# Patient Record
Sex: FEMALE | Race: NATIVE HAWAIIAN OR OTHER PACIFIC ISLANDER | NOT HISPANIC OR LATINO | Employment: OTHER | URBAN - METROPOLITAN AREA
[De-identification: names, ages, dates, MRNs, and addresses within clinical notes are randomized per-mention and may not be internally consistent; named-entity substitution may affect disease eponyms.]

---

## 2017-01-05 DIAGNOSIS — I27.20 PULMONARY HTN (H): Primary | ICD-10-CM

## 2017-01-05 DIAGNOSIS — I50.9 CONGESTIVE HEART FAILURE, UNSPECIFIED CONGESTIVE HEART FAILURE CHRONICITY, UNSPECIFIED CONGESTIVE HEART FAILURE TYPE: ICD-10-CM

## 2017-01-05 RX ORDER — METOPROLOL TARTRATE 100 MG
100 TABLET ORAL 2 TIMES DAILY
Qty: 180 TABLET | Refills: 3 | Status: SHIPPED | OUTPATIENT
Start: 2017-01-05 | End: 2018-01-05

## 2017-01-05 RX ORDER — LOSARTAN POTASSIUM 50 MG/1
50 TABLET ORAL 2 TIMES DAILY
Qty: 180 TABLET | Refills: 3 | Status: SHIPPED | OUTPATIENT
Start: 2017-01-05 | End: 2017-05-15

## 2017-01-12 ENCOUNTER — TELEPHONE (OUTPATIENT)
Dept: NURSING | Facility: CLINIC | Age: 82
End: 2017-01-12

## 2017-01-12 NOTE — TELEPHONE ENCOUNTER
Patient is due for INR.   Spoke with patient. She has not been able to get here.   She will not be able to come in until next week and states that she will call to make an appointment.     No further questions or concerns at this time.  Katerine Harden RN   [facility]- Float Nurse

## 2017-01-17 NOTE — TELEPHONE ENCOUNTER
glipiZIDE (GLUCOTROL XL) 5 MG 24 hr tablet      Last Written Prescription Date: 10/18/2016  Last Fill Quantity: 90, # refills: 0  Last Office Visit with G, P or [facility] prescribing provider:  12/19/2016        BP Readings from Last 3 Encounters:   12/19/16 120/62   10/27/16 108/74   08/09/16 114/73     MICROL      103   11/10/2015  No results found for this basename: microalbumin  CREATININE   Date Value Ref Range Status   12/19/2016 1.89* 0.52 - 1.04 mg/dL Final   ]  GFR ESTIMATE   Date Value Ref Range Status   12/19/2016 25* >60 mL/min/1.7m2 Final     Comment:     Non  GFR Calc   10/27/2016 33* >60 mL/min/1.7m2 Final     Comment:     Non  GFR Calc   07/12/2016 29* >60 mL/min/1.7m2 Final     Comment:     Non  GFR Calc     GFR ESTIMATE IF BLACK   Date Value Ref Range Status   12/19/2016 31* >60 mL/min/1.7m2 Final     Comment:      GFR Calc   10/27/2016 40* >60 mL/min/1.7m2 Final     Comment:      GFR Calc   07/12/2016 35* >60 mL/min/1.7m2 Final     Comment:      GFR Calc     CHOL      144   5/19/2016  HDL       64   5/19/2016  LDL       68   5/19/2016  TRIG       63   5/19/2016  CHOLHDLRATIO      2.5   7/11/2014  AST       15   5/23/2016  ALT       19   12/19/2016  A1C      8.9   12/19/2016  A1C      7.0   7/12/2016  A1C      7.6   4/5/2016  A1C      7.0   11/10/2015  A1C      6.9   4/15/2015  POTASSIUM   Date Value Ref Range Status   12/19/2016 4.1 3.4 - 5.3 mmol/L Final

## 2017-01-18 RX ORDER — GLIPIZIDE 5 MG/1
TABLET, FILM COATED, EXTENDED RELEASE ORAL
Qty: 90 TABLET | Refills: 0 | OUTPATIENT
Start: 2017-01-18

## 2017-01-25 ENCOUNTER — OFFICE VISIT (OUTPATIENT)
Dept: OPHTHALMOLOGY | Facility: CLINIC | Age: 82
End: 2017-01-25
Payer: COMMERCIAL

## 2017-01-25 DIAGNOSIS — N18.30 CONTROLLED TYPE 2 DIABETES MELLITUS WITH STAGE 3 CHRONIC KIDNEY DISEASE, WITHOUT LONG-TERM CURRENT USE OF INSULIN (H): Primary | ICD-10-CM

## 2017-01-25 DIAGNOSIS — H53.411: ICD-10-CM

## 2017-01-25 DIAGNOSIS — H52.4 PRESBYOPIA: ICD-10-CM

## 2017-01-25 DIAGNOSIS — Z96.1 PSEUDOPHAKIA: ICD-10-CM

## 2017-01-25 DIAGNOSIS — E11.22 CONTROLLED TYPE 2 DIABETES MELLITUS WITH STAGE 3 CHRONIC KIDNEY DISEASE, WITHOUT LONG-TERM CURRENT USE OF INSULIN (H): Primary | ICD-10-CM

## 2017-01-25 PROCEDURE — 92015 DETERMINE REFRACTIVE STATE: CPT | Performed by: OPHTHALMOLOGY

## 2017-01-25 PROCEDURE — 92014 COMPRE OPH EXAM EST PT 1/>: CPT | Performed by: OPHTHALMOLOGY

## 2017-01-25 ASSESSMENT — EXTERNAL EXAM - RIGHT EYE: OD_EXAM: 1+ BROW PTOSIS

## 2017-01-25 ASSESSMENT — REFRACTION_WEARINGRX
OD_ADD: +3.00
OS_AXIS: 153
OD_AXIS: 045
OS_CYLINDER: +0.50
OD_CYLINDER: +0.50
SPECS_TYPE: BIFOCAL
OD_SPHERE: -1.00
OS_SPHERE: -1.50
OS_ADD: +3.00

## 2017-01-25 ASSESSMENT — EXTERNAL EXAM - LEFT EYE: OS_EXAM: 1+ BROW PTOSIS

## 2017-01-25 ASSESSMENT — VISUAL ACUITY
METHOD: SNELLEN - LINEAR
OD_CC: J1
OD_CC: 20/20
OS_CC: 20/20-1
OS_CC: J1

## 2017-01-25 ASSESSMENT — REFRACTION_MANIFEST
OS_CYLINDER: +0.50
OS_AXIS: 120
OD_SPHERE: -1.00
OD_AXIS: 035
OD_CYLINDER: +0.50
OS_SPHERE: -1.50
OD_ADD: +3.00
OS_ADD: +3.00

## 2017-01-25 ASSESSMENT — TONOMETRY
OS_IOP_MMHG: 13
OD_IOP_MMHG: 12
IOP_METHOD: APPLANATION

## 2017-01-25 ASSESSMENT — CONF VISUAL FIELD
OD_NORMAL: 1
OS_NORMAL: 1

## 2017-01-25 ASSESSMENT — CUP TO DISC RATIO
OS_RATIO: 0.5
OD_RATIO: 0.5

## 2017-01-25 ASSESSMENT — SLIT LAMP EXAM - LIDS
COMMENTS: 1+, DERMATOCHALASIS
COMMENTS: 1+, DERMATOCHALASIS

## 2017-01-25 NOTE — MR AVS SNAPSHOT
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   Possible posterior vitreous detachment (sudden onset large floater and/or flashing lights) discussed.   Glasses Rx given -optional   Call in September 2017 for an appointment in January 2018 for Complete Exam    Dr. Tavares [phone]      Diabetes weakens the blood vessels all over the body, including the eyes. Damage to the blood vessels in the eyes can cause swelling or bleeding into part of the eye (called the retina). This is called diabetic retinopathy (DENISSE-tin--pu-thee). If not treated, this disease can cause vision loss or blindness.   Symptoms may include blurred or distorted vision, but many people have no symptoms. It's important to see your eye doctor regularly to check for problems.   Early treatment and good control can help protect your vision. Here are the things you can do to help prevent vision loss:      1. Keep your blood sugar levels under tight control.      2. Bring high blood pressure under control.      3. No smoking.      4. Have yearly dilated eye exams.         Follow-ups after your visit        Your next 10 appointments already scheduled     Feb 20, 2017  2:30 PM   New Patient Visit with Emilie Ren MD   [facility] ([facility])    [address] [place] [id number]   [phone]            May 11, 2017 11:30 AM   (Arrive by 11:15 AM)  "  RETURN PRIMARY PULMONARY with Akbar Thompson MD   [facility] ([facility] [place])    [address] [facility] [address] [phone]              Who to contact     If you have questions or need follow up information about today's clinic visit or your schedule please contact [facility] directly at [phone].  Normal or non-critical lab and imaging results will be communicated to you by MyChart, letter or phone within 4 business days after the clinic has received the results. If you do not hear from us within 7 days, please contact the clinic through MyChart or phone. If you have a critical or abnormal lab result, we will notify you by phone as soon as possible.  Submit refill requests through [employer] or call your pharmacy and they will forward the refill request to us. Please allow 3 business days for your refill to be completed.          Additional Information About Your Visit        [employer]ine[employer]Talkpush Information     [employer]OTMP lets you send messages to your doctor, view your test results, renew your prescriptions, schedule appointments and more. To sign up, go to [email or web address] . Click on \"Log in\" on the left side of the screen, which will take you to the Welcome page. Then click on \"Sign up Now\" on the right side of the page.     You will be asked to enter the access code listed below, as well as some personal information. Please follow the directions to create your username and password.     Your access code is: [email or web address]  Expires: 2017  3:53 PM     Your access code will  in 90 days. If you need help or a new code, please call your [facility] clinic or [phone].        Care EveryWhere ID     This is your Care EveryWhere ID. This could be used by other organizations to access your [facility] medical records  [id number]         Blood Pressure from Last 3 Encounters:   16 120/62   10/27/16 108/74   16 114/73    " Weight from Last 3 Encounters:   10/27/16 115.849 kg (255 lb 6.4 oz)   08/09/16 115.123 kg (253 lb 12.8 oz)   07/12/16 111.449 kg (245 lb 11.2 oz)              Today, you had the following     No orders found for display       Primary Care Provider Office Phone # Fax #    Steven Abrams [phone]       [address] [address]        Thank you!     Thank you for choosing [facility]  for your care. Our goal is always to provide you with excellent care. Hearing back from our patients is one way we can continue to improve our services. Please take a few minutes to complete the written survey that you may receive in the mail after your visit with us. Thank you!             Your Updated Medication List - Protect others around you: Learn how to safely use, store and throw away your medicines at www.disposemymeds.org.          This list is accurate as of: 1/25/17  3:53 PM.  Always use your most recent med list.                   Brand Name Dispense Instructions for use    ACCU-CHEK PARVEZ PLUS test strip   Generic drug:  blood glucose monitoring     100 each    TEST BLOOD GLUCOSE AS DIRECTED EVERY DAY       * albuterol 108 (90 BASE) MCG/ACT Inhaler    PROAIR HFA/PROVENTIL HFA/VENTOLIN HFA    3 Inhaler    Inhale 2 puffs into the lungs every 6 hours as needed for shortness of breath / dyspnea       * albuterol (2.5 MG/3ML) 0.083% neb solution     30 vial    Take 3 mLs (2.5 mg) by nebulization every 4 hours as needed for shortness of breath / dyspnea       * ASPIRIN NOT PRESCRIBED    INTENTIONAL    0 each    Antiplatelet medication not prescribed intentionally due to Current anticoagulant therapy (warfarin/enoxaparin)       budesonide-formoterol 80-4.5 MCG/ACT Inhaler    SYMBICORT    1 Inhaler    Inhale 2 puffs into the lungs 2 times daily       CALCIUM + D PO      Take 1 tablet by mouth 2 times daily.       diphenoxylate-atropine 2.5-0.025 MG per tablet     LOMOTIL    40 tablet    TAKE 1 TABLET BY MOUTH FOUR TIMES DAILY       doxycycline 100 MG tablet    VIBRA-TABS    20 tablet    Take 1 tablet (100 mg) by mouth 2 times daily       furosemide 40 MG tablet    LASIX    180 tablet    Take 1 tablet (40 mg) by mouth 2 times daily       * glipiZIDE 5 MG 24 hr tablet    GLUCOTROL XL    90 tablet    TAKE 1 TABLET BY MOUTH EVERY DAY       * glipiZIDE 10 MG 24 hr tablet    glipiZIDE XL    90 tablet    Take 1 tablet (10 mg) by mouth daily       guaiFENesin-codeine 100-10 MG/5ML Soln solution    ROBITUSSIN AC    120 mL    Take 10 mLs by mouth every 4 hours as needed for cough       HYDROcodone-acetaminophen 5-325 MG per tablet    NORCO    40 tablet    Take 1 tablet by mouth 4 times daily as needed       * losartan 25 MG tablet    COZAAR    180 tablet    TAKE 1 TABLET BY MOUTH TWICE DAILY       * losartan 50 MG tablet    COZAAR    180 tablet    Take 1 tablet (50 mg) by mouth 2 times daily .  Please note change in tablet strength.       metoprolol 100 MG tablet    LOPRESSOR    180 tablet    Take 1 tablet (100 mg) by mouth 2 times daily NOTE TABLET STRENGTH CHANGE-1 TABLET TWICE DAILY       mupirocin 2 % ointment    BACTROBAN    22 g    Use 3 times a day to affected area for one week       OMEPRAZOLE CPCR 20 MG OR      1 CAPSULE DAILY       * order for DME     1 Device    nebuliser       order for DME     1 each    Reasoning for requesting the hospital bed: Patient had multiple complications following hip replacement(blood clot/infection/removal of hip replacement) uses walker to ambulate now,she has a very hard time trying to lift her right foot. Hard to get herself into bed because of the height. She lives alone.  She uses devices at home to get herself into bed( step stool/extension to help lift her leg).Would also like the side rails       PARoxetine 20 MG tablet    PAXIL    90 tablet    TAKE 1 TABLET BY MOUTH EVERY DAY       potassium chloride 10 MEQ tablet    K-TAB,KLOR-CON     180 tablet    Take 2 tablets daily       predniSONE 20 MG tablet    DELTASONE    10 tablet    Take 1 tablet (20 mg) by mouth 2 times daily       Resveratrol 100 MG Caps      Take 1 capsule by mouth daily       * STATIN NOT PRESCRIBED (INTENTIONAL)     0 each    1 each continuous prn. Statin not prescribed intentionally due to Other: patient has normal LDL within recommended guidelines on no medications       TYLENOL 500 MG tablet   Generic drug:  acetaminophen      Take 2 tablets by mouth 3 times daily as needed.       vitamin B complex with vitamin C Tabs tablet      Take 1 tablet by mouth daily.       warfarin 5 MG tablet    COUMADIN    90 tablet    TAKE 1/2 TABLET ON MONDAY AND FRIDAY AND 1 TABLET BY MOUTH ON ALL OTHER DAYS       * Notice:  This list has 9 medication(s) that are the same as other medications prescribed for you. Read the directions carefully, and ask your doctor or other care provider to review them with you.

## 2017-01-25 NOTE — PATIENT INSTRUCTIONS
Possible clouding of posterior capsule discussed.   Possible posterior vitreous detachment (sudden onset large floater and/or flashing lights) discussed.   Glasses Rx given -optional   Call in September 2017 for an appointment in January 2018 for Complete Exam    Dr. Tavares [phone]      Diabetes weakens the blood vessels all over the body, including the eyes. Damage to the blood vessels in the eyes can cause swelling or bleeding into part of the eye (called the retina). This is called diabetic retinopathy ([place]-tin--[facility]-thee). If not treated, this disease can cause vision loss or blindness.   Symptoms may include blurred or distorted vision, but many people have no symptoms. It's important to see your eye doctor regularly to check for problems.   Early treatment and good control can help protect your vision. Here are the things you can do to help prevent vision loss:      1. Keep your blood sugar levels under tight control.      2. Bring high blood pressure under control.      3. No smoking.      4. Have yearly dilated eye exams.

## 2017-01-25 NOTE — PROGRESS NOTES
Current Eye Medications:       Subjective:  DIABETIC EYE EXAM   Pt report she sees good. Pt reports that about 6 wks ago she had a broken blood vessel right eye , patient has had several for these in the past.    A1C      8.9   12/19/2016  A1C      7.0   7/12/2016  A1C      7.6   4/5/2016  A1C      7.0   11/10/2015  A1C      6.9   4/15/2015     Objective:  See Ophthalmology Exam.       Assessment:  Stable eye exam.  No diabetic retinopathy.      ICD-10-CM    1. 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 Pseudophakia, ou Z96.1    3. 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 Presbyopia H52.4 REFRACTIVE STATUS        Plan: Possible clouding of posterior capsule discussed.   Possible posterior vitreous detachment (sudden onset large floater and/or flashing lights) discussed.   Glasses Rx given -optional   Call in September 2017 for an appointment in January 2018 for Complete Exam    Dr. Tavares [phone]

## 2017-02-02 NOTE — TELEPHONE ENCOUNTER
Spoke to patient and informed her she is overdue for INR.  Appointment scheduled for 2/6.   No further questions at this time.  Jack Corral RN  [facility]

## 2017-02-06 ENCOUNTER — ANTICOAGULATION THERAPY VISIT (OUTPATIENT)
Dept: NURSING | Facility: CLINIC | Age: 82
End: 2017-02-06
Payer: COMMERCIAL

## 2017-02-06 DIAGNOSIS — Z79.01 LONG-TERM (CURRENT) USE OF ANTICOAGULANTS: Primary | ICD-10-CM

## 2017-02-06 LAB — INR POINT OF CARE: 2.5 (ref 0.86–1.14)

## 2017-02-06 PROCEDURE — 85610 PROTHROMBIN TIME: CPT | Mod: QW

## 2017-02-06 PROCEDURE — 99207 ZZC NO CHARGE NURSE ONLY: CPT

## 2017-02-06 PROCEDURE — 36416 COLLJ CAPILLARY BLOOD SPEC: CPT

## 2017-02-06 NOTE — PROGRESS NOTES
ANTICOAGULATION FOLLOW-UP CLINIC VISIT    Patient Name:  Mara Colindres  Date:  2/6/2017  Contact Type:  Face to Face    SUBJECTIVE:     Patient Findings     Positives No Problem Findings           OBJECTIVE    INR PROTIME   Date Value Ref Range Status   02/06/2017 2.5* 0.86 - 1.14 Final       ASSESSMENT / PLAN  INR assessment THER    Recheck INR In: 2 WEEKS    INR Location Clinic      Anticoagulation Summary as of 2/6/2017     INR goal 2.0-3.0   Selected INR 2.5 (2/6/2017)   Maintenance plan 2.5 mg (5 mg x 0.5) on Mon, Fri; 5 mg (5 mg x 1) all other days   Full instructions 2/8: 2.5 mg; 2/15: 2.5 mg; 2/22: 2.5 mg; Otherwise 2.5 mg on Mon, Fri; 5 mg all other days   Weekly total 30 mg   Plan last modified Katerine Harden RN (9/8/2016)   Next INR check 2/23/2017   Priority INR   Target end date     Indications   Long-term (current) use of anticoagulants [Z79.01] [Z79.01]  DVT (deep venous thrombosis) (H) [I82.409]         Anticoagulation Episode Summary     INR check location     Preferred lab     Send INR reminders to  [facility]    Comments       Anticoagulation Care Providers     Provider Role Specialty Phone number    Steven Abrams MD [facility] [phone]            See the Encounter Report to view Anticoagulation Flowsheet and Dosing Calendar (Go to Encounters tab in chart review, and find the Anticoagulation Therapy Visit)    RN reviewed patient's weekly warfarin dose.  Pt INR remains within therapeutic range.  Pt will continue with current dosing and monitoring as planned, based on physician directed care plan.    [facility]     Jack Corral, RN

## 2017-02-06 NOTE — MR AVS SNAPSHOT
Mara CHARLES Jens   2/6/2017 3:00 PM   Anticoagulation Therapy Visit    Description:  82 year old female   Provider:  [facility]   Department:  [facility]           INR as of 2/6/2017     Selected INR 2.5 (2/6/2017)      Anticoagulation Summary as of 2/6/2017     INR goal 2.0-3.0   Selected INR 2.5 (2/6/2017)   Full instructions 2.5 mg on Mon, Fri; 5 mg all other days   Next INR check 2/23/2017    Indications   Long-term (current) use of anticoagulants [Z79.01] [Z79.01]  DVT (deep venous thrombosis) (H) [I82.409]         Your next Anticoagulation Clinic appointment(s)     Feb 23, 2017  3:15 PM   Anticoagulation Visit with [facility]   [facility] ([facility]    [address] [facility] [address] [address] [phone]              Contact Numbers     [facility]  Please call [phone] to cancel and/or reschedule your appointment   Please call [phone] with any problems or questions regarding your therapy.        February 2017 Details    Sun Mon Tue Wed Thu Fri Sat        1               2               3               4                 5               6      2.5 mg   See details      7      5 mg         8      5 mg         9      5 mg         10      2.5 mg         11      5 mg           12      5 mg         13      2.5 mg         14      5 mg         15      5 mg         16      5 mg         17      2.5 mg         18      5 mg           19      5 mg         20      2.5 mg         21      5 mg         22      5 mg         23            24               25                 26               27               28                    Date Details   02/06 This INR check       Date of next INR:  2/23/2017         How to take your warfarin dose     To take:  2.5 mg Take 0.5 of a 5 mg tablet.    To take:  5 mg Take 1 of the 5 mg tablets.

## 2017-02-20 ENCOUNTER — OFFICE VISIT (OUTPATIENT)
Dept: NEPHROLOGY | Facility: CLINIC | Age: 82
End: 2017-02-20

## 2017-02-20 VITALS
HEART RATE: 92 BPM | DIASTOLIC BLOOD PRESSURE: 84 MMHG | SYSTOLIC BLOOD PRESSURE: 127 MMHG | BODY MASS INDEX: 44.99 KG/M2 | WEIGHT: 254 LBS

## 2017-02-20 DIAGNOSIS — E08.42 DIABETIC POLYNEUROPATHY ASSOCIATED WITH DIABETES MELLITUS DUE TO UNDERLYING CONDITION (H): Primary | ICD-10-CM

## 2017-02-20 DIAGNOSIS — I27.20 PULMONARY HTN (H): ICD-10-CM

## 2017-02-20 DIAGNOSIS — I89.0 LYMPHEDEMA: ICD-10-CM

## 2017-02-20 DIAGNOSIS — N18.30 CONTROLLED TYPE 2 DIABETES MELLITUS WITH STAGE 3 CHRONIC KIDNEY DISEASE, WITHOUT LONG-TERM CURRENT USE OF INSULIN (H): Primary | ICD-10-CM

## 2017-02-20 DIAGNOSIS — I48.19 PERSISTENT ATRIAL FIBRILLATION (H): ICD-10-CM

## 2017-02-20 DIAGNOSIS — N18.30 CKD (CHRONIC KIDNEY DISEASE) STAGE 3, GFR 30-59 ML/MIN (H): ICD-10-CM

## 2017-02-20 DIAGNOSIS — I82.401 ACUTE DEEP VEIN THROMBOSIS (DVT) OF RIGHT LOWER EXTREMITY, UNSPECIFIED VEIN (H): ICD-10-CM

## 2017-02-20 DIAGNOSIS — E11.22 CONTROLLED TYPE 2 DIABETES MELLITUS WITH STAGE 3 CHRONIC KIDNEY DISEASE, WITHOUT LONG-TERM CURRENT USE OF INSULIN (H): Primary | ICD-10-CM

## 2017-02-20 DIAGNOSIS — E66.01 MORBID OBESITY DUE TO EXCESS CALORIES (H): ICD-10-CM

## 2017-02-20 LAB
ALBUMIN UR-MCNC: NEGATIVE MG/DL
APPEARANCE UR: CLEAR
BILIRUB UR QL STRIP: NEGATIVE
COLOR UR AUTO: YELLOW
ERYTHROCYTE [DISTWIDTH] IN BLOOD BY AUTOMATED COUNT: 14.4 % (ref 10–15)
GLUCOSE UR STRIP-MCNC: NEGATIVE MG/DL
HCT VFR BLD AUTO: 42.3 % (ref 35–47)
HGB BLD-MCNC: 13.8 G/DL (ref 11.7–15.7)
HGB UR QL STRIP: ABNORMAL
KETONES UR STRIP-MCNC: NEGATIVE MG/DL
LEUKOCYTE ESTERASE UR QL STRIP: NEGATIVE
MCH RBC QN AUTO: 31.1 PG (ref 26.5–33)
MCHC RBC AUTO-ENTMCNC: 32.6 G/DL (ref 31.5–36.5)
MCV RBC AUTO: 95 FL (ref 78–100)
NITRATE UR QL: NEGATIVE
NON-SQ EPI CELLS #/AREA URNS LPF: ABNORMAL /LPF
PH UR STRIP: 5 PH (ref 5–7)
PLATELET # BLD AUTO: 252 10E9/L (ref 150–450)
RBC # BLD AUTO: 4.44 10E12/L (ref 3.8–5.2)
RBC #/AREA URNS AUTO: ABNORMAL /HPF (ref 0–2)
SP GR UR STRIP: 1.01 (ref 1–1.03)
URN SPEC COLLECT METH UR: ABNORMAL
UROBILINOGEN UR STRIP-ACNC: 0.2 EU/DL (ref 0.2–1)
WBC # BLD AUTO: 7.1 10E9/L (ref 4–11)
WBC #/AREA URNS AUTO: ABNORMAL /HPF (ref 0–2)

## 2017-02-20 PROCEDURE — 36415 COLL VENOUS BLD VENIPUNCTURE: CPT | Performed by: INTERNAL MEDICINE

## 2017-02-20 PROCEDURE — 84550 ASSAY OF BLOOD/URIC ACID: CPT | Performed by: INTERNAL MEDICINE

## 2017-02-20 PROCEDURE — 80053 COMPREHEN METABOLIC PANEL: CPT | Performed by: INTERNAL MEDICINE

## 2017-02-20 PROCEDURE — 84156 ASSAY OF PROTEIN URINE: CPT | Performed by: INTERNAL MEDICINE

## 2017-02-20 PROCEDURE — 83735 ASSAY OF MAGNESIUM: CPT | Performed by: INTERNAL MEDICINE

## 2017-02-20 PROCEDURE — 82784 ASSAY IGA/IGD/IGG/IGM EACH: CPT | Performed by: INTERNAL MEDICINE

## 2017-02-20 PROCEDURE — 81001 URINALYSIS AUTO W/SCOPE: CPT | Performed by: INTERNAL MEDICINE

## 2017-02-20 PROCEDURE — 86334 IMMUNOFIX E-PHORESIS SERUM: CPT | Performed by: INTERNAL MEDICINE

## 2017-02-20 PROCEDURE — 85027 COMPLETE CBC AUTOMATED: CPT | Performed by: INTERNAL MEDICINE

## 2017-02-20 PROCEDURE — 86335 IMMUNFIX E-PHORSIS/URINE/CSF: CPT | Performed by: INTERNAL MEDICINE

## 2017-02-20 PROCEDURE — 84100 ASSAY OF PHOSPHORUS: CPT | Performed by: INTERNAL MEDICINE

## 2017-02-20 NOTE — PROGRESS NOTES
Assessment and Plan:   82 year old female with history of CKD with SCr 1.2-1.5mg/dL, pulmonary hypertension, atrial fibrillation, DVT, diastolic dysfunction, diabetes x 10 years, complicated right hip replacement in 2004 with chronic infection and DVT, who presents for CKD evaluation.   1. CKD stage 3b/4- her SCr has been 1.2-1.5 but recently up to 1.9mg/dL. She has complex cardiorenal physiology with Afib, diastolic dysfunction and pulmonary hypertension. She has +ABILIO and Raynauds, invoking possibility of chronic interstitial process or autoimmune disease.   She has diabetes with neuropathy that could also explain some CKD.    - check urine analysis, immunofixation, assess proteinuria  - monitor with labs q 3-4 months  - no evidence for sarcoidosis  2. Anemia- borderline anemia, mild   3. Hypertension- well controlled, low normal BP, on ACE, BB and diuretic  4. Electrolytes/ acid-base- within normal  5. Bone- normal PTH (49) and normal calcium and phosphorus      Assessment and plan was discussed with patient and she voiced her understanding and agreement.    Consult:  Mara Colindres was seen in consultation at the request of Dr. Abrams for CKD management.    Reason for Visit:  Mara Colindres is a 82 year old female with CKD from diabetes and cardiac dysfunction, who presents for evaluation    HPI:  She is a pleasant female with history of diabetes x 10 years with mild neuropathy, no retinopathy,moderate pulmonary hypertension, diastolic heart failure, hypertension, atrial fibrillation, diabetes and chronic kidney disease. She has recently been following for ongoing management of pulmonary arterial hypertension and congestive heart failure. with CKD with SCr 1.2-1.5 mg/dL back to 2006 (eGFR 35-45 ml/min), 2004 had a right hip joint replacement with chronic infection and was on antibiotics for 10 years, and took herself off a few years ago.   She has dyspnea on exertion even with showering and other simple tasks.  She lives with her son who moved here from [place] at time of her 's death. She has a grandson going to [employer] for oil business.            Kidney Disease and Medical Hx:       h/o HTN: Yes   Usual BP: 100       h/o DM: Yes        h/o Protein in Urine: Yes        h/o Blood in Urine: No       h/o Kidney Stones:No       h/o UTI  No       h/o Chronic NSAID Use: No         Previous Transplant Hx:      No         Uremic Symptoms:       No    ROS:   A comprehensive review of systems was obtained and negative, except as noted in the HPI or PMH.    Active Medical Problems:  Patient Active Problem List   Diagnosis     Morbid obesity (H)     Hypertension goal BP (blood pressure) < 140/90     DVT (deep venous thrombosis) (H)     MRSA (methicillin resistant Staphylococcus aureus)     Chronic diarrhea     HYPERLIPIDEMIA LDL GOAL <100     Chronic anticoagulation     CKD (chronic kidney disease) stage 3, GFR 30-59 ml/min     Septic hip (H)     Lymphedema     Tubular adenoma of colon     Abdominal wall hematoma     Advanced directives, counseling/discussion     Umbilical hernia     History of [place] fever     Moderate persistent asthma     Type 2 diabetes, controlled, with renal manifestation (H)     Pseudophakia, ou     Hypovitaminosis D     Bilateral leg pain     Positive YAMINI     Raynaud phenomenon     Pulmonary HTN (H)     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     Scotoma involving central area in visual field, right     Failed total hip arthroplasty, sequela     Major depressive disorder, recurrent episode, mild (H)     Chronic diastolic congestive heart failure (H)     Long-term (current) use of anticoagulants [Z79.01]     Persistent atrial fibrillation (H)     Diabetic polyneuropathy associated with diabetes mellitus due to underlying condition (H)     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, without long-term current use of insulin (H)     PMH:   Medical  record was reviewed and PMH was discussed with patient and noted below.  Past Medical History   Diagnosis Date     A-fib (H)      Bilateral leg edema      fairly severe     Cataract      CHF (congestive heart failure) (H)      Chronic diarrhea      neg colonoscopy abt one year ago, even to the point of having fecal accidents     Chronic venous insufficiency      CKD (chronic kidney disease) stage 3, GFR 30-59 ml/min      Diabetic retinopathy (H)      DVT (deep venous thrombosis) (H)      GERD (gastroesophageal reflux disease)      Hyperlipidemia LDL goal <100 10/31/2010     Hypertension goal BP (blood pressure) < 140/90      Long-term (current) use of anticoagulants      Moderate persistent asthma 8/15/2012     MRSA (methicillin resistant Staphylococcus aureus)      postop hip     Obesity      S/P colonoscopy 2008 ( ?)     Septic hip (H)      sees an ID specialist Dr ANNEL DE LA CRUZ She remains on long-term suppressive antibiotic therapy using Minocin 50 mg twice daily     Stasis dermatitis      Type 2 diabetes, HbA1C goal < 8% (H)      Urinary incontinence, mixed      Vitiligo      PSH:   Past Surgical History   Procedure Laterality Date     C removal gallbladder       Appendectomy       Rotator cuff repair rt/lt  1/93     right     Tonsillectomy       Joint replacemtn, knee rt/lt  2000     bilateral     Joint replacement, hip rt/lt  2004     right     Colonoscopy  2008     Colonoscopy  8/20/2012     Procedure: COLONOSCOPY;  COLONOSCOPY, TUBULAR ADENOMA OF COLON, CHRONIC DIARRHEA;  Surgeon: Yobany Hinojosa MD;  Location: [facility] OR     Phacoemulsification with standard intraocular lens implant  8/2013     left eye; right eye       Family Hx:   Family History   Problem Relation Age of Onset     DIABETES Mother      CANCER Mother      Hypertension Mother      Glaucoma Mother      Hypertension Father      CANCER Son      thyroid cancer     Neurologic Disorder Sister      Alzheimer Disease Sister      Personal Hx:    Social History     Social History     Marital status:      Spouse name: Katja)     Number of children: 3     Years of education: N/A     Occupational History      Retired     Social History Main Topics     Smoking status: Never Smoker     Smokeless tobacco: Never Used     Alcohol use No     Drug use: No     Sexual activity: Not Currently     Other Topics Concern     Not on file     Social History Narrative    [place] in [place]       Allergies:  Allergies   Allergen Reactions     Atenolol Other (See Comments)     Arms became limp, almost stroke like symptoms per patient.   Tolerates metoprolol fine     Metformin      Side effects / gastrointestinal symptoms        Medications:  Prior to Admission medications    Medication Sig Start Date End Date Taking? Authorizing Provider   metoprolol (LOPRESSOR) 100 MG tablet Take 1 tablet (100 mg) by mouth 2 times daily NOTE TABLET STRENGTH CHANGE-1 TABLET TWICE DAILY 1/5/17   Akbar Thompson MD   losartan (COZAAR) 50 MG tablet Take 1 tablet (50 mg) by mouth 2 times daily .  Please note change in tablet strength. 1/5/17   Akbar Thompson MD   glipiZIDE (GLIPIZIDE XL) 10 MG 24 hr tablet Take 1 tablet (10 mg) by mouth daily 12/19/16   Steven Abrams MD   warfarin (COUMADIN) 5 MG tablet TAKE 1/2 TABLET ON MONDAY AND FRIDAY AND 1 TABLET BY MOUTH ON ALL OTHER DAYS 12/12/16   Ilene Davis APRN CNP   guaiFENesin-codeine (ROBITUSSIN AC) 100-10 MG/5ML SOLN solution Take 10 mLs by mouth every 4 hours as needed for cough 12/8/16   Steven Abrams MD   PARoxetine (PAXIL) 20 MG tablet TAKE 1 TABLET BY MOUTH EVERY DAY 11/8/16   Steven Abrams MD   diphenoxylate-atropine (LOMOTIL) 2.5-0.025 MG per tablet TAKE 1 TABLET BY MOUTH FOUR TIMES DAILY 11/2/16   Steven Abrams MD   furosemide (LASIX) 40 MG tablet Take 1 tablet (40 mg) by mouth 2 times daily 8/26/16   Akbar Thompson MD   losartan (COZAAR) 25 MG tablet TAKE 1 TABLET BY MOUTH TWICE DAILY 7/13/16    Steven Abrams MD   potassium chloride (K-DUR) 10 MEQ tablet Take 2 tablets daily 6/28/16   Sabrina Hicks APRN CNP   albuterol (2.5 MG/3ML) 0.083% nebulizer solution Take 3 mLs (2.5 mg) by nebulization every 4 hours as needed for shortness of breath / dyspnea 3/4/16   Steven Abrams MD   order for DME Reasoning for requesting the hospital bed: Patient had multiple complications following hip replacement(blood clot/infection/removal of hip replacement) uses walker to ambulate now,she has a very hard time trying to lift her right foot. Hard to get herself into bed because of the height. She lives alone.  She uses devices at home to get herself into bed( step stool/extension to help lift her leg).Would also like the side rails 1/13/16   Steven Abrams MD   ACCU-CHEK PARVEZ PLUS test strip TEST BLOOD GLUCOSE AS DIRECTED EVERY DAY 9/2/15   Steven Abrams MD   mupirocin (BACTROBAN) 2 % ointment Use 3 times a day to affected area for one week 6/10/15   Susie Villatoro MD   albuterol (PROAIR HFA, PROVENTIL HFA, VENTOLIN HFA) 108 (90 BASE) MCG/ACT inhaler Inhale 2 puffs into the lungs every 6 hours as needed for shortness of breath / dyspnea 4/15/15   Ilene Davis APRN CNP   budesonide-formoterol (SYMBICORT) 80-4.5 MCG/ACT inhaler Inhale 2 puffs into the lungs 2 times daily 4/15/15   Ilene Davis APRN CNP   acetaminophen (TYLENOL) 500 MG tablet Take 2 tablets by mouth 3 times daily as needed.    Reported, Patient   vitamin  B complex with vitamin C (VITAMIN  B COMPLEX) TABS Take 1 tablet by mouth daily.    Reported, Patient   Resveratrol 100 MG CAPS Take 1 capsule by mouth daily     Reported, Patient   STATIN NOT PRESCRIBED, INTENTIONAL, 1 each continuous prn. Statin not prescribed intentionally due to Other: patient has normal LDL within recommended guidelines on no medications 3/12/13   Steven Abrams MD   ASPIRIN NOT PRESCRIBED, INTENTIONAL, Antiplatelet medication not  prescribed intentionally due to Current anticoagulant therapy (warfarin/enoxaparin) 10/8/12   Steven Abrams MD   Calcium Carbonate-Vitamin D (CALCIUM + D PO) Take 1 tablet by mouth 2 times daily.    Unknown, Entered By History   ORDER FOR DME nebuliser 5/23/12   Chip Vernon MD   OMEPRAZOLE CPCR 20 MG OR 1 CAPSULE DAILY    Reported, Patient     Vitals:  /84 (BP Location: Right arm, Patient Position: Chair, Cuff Size: Adult Large)  Pulse 92  Wt 115.2 kg (254 lb)  BMI 44.99 kg/m2    Exam:  GENERAL APPEARANCE: alert and no distress  HENT: mouth without ulcers or lesions  LYMPHATICS: no cervical or supraclavicular nodes  RESP: lungs clear to auscultation - no rales, rhonchi or wheezes  CV: regular rhythm, normal rate, no rub, no murmur  EDEMA: + LE edema bilaterally, right >> L  ABDOMEN: soft, nondistended, nontender, bowel sounds normal  MS: extremities normal - no gross deformities noted, no evidence of inflammation in joints, no muscle tenderness  SKIN: no rash      Results:  No results found for this or any previous visit (from the past 336 hour(s)).  Component      Latest Ref Rng & Units 6/3/2016 10/27/2016 12/19/2016   Sodium      133 - 144 mmol/L 135 138 136   Potassium      3.4 - 5.3 mmol/L 4.3 4.0 4.1   Chloride      94 - 109 mmol/L 102 103 100   Carbon Dioxide      20 - 32 mmol/L 26 23 23   Anion Gap      3 - 14 mmol/L 7 12 13   Glucose      70 - 99 mg/dL 168 (H) 196 (H) 361 (H)   Urea Nitrogen      7 - 30 mg/dL 32 (H) 27 35 (H)   Creatinine      0.52 - 1.04 mg/dL 1.55 (H) 1.51 (H) 1.89 (H)   GFR Estimate      >60 mL/min/1.7m2 32 (L) 33 (L) 25 (L)   GFR Estimate If Black      >60 mL/min/1.7m2 39 (L) 40 (L) 31 (L)   Calcium      8.5 - 10.1 mg/dL 9.3 8.8 9.6   WBC      4.0 - 11.0 10e9/L  6.7    RBC Count      3.8 - 5.2 10e12/L  4.15    Hemoglobin      11.7 - 15.7 g/dL  12.6    Hematocrit      35.0 - 47.0 %  39.5    MCV      78 - 100 fl  95    MCH      26.5 - 33.0 pg  30.4    MCHC      31.5 - 36.5 g/dL   31.9    RDW      10.0 - 15.0 %  14.4    Platelet Count      150 - 450 10e9/L  232    N-Terminal Pro Bnp      0 - 450 pg/mL 1895 (H) 2796 (H)    Magnesium      1.6 - 2.3 mg/dL 1.8     Hemoglobin A1C      4.3 - 6.0 %   8.9 (H)   Parathyroid Hormone Intact      12 - 72 pg/mL   49   Alkaline Phosphatase      40 - 150 U/L   91   Phosphorus      2.5 - 4.5 mg/dL   2.9   Vitamin D Deficiency screening      20 - 75 ug/L   35   ALT      0 - 50 U/L   19

## 2017-02-20 NOTE — LETTER
2/20/2017       RE: Mara Colindres  [address] [facility] [address]     Dear Colleague,    Thank you for referring your patient, Mara Colindres, [facility] Please see a copy of my visit note below.    Assessment and Plan:   82 year old female with history of CKD with SCr 1.2-1.5mg/dL, pulmonary hypertension, atrial fibrillation, DVT, diastolic dysfunction, diabetes x 10 years, complicated right hip replacement in 2004 with chronic infection and DVT, who presents for CKD evaluation.   1. CKD stage 3b/4- her SCr has been 1.2-1.5 but recently up to 1.9mg/dL. She has complex cardiorenal physiology with Afib, diastolic dysfunction and pulmonary hypertension. She has +ABILIO and Raynauds, invoking possibility of chronic interstitial process or autoimmune disease.   She has diabetes with neuropathy that could also explain some CKD.    - check urine analysis, immunofixation, assess proteinuria  - monitor with labs q 3-4 months  - no evidence for sarcoidosis  2. Anemia- borderline anemia, mild   3. Hypertension- well controlled, low normal BP, on ACE, BB and diuretic  4. Electrolytes/ acid-base- within normal  5. Bone- normal PTH (49) and normal calcium and phosphorus      Assessment and plan was discussed with patient and she voiced her understanding and agreement.    Consult:  Mara Colindres was seen in consultation at the request of Dr. Abrams for CKD management.    Reason for Visit:  Mara Colindres is a 82 year old female with CKD from diabetes and cardiac dysfunction, who presents for evaluation    HPI:  She is a pleasant female with history of diabetes x 10 years with mild neuropathy, no retinopathy,moderate pulmonary hypertension, diastolic heart failure, hypertension, atrial fibrillation, diabetes and chronic kidney disease. She has recently been following for ongoing management of pulmonary arterial hypertension and congestive heart  failure. with CKD with SCr 1.2-1.5 mg/dL back to 2006 (eGFR 35-45 ml/min), 2004 had a right hip joint replacement with chronic infection and was on antibiotics for 10 years, and took herself off a few years ago.   She has dyspnea on exertion even with showering and other simple tasks. She lives with her son who moved here from [place] at time of her 's death. She has a grandson going to [place] for oil business.            Kidney Disease and Medical Hx:       h/o HTN: Yes   Usual BP: 100       h/o DM: Yes        h/o Protein in Urine: Yes        h/o Blood in Urine: No       h/o Kidney Stones:No       h/o UTI  No       h/o Chronic NSAID Use: No         Previous Transplant Hx:      No         Uremic Symptoms:       No    ROS:   A comprehensive review of systems was obtained and negative, except as noted in the HPI or PMH.    Active Medical Problems:  Patient Active Problem List   Diagnosis     Morbid obesity (H)     Hypertension goal BP (blood pressure) < 140/90     DVT (deep venous thrombosis) (H)     MRSA (methicillin resistant Staphylococcus aureus)     Chronic diarrhea     HYPERLIPIDEMIA LDL GOAL <100     Chronic anticoagulation     CKD (chronic kidney disease) stage 3, GFR 30-59 ml/min     Septic hip (H)     Lymphedema     Tubular adenoma of colon     Abdominal wall hematoma     Advanced directives, counseling/discussion     Umbilical hernia     History of [place] fever     Moderate persistent asthma     Type 2 diabetes, controlled, with renal manifestation (H)     Pseudophakia, ou     Hypovitaminosis D     Bilateral leg pain     Positive YAMINI     Raynaud phenomenon     Pulmonary HTN (H)     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     Scotoma involving central area in visual field, right     Failed total hip arthroplasty, sequela     Major depressive disorder, recurrent episode, mild (H)     Chronic diastolic congestive heart failure (H)     Long-term (current) use of anticoagulants  [Z79.01]     Persistent atrial fibrillation (H)     Diabetic polyneuropathy associated with diabetes mellitus due to underlying condition (H)     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, without long-term current use of insulin (H)     PMH:   Medical record was reviewed and PMH was discussed with patient and noted below.  Past Medical History   Diagnosis Date     A-fib (H)      Bilateral leg edema      fairly severe     Cataract      CHF (congestive heart failure) (H)      Chronic diarrhea      neg colonoscopy abt one year ago, even to the point of having fecal accidents     Chronic venous insufficiency      CKD (chronic kidney disease) stage 3, GFR 30-59 ml/min      Diabetic retinopathy (H)      DVT (deep venous thrombosis) (H)      GERD (gastroesophageal reflux disease)      Hyperlipidemia LDL goal <100 10/31/2010     Hypertension goal BP (blood pressure) < 140/90      Long-term (current) use of anticoagulants      Moderate persistent asthma 8/15/2012     MRSA (methicillin resistant Staphylococcus aureus)      postop hip     Obesity      S/P colonoscopy 2008 ( ?)     Septic hip (H)      sees an ID specialist Dr ANNEL DE LA CRUZ She remains on long-term suppressive antibiotic therapy using Minocin 50 mg twice daily     Stasis dermatitis      Type 2 diabetes, HbA1C goal < 8% (H)      Urinary incontinence, mixed      Vitiligo      PSH:   Past Surgical History   Procedure Laterality Date     C removal gallbladder       Appendectomy       Rotator cuff repair rt/lt  1/93     right     Tonsillectomy       Joint replacemtn, knee rt/lt  2000     bilateral     Joint replacement, hip rt/lt  2004     right     Colonoscopy  2008     Colonoscopy  8/20/2012     Procedure: COLONOSCOPY;  COLONOSCOPY, TUBULAR ADENOMA OF COLON, CHRONIC DIARRHEA;  Surgeon: Yobany Hinojosa MD;  Location: [facility] OR     Phacoemulsification with standard intraocular lens implant  8/2013     left eye; right eye        Family Hx:   Family History   Problem Relation Age of Onset     DIABETES Mother      CANCER Mother      Hypertension Mother      Glaucoma Mother      Hypertension Father      CANCER Son      thyroid cancer     Neurologic Disorder Sister      Alzheimer Disease Sister      Personal Hx:   Social History     Social History     Marital status:      Spouse name: Katja)     Number of children: 3     Years of education: N/A     Occupational History      Retired     Social History Main Topics     Smoking status: Never Smoker     Smokeless tobacco: Never Used     Alcohol use No     Drug use: No     Sexual activity: Not Currently     Other Topics Concern     Not on file     Social History Narrative    [place]       Allergies:  Allergies   Allergen Reactions     Atenolol Other (See Comments)     Arms became limp, almost stroke like symptoms per patient.   Tolerates metoprolol fine     Metformin      Side effects / gastrointestinal symptoms        Medications:  Prior to Admission medications    Medication Sig Start Date End Date Taking? Authorizing Provider   metoprolol (LOPRESSOR) 100 MG tablet Take 1 tablet (100 mg) by mouth 2 times daily NOTE TABLET STRENGTH CHANGE-1 TABLET TWICE DAILY 1/5/17   Akbar Thompson MD   losartan (COZAAR) 50 MG tablet Take 1 tablet (50 mg) by mouth 2 times daily .  Please note change in tablet strength. 1/5/17   Akbar Thompson MD   glipiZIDE (GLIPIZIDE XL) 10 MG 24 hr tablet Take 1 tablet (10 mg) by mouth daily 12/19/16   Steven Abrams MD   warfarin (COUMADIN) 5 MG tablet TAKE 1/2 TABLET ON MONDAY AND FRIDAY AND 1 TABLET BY MOUTH ON ALL OTHER DAYS 12/12/16   Ilene Davis APRN CNP   guaiFENesin-codeine (ROBITUSSIN AC) 100-10 MG/5ML SOLN solution Take 10 mLs by mouth every 4 hours as needed for cough 12/8/16   Steven Abrams MD   PARoxetine (PAXIL) 20 MG tablet TAKE 1 TABLET BY MOUTH EVERY DAY 11/8/16   Steven Abrams MD   diphenoxylate-atropine  (LOMOTIL) 2.5-0.025 MG per tablet TAKE 1 TABLET BY MOUTH FOUR TIMES DAILY 11/2/16   Steven Abrams MD   furosemide (LASIX) 40 MG tablet Take 1 tablet (40 mg) by mouth 2 times daily 8/26/16   Akbar Thompson MD   losartan (COZAAR) 25 MG tablet TAKE 1 TABLET BY MOUTH TWICE DAILY 7/13/16   Steven Abrams MD   potassium chloride (K-DUR) 10 MEQ tablet Take 2 tablets daily 6/28/16   Sabrina Hicks APRN CNP   albuterol (2.5 MG/3ML) 0.083% nebulizer solution Take 3 mLs (2.5 mg) by nebulization every 4 hours as needed for shortness of breath / dyspnea 3/4/16   Steven Abrams MD   order for DME Reasoning for requesting the hospital bed: Patient had multiple complications following hip replacement(blood clot/infection/removal of hip replacement) uses walker to ambulate now,she has a very hard time trying to lift her right foot. Hard to get herself into bed because of the height. She lives alone.  She uses devices at home to get herself into bed( step stool/extension to help lift her leg).Would also like the side rails 1/13/16   Steven Abrams MD   ACCU-CHEK PARVEZ PLUS test strip TEST BLOOD GLUCOSE AS DIRECTED EVERY DAY 9/2/15   Steven Abrams MD   mupirocin (BACTROBAN) 2 % ointment Use 3 times a day to affected area for one week 6/10/15   Susie Villatoro MD   albuterol (PROAIR HFA, PROVENTIL HFA, VENTOLIN HFA) 108 (90 BASE) MCG/ACT inhaler Inhale 2 puffs into the lungs every 6 hours as needed for shortness of breath / dyspnea 4/15/15   Ilene Davis APRN CNP   budesonide-formoterol (SYMBICORT) 80-4.5 MCG/ACT inhaler Inhale 2 puffs into the lungs 2 times daily 4/15/15   Ilene Davis APRN CNP   acetaminophen (TYLENOL) 500 MG tablet Take 2 tablets by mouth 3 times daily as needed.    Reported, Patient   vitamin  B complex with vitamin C (VITAMIN  B COMPLEX) TABS Take 1 tablet by mouth daily.    Reported, Patient   Resveratrol 100 MG CAPS Take 1 capsule by mouth daily      Reported, Patient   STATIN NOT PRESCRIBED, INTENTIONAL, 1 each continuous prn. Statin not prescribed intentionally due to Other: patient has normal LDL within recommended guidelines on no medications 3/12/13   Steven Abrams MD   ASPIRIN NOT PRESCRIBED, INTENTIONAL, Antiplatelet medication not prescribed intentionally due to Current anticoagulant therapy (warfarin/enoxaparin) 10/8/12   Steven Abrams MD   Calcium Carbonate-Vitamin D (CALCIUM + D PO) Take 1 tablet by mouth 2 times daily.    Unknown, Entered By History   ORDER FOR DME nebuliser 5/23/12   Chip Vernon MD   OMEPRAZOLE CPCR 20 MG OR 1 CAPSULE DAILY    Reported, Patient     Vitals:  /84 (BP Location: Right arm, Patient Position: Chair, Cuff Size: Adult Large)  Pulse 92  Wt 115.2 kg (254 lb)  BMI 44.99 kg/m2    Exam:  GENERAL APPEARANCE: alert and no distress  HENT: mouth without ulcers or lesions  LYMPHATICS: no cervical or supraclavicular nodes  RESP: lungs clear to auscultation - no rales, rhonchi or wheezes  CV: regular rhythm, normal rate, no rub, no murmur  EDEMA: + LE edema bilaterally, right >> L  ABDOMEN: soft, nondistended, nontender, bowel sounds normal  MS: extremities normal - no gross deformities noted, no evidence of inflammation in joints, no muscle tenderness  SKIN: no rash      Results:  No results found for this or any previous visit (from the past 336 hour(s)).  Component      Latest Ref Rng & Units 6/3/2016 10/27/2016 12/19/2016   Sodium      133 - 144 mmol/L 135 138 136   Potassium      3.4 - 5.3 mmol/L 4.3 4.0 4.1   Chloride      94 - 109 mmol/L 102 103 100   Carbon Dioxide      20 - 32 mmol/L 26 23 23   Anion Gap      3 - 14 mmol/L 7 12 13   Glucose      70 - 99 mg/dL 168 (H) 196 (H) 361 (H)   Urea Nitrogen      7 - 30 mg/dL 32 (H) 27 35 (H)   Creatinine      0.52 - 1.04 mg/dL 1.55 (H) 1.51 (H) 1.89 (H)   GFR Estimate      >60 mL/min/1.7m2 32 (L) 33 (L) 25 (L)   GFR Estimate If Black      >60 mL/min/1.7m2 39 (L) 40 (L) 31  (L)   Calcium      8.5 - 10.1 mg/dL 9.3 8.8 9.6   WBC      4.0 - 11.0 10e9/L  6.7    RBC Count      3.8 - 5.2 10e12/L  4.15    Hemoglobin      11.7 - 15.7 g/dL  12.6    Hematocrit      35.0 - 47.0 %  39.5    MCV      78 - 100 fl  95    MCH      26.5 - 33.0 pg  30.4    MCHC      31.5 - 36.5 g/dL  31.9    RDW      10.0 - 15.0 %  14.4    Platelet Count      150 - 450 10e9/L  232    N-Terminal Pro Bnp      0 - 450 pg/mL 1895 (H) 2796 (H)    Magnesium      1.6 - 2.3 mg/dL 1.8     Hemoglobin A1C      4.3 - 6.0 %   8.9 (H)   Parathyroid Hormone Intact      12 - 72 pg/mL   49   Alkaline Phosphatase      40 - 150 U/L   91   Phosphorus      2.5 - 4.5 mg/dL   2.9   Vitamin D Deficiency screening      20 - 75 ug/L   35   ALT      0 - 50 U/L   19       Again, thank you for allowing me to participate in the care of your patient.      Sincerely,    Emilie Ren MD

## 2017-02-20 NOTE — MR AVS SNAPSHOT
After Visit Summary   2/20/2017    Mara Colindres    MRN: [id number]           Patient Information     Date Of Birth          11/30/1934        Visit Information        Provider Department      2/20/2017 2:30 PM Emilie Ren MD [facility]        Today's Diagnoses     Diabetic polyneuropathy associated with diabetes mellitus due to underlying condition (H)    -  1    Pulmonary HTN (H)        Morbid obesity due to excess calories (H)        Acute deep vein thrombosis (DVT) of right lower extremity, unspecified vein (H)        Persistent atrial fibrillation (H)        CKD (chronic kidney disease) stage 3, GFR 30-59 ml/min        Lymphedema           Follow-ups after your visit        Follow-up notes from your care team     Return in about 3 months (around 5/20/2017).      Your next 10 appointments already scheduled     Feb 23, 2017  3:15 PM CST   Anticoagulation Visit with FZ ANTI COAG   [facility]    [address] [facility] [address]   [phone]            May 11, 2017 11:30 AM CDT   (Arrive by 11:15 AM)   RETURN PRIMARY PULMONARY with Akbar Thompson MD   [facility] ([facility])    [address] [place] [id number] [phone]              Who to contact     Please call your clinic at [phone] to:    Ask questions about your health    Make or cancel appointments    Discuss your medicines    Learn about your test results    Speak to your doctor   If you have compliments or concerns about an experience at your clinic, or if you wish to file a complaint, please contact [facility] Patient Relations at [phone] or email us at [email or web address]         Additional Information About Your Visit        MyChart Information     [email or web address] is an electronic gateway that provides easy, online access to your medical records. With  dax Asparna, you can request a clinic appointment, read your test results, renew a prescription or communicate with your care team.     To sign up for dax Asparna visit the website at [email or web address]   You will be asked to enter the access code listed below, as well as some personal information. Please follow the directions to create your username and password.     Your access code is: [email or web address]  Expires: 2017  3:53 PM     Your access code will  in 90 days. If you need help or a new code, please contact your [facility] or call [phone] for assistance.        Care EveryWhere ID     This is your Care EveryWhere ID. This could be used by other organizations to access your [facility] medical records  [id number]        Your Vitals Were     Pulse BMI (Body Mass Index)                92 44.99 kg/m2           Blood Pressure from Last 3 Encounters:   17 127/84   16 120/62   10/27/16 108/74    Weight from Last 3 Encounters:   17 115.2 kg (254 lb)   10/27/16 115.8 kg (255 lb 6.4 oz)   16 115.1 kg (253 lb 12.8 oz)              Today, you had the following     No orders found for display         Today's Medication Changes          These changes are accurate as of: 17  5:41 PM.  If you have any questions, ask your nurse or doctor.               These medicines have changed or have updated prescriptions.        Dose/Directions    glipiZIDE 10 MG 24 hr tablet   Commonly known as:  glipiZIDE XL   This may have changed:  Another medication with the same name was removed. Continue taking this medication, and follow the directions you see here.   Used for:  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, without long-term current use of insulin (H)   Changed by:  Steven Abrams MD        Dose:  10 mg   Take 1 tablet (10 mg) by mouth daily   Quantity:  90 tablet   Refills:  1                Primary Care Provider Office Phone # Fax  #    Steven Abrams [phone]       [facility] [address] [place] [phone]        Thank you!     Thank you for choosing [facility]  for your care. Our goal is always to provide you with excellent care. Hearing back from our patients is one way we can continue to improve our services. Please take a few minutes to complete the written survey that you may receive in the mail after your visit with us. Thank you!             Your Updated Medication List - Protect others around you: Learn how to safely use, store and throw away your medicines at www.disposemymeds.org.          This list is accurate as of: 2/20/17  5:41 PM.  Always use your most recent med list.                   Brand Name Dispense Instructions for use    ACCU-CHEK PARVEZ PLUS test strip   Generic drug:  blood glucose monitoring     100 each    TEST BLOOD GLUCOSE AS DIRECTED EVERY DAY       * albuterol 108 (90 BASE) MCG/ACT Inhaler    PROAIR HFA/PROVENTIL HFA/VENTOLIN HFA    3 Inhaler    Inhale 2 puffs into the lungs every 6 hours as needed for shortness of breath / dyspnea       * albuterol (2.5 MG/3ML) 0.083% neb solution     30 vial    Take 3 mLs (2.5 mg) by nebulization every 4 hours as needed for shortness of breath / dyspnea       * ASPIRIN NOT PRESCRIBED    INTENTIONAL    0 each    Antiplatelet medication not prescribed intentionally due to Current anticoagulant therapy (warfarin/enoxaparin)       budesonide-formoterol 80-4.5 MCG/ACT Inhaler    SYMBICORT    1 Inhaler    Inhale 2 puffs into the lungs 2 times daily       CALCIUM + D PO      Take 1 tablet by mouth 2 times daily.       diphenoxylate-atropine 2.5-0.025 MG per tablet    LOMOTIL    40 tablet    TAKE 1 TABLET BY MOUTH FOUR TIMES DAILY       furosemide 40 MG tablet    LASIX    180 tablet    Take 1 tablet (40 mg) by mouth 2 times daily       glipiZIDE 10 MG 24 hr tablet    glipiZIDE XL    90 tablet    Take 1 tablet (10 mg) by mouth daily        guaiFENesin-codeine 100-10 MG/5ML Soln solution    ROBITUSSIN AC    120 mL    Take 10 mLs by mouth every 4 hours as needed for cough       * losartan 25 MG tablet    COZAAR    180 tablet    TAKE 1 TABLET BY MOUTH TWICE DAILY       * losartan 50 MG tablet    COZAAR    180 tablet    Take 1 tablet (50 mg) by mouth 2 times daily .  Please note change in tablet strength.       metoprolol 100 MG tablet    LOPRESSOR    180 tablet    Take 1 tablet (100 mg) by mouth 2 times daily NOTE TABLET STRENGTH CHANGE-1 TABLET TWICE DAILY       mupirocin 2 % ointment    BACTROBAN    22 g    Use 3 times a day to affected area for one week       OMEPRAZOLE CPCR 20 MG OR      1 CAPSULE DAILY       * order for DME     1 Device    nebuliser       order for DME     1 each    Reasoning for requesting the hospital bed: Patient had multiple complications following hip replacement(blood clot/infection/removal of hip replacement) uses walker to ambulate now,she has a very hard time trying to lift her right foot. Hard to get herself into bed because of the height. She lives alone.  She uses devices at home to get herself into bed( step stool/extension to help lift her leg).Would also like the side rails       PARoxetine 20 MG tablet    PAXIL    90 tablet    TAKE 1 TABLET BY MOUTH EVERY DAY       potassium chloride 10 MEQ tablet    K-TAB,KLOR-CON    180 tablet    Take 2 tablets daily       Resveratrol 100 MG Caps      Take 1 capsule by mouth daily       * STATIN NOT PRESCRIBED (INTENTIONAL)     0 each    1 each continuous prn. Statin not prescribed intentionally due to Other: patient has normal LDL within recommended guidelines on no medications       TYLENOL 500 MG tablet   Generic drug:  acetaminophen      Take 2 tablets by mouth 3 times daily as needed.       vitamin B complex with vitamin C Tabs tablet      Take 1 tablet by mouth daily.       warfarin 5 MG tablet    COUMADIN    90 tablet    TAKE 1/2 TABLET ON MONDAY AND FRIDAY AND 1 TABLET  BY MOUTH ON ALL OTHER DAYS       * Notice:  This list has 7 medication(s) that are the same as other medications prescribed for you. Read the directions carefully, and ask your doctor or other care provider to review them with you.

## 2017-02-21 LAB
ALBUMIN SERPL-MCNC: 4 G/DL (ref 3.4–5)
ALP SERPL-CCNC: 78 U/L (ref 40–150)
ALT SERPL W P-5'-P-CCNC: 20 U/L (ref 0–50)
ANION GAP SERPL CALCULATED.3IONS-SCNC: 10 MMOL/L (ref 3–14)
AST SERPL W P-5'-P-CCNC: 18 U/L (ref 0–45)
BILIRUB SERPL-MCNC: 1.4 MG/DL (ref 0.2–1.3)
BUN SERPL-MCNC: 21 MG/DL (ref 7–30)
CALCIUM SERPL-MCNC: 9.4 MG/DL (ref 8.5–10.1)
CHLORIDE SERPL-SCNC: 102 MMOL/L (ref 94–109)
CO2 SERPL-SCNC: 25 MMOL/L (ref 20–32)
CREAT SERPL-MCNC: 1.29 MG/DL (ref 0.52–1.04)
CREAT UR-MCNC: 36 MG/DL
GFR SERPL CREATININE-BSD FRML MDRD: 40 ML/MIN/1.7M2
GLUCOSE SERPL-MCNC: 193 MG/DL (ref 70–99)
MAGNESIUM SERPL-MCNC: 1.9 MG/DL (ref 1.6–2.3)
PHOSPHATE SERPL-MCNC: 2.6 MG/DL (ref 2.5–4.5)
POTASSIUM SERPL-SCNC: 4.1 MMOL/L (ref 3.4–5.3)
PROT SERPL-MCNC: 7.4 G/DL (ref 6.8–8.8)
PROT UR-MCNC: 0.07 G/L
PROT/CREAT 24H UR: 0.19 G/G CR (ref 0–0.2)
SODIUM SERPL-SCNC: 137 MMOL/L (ref 133–144)
URATE SERPL-MCNC: 5.3 MG/DL (ref 2.6–6)

## 2017-02-22 LAB
IGA SERPL-MCNC: 346 MG/DL (ref 70–380)
IGG SERPL-MCNC: 1010 MG/DL (ref 695–1620)
IGM SERPL-MCNC: 41 MG/DL (ref 60–265)
IMMUNOFIXATION ELP: ABNORMAL
PROT ELPH PNL UR ELPH: NORMAL

## 2017-02-23 ENCOUNTER — ANTICOAGULATION THERAPY VISIT (OUTPATIENT)
Dept: NURSING | Facility: CLINIC | Age: 82
End: 2017-02-23
Payer: COMMERCIAL

## 2017-02-23 DIAGNOSIS — Z79.01 LONG-TERM (CURRENT) USE OF ANTICOAGULANTS: ICD-10-CM

## 2017-02-23 DIAGNOSIS — I82.401 ACUTE DEEP VEIN THROMBOSIS (DVT) OF RIGHT LOWER EXTREMITY, UNSPECIFIED VEIN (H): ICD-10-CM

## 2017-02-23 LAB — INR POINT OF CARE: 2.2 (ref 0.86–1.14)

## 2017-02-23 PROCEDURE — 85610 PROTHROMBIN TIME: CPT | Mod: QW

## 2017-02-23 PROCEDURE — 99207 ZZC NO CHARGE NURSE ONLY: CPT

## 2017-02-23 PROCEDURE — 36416 COLLJ CAPILLARY BLOOD SPEC: CPT

## 2017-02-23 NOTE — MR AVS SNAPSHOT
Mara CHARLES Jens   2/23/2017 3:15 PM   Anticoagulation Therapy Visit    Description:  82 year old female   Provider:  STALIN ANTI COAG   Department:  [facility]           INR as of 2/23/2017     Today's INR 2.2      Anticoagulation Summary as of 2/23/2017     INR goal 2.0-3.0   Today's INR 2.2   Full instructions 2.5 mg on Mon, Fri; 5 mg all other days   Next INR check 3/23/2017    Indications   Long-term (current) use of anticoagulants [Z79.01] [Z79.01]  DVT (deep venous thrombosis) (H) [I82.409]         Your next Anticoagulation Clinic appointment(s)     Mar 23, 2017  3:00 PM CDT   Anticoagulation Visit with STALIN ANTI LAYO   [facility] ([facility]    [address] [place] [address] [phone]              Contact Numbers     [facility]  Please call [phone] to cancel and/or reschedule your appointment   Please call [phone] with any problems or questions regarding your therapy.        February 2017 Details    Sun Mon Tue Wed Thu Fri Sat        1               2               3               4                 5               6               7               8               9               10               11                 12               13               14               15               16               17               18                 19               20               21               22               23      5 mg   See details      24      2.5 mg         25      5 mg           26      5 mg         27      2.5 mg         28      5 mg              Date Details   02/23 This INR check               How to take your warfarin dose     To take:  2.5 mg Take 0.5 of a 5 mg tablet.    To take:  5 mg Take 1 of the 5 mg tablets.           March 2017 Details    Sun Mon Tue Wed Thu Fri Sat        1      5 mg         2      5 mg         3      2.5 mg         4      5 mg           5      5 mg         6      2.5 mg         7      5 mg         8      5 mg         9       5 mg         10      2.5 mg         11      5 mg           12      5 mg         13      2.5 mg         14      5 mg         15      5 mg         16      5 mg         17      2.5 mg         18      5 mg           19      5 mg         20      2.5 mg         21      5 mg         22      5 mg         23            24               25                 26               27               28               29               30               31                 Date Details   No additional details    Date of next INR:  3/23/2017         How to take your warfarin dose     To take:  2.5 mg Take 0.5 of a 5 mg tablet.    To take:  5 mg Take 1 of the 5 mg tablets.

## 2017-02-23 NOTE — PROGRESS NOTES
ANTICOAGULATION FOLLOW-UP CLINIC VISIT    Patient Name:  Mara Colindres  Date:  2/23/2017  Contact Type:  Face to Face    SUBJECTIVE:     Patient Findings     Positives No Problem Findings           OBJECTIVE    INR Protime   Date Value Ref Range Status   02/23/2017 2.2 (A) 0.86 - 1.14 Final       ASSESSMENT / PLAN  INR assessment THER    Recheck INR In: 4 WEEKS    INR Location Clinic      Anticoagulation Summary as of 2/23/2017     INR goal 2.0-3.0   Today's INR 2.2   Maintenance plan 2.5 mg (5 mg x 0.5) on Mon, Fri; 5 mg (5 mg x 1) all other days   Full instructions 2.5 mg on Mon, Fri; 5 mg all other days   Weekly total 30 mg   Plan last modified Katerine Harden RN (9/8/2016)   Next INR check 3/23/2017   Priority INR   Target end date     Indications   Long-term (current) use of anticoagulants [Z79.01] [Z79.01]  DVT (deep venous thrombosis) (H) [I82.409]         Anticoagulation Episode Summary     INR check location     Preferred lab     Send INR reminders to [facility]    Comments       Anticoagulation Care Providers     Provider Role Specialty Phone number    Steven Abrams MD [facility] [phone]            See the Encounter Report to view Anticoagulation Flowsheet and Dosing Calendar (Go to Encounters tab in chart review, and find the Anticoagulation Therapy Visit)        Shellie Conte RN

## 2017-03-23 ENCOUNTER — ANTICOAGULATION THERAPY VISIT (OUTPATIENT)
Dept: NURSING | Facility: CLINIC | Age: 82
End: 2017-03-23
Payer: COMMERCIAL

## 2017-03-23 DIAGNOSIS — Z79.01 LONG-TERM (CURRENT) USE OF ANTICOAGULANTS: ICD-10-CM

## 2017-03-23 LAB — INR POINT OF CARE: 3.1 (ref 0.86–1.14)

## 2017-03-23 PROCEDURE — 99207 ZZC NO CHARGE NURSE ONLY: CPT

## 2017-03-23 PROCEDURE — 85610 PROTHROMBIN TIME: CPT | Mod: QW

## 2017-03-23 PROCEDURE — 36416 COLLJ CAPILLARY BLOOD SPEC: CPT

## 2017-03-23 NOTE — MR AVS SNAPSHOT
Mara CHARLES Jens   3/23/2017 3:00 PM   Anticoagulation Therapy Visit    Description:  82 year old female   Provider:  STALIN ANTI COAG   Department:  [facility]           INR as of 3/23/2017     Today's INR 3.1!      Anticoagulation Summary as of 3/23/2017     INR goal 2.0-3.0   Today's INR 3.1!   Full instructions 2.5 mg on Mon, Wed, Fri; 5 mg all other days   Next INR check 4/20/2017    Indications   Long-term (current) use of anticoagulants [Z79.01] [Z79.01]  DVT (deep venous thrombosis) (H) [I82.409]         Your next Anticoagulation Clinic appointment(s)     Apr 20, 2017  2:30 PM CDT   Anticoagulation Visit with STALIN ANTI LAYO   [facility] ([address] [address] [phone]              Contact Numbers     [facility]  Please call [phone] to cancel and/or reschedule your appointment   Please call [phone] with any problems or questions regarding your therapy.        March 2017 Details    Sun Mon Tue Wed Thu Fri Sat        1               2               3               4                 5               6               7               8               9               10               11                 12               13               14               15               16               17               18                 19               20               21               22               23      5 mg   See details      24      2.5 mg         25      5 mg           26      5 mg         27      2.5 mg         28      5 mg         29      2.5 mg         30      5 mg         31      2.5 mg           Date Details   03/23 This INR check               How to take your warfarin dose     To take:  2.5 mg Take 0.5 of a 5 mg tablet.    To take:  5 mg Take 1 of the 5 mg tablets.           April 2017 Details    Sun Mon Tue Wed Thu Fri Sat           1      5 mg           2      5 mg         3      2.5 mg         4      5 mg         5      2.5 mg          6      5 mg         7      2.5 mg         8      5 mg           9      5 mg         10      2.5 mg         11      5 mg         12      2.5 mg         13      5 mg         14      2.5 mg         15      5 mg           16      5 mg         17      2.5 mg         18      5 mg         19      2.5 mg         20            21               22                 23               24               25               26               27               28               29                 30                      Date Details   No additional details    Date of next INR:  4/20/2017         How to take your warfarin dose     To take:  2.5 mg Take 0.5 of a 5 mg tablet.    To take:  5 mg Take 1 of the 5 mg tablets.

## 2017-03-23 NOTE — PROGRESS NOTES
ANTICOAGULATION FOLLOW-UP CLINIC VISIT    Patient Name:  Mara Colindres  Date:  3/23/2017  Contact Type:  Face to Face    SUBJECTIVE:     Patient Findings     Positives No Problem Findings    Comments Patient had been taking 2.5 mg M/W/F instead of just M/F and 5 mg ROW           OBJECTIVE    INR Protime   Date Value Ref Range Status   03/23/2017 3.1 (A) 0.86 - 1.14 Final       ASSESSMENT / PLAN  INR assessment THER    Recheck INR In: 4 WEEKS    INR Location Clinic      Anticoagulation Summary as of 3/23/2017     INR goal 2.0-3.0   Today's INR 3.1!   Maintenance plan 2.5 mg (5 mg x 0.5) on Mon, Wed, Fri; 5 mg (5 mg x 1) all other days   Full instructions 2.5 mg on Mon, Wed, Fri; 5 mg all other days   Weekly total 27.5 mg   Plan last modified Celeste Ruiz RN (3/23/2017)   Next INR check 4/20/2017   Priority INR   Target end date     Indications   Long-term (current) use of anticoagulants [Z79.01] [Z79.01]  DVT (deep venous thrombosis) (H) [I82.409]         Anticoagulation Episode Summary     INR check location     Preferred lab     Send INR reminders to [facility]    Comments       Anticoagulation Care Providers     Provider Role Specialty Phone number    Steven Abrams MD [facility] Internal Medicine [phone]            See the Encounter Report to view Anticoagulation Flowsheet and Dosing Calendar (Go to Encounters tab in chart review, and find the Anticoagulation Therapy Visit)    Dosage adjustment made based on physician directed care plan.    Celeste Ruiz, AMAYA

## 2017-04-02 DIAGNOSIS — I48.19 PERSISTENT ATRIAL FIBRILLATION (H): ICD-10-CM

## 2017-04-02 DIAGNOSIS — Z79.01 LONG-TERM (CURRENT) USE OF ANTICOAGULANTS: ICD-10-CM

## 2017-04-04 RX ORDER — WARFARIN SODIUM 5 MG/1
TABLET ORAL
Qty: 90 TABLET | Refills: 1 | Status: SHIPPED | OUTPATIENT
Start: 2017-04-04 | End: 2017-11-27

## 2017-04-04 NOTE — TELEPHONE ENCOUNTER
Prescription approved per [facility] Refill Protocol.  Celeste Ruiz, RN - BC

## 2017-04-04 NOTE — TELEPHONE ENCOUNTER
warfarin (COUMADIN) 5 MG tablet    Last Written Prescription Date: 12/12/16  Last Fill Qty: 90, # refills: 0  Last Office Visit with G, P or [facility] prescribing provider: 12/19/16       Date and Result of Last PT/INR:   Lab Results   Component Value Date    INR 3.1 03/23/2017    INR 2.2 02/23/2017    INR 1.87 05/26/2016    INR 2.02 05/25/2016

## 2017-04-20 NOTE — PROGRESS NOTES
"  SUBJECTIVE:                                                    Mara Colindres is a 82 year old female who presents to clinic today for the following health issues:      SOB/Chronic Coughing -- Patient states she has had \"hard\" coughing after she has a cold. She had episodes after vik and easter. Her colds appear 2 to 3 times a year, and coughing symptoms persist for one to sometimes 2 months after her cold ends. She relates she cannot take a deep breath. She states this has been happening for several years. She uses her nebulizer 3 to 4 times a day, which helps. She states she is producing phlegm and has rhinorrhea. She notes she was diagnosed with asthma. She states she could hardly get dressed because she feels fatigued. She feels like she is \"going to go to [employer] locker\" because of coughing. She states it is harder to breath in, but it does not feel like she is breathing through a straw. She notes she has not used a bronchodilator for a few months because she feels like they do not help. She denies sore throat, ear pain, or chest pain.    Osteoarthritis -- She states she has pain in her shoulders, hands, and hips. She is taking Hydrocodone, which she states she uses 4 times a week.    Hypertension -- Patient reports she threw away her blood pressure monitor.  BP Readings from Last 3 Encounters:   04/27/17 120/88   02/20/17 127/84   12/19/16 120/62     Diabetes Follow-up      Patient is checking blood sugars: occasional     Diabetic concerns: None     Symptoms of hypoglycemia (low blood sugar): none     Paresthesias (numbness or burning in feet) or sores: Yes      Date of last diabetic eye exam: within the last year     -- She has been compliant with Glipizide 10 MG qd. Her progress was discussed and the current treatment plan will be checked again in 3 months.  Lab Results   Component Value Date    A1C 8.3 04/27/2017    A1C 8.9 12/19/2016    A1C 7.0 07/12/2016    A1C 7.6 04/05/2016    A1C 7.0 " 11/10/2015          Amount of exercise or physical activity: None    Problems taking medications regularly: No    Medication side effects: none    Diet: low fat/cholesterol    Additional Notes -- She believes she has had a DEXA scan within the last 10 years. Her x-ray from 1 year ago showed no notable abnormal results.    Problem list and histories reviewed & adjusted, as indicated.  Additional history: as documented    Patient Active Problem List   Diagnosis     Morbid obesity (H)     Hypertension goal BP (blood pressure) < 140/90     DVT (deep venous thrombosis) (H)     MRSA (methicillin resistant Staphylococcus aureus)     Chronic diarrhea     HYPERLIPIDEMIA LDL GOAL <100     Chronic anticoagulation     CKD (chronic kidney disease) stage 3, GFR 30-59 ml/min     Septic hip (H)     Lymphedema     Tubular adenoma of colon     Abdominal wall hematoma     Advanced directives, counseling/discussion     Umbilical hernia     History of [facility] fever     Moderate persistent asthma     Type 2 diabetes, controlled, with renal manifestation (H)     Pseudophakia, ou     Hypovitaminosis D     Bilateral leg pain     Positive YAMINI     Raynaud phenomenon     Pulmonary HTN (H)     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     Scotoma involving central area in visual field, right     Failed total hip arthroplasty, sequela     Major depressive disorder, recurrent episode, mild (H)     Chronic diastolic congestive heart failure (H)     Long-term (current) use of anticoagulants [Z79.01]     Persistent atrial fibrillation (H)     Diabetic polyneuropathy associated with diabetes mellitus due to underlying condition (H)     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, without long-term current use of insulin (H)     Fatigue, unspecified type     Past Surgical History:   Procedure Laterality Date     APPENDECTOMY       C REMOVAL GALLBLADDER       COLONOSCOPY  2008     COLONOSCOPY  8/20/2012     Procedure: COLONOSCOPY;  COLONOSCOPY, TUBULAR ADENOMA OF COLON, CHRONIC DIARRHEA;  Surgeon: Yobany Hinojosa MD;  Location: [facility] OR     JOINT REPLACEMENT, HIP RT/LT  2004    right     JOINT REPLACEMTN, KNEE RT/LT  2000    bilateral     PHACOEMULSIFICATION WITH STANDARD INTRAOCULAR LENS IMPLANT  8/2013    left eye; right eye     ROTATOR CUFF REPAIR RT/LT  1/93    right     TONSILLECTOMY         Social History   Substance Use Topics     Smoking status: Never Smoker     Smokeless tobacco: Never Used     Alcohol use No     Family History   Problem Relation Age of Onset     DIABETES Mother      CANCER Mother      Hypertension Mother      Glaucoma Mother      Hypertension Father      CANCER Son      thyroid cancer     Neurologic Disorder Sister      Alzheimer Disease Sister          Current Outpatient Prescriptions   Medication Sig Dispense Refill     HYDROcodone-acetaminophen (NORCO) 5-325 MG per tablet TK 1 T PO  QID AS NEEDED 90 tablet 0     guaiFENesin-codeine (ROBITUSSIN AC) 100-10 MG/5ML SOLN solution Take 10 mLs by mouth every 4 hours as needed for cough 120 mL 3     predniSONE (DELTASONE) 20 MG tablet Take 1 tablet (20 mg) by mouth daily Take 3 tabs (60 mg) orally daily for 3 days, then Take 2 tabs (40 mg) orally daily for 3 days, then Take 1 tab (20 mg) orally daily for 3 days, then Take 1/2 tab (10 mg) orally for 3 days 20 tablet 0     warfarin (COUMADIN) 5 MG tablet Take 1/2 tablet (2.5 mg) Monday, Wednesday, and Friday, and take 1 tablet (5 mg) by mouth all other days 90 tablet 1     metoprolol (LOPRESSOR) 100 MG tablet Take 1 tablet (100 mg) by mouth 2 times daily NOTE TABLET STRENGTH CHANGE-1 TABLET TWICE DAILY 180 tablet 3     losartan (COZAAR) 50 MG tablet Take 1 tablet (50 mg) by mouth 2 times daily .  Please note change in tablet strength. 180 tablet 3     glipiZIDE (GLIPIZIDE XL) 10 MG 24 hr tablet Take 1 tablet (10 mg) by mouth daily 90 tablet 1     PARoxetine (PAXIL) 20 MG tablet TAKE 1 TABLET BY  MOUTH EVERY DAY 90 tablet 1     diphenoxylate-atropine (LOMOTIL) 2.5-0.025 MG per tablet TAKE 1 TABLET BY MOUTH FOUR TIMES DAILY 40 tablet 5     furosemide (LASIX) 40 MG tablet Take 1 tablet (40 mg) by mouth 2 times daily 180 tablet 3     losartan (COZAAR) 25 MG tablet TAKE 1 TABLET BY MOUTH TWICE DAILY 180 tablet 1     potassium chloride (K-DUR) 10 MEQ tablet Take 2 tablets daily 180 tablet 3     albuterol (2.5 MG/3ML) 0.083% nebulizer solution Take 3 mLs (2.5 mg) by nebulization every 4 hours as needed for shortness of breath / dyspnea 30 vial 0     order for DME Reasoning for requesting the hospital bed: Patient had multiple complications following hip replacement(blood clot/infection/removal of hip replacement) uses walker to ambulate now,she has a very hard time trying to lift her right foot. Hard to get herself into bed because of the height. She lives alone.  She uses devices at home to get herself into bed( step stool/extension to help lift her leg).Would also like the side rails 1 each 0     ACCU-CHEK PARVEZ PLUS test strip TEST BLOOD GLUCOSE AS DIRECTED EVERY  each 2     mupirocin (BACTROBAN) 2 % ointment Use 3 times a day to affected area for one week 22 g 1     albuterol (PROAIR HFA, PROVENTIL HFA, VENTOLIN HFA) 108 (90 BASE) MCG/ACT inhaler Inhale 2 puffs into the lungs every 6 hours as needed for shortness of breath / dyspnea 3 Inhaler 1     budesonide-formoterol (SYMBICORT) 80-4.5 MCG/ACT inhaler Inhale 2 puffs into the lungs 2 times daily 1 Inhaler 3     acetaminophen (TYLENOL) 500 MG tablet Take 2 tablets by mouth 3 times daily as needed.       vitamin  B complex with vitamin C (VITAMIN  B COMPLEX) TABS Take 1 tablet by mouth daily.       Resveratrol 100 MG CAPS Take 1 capsule by mouth daily        Calcium Carbonate-Vitamin D (CALCIUM + D PO) Take 1 tablet by mouth 2 times daily.       ORDER FOR DME nebuliser 1 Device 1     OMEPRAZOLE CPCR 20 MG OR 1 CAPSULE DAILY       STATIN NOT PRESCRIBED,  "INTENTIONAL, 1 each continuous prn. Statin not prescribed intentionally due to Other: patient has normal LDL within recommended guidelines on no medications (Patient not taking: Reported on 4/27/2017) 0 each 0     ASPIRIN NOT PRESCRIBED, INTENTIONAL, Antiplatelet medication not prescribed intentionally due to Current anticoagulant therapy (warfarin/enoxaparin) (Patient not taking: Reported on 4/27/2017) 0 each 3     Labs reviewed in EPIC    Reviewed and updated as needed this visit by clinical staff  Tobacco  Allergies  Meds  Med Hx  Surg Hx  Fam Hx  Soc Hx      Reviewed and updated as needed this visit by Provider         ROS:  Constitutional, HEENT, cardiovascular, pulmonary, GI, , musculoskeletal, neuro, skin, endocrine and psych systems are negative, except as otherwise noted.    This document serves as a record of the services and decisions personally performed and made by Steven Abrams MD. It was created on their behalf by Herbert Nguyen, a trained medical scribe. The creation of this document is based the provider's statements to the medical scribe.  Herbert Nguyen April 27, 2017 4:49 PM    OBJECTIVE:                                                    /88 (BP Location: Right arm, Cuff Size: Adult Large)  Pulse 104  Temp 98.6  F (37  C) (Oral)  Ht 1.6 m (5' 3\")  Wt 113.9 kg (251 lb)  SpO2 95%  Breastfeeding? No  BMI 44.46 kg/m2  Body mass index is 44.46 kg/(m^2).  GENERAL: healthy, alert and no distress, morbid obesity  EYES: Eyes grossly normal to inspection, PERRL and conjunctivae and sclerae normal  RESP: lungs reveal pronounced polyphonic wheezing primarily these are inspiratory, talking slowly and seems visibly dyspneic  Respiratory rate 18 breaths per minute  CV: regular rate and rhythm, normal S1 S2, no S3 or S4, no murmur, click or rub, no peripheral edema and peripheral pulses strong  MS: no gross musculoskeletal defects noted, significant 2+ bilateral edema, very obese and " using a motorized scooter type of vehicle   SKIN: no suspicious lesions or rashes to visible skin  NEURO: mentation intact and speech normal  PSYCH: mentation appears normal, affect normal/bright    Diagnostic Test Results:  Results for orders placed or performed in visit on 04/27/17   Spirometry, Breathing Capacity: Normal Order, Clinic Performed   Result Value Ref Range    FEV-1      FVC      FEV1/FVC      FEF 25/75     Hemoglobin A1c   Result Value Ref Range    Hemoglobin A1C 8.3 (H) 4.3 - 6.0 %        ASSESSMENT/PLAN:                                                      (J45.40) Moderate persistent asthma without complication  (primary encounter diagnosis)  Comment: her symptoms are most consistent with asthma which she has as a diagnosis on her problem list. Her current symptoms are most consistent with an asthma flare-up . I treated her with a course of prednisone in tapering doses and renewed her associated nebulizer supplies including albuterol as what she has is way out of date.   Plan: Spirometry, Breathing Capacity: Normal Order,         Clinic Performed, XR Chest 2 Views, predniSONE         (DELTASONE) 20 MG tablet        And further follow up depending on how things go     (Z13.89) Screening for diabetic peripheral neuropathy  Comment:   Plan: FOOT EXAM  NO CHARGE [95013.114]            (E11.22,  N18.3) 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, without long-term current use of insulin (H)  Comment: her hemoglobin a1c  [ diabetes test ] is improved. Recheck in 3 months   Plan: Albumin Random Urine Quantitative, Hemoglobin         A1c            (R05) Cough  Comment: refills provided   Plan: guaiFENesin-codeine (ROBITUSSIN AC) 100-10         MG/5ML SOLN solution, XR Chest 2 Views,         predniSONE (DELTASONE) 20 MG tablet            (M15.0) Primary osteoarthritis involving multiple joints  Comment: not a Chronic Pain Contract today . Using this medication quite  intermittent only  Plan: HYDROcodone-acetaminophen (NORCO) 5-325 MG per         tablet            (Z78.0) Asymptomatic menopausal state   Comment: recommended   Plan: DX Hip/Pelvis/Spine            (R53.83) Fatigue, unspecified type  Comment: multifactorial   Plan: Vitamin B12, Hemoglobin            (J45.41) Moderate persistent asthma with exacerbation  Comment: as above   Plan: albuterol (2.5 MG/3ML) 0.083% neb solution,         budesonide-formoterol (SYMBICORT) 80-4.5         MCG/ACT Inhaler              Patient Instructions   - Recheck A1c in 3 months.    - I will follow up with you about test results.    - Use Prednisone as directed.     - Follow up with me after Prednisone is finished.    The information in this document, created by the medical scribe for me, accurately reflects the services I personally performed and the decisions made by me. I have reviewed and approved this document for accuracy.   MD Steven Patterson MD  [facility]

## 2017-04-27 ENCOUNTER — OFFICE VISIT (OUTPATIENT)
Dept: FAMILY MEDICINE | Facility: CLINIC | Age: 82
End: 2017-04-27
Payer: COMMERCIAL

## 2017-04-27 ENCOUNTER — ANTICOAGULATION THERAPY VISIT (OUTPATIENT)
Dept: NURSING | Facility: CLINIC | Age: 82
End: 2017-04-27
Payer: COMMERCIAL

## 2017-04-27 ENCOUNTER — RADIANT APPOINTMENT (OUTPATIENT)
Dept: GENERAL RADIOLOGY | Facility: CLINIC | Age: 82
End: 2017-04-27
Attending: INTERNAL MEDICINE
Payer: COMMERCIAL

## 2017-04-27 VITALS
SYSTOLIC BLOOD PRESSURE: 120 MMHG | OXYGEN SATURATION: 95 % | TEMPERATURE: 98.6 F | BODY MASS INDEX: 44.47 KG/M2 | HEIGHT: 63 IN | DIASTOLIC BLOOD PRESSURE: 88 MMHG | WEIGHT: 251 LBS | HEART RATE: 104 BPM

## 2017-04-27 DIAGNOSIS — R53.83 FATIGUE, UNSPECIFIED TYPE: ICD-10-CM

## 2017-04-27 DIAGNOSIS — J45.41 MODERATE PERSISTENT ASTHMA WITH EXACERBATION: Primary | ICD-10-CM

## 2017-04-27 DIAGNOSIS — R05.9 COUGH: ICD-10-CM

## 2017-04-27 DIAGNOSIS — M15.0 PRIMARY OSTEOARTHRITIS INVOLVING MULTIPLE JOINTS: ICD-10-CM

## 2017-04-27 DIAGNOSIS — Z78.0 ASYMPTOMATIC MENOPAUSAL STATE: ICD-10-CM

## 2017-04-27 DIAGNOSIS — J45.40 MODERATE PERSISTENT ASTHMA WITHOUT COMPLICATION: ICD-10-CM

## 2017-04-27 DIAGNOSIS — Z13.89 SCREENING FOR DIABETIC PERIPHERAL NEUROPATHY: ICD-10-CM

## 2017-04-27 DIAGNOSIS — N18.30 CONTROLLED TYPE 2 DIABETES MELLITUS WITH STAGE 3 CHRONIC KIDNEY DISEASE, WITHOUT LONG-TERM CURRENT USE OF INSULIN (H): ICD-10-CM

## 2017-04-27 DIAGNOSIS — Z79.01 LONG-TERM (CURRENT) USE OF ANTICOAGULANTS: ICD-10-CM

## 2017-04-27 DIAGNOSIS — E11.22 CONTROLLED TYPE 2 DIABETES MELLITUS WITH STAGE 3 CHRONIC KIDNEY DISEASE, WITHOUT LONG-TERM CURRENT USE OF INSULIN (H): ICD-10-CM

## 2017-04-27 LAB
FEF 25/75: NORMAL
FEV-1: NORMAL
FEV1/FVC: NORMAL
FVC: NORMAL
HBA1C MFR BLD: 8.3 % (ref 4.3–6)
HGB BLD-MCNC: 13.7 G/DL (ref 11.7–15.7)
INR POINT OF CARE: 3.3 (ref 0.86–1.14)

## 2017-04-27 PROCEDURE — 99214 OFFICE O/P EST MOD 30 MIN: CPT | Mod: 25 | Performed by: INTERNAL MEDICINE

## 2017-04-27 PROCEDURE — 82607 VITAMIN B-12: CPT | Performed by: INTERNAL MEDICINE

## 2017-04-27 PROCEDURE — 36416 COLLJ CAPILLARY BLOOD SPEC: CPT

## 2017-04-27 PROCEDURE — 83036 HEMOGLOBIN GLYCOSYLATED A1C: CPT | Performed by: INTERNAL MEDICINE

## 2017-04-27 PROCEDURE — 71020 XR CHEST 2 VW: CPT

## 2017-04-27 PROCEDURE — 94010 BREATHING CAPACITY TEST: CPT | Performed by: INTERNAL MEDICINE

## 2017-04-27 PROCEDURE — 85018 HEMOGLOBIN: CPT | Performed by: INTERNAL MEDICINE

## 2017-04-27 PROCEDURE — 99207 ZZC NO CHARGE NURSE ONLY: CPT

## 2017-04-27 PROCEDURE — 85610 PROTHROMBIN TIME: CPT | Mod: QW

## 2017-04-27 PROCEDURE — 99207 C FOOT EXAM  NO CHARGE: CPT | Performed by: INTERNAL MEDICINE

## 2017-04-27 RX ORDER — HYDROCODONE BITARTRATE AND ACETAMINOPHEN 5; 325 MG/1; MG/1
TABLET ORAL
Qty: 90 TABLET | Refills: 0 | Status: SHIPPED | OUTPATIENT
Start: 2017-04-27 | End: 2018-04-11

## 2017-04-27 RX ORDER — BUDESONIDE AND FORMOTEROL FUMARATE DIHYDRATE 80; 4.5 UG/1; UG/1
2 AEROSOL RESPIRATORY (INHALATION) 2 TIMES DAILY
Qty: 1 INHALER | Refills: 3 | Status: SHIPPED | OUTPATIENT
Start: 2017-04-27 | End: 2017-09-12

## 2017-04-27 RX ORDER — PREDNISONE 20 MG/1
20 TABLET ORAL DAILY
Qty: 20 TABLET | Refills: 0 | Status: SHIPPED | OUTPATIENT
Start: 2017-04-27 | End: 2017-07-27

## 2017-04-27 RX ORDER — CODEINE PHOSPHATE AND GUAIFENESIN 10; 100 MG/5ML; MG/5ML
2 SOLUTION ORAL EVERY 4 HOURS PRN
Qty: 120 ML | Refills: 3 | Status: SHIPPED | OUTPATIENT
Start: 2017-04-27 | End: 2019-06-20

## 2017-04-27 RX ORDER — ALBUTEROL SULFATE 0.83 MG/ML
SOLUTION RESPIRATORY (INHALATION)
Qty: 30 VIAL | Refills: 0 | Status: SHIPPED | OUTPATIENT
Start: 2017-04-27 | End: 2019-12-09

## 2017-04-27 RX ORDER — HYDROCODONE BITARTRATE AND ACETAMINOPHEN 5; 325 MG/1; MG/1
TABLET ORAL
Refills: 0 | COMMUNITY
Start: 2016-06-30 | End: 2017-04-27

## 2017-04-27 ASSESSMENT — PAIN SCALES - GENERAL: PAINLEVEL: NO PAIN (0)

## 2017-04-27 NOTE — PATIENT INSTRUCTIONS
- Recheck A1c in 3 months.    - I will follow up with you about test results.    - Use Prednisone as directed.     - Follow up with me after Prednisone is finished.        [facility]    If you have any questions regarding to your visit please contact your care team:     Team Pink:   Clinic Hours Telephone Number   Internal Medicine:  Dr. Alaina Davis, NP       7am-7pm  Monday - Thursday   7am-5pm  Fridays  [phone]  (Appointment scheduling available 24/7)    Questions about your visit?  Team Line  [phone]   [facility] [place] [facility] [place]-9pm Monday-Friday Saturday-Sunday- 9am-5pm   [place] - 5pm-9pm Monday-Friday Saturday-Sunday- 9am-5pm  [phone] - [place]   [phone] - [place]       What options do I have for visits at the clinic other than the traditional office visit?  To expand how we care for you, many of our providers are utilizing electronic visits (e-visits) and telephone visits, when medically appropriate, for interactions with their patients rather than a visit in the clinic.   We also offer nurse visits for many medical concerns. Just like any other service, we will bill your insurance company for this type of visit based on time spent on the phone with your provider. Not all insurance companies cover these visits. Please check with your medical insurance if this type of visit is covered. You will be responsible for any charges that are not paid by your insurance.      E-visits via Vayyar:  generally incur a $35.00 fee.  Telephone visits:  Time spent on the phone: *charged based on time that is spent on the phone in increments of 10 minutes. Estimated cost:   5-10 mins $30.00   11-20 mins. $59.00   21-30 mins. $85.00   Use Vayyar (secure email communication and access to your chart) to send your primary care provider a message or make an appointment. Ask someone on your Team how to sign up for  Reggie.    For a Price Quote for your services, please call our Consumer Price Line at [phone].    As always, Thank you for trusting us with your health care needs!    Claudia Ornelas CMA

## 2017-04-27 NOTE — MR AVS SNAPSHOT
After Visit Summary   4/27/2017    Mara Colindres    MRN: [id number]           Patient Information     Date Of Birth          11/30/1934        Visit Information        Provider Department      4/27/2017 3:50 PM Steven Abrams MD [facility]        Today's Diagnoses     Moderate persistent asthma without complication    -  1    Screening for diabetic peripheral neuropathy        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, without long-term current use of insulin (H)        Cough        Primary osteoarthritis involving multiple joints        Asymptomatic menopausal state         Fatigue, unspecified type          Care Instructions    - Recheck A1c in 3 months.    - I will follow up with you about test results.    - Use Prednisone as directed.     - Follow up with me after Prednisone is finished.        [facility]    If you have any questions regarding to your visit please contact your care team:     Team Pink:   Clinic Hours Telephone Number   Internal Medicine:  Dr. Alaina Davis NP       7am-7pm  Monday - Thursday   7am-5pm  Fridays  [phone]  (Appointment scheduling available 24/7)    Questions about your visit?  Team Line  [phone]   Urgent Care - [place] - 11am-9pm Monday-Friday Saturday-Sunday- 9am-5pm   [place] - 5pm-9pm Monday-Friday Saturday-Sunday- 9am-5pm  [phone] - [place]   [phone] - [place]       What options do I have for visits at the clinic other than the traditional office visit?  To expand how we care for you, many of our providers are utilizing electronic visits (e-visits) and telephone visits, when medically appropriate, for interactions with their patients rather than a visit in the clinic.   We also offer nurse visits for many medical concerns. Just like any other service, we will bill your insurance company for this type of visit  based on time spent on the phone with your provider. Not all insurance companies cover these visits. Please check with your medical insurance if this type of visit is covered. You will be responsible for any charges that are not paid by your insurance.      E-visits via [employer]:  generally incur a $35.00 fee.  Telephone visits:  Time spent on the phone: *charged based on time that is spent on the phone in increments of 10 minutes. Estimated cost:   5-10 mins $30.00   11-20 mins. $59.00   21-30 mins. $85.00   Use [employer] (secure email communication and access to your chart) to send your primary care provider a message or make an appointment. Ask someone on your Team how to sign up for Algramo.    For a Price Quote for your services, please call our [employer] Price Line at [phone].    As always, Thank you for trusting us with your health care needs!    Claudia Ornelas CMA          Follow-ups after your visit        Your next 10 appointments already scheduled     May 11, 2017  3:15 PM CDT   Anticoagulation Visit with SRIDHAR ANTI COAG   [facility]    [address] [place] [address] [phone]            May 15, 2017  3:00 PM CDT   Return Visit with Emilie Ren MD   [facility] ([facility])    [address] [address] [phone]            May 24, 2017  2:30 PM CDT   (Arrive by 2:15 PM)   RETURN PRIMARY PULMONARY with Akbar Thompson MD   [facility] ([facility])    [address]  [address] [facility] [address] [phone]              Future tests that were ordered for you today     Open Future Orders        Priority Expected Expires Ordered    DX Hip/Pelvis/Spine Routine 4/27/2017 4/27/2018 4/27/2017    XR Chest 2 Views Routine 4/27/2017 4/27/2018 4/27/2017            Who to contact     If you have questions or need follow up information about today's  "clinic visit or your schedule please contact [facility] directly at [phone].  Normal or non-critical lab and imaging results will be communicated to you by MyChart, letter or phone within 4 business days after the clinic has received the results. If you do not hear from us within 7 days, please contact the clinic through [employer]bee[employer] or phone. If you have a critical or abnormal lab result, we will notify you by phone as soon as possible.  Submit refill requests through [employer] or call your pharmacy and they will forward the refill request to us. Please allow 3 business days for your refill to be completed.          Additional Information About Your Visit        MyChart Information     [employer] lets you send messages to your doctor, view your test results, renew your prescriptions, schedule appointments and more. To sign up, go to [email or web address] . Click on \"Log in\" on the left side of the screen, which will take you to the Welcome page. Then click on \"Sign up Now\" on the right side of the page.     You will be asked to enter the access code listed below, as well as some personal information. Please follow the directions to create your username and password.     Your access code is: [email or web address]  Expires: 2017  5:00 PM     Your access code will  in 90 days. If you need help or a new code, please call your [place] clinic or [phone].        Care EveryWhere ID     This is your Care EveryWhere ID. This could be used by other organizations to access your [place] medical records  [id number]        Your Vitals Were     Pulse Temperature Height Pulse Oximetry Breastfeeding? BMI (Body Mass Index)    104 98.6  F (37  C) (Oral) 5' 3\" (1.6 m) 95% No 44.46 kg/m2       Blood Pressure from Last 3 Encounters:   17 120/88   17 127/84   16 120/62    Weight from Last 3 Encounters:   17 251 lb (113.9 kg)   17 254 lb (115.2 kg)   10/27/16 255 lb 6.4 oz (115.8 kg)    "           We Performed the Following     Albumin Random Urine Quantitative     FOOT EXAM  NO CHARGE [06299.114]     Hemoglobin A1c     Hemoglobin     Spirometry, Breathing Capacity: Normal Order, Clinic Performed     Vitamin B12          Today's Medication Changes          These changes are accurate as of: 4/27/17  5:00 PM.  If you have any questions, ask your nurse or doctor.               Start taking these medicines.        Dose/Directions    predniSONE 20 MG tablet   Commonly known as:  DELTASONE   Used for:  Cough, Moderate persistent asthma without complication   Started by:  Steven Abrams MD        Dose:  20 mg   Take 1 tablet (20 mg) by mouth daily Take 3 tabs (60 mg) orally daily for 3 days, then Take 2 tabs (40 mg) orally daily for 3 days, then Take 1 tab (20 mg) orally daily for 3 days, then Take 1/2 tab (10 mg) orally for 3 days   Quantity:  20 tablet   Refills:  0         These medicines have changed or have updated prescriptions.        Dose/Directions    HYDROcodone-acetaminophen 5-325 MG per tablet   Commonly known as:  NORCO   This may have changed:  See the new instructions.   Used for:  Primary osteoarthritis involving multiple joints   Changed by:  Steven Abrams MD        TK 1 T PO  QID AS NEEDED   Quantity:  90 tablet   Refills:  0            Where to get your medicines      Some of these will need a paper prescription and others can be bought over the counter.  Ask your nurse if you have questions.     Bring a paper prescription for each of these medications     guaiFENesin-codeine 100-10 MG/5ML Soln solution    HYDROcodone-acetaminophen 5-325 MG per tablet    predniSONE 20 MG tablet                Primary Care Provider Office Phone # Fax #    Steven Abrams [phone]       [facility] [address] [address]        Thank you!     Thank you for choosing [facility]  for your care. Our goal is always to provide you with excellent  care. Hearing back from our patients is one way we can continue to improve our services. Please take a few minutes to complete the written survey that you may receive in the mail after your visit with us. Thank you!             Your Updated Medication List - Protect others around you: Learn how to safely use, store and throw away your medicines at www.disposemymeds.org.          This list is accurate as of: 4/27/17  5:00 PM.  Always use your most recent med list.                   Brand Name Dispense Instructions for use    ACCU-CHEK PARVEZ PLUS test strip   Generic drug:  blood glucose monitoring     100 each    TEST BLOOD GLUCOSE AS DIRECTED EVERY DAY       * albuterol 108 (90 BASE) MCG/ACT Inhaler    PROAIR HFA/PROVENTIL HFA/VENTOLIN HFA    3 Inhaler    Inhale 2 puffs into the lungs every 6 hours as needed for shortness of breath / dyspnea       * albuterol (2.5 MG/3ML) 0.083% neb solution     30 vial    Take 3 mLs (2.5 mg) by nebulization every 4 hours as needed for shortness of breath / dyspnea       * ASPIRIN NOT PRESCRIBED    INTENTIONAL    0 each    Antiplatelet medication not prescribed intentionally due to Current anticoagulant therapy (warfarin/enoxaparin)       budesonide-formoterol 80-4.5 MCG/ACT Inhaler    SYMBICORT    1 Inhaler    Inhale 2 puffs into the lungs 2 times daily       CALCIUM + D PO      Take 1 tablet by mouth 2 times daily.       diphenoxylate-atropine 2.5-0.025 MG per tablet    LOMOTIL    40 tablet    TAKE 1 TABLET BY MOUTH FOUR TIMES DAILY       furosemide 40 MG tablet    LASIX    180 tablet    Take 1 tablet (40 mg) by mouth 2 times daily       glipiZIDE 10 MG 24 hr tablet    glipiZIDE XL    90 tablet    Take 1 tablet (10 mg) by mouth daily       guaiFENesin-codeine 100-10 MG/5ML Soln solution    ROBITUSSIN AC    120 mL    Take 10 mLs by mouth every 4 hours as needed for cough       HYDROcodone-acetaminophen 5-325 MG per tablet    NORCO    90 tablet    TK 1 T PO  QID AS NEEDED       *  losartan 25 MG tablet    COZAAR    180 tablet    TAKE 1 TABLET BY MOUTH TWICE DAILY       * losartan 50 MG tablet    COZAAR    180 tablet    Take 1 tablet (50 mg) by mouth 2 times daily .  Please note change in tablet strength.       metoprolol 100 MG tablet    LOPRESSOR    180 tablet    Take 1 tablet (100 mg) by mouth 2 times daily NOTE TABLET STRENGTH CHANGE-1 TABLET TWICE DAILY       mupirocin 2 % ointment    BACTROBAN    22 g    Use 3 times a day to affected area for one week       OMEPRAZOLE CPCR 20 MG OR      1 CAPSULE DAILY       * order for DME     1 Device    nebuliser       order for DME     1 each    Reasoning for requesting the hospital bed: Patient had multiple complications following hip replacement(blood clot/infection/removal of hip replacement) uses walker to ambulate now,she has a very hard time trying to lift her right foot. Hard to get herself into bed because of the height. She lives alone.  She uses devices at home to get herself into bed( step stool/extension to help lift her leg).Would also like the side rails       PARoxetine 20 MG tablet    PAXIL    90 tablet    TAKE 1 TABLET BY MOUTH EVERY DAY       potassium chloride 10 MEQ tablet    K-TAB,KLOR-CON    180 tablet    Take 2 tablets daily       predniSONE 20 MG tablet    DELTASONE    20 tablet    Take 1 tablet (20 mg) by mouth daily Take 3 tabs (60 mg) orally daily for 3 days, then Take 2 tabs (40 mg) orally daily for 3 days, then Take 1 tab (20 mg) orally daily for 3 days, then Take 1/2 tab (10 mg) orally for 3 days       Resveratrol 100 MG Caps      Take 1 capsule by mouth daily       * STATIN NOT PRESCRIBED (INTENTIONAL)     0 each    1 each continuous prn. Statin not prescribed intentionally due to Other: patient has normal LDL within recommended guidelines on no medications       TYLENOL 500 MG tablet   Generic drug:  acetaminophen      Take 2 tablets by mouth 3 times daily as needed.       vitamin B complex with vitamin C Tabs tablet       Take 1 tablet by mouth daily.       warfarin 5 MG tablet    COUMADIN    90 tablet    Take 1/2 tablet (2.5 mg) Monday, Wednesday, and Friday, and take 1 tablet (5 mg) by mouth all other days       * Notice:  This list has 7 medication(s) that are the same as other medications prescribed for you. Read the directions carefully, and ask your doctor or other care provider to review them with you.

## 2017-04-27 NOTE — MR AVS SNAPSHOT
Mara CHARLES Jens   4/27/2017 3:15 PM   Anticoagulation Therapy Visit    Description:  82 year old female   Provider:  STALIN ANTI COAG   Department:  [facility]           INR as of 4/27/2017     Today's INR 3.3!      Anticoagulation Summary as of 4/27/2017     INR goal 2.0-3.0   Today's INR 3.3!   Full instructions 2.5 mg on Mon, Wed, Fri; 5 mg all other days   Next INR check 5/11/2017    Indications   Long-term (current) use of anticoagulants [Z79.01] [Z79.01]  DVT (deep venous thrombosis) (H) [I82.409]         Your next Anticoagulation Clinic appointment(s)     May 11, 2017  3:15 PM CDT   Anticoagulation Visit with STALIN ANTI LAYO   [facility] ([facility]    [address] [place] [address] [phone]              Contact Numbers     [facility]  Please call [phone] to cancel and/or reschedule your appointment   Please call [phone] with any problems or questions regarding your therapy.        April 2017 Details    Sun Mon Tue Wed Thu Fri Sat           1                 2               3               4               5               6               7               8                 9               10               11               12               13               14               15                 16               17               18               19               20               21               22                 23               24               25               26               27      5 mg   See details      28      2.5 mg         29      5 mg           30      5 mg                Date Details   04/27 This INR check               How to take your warfarin dose     To take:  2.5 mg Take 0.5 of a 5 mg tablet.    To take:  5 mg Take 1 of the 5 mg tablets.           May 2017 Details    Sun Mon Tue Wed u Fri Sat      1      2.5 mg         2      5 mg         3      2.5 mg         4      5 mg         5      2.5 mg         6      5 mg           7      5 mg          8      2.5 mg         9      5 mg         10      2.5 mg         11            12               13                 14               15               16               17               18               19               20                 21               22               23               24               25               26               27                 28               29               30               31                   Date Details   No additional details    Date of next INR:  5/11/2017         How to take your warfarin dose     To take:  2.5 mg Take 0.5 of a 5 mg tablet.    To take:  5 mg Take 1 of the 5 mg tablets.

## 2017-04-27 NOTE — PROGRESS NOTES
ANTICOAGULATION FOLLOW-UP CLINIC VISIT    Patient Name:  Mara Colindres  Date:  4/27/2017  Contact Type:  Face to Face    SUBJECTIVE:     Patient Findings     Positives No Problem Findings           OBJECTIVE    INR Protime   Date Value Ref Range Status   04/27/2017 3.3 (A) 0.86 - 1.14 Final       ASSESSMENT / PLAN  INR assessment SUPRA    Recheck INR In: 2 WEEKS    INR Location Clinic      Anticoagulation Summary as of 4/27/2017     INR goal 2.0-3.0   Today's INR 3.3!   Maintenance plan 2.5 mg (5 mg x 0.5) on Mon, Wed, Fri; 5 mg (5 mg x 1) all other days   Full instructions 2.5 mg on Mon, Wed, Fri; 5 mg all other days   Weekly total 27.5 mg   No change documented Celeste Ruiz RN   Plan last modified Celeste Ruiz RN (3/23/2017)   Next INR check 5/11/2017   Priority INR   Target end date     Indications   Long-term (current) use of anticoagulants [Z79.01] [Z79.01]  DVT (deep venous thrombosis) (H) [I82.409]         Anticoagulation Episode Summary     INR check location     Preferred lab     Send INR reminders to [facility]    Comments       Anticoagulation Care Providers     Provider Role Specialty Phone number    Steven Abrams MD [facility] Internal Medicine [phone]            See the Encounter Report to view Anticoagulation Flowsheet and Dosing Calendar (Go to Encounters tab in chart review, and find the Anticoagulation Therapy Visit)    Dosage adjustment made based on physician directed care plan.    Celeste Ruiz RN

## 2017-04-27 NOTE — NURSING NOTE
"Chief Complaint   Patient presents with     Diabetes     follow-up     Medication Request     pain meds       Initial /88 (BP Location: Right arm, Cuff Size: Adult Large)  Pulse 104  Temp 98.6  F (37  C) (Oral)  Ht 5' 3\" (1.6 m)  Wt 251 lb (113.9 kg)  SpO2 95%  Breastfeeding? No  BMI 44.46 kg/m2 Estimated body mass index is 44.46 kg/(m^2) as calculated from the following:    Height as of this encounter: 5' 3\" (1.6 m).    Weight as of this encounter: 251 lb (113.9 kg).  Medication Reconciliation: complete       Claudia Ornelas CMA      "

## 2017-04-28 LAB — VIT B12 SERPL-MCNC: 3481 PG/ML (ref 193–986)

## 2017-05-04 DIAGNOSIS — F33.0 MAJOR DEPRESSIVE DISORDER, RECURRENT EPISODE, MILD (H): ICD-10-CM

## 2017-05-04 NOTE — TELEPHONE ENCOUNTER
PARoxetine (PAXIL) 20 MG tablet     Last Written Prescription Date: 11/8/16  Last Fill Quantity: 90, # refills: 1  Last Office Visit with G primary care provider:  4/27/17        Last PHQ-9 score on record=   PHQ-9 SCORE 11/8/2016   Total Score -   Total Score 2

## 2017-05-05 NOTE — TELEPHONE ENCOUNTER
MA - please call and complete a PHQ-9 and reroute back to RN refill pool.    Celeste Ruiz RN - BC

## 2017-05-08 ENCOUNTER — TELEPHONE (OUTPATIENT)
Dept: FAMILY MEDICINE | Facility: CLINIC | Age: 82
End: 2017-05-08

## 2017-05-08 DIAGNOSIS — R05.9 COUGH: Primary | ICD-10-CM

## 2017-05-08 RX ORDER — PAROXETINE 20 MG/1
TABLET, FILM COATED ORAL
Qty: 90 TABLET | Refills: 0 | Status: SHIPPED | OUTPATIENT
Start: 2017-05-08 | End: 2017-07-27

## 2017-05-08 NOTE — TELEPHONE ENCOUNTER
I think we should set patient up for a follow up with one of our allergy specialists in order to get a better handle on her diagnosis and more effective treatment plan , Please help see to it that appointment is generated     OZ WALSH MD

## 2017-05-08 NOTE — TELEPHONE ENCOUNTER
Patient states she was supposed to follow-up and let you know once she has completed the Prednisone. Patient states she is feeling better but is still coughing and producing phlegm.      Claudia Ornelas, CMA

## 2017-05-08 NOTE — TELEPHONE ENCOUNTER
Spoke to patient in regards to message below. Completed PHQ9 over the phone with patient. Score=7. Please advise.    Claudia Ornelas CMA

## 2017-05-09 ASSESSMENT — PATIENT HEALTH QUESTIONNAIRE - PHQ9: SUM OF ALL RESPONSES TO PHQ QUESTIONS 1-9: 6

## 2017-05-09 NOTE — TELEPHONE ENCOUNTER
Spoke with patient.  She had a recent illness and is not sure if her symptoms are related to the recent illness or the depression.  Went over PHQ-9 again.  Score of 6.  Patient denies any thoughts of hurting self in any way.    She would like to stay on same dose.    Refill sent.  She declined an appointment but agreed to schedule if needed.    Soraida Marmolejo RN

## 2017-05-15 ENCOUNTER — OFFICE VISIT (OUTPATIENT)
Dept: NEPHROLOGY | Facility: CLINIC | Age: 82
End: 2017-05-15

## 2017-05-15 VITALS
SYSTOLIC BLOOD PRESSURE: 99 MMHG | HEART RATE: 104 BPM | WEIGHT: 255 LBS | DIASTOLIC BLOOD PRESSURE: 72 MMHG | BODY MASS INDEX: 45.17 KG/M2

## 2017-05-15 DIAGNOSIS — I50.32 CHRONIC DIASTOLIC CONGESTIVE HEART FAILURE (H): ICD-10-CM

## 2017-05-15 DIAGNOSIS — R06.02 SHORTNESS OF BREATH: ICD-10-CM

## 2017-05-15 DIAGNOSIS — N18.30 CONTROLLED TYPE 2 DIABETES MELLITUS WITH STAGE 3 CHRONIC KIDNEY DISEASE, WITHOUT LONG-TERM CURRENT USE OF INSULIN (H): ICD-10-CM

## 2017-05-15 DIAGNOSIS — I10 HYPERTENSION GOAL BP (BLOOD PRESSURE) < 140/90: ICD-10-CM

## 2017-05-15 DIAGNOSIS — I27.20 PULMONARY HTN (H): ICD-10-CM

## 2017-05-15 DIAGNOSIS — Z79.01 CHRONIC ANTICOAGULATION: ICD-10-CM

## 2017-05-15 DIAGNOSIS — N18.30 CKD (CHRONIC KIDNEY DISEASE) STAGE 3, GFR 30-59 ML/MIN (H): Primary | ICD-10-CM

## 2017-05-15 DIAGNOSIS — J45.41 MODERATE PERSISTENT ASTHMA WITH ACUTE EXACERBATION: Primary | ICD-10-CM

## 2017-05-15 DIAGNOSIS — N18.30 CKD (CHRONIC KIDNEY DISEASE) STAGE 3, GFR 30-59 ML/MIN (H): ICD-10-CM

## 2017-05-15 DIAGNOSIS — I48.19 PERSISTENT ATRIAL FIBRILLATION (H): ICD-10-CM

## 2017-05-15 DIAGNOSIS — R58 ECCHYMOSIS: ICD-10-CM

## 2017-05-15 DIAGNOSIS — E11.22 CONTROLLED TYPE 2 DIABETES MELLITUS WITH STAGE 3 CHRONIC KIDNEY DISEASE, WITHOUT LONG-TERM CURRENT USE OF INSULIN (H): ICD-10-CM

## 2017-05-15 DIAGNOSIS — I73.00 RAYNAUD'S PHENOMENON WITHOUT GANGRENE: ICD-10-CM

## 2017-05-15 LAB
ERYTHROCYTE [DISTWIDTH] IN BLOOD BY AUTOMATED COUNT: 14.7 % (ref 10–15)
HCT VFR BLD AUTO: 32.6 % (ref 35–47)
HGB BLD-MCNC: 10.7 G/DL (ref 11.7–15.7)
INR PPP: 3.3 (ref 0.86–1.14)
MCH RBC QN AUTO: 32 PG (ref 26.5–33)
MCHC RBC AUTO-ENTMCNC: 32.8 G/DL (ref 31.5–36.5)
MCV RBC AUTO: 98 FL (ref 78–100)
PLATELET # BLD AUTO: 284 10E9/L (ref 150–450)
RBC # BLD AUTO: 3.34 10E12/L (ref 3.8–5.2)
WBC # BLD AUTO: 8.3 10E9/L (ref 4–11)

## 2017-05-15 PROCEDURE — 86256 FLUORESCENT ANTIBODY TITER: CPT | Mod: 90 | Performed by: INTERNAL MEDICINE

## 2017-05-15 PROCEDURE — 86160 COMPLEMENT ANTIGEN: CPT | Performed by: INTERNAL MEDICINE

## 2017-05-15 PROCEDURE — 86038 ANTINUCLEAR ANTIBODIES: CPT | Performed by: INTERNAL MEDICINE

## 2017-05-15 PROCEDURE — 85610 PROTHROMBIN TIME: CPT | Performed by: INTERNAL MEDICINE

## 2017-05-15 PROCEDURE — 80053 COMPREHEN METABOLIC PANEL: CPT | Performed by: INTERNAL MEDICINE

## 2017-05-15 PROCEDURE — 83735 ASSAY OF MAGNESIUM: CPT | Performed by: INTERNAL MEDICINE

## 2017-05-15 PROCEDURE — 99000 SPECIMEN HANDLING OFFICE-LAB: CPT | Performed by: INTERNAL MEDICINE

## 2017-05-15 PROCEDURE — 84100 ASSAY OF PHOSPHORUS: CPT | Performed by: INTERNAL MEDICINE

## 2017-05-15 PROCEDURE — 36415 COLL VENOUS BLD VENIPUNCTURE: CPT | Performed by: INTERNAL MEDICINE

## 2017-05-15 PROCEDURE — 83880 ASSAY OF NATRIURETIC PEPTIDE: CPT | Performed by: INTERNAL MEDICINE

## 2017-05-15 PROCEDURE — 86235 NUCLEAR ANTIGEN ANTIBODY: CPT | Performed by: INTERNAL MEDICINE

## 2017-05-15 PROCEDURE — 85027 COMPLETE CBC AUTOMATED: CPT | Performed by: INTERNAL MEDICINE

## 2017-05-15 RX ORDER — LOSARTAN POTASSIUM 25 MG/1
25 TABLET ORAL 2 TIMES DAILY
Qty: 60 TABLET | Refills: 3 | Status: SHIPPED | OUTPATIENT
Start: 2017-05-15 | End: 2017-05-16

## 2017-05-15 NOTE — LETTER
5/15/2017      RE: Mara Colindres  [address] [facility] [address]       Assessment and Plan:   82 year old female with history of CKD with SCr 1.2-1.5mg/dL, pulmonary hypertension, atrial fibrillation, DVT, diastolic dysfunction, diabetes x 10 years, complicated right hip replacement in 2004 with chronic infection and DVT, who presents for CKD evaluation.   1. CKD stage 3b/4- her SCr has been 1.2-1.5 but was up to 1.9mg/dL winter 2016 but back down in February 2017. She has complex cardiorenal physiology with Afib, diastolic dysfunction and pulmonary hypertension. She has +ABILIO and Raynauds, invoking possibility of chronic interstitial process or autoimmune disease.   - will check serology today  She has diabetes with neuropathy that could also explain some CKD.    - she is non proteinuric  -  F/u renal function today  2. Anemia- borderline anemia, mild   3. Hypertension- well controlled, low normal BP, on ACE, BB and diuretic-    - BP today 99 systolic and she is symptomatic (dizziness)  - lower losartan to 25mg bid, improve BP and perfusion, ? Better response to furosemide, she will take it regularly the next few days (bid for next couple days at least) and see if 2-3 lbs lower will help her breathing.  -check pro BNP  4. Electrolytes/ acid-base- within normal  5. Bone- normal PTH (49) and normal calcium and phosphorus  6. Ecchymosis- ? Spontaneous bleed on coumadin (no trauma per patient), check INR and hgb today    Assessment and plan was discussed with patient and she voiced her understanding and agreement.    Reason for Visit:  Mara Colindres is a 82 year old female with CKD from diabetes and cardiac dysfunction, who presents for followup    HPI:  She is a pleasant female with history of diabetes x 10 years with mild neuropathy, no retinopathy,moderate pulmonary hypertension, diastolic heart failure, hypertension, atrial fibrillation, diabetes and chronic kidney disease. She has recently been  following for ongoing management of pulmonary arterial hypertension and congestive heart failure. with CKD with SCr 1.2-1.5 mg/dL back to 2006 (eGFR 35-45 ml/min). In 2004 she had a right hip joint replacement with chronic infection and was on antibiotics for 10 years, and took herself off a few years ago.   She also has history of + ABILIO (2.2) in 2014 and Raynaud's.  She has dyspnea on exertion even with showering and other simple tasks. She lives with her son who moved here from [place] at time of her 's death. She has a grandson going to [place] for oil business.   She is on motor scooter, unable to walk due to hip.  Today she returns for followup. Her last creatinine was 1.29mg/dL which was back to her baseline. She complains of significant shortness of breath, was treated for bronchitis with steroids in April. She is on the high side of her weight range. She sometimes skips her furosemide if she is going anywhere.  She has felt dizzy for the last few days. Her blood pressure is 99 systolic and HR near 100 (usually lower). Her weight is up on higher side of her normal range.   She also has a bruise on her right hip though does not recall falling or injuring herself. She is on coumadin for DVT history   She is seeing DR Thompson next week for pulmonary hypertension followup    ROS:   A comprehensive review of systems was obtained and negative, except as noted in the HPI or PMH.    Active Medical Problems:  Patient Active Problem List   Diagnosis     Morbid obesity (H)     Hypertension goal BP (blood pressure) < 140/90     DVT (deep venous thrombosis) (H)     MRSA (methicillin resistant Staphylococcus aureus)     Chronic diarrhea     HYPERLIPIDEMIA LDL GOAL <100     Chronic anticoagulation     CKD (chronic kidney disease) stage 3, GFR 30-59 ml/min     Septic hip (H)     Lymphedema     Tubular adenoma of colon     Abdominal wall hematoma     Advanced directives, counseling/discussion     Umbilical  hernia     History of [place] fever     Moderate persistent asthma     Type 2 diabetes, controlled, with renal manifestation (H)     Pseudophakia, ou     Hypovitaminosis D     Bilateral leg pain     Positive YAMINI     Raynaud phenomenon     Pulmonary HTN (H)     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     Scotoma involving central area in visual field, right     Failed total hip arthroplasty, sequela     Major depressive disorder, recurrent episode, mild (H)     Chronic diastolic congestive heart failure (H)     Long-term (current) use of anticoagulants [Z79.01]     Persistent atrial fibrillation (H)     Diabetic polyneuropathy associated with diabetes mellitus due to underlying condition (H)     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, without long-term current use of insulin (H)     Fatigue, unspecified type     PMH:   Medical record was reviewed and PMH was discussed with patient and noted below.  Past Medical History:   Diagnosis Date     A-fib (H)      Bilateral leg edema     fairly severe     Cataract      CHF (congestive heart failure) (H)      Chronic diarrhea     neg colonoscopy abt one year ago, even to the point of having fecal accidents     Chronic venous insufficiency      CKD (chronic kidney disease) stage 3, GFR 30-59 ml/min      Diabetic retinopathy (H)      DVT (deep venous thrombosis) (H)      GERD (gastroesophageal reflux disease)      Hyperlipidemia LDL goal <100 10/31/2010     Hypertension goal BP (blood pressure) < 140/90      Long-term (current) use of anticoagulants      Moderate persistent asthma 8/15/2012     MRSA (methicillin resistant Staphylococcus aureus)     postop hip     Obesity      S/P colonoscopy 2008 ( ?)     Septic hip (H)     sees an ID specialist Dr ANNEL DE LA CRUZ She remains on long-term suppressive antibiotic therapy using Minocin 50 mg twice daily     Stasis dermatitis      Type 2 diabetes, HbA1C goal < 8% (H)      Urinary  incontinence, mixed      Vitiligo      PSH:   Past Surgical History:   Procedure Laterality Date     APPENDECTOMY       C REMOVAL GALLBLADDER       COLONOSCOPY  2008     COLONOSCOPY  8/20/2012    Procedure: COLONOSCOPY;  COLONOSCOPY, TUBULAR ADENOMA OF COLON, CHRONIC DIARRHEA;  Surgeon: Yobany Hinojosa MD;  Location: [facility] OR     JOINT REPLACEMENT, HIP RT/LT  2004    right     JOINT REPLACEMTN, KNEE RT/LT  2000    bilateral     PHACOEMULSIFICATION WITH STANDARD INTRAOCULAR LENS IMPLANT  8/2013    left eye; right eye     ROTATOR CUFF REPAIR RT/LT  1/93    right     TONSILLECTOMY         Family Hx:   Family History   Problem Relation Age of Onset     DIABETES Mother      CANCER Mother      Hypertension Mother      Glaucoma Mother      Hypertension Father      CANCER Son      thyroid cancer     Neurologic Disorder Sister      Alzheimer Disease Sister      Personal Hx:   Social History     Social History     Marital status:      Spouse name: (Bill)     Number of children: 3     Years of education: N/A     Occupational History      Retired     Social History Main Topics     Smoking status: Never Smoker     Smokeless tobacco: Never Used     Alcohol use No     Drug use: No     Sexual activity: Not Currently     Other Topics Concern     Not on file     Social History Narrative    [place] in [place]       Allergies:  Allergies   Allergen Reactions     Atenolol Other (See Comments)     Arms became limp, almost stroke like symptoms per patient.   Tolerates metoprolol fine     Metformin      Side effects / gastrointestinal symptoms        Medications:  Prior to Admission medications    Medication Sig Start Date End Date Taking? Authorizing Provider   metoprolol (LOPRESSOR) 100 MG tablet Take 1 tablet (100 mg) by mouth 2 times daily NOTE TABLET STRENGTH CHANGE-1 TABLET TWICE DAILY 1/5/17   Akbar Thompson MD   losartan (COZAAR) 50 MG tablet Take 1 tablet (50 mg) by mouth 2 times daily .  Please note change in  tablet strength. 1/5/17   Akbar Thompson MD   glipiZIDE (GLIPIZIDE XL) 10 MG 24 hr tablet Take 1 tablet (10 mg) by mouth daily 12/19/16   Stevne Abrams MD   warfarin (COUMADIN) 5 MG tablet TAKE 1/2 TABLET ON MONDAY AND FRIDAY AND 1 TABLET BY MOUTH ON ALL OTHER DAYS 12/12/16   Ilene Davis APRN CNP   guaiFENesin-codeine (ROBITUSSIN AC) 100-10 MG/5ML SOLN solution Take 10 mLs by mouth every 4 hours as needed for cough 12/8/16   Steven Abrams MD   PARoxetine (PAXIL) 20 MG tablet TAKE 1 TABLET BY MOUTH EVERY DAY 11/8/16   Steven Abrams MD   diphenoxylate-atropine (LOMOTIL) 2.5-0.025 MG per tablet TAKE 1 TABLET BY MOUTH FOUR TIMES DAILY 11/2/16   Steven Abrams MD   furosemide (LASIX) 40 MG tablet Take 1 tablet (40 mg) by mouth 2 times daily 8/26/16   Akbar Thompson MD   losartan (COZAAR) 25 MG tablet TAKE 1 TABLET BY MOUTH TWICE DAILY 7/13/16   Steven Abrams MD   potassium chloride (K-DUR) 10 MEQ tablet Take 2 tablets daily 6/28/16   Sabrina Hicks APRN CNP   albuterol (2.5 MG/3ML) 0.083% nebulizer solution Take 3 mLs (2.5 mg) by nebulization every 4 hours as needed for shortness of breath / dyspnea 3/4/16   Steven Abrams MD   order for DME Reasoning for requesting the hospital bed: Patient had multiple complications following hip replacement(blood clot/infection/removal of hip replacement) uses walker to ambulate now,she has a very hard time trying to lift her right foot. Hard to get herself into bed because of the height. She lives alone.  She uses devices at home to get herself into bed( step stool/extension to help lift her leg).Would also like the side rails 1/13/16   Steven Abrams MD   ACCU-CHEK PARVEZ PLUS test strip TEST BLOOD GLUCOSE AS DIRECTED EVERY DAY 9/2/15   Steven Abrams MD   mupirocin (BACTROBAN) 2 % ointment Use 3 times a day to affected area for one week 6/10/15   Susie Villatoro MD   albuterol (PROAIR HFA, PROVENTIL HFA, VENTOLIN HFA) 108 (61  BASE) MCG/ACT inhaler Inhale 2 puffs into the lungs every 6 hours as needed for shortness of breath / dyspnea 4/15/15   Ilene Davis APRN CNP   budesonide-formoterol (SYMBICORT) 80-4.5 MCG/ACT inhaler Inhale 2 puffs into the lungs 2 times daily 4/15/15   Ilene Davis APRN CNP   acetaminophen (TYLENOL) 500 MG tablet Take 2 tablets by mouth 3 times daily as needed.    Reported, Patient   vitamin  B complex with vitamin C (VITAMIN  B COMPLEX) TABS Take 1 tablet by mouth daily.    Reported, Patient   Resveratrol 100 MG CAPS Take 1 capsule by mouth daily     Reported, Patient   STATIN NOT PRESCRIBED, INTENTIONAL, 1 each continuous prn. Statin not prescribed intentionally due to Other: patient has normal LDL within recommended guidelines on no medications 3/12/13   Steven Abrams MD   ASPIRIN NOT PRESCRIBED, INTENTIONAL, Antiplatelet medication not prescribed intentionally due to Current anticoagulant therapy (warfarin/enoxaparin) 10/8/12   Steven Abrams MD   Calcium Carbonate-Vitamin D (CALCIUM + D PO) Take 1 tablet by mouth 2 times daily.    Unknown, Entered By History   ORDER FOR DME nebuliser 5/23/12   Chip Vernon MD   OMEPRAZOLE CPCR 20 MG OR 1 CAPSULE DAILY    Reported, Patient     Vitals:  BP 99/72 (BP Location: Left arm, Patient Position: Chair, Cuff Size: Adult Large)  Pulse 104  Wt 115.7 kg (255 lb)  BMI 45.17 kg/m2    Exam:  GENERAL APPEARANCE: alert and no distress  HENT: mouth without ulcers or lesions, +jvd  LYMPHATICS: no cervical or supraclavicular nodes  RESP: lungs clear to auscultation - no rales, rhonchi or wheezes  CV: regular rhythm, normal rate, no rub, no murmur  EDEMA: + LE edema bilaterally, right > L  ABDOMEN: soft, nondistended, nontender, bowel sounds normal  MS: extremities normal - no gross deformities noted, no evidence of inflammation in joints, no muscle tenderness  SKIN: no rash      Results:  No results found for this or any previous visit (from the past  336 hour(s)).  Component      Latest Ref Rng & Units 6/3/2016 10/27/2016 12/19/2016   Sodium      133 - 144 mmol/L 135 138 136   Potassium      3.4 - 5.3 mmol/L 4.3 4.0 4.1   Chloride      94 - 109 mmol/L 102 103 100   Carbon Dioxide      20 - 32 mmol/L 26 23 23   Anion Gap      3 - 14 mmol/L 7 12 13   Glucose      70 - 99 mg/dL 168 (H) 196 (H) 361 (H)   Urea Nitrogen      7 - 30 mg/dL 32 (H) 27 35 (H)   Creatinine      0.52 - 1.04 mg/dL 1.55 (H) 1.51 (H) 1.89 (H)   GFR Estimate      >60 mL/min/1.7m2 32 (L) 33 (L) 25 (L)   GFR Estimate If Black      >60 mL/min/1.7m2 39 (L) 40 (L) 31 (L)   Calcium      8.5 - 10.1 mg/dL 9.3 8.8 9.6   WBC      4.0 - 11.0 10e9/L  6.7    RBC Count      3.8 - 5.2 10e12/L  4.15    Hemoglobin      11.7 - 15.7 g/dL  12.6    Hematocrit      35.0 - 47.0 %  39.5    MCV      78 - 100 fl  95    MCH      26.5 - 33.0 pg  30.4    MCHC      31.5 - 36.5 g/dL  31.9    RDW      10.0 - 15.0 %  14.4    Platelet Count      150 - 450 10e9/L  232    N-Terminal Pro Bnp      0 - 450 pg/mL 1895 (H) 2796 (H)    Magnesium      1.6 - 2.3 mg/dL 1.8     Hemoglobin A1C      4.3 - 6.0 %   8.9 (H)   Parathyroid Hormone Intact      12 - 72 pg/mL   49   Alkaline Phosphatase      40 - 150 U/L   91   Phosphorus      2.5 - 4.5 mg/dL   2.9   Vitamin D Deficiency screening      20 - 75 ug/L   35   ALT      0 - 50 U/L   19       Emilie Florencio Ren MD

## 2017-05-15 NOTE — MR AVS SNAPSHOT
After Visit Summary   5/15/2017    Mara Colindres    MRN: [id number]           Patient Information     Date Of Birth          11/30/1934        Visit Information        Provider Department      5/15/2017 3:00 PM Emilie Ren MD [facility]        Today's Diagnoses     Moderate persistent asthma with acute exacerbation    -  1    Pulmonary HTN (H)        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, without long-term current use of insulin (H)        Chronic diastolic congestive heart failure (H)        Hypertension goal BP (blood pressure) < 140/90        Raynaud's phenomenon without gangrene        CKD (chronic kidney disease) stage 3, GFR 30-59 ml/min        Chronic anticoagulation           Follow-ups after your visit        Follow-up notes from your care team     Return in about 3 months (around 8/15/2017).      Your next 10 appointments already scheduled     May 24, 2017  2:30 PM CDT   (Arrive by 2:15 PM)   RETURN PRIMARY PULMONARY with Akbar Thompson MD   [facility] [place]    [address] [place] [id number] [phone]              Who to contact     Please call your clinic at [phone] to:    Ask questions about your health    Make or cancel appointments    Discuss your medicines    Learn about your test results    Speak to your doctor   If you have compliments or concerns about an experience at your clinic, or if you wish to file a complaint, please contact [facility] Patient Relations at [phone] or email us at [email or web address]         Additional Information About Your Visit        MyChart Information     [email or web address] is an electronic gateway that provides easy, online access to your medical records. With [email or web address], you can request a clinic appointment, read your test results, renew a prescription or communicate with your care  team.     To sign up for Journeys visit the website at [email or web address]   You will be asked to enter the access code listed below, as well as some personal information. Please follow the directions to create your username and password.     Your access code is: [email or web address]  Expires: 2017  5:00 PM     Your access code will  in 90 days. If you need help or a new code, please contact your [facility] or call [phone] for assistance.        Care EveryWhere ID     This is your Care EveryWhere ID. This could be used by other organizations to access your [place] medical records  [id number]        Your Vitals Were     Pulse BMI (Body Mass Index)                104 45.17 kg/m2           Blood Pressure from Last 3 Encounters:   05/15/17 99/72   17 120/88   17 127/84    Weight from Last 3 Encounters:   05/15/17 115.7 kg (255 lb)   17 113.9 kg (251 lb)   17 115.2 kg (254 lb)              Today, you had the following     No orders found for display         Today's Medication Changes          These changes are accurate as of: 5/15/17  4:04 PM.  If you have any questions, ask your nurse or doctor.               These medicines have changed or have updated prescriptions.        Dose/Directions    losartan 25 MG tablet   Commonly known as:  COZAAR   This may have changed:  See the new instructions.   Used for:  Pulmonary HTN (H)   Changed by:  Emilie Ren MD        Dose:  25 mg   Take 1 tablet (25 mg) by mouth 2 times daily   Quantity:  60 tablet   Refills:  3            Where to get your medicines      These medications were sent to [id number] - [place], [place] - [address] AT [facility] &   [address], [place] [address]     Phone:  [phone]     losartan 25 MG tablet                Primary Care Provider Office Phone # Fax #    Steven Abrams [phone]        [facility] [address]  [place] [address]        Thank you!     Thank you for choosing [facility]  for your care. Our goal is always to provide you with excellent care. Hearing back from our patients is one way we can continue to improve our services. Please take a few minutes to complete the written survey that you may receive in the mail after your visit with us. Thank you!             Your Updated Medication List - Protect others around you: Learn how to safely use, store and throw away your medicines at www.disposemymeds.org.          This list is accurate as of: 5/15/17  4:04 PM.  Always use your most recent med list.                   Brand Name Dispense Instructions for use    ACCU-CHEK PARVEZ PLUS test strip   Generic drug:  blood glucose monitoring     100 each    TEST BLOOD GLUCOSE AS DIRECTED EVERY DAY       * albuterol 108 (90 BASE) MCG/ACT Inhaler    PROAIR HFA/PROVENTIL HFA/VENTOLIN HFA    3 Inhaler    Inhale 2 puffs into the lungs every 6 hours as needed for shortness of breath / dyspnea       * albuterol (2.5 MG/3ML) 0.083% neb solution     30 vial    Take 3 mLs (2.5 mg) by nebulization every 4 hours as needed for shortness of breath / dyspnea       * ASPIRIN NOT PRESCRIBED    INTENTIONAL    0 each    Antiplatelet medication not prescribed intentionally due to Current anticoagulant therapy (warfarin/enoxaparin)       budesonide-formoterol 80-4.5 MCG/ACT Inhaler    SYMBICORT    1 Inhaler    Inhale 2 puffs into the lungs 2 times daily       CALCIUM + D PO      Take 1 tablet by mouth 2 times daily.       diphenoxylate-atropine 2.5-0.025 MG per tablet    LOMOTIL    40 tablet    TAKE 1 TABLET BY MOUTH FOUR TIMES DAILY       furosemide 40 MG tablet    LASIX    180 tablet    Take 1 tablet (40 mg) by mouth 2 times daily       glipiZIDE 10 MG 24 hr tablet    glipiZIDE XL    90 tablet    Take 1 tablet (10 mg) by mouth daily       guaiFENesin-codeine 100-10 MG/5ML Soln solution     ROBITUSSIN AC    120 mL    Take 10 mLs by mouth every 4 hours as needed for cough       HYDROcodone-acetaminophen 5-325 MG per tablet    NORCO    90 tablet    TK 1 T PO  QID AS NEEDED       losartan 25 MG tablet    COZAAR    60 tablet    Take 1 tablet (25 mg) by mouth 2 times daily       metoprolol 100 MG tablet    LOPRESSOR    180 tablet    Take 1 tablet (100 mg) by mouth 2 times daily NOTE TABLET STRENGTH CHANGE-1 TABLET TWICE DAILY       mupirocin 2 % ointment    BACTROBAN    22 g    Use 3 times a day to affected area for one week       OMEPRAZOLE CPCR 20 MG OR      1 CAPSULE DAILY       * order for DME     1 Device    nebuliser       order for DME     1 each    Reasoning for requesting the hospital bed: Patient had multiple complications following hip replacement(blood clot/infection/removal of hip replacement) uses walker to ambulate now,she has a very hard time trying to lift her right foot. Hard to get herself into bed because of the height. She lives alone.  She uses devices at home to get herself into bed( step stool/extension to help lift her leg).Would also like the side rails       PARoxetine 20 MG tablet    PAXIL    90 tablet    TAKE 1 TABLET BY MOUTH EVERY DAY       potassium chloride 10 MEQ tablet    K-TAB,KLOR-CON    180 tablet    Take 2 tablets daily       predniSONE 20 MG tablet    DELTASONE    20 tablet    Take 1 tablet (20 mg) by mouth daily Take 3 tabs (60 mg) orally daily for 3 days, then Take 2 tabs (40 mg) orally daily for 3 days, then Take 1 tab (20 mg) orally daily for 3 days, then Take 1/2 tab (10 mg) orally for 3 days       Resveratrol 100 MG Caps      Take 1 capsule by mouth daily       * STATIN NOT PRESCRIBED (INTENTIONAL)     0 each    1 each continuous prn. Statin not prescribed intentionally due to Other: patient has normal LDL within recommended guidelines on no medications       TYLENOL 500 MG tablet   Generic drug:  acetaminophen      Take 2 tablets by mouth 3 times daily as  needed.       vitamin B complex with vitamin C Tabs tablet      Take 1 tablet by mouth daily.       warfarin 5 MG tablet    COUMADIN    90 tablet    Take 1/2 tablet (2.5 mg) Monday, Wednesday, and Friday, and take 1 tablet (5 mg) by mouth all other days       * Notice:  This list has 5 medication(s) that are the same as other medications prescribed for you. Read the directions carefully, and ask your doctor or other care provider to review them with you.

## 2017-05-15 NOTE — PROGRESS NOTES
Assessment and Plan:   82 year old female with history of CKD with SCr 1.2-1.5mg/dL, pulmonary hypertension, atrial fibrillation, DVT, diastolic dysfunction, diabetes x 10 years, complicated right hip replacement in 2004 with chronic infection and DVT, who presents for CKD evaluation.   1. CKD stage 3b/4- her SCr has been 1.2-1.5 but was up to 1.9mg/dL winter 2016 but back down in February 2017. She has complex cardiorenal physiology with Afib, diastolic dysfunction and pulmonary hypertension. She has +ABILIO and Raynauds, invoking possibility of chronic interstitial process or autoimmune disease.   - will check serology today  She has diabetes with neuropathy that could also explain some CKD.    - she is non proteinuric  -  F/u renal function today  2. Anemia- borderline anemia, mild   3. Hypertension- well controlled, low normal BP, on ACE, BB and diuretic-    - BP today 99 systolic and she is symptomatic (dizziness)  - lower losartan to 25mg bid, improve BP and perfusion, ? Better response to furosemide, she will take it regularly the next few days (bid for next couple days at least) and see if 2-3 lbs lower will help her breathing.  -check pro BNP  4. Electrolytes/ acid-base- within normal  5. Bone- normal PTH (49) and normal calcium and phosphorus  6. Ecchymosis- ? Spontaneous bleed on coumadin (no trauma per patient), check INR and hgb today    Assessment and plan was discussed with patient and she voiced her understanding and agreement.    Reason for Visit:  Mara Colindres is a 82 year old female with CKD from diabetes and cardiac dysfunction, who presents for followup    HPI:  She is a pleasant female with history of diabetes x 10 years with mild neuropathy, no retinopathy,moderate pulmonary hypertension, diastolic heart failure, hypertension, atrial fibrillation, diabetes and chronic kidney disease. She has recently been following for ongoing management of pulmonary arterial hypertension and congestive heart  failure. with CKD with SCr 1.2-1.5 mg/dL back to 2006 (eGFR 35-45 ml/min). In 2004 she had a right hip joint replacement with chronic infection and was on antibiotics for 10 years, and took herself off a few years ago.   She also has history of + ABILIO (2.2) in 2014 and Raynaud's.  She has dyspnea on exertion even with showering and other simple tasks. She lives with her son who moved here from [place] at time of her 's death. She has a grandson going to [employer] for oil business.   She is on motor scooter, unable to walk due to hip.  Today she returns for followup. Her last creatinine was 1.29mg/dL which was back to her baseline. She complains of significant shortness of breath, was treated for bronchitis with steroids in April. She is on the high side of her weight range. She sometimes skips her furosemide if she is going anywhere.  She has felt dizzy for the last few days. Her blood pressure is 99 systolic and HR near 100 (usually lower). Her weight is up on higher side of her normal range.   She also has a bruise on her right hip though does not recall falling or injuring herself. She is on coumadin for DVT history   She is seeing DR Thompson next week for pulmonary hypertension followup    ROS:   A comprehensive review of systems was obtained and negative, except as noted in the HPI or PMH.    Active Medical Problems:  Patient Active Problem List   Diagnosis     Morbid obesity (H)     Hypertension goal BP (blood pressure) < 140/90     DVT (deep venous thrombosis) (H)     MRSA (methicillin resistant Staphylococcus aureus)     Chronic diarrhea     HYPERLIPIDEMIA LDL GOAL <100     Chronic anticoagulation     CKD (chronic kidney disease) stage 3, GFR 30-59 ml/min     Septic hip (H)     Lymphedema     Tubular adenoma of colon     Abdominal wall hematoma     Advanced directives, counseling/discussion     Umbilical hernia     History of [place] fever     Moderate persistent asthma     Type 2  diabetes, controlled, with renal manifestation (H)     Pseudophakia, ou     Hypovitaminosis D     Bilateral leg pain     Positive YAMINI     Raynaud phenomenon     Pulmonary HTN (H)     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     Scotoma involving central area in visual field, right     Failed total hip arthroplasty, sequela     Major depressive disorder, recurrent episode, mild (H)     Chronic diastolic congestive heart failure (H)     Long-term (current) use of anticoagulants [Z79.01]     Persistent atrial fibrillation (H)     Diabetic polyneuropathy associated with diabetes mellitus due to underlying condition (H)     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, without long-term current use of insulin (H)     Fatigue, unspecified type     PMH:   Medical record was reviewed and PMH was discussed with patient and noted below.  Past Medical History:   Diagnosis Date     A-fib (H)      Bilateral leg edema     fairly severe     Cataract      CHF (congestive heart failure) (H)      Chronic diarrhea     neg colonoscopy abt one year ago, even to the point of having fecal accidents     Chronic venous insufficiency      CKD (chronic kidney disease) stage 3, GFR 30-59 ml/min      Diabetic retinopathy (H)      DVT (deep venous thrombosis) (H)      GERD (gastroesophageal reflux disease)      Hyperlipidemia LDL goal <100 10/31/2010     Hypertension goal BP (blood pressure) < 140/90      Long-term (current) use of anticoagulants      Moderate persistent asthma 8/15/2012     MRSA (methicillin resistant Staphylococcus aureus)     postop hip     Obesity      S/P colonoscopy 2008 ( ?)     Septic hip (H)     sees an ID specialist Dr ANNEL DE LA CRUZ She remains on long-term suppressive antibiotic therapy using Minocin 50 mg twice daily     Stasis dermatitis      Type 2 diabetes, HbA1C goal < 8% (H)      Urinary incontinence, mixed      Vitiligo      PSH:   Past Surgical History:   Procedure Laterality  Date     APPENDECTOMY       C REMOVAL GALLBLADDER       COLONOSCOPY  2008     COLONOSCOPY  8/20/2012    Procedure: COLONOSCOPY;  COLONOSCOPY, TUBULAR ADENOMA OF COLON, CHRONIC DIARRHEA;  Surgeon: Yobany Hinojosa MD;  Location: [facility]     JOINT REPLACEMENT, HIP RT/LT  2004    right     JOINT REPLACEMTN, KNEE RT/LT  2000    bilateral     PHACOEMULSIFICATION WITH STANDARD INTRAOCULAR LENS IMPLANT  8/2013    left eye; right eye     ROTATOR CUFF REPAIR RT/LT  1/93    right     TONSILLECTOMY         Family Hx:   Family History   Problem Relation Age of Onset     DIABETES Mother      CANCER Mother      Hypertension Mother      Glaucoma Mother      Hypertension Father      CANCER Son      thyroid cancer     Neurologic Disorder Sister      Alzheimer Disease Sister      Personal Hx:   Social History     Social History     Marital status:      Spouse name: (Bill)     Number of children: 3     Years of education: N/A     Occupational History      Retired     Social History Main Topics     Smoking status: Never Smoker     Smokeless tobacco: Never Used     Alcohol use No     Drug use: No     Sexual activity: Not Currently     Other Topics Concern     Not on file     Social History Narrative    [place] in [place]       Allergies:  Allergies   Allergen Reactions     Atenolol Other (See Comments)     Arms became limp, almost stroke like symptoms per patient.   Tolerates metoprolol fine     Metformin      Side effects / gastrointestinal symptoms        Medications:  Prior to Admission medications    Medication Sig Start Date End Date Taking? Authorizing Provider   metoprolol (LOPRESSOR) 100 MG tablet Take 1 tablet (100 mg) by mouth 2 times daily NOTE TABLET STRENGTH CHANGE-1 TABLET TWICE DAILY 1/5/17   Akbar Thompson MD   losartan (COZAAR) 50 MG tablet Take 1 tablet (50 mg) by mouth 2 times daily .  Please note change in tablet strength. 1/5/17   Akbar Thompson MD   glipiZIDE (GLIPIZIDE XL) 10 MG 24 hr  tablet Take 1 tablet (10 mg) by mouth daily 12/19/16   Steven Abrams MD   warfarin (COUMADIN) 5 MG tablet TAKE 1/2 TABLET ON MONDAY AND FRIDAY AND 1 TABLET BY MOUTH ON ALL OTHER DAYS 12/12/16   Ilene Davis APRN CNP   guaiFENesin-codeine (ROBITUSSIN AC) 100-10 MG/5ML SOLN solution Take 10 mLs by mouth every 4 hours as needed for cough 12/8/16   Steven Abrams MD   PARoxetine (PAXIL) 20 MG tablet TAKE 1 TABLET BY MOUTH EVERY DAY 11/8/16   Steven Abrams MD   diphenoxylate-atropine (LOMOTIL) 2.5-0.025 MG per tablet TAKE 1 TABLET BY MOUTH FOUR TIMES DAILY 11/2/16   Steven Abrams MD   furosemide (LASIX) 40 MG tablet Take 1 tablet (40 mg) by mouth 2 times daily 8/26/16   Akbar Thompson MD   losartan (COZAAR) 25 MG tablet TAKE 1 TABLET BY MOUTH TWICE DAILY 7/13/16   Steven Abrams MD   potassium chloride (K-DUR) 10 MEQ tablet Take 2 tablets daily 6/28/16   Sabrina Hicks APRN CNP   albuterol (2.5 MG/3ML) 0.083% nebulizer solution Take 3 mLs (2.5 mg) by nebulization every 4 hours as needed for shortness of breath / dyspnea 3/4/16   Steven Abrams MD   order for DME Reasoning for requesting the hospital bed: Patient had multiple complications following hip replacement(blood clot/infection/removal of hip replacement) uses walker to ambulate now,she has a very hard time trying to lift her right foot. Hard to get herself into bed because of the height. She lives alone.  She uses devices at home to get herself into bed( step stool/extension to help lift her leg).Would also like the side rails 1/13/16   Steven Abrams MD   ACCU-CHEK PARVEZ PLUS test strip TEST BLOOD GLUCOSE AS DIRECTED EVERY DAY 9/2/15   Steven Abrams MD   mupirocin (BACTROBAN) 2 % ointment Use 3 times a day to affected area for one week 6/10/15   Susie Villatoro MD   albuterol (PROAIR HFA, PROVENTIL HFA, VENTOLIN HFA) 108 (90 BASE) MCG/ACT inhaler Inhale 2 puffs into the lungs every 6 hours as needed for shortness of  breath / dyspnea 4/15/15   Ilene Davis APRN CNP   budesonide-formoterol (SYMBICORT) 80-4.5 MCG/ACT inhaler Inhale 2 puffs into the lungs 2 times daily 4/15/15   Ilene Davis APRN CNP   acetaminophen (TYLENOL) 500 MG tablet Take 2 tablets by mouth 3 times daily as needed.    Reported, Patient   vitamin  B complex with vitamin C (VITAMIN  B COMPLEX) TABS Take 1 tablet by mouth daily.    Reported, Patient   Resveratrol 100 MG CAPS Take 1 capsule by mouth daily     Reported, Patient   STATIN NOT PRESCRIBED, INTENTIONAL, 1 each continuous prn. Statin not prescribed intentionally due to Other: patient has normal LDL within recommended guidelines on no medications 3/12/13   Steven Abrams MD   ASPIRIN NOT PRESCRIBED, INTENTIONAL, Antiplatelet medication not prescribed intentionally due to Current anticoagulant therapy (warfarin/enoxaparin) 10/8/12   Steven Abrams MD   Calcium Carbonate-Vitamin D (CALCIUM + D PO) Take 1 tablet by mouth 2 times daily.    Unknown, Entered By History   ORDER FOR DME nebuliser 5/23/12   Chip Vernon MD   OMEPRAZOLE CPCR 20 MG OR 1 CAPSULE DAILY    Reported, Patient     Vitals:  BP 99/72 (BP Location: Left arm, Patient Position: Chair, Cuff Size: Adult Large)  Pulse 104  Wt 115.7 kg (255 lb)  BMI 45.17 kg/m2    Exam:  GENERAL APPEARANCE: alert and no distress  HENT: mouth without ulcers or lesions, +jvd  LYMPHATICS: no cervical or supraclavicular nodes  RESP: lungs clear to auscultation - no rales, rhonchi or wheezes  CV: regular rhythm, normal rate, no rub, no murmur  EDEMA: + LE edema bilaterally, right > L  ABDOMEN: soft, nondistended, nontender, bowel sounds normal  MS: extremities normal - no gross deformities noted, no evidence of inflammation in joints, no muscle tenderness  SKIN: no rash      Results:  No results found for this or any previous visit (from the past 336 hour(s)).  Component      Latest Ref Rng & Units 6/3/2016 10/27/2016 12/19/2016    Sodium      133 - 144 mmol/L 135 138 136   Potassium      3.4 - 5.3 mmol/L 4.3 4.0 4.1   Chloride      94 - 109 mmol/L 102 103 100   Carbon Dioxide      20 - 32 mmol/L 26 23 23   Anion Gap      3 - 14 mmol/L 7 12 13   Glucose      70 - 99 mg/dL 168 (H) 196 (H) 361 (H)   Urea Nitrogen      7 - 30 mg/dL 32 (H) 27 35 (H)   Creatinine      0.52 - 1.04 mg/dL 1.55 (H) 1.51 (H) 1.89 (H)   GFR Estimate      >60 mL/min/1.7m2 32 (L) 33 (L) 25 (L)   GFR Estimate If Black      >60 mL/min/1.7m2 39 (L) 40 (L) 31 (L)   Calcium      8.5 - 10.1 mg/dL 9.3 8.8 9.6   WBC      4.0 - 11.0 10e9/L  6.7    RBC Count      3.8 - 5.2 10e12/L  4.15    Hemoglobin      11.7 - 15.7 g/dL  12.6    Hematocrit      35.0 - 47.0 %  39.5    MCV      78 - 100 fl  95    MCH      26.5 - 33.0 pg  30.4    MCHC      31.5 - 36.5 g/dL  31.9    RDW      10.0 - 15.0 %  14.4    Platelet Count      150 - 450 10e9/L  232    N-Terminal Pro Bnp      0 - 450 pg/mL 1895 (H) 2796 (H)    Magnesium      1.6 - 2.3 mg/dL 1.8     Hemoglobin A1C      4.3 - 6.0 %   8.9 (H)   Parathyroid Hormone Intact      12 - 72 pg/mL   49   Alkaline Phosphatase      40 - 150 U/L   91   Phosphorus      2.5 - 4.5 mg/dL   2.9   Vitamin D Deficiency screening      20 - 75 ug/L   35   ALT      0 - 50 U/L   19

## 2017-05-15 NOTE — NURSING NOTE
"Chief Complaint   Patient presents with     Follow Up For     Kidney Problem     CKD        Initial BP 99/72 (BP Location: Left arm, Patient Position: Chair, Cuff Size: Adult Large)  Pulse 104  Wt 115.7 kg (255 lb)  BMI 45.17 kg/m2 Estimated body mass index is 45.17 kg/(m^2) as calculated from the following:    Height as of 4/27/17: 1.6 m (5' 3\").    Weight as of this encounter: 115.7 kg (255 lb).  Medication Reconciliation: complete  "

## 2017-05-16 ENCOUNTER — TELEPHONE (OUTPATIENT)
Dept: NEPHROLOGY | Facility: CLINIC | Age: 82
End: 2017-05-16

## 2017-05-16 ENCOUNTER — ANTICOAGULATION THERAPY VISIT (OUTPATIENT)
Dept: NURSING | Facility: CLINIC | Age: 82
End: 2017-05-16
Payer: COMMERCIAL

## 2017-05-16 DIAGNOSIS — Z79.01 LONG-TERM (CURRENT) USE OF ANTICOAGULANTS: ICD-10-CM

## 2017-05-16 DIAGNOSIS — I82.409 DVT (DEEP VENOUS THROMBOSIS) (H): ICD-10-CM

## 2017-05-16 DIAGNOSIS — I27.20 PULMONARY HTN (H): ICD-10-CM

## 2017-05-16 LAB
ALBUMIN SERPL-MCNC: 3.1 G/DL (ref 3.4–5)
ALP SERPL-CCNC: 69 U/L (ref 40–150)
ALT SERPL W P-5'-P-CCNC: 16 U/L (ref 0–50)
ANION GAP SERPL CALCULATED.3IONS-SCNC: 10 MMOL/L (ref 3–14)
AST SERPL W P-5'-P-CCNC: 17 U/L (ref 0–45)
BILIRUB SERPL-MCNC: 2.1 MG/DL (ref 0.2–1.3)
BUN SERPL-MCNC: 25 MG/DL (ref 7–30)
CALCIUM SERPL-MCNC: 8.5 MG/DL (ref 8.5–10.1)
CHLORIDE SERPL-SCNC: 104 MMOL/L (ref 94–109)
CO2 SERPL-SCNC: 24 MMOL/L (ref 20–32)
CREAT SERPL-MCNC: 1.35 MG/DL (ref 0.52–1.04)
GFR SERPL CREATININE-BSD FRML MDRD: 38 ML/MIN/1.7M2
GLUCOSE SERPL-MCNC: 255 MG/DL (ref 70–99)
MAGNESIUM SERPL-MCNC: 1.6 MG/DL (ref 1.6–2.3)
NT-PROBNP SERPL-MCNC: 2903 PG/ML (ref 0–450)
PHOSPHATE SERPL-MCNC: 2.9 MG/DL (ref 2.5–4.5)
POTASSIUM SERPL-SCNC: 4.6 MMOL/L (ref 3.4–5.3)
PROT SERPL-MCNC: 6.3 G/DL (ref 6.8–8.8)
SODIUM SERPL-SCNC: 138 MMOL/L (ref 133–144)

## 2017-05-16 PROCEDURE — 99207 ZZC NO CHARGE NURSE ONLY: CPT | Performed by: INTERNAL MEDICINE

## 2017-05-16 RX ORDER — LOSARTAN POTASSIUM 25 MG/1
25 TABLET ORAL 2 TIMES DAILY
Qty: 180 TABLET | Refills: 3 | Status: SHIPPED | OUTPATIENT
Start: 2017-05-16 | End: 2018-01-09

## 2017-05-16 NOTE — PROGRESS NOTES
ANTICOAGULATION FOLLOW-UP CLINIC VISIT    Patient Name:  Mara Colindres  Date:  5/16/2017  Contact Type:  Telephone    SUBJECTIVE:     Patient Findings     Positives No Problem Findings    Comments S/O:  Pt was called with the following orders: INR today of 3.3.  Pt is on Coumadin for DVT.  Current Coumadin dose is 2.5 mg on Mon/Wed/Fri and 5 mg ROW.   Concerns today are: None Noted.    A/P: Pt's INR is Supratherapeutic  for his/her goal range of 2 - 3.  Reasons why INR is out of range may include:unknown  Recommended to have pt remain on the same Coumadin dose and to have his/her INR rechecked in 2 weeks on 5/30.      Cleeste Ruiz RN - BC                 OBJECTIVE    INR   Date Value Ref Range Status   05/15/2017 3.30 (H) 0.86 - 1.14 Final     Comment:     This test is intended for monitoring Coumadin therapy.  Results are not   accurate   in patients with prolonged INR due to factor deficiency.         ASSESSMENT / PLAN  INR assessment SUPRA    Recheck INR In: 2 WEEKS    INR Location Clinic      Anticoagulation Summary as of 5/16/2017     INR goal 2.0-3.0   Today's INR 3.30! (5/15/2017)   Maintenance plan 2.5 mg (5 mg x 0.5) on Mon, Wed, Fri; 5 mg (5 mg x 1) all other days   Full instructions 2.5 mg on Mon, Wed, Fri; 5 mg all other days   Weekly total 27.5 mg   No change documented Celeste Ruiz RN   Plan last modified Celeste Ruiz RN (3/23/2017)   Next INR check 5/30/2017   Priority INR   Target end date     Indications   Long-term (current) use of anticoagulants [Z79.01] [Z79.01]  DVT (deep venous thrombosis) (H) [I82.409]         Anticoagulation Episode Summary     INR check location     Preferred lab     Send INR reminders to [facility]    Comments       Anticoagulation Care Providers     Provider Role Specialty Phone number    Steven Abrams MD [facility] Internal Medicine [phone]            See the Encounter Report to view Anticoagulation Flowsheet and Dosing Calendar (Go to  Encounters tab in chart review, and find the Anticoagulation Therapy Visit)    Dosage adjustment made based on physician directed care plan.    Celeste Ruiz RN

## 2017-05-16 NOTE — TELEPHONE ENCOUNTER
Request for 90 day supply.  Kim Dyson LPN  Nephrology  [facility]  [phone]

## 2017-05-16 NOTE — MR AVS SNAPSHOT
Mara CHARLES Jens   5/16/2017   Anticoagulation Therapy Visit    Description:  82 year old female   Provider:  Steven Abrams MD   Department:  [facility]           INR as of 5/16/2017     Today's INR 3.30! (5/15/2017)      Anticoagulation Summary as of 5/16/2017     INR goal 2.0-3.0   Today's INR 3.30! (5/15/2017)   Full instructions 2.5 mg on Mon, Wed, Fri; 5 mg all other days   Next INR check 5/30/2017    Indications   Long-term (current) use of anticoagulants [Z79.01] [Z79.01]  DVT (deep venous thrombosis) (H) [I82.409]         Your next Anticoagulation Clinic appointment(s)     May 30, 2017  2:30 PM CDT   Anticoagulation Visit with SRIDHAR ANTI COAG   [facility] ([address] [address] [phone]              Contact Numbers     [facility]  Please call [phone] to cancel and/or reschedule your appointment   Please call [phone] with any problems or questions regarding your therapy.        May 2017 Details    Sun Mon Tue Wed Thu Fri Sat      1               2               3               4               5               6                 7               8               9               10               11               12               13                 14               15               16      5 mg   See details      17      2.5 mg         18      5 mg         19      2.5 mg         20      5 mg           21      5 mg         22      2.5 mg         23      5 mg         24      2.5 mg         25      5 mg         26      2.5 mg         27      5 mg           28      5 mg         29      2.5 mg         30            31                   Date Details   05/16 This INR check       Date of next INR:  5/30/2017         How to take your warfarin dose     To take:  2.5 mg Take 0.5 of a 5 mg tablet.    To take:  5 mg Take 1 of the 5 mg tablets.

## 2017-05-17 LAB
ANA SER QL IA: NORMAL
C3 SERPL-MCNC: 120 MG/DL (ref 76–169)
C4 SERPL-MCNC: 22 MG/DL (ref 15–50)
ENA SCL70 IGG SER IA-ACNC: NORMAL AI (ref 0–0.9)

## 2017-05-18 LAB — ENDOMYSIUM IGA TITR SER IF: NORMAL {TITER}

## 2017-05-23 ENCOUNTER — PRE VISIT (OUTPATIENT)
Dept: CARDIOLOGY | Facility: CLINIC | Age: 82
End: 2017-05-23

## 2017-05-24 ENCOUNTER — OFFICE VISIT (OUTPATIENT)
Dept: CARDIOLOGY | Facility: CLINIC | Age: 82
End: 2017-05-24
Attending: INTERNAL MEDICINE
Payer: COMMERCIAL

## 2017-05-24 VITALS
BODY MASS INDEX: 44.38 KG/M2 | OXYGEN SATURATION: 99 % | WEIGHT: 250.5 LBS | HEIGHT: 63 IN | DIASTOLIC BLOOD PRESSURE: 82 MMHG | HEART RATE: 88 BPM | SYSTOLIC BLOOD PRESSURE: 127 MMHG

## 2017-05-24 DIAGNOSIS — I27.20 PULMONARY HYPERTENSION (H): ICD-10-CM

## 2017-05-24 DIAGNOSIS — R06.09 DYSPNEA ON EXERTION: Primary | ICD-10-CM

## 2017-05-24 PROCEDURE — 99214 OFFICE O/P EST MOD 30 MIN: CPT | Mod: ZP | Performed by: INTERNAL MEDICINE

## 2017-05-24 PROCEDURE — 99212 OFFICE O/P EST SF 10 MIN: CPT | Mod: ZF

## 2017-05-24 ASSESSMENT — PAIN SCALES - GENERAL: PAINLEVEL: NO PAIN (0)

## 2017-05-24 NOTE — NURSING NOTE
Diet: Patient instructed regarding a heart healthy diet, including discussion of reduced fat and sodium intake. Patient demonstrated understanding of this information and agreed to call with further questions or concerns.    Labs: Patient was given results of the laboratory testing obtained today. Patient demonstrated understanding of this information and agreed to call with further questions or concerns.     Med Reconcile: Reviewed and verified all current medications with the patient. The updated medication list was printed and given to the patient.    Return Appointment: Patient given instructions regarding scheduling next clinic visit. Patient demonstrated understanding of this information and agreed to call with further questions or concerns.    Patient stated she understood all health information given and agreed to call with further questions or concerns.     Medication Changes:  No medication changes at this time. Please continue current medication regiment.     Patient Instructions:  Check-In  Time Check-In Location Estimated Length Procedure   Name        [facility]   waiting room 20 minutes CT Scan**   Procedure Preparations & Instructions     This is a non-invasive procedure and does NOT require any preparation         Results:  Component      Latest Ref Rng & Units 5/15/2017   Sodium      133 - 144 mmol/L 138   Potassium      3.4 - 5.3 mmol/L 4.6   Chloride      94 - 109 mmol/L 104   Carbon Dioxide      20 - 32 mmol/L 24   Anion Gap      3 - 14 mmol/L 10   Glucose      70 - 99 mg/dL 255 (H)   Urea Nitrogen      7 - 30 mg/dL 25   Creatinine      0.52 - 1.04 mg/dL 1.35 (H)   GFR Estimate      >60 mL/min/1.7m2 38 (L)   GFR Estimate If Black      >60 mL/min/1.7m2 45 (L)   Calcium      8.5 - 10.1 mg/dL 8.5   Bilirubin Total      0.2 - 1.3 mg/dL 2.1 (H)   Albumin      3.4 - 5.0 g/dL 3.1 (L)   Protein Total      6.8 - 8.8 g/dL 6.3 (L)   Alkaline Phosphatase      40 - 150 U/L 69   ALT      0 - 50 U/L 16   AST      0  "- 45 U/L 17   WBC      4.0 - 11.0 10e9/L 8.3   RBC Count      3.8 - 5.2 10e12/L 3.34 (L)   Hemoglobin      11.7 - 15.7 g/dL 10.7 (L)   Hematocrit      35.0 - 47.0 % 32.6 (L)   MCV      78 - 100 fl 98   MCH      26.5 - 33.0 pg 32.0   MCHC      31.5 - 36.5 g/dL 32.8   RDW      10.0 - 15.0 % 14.7   Platelet Count      150 - 450 10e9/L 284   Magnesium      1.6 - 2.3 mg/dL 1.6   Phosphorus      2.5 - 4.5 mg/dL 2.9   INR      0.86 - 1.14 3.30 (H)   N-Terminal Pro Bnp      0 - 450 pg/mL 2903 (H)   Complement C3      76 - 169 mg/dL 120   Complement C4      15 - 50 mg/dL 22   Endomysial Antibody IgA by IFA       <1:10 . . .   ABILIO Screen by EIA      <1.0 <1.0 . . .   Scleroderma Antibody Scl-70 NOBLE IgG      0.0 - 0.9 AI <0.2 . . .     Vital Signs 5/24/2017   Systolic 127   Diastolic 82   Pulse 88   Temperature    Respirations    Weight (LB) 250 lb 8 oz   Height 5' 3\"   BMI (Calculated) 44.47   Pain 0   O2 99     Follow up Appointment Information:  Check-In  Time Check-In Location Appointment   Type Provider   Name     After review of testing   [address] [place] [address]  3rd Floor Routine Clinic   Follow Up Visit Akbar Thompson MD   Appointment Preparations & Instructions   We ask that you plan accordingly due to traffic and parking that may delay you. We look forward to seeing you!         We are located on the third floor of the [facility] on the [facility].  Our address is     [address] [place] [address] [place] [address] [place] [address]    Thank you for allowing us to be a part of your care here at the [facility]    If you have questions or concerns please contact us at:    Kathryn Sotelo, RN, BSN    Juan A L[facility]pez (Schedule,P.A.)  Nurse Coordinator     Clinic   Pulmonary Hypertension   Pulmonary Hypertension  [facility]  (P)[phone]    (P) " [phone]        (F)[phone]    ** Please note that you will NOT receive a reminder call regarding your scheduled testing, reminder calls are for provider appointments only.**    **If you are scheduled for testing within the [facility] system you may receive a call regarding pre-registration for billing purposes only.**     Remember to weigh yourself daily after voiding and before you consume any food or beverages and log the numbers.      If you have gained/lost 2 pounds overnight or 5 pounds in a week contact us immediately for medication adjustments or further instructions.

## 2017-05-24 NOTE — NURSING NOTE
Chief Complaint   Patient presents with     Follow Up For     Return PH for 7 month follow up

## 2017-05-24 NOTE — PROGRESS NOTES
Mara is a pleasant 81-year-old female with moderate pulmonary hypertension World Health Organization group 2, diastolic heart failure, hypertension, atrial fibrillation, diabetes and chronic kidney disease. She was hospitalized recently for increasing shortness of breath, and right heart cath showed pulmonary artery wedge pressure of 26. SVR also 1580.         Results for MARA MAHAN (MRN [id number]) as of 5/24/2017 14:46   Ref. Range 4/27/2017 17:23 5/15/2017 16:15   Sodium Latest Ref Range: 133 - 144 mmol/L  138   Potassium Latest Ref Range: 3.4 - 5.3 mmol/L  4.6   Chloride Latest Ref Range: 94 - 109 mmol/L  104   Carbon Dioxide Latest Ref Range: 20 - 32 mmol/L  24   Urea Nitrogen Latest Ref Range: 7 - 30 mg/dL  25   Creatinine Latest Ref Range: 0.52 - 1.04 mg/dL  1.35 (H)   GFR Estimate Latest Ref Range: >60 mL/min/1.7m2  38 (L)   GFR Estimate If Black Latest Ref Range: >60 mL/min/1.7m2  45 (L)   Calcium Latest Ref Range: 8.5 - 10.1 mg/dL  8.5   Anion Gap Latest Ref Range: 3 - 14 mmol/L  10   Magnesium Latest Ref Range: 1.6 - 2.3 mg/dL  1.6   Phosphorus Latest Ref Range: 2.5 - 4.5 mg/dL  2.9   Albumin Latest Ref Range: 3.4 - 5.0 g/dL  3.1 (L)   Protein Total Latest Ref Range: 6.8 - 8.8 g/dL  6.3 (L)   Bilirubin Total Latest Ref Range: 0.2 - 1.3 mg/dL  2.1 (H)   Alkaline Phosphatase Latest Ref Range: 40 - 150 U/L  69   ALT Latest Ref Range: 0 - 50 U/L  16   AST Latest Ref Range: 0 - 45 U/L  17   ABILIO Screen by EIA Latest Ref Range: <1.0   <1.0...   Endomysial Antibody IgA by IFA Unknown  <1:10...   N-Terminal Pro Bnp Latest Ref Range: 0 - 450 pg/mL  2903 (H)   Glucose Latest Ref Range: 70 - 99 mg/dL  255 (H)   WBC Latest Ref Range: 4.0 - 11.0 10e9/L  8.3   Hemoglobin Latest Ref Range: 11.7 - 15.7 g/dL  10.7 (L)   Hematocrit Latest Ref Range: 35.0 - 47.0 %  32.6 (L)   Platelet Count Latest Ref Range: 150 - 450 10e9/L  284   RBC Count Latest Ref Range: 3.8 - 5.2 10e12/L  3.34 (L)   MCV Latest Ref  Range: 78 - 100 fl  98   MCH Latest Ref Range: 26.5 - 33.0 pg  32.0   MCHC Latest Ref Range: 31.5 - 36.5 g/dL  32.8   RDW Latest Ref Range: 10.0 - 15.0 %  14.7   INR Latest Ref Range: 0.86 - 1.14   3.30 (H)   Complement C3 Latest Ref Range: 76 - 169 mg/dL  120   Complement C4 Latest Ref Range: 15 - 50 mg/dL  22   Scleroderma Antibody Scl-70 NOBLE IgG Latest Ref Range: 0.0 - 0.9 AI  <0.2...   XR CHEST 2 VW Unknown Rpt    I personally saw and examined this patient, reviewed imaging and laboratory studies, confirmed physical examination and discussed results and plan with patient and or family.

## 2017-05-24 NOTE — LETTER
5/24/2017    RE: Mara Colindres  [address] [facility] [address]     Dear Colleague,    Thank you for the opportunity to participate in the care of your patient, Mara Colindres, at the [facility] at [facility]. Please see a copy of my visit note below.    Mara is a pleasant 81-year-old female with moderate pulmonary hypertension World Health Organization group 2, diastolic heart failure, hypertension, atrial fibrillation, diabetes and chronic kidney disease. She was hospitalized recently for increasing shortness of breath, and right heart cath showed pulmonary artery wedge pressure of 26. SVR also 1580.         Results for MARA COLINDRES (MRN [id number]) as of 5/24/2017 14:46   Ref. Range 4/27/2017 17:23 5/15/2017 16:15   Sodium Latest Ref Range: 133 - 144 mmol/L  138   Potassium Latest Ref Range: 3.4 - 5.3 mmol/L  4.6   Chloride Latest Ref Range: 94 - 109 mmol/L  104   Carbon Dioxide Latest Ref Range: 20 - 32 mmol/L  24   Urea Nitrogen Latest Ref Range: 7 - 30 mg/dL  25   Creatinine Latest Ref Range: 0.52 - 1.04 mg/dL  1.35 (H)   GFR Estimate Latest Ref Range: >60 mL/min/1.7m2  38 (L)   GFR Estimate If Black Latest Ref Range: >60 mL/min/1.7m2  45 (L)   Calcium Latest Ref Range: 8.5 - 10.1 mg/dL  8.5   Anion Gap Latest Ref Range: 3 - 14 mmol/L  10   Magnesium Latest Ref Range: 1.6 - 2.3 mg/dL  1.6   Phosphorus Latest Ref Range: 2.5 - 4.5 mg/dL  2.9   Albumin Latest Ref Range: 3.4 - 5.0 g/dL  3.1 (L)   Protein Total Latest Ref Range: 6.8 - 8.8 g/dL  6.3 (L)   Bilirubin Total Latest Ref Range: 0.2 - 1.3 mg/dL  2.1 (H)   Alkaline Phosphatase Latest Ref Range: 40 - 150 U/L  69   ALT Latest Ref Range: 0 - 50 U/L  16   AST Latest Ref Range: 0 - 45 U/L  17   ABILIO Screen by EIA Latest Ref Range: <1.0   <1.0...   Endomysial Antibody IgA by IFA Unknown  <1:10...   N-Terminal Pro Bnp Latest Ref Range: 0 - 450 pg/mL  2903 (H)   Glucose Latest Ref Range: 70 - 99  mg/dL  255 (H)   WBC Latest Ref Range: 4.0 - 11.0 10e9/L  8.3   Hemoglobin Latest Ref Range: 11.7 - 15.7 g/dL  10.7 (L)   Hematocrit Latest Ref Range: 35.0 - 47.0 %  32.6 (L)   Platelet Count Latest Ref Range: 150 - 450 10e9/L  284   RBC Count Latest Ref Range: 3.8 - 5.2 10e12/L  3.34 (L)   MCV Latest Ref Range: 78 - 100 fl  98   MCH Latest Ref Range: 26.5 - 33.0 pg  32.0   MCHC Latest Ref Range: 31.5 - 36.5 g/dL  32.8   RDW Latest Ref Range: 10.0 - 15.0 %  14.7   INR Latest Ref Range: 0.86 - 1.14   3.30 (H)   Complement C3 Latest Ref Range: 76 - 169 mg/dL  120   Complement C4 Latest Ref Range: 15 - 50 mg/dL  22   Scleroderma Antibody Scl-70 NOBLE IgG Latest Ref Range: 0.0 - 0.9 AI  <0.2...   XR CHEST 2 VW Unknown Rpt    I personally saw and examined this patient, reviewed imaging and laboratory studies, confirmed physical examination and discussed results and plan with patient and or family.    Please do not hesitate to contact me if you have any questions/concerns.     Sincerely,     Akbar Thompson MD

## 2017-05-24 NOTE — PATIENT INSTRUCTIONS
"Medication Changes:  No medication changes at this time. Please continue current medication regiment.     Patient Instructions:  Check-In  Time Check-In Location Estimated Length Procedure   Name        [facility]   waiting room 20 minutes CT Scan**   Procedure Preparations & Instructions     This is a non-invasive procedure and does NOT require any preparation         Results:  Component      Latest Ref Rng & Units 5/15/2017   Sodium      133 - 144 mmol/L 138   Potassium      3.4 - 5.3 mmol/L 4.6   Chloride      94 - 109 mmol/L 104   Carbon Dioxide      20 - 32 mmol/L 24   Anion Gap      3 - 14 mmol/L 10   Glucose      70 - 99 mg/dL 255 (H)   Urea Nitrogen      7 - 30 mg/dL 25   Creatinine      0.52 - 1.04 mg/dL 1.35 (H)   GFR Estimate      >60 mL/min/1.7m2 38 (L)   GFR Estimate If Black      >60 mL/min/1.7m2 45 (L)   Calcium      8.5 - 10.1 mg/dL 8.5   Bilirubin Total      0.2 - 1.3 mg/dL 2.1 (H)   Albumin      3.4 - 5.0 g/dL 3.1 (L)   Protein Total      6.8 - 8.8 g/dL 6.3 (L)   Alkaline Phosphatase      40 - 150 U/L 69   ALT      0 - 50 U/L 16   AST      0 - 45 U/L 17   WBC      4.0 - 11.0 10e9/L 8.3   RBC Count      3.8 - 5.2 10e12/L 3.34 (L)   Hemoglobin      11.7 - 15.7 g/dL 10.7 (L)   Hematocrit      35.0 - 47.0 % 32.6 (L)   MCV      78 - 100 fl 98   MCH      26.5 - 33.0 pg 32.0   MCHC      31.5 - 36.5 g/dL 32.8   RDW      10.0 - 15.0 % 14.7   Platelet Count      150 - 450 10e9/L 284   Magnesium      1.6 - 2.3 mg/dL 1.6   Phosphorus      2.5 - 4.5 mg/dL 2.9   INR      0.86 - 1.14 3.30 (H)   N-Terminal Pro Bnp      0 - 450 pg/mL 2903 (H)   Complement C3      76 - 169 mg/dL 120   Complement C4      15 - 50 mg/dL 22   Endomysial Antibody IgA by IFA       <1:10 . . .   ABILIO Screen by EIA      <1.0 <1.0 . . .   Scleroderma Antibody Scl-70 NOBLE IgG      0.0 - 0.9 AI <0.2 . . .     Vital Signs 5/24/2017   Systolic 127   Diastolic 82   Pulse 88   Temperature    Respirations    Weight (LB) 250 lb 8 oz   Height 5' 3\"   BMI " (Calculated) 44.47   Pain 0   O2 99     Follow up Appointment Information:  Check-In  Time Check-In Location Appointment   Type Provider   Name     After review of testing   [address] [place] [address]  3rd Floor Routine Clinic   Follow Up Visit Akbar Thompson MD   Appointment Preparations & Instructions   We ask that you plan accordingly due to traffic and parking that may delay you. We look forward to seeing you!         We are located on the third floor of the [facility] on the [facility].  Our address is     [address] [place] [address] on 3rd Floor   [place] [id number]    Thank you for allowing us to be a part of your care here at the [facility]    If you have questions or concerns please contact us at:    Kathryn Sotelo RN, BSN    [facility] ([facility]  Nurse Coordinator     Clinic   Pulmonary Hypertension   Pulmonary Hypertension  [facility]  (P)[phone]    (P) [phone]        (F)[phone]    ** Please note that you will NOT receive a reminder call regarding your scheduled testing, reminder calls are for provider appointments only.**    **If you are scheduled for testing within the [employer] system you may receive a call regarding pre-registration for billing purposes only.**     Remember to weigh yourself daily after voiding and before you consume any food or beverages and log the numbers.      If you have gained/lost 2 pounds overnight or 5 pounds in a week contact us immediately for medication adjustments or further instructions.

## 2017-05-24 NOTE — MR AVS SNAPSHOT
After Visit Summary   5/24/2017    Mara Colindres    MRN: [id number]           Patient Information     Date Of Birth          11/30/1934        Visit Information        Provider Department      5/24/2017 2:30 PM Akbar Thompson MD [facility]        Today's Diagnoses     Dyspnea on exertion    -  1    Pulmonary hypertension (H)          Care Instructions    Medication Changes:  No medication changes at this time. Please continue current medication regiment.     Patient Instructions:  Check-In  Time Check-In Location Estimated Length Procedure   Name        [facility]   waiting room 20 minutes CT Scan**   Procedure Preparations & Instructions     This is a non-invasive procedure and does NOT require any preparation         Results:  Component      Latest Ref Rng & Units 5/15/2017   Sodium      133 - 144 mmol/L 138   Potassium      3.4 - 5.3 mmol/L 4.6   Chloride      94 - 109 mmol/L 104   Carbon Dioxide      20 - 32 mmol/L 24   Anion Gap      3 - 14 mmol/L 10   Glucose      70 - 99 mg/dL 255 (H)   Urea Nitrogen      7 - 30 mg/dL 25   Creatinine      0.52 - 1.04 mg/dL 1.35 (H)   GFR Estimate      >60 mL/min/1.7m2 38 (L)   GFR Estimate If Black      >60 mL/min/1.7m2 45 (L)   Calcium      8.5 - 10.1 mg/dL 8.5   Bilirubin Total      0.2 - 1.3 mg/dL 2.1 (H)   Albumin      3.4 - 5.0 g/dL 3.1 (L)   Protein Total      6.8 - 8.8 g/dL 6.3 (L)   Alkaline Phosphatase      40 - 150 U/L 69   ALT      0 - 50 U/L 16   AST      0 - 45 U/L 17   WBC      4.0 - 11.0 10e9/L 8.3   RBC Count      3.8 - 5.2 10e12/L 3.34 (L)   Hemoglobin      11.7 - 15.7 g/dL 10.7 (L)   Hematocrit      35.0 - 47.0 % 32.6 (L)   MCV      78 - 100 fl 98   MCH      26.5 - 33.0 pg 32.0   MCHC      31.5 - 36.5 g/dL 32.8   RDW      10.0 - 15.0 % 14.7   Platelet Count      150 - 450 10e9/L 284   Magnesium      1.6 - 2.3 mg/dL 1.6   Phosphorus      2.5 - 4.5 mg/dL 2.9   INR      0.86 - 1.14 3.30 (H)   N-Terminal Pro Bnp      0 - 450 pg/mL  "2903 (H)   Complement C3      76 - 169 mg/dL 120   Complement C4      15 - 50 mg/dL 22   Endomysial Antibody IgA by IFA       <1:10 . . .   ABILIO Screen by EIA      <1.0 <1.0 . . .   Scleroderma Antibody Scl-70 NOBLE IgG      0.0 - 0.9 AI <0.2 . . .     Vital Signs 5/24/2017   Systolic 127   Diastolic 82   Pulse 88   Temperature    Respirations    Weight (LB) 250 lb 8 oz   Height 5' 3\"   BMI (Calculated) 44.47   Pain 0   O2 99     Follow up Appointment Information:  Check-In  Time Check-In Location Appointment   Type Provider   Name     After review of testing   [address] [place] [address]  3rd Floor Routine Clinic   Follow Up Visit Akbar Thompson MD   Appointment Preparations & Instructions   We ask that you plan accordingly due to traffic and parking that may delay you. We look forward to seeing you!         We are located on the third floor of the [facility] ([facility] on the [facility].  Our address is     [address] [place] [address] [place] [address]rd [place] [address]    Thank you for allowing us to be a part of your care here at the [facility]    If you have questions or concerns please contact us at:    Kathryn Sotelo RN, BSN    Juan A López (Schedule[facility]P.A.)  Nurse Coordinator     Clinic   Pulmonary Hypertension   Pulmonary Hypertension  [facility]  (P)[phone]    (P) [phone]        (F)[phone]    ** Please note that you will NOT receive a reminder call regarding your scheduled testing, reminder calls are for provider appointments only.**    **If you are scheduled for testing within the [employer] system you may receive a call regarding pre-registration for billing purposes only.**     Remember to weigh yourself daily after voiding and before you consume any food or beverages and log the numbers.      If you have gained/lost 2 pounds overnight or 5 pounds in a " week contact us immediately for medication adjustments or further instructions.            Follow-ups after your visit        Follow-up notes from your care team     Return for with Dr. Thompson, Return Pulmonary Hypertension, with, Labs, Will schedule follow up after testing is completed.      Your next 10 appointments already scheduled     May 24, 2017  4:20 PM CDT   (Arrive by 4:05 PM)   CT CHEST W/O CONTRAST with UCCT1   [facility] [place] [facility] ([facility])    [address] [facility]  1st Floor  [facility] [address] [phone]           Please bring any scans or X-rays taken at other hospitals, if similar tests were done. Also bring a list of your medicines, including vitamins, minerals and over-the-counter drugs. It is safest to leave personal items at home.  Be sure to tell your doctor:   If you have any allergies.   If there s any chance you are pregnant.   If you are breastfeeding.   If you have any special needs.  You do not need to do anything special to prepare.  Please wear loose clothing, such as a sweat suit or jogging clothes. Avoid snaps, zippers and other metal. We may ask you to undress and put on a hospital gown.            May 30, 2017  2:30 PM CDT   [facility] with [facility] COAG   [facility]    [address] [place] [address] [phone]              Future tests that were ordered for you today     Open Future Orders        Priority Expected Expires Ordered    CT Chest w/o contrast Routine  5/24/2018 5/24/2017            Who to contact     If you have questions or need follow up information about today's clinic visit or your schedule please contact [facility] directly at [phone].  Normal or non-critical lab and imaging results will be communicated to you by MyChart, letter or phone within 4 business days after the clinic has received the results. If you do not hear from us within 7  "days, please contact the clinic through [employer] or phone. If you have a critical or abnormal lab result, we will notify you by phone as soon as possible.  Submit refill requests through [employer] or call your pharmacy and they will forward the refill request to us. Please allow 3 business days for your refill to be completed.          Additional Information About Your Visit        [employer] Information     [employer] lets you send messages to your doctor, view your test results, renew your prescriptions, schedule appointments and more. To sign up, go to [email or web address] . Click on \"Log in\" on the left side of the screen, which will take you to the Welcome page. Then click on \"Sign up Now\" on the right side of the page.     You will be asked to enter the access code listed below, as well as some personal information. Please follow the directions to create your username and password.     Your access code is: [email or web address]  Expires: 2017  5:00 PM     Your access code will  in 90 days. If you need help or a new code, please call your [facility] clinic or [phone].        Care EveryWhere ID     This is your Care EveryWhere ID. This could be used by other organizations to access your [facility] medical records  [id number]        Your Vitals Were     Pulse Height Pulse Oximetry BMI (Body Mass Index)          88 1.6 m (5' 3\") 99% 44.37 kg/m2         Blood Pressure from Last 3 Encounters:   17 127/82   05/15/17 99/72   17 120/88    Weight from Last 3 Encounters:   17 113.6 kg (250 lb 8 oz)   05/15/17 115.7 kg (255 lb)   17 113.9 kg (251 lb)               Primary Care Provider Office Phone # Fax #    Steven Abrams [phone]       [facility] [address] [facility] [address]        Thank you!     Thank you for choosing [facility]  for your care. Our goal is always to provide you with excellent care. Hearing back from our patients is one way we " can continue to improve our services. Please take a few minutes to complete the written survey that you may receive in the mail after your visit with us. Thank you!             Your Updated Medication List - Protect others around you: Learn how to safely use, store and throw away your medicines at www.disposemymeds.org.          This list is accurate as of: 5/24/17  3:13 PM.  Always use your most recent med list.                   Brand Name Dispense Instructions for use    ACCU-CHEK PARVEZ PLUS test strip   Generic drug:  blood glucose monitoring     100 each    TEST BLOOD GLUCOSE AS DIRECTED EVERY DAY       * albuterol 108 (90 BASE) MCG/ACT Inhaler    PROAIR HFA/PROVENTIL HFA/VENTOLIN HFA    3 Inhaler    Inhale 2 puffs into the lungs every 6 hours as needed for shortness of breath / dyspnea       * albuterol (2.5 MG/3ML) 0.083% neb solution     30 vial    Take 3 mLs (2.5 mg) by nebulization every 4 hours as needed for shortness of breath / dyspnea       * ASPIRIN NOT PRESCRIBED    INTENTIONAL    0 each    Antiplatelet medication not prescribed intentionally due to Current anticoagulant therapy (warfarin/enoxaparin)       budesonide-formoterol 80-4.5 MCG/ACT Inhaler    SYMBICORT    1 Inhaler    Inhale 2 puffs into the lungs 2 times daily       CALCIUM + D PO      Take 1 tablet by mouth 2 times daily.       diphenoxylate-atropine 2.5-0.025 MG per tablet    LOMOTIL    40 tablet    TAKE 1 TABLET BY MOUTH FOUR TIMES DAILY       furosemide 40 MG tablet    LASIX    180 tablet    Take 1 tablet (40 mg) by mouth 2 times daily       glipiZIDE 10 MG 24 hr tablet    glipiZIDE XL    90 tablet    Take 1 tablet (10 mg) by mouth daily       guaiFENesin-codeine 100-10 MG/5ML Soln solution    ROBITUSSIN AC    120 mL    Take 10 mLs by mouth every 4 hours as needed for cough       HYDROcodone-acetaminophen 5-325 MG per tablet    NORCO    90 tablet    TK 1 T PO  QID AS NEEDED       losartan 25 MG tablet    COZAAR    180 tablet    Take 1  tablet (25 mg) by mouth 2 times daily       metoprolol 100 MG tablet    LOPRESSOR    180 tablet    Take 1 tablet (100 mg) by mouth 2 times daily NOTE TABLET STRENGTH CHANGE-1 TABLET TWICE DAILY       mupirocin 2 % ointment    BACTROBAN    22 g    Use 3 times a day to affected area for one week       OMEPRAZOLE CPCR 20 MG OR      1 CAPSULE DAILY       * order for DME     1 Device    nebuliser       order for DME     1 each    Reasoning for requesting the hospital bed: Patient had multiple complications following hip replacement(blood clot/infection/removal of hip replacement) uses walker to ambulate now,she has a very hard time trying to lift her right foot. Hard to get herself into bed because of the height. She lives alone.  She uses devices at home to get herself into bed( step stool/extension to help lift her leg).Would also like the side rails       PARoxetine 20 MG tablet    PAXIL    90 tablet    TAKE 1 TABLET BY MOUTH EVERY DAY       potassium chloride 10 MEQ tablet    K-TAB,KLOR-CON    180 tablet    Take 2 tablets daily       predniSONE 20 MG tablet    DELTASONE    20 tablet    Take 1 tablet (20 mg) by mouth daily Take 3 tabs (60 mg) orally daily for 3 days, then Take 2 tabs (40 mg) orally daily for 3 days, then Take 1 tab (20 mg) orally daily for 3 days, then Take 1/2 tab (10 mg) orally for 3 days       Resveratrol 100 MG Caps      Take 1 capsule by mouth daily       * STATIN NOT PRESCRIBED (INTENTIONAL)     0 each    1 each continuous prn. Statin not prescribed intentionally due to Other: patient has normal LDL within recommended guidelines on no medications       TYLENOL 500 MG tablet   Generic drug:  acetaminophen      Take 2 tablets by mouth 3 times daily as needed.       vitamin B complex with vitamin C Tabs tablet      Take 1 tablet by mouth daily.       warfarin 5 MG tablet    COUMADIN    90 tablet    Take 1/2 tablet (2.5 mg) Monday, Wednesday, and Friday, and take 1 tablet (5 mg) by mouth all other  days       * Notice:  This list has 5 medication(s) that are the same as other medications prescribed for you. Read the directions carefully, and ask your doctor or other care provider to review them with you.

## 2017-05-30 ENCOUNTER — ANTICOAGULATION THERAPY VISIT (OUTPATIENT)
Dept: NURSING | Facility: CLINIC | Age: 82
End: 2017-05-30
Payer: COMMERCIAL

## 2017-05-30 DIAGNOSIS — I82.409 DVT (DEEP VENOUS THROMBOSIS) (H): ICD-10-CM

## 2017-05-30 DIAGNOSIS — Z79.01 LONG-TERM (CURRENT) USE OF ANTICOAGULANTS: ICD-10-CM

## 2017-05-30 LAB — INR POINT OF CARE: 2.8 (ref 0.86–1.14)

## 2017-05-30 PROCEDURE — 85610 PROTHROMBIN TIME: CPT | Mod: QW

## 2017-05-30 PROCEDURE — 99207 ZZC NO CHARGE NURSE ONLY: CPT

## 2017-05-30 PROCEDURE — 36416 COLLJ CAPILLARY BLOOD SPEC: CPT

## 2017-05-30 NOTE — MR AVS SNAPSHOT
Mara CHARLES Jens   5/30/2017 2:30 PM   Anticoagulation Therapy Visit    Description:  82 year old female   Provider:  STALIN ANTI COAG   Department:  [facility]           INR as of 5/30/2017     Today's INR 2.8      Anticoagulation Summary as of 5/30/2017     INR goal 2.0-3.0   Today's INR 2.8   Full instructions 2.5 mg on Mon, Wed, Fri; 5 mg all other days   Next INR check 6/27/2017    Indications   Long-term (current) use of anticoagulants [Z79.01] [Z79.01]  DVT (deep venous thrombosis) (H) [I82.409]         Your next Anticoagulation Clinic appointment(s)     Jun 27, 2017  2:30 PM CDT   Anticoagulation Visit with STALIN ANTI COAЕЛЕНА   [facility] ([facility]    [address] [address] [phone]              Contact Numbers     [facility]  Please call [phone] to cancel and/or reschedule your appointment   Please call [phone] with any problems or questions regarding your therapy.        May 2017 Details    Sun Mon Tue Wed Thu Fri Sat      1               2               3               4               5               6                 7               8               9               10               11               12               13                 14               15               16               17               18               19               20                 21               22               23               24               25               26               27                 28               29               30      5 mg   See details      31      2.5 mg             Date Details   05/30 This INR check               How to take your warfarin dose     To take:  2.5 mg Take 0.5 of a 5 mg tablet.    To take:  5 mg Take 1 of the 5 mg tablets.           June 2017 Details    Sun Mon Tue Wed Thu Fri Sat         1      5 mg         2      2.5 mg         3      5 mg           4      5 mg         5      2.5 mg         6      5 mg         7      2.5 mg          8      5 mg         9      2.5 mg         10      5 mg           11      5 mg         12      2.5 mg         13      5 mg         14      2.5 mg         15      5 mg         16      2.5 mg         17      5 mg           18      5 mg         19      2.5 mg         20      5 mg         21      2.5 mg         22      5 mg         23      2.5 mg         24      5 mg           25      5 mg         26      2.5 mg         27            28               29               30                 Date Details   No additional details    Date of next INR:  6/27/2017         How to take your warfarin dose     To take:  2.5 mg Take 0.5 of a 5 mg tablet.    To take:  5 mg Take 1 of the 5 mg tablets.

## 2017-05-30 NOTE — PROGRESS NOTES
ANTICOAGULATION FOLLOW-UP CLINIC VISIT    Patient Name:  Mara Colindres  Date:  5/30/2017  Contact Type:  Face to Face    SUBJECTIVE:     Patient Findings     Positives No Problem Findings           OBJECTIVE    INR Protime   Date Value Ref Range Status   05/30/2017 2.8 (A) 0.86 - 1.14 Final       ASSESSMENT / PLAN  No question data found.  Anticoagulation Summary as of 5/30/2017     INR goal 2.0-3.0   Today's INR 2.8   Maintenance plan 2.5 mg (5 mg x 0.5) on Mon, Wed, Fri; 5 mg (5 mg x 1) all other days   Full instructions 2.5 mg on Mon, Wed, Fri; 5 mg all other days   Weekly total 27.5 mg   No change documented Celeste Ruiz RN   Plan last modified Celeste Ruiz RN (3/23/2017)   Next INR check 6/27/2017   Priority INR   Target end date     Indications   Long-term (current) use of anticoagulants [Z79.01] [Z79.01]  DVT (deep venous thrombosis) (H) [I82.409]         Anticoagulation Episode Summary     INR check location     Preferred lab     Send INR reminders to [facility]    Comments       Anticoagulation Care Providers     Provider Role Specialty Phone number    Steven Abrams MD Responsible Internal Medicine [phone]            See the Encounter Report to view Anticoagulation Flowsheet and Dosing Calendar (Go to Encounters tab in chart review, and find the Anticoagulation Therapy Visit)    Dosage adjustment made based on physician directed care plan.    Celeste Ruiz RN

## 2017-06-07 DIAGNOSIS — N18.30 CKD (CHRONIC KIDNEY DISEASE) STAGE 3, GFR 30-59 ML/MIN (H): Primary | ICD-10-CM

## 2017-06-07 NOTE — NURSING NOTE
Communicated to patient per Dr. Ren to repeat labs in 1-2 weeks (CMP and CBC). Orders have been placed. Left voice message for patient.  Kim Dyson LPN  Nephrology  [facility]  [phone]

## 2017-06-15 DIAGNOSIS — N18.30 CKD (CHRONIC KIDNEY DISEASE) STAGE 3, GFR 30-59 ML/MIN (H): ICD-10-CM

## 2017-06-15 LAB
ALBUMIN SERPL-MCNC: 3.7 G/DL (ref 3.4–5)
ALP SERPL-CCNC: 68 U/L (ref 40–150)
ALT SERPL W P-5'-P-CCNC: 20 U/L (ref 0–50)
ANION GAP SERPL CALCULATED.3IONS-SCNC: 13 MMOL/L (ref 3–14)
AST SERPL W P-5'-P-CCNC: 15 U/L (ref 0–45)
BILIRUB SERPL-MCNC: 0.8 MG/DL (ref 0.2–1.3)
BUN SERPL-MCNC: 29 MG/DL (ref 7–30)
CALCIUM SERPL-MCNC: 9.1 MG/DL (ref 8.5–10.1)
CHLORIDE SERPL-SCNC: 102 MMOL/L (ref 94–109)
CO2 SERPL-SCNC: 21 MMOL/L (ref 20–32)
CREAT SERPL-MCNC: 1.42 MG/DL (ref 0.52–1.04)
ERYTHROCYTE [DISTWIDTH] IN BLOOD BY AUTOMATED COUNT: 14.8 % (ref 10–15)
GFR SERPL CREATININE-BSD FRML MDRD: 35 ML/MIN/1.7M2
GLUCOSE SERPL-MCNC: 200 MG/DL (ref 70–99)
HCT VFR BLD AUTO: 36.9 % (ref 35–47)
HGB BLD-MCNC: 12.3 G/DL (ref 11.7–15.7)
MCH RBC QN AUTO: 33.1 PG (ref 26.5–33)
MCHC RBC AUTO-ENTMCNC: 33.3 G/DL (ref 31.5–36.5)
MCV RBC AUTO: 99 FL (ref 78–100)
PLATELET # BLD AUTO: 252 10E9/L (ref 150–450)
POTASSIUM SERPL-SCNC: 3.9 MMOL/L (ref 3.4–5.3)
PROT SERPL-MCNC: 7.1 G/DL (ref 6.8–8.8)
RBC # BLD AUTO: 3.72 10E12/L (ref 3.8–5.2)
SODIUM SERPL-SCNC: 136 MMOL/L (ref 133–144)
WBC # BLD AUTO: 6.9 10E9/L (ref 4–11)

## 2017-06-15 PROCEDURE — 36415 COLL VENOUS BLD VENIPUNCTURE: CPT | Performed by: INTERNAL MEDICINE

## 2017-06-15 PROCEDURE — 85027 COMPLETE CBC AUTOMATED: CPT | Performed by: INTERNAL MEDICINE

## 2017-06-15 PROCEDURE — 80053 COMPREHEN METABOLIC PANEL: CPT | Performed by: INTERNAL MEDICINE

## 2017-06-16 DIAGNOSIS — N18.30 CONTROLLED TYPE 2 DIABETES MELLITUS WITH STAGE 3 CHRONIC KIDNEY DISEASE, WITHOUT LONG-TERM CURRENT USE OF INSULIN (H): ICD-10-CM

## 2017-06-16 DIAGNOSIS — E11.22 CONTROLLED TYPE 2 DIABETES MELLITUS WITH STAGE 3 CHRONIC KIDNEY DISEASE, WITHOUT LONG-TERM CURRENT USE OF INSULIN (H): ICD-10-CM

## 2017-06-16 DIAGNOSIS — T50.2X5A DIURETIC-INDUCED HYPOKALEMIA: ICD-10-CM

## 2017-06-16 DIAGNOSIS — E87.6 DIURETIC-INDUCED HYPOKALEMIA: ICD-10-CM

## 2017-06-16 RX ORDER — GLIPIZIDE 10 MG/1
10 TABLET, FILM COATED, EXTENDED RELEASE ORAL DAILY
Qty: 90 TABLET | Refills: 0 | Status: SHIPPED | OUTPATIENT
Start: 2017-06-16 | End: 2017-07-27

## 2017-06-16 RX ORDER — POTASSIUM CHLORIDE 750 MG/1
TABLET, EXTENDED RELEASE ORAL
Qty: 180 TABLET | Refills: 3 | Status: SHIPPED | OUTPATIENT
Start: 2017-06-16 | End: 2018-01-22

## 2017-06-16 NOTE — TELEPHONE ENCOUNTER
glipiZIDE (GLIPIZIDE XL) 10 MG 24 hr tablet         Last Written Prescription Date: 12/19/16  Last Fill Quantity: 90, # refills: 1  Last Office Visit with [facility], [facility] or [facility] prescribing provider:  4/27/17        BP Readings from Last 3 Encounters:   05/24/17 127/82   05/15/17 99/72   04/27/17 120/88     Lab Results   Component Value Date    MICROL 103 11/10/2015     Lab Results   Component Value Date    UMALCR 39.16 11/10/2015     Creatinine   Date Value Ref Range Status   06/15/2017 1.42 (H) 0.52 - 1.04 mg/dL Final   ]  GFR Estimate   Date Value Ref Range Status   06/15/2017 35 (L) >60 mL/min/1.7m2 Final     Comment:     Non  GFR Calc   05/15/2017 38 (L) >60 mL/min/1.7m2 Final     Comment:     Non  GFR Calc   02/20/2017 40 (L) >60 mL/min/1.7m2 Final     Comment:     Non  GFR Calc     GFR Estimate If Black   Date Value Ref Range Status   06/15/2017 43 (L) >60 mL/min/1.7m2 Final     Comment:      GFR Calc   05/15/2017 45 (L) >60 mL/min/1.7m2 Final     Comment:      GFR Calc   02/20/2017 48 (L) >60 mL/min/1.7m2 Final     Comment:      GFR Calc     Lab Results   Component Value Date    CHOL 144 05/19/2016     Lab Results   Component Value Date    HDL 64 05/19/2016     Lab Results   Component Value Date    LDL 68 05/19/2016     Lab Results   Component Value Date    TRIG 63 05/19/2016     Lab Results   Component Value Date    CHOLHDLRATIO 2.5 07/11/2014     Lab Results   Component Value Date    AST 15 06/15/2017     Lab Results   Component Value Date    ALT 20 06/15/2017     Lab Results   Component Value Date    A1C 8.3 04/27/2017    A1C 8.9 12/19/2016    A1C 7.0 07/12/2016    A1C 7.6 04/05/2016    A1C 7.0 11/10/2015     Potassium   Date Value Ref Range Status   06/15/2017 3.9 3.4 - 5.3 mmol/L Final

## 2017-06-16 NOTE — TELEPHONE ENCOUNTER
Routing refill request to provider for review/approval because:  Labs out of range:  Cr  Labs not current:  LDL    Celeste Ruiz RN - BC

## 2017-06-20 NOTE — TELEPHONE ENCOUNTER
Spoke to patient in regards to approved med and needing to be seen for further refills. Patient states understanding. Scheduled patient to see PCP 7/27/17.    Claudia Ornelas CMA

## 2017-06-21 ENCOUNTER — RADIANT APPOINTMENT (OUTPATIENT)
Dept: BONE DENSITY | Facility: CLINIC | Age: 82
End: 2017-06-21
Attending: INTERNAL MEDICINE
Payer: COMMERCIAL

## 2017-06-21 DIAGNOSIS — Z78.0 ASYMPTOMATIC POSTMENOPAUSAL STATUS: ICD-10-CM

## 2017-06-21 DIAGNOSIS — Z78.0 ASYMPTOMATIC MENOPAUSAL STATE: ICD-10-CM

## 2017-06-21 PROCEDURE — 77081 DXA BONE DENSITY APPENDICULR: CPT | Performed by: INTERNAL MEDICINE

## 2017-06-21 PROCEDURE — 77080 DXA BONE DENSITY AXIAL: CPT | Mod: 59 | Performed by: INTERNAL MEDICINE

## 2017-06-27 ENCOUNTER — ANTICOAGULATION THERAPY VISIT (OUTPATIENT)
Dept: NURSING | Facility: CLINIC | Age: 82
End: 2017-06-27
Payer: COMMERCIAL

## 2017-06-27 ENCOUNTER — TELEPHONE (OUTPATIENT)
Dept: PHARMACY | Facility: CLINIC | Age: 82
End: 2017-06-27

## 2017-06-27 DIAGNOSIS — N18.30 CKD (CHRONIC KIDNEY DISEASE) STAGE 3, GFR 30-59 ML/MIN (H): ICD-10-CM

## 2017-06-27 DIAGNOSIS — I82.409 DVT (DEEP VENOUS THROMBOSIS) (H): ICD-10-CM

## 2017-06-27 DIAGNOSIS — Z79.01 LONG-TERM (CURRENT) USE OF ANTICOAGULANTS: ICD-10-CM

## 2017-06-27 DIAGNOSIS — M81.0 SENILE OSTEOPOROSIS: Primary | ICD-10-CM

## 2017-06-27 LAB — INR POINT OF CARE: 2.9 (ref 0.86–1.14)

## 2017-06-27 PROCEDURE — 99207 ZZC NO CHARGE NURSE ONLY: CPT

## 2017-06-27 PROCEDURE — 85610 PROTHROMBIN TIME: CPT | Mod: QW

## 2017-06-27 PROCEDURE — 36416 COLLJ CAPILLARY BLOOD SPEC: CPT

## 2017-06-27 NOTE — MR AVS SNAPSHOT
Mara CHARLES Jens   6/27/2017 2:30 PM   Anticoagulation Therapy Visit    Description:  82 year old female   Provider:  STALIN ANTI COAG   Department:  [facility]           INR as of 6/27/2017     Today's INR 2.9      Anticoagulation Summary as of 6/27/2017     INR goal 2.0-3.0   Today's INR 2.9   Full instructions 2.5 mg on Mon, Wed, Fri; 5 mg all other days   Next INR check 7/27/2017    Indications   Long-term (current) use of anticoagulants [Z79.01] [Z79.01]  DVT (deep venous thrombosis) (H) [I82.409]         Your next Anticoagulation Clinic appointment(s)     Jul 27, 2017  3:15 PM CDT   Anticoagulation Visit with STALIN ANTI LAYO   [facility] ([facility]    [address] [address] [phone]              Contact Numbers     [facility]  Please call [phone] to cancel and/or reschedule your appointment   Please call [phone] with any problems or questions regarding your therapy.        June 2017 Details    Sun Mon Tue Wed Thu Fri Sat         1               2               3                 4               5               6               7               8               9               10                 11               12               13               14               15               16               17                 18               19               20               21               22               23               24                 25               26               27      5 mg   See details      28      2.5 mg         29      5 mg         30      2.5 mg           Date Details   06/27 This INR check               How to take your warfarin dose     To take:  2.5 mg Take 0.5 of a 5 mg tablet.    To take:  5 mg Take 1 of the 5 mg tablets.           July 2017 Details    Sun Mon Tue Wed Thu Fri Sat           1      5 mg           2      5 mg         3      2.5 mg         4      5 mg         5      2.5 mg         6      5 mg         7      2.5 mg          8      5 mg           9      5 mg         10      2.5 mg         11      5 mg         12      2.5 mg         13      5 mg         14      2.5 mg         15      5 mg           16      5 mg         17      2.5 mg         18      5 mg         19      2.5 mg         20      5 mg         21      2.5 mg         22      5 mg           23      5 mg         24      2.5 mg         25      5 mg         26      2.5 mg         27            28               29                 30               31                     Date Details   No additional details    Date of next INR:  7/27/2017         How to take your warfarin dose     To take:  2.5 mg Take 0.5 of a 5 mg tablet.    To take:  5 mg Take 1 of the 5 mg tablets.

## 2017-06-27 NOTE — TELEPHONE ENCOUNTER
Submitted insurance verification request via [employer] portal. Will await response - please leave encounter open.       Hugh Ray, Carla  Medication Therapy Management Provider  Pager (voicemail): [phone]

## 2017-06-27 NOTE — PROGRESS NOTES
ANTICOAGULATION FOLLOW-UP CLINIC VISIT    Patient Name:  Mara Colindres  Date:  6/27/2017  Contact Type:  Face to Face    SUBJECTIVE:     Patient Findings     Positives No Problem Findings           OBJECTIVE    INR Protime   Date Value Ref Range Status   06/27/2017 2.9 (A) 0.86 - 1.14 Final       ASSESSMENT / PLAN  No question data found.  Anticoagulation Summary as of 6/27/2017     INR goal 2.0-3.0   Today's INR 2.9   Maintenance plan 2.5 mg (5 mg x 0.5) on Mon, Wed, Fri; 5 mg (5 mg x 1) all other days   Full instructions 2.5 mg on Mon, Wed, Fri; 5 mg all other days   Weekly total 27.5 mg   No change documented Celeste Ruiz RN   Plan last modified Celeste Ruiz RN (3/23/2017)   Next INR check 7/27/2017   Priority INR   Target end date     Indications   Long-term (current) use of anticoagulants [Z79.01] [Z79.01]  DVT (deep venous thrombosis) (H) [I82.409]         Anticoagulation Episode Summary     INR check location     Preferred lab     Send INR reminders to [facility] CL[facility]IC    Comments       Anticoagulation Care Providers     Provider Role Specialty Phone number    Steven Abrams MD Responsible Internal Medicine [phone]            See the Encounter Report to view Anticoagulation Flowsheet and Dosing Calendar (Go to Encounters tab in chart review, and find the Anticoagulation Therapy Visit)    Dosage adjustment made based on physician directed care plan.    Celeste Ruiz RN

## 2017-07-11 PROBLEM — M81.0 SENILE OSTEOPOROSIS: Status: ACTIVE | Noted: 2017-07-11

## 2017-07-11 NOTE — TELEPHONE ENCOUNTER
RN huddled with Edvin (MT) per Hugh patient will need to rechecking calcium, magnesium, and phosphorus within 14 days before Prolia therapy started.  Will route to MD to place order to be completed for patient before starting Prolia therapy.    Danielle KOVACS RN, BSN

## 2017-07-11 NOTE — TELEPHONE ENCOUNTER
Received insurance verification back from [employer] Portal - Prolia is subject to a $183 deductible ($0 of which has been met) and 20% co-insurance for administration, office visit, and cost of Prolia (roughly $200). [employer] will coordinate benefits and will consider the patient's deductible, co-insurance amounts up to primary plans allowed amount. Patient effectively has a 0% co-insurance with coordination. Claims will crossover.       Routed to Pink  to notify patient of coverage and schedule RN injection.       Carlos SanchezD  Medication Therapy Management Provider  Pager (voicemail): [phone]

## 2017-07-12 NOTE — TELEPHONE ENCOUNTER
"Please place orders for Prolia \"injection\" to be given in clinic by RN.    Pharmacy does not order this medication.  Please do not send to pharmacy. Halima Bob,     "

## 2017-07-13 NOTE — TELEPHONE ENCOUNTER
Patient has OV with Dr. Abrams on 7/27/17    Please see if patient wants to get labs now and then if cleared, get Prolia injection on the same day she comes in to see Dr. Abrams on 7/27/17  (Schedule a separate appointment on RN schedule for the injection)    Or would she rather get labs done at 7/27/17 OV then schedule an RN appointment on a later day    Soraida Marmolejo RN

## 2017-07-14 ENCOUNTER — TELEPHONE (OUTPATIENT)
Dept: FAMILY MEDICINE | Facility: CLINIC | Age: 82
End: 2017-07-14

## 2017-07-14 NOTE — TELEPHONE ENCOUNTER
Reason for Call:  Other returning call    Detailed comments: Patient Left a message on the TC line. Returning a call from Steven Abrams MD    Phone Number Patient can be reached at: Home number on file [phone] (home)    Best Time: any time    Can we leave a detailed message on this number? Not Applicable    Call taken on 7/14/2017 at 12:12 PM by Rubi Harrington

## 2017-07-14 NOTE — TELEPHONE ENCOUNTER
To Chac[facility] Team Triage RN ; I believe the call to patient came from Alisha Marmolejo RN, please clarify; If I called patient it's news to me, I would be glad to call her back but not sure of reason.    Steven Abrams MD

## 2017-07-14 NOTE — TELEPHONE ENCOUNTER
"This call was made by [place]   See 6/27/17 phone encounter (copy below).   Please reach out to patient and document in the 6/27/17 encounter. Close out this encounter so there are no duplicates      \"Patient has OV with Dr. Abrams on 7/27/17     Please see if patient wants to get labs now and then if cleared, get Prolia injection on the same day she comes in to see Dr. Abrams on 7/27/17  (Schedule a separate appointment on RN schedule for the injection)     Or would she rather get labs done at 7/27/17 OV then schedule an RN appointment on a later day\"       Soraida Marmolejo, AMAYA    "

## 2017-07-17 NOTE — TELEPHONE ENCOUNTER
Spoke to patient.  Patient will have labs drawn on 7-24 at 11 a.m.  Patient scheduled for Prolia injection on 7-27-17, at 2:30 p.m.  Halima Bob,

## 2017-07-24 DIAGNOSIS — N18.30 CKD (CHRONIC KIDNEY DISEASE) STAGE 3, GFR 30-59 ML/MIN (H): ICD-10-CM

## 2017-07-24 DIAGNOSIS — M81.0 SENILE OSTEOPOROSIS: ICD-10-CM

## 2017-07-24 LAB
ANION GAP SERPL CALCULATED.3IONS-SCNC: 10 MMOL/L (ref 3–14)
BUN SERPL-MCNC: 22 MG/DL (ref 7–30)
CALCIUM SERPL-MCNC: 9.5 MG/DL (ref 8.5–10.1)
CHLORIDE SERPL-SCNC: 104 MMOL/L (ref 94–109)
CO2 SERPL-SCNC: 24 MMOL/L (ref 20–32)
CREAT SERPL-MCNC: 1.48 MG/DL (ref 0.52–1.04)
GFR SERPL CREATININE-BSD FRML MDRD: 34 ML/MIN/1.7M2
GLUCOSE SERPL-MCNC: 211 MG/DL (ref 70–99)
MAGNESIUM SERPL-MCNC: 1.7 MG/DL (ref 1.6–2.3)
PHOSPHATE SERPL-MCNC: 3.2 MG/DL (ref 2.5–4.5)
POTASSIUM SERPL-SCNC: 3.9 MMOL/L (ref 3.4–5.3)
SODIUM SERPL-SCNC: 138 MMOL/L (ref 133–144)

## 2017-07-24 PROCEDURE — 84100 ASSAY OF PHOSPHORUS: CPT | Performed by: INTERNAL MEDICINE

## 2017-07-24 PROCEDURE — 83735 ASSAY OF MAGNESIUM: CPT | Performed by: INTERNAL MEDICINE

## 2017-07-24 PROCEDURE — 80048 BASIC METABOLIC PNL TOTAL CA: CPT | Performed by: INTERNAL MEDICINE

## 2017-07-24 PROCEDURE — 36415 COLL VENOUS BLD VENIPUNCTURE: CPT | Performed by: INTERNAL MEDICINE

## 2017-07-25 NOTE — PROGRESS NOTES
SUBJECTIVE:                                                    Mara Colindres is a 82 year old female who presents to clinic today for the following health issues:       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, without long-term current use of insulin (H)  Major depressive disorder, recurrent episode, mild (H)  Hyperlipidemia LDL goal <100  CKD (chronic kidney disease) stage 3, GFR 30-59 ml/min  Diabetic polyneuropathy associated with diabetes mellitus due to underlying condition (H)  Senile osteoporosis     This was a long and somewhat complicated appointment as I believe I was possibly pushing patient towards treatment I am not so sure she wants, see as detailed below     Diabetes Follow-up    Patient is checking blood sugars: once daily.  Results are as follows:       am - 150's-170's    Diabetic concerns: None     Symptoms of hypoglycemia (low blood sugar): none     Paresthesias (numbness or burning in feet) or sores: Yes numb toes    Date of last diabetic eye exam: within the last year  -- She states when her diabetes was more in control she was watching her diet better. She cannot walk very far now.  Lab Results   Component Value Date    A1C 8.2 07/27/2017    A1C 8.3 04/27/2017    A1C 8.9 12/19/2016    A1C 7.0 07/12/2016    A1C 7.6 04/05/2016     Osteoporosis -- Patient states she received a call from the office telling her to start having Prolia injections. She is curious about this medication and somewhat concerned . She has been compliant with Calcium and Vitamin D. She tried Fosamax for about 6 months but cannot remember why she stopped this.    Hypertension --  BP Readings from Last 3 Encounters:   07/27/17 110/70   05/24/17 127/82   05/15/17 99/72       Amount of exercise or physical activity: None    Problems taking medications regularly: No    Medication side effects: none    Diet: low salt and low fat/cholesterol    Problem list and histories reviewed & adjusted, as  indicated.  Additional history: as documented    Patient Active Problem List   Diagnosis     Morbid obesity (H)     Hypertension goal BP (blood pressure) < 140/90     DVT (deep venous thrombosis) (H)     MRSA (methicillin resistant Staphylococcus aureus)     Chronic diarrhea     HYPERLIPIDEMIA LDL GOAL <100     Chronic anticoagulation     CKD (chronic kidney disease) stage 3, GFR 30-59 ml/min     Septic hip (H)     Lymphedema     Tubular adenoma of colon     Abdominal wall hematoma     Advanced directives, counseling/discussion     Umbilical hernia     History of [place] fever     Moderate persistent asthma     Type 2 diabetes, controlled, with renal manifestation (H)     Pseudophakia, ou     Hypovitaminosis D     Bilateral leg pain     Positive YAMINI     Raynaud phenomenon     Pulmonary HTN (H)     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     Scotoma involving central area in visual field, right     Failed total hip arthroplasty, sequela     Major depressive disorder, recurrent episode, mild (H)     Chronic diastolic congestive heart failure (H)     Long-term (current) use of anticoagulants [Z79.01]     Persistent atrial fibrillation (H)     Diabetic polyneuropathy associated with diabetes mellitus due to underlying condition (H)     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, without long-term current use of insulin (H)     Fatigue, unspecified type     Senile osteoporosis     Past Surgical History:   Procedure Laterality Date     APPENDECTOMY       COLONOSCOPY  2008     COLONOSCOPY  8/20/2012    Procedure: COLONOSCOPY;  COLONOSCOPY, TUBULAR ADENOMA OF COLON, CHRONIC DIARRHEA;  Surgeon: Yobany Hinojosa MD;  Location: [facility] OR     HC REMOVAL GALLBLADDER       JOINT REPLACEMENT, HIP RT/LT  2004    right     JOINT REPLACEMTN, KNEE RT/LT  2000    bilateral     PHACOEMULSIFICATION WITH STANDARD INTRAOCULAR LENS IMPLANT  8/2013    left eye; right eye     ROTATOR CUFF REPAIR  RT/LT  1/93    right     TONSILLECTOMY         Social History   Substance Use Topics     Smoking status: Never Smoker     Smokeless tobacco: Never Used     Alcohol use No     Family History   Problem Relation Age of Onset     DIABETES Mother      CANCER Mother      Hypertension Mother      Glaucoma Mother      Hypertension Father      CANCER Son      thyroid cancer     Neurologic Disorder Sister      Alzheimer Disease Sister          Current Outpatient Prescriptions   Medication Sig Dispense Refill     glipiZIDE (GLUCOTROL XL) 10 MG 24 hr tablet Take 1 tablet (10 mg) by mouth daily NEED APPOINTMENT FOR FURTHER REFILLS. 90 tablet 0     potassium chloride (K-TAB,KLOR-CON) 10 MEQ tablet Take 2 tablets daily 180 tablet 3     losartan (COZAAR) 25 MG tablet Take 1 tablet (25 mg) by mouth 2 times daily 180 tablet 3     PARoxetine (PAXIL) 20 MG tablet TAKE 1 TABLET BY MOUTH EVERY DAY 90 tablet 0     HYDROcodone-acetaminophen (NORCO) 5-325 MG per tablet TK 1 T PO  QID AS NEEDED 90 tablet 0     guaiFENesin-codeine (ROBITUSSIN AC) 100-10 MG/5ML SOLN solution Take 10 mLs by mouth every 4 hours as needed for cough 120 mL 3     albuterol (2.5 MG/3ML) 0.083% neb solution Take 3 mLs (2.5 mg) by nebulization every 4 hours as needed for shortness of breath / dyspnea 30 vial 0     budesonide-formoterol (SYMBICORT) 80-4.5 MCG/ACT Inhaler Inhale 2 puffs into the lungs 2 times daily 1 Inhaler 3     warfarin (COUMADIN) 5 MG tablet Take 1/2 tablet (2.5 mg) Monday, Wednesday, and Friday, and take 1 tablet (5 mg) by mouth all other days 90 tablet 1     metoprolol (LOPRESSOR) 100 MG tablet Take 1 tablet (100 mg) by mouth 2 times daily NOTE TABLET STRENGTH CHANGE-1 TABLET TWICE DAILY 180 tablet 3     diphenoxylate-atropine (LOMOTIL) 2.5-0.025 MG per tablet TAKE 1 TABLET BY MOUTH FOUR TIMES DAILY 40 tablet 5     furosemide (LASIX) 40 MG tablet Take 1 tablet (40 mg) by mouth 2 times daily 180 tablet 3     ACCU-CHEK PARVEZ PLUS test strip TEST  BLOOD GLUCOSE AS DIRECTED EVERY  each 2     mupirocin (BACTROBAN) 2 % ointment Use 3 times a day to affected area for one week 22 g 1     albuterol (PROAIR HFA, PROVENTIL HFA, VENTOLIN HFA) 108 (90 BASE) MCG/ACT inhaler Inhale 2 puffs into the lungs every 6 hours as needed for shortness of breath / dyspnea 3 Inhaler 1     acetaminophen (TYLENOL) 500 MG tablet Take 2 tablets by mouth 3 times daily as needed.       Resveratrol 100 MG CAPS Take 1 capsule by mouth daily        Calcium Carbonate-Vitamin D (CALCIUM + D PO) Take 1 tablet by mouth 2 times daily.       OMEPRAZOLE CPCR 20 MG OR 1 CAPSULE DAILY       denosumab (PROLIA) 60 MG/ML SOLN injection Inject 1 mL (60 mg) Subcutaneous once for 1 dose 1 mL 0     order for DME Reasoning for requesting the hospital bed: Patient had multiple complications following hip replacement(blood clot/infection/removal of hip replacement) uses walker to ambulate now,she has a very hard time trying to lift her right foot. Hard to get herself into bed because of the height. She lives alone.  She uses devices at home to get herself into bed( step stool/extension to help lift her leg).Would also like the side rails 1 each 0     STATIN NOT PRESCRIBED, INTENTIONAL, 1 each continuous prn. Statin not prescribed intentionally due to Other: patient has normal LDL within recommended guidelines on no medications 0 each 0     ASPIRIN NOT PRESCRIBED, INTENTIONAL, Antiplatelet medication not prescribed intentionally due to Current anticoagulant therapy (warfarin/enoxaparin) 0 each 3     ORDER FOR DME nebuliser 1 Device 1     Labs reviewed in EPIC      Reviewed and updated as needed this visit by clinical staffTobacco  Allergies  Meds  Med Hx  Surg Hx  Fam Hx  Soc Hx      Reviewed and updated as needed this visit by Provider         ROS:  Constitutional, HEENT, cardiovascular, pulmonary, GI, , musculoskeletal, neuro, skin, endocrine and psych systems are negative, except as otherwise  "noted.    This document serves as a record of the services and decisions personally performed and made by Steven Abrams MD. It was created on their behalf by Herbert Nguyen, a trained medical scribe. The creation of this document is based the provider's statements to the medical scribe.  Herbert Nguyen July 27, 2017 3:32 PM    OBJECTIVE:   /70  Pulse 102  Temp 97.3  F (36.3  C) (Oral)  Ht 1.6 m (5' 3\")  Wt 115.2 kg (254 lb)  SpO2 98%  Breastfeeding? No  BMI 44.99 kg/m2  Body mass index is 44.99 kg/(m^2).  GENERAL: morbidly obese, alert and no distress, in a motorized scooter type of vehicle with poor generalized mobility   EYES: Eyes grossly normal to inspection, PERRL and conjunctivae and sclerae normal  RESP: lungs clear to auscultation - no rales, rhonchi or wheezes, distant breath sounds   CV: regular rate and rhythm, normal S1 S2, no S3 or S4, no murmur, click or rub, no peripheral edema and peripheral pulses strong, distant heart tones   SKIN: no suspicious lesions or rashes to visible skin  NEURO: mentation intact and speech normal  PSYCH: mentation appears normal, affect normal/bright    Diagnostic Test Results:  Results for orders placed or performed in visit on 07/27/17   Hemoglobin A1c   Result Value Ref Range    Hemoglobin A1C 8.2 (H) 4.3 - 6.0 %     ASSESSMENT/PLAN:     (E11.22,  N18.3) 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, without long-term current use of insulin (H)  (primary encounter diagnosis)  Comment: I felt that by far the best chance to get this patients diabetes mellitus under better control and help her shed some of her tremendous excess weight was to try a (glucagon-like peptide-1) GLP-1 agonist such as Trulicity (dulaglutide) . She was not convinced of this and wants to consider her full range of options. For these reasons we agreed with a certified diabetes educator office visit    Plan: Hemoglobin A1c, glipiZIDE (GLUCOTROL XL) 10 MG        "  24 hr tablet, DIABETES EDUCATOR REFERRAL,         Albumin Random Urine Quantitative, CANCELED:         Albumin Random Urine Quantitative            (F33.0) Major depressive disorder, recurrent episode, mild (H)  Comment: maintainence therapy at this point   Plan: PARoxetine (PAXIL) 20 MG tablet            (E78.5) Hyperlipidemia LDL goal <100  Comment: ongoing treatment and ongoing monitoring    Plan: Lipid panel reflex to direct LDL            (N18.3) CKD (chronic kidney disease) stage 3, GFR 30-59 ml/min  Comment: ongoing monitoring  , blood pressure a shade low and losartan ( Cozaar ) reduced   Plan: Albumin Random Urine Quantitative, CANCELED:         Albumin Random Urine Quantitative            (E08.42) Diabetic polyneuropathy associated with diabetes mellitus due to underlying condition (H)  Comment: has bilateral lower extremity tingling and numbess type sensation   Plan: ongoing monitoring      (M81.0) Senile osteoporosis  Comment: we now led to the discussion of Denosumab injection (Prolia)   Plan: again she isn't sure she wants to proceed with this. I explained to patient the benefits and also to stay with calcium and vitamin D     Patient Instructions   - If you would like you can meet with an [facility] pharmacist to discuss Prolia.    - You can follow up with a diabetes educator.    - Decrease Losartan to 25 MG once a day.    The information in this document, created by the medical scribe for me, accurately reflects the services I personally performed and the decisions made by me. I have reviewed and approved this document for accuracy.   MD Steven Patterson MD  [facility]

## 2017-07-27 ENCOUNTER — ANTICOAGULATION THERAPY VISIT (OUTPATIENT)
Dept: NURSING | Facility: CLINIC | Age: 82
End: 2017-07-27
Payer: COMMERCIAL

## 2017-07-27 ENCOUNTER — OFFICE VISIT (OUTPATIENT)
Dept: FAMILY MEDICINE | Facility: CLINIC | Age: 82
End: 2017-07-27
Payer: COMMERCIAL

## 2017-07-27 VITALS
DIASTOLIC BLOOD PRESSURE: 70 MMHG | BODY MASS INDEX: 45 KG/M2 | HEIGHT: 63 IN | OXYGEN SATURATION: 98 % | HEART RATE: 102 BPM | TEMPERATURE: 97.3 F | SYSTOLIC BLOOD PRESSURE: 110 MMHG | WEIGHT: 254 LBS

## 2017-07-27 DIAGNOSIS — N18.30 CONTROLLED TYPE 2 DIABETES MELLITUS WITH STAGE 3 CHRONIC KIDNEY DISEASE, WITHOUT LONG-TERM CURRENT USE OF INSULIN (H): Primary | ICD-10-CM

## 2017-07-27 DIAGNOSIS — E11.22 CONTROLLED TYPE 2 DIABETES MELLITUS WITH STAGE 3 CHRONIC KIDNEY DISEASE, WITHOUT LONG-TERM CURRENT USE OF INSULIN (H): Primary | ICD-10-CM

## 2017-07-27 DIAGNOSIS — N18.30 CKD (CHRONIC KIDNEY DISEASE) STAGE 3, GFR 30-59 ML/MIN (H): ICD-10-CM

## 2017-07-27 DIAGNOSIS — I82.409 DVT (DEEP VENOUS THROMBOSIS) (H): ICD-10-CM

## 2017-07-27 DIAGNOSIS — E08.42 DIABETIC POLYNEUROPATHY ASSOCIATED WITH DIABETES MELLITUS DUE TO UNDERLYING CONDITION (H): ICD-10-CM

## 2017-07-27 DIAGNOSIS — Z79.01 LONG-TERM (CURRENT) USE OF ANTICOAGULANTS: ICD-10-CM

## 2017-07-27 DIAGNOSIS — E78.5 HYPERLIPIDEMIA LDL GOAL <100: ICD-10-CM

## 2017-07-27 DIAGNOSIS — M81.0 SENILE OSTEOPOROSIS: ICD-10-CM

## 2017-07-27 DIAGNOSIS — F33.0 MAJOR DEPRESSIVE DISORDER, RECURRENT EPISODE, MILD (H): ICD-10-CM

## 2017-07-27 LAB
CHOLEST SERPL-MCNC: 137 MG/DL
HBA1C MFR BLD: 8.2 % (ref 4.3–6)
HDLC SERPL-MCNC: 56 MG/DL
INR POINT OF CARE: 3.1 (ref 0.86–1.14)
LDLC SERPL CALC-MCNC: 66 MG/DL
NONHDLC SERPL-MCNC: 81 MG/DL
TRIGL SERPL-MCNC: 77 MG/DL

## 2017-07-27 PROCEDURE — 80061 LIPID PANEL: CPT | Performed by: INTERNAL MEDICINE

## 2017-07-27 PROCEDURE — 83036 HEMOGLOBIN GLYCOSYLATED A1C: CPT | Performed by: INTERNAL MEDICINE

## 2017-07-27 PROCEDURE — 85610 PROTHROMBIN TIME: CPT | Mod: QW

## 2017-07-27 PROCEDURE — 99207 ZZC NO CHARGE NURSE ONLY: CPT

## 2017-07-27 PROCEDURE — 99214 OFFICE O/P EST MOD 30 MIN: CPT | Performed by: INTERNAL MEDICINE

## 2017-07-27 PROCEDURE — 36416 COLLJ CAPILLARY BLOOD SPEC: CPT

## 2017-07-27 RX ORDER — GLIPIZIDE 10 MG/1
10 TABLET, FILM COATED, EXTENDED RELEASE ORAL DAILY
Qty: 90 TABLET | Refills: 0 | Status: SHIPPED | OUTPATIENT
Start: 2017-07-27 | End: 2017-10-06 | Stop reason: SINTOL

## 2017-07-27 RX ORDER — PAROXETINE 20 MG/1
20 TABLET, FILM COATED ORAL DAILY
Qty: 90 TABLET | Refills: 1 | Status: SHIPPED | OUTPATIENT
Start: 2017-07-27 | End: 2018-01-21

## 2017-07-27 ASSESSMENT — ANXIETY QUESTIONNAIRES
1. FEELING NERVOUS, ANXIOUS, OR ON EDGE: NOT AT ALL
IF YOU CHECKED OFF ANY PROBLEMS ON THIS QUESTIONNAIRE, HOW DIFFICULT HAVE THESE PROBLEMS MADE IT FOR YOU TO DO YOUR WORK, TAKE CARE OF THINGS AT HOME, OR GET ALONG WITH OTHER PEOPLE: NOT DIFFICULT AT ALL
5. BEING SO RESTLESS THAT IT IS HARD TO SIT STILL: NOT AT ALL
6. BECOMING EASILY ANNOYED OR IRRITABLE: NOT AT ALL
2. NOT BEING ABLE TO STOP OR CONTROL WORRYING: NOT AT ALL
GAD7 TOTAL SCORE: 1
7. FEELING AFRAID AS IF SOMETHING AWFUL MIGHT HAPPEN: NOT AT ALL
3. WORRYING TOO MUCH ABOUT DIFFERENT THINGS: SEVERAL DAYS

## 2017-07-27 ASSESSMENT — PATIENT HEALTH QUESTIONNAIRE - PHQ9: 5. POOR APPETITE OR OVEREATING: NOT AT ALL

## 2017-07-27 ASSESSMENT — PAIN SCALES - GENERAL: PAINLEVEL: NO PAIN (0)

## 2017-07-27 NOTE — MR AVS SNAPSHOT
After Visit Summary   7/27/2017    Mara Colindres    MRN: [id number]           Patient Information     Date Of Birth          11/30/1934        Visit Information        Provider Department      7/27/2017 2:50 PM Steven Abrams MD [facility]        Today's Diagnoses     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, without long-term current use of insulin (H)    -  1    Major depressive disorder, recurrent episode, mild (H)        Hyperlipidemia LDL goal <100        CKD (chronic kidney disease) stage 3, GFR 30-59 ml/min        Diabetic polyneuropathy associated with diabetes mellitus due to underlying condition (H)        Senile osteoporosis          Care Instructions    - If you would like you can meet with an [facility] pharmacist to discuss Prolia.    - You can follow up with a diabetes educator.    - Decrease Losartan to 25 MG once a day.    - Follow up with me in 3-6 months.     [facility]    If you have any questions regarding to your visit please contact your care team:     Team Pink:   Clinic Hours Telephone Number   Internal Medicine:  Dr. Alaina Davis NP       7am-7pm  Monday - Thursday   7am-5pm  Fridays  [phone]  (Appointment scheduling available 24/7)    Questions about your visit?  Team Line  [phone]   Urgent Care - [place] - 11am-9pm Monday-Friday Saturday-Sunday- 9am-5pm   [place] - 5pm-9pm Monday-Friday Saturday-Sunday- 9am-5pm  [phone] - [place]   [phone] - [place]       What options do I have for visits at the clinic other than the traditional office visit?  To expand how we care for you, many of our providers are utilizing electronic visits (e-visits) and telephone visits, when medically appropriate, for interactions with their patients rather than a visit in the clinic.   We also offer nurse visits for many medical concerns. Just like any  other service, we will bill your insurance company for this type of visit based on time spent on the phone with your provider. Not all insurance companies cover these visits. Please check with your medical insurance if this type of visit is covered. You will be responsible for any charges that are not paid by your insurance.      E-visits via [employer]:  generally incur a $35.00 fee.  Telephone visits:  Time spent on the phone: *charged based on time that is spent on the phone in increments of 10 minutes. Estimated cost:   5-10 mins $30.00   11-20 mins. $59.00   21-30 mins. $85.00   Use [employer] (secure email communication and access to your chart) to send your primary care provider a message or make an appointment. Ask someone on your Team how to sign up for [employer].    For a Price Quote for your services, please call our [employer] Price Line at [phone].    As always, Thank you for trusting us with your health care needs!    Discharged by JOVON Meza            Follow-ups after your visit        Additional Services     DIABETES EDUCATOR REFERRAL       DIABETES SELF MANAGEMENT TRAINING (DSMT)      Your provider has referred you to Diabetes Education: [facility]: Diabetes Education - [facility] [phone]   [email or web address]    Type of training and number of hours: Previous Diagnosis: Follow-up DSMT - 2 hours.    Medicare covers: 10 hours of initial DSMT in 12 month period from the time of first visit, plus 2 hours of follow-up DSMT annually, and additional hours as requested for insulin training.    Diabetes Type: Type 2 - On Oral Medication             Diabetes Co-Morbidities: neuropathy               A1C Goal:  <8.0       A1C is: Lab Results       Component                Value               Date                       A1C                      8.2                 07/27/2017              If an urgent visit is needed or A1C is above 12, Care Team to call the  Diabetes Education Team at [phone] or send an In Basket message to the Diabetes Education Pool (P DIAB ED-PATIENT CARE).    Diabetes Education Topics: Comprehensive Knowledge Assessment and Instruction    Special Educational Needs Requiring Individual DSMT: Other: - we are planning on starting (glucagon-like peptide-1) GLP-1 agonist  [ Trulicity (dulaglutide) ]       MEDICAL NUTRITION THERAPY (MNT) for Diabetes    Medical Nutrition Therapy with a Registered Dietitian can be provided in coordination with Diabetes Self-Management Training to assist in achieving optimal diabetes management.    MNT Type and Hours: Previous diagnosis: Annual follow-up MNT - 2 hours                       Medicare will cover: 3 hours initial MNT in 12 month period after first visit, plus 2 hours of follow-up MNT annually    Please be aware that coverage of these services is subject to the terms and limitations of your health insurance plan.  Call member services at your health plan to determine Diabetes Self-Management Training benefits and ask which blood glucose monitor brands are covered by your plan.      Please bring the following with you to your appointment:    (1)  List of current medications   (2)  List of Blood Glucose Monitor brands that are covered by your insurance plan  (3)  Blood Glucose Monitor and log book  (4)   Food records for the 3 days prior to your visit    The Certified Diabetes Educator may make diabetes medication adjustments per the CDE Protocol and Collaborative Practice Agreement.                  Who to contact     If you have questions or need follow up information about today's clinic visit or your schedule please contact [facility] directly at [phone].  Normal or non-critical lab and imaging results will be communicated to you by MyChart, letter or phone within 4 business days after the clinic has received the results. If you do not hear from us within 7 days, please contact the  "clinic through [employer] or phone. If you have a critical or abnormal lab result, we will notify you by phone as soon as possible.  Submit refill requests through [employer] or call your pharmacy and they will forward the refill request to us. Please allow 3 business days for your refill to be completed.          Additional Information About Your Visit        [employer] Information     [employer] lets you send messages to your doctor, view your test results, renew your prescriptions, schedule appointments and more. To sign up, go to [email or web address] . Click on \"Log in\" on the left side of the screen, which will take you to the Welcome page. Then click on \"Sign up Now\" on the right side of the page.     You will be asked to enter the access code listed below, as well as some personal information. Please follow the directions to create your username and password.     Your access code is: [email or web address]  Expires: 10/25/2017  3:49 PM     Your access code will  in 90 days. If you need help or a new code, please call your [place] clinic or [phone].        Care EveryWhere ID     This is your Care EveryWhere ID. This could be used by other organizations to access your [place] medical records  [id number]        Your Vitals Were     Pulse Temperature Height Pulse Oximetry Breastfeeding? BMI (Body Mass Index)    102 97.3  F (36.3  C) (Oral) 5' 3\" (1.6 m) 98% No 44.99 kg/m2       Blood Pressure from Last 3 Encounters:   17 110/70   17 127/82   05/15/17 99/72    Weight from Last 3 Encounters:   17 254 lb (115.2 kg)   17 250 lb 8 oz (113.6 kg)   05/15/17 255 lb (115.7 kg)              We Performed the Following     Albumin Random Urine Quantitative     DIABETES EDUCATOR REFERRAL     Hemoglobin A1c     Lipid panel reflex to direct LDL          Today's Medication Changes          These changes are accurate as of: 17  3:49 PM.  If you have any questions, ask your nurse or doctor.             "   These medicines have changed or have updated prescriptions.        Dose/Directions    PARoxetine 20 MG tablet   Commonly known as:  PAXIL   This may have changed:  See the new instructions.   Used for:  Major depressive disorder, recurrent episode, mild (H)   Changed by:  Steven Abrams MD        Dose:  20 mg   Take 1 tablet (20 mg) by mouth daily   Quantity:  90 tablet   Refills:  1         Stop taking these medicines if you haven't already. Please contact your care team if you have questions.     vitamin B complex with vitamin C Tabs tablet   Stopped by:  Steven Abrams MD                Where to get your medicines      These medications were sent to [id number] - [place], [place] - [address] AT [facility] [address]  [address], [place] [address]     Phone:  [phone]     glipiZIDE 10 MG 24 hr tablet    PARoxetine 20 MG tablet                Primary Care Provider Office Phone # Fax #    Steven Abrams [phone]       [place] [id number] [facility] [address]        Equal Access to Services     [facility] Hospital[facility]LLA AH: Hadii ana laura ku hadasho Soomaali, waaxda luqadaha, qaybta kaalmada adeeliezeryada, scott fenton . So [facility] [phone].    ATENCIÓN: Si habla español, tiene a mckeon disposición servicios gratuitos de asistencia lingüística. [facility] [phone].    We comply with applicable federal civil rights laws and [place] laws. We do not discriminate on the basis of race, color, national origin, age, disability sex, sexual orientation or gender identity.            Thank you!     Thank you for choosing [facility]  for your care. Our goal is always to provide you with excellent care. Hearing back from our patients is one way we can continue to improve our services. Please take a few minutes to complete the written survey that you may receive in the mail after your visit with us. Thank  you!             Your Updated Medication List - Protect others around you: Learn how to safely use, store and throw away your medicines at www.disposemymeds.org.          This list is accurate as of: 7/27/17  3:49 PM.  Always use your most recent med list.                   Brand Name Dispense Instructions for use Diagnosis    ACCU-CHEK PARVEZ PLUS test strip   Generic drug:  blood glucose monitoring     100 each    TEST BLOOD GLUCOSE AS DIRECTED EVERY DAY    Type 2 diabetes, HbA1C goal < 8% (H)       * albuterol 108 (90 BASE) MCG/ACT Inhaler    PROAIR HFA/PROVENTIL HFA/VENTOLIN HFA    3 Inhaler    Inhale 2 puffs into the lungs every 6 hours as needed for shortness of breath / dyspnea    Moderate persistent asthma with exacerbation       * albuterol (2.5 MG/3ML) 0.083% neb solution     30 vial    Take 3 mLs (2.5 mg) by nebulization every 4 hours as needed for shortness of breath / dyspnea    Moderate persistent asthma with exacerbation       * ASPIRIN NOT PRESCRIBED    INTENTIONAL    0 each    Antiplatelet medication not prescribed intentionally due to Current anticoagulant therapy (warfarin/enoxaparin)    Type 2 diabetes, HbA1C goal < 8% (H)       budesonide-formoterol 80-4.5 MCG/ACT Inhaler    SYMBICORT    1 Inhaler    Inhale 2 puffs into the lungs 2 times daily    Moderate persistent asthma with exacerbation       CALCIUM + D PO      Take 1 tablet by mouth 2 times daily.        denosumab 60 MG/ML Soln injection    PROLIA    1 mL    Inject 1 mL (60 mg) Subcutaneous once for 1 dose    CKD (chronic kidney disease) stage 3, GFR 30-59 ml/min, Senile osteoporosis       diphenoxylate-atropine 2.5-0.025 MG per tablet    LOMOTIL    40 tablet    TAKE 1 TABLET BY MOUTH FOUR TIMES DAILY    Chronic diarrhea       furosemide 40 MG tablet    LASIX    180 tablet    Take 1 tablet (40 mg) by mouth 2 times daily    Chronic kidney disease, stage III (moderate)       glipiZIDE 10 MG 24 hr tablet    GLUCOTROL XL    90 tablet    Take 1  tablet (10 mg) by mouth daily NEED APPOINTMENT FOR FURTHER REFILLS.    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, without long-term current use of insulin (H)       guaiFENesin-codeine 100-10 MG/5ML Soln solution    ROBITUSSIN AC    120 mL    Take 10 mLs by mouth every 4 hours as needed for cough    Cough, Moderate persistent asthma with exacerbation       HYDROcodone-acetaminophen 5-325 MG per tablet    NORCO    90 tablet    TK 1 T PO  QID AS NEEDED    Primary osteoarthritis involving multiple joints       losartan 25 MG tablet    COZAAR    180 tablet    Take 1 tablet (25 mg) by mouth 2 times daily    Pulmonary HTN (H)       metoprolol 100 MG tablet    LOPRESSOR    180 tablet    Take 1 tablet (100 mg) by mouth 2 times daily NOTE TABLET STRENGTH CHANGE-1 TABLET TWICE DAILY    Congestive heart failure, unspecified congestive heart failure chronicity, unspecified congestive heart failure type (H), Pulmonary HTN (H)       mupirocin 2 % ointment    BACTROBAN    22 g    Use 3 times a day to affected area for one week    Post-operative state       OMEPRAZOLE CPCR 20 MG OR      1 CAPSULE DAILY        * order for DME     1 Device    nebuliser    Acute bronchitis treated with antibiotics in the past 60 days       order for DME     1 each    Reasoning for requesting the hospital bed: Patient had multiple complications following hip replacement(blood clot/infection/removal of hip replacement) uses walker to ambulate now,she has a very hard time trying to lift her right foot. Hard to get herself into bed because of the height. She lives alone.  She uses devices at home to get herself into bed( step stool/extension to help lift her leg).Would also like the side rails    Failed total hip arthroplasty, sequela, Septic hip (H), Lymphedema, Bilateral leg pain       PARoxetine 20 MG tablet    PAXIL    90 tablet    Take 1 tablet (20 mg) by mouth daily    Major depressive disorder, recurrent episode, mild  (H)       potassium chloride 10 MEQ tablet    K-TAB,KLOR-CON    180 tablet    Take 2 tablets daily    Diuretic-induced hypokalemia       Resveratrol 100 MG Caps      Take 1 capsule by mouth daily        * STATIN NOT PRESCRIBED (INTENTIONAL)     0 each    1 each continuous prn. Statin not prescribed intentionally due to Other: patient has normal LDL within recommended guidelines on no medications    Hyperlipidemia LDL goal <100       TYLENOL 500 MG tablet   Generic drug:  acetaminophen      Take 2 tablets by mouth 3 times daily as needed.        warfarin 5 MG tablet    COUMADIN    90 tablet    Take 1/2 tablet (2.5 mg) Monday, Wednesday, and Friday, and take 1 tablet (5 mg) by mouth all other days    Long-term (current) use of anticoagulants, Persistent atrial fibrillation (H)       * Notice:  This list has 5 medication(s) that are the same as other medications prescribed for you. Read the directions carefully, and ask your doctor or other care provider to review them with you.

## 2017-07-27 NOTE — PATIENT INSTRUCTIONS
- If you would like you can meet with an [facility] pharmacist to discuss Prolia.    - You can follow up with a diabetes educator.    - Decrease Losartan to 25 MG once a day.    - Follow up with me in 3-6 months.     [facility]    If you have any questions regarding to your visit please contact your care team:     Team Pink:   Clinic Hours Telephone Number   Internal Medicine:  Dr. Alaina Davis, NP       7am-7pm  Monday - Thursday   7am-5pm  Fridays  [phone]  (Appointment scheduling available 24/7)    Questions about your visit?  Team Line  [phone]   Urgent Care - [place] - 11am-9pm Monday-Friday Saturday-Sunday- 9am-5pm   [place] - 5pm-9pm Monday-Friday Saturday-Sunday- 9am-5pm  [phone] - [place]   [phone] - [place]       What options do I have for visits at the clinic other than the traditional office visit?  To expand how we care for you, many of our providers are utilizing electronic visits (e-visits) and telephone visits, when medically appropriate, for interactions with their patients rather than a visit in the clinic.   We also offer nurse visits for many medical concerns. Just like any other service, we will bill your insurance company for this type of visit based on time spent on the phone with your provider. Not all insurance companies cover these visits. Please check with your medical insurance if this type of visit is covered. You will be responsible for any charges that are not paid by your insurance.      E-visits via [employer]:  generally incur a $35.00 fee.  Telephone visits:  Time spent on the phone: *charged based on time that is spent on the phone in increments of 10 minutes. Estimated cost:   5-10 mins $30.00   11-20 mins. $59.00   21-30 mins. $85.00   Use [employer] (secure email communication and access to your chart) to send your primary care provider a message or make an appointment. Ask someone  on your Team how to sign up for [employer].    For a Price Quote for your services, please call our [employer] Price Line at [phone].    As always, Thank you for trusting us with your health care needs!    Discharged by JOVON Meza

## 2017-07-27 NOTE — LETTER
[facility]  [address] [place] [address]    July 28, 2017    Mara Colindres  [address] [address]          Dear Mara,    We already discussed with you the hemoglobin a1c  [ diabetes test ] at your appointment . The fasting lipid panel is all within acceptable limits.     Please let me know if you have any questions for me about all of these lab tests. Take care Mara,     Results for orders placed or performed in visit on 07/27/17   Lipid panel reflex to direct LDL   Result Value Ref Range    Cholesterol 137 <200 mg/dL    Triglycerides 77 <150 mg/dL    HDL Cholesterol 56 >49 mg/dL    LDL Cholesterol Calculated 66 <100 mg/dL    Non HDL Cholesterol 81 <130 mg/dL   Hemoglobin A1c   Result Value Ref Range    Hemoglobin A1C 8.2 (H) 4.3 - 6.0 %       If you have any questions or concerns, please me or my clinic team at [phone].      Sincerely,        Steven Abrams MD/bt

## 2017-07-27 NOTE — PROGRESS NOTES
ANTICOAGULATION FOLLOW-UP CLINIC VISIT    Patient Name:  Mara Colindres  Date:  7/27/2017  Contact Type:  Face to Face    SUBJECTIVE:        OBJECTIVE    INR Protime   Date Value Ref Range Status   07/27/2017 3.1 (A) 0.86 - 1.14 Final       ASSESSMENT / PLAN  No question data found.  Anticoagulation Summary as of 7/27/2017     INR goal 2.0-3.0   Today's INR 3.1!   Maintenance plan 2.5 mg (5 mg x 0.5) on Mon, Wed, Fri; 5 mg (5 mg x 1) all other days   Full instructions 2.5 mg on Mon, Wed, Fri; 5 mg all other days   Weekly total 27.5 mg   No change documented Celeste Ruiz RN   Plan last modified Celeste Ruiz RN (3/23/2017)   Next INR check 8/31/2017   Priority INR   Target end date     Indications   Long-term (current) use of anticoagulants [Z79.01] [Z79.01]  DVT (deep venous thrombosis) (H) [I82.409]         Anticoagulation Episode Summary     INR check location     Preferred lab     Send INR reminders to [facility]    Comments       Anticoagulation Care Providers     Provider Role Specialty Phone number    Steven Abrams MD Responsible Internal Medicine [phone]            See the Encounter Report to view Anticoagulation Flowsheet and Dosing Calendar (Go to Encounters tab in chart review, and find the Anticoagulation Therapy Visit)    Dosage adjustment made based on physician directed care plan.    Celeste Ruiz RN

## 2017-07-27 NOTE — NURSING NOTE
"Chief Complaint   Patient presents with     Diabetes     follow-up     Health Maintenance     PHQ(, GAD7, ACT, Fall assessment       Initial /70  Pulse 102  Temp 97.3  F (36.3  C) (Oral)  Ht 5' 3\" (1.6 m)  Wt 254 lb (115.2 kg)  SpO2 98%  Breastfeeding? No  BMI 44.99 kg/m2 Estimated body mass index is 44.99 kg/(m^2) as calculated from the following:    Height as of this encounter: 5' 3\" (1.6 m).    Weight as of this encounter: 254 lb (115.2 kg).  Medication Reconciliation: complete   Elena Boss CMA (AAMA)      "

## 2017-07-27 NOTE — MR AVS SNAPSHOT
Mara CHARLES Jens   7/27/2017 3:15 PM   Anticoagulation Therapy Visit    Description:  82 year old female   Provider:  STALIN ANTI COAG   Department:  [facility]           INR as of 7/27/2017     Today's INR 3.1!      Anticoagulation Summary as of 7/27/2017     INR goal 2.0-3.0   Today's INR 3.1!   Full instructions 2.5 mg on Mon, Wed, Fri; 5 mg all other days   Next INR check 8/31/2017    Indications   Long-term (current) use of anticoagulants [Z79.01] [Z79.01]  DVT (deep venous thrombosis) (H) [I82.409]         Your next Anticoagulation Clinic appointment(s)     Aug 31, 2017  2:45 PM CDT   Anticoagulation Visit with STALIN ANTI LAYO   [facility]    [address] [place] [id number] [phone]              Contact Numbers     [facility]  Please call [phone] to cancel and/or reschedule your appointment   Please call [phone] with any problems or questions regarding your therapy.        July 2017 Details    Sun Mon Tue Wed Thu Fri Sat           1                 2               3               4               5               6               7               8                 9               10               11               12               13               14               15                 16               17               18               19               20               21               22                 23               24               25               26               27      5 mg   See details      28      2.5 mg         29      5 mg           30      5 mg         31      2.5 mg               Date Details   07/27 This INR check               How to take your warfarin dose     To take:  2.5 mg Take 0.5 of a 5 mg tablet.    To take:  5 mg Take 1 of the 5 mg tablets.           August 2017 Details    Sun Mon Tue Wed Thu Fri Sat       1      5 mg         2      2.5 mg         3      5 mg         4      2.5 mg         5      5 mg           6      5 mg          7      2.5 mg         8      5 mg         9      2.5 mg         10      5 mg         11      2.5 mg         12      5 mg           13      5 mg         14      2.5 mg         15      5 mg         16      2.5 mg         17      5 mg         18      2.5 mg         19      5 mg           20      5 mg         21      2.5 mg         22      5 mg         23      2.5 mg         24      5 mg         25      2.5 mg         26      5 mg           27      5 mg         28      2.5 mg         29      5 mg         30      2.5 mg         31               Date Details   No additional details    Date of next INR:  8/31/2017         How to take your warfarin dose     To take:  2.5 mg Take 0.5 of a 5 mg tablet.    To take:  5 mg Take 1 of the 5 mg tablets.

## 2017-07-28 ASSESSMENT — PATIENT HEALTH QUESTIONNAIRE - PHQ9: SUM OF ALL RESPONSES TO PHQ QUESTIONS 1-9: 5

## 2017-07-28 ASSESSMENT — ANXIETY QUESTIONNAIRES: GAD7 TOTAL SCORE: 1

## 2017-07-28 ASSESSMENT — ASTHMA QUESTIONNAIRES: ACT_TOTALSCORE: 20

## 2017-08-23 DIAGNOSIS — N18.30 CHRONIC KIDNEY DISEASE, STAGE III (MODERATE) (H): ICD-10-CM

## 2017-08-23 RX ORDER — FUROSEMIDE 40 MG
40 TABLET ORAL 2 TIMES DAILY
Qty: 180 TABLET | Refills: 3 | Status: SHIPPED | OUTPATIENT
Start: 2017-08-23 | End: 2018-01-22

## 2017-08-30 ENCOUNTER — ALLIED HEALTH/NURSE VISIT (OUTPATIENT)
Dept: EDUCATION SERVICES | Facility: CLINIC | Age: 82
End: 2017-08-30
Payer: COMMERCIAL

## 2017-08-30 ENCOUNTER — TELEPHONE (OUTPATIENT)
Dept: INTERNAL MEDICINE | Facility: CLINIC | Age: 82
End: 2017-08-30

## 2017-08-30 ENCOUNTER — ANTICOAGULATION THERAPY VISIT (OUTPATIENT)
Dept: NURSING | Facility: CLINIC | Age: 82
End: 2017-08-30
Payer: COMMERCIAL

## 2017-08-30 DIAGNOSIS — I82.409 DVT (DEEP VENOUS THROMBOSIS) (H): ICD-10-CM

## 2017-08-30 DIAGNOSIS — E11.22 CONTROLLED TYPE 2 DIABETES MELLITUS WITH STAGE 3 CHRONIC KIDNEY DISEASE, WITHOUT LONG-TERM CURRENT USE OF INSULIN (H): Primary | ICD-10-CM

## 2017-08-30 DIAGNOSIS — I48.19 PERSISTENT ATRIAL FIBRILLATION (H): Primary | ICD-10-CM

## 2017-08-30 DIAGNOSIS — N18.30 CONTROLLED TYPE 2 DIABETES MELLITUS WITH STAGE 3 CHRONIC KIDNEY DISEASE, WITHOUT LONG-TERM CURRENT USE OF INSULIN (H): Primary | ICD-10-CM

## 2017-08-30 DIAGNOSIS — Z79.01 LONG-TERM (CURRENT) USE OF ANTICOAGULANTS: ICD-10-CM

## 2017-08-30 LAB — INR POINT OF CARE: 3.3 (ref 0.86–1.14)

## 2017-08-30 PROCEDURE — 36416 COLLJ CAPILLARY BLOOD SPEC: CPT

## 2017-08-30 PROCEDURE — 85610 PROTHROMBIN TIME: CPT | Mod: QW

## 2017-08-30 PROCEDURE — G0108 DIAB MANAGE TRN  PER INDIV: HCPCS

## 2017-08-30 PROCEDURE — 99207 ZZC NO CHARGE NURSE ONLY: CPT

## 2017-08-30 NOTE — MR AVS SNAPSHOT
Mara CHARLES Jens   8/30/2017 1:30 PM   Anticoagulation Therapy Visit    Description:  82 year old female   Provider:  STALIN ANTI COAG   Department:  [facility]           INR as of 8/30/2017     Today's INR 3.3!      Anticoagulation Summary as of 8/30/2017     INR goal 2.0-3.0   Today's INR 3.3!   Full instructions 2.5 mg on Mon, Wed, Fri; 5 mg all other days   Next INR check 9/28/2017    Indications   Long-term (current) use of anticoagulants [Z79.01] [Z79.01]  DVT (deep venous thrombosis) (H) [I82.409]         Your next Anticoagulation Clinic appointment(s)     Sep 28, 2017  2:30 PM CDT   Anticoagulation Visit with STALIN ANTI LAYO   [facility] ([address] [address] [phone]              Contact Numbers     [facility]  Please call [phone] to cancel and/or reschedule your appointment   Please call [phone] with any problems or questions regarding your therapy.        August 2017 Details    Sun Mon Tue Wed Thu Fri Sat       1               2               3               4               5                 6               7               8               9               10               11               12                 13               14               15               16               17               18               19                 20               21               22               23               24               25               26                 27               28               29               30      2.5 mg   See details      31      5 mg            Date Details   08/30 This INR check               How to take your warfarin dose     To take:  2.5 mg Take 0.5 of a 5 mg tablet.    To take:  5 mg Take 1 of the 5 mg tablets.           September 2017 Details    Sun Mon Tue Wed Thu Fri Sat          1      2.5 mg         2      5 mg           3      5 mg         4      2.5 mg         5      5 mg         6      2.5 mg         7       5 mg         8      2.5 mg         9      5 mg           10      5 mg         11      2.5 mg         12      5 mg         13      2.5 mg         14      5 mg         15      2.5 mg         16      5 mg           17      5 mg         18      2.5 mg         19      5 mg         20      2.5 mg         21      5 mg         22      2.5 mg         23      5 mg           24      5 mg         25      2.5 mg         26      5 mg         27      2.5 mg         28            29               30                Date Details   No additional details    Date of next INR:  9/28/2017         How to take your warfarin dose     To take:  2.5 mg Take 0.5 of a 5 mg tablet.    To take:  5 mg Take 1 of the 5 mg tablets.

## 2017-08-30 NOTE — PATIENT INSTRUCTIONS
Find out the type or thyroid cancer that your sone had.  Recommend 2-3 carb choices for meals and 0-1 or 0-2 choices for snack.  Be as active as you are able.

## 2017-08-30 NOTE — PROGRESS NOTES
ANTICOAGULATION FOLLOW-UP CLINIC VISIT    Patient Name:  Mara Colindres  Date:  8/30/2017  Contact Type:  Face to Face    SUBJECTIVE:     Patient Findings     Positives No Problem Findings, Unexplained INR or factor level change           OBJECTIVE    INR Protime   Date Value Ref Range Status   08/30/2017 3.3 (A) 0.86 - 1.14 Final       ASSESSMENT / PLAN  No question data found.  Anticoagulation Summary as of 8/30/2017     INR goal 2.0-3.0   Today's INR 3.3!   Maintenance plan 2.5 mg (5 mg x 0.5) on Mon, Wed, Fri; 5 mg (5 mg x 1) all other days   Full instructions 2.5 mg on Mon, Wed, Fri; 5 mg all other days   Weekly total 27.5 mg   No change documented Celeste Ruiz RN   Plan last modified Celeste Ruiz RN (3/23/2017)   Next INR check 9/28/2017   Priority INR   Target end date     Indications   Long-term (current) use of anticoagulants [Z79.01] [Z79.01]  DVT (deep venous thrombosis) (H) [I82.409]         Anticoagulation Episode Summary     INR check location     Preferred lab     Send INR reminders to [facility]    Comments       Anticoagulation Care Providers     Provider Role Specialty Phone number    Steven Abrams MD [facility] [phone]            See the Encounter Report to view Anticoagulation Flowsheet and Dosing Calendar (Go to Encounters tab in chart review, and find the Anticoagulation Therapy Visit)    Dosage adjustment made based on physician directed care plan.    Celeste Ruiz RN

## 2017-08-30 NOTE — MR AVS SNAPSHOT
"              After Visit Summary   8/30/2017    Mara Colindres    MRN: [id number]           Patient Information     Date Of Birth          11/30/1934        Visit Information        Provider Department      8/30/2017 1:45 PM  DIABETIC ED RESOURCE [facility]        Care Instructions    Find out the type or thyroid cancer that your sone had.  Recommend 2-3 carb choices for meals and 0-1 or 0-2 choices for snack.  Be as active as you are able.          Follow-ups after your visit        Your next 10 appointments already scheduled     Sep 21, 2017  2:00 PM CDT   Diabetic Education with  DIABETIC ED RESOURCE   [facility]    [address] [place] [id number] [phone]            Sep 28, 2017  2:30 PM CDT   Anticoagulation Visit with SRIDHAR ANTI COAG   [facility]    [address] [place] [id number] [phone]              Who to contact     If you have questions or need follow up information about today's clinic visit or your schedule please contact [facility] directly at [phone].  Normal or non-critical lab and imaging results will be communicated to you by MyChart, letter or phone within 4 business days after the clinic has received the results. If you do not hear from us within 7 days, please contact the clinic through [employer] or phone. If you have a critical or abnormal lab result, we will notify you by phone as soon as possible.  Submit refill requests through e[employer] or call your pharmacy and they will forward the refill request to us. Please allow 3 business days for your refill to be completed.          Additional Information About Your Visit        MyChart Information     [employer] lets you send messages to your doctor, view your test results, renew your prescriptions, schedule appointments and more. To sign up, go to [email or web address] . Click on \"Log in\" on " "the left side of the screen, which will take you to the Welcome page. Then click on \"Sign up Now\" on the right side of the page.     You will be asked to enter the access code listed below, as well as some personal information. Please follow the directions to create your username and password.     Your access code is: [email or web address]  Expires: 10/25/2017  3:49 PM     Your access code will  in 90 days. If you need help or a new code, please call your [facility] clinic or [phone].        Care EveryWhere ID     This is your Care EveryWhere ID. This could be used by other organizations to access your [facility] medical records  [id number]         Blood Pressure from Last 3 Encounters:   17 110/70   17 127/82   05/15/17 99/72    Weight from Last 3 Encounters:   17 254 lb (115.2 kg)   17 250 lb 8 oz (113.6 kg)   05/15/17 255 lb (115.7 kg)              Today, you had the following     No orders found for display       Primary Care Provider Office Phone # Fax #    Steven Abrams [phone] [address] [facility] [address]        Equal Access to Services     MARCIA PAREDES : Hadii ana laura ku hadasho Soomaali, waaxda luqadaha, qaybta kaalmada adeegyada, scott fenton . So [facility] [phone].    ATENCIÓN: Si habla español, tiene a mckeon disposición servicios gratuitos de asistencia lingüística. Llame al [phone].    We comply with applicable federal civil rights laws and [place] laws. We do not discriminate on the basis of race, color, national origin, age, disability sex, sexual orientation or gender identity.            Thank you!     Thank you for choosing [facility]  for your care. Our goal is always to provide you with excellent care. Hearing back from our patients is one way we can continue to improve our services. Please take a few minutes to complete the written survey that you may receive in the mail after your visit with " us. Thank you!             Your Updated Medication List - Protect others around you: Learn how to safely use, store and throw away your medicines at www.disposemymeds.org.          This list is accurate as of: 8/30/17  2:56 PM.  Always use your most recent med list.                   Brand Name Dispense Instructions for use Diagnosis    ACCU-CHEK PARVEZ PLUS test strip   Generic drug:  blood glucose monitoring     100 each    TEST BLOOD GLUCOSE AS DIRECTED EVERY DAY    Type 2 diabetes, HbA1C goal < 8% (H)       * albuterol 108 (90 BASE) MCG/ACT Inhaler    PROAIR HFA/PROVENTIL HFA/VENTOLIN HFA    3 Inhaler    Inhale 2 puffs into the lungs every 6 hours as needed for shortness of breath / dyspnea    Moderate persistent asthma with exacerbation       * albuterol (2.5 MG/3ML) 0.083% neb solution     30 vial    Take 3 mLs (2.5 mg) by nebulization every 4 hours as needed for shortness of breath / dyspnea    Moderate persistent asthma with exacerbation       * ASPIRIN NOT PRESCRIBED    INTENTIONAL    0 each    Antiplatelet medication not prescribed intentionally due to Current anticoagulant therapy (warfarin/enoxaparin)    Type 2 diabetes, HbA1C goal < 8% (H)       budesonide-formoterol 80-4.5 MCG/ACT Inhaler    SYMBICORT    1 Inhaler    Inhale 2 puffs into the lungs 2 times daily    Moderate persistent asthma with exacerbation       CALCIUM + D PO      Take 1 tablet by mouth 2 times daily.        denosumab 60 MG/ML Soln injection    PROLIA    1 mL    Inject 1 mL (60 mg) Subcutaneous once for 1 dose    CKD (chronic kidney disease) stage 3, GFR 30-59 ml/min, Senile osteoporosis       diphenoxylate-atropine 2.5-0.025 MG per tablet    LOMOTIL    40 tablet    TAKE 1 TABLET BY MOUTH FOUR TIMES DAILY    Chronic diarrhea       furosemide 40 MG tablet    LASIX    180 tablet    Take 1 tablet (40 mg) by mouth 2 times daily    Chronic kidney disease, stage III (moderate)       glipiZIDE 10 MG 24 hr tablet    GLUCOTROL XL    90 tablet     Take 1 tablet (10 mg) by mouth daily NEED APPOINTMENT FOR FURTHER REFILLS.    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, without long-term current use of insulin (H)       guaiFENesin-codeine 100-10 MG/5ML Soln solution    ROBITUSSIN AC    120 mL    Take 10 mLs by mouth every 4 hours as needed for cough    Cough, Moderate persistent asthma with exacerbation       HYDROcodone-acetaminophen 5-325 MG per tablet    NORCO    90 tablet    TK 1 T PO  QID AS NEEDED    Primary osteoarthritis involving multiple joints       losartan 25 MG tablet    COZAAR    180 tablet    Take 1 tablet (25 mg) by mouth 2 times daily    Pulmonary HTN (H)       metoprolol 100 MG tablet    LOPRESSOR    180 tablet    Take 1 tablet (100 mg) by mouth 2 times daily NOTE TABLET STRENGTH CHANGE-1 TABLET TWICE DAILY    Congestive heart failure, unspecified congestive heart failure chronicity, unspecified congestive heart failure type (H), Pulmonary HTN (H)       mupirocin 2 % ointment    BACTROBAN    22 g    Use 3 times a day to affected area for one week    Post-operative state       OMEPRAZOLE CPCR 20 MG OR      1 CAPSULE DAILY        * order for DME     1 Device    nebuliser    Acute bronchitis treated with antibiotics in the past 60 days       order for DME     1 each    Reasoning for requesting the hospital bed: Patient had multiple complications following hip replacement(blood clot/infection/removal of hip replacement) uses walker to ambulate now,she has a very hard time trying to lift her right foot. Hard to get herself into bed because of the height. She lives alone.  She uses devices at home to get herself into bed( step stool/extension to help lift her leg).Would also like the side rails    Failed total hip arthroplasty, sequela, Septic hip (H), Lymphedema, Bilateral leg pain       PARoxetine 20 MG tablet    PAXIL    90 tablet    Take 1 tablet (20 mg) by mouth daily    Major depressive disorder, recurrent  episode, mild (H)       potassium chloride 10 MEQ tablet    K-TAB,KLOR-CON    180 tablet    Take 2 tablets daily    Diuretic-induced hypokalemia       Resveratrol 100 MG Caps      Take 1 capsule by mouth daily        * STATIN NOT PRESCRIBED (INTENTIONAL)     0 each    1 each continuous prn. Statin not prescribed intentionally due to Other: patient has normal LDL within recommended guidelines on no medications    Hyperlipidemia LDL goal <100       TYLENOL 500 MG tablet   Generic drug:  acetaminophen      Take 2 tablets by mouth 3 times daily as needed.        warfarin 5 MG tablet    COUMADIN    90 tablet    Take 1/2 tablet (2.5 mg) Monday, Wednesday, and Friday, and take 1 tablet (5 mg) by mouth all other days    Long-term (current) use of anticoagulants, Persistent atrial fibrillation (H)       * Notice:  This list has 5 medication(s) that are the same as other medications prescribed for you. Read the directions carefully, and ask your doctor or other care provider to review them with you.

## 2017-08-30 NOTE — PROGRESS NOTES
Diabetes Self Management Training: Individual Review Visit    Mara Colindres presents today for education and evaluation of glucose control related to Type 2 diabetes.    She is accompanied by self    Patient's diabetes management related comments/concerns: What are the side effects of that medicine?    Patient's emotional response to diabetes: expresses readiness to learn and uncertain    Patient would like this visit to be focused around the following diabetes-related behaviors and goals: Healthy Eating and Taking Medication    ASSESSMENT:  Patient Problem List and Family Medical History reviewed for relevant medical history, current medical status, and diabetes risk factors.  Son lives with patient and assists her.  She states she has no motivation to much of anything.  Can benefit from mindful choices for food and adding some activity to her day. Patient uses a motorized cart to get around.  She is considering starting Trulicity but her son has had thyroid cancer and she does not know what type.  She will find out this information before her next appointment so we can make a decision at that time to start or not.  She has decreased renal function.    Current Diabetes Management per Patient:  Taking diabetes medications?   yes:     Diabetes Medication(s)     Sulfonylureas Sig    glipiZIDE (GLUCOTROL XL) 10 MG 24 hr tablet Take 1 tablet (10 mg) by mouth daily NEED APPOINTMENT FOR FURTHER REFILLS.          *Abbreviated insulin dose documentation key: Insulin Trade Name (llmozxznq-rnima-tgdxpz-bedtime) - i.e. Humalog 5-5-5-0 (Humalog 5 units at breakfast, 5 units at lunch, and 5 units at dinner).    Past Diabetes Education: Yes    Patient glucose self monitoring as follows: 0-2 times per day times daily.   BG meter: Accu-Chek Leanna meter  BG results: fasting glucose- 166, 172, 151, 118, 124, 146, 177 and bedtime- 245, 190, 184, 220, 130, 218    BG values are: Not in goal  Patient's most recent   Lab Results  "  Component Value Date    A1C 8.2 07/27/2017    is not meeting goal of <8.0    Nutrition:  Patient skips lunch regularly and has an inconsistent intake of carbohydrates limited vegetables  Gets up at 7 and reads the paper and then goes back to bed and gets up at 10 am  Breakfast - Brunch-  11- 12-1 pm- 2 toast   Garima or 12 grain and 2 oz cheese  Or cantaloupe and 3 club crackers and cheese 2  Or 2 toaster waffles with syrup lite  Lunch - no eating between brunch and dinner.  Dinner - 6 pm- chicken geraldine pasta and diet Pepsi   Or bowl of chili, 2 biscuits and wine cooler, 12 toffee nuts, 3 pieces of salt water taffy  Snacks - 10 pm- package of cheese crackers and popcorn about 11 pm  Or Chocolate Schwanns bar   Or 2 toast with butter and melvina tea.         2 pm- cantaloupe 1/2 cup package of snack crackers 6  (Double with cheese or peanut butter in the middle.)    Beverages:  wine cooler, coffee with artifical creamer,  A little water,  Tea, sparkling water, cranberry juice or orange juice    Cultural/Samaritan diet restrictions: no    Biggest Challenge to Healthy Eating: no regular schedule, not balanced eating, snacking.  Feels that she is still a bit depressed since her husbands death.  Does not feel like doing anything.    Physical Activity:    No exercise    Diabetes Complications:  Not discussed today.    Vitals:  There were no vitals taken for this visit.  Estimated body mass index is 44.99 kg/(m^2) as calculated from the following:    Height as of 7/27/17: 5' 3\" (1.6 m).    Weight as of 7/27/17: 254 lb (115.2 kg).   Last 3 BP:   BP Readings from Last 3 Encounters:   07/27/17 110/70   05/24/17 127/82   05/15/17 99/72       History   Smoking Status     Never Smoker   Smokeless Tobacco     Never Used       Labs:  Lab Results   Component Value Date    A1C 8.2 07/27/2017     Lab Results   Component Value Date     07/24/2017     Lab Results   Component Value Date    LDL 66 07/27/2017     HDL Cholesterol "   Date Value Ref Range Status   07/27/2017 56 >49 mg/dL Final   ]  GFR Estimate   Date Value Ref Range Status   07/24/2017 34 (L) >60 mL/min/1.7m2 Final     Comment:     Non  GFR Calc     GFR Estimate If Black   Date Value Ref Range Status   07/24/2017 41 (L) >60 mL/min/1.7m2 Final     Comment:      GFR Calc     Lab Results   Component Value Date    CR 1.48 07/24/2017     No results found for: MICROALBUMIN    Socio/Economic Considerations:    Support system: [facility]    Health Beliefs and Attitudes:   Patient Activation Measure Survey Score:  No flowsheet data found.    Stage of Change: CONTEMPLATION (Considering change and yet undecided)      Diabetes knowledge and skills assessment:     Patient is knowledgeable in diabetes management concepts related to: Monitoring    Patient needs further education on the following diabetes management concepts: Healthy Eating, Being Active, Taking Medication and Problem Solving    Barriers to Learning Assessment: No Barriers identified except age    Based on learning assessment above, most appropriate setting for further diabetes education would be: Individual setting.    INTERVENTION:   Education provided today on:  AADE Self-Care Behaviors:  Healthy Eating: carbohydrate counting, consistency in amount, composition, and timing of food intake, portion control, plate planning method and label reading  Taking Medication: action of prescribed medication, side effects of prescribed medications and when to take medications  Problem Solving: high blood glucose - causes, signs/symptoms, treatment and prevention  Healthy Coping: benefits of making appropriate lifestyle changes  Risks: A1C levels and goals    Opportunities for ongoing education and support in diabetes-self management were discussed.    Pt verbalized understanding of concepts discussed and recommendations provided today.       Education Materials Provided:  [facility] Understanding Diabetes  Booklet, BG Log Sheet and My Plate Planner    PLAN:  See Patient Instructions for co-developed, patient-stated behavior change goals.  AVS printed and provided to patient today.    FOLLOW-UP:  Follow-up appointment scheduled on 8/30/17.  Chart routed to referring provider.    Ongoing plan for education and support: Follow-up visit with diabetes educator in 3 weeks    Bess Dang RN  BSN CDE    Time Spent: 70 minutes  Encounter Type: Individual    Any diabetes medication dose changes were made via the CDE Protocol and Collaborative Practice Agreement with the patient's referring provider. A copy of this encounter was shared with the provider.

## 2017-09-12 DIAGNOSIS — E11.22 CONTROLLED TYPE 2 DIABETES MELLITUS WITH STAGE 3 CHRONIC KIDNEY DISEASE, WITHOUT LONG-TERM CURRENT USE OF INSULIN (H): ICD-10-CM

## 2017-09-12 DIAGNOSIS — N18.30 CONTROLLED TYPE 2 DIABETES MELLITUS WITH STAGE 3 CHRONIC KIDNEY DISEASE, WITHOUT LONG-TERM CURRENT USE OF INSULIN (H): ICD-10-CM

## 2017-09-12 DIAGNOSIS — J45.41 MODERATE PERSISTENT ASTHMA WITH EXACERBATION: ICD-10-CM

## 2017-09-12 RX ORDER — GLIPIZIDE 10 MG/1
TABLET, FILM COATED, EXTENDED RELEASE ORAL
Qty: 90 TABLET | Refills: 0 | Status: SHIPPED | OUTPATIENT
Start: 2017-09-12 | End: 2017-10-06 | Stop reason: SINTOL

## 2017-09-12 RX ORDER — DILTIAZEM HYDROCHLORIDE 60 MG/1
TABLET, FILM COATED ORAL
Qty: 10.2 G | Refills: 0 | Status: SHIPPED | OUTPATIENT
Start: 2017-09-12 | End: 2017-10-10

## 2017-09-12 NOTE — TELEPHONE ENCOUNTER
glipiZIDE (GLUCOTROL XL) 10 MG 24 hr tablet    Last Written Prescription Date: 07/27/2017  Last Fill Quantity: 90, # refills: 0  Last Office Visit with G, P or [facility] prescribing provider:  07/27/2017        BP Readings from Last 3 Encounters:   07/27/17 110/70   05/24/17 127/82   05/15/17 99/72     Lab Results   Component Value Date    MICROL 103 11/10/2015     Lab Results   Component Value Date    UMALCR 39.16 11/10/2015     Creatinine   Date Value Ref Range Status   07/24/2017 1.48 (H) 0.52 - 1.04 mg/dL Final   ]  GFR Estimate   Date Value Ref Range Status   07/24/2017 34 (L) >60 mL/min/1.7m2 Final     Comment:     Non  GFR Calc   06/15/2017 35 (L) >60 mL/min/1.7m2 Final     Comment:     Non  GFR Calc   05/15/2017 38 (L) >60 mL/min/1.7m2 Final     Comment:     Non  GFR Calc     GFR Estimate If Black   Date Value Ref Range Status   07/24/2017 41 (L) >60 mL/min/1.7m2 Final     Comment:      GFR Calc   06/15/2017 43 (L) >60 mL/min/1.7m2 Final     Comment:      GFR Calc   05/15/2017 45 (L) >60 mL/min/1.7m2 Final     Comment:      GFR Calc     Lab Results   Component Value Date    CHOL 137 07/27/2017     Lab Results   Component Value Date    HDL 56 07/27/2017     Lab Results   Component Value Date    LDL 66 07/27/2017     Lab Results   Component Value Date    TRIG 77 07/27/2017     Lab Results   Component Value Date    CHOLHDLRATIO 2.5 07/11/2014     Lab Results   Component Value Date    AST 15 06/15/2017     Lab Results   Component Value Date    ALT 20 06/15/2017     Lab Results   Component Value Date    A1C 8.2 07/27/2017    A1C 8.3 04/27/2017    A1C 8.9 12/19/2016    A1C 7.0 07/12/2016    A1C 7.6 04/05/2016     Potassium   Date Value Ref Range Status   07/24/2017 3.9 3.4 - 5.3 mmol/L Final     Laxmi Hawley MA

## 2017-09-12 NOTE — TELEPHONE ENCOUNTER
budesonide-formoterol (SYMBICORT) 80-4.5 MCG/ACT Inhaler   Last Written Prescription Date: 04/27/2017  Last Fill Quantity: 1, # refills: 3    Last Office Visit with [facility], P or [facility] prescribing provider:  07/27/2017   Future Office Visit:       Date of Last Asthma Action Plan Letter:   Asthma Action Plan Q1 Year    Topic Date Due     Asthma Action Plan - yearly  04/28/2017      Asthma Control Test:   ACT Total Scores 7/27/2017   ACT TOTAL SCORE -   ASTHMA ER VISITS -   ASTHMA HOSPITALIZATIONS -   ACT TOTAL SCORE (Goal Greater than or Equal to 20) 20   In the past 12 months, how many times did you visit the emergency room for your asthma without being admitted to the hospital? 0   In the past 12 months, how many times were you hospitalized overnight because of your asthma? 0       Date of Last Spirometry Test:   No results found for this or any previous visit.    Laxmi Hawley MA

## 2017-09-21 ENCOUNTER — ALLIED HEALTH/NURSE VISIT (OUTPATIENT)
Dept: EDUCATION SERVICES | Facility: CLINIC | Age: 82
End: 2017-09-21
Payer: COMMERCIAL

## 2017-09-21 DIAGNOSIS — I48.19 PERSISTENT ATRIAL FIBRILLATION (H): Primary | ICD-10-CM

## 2017-09-21 DIAGNOSIS — E11.22 CONTROLLED TYPE 2 DIABETES MELLITUS WITH STAGE 3 CHRONIC KIDNEY DISEASE, WITHOUT LONG-TERM CURRENT USE OF INSULIN (H): ICD-10-CM

## 2017-09-21 DIAGNOSIS — N18.30 CONTROLLED TYPE 2 DIABETES MELLITUS WITH STAGE 3 CHRONIC KIDNEY DISEASE, WITHOUT LONG-TERM CURRENT USE OF INSULIN (H): ICD-10-CM

## 2017-09-21 PROCEDURE — G0108 DIAB MANAGE TRN  PER INDIV: HCPCS

## 2017-09-21 NOTE — PATIENT INSTRUCTIONS
Check with your insurance company to see if Trulicity is covered under your Medicare part D or your other supplement.  Call Dr Abrams if you would like to start.  Call in blood glucose values for review 2 weeks after you start Trulicity.

## 2017-09-21 NOTE — PROGRESS NOTES
Diabetes Self Management Training: Individual Review Visit    Mara Colindres presents today for education and evaluation of glucose control related to Type 2 diabetes.    She is accompanied by self    Patient's diabetes management related comments/concerns: My blood glucose levels are somewhat better and I am being more mindful of what I am eating.  I am not at 100% but I have been doing better.    Patient's emotional response to diabetes: expresses readiness to learn    Patient would like this visit to be focused around the following diabetes-related behaviors and goals: Assistance with making lifestyle changes, Healthy Eating and Taking Medication    ASSESSMENT:  Patient Problem List and Family Medical History reviewed for relevant medical history, current medical status, and diabetes risk factors.    Current Diabetes Management per Patient:  Taking diabetes medications?   yes:     Diabetes Medication(s)     Sulfonylureas Sig    glipiZIDE (GLUCOTROL XL) 10 MG 24 hr tablet TAKE 1 TABLET(10 MG) BY MOUTH DAILY    glipiZIDE (GLUCOTROL XL) 10 MG 24 hr tablet Take 1 tablet (10 mg) by mouth daily NEED APPOINTMENT FOR FURTHER REFILLS.          *Abbreviated insulin dose documentation key: Insulin Trade Name (ndpnjdmdd-niwtf-vsntju-bedtime) - i.e. Humalog 5-5-5-0 (Humalog 5 units at breakfast, 5 units at lunch, and 5 units at dinner).    Past Diabetes Education: Yes    Patient glucose self monitoring as follows: per patient recall 1-2 times per day  BG results: fasting glucose- 120-160, pre-lunch glucose- , pre-supper glucose-  and bedtime- 172, 176 mg/dl.    BG values are: unable to assess  Patient's most recent   Lab Results   Component Value Date    A1C 8.2 07/27/2017    is not meeting goal of <8.0    Nutrition:  Patient eats 3 meals per day  Eating more vegetables and maybe adding chicken.    Breakfast -  small- toast 1-2 and coffee, may have a piece of fruit if only eating 1 toast  Lunch -   Tuna salad sandwich 1/2  "and fruit and a tomato  Dinner -   Pork tenderloin sandwich on  1 bread and a cookie  Snacks - cucumbers    Physical Activity:    Limitations: walking  Limited.  Uses a scooter    Diabetes Complications:  Acute Complications: At risk for hypoglycemia? yes    Vitals:  There were no vitals taken for this visit.  Estimated body mass index is 44.99 kg/(m^2) as calculated from the following:    Height as of 7/27/17: 5' 3\" (1.6 m).    Weight as of 7/27/17: 254 lb (115.2 kg).   Last 3 BP:   BP Readings from Last 3 Encounters:   07/27/17 110/70   05/24/17 127/82   05/15/17 99/72       History   Smoking Status     Never Smoker   Smokeless Tobacco     Never Used       Labs:  Lab Results   Component Value Date    A1C 8.2 07/27/2017     Lab Results   Component Value Date     07/24/2017     Lab Results   Component Value Date    LDL 66 07/27/2017     HDL Cholesterol   Date Value Ref Range Status   07/27/2017 56 >49 mg/dL Final   ]  GFR Estimate   Date Value Ref Range Status   07/24/2017 34 (L) >60 mL/min/1.7m2 Final     Comment:     Non  GFR Calc     GFR Estimate If Black   Date Value Ref Range Status   07/24/2017 41 (L) >60 mL/min/1.7m2 Final     Comment:      GFR Calc     Lab Results   Component Value Date    CR 1.48 07/24/2017     No results found for: MICROALBUMIN    Socio/Economic Considerations:    Support system: not assessed    Health Beliefs and Attitudes:   Patient Activation Measure Survey Score:  No flowsheet data found.    Stage of Change: PREPARATION (Decided to change - considering how)      Diabetes knowledge and skills assessment:     Patient is knowledgeable in diabetes management concepts related to: Monitoring    Patient needs further education on the following diabetes management concepts: Healthy Eating, Taking Medication and Problem Solving    Barriers to Learning Assessment: age above 80 years    Based on learning assessment above, most appropriate setting for further " diabetes education would be: Individual setting.    INTERVENTION:   Education provided today on:  AADE Self-Care Behaviors:  Healthy Eating: carbohydrate counting, consistency in amount, composition, and timing of food intake, weight reduction, portion control and plate planning method  Monitoring: log and interpret results, individual blood glucose targets and frequency of monitoring  Taking Medication: Trulicity-action of prescribed medication, drawing up, administering diabetes medications, side effects of prescribed medications and when to take medications.  Patient did practice injection using an insulin syringe into her abd without difficulty.  Problem Solving: high blood glucose - causes, signs/symptoms, treatment and prevention and low blood glucose - causes, signs/symptoms, treatment and prevention  Low blood glucose - possible reduction of glipizide XL dose at start of Trulicity to prevent low blood glucose.    Opportunities for ongoing education and support in diabetes-self management were discussed.    Pt verbalized understanding of concepts discussed and recommendations provided today.       Education Materials Provided:  BG Log Sheet    PLAN:  See Patient Instructions for co-developed, patient-stated behavior change goals.  AVS printed and provided to patient today.  Patient is interested to start Trulicity.    FOLLOW-UP:  Patient to call MD after she talks with her insurance co if she would like to start Trulicity.  Patient to call in blood glucose values for review 2 weeks after start of Trulicity or sooner if concerns.  Chart routed to referring provider.    Ongoing plan for education and support: Follow-up visit with diabetes educator  and Follow-up with primary care provider    Bess Dang RN  BSN CDE    Time Spent: 60 minutes  Encounter Type: Individual    Any diabetes medication dose changes were made via the CDE Protocol and Collaborative Practice Agreement with the patient's referring  provider. A copy of this encounter was shared with the provider.

## 2017-09-21 NOTE — MR AVS SNAPSHOT
"              After Visit Summary   9/21/2017    Mara Colindres    MRN: [id number]           Patient Information     Date Of Birth          11/30/1934        Visit Information        Provider Department      9/21/2017 2:00 PM [facility] DIABETIC ED RESOURCE [facility]        Care Instructions    Check with your insurance company to see if Trulicity is covered under your Medicare part D or your other supplement.  Call Dr Abrams if you would like to start.  Call in blood glucose values for review 2 weeks after you start Trulicity.          Follow-ups after your visit        Your next 10 appointments already scheduled     Sep 28, 2017  2:30 PM CDT   Anticoagulation Visit with SRIDHAR ANTI COAG   [facility] ([facility]    [address] [address] [phone]              Who to contact     If you have questions or need follow up information about today's clinic visit or your schedule please contact [facility] directly at [phone].  Normal or non-critical lab and imaging results will be communicated to you by Owlrhart, letter or phone within 4 business days after the clinic has received the results. If you do not hear from us within 7 days, please contact the clinic through TerraSkyt or phone. If you have a critical or abnormal lab result, we will notify you by phone as soon as possible.  Submit refill requests through Clusterize or call your pharmacy and they will forward the refill request to us. Please allow 3 business days for your refill to be completed.          Additional Information About Your Visit        Owlrhart Information     Clusterize lets you send messages to your doctor, view your test results, renew your prescriptions, schedule appointments and more. To sign up, go to [email or web address] . Click on \"Log in\" on the left side of the screen, which will take you to the Welcome page. Then click on \"Sign up Now\" on the right side of the page. "     You will be asked to enter the access code listed below, as well as some personal information. Please follow the directions to create your username and password.     Your access code is: [email or web address]  Expires: 10/25/2017  3:49 PM     Your access code will  in 90 days. If you need help or a new code, please call your [facility] clinic or [phone].        Care EveryWhere ID     This is your Care EveryWhere ID. This could be used by other organizations to access your [facility] medical records  [id number]         Blood Pressure from Last 3 Encounters:   17 110/70   17 127/82   05/15/17 99/72    Weight from Last 3 Encounters:   17 254 lb (115.2 kg)   17 250 lb 8 oz (113.6 kg)   05/15/17 255 lb (115.7 kg)              Today, you had the following     No orders found for display       Primary Care Provider Office Phone # Fax #    Steven Abrams [phone]       [address] [facility] [address]        Equal Access to Services     [facility]: Hadii aad ku hadasho Soomaali, waaxda luqadaha, qaybta kaalmada adeegyada, scott augustinen bailee fenton . So [facility] [phone].    ATENCIÓN: Si habla español, tiene a mckeon disposición servicios gratuitos de asistencia lingüística. Llame al [phone].    We comply with applicable federal civil rights laws and [place] laws. We do not discriminate on the basis of race, color, national origin, age, disability sex, sexual orientation or gender identity.            Thank you!     Thank you for choosing [facility]  for your care. Our goal is always to provide you with excellent care. Hearing back from our patients is one way we can continue to improve our services. Please take a few minutes to complete the written survey that you may receive in the mail after your visit with us. Thank you!             Your Updated Medication List - Protect others around you: Learn how to safely use, store and throw  away your medicines at www.disposemymeds.org.          This list is accurate as of: 9/21/17  2:55 PM.  Always use your most recent med list.                   Brand Name Dispense Instructions for use Diagnosis    ACCU-CHEK PARVEZ PLUS test strip   Generic drug:  blood glucose monitoring     100 each    TEST BLOOD GLUCOSE AS DIRECTED EVERY DAY    Type 2 diabetes, HbA1C goal < 8% (H)       * albuterol 108 (90 BASE) MCG/ACT Inhaler    PROAIR HFA/PROVENTIL HFA/VENTOLIN HFA    3 Inhaler    Inhale 2 puffs into the lungs every 6 hours as needed for shortness of breath / dyspnea    Moderate persistent asthma with exacerbation       * albuterol (2.5 MG/3ML) 0.083% neb solution     30 vial    Take 3 mLs (2.5 mg) by nebulization every 4 hours as needed for shortness of breath / dyspnea    Moderate persistent asthma with exacerbation       * ASPIRIN NOT PRESCRIBED    INTENTIONAL    0 each    Antiplatelet medication not prescribed intentionally due to Current anticoagulant therapy (warfarin/enoxaparin)    Type 2 diabetes, HbA1C goal < 8% (H)       CALCIUM + D PO      Take 1 tablet by mouth 2 times daily.        denosumab 60 MG/ML Soln injection    PROLIA    1 mL    Inject 1 mL (60 mg) Subcutaneous once for 1 dose    CKD (chronic kidney disease) stage 3, GFR 30-59 ml/min, Senile osteoporosis       diphenoxylate-atropine 2.5-0.025 MG per tablet    LOMOTIL    40 tablet    TAKE 1 TABLET BY MOUTH FOUR TIMES DAILY    Chronic diarrhea       furosemide 40 MG tablet    LASIX    180 tablet    Take 1 tablet (40 mg) by mouth 2 times daily    Chronic kidney disease, stage III (moderate)       * glipiZIDE 10 MG 24 hr tablet    GLUCOTROL XL    90 tablet    Take 1 tablet (10 mg) by mouth daily NEED APPOINTMENT FOR FURTHER REFILLS.    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, without long-term current use of insulin (H)       * glipiZIDE 10 MG 24 hr tablet    GLUCOTROL XL    90 tablet    TAKE 1 TABLET(10 MG) BY  MOUTH DAILY    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, without long-term current use of insulin (H)       guaiFENesin-codeine 100-10 MG/5ML Soln solution    ROBITUSSIN AC    120 mL    Take 10 mLs by mouth every 4 hours as needed for cough    Cough, Moderate persistent asthma with exacerbation       HYDROcodone-acetaminophen 5-325 MG per tablet    NORCO    90 tablet    TK 1 T PO  QID AS NEEDED    Primary osteoarthritis involving multiple joints       losartan 25 MG tablet    COZAAR    180 tablet    Take 1 tablet (25 mg) by mouth 2 times daily    Pulmonary HTN (H)       metoprolol 100 MG tablet    LOPRESSOR    180 tablet    Take 1 tablet (100 mg) by mouth 2 times daily NOTE TABLET STRENGTH CHANGE-1 TABLET TWICE DAILY    Congestive heart failure, unspecified congestive heart failure chronicity, unspecified congestive heart failure type (H), Pulmonary HTN (H)       mupirocin 2 % ointment    BACTROBAN    22 g    Use 3 times a day to affected area for one week    Post-operative state       OMEPRAZOLE CPCR 20 MG OR      1 CAPSULE DAILY        * order for DME     1 Device    nebuliser    Acute bronchitis treated with antibiotics in the past 60 days       order for DME     1 each    Reasoning for requesting the hospital bed: Patient had multiple complications following hip replacement(blood clot/infection/removal of hip replacement) uses walker to ambulate now,she has a very hard time trying to lift her right foot. Hard to get herself into bed because of the height. She lives alone.  She uses devices at home to get herself into bed( step stool/extension to help lift her leg).Would also like the side rails    Failed total hip arthroplasty, sequela, Septic hip (H), Lymphedema, Bilateral leg pain       PARoxetine 20 MG tablet    PAXIL    90 tablet    Take 1 tablet (20 mg) by mouth daily    Major depressive disorder, recurrent episode, mild (H)       potassium chloride 10 MEQ tablet     K-TAB,KLOR-CON    180 tablet    Take 2 tablets daily    Diuretic-induced hypokalemia       Resveratrol 100 MG Caps      Take 1 capsule by mouth daily        * STATIN NOT PRESCRIBED (INTENTIONAL)     0 each    1 each continuous prn. Statin not prescribed intentionally due to Other: patient has normal LDL within recommended guidelines on no medications    Hyperlipidemia LDL goal <100       SYMBICORT 80-4.5 MCG/ACT Inhaler   Generic drug:  budesonide-formoterol     10.2 g    INHALE 2 PUFFS INTO THE LUNGS TWICE DAILY    Moderate persistent asthma with exacerbation       TYLENOL 500 MG tablet   Generic drug:  acetaminophen      Take 2 tablets by mouth 3 times daily as needed.        warfarin 5 MG tablet    COUMADIN    90 tablet    Take 1/2 tablet (2.5 mg) Monday, Wednesday, and Friday, and take 1 tablet (5 mg) by mouth all other days    Long-term (current) use of anticoagulants, Persistent atrial fibrillation (H)       * Notice:  This list has 7 medication(s) that are the same as other medications prescribed for you. Read the directions carefully, and ask your doctor or other care provider to review them with you.

## 2017-09-25 ENCOUNTER — TELEPHONE (OUTPATIENT)
Dept: INTERNAL MEDICINE | Facility: CLINIC | Age: 82
End: 2017-09-25

## 2017-09-25 DIAGNOSIS — N18.30 CONTROLLED TYPE 2 DIABETES MELLITUS WITH STAGE 3 CHRONIC KIDNEY DISEASE, WITHOUT LONG-TERM CURRENT USE OF INSULIN (H): Primary | ICD-10-CM

## 2017-09-25 DIAGNOSIS — E11.22 CONTROLLED TYPE 2 DIABETES MELLITUS WITH STAGE 3 CHRONIC KIDNEY DISEASE, WITHOUT LONG-TERM CURRENT USE OF INSULIN (H): Primary | ICD-10-CM

## 2017-09-25 NOTE — TELEPHONE ENCOUNTER
Reason for Call:  Other prescription    Detailed comments:  Patient calling. Per festus in diabetic ed. She would like a rx of trulicity called into pharmacy. Please call and let know.     Phone Number Patient can be reached at: Home number on file [phone] (home)    Best Time:  Any     Can we leave a detailed message on this number? YES    Call taken on 9/25/2017 at 1:59 PM by Katlin Mcarthur

## 2017-09-25 NOTE — TELEPHONE ENCOUNTER
Routing to provider to please advise  Patient would like a prescription for Trulicity    Per 9/21/17 diabetic ed appointment notes:    Instructions   Check with your insurance company to see if Trulicity is covered under your Medicare part D or your other supplement.  Call Dr Abrams if you would like to start.  Call in blood glucose values for review 2 weeks after you start Trulicity.

## 2017-09-26 DIAGNOSIS — N18.30 CONTROLLED TYPE 2 DIABETES MELLITUS WITH STAGE 3 CHRONIC KIDNEY DISEASE, WITHOUT LONG-TERM CURRENT USE OF INSULIN (H): ICD-10-CM

## 2017-09-26 DIAGNOSIS — E11.22 CONTROLLED TYPE 2 DIABETES MELLITUS WITH STAGE 3 CHRONIC KIDNEY DISEASE, WITHOUT LONG-TERM CURRENT USE OF INSULIN (H): ICD-10-CM

## 2017-09-26 NOTE — TELEPHONE ENCOUNTER
dulaglutide (TRULICITY) 1.5 MG/0.5ML pen         Last Written Prescription Date: 9/25/17  Last Fill Quantity: .5mL, # refills: 11  Last Office Visit with G, P or [facility] prescribing provider:  9/25/17        BP Readings from Last 3 Encounters:   07/27/17 110/70   05/24/17 127/82   05/15/17 99/72     Lab Results   Component Value Date    MICROL 103 11/10/2015     Lab Results   Component Value Date    UMALCR 39.16 11/10/2015     Creatinine   Date Value Ref Range Status   07/24/2017 1.48 (H) 0.52 - 1.04 mg/dL Final   ]  GFR Estimate   Date Value Ref Range Status   07/24/2017 34 (L) >60 mL/min/1.7m2 Final     Comment:     Non  GFR Calc   06/15/2017 35 (L) >60 mL/min/1.7m2 Final     Comment:     Non  GFR Calc   05/15/2017 38 (L) >60 mL/min/1.7m2 Final     Comment:     Non  GFR Calc     GFR Estimate If Black   Date Value Ref Range Status   07/24/2017 41 (L) >60 mL/min/1.7m2 Final     Comment:      GFR Calc   06/15/2017 43 (L) >60 mL/min/1.7m2 Final     Comment:      GFR Calc   05/15/2017 45 (L) >60 mL/min/1.7m2 Final     Comment:      GFR Calc     Lab Results   Component Value Date    CHOL 137 07/27/2017     Lab Results   Component Value Date    HDL 56 07/27/2017     Lab Results   Component Value Date    LDL 66 07/27/2017     Lab Results   Component Value Date    TRIG 77 07/27/2017     Lab Results   Component Value Date    CHOLHDLRATIO 2.5 07/11/2014     Lab Results   Component Value Date    AST 15 06/15/2017     Lab Results   Component Value Date    ALT 20 06/15/2017     Lab Results   Component Value Date    A1C 8.2 07/27/2017    A1C 8.3 04/27/2017    A1C 8.9 12/19/2016    A1C 7.0 07/12/2016    A1C 7.6 04/05/2016     Potassium   Date Value Ref Range Status   07/24/2017 3.9 3.4 - 5.3 mmol/L Final

## 2017-09-28 ENCOUNTER — ANTICOAGULATION THERAPY VISIT (OUTPATIENT)
Dept: NURSING | Facility: CLINIC | Age: 82
End: 2017-09-28
Payer: COMMERCIAL

## 2017-09-28 DIAGNOSIS — Z79.01 LONG-TERM (CURRENT) USE OF ANTICOAGULANTS: ICD-10-CM

## 2017-09-28 DIAGNOSIS — I82.409 DVT (DEEP VENOUS THROMBOSIS) (H): ICD-10-CM

## 2017-09-28 LAB — INR POINT OF CARE: 2.8 (ref 0.86–1.14)

## 2017-09-28 PROCEDURE — 36416 COLLJ CAPILLARY BLOOD SPEC: CPT

## 2017-09-28 PROCEDURE — 85610 PROTHROMBIN TIME: CPT | Mod: QW

## 2017-09-28 PROCEDURE — 99207 ZZC NO CHARGE NURSE ONLY: CPT

## 2017-09-28 NOTE — PROGRESS NOTES
ANTICOAGULATION FOLLOW-UP CLINIC VISIT    Patient Name:  Mara Colindres  Date:  9/28/2017  Contact Type:  Face to Face    SUBJECTIVE:     Patient Findings     Positives No Problem Findings           OBJECTIVE    INR Protime   Date Value Ref Range Status   09/28/2017 2.8 (A) 0.86 - 1.14 Final       ASSESSMENT / PLAN  INR assessment THER    Recheck INR In: 6 WEEKS    INR Location Clinic      Anticoagulation Summary as of 9/28/2017     INR goal 2.0-3.0   Today's INR 2.8   Maintenance plan 2.5 mg (5 mg x 0.5) on Mon, Wed, Fri; 5 mg (5 mg x 1) all other days   Full instructions 2.5 mg on Mon, Wed, Fri; 5 mg all other days   Weekly total 27.5 mg   No change documented Mg Oconnor RN   Plan last modified Celeste Ruiz RN (3/23/2017)   Next INR check 11/8/2017   Priority INR   Target end date     Indications   Long-term (current) use of anticoagulants [Z79.01] [Z79.01]  DVT (deep venous thrombosis) (H) [I82.409]         Anticoagulation Episode Summary     INR check location     Preferred lab     Send INR reminders to [facility]    Comments       Anticoagulation Care Providers     Provider Role Specialty Phone number    Steven Abrams MD [facility] Internal Medicine [phone]            See the Encounter Report to view Anticoagulation Flowsheet and Dosing Calendar (Go to Encounters tab in chart review, and find the Anticoagulation Therapy Visit)    Dosage adjustment made based on physician directed care plan.    Mg Oconnor, RN

## 2017-09-28 NOTE — TELEPHONE ENCOUNTER
Duplicate request from the same pharmacy.     dulaglutide (TRULICITY) 0.75 MG/0.5ML pen 0.5 mL 0 9/25/2017  --      Sig: Inject 0.75 mg Subcutaneous every 7 days After one week plan to increase dose to 1.5 milligram dose     Class: E-Prescribe     Route: Subcutaneous     Order: [id number]     E-Prescribing Status: Receipt confirmed by pharmacy (9/25/2017  6:31 PM CDT)       Printout Tracking      External Result Report       Medication Administration Instructions      After one week plan to increase dose to 1.5 milligram dose       Pharmacy      [id number] - [place], [place] - [address] AT [facility] [address]         Danielle KOVACS, RN, BSN

## 2017-09-28 NOTE — MR AVS SNAPSHOT
Mara CHARLES Jens   9/28/2017 2:30 PM   Anticoagulation Therapy Visit    Description:  82 year old female   Provider:  STALIN ANTI COAG   Department:  [facility]           INR as of 9/28/2017     Today's INR 2.8      Anticoagulation Summary as of 9/28/2017     INR goal 2.0-3.0   Today's INR 2.8   Full instructions 2.5 mg on Mon, Wed, Fri; 5 mg all other days   Next INR check 11/8/2017    Indications   Long-term (current) use of anticoagulants [Z79.01] [Z79.01]  DVT (deep venous thrombosis) (H) [I82.409]         Your next Anticoagulation Clinic appointment(s)     Nov 08, 2017  2:30 PM CST   Anticoagulation Visit with STALIN ANTI LAYO   [facility] ([facility]    [address] [address] [phone]              Contact Numbers     [facility]  Please call [phone] to cancel and/or reschedule your appointment   Please call [phone] with any problems or questions regarding your therapy.        September 2017 Details    Sun Mon Tue Wed Thu Fri Sat          1               2                 3               4               5               6               7               8               9                 10               11               12               13               14               15               16                 17               18               19               20               21               22               23                 24               25               26               27               28      5 mg   See details      29      2.5 mg         30      5 mg          Date Details   09/28 This INR check               How to take your warfarin dose     To take:  2.5 mg Take 0.5 of a 5 mg tablet.    To take:  5 mg Take 1 of the 5 mg tablets.           October 2017 Details    Sun Mon Tue Wed Thu Fri Sat     1      5 mg         2      2.5 mg         3      5 mg         4      2.5 mg         5      5 mg         6      2.5 mg         7      5 mg           8       5 mg         9      2.5 mg         10      5 mg         11      2.5 mg         12      5 mg         13      2.5 mg         14      5 mg           15      5 mg         16      2.5 mg         17      5 mg         18      2.5 mg         19      5 mg         20      2.5 mg         21      5 mg           22      5 mg         23      2.5 mg         24      5 mg         25      2.5 mg         26      5 mg         27      2.5 mg         28      5 mg           29      5 mg         30      2.5 mg         31      5 mg              Date Details   No additional details            How to take your warfarin dose     To take:  2.5 mg Take 0.5 of a 5 mg tablet.    To take:  5 mg Take 1 of the 5 mg tablets.           November 2017 Details    Sun Mon Tue Wed Thu Fri Sat        1      2.5 mg         2      5 mg         3      2.5 mg         4      5 mg           5      5 mg         6      2.5 mg         7      5 mg         8            9               10               11                 12               13               14               15               16               17               18                 19               20               21               22               23               24               25                 26               27               28               29               30                  Date Details   No additional details    Date of next INR:  11/8/2017         How to take your warfarin dose     To take:  2.5 mg Take 0.5 of a 5 mg tablet.    To take:  5 mg Take 1 of the 5 mg tablets.

## 2017-10-05 ENCOUNTER — TELEPHONE (OUTPATIENT)
Dept: INTERNAL MEDICINE | Facility: CLINIC | Age: 82
End: 2017-10-05

## 2017-10-05 DIAGNOSIS — E11.9 TYPE 2 DIABETES, HBA1C GOAL < 8% (H): ICD-10-CM

## 2017-10-05 NOTE — TELEPHONE ENCOUNTER
Reason for Call:  Other prescription    Detailed comments: Patient states she is having side effects from the prescription Trulicity,  Patient states since she has to test more often more test strips are needed at this time, also     Please contact the patient to discuss further,     Pharmacy [facility],   Phone Number Patient can be reached at: Home number on file [phone] (home)    Best Time: today,     Can we leave a detailed message on this number? YES    Call taken on 10/5/2017 at 3:17 PM by Laisha Willson

## 2017-10-05 NOTE — TELEPHONE ENCOUNTER
Patient calling into clinic with concerns regarding low blood sugar after starting Trulicity. States started Trulicity injection on Monday 10/2/17. Patient states she took Glipizide 10mg tablet in morning and trulicity injection in evening. Monday night was feeling nauseated and lasted for 3 days. Patient states did have one episode of vomiting. Yesterday felt dizzy,tired weak. Took blood sugar yesterday and blood sugar at that time was 75mg/dl. Patient states normally runs around 157mg/dl and has been staying around that. However only has a few test strips left so has not been regularly checking blood sugars to save test strips. Patient states she would like to test 4 times daily and would like prescription sent to pharmacy for this. Patient is wondering if Glipizide and Trulicity together may have caused symptoms. Feeling better today. Not feeling nauseated, weak or tired. No recurrence of vomiting. Patient wondering if maybe she should lower dose of glipizide to half tablet?  Patient would also like clarity on Trulicity. Wondering if can finish box 0.75mg before she starts the 1.5mg of trulicity. Does not want to waste syringes before increasing the dose.     , Please advise.    Nichole Ugarte RN

## 2017-10-06 DIAGNOSIS — E11.9 TYPE 2 DIABETES, HBA1C GOAL < 8% (H): ICD-10-CM

## 2017-10-06 RX ORDER — BLOOD SUGAR DIAGNOSTIC
STRIP MISCELLANEOUS
Qty: 350 STRIP | Refills: 5 | Status: SHIPPED | OUTPATIENT
Start: 2017-10-06

## 2017-10-06 NOTE — TELEPHONE ENCOUNTER
Prescription for test strips resent as patient is requesting a 90 day supply and the previous prescription was written incorrecting as 100 strips KASANDRA Marmolejo RN

## 2017-10-06 NOTE — TELEPHONE ENCOUNTER
1. New prescription for glucostrips sent. Four times a day is appropriate   2. Discontinue Glucotrol (Glipizide) as soon as possible   3. Ok to go slow with Trulicity (dulaglutide)     Steven Abrams MD

## 2017-10-06 NOTE — TELEPHONE ENCOUNTER
Patient notified of Provider's message as written.  Patient verbalized understanding.    Called pharmacy to d/c glipizide  Nakita took the order    Soraida Marmolejo RN

## 2017-10-10 DIAGNOSIS — J45.41 MODERATE PERSISTENT ASTHMA WITH EXACERBATION: ICD-10-CM

## 2017-10-10 RX ORDER — DILTIAZEM HYDROCHLORIDE 60 MG/1
TABLET, FILM COATED ORAL
Qty: 10.2 G | Refills: 0 | Status: SHIPPED | OUTPATIENT
Start: 2017-10-10 | End: 2017-11-07

## 2017-10-10 NOTE — TELEPHONE ENCOUNTER
SYMBICORT 80-4.5 MCG/ACT Inhaler     Last Written Prescription Date: 09/12/2017  Last Fill Quantity: 10.2g, # refills: 0    Last Office Visit with [facility], [facility] or [facility] prescribing provider:  07/27/2017   Future Office Visit:       Date of Last Asthma Action Plan Letter:   Asthma Action Plan Q1 Year    Topic Date Due     Asthma Action Plan - yearly  04/28/2017      Asthma Control Test:   ACT Total Scores 7/27/2017   ACT TOTAL SCORE -   ASTHMA ER VISITS -   ASTHMA HOSPITALIZATIONS -   ACT TOTAL SCORE (Goal Greater than or Equal to 20) 20   In the past 12 months, how many times did you visit the emergency room for your asthma without being admitted to the hospital? 0   In the past 12 months, how many times were you hospitalized overnight because of your asthma? 0       Date of Last Spirometry Test:   No results found for this or any previous visit.      Laxmi Hawley MA

## 2017-10-13 ENCOUNTER — TELEPHONE (OUTPATIENT)
Dept: EDUCATION SERVICES | Facility: CLINIC | Age: 82
End: 2017-10-13

## 2017-10-13 NOTE — TELEPHONE ENCOUNTER
Call out to patient to follow up with her since her Trulicity start.  Patient reports that she is now doing fine with this medication.  She denies nausea or vomiting this week.  Reports blood glucose values as:    Date Breakfast  Lunch  Dinner  Bedtime    Before After Before After Before After    10/7 150      137   10/8 122      160   10/9       258   10/10 117      240   10/11 149    188  156   10/12 127      164   10/13 154           She states when her blood glucose levels were above 200, she probably ate a late meal but cannot remember exactly.  Recommended that if she plans to eat a later dinner, would be better to test before her dinner meal on that day.  BG goal range  before meals and less than 180 mg/dl when tested 2 hours after the start of a meal.  Patient plans to take the .75mg dose weekly for  4 doses and then if all going well, to increase to 1.5 mg weekly.    Patient reports that when she first started Trulicity on Oct 2 ,she did feel nauseated and vomited one time on 10/3.  She states that on 10/6 she did belch stomach contents once.  Patient states during this first on Trulicity week she was able to eat and drink.  Requested that patient call in her blood glucose values again in 2 weeks for review before she would begin the increased dose.  Patient verbalized understanding.    This note forwarded to Dr Abrams.    Bess Dang RN  BSN CDE

## 2017-10-16 DIAGNOSIS — N18.30 CONTROLLED TYPE 2 DIABETES MELLITUS WITH STAGE 3 CHRONIC KIDNEY DISEASE, WITHOUT LONG-TERM CURRENT USE OF INSULIN (H): ICD-10-CM

## 2017-10-16 DIAGNOSIS — E11.22 CONTROLLED TYPE 2 DIABETES MELLITUS WITH STAGE 3 CHRONIC KIDNEY DISEASE, WITHOUT LONG-TERM CURRENT USE OF INSULIN (H): ICD-10-CM

## 2017-10-16 NOTE — TELEPHONE ENCOUNTER
dulaglutide (TRULICITY) 0.75 MG/0.5ML pen       Last Written Prescription Date: 09/25/2017  Last Fill Quantity: 0.5mL, # refills: 0  Last Office Visit with [facility], [facility] or [facility] prescribing provider:  07/27/2017        BP Readings from Last 3 Encounters:   07/27/17 110/70   05/24/17 127/82   05/15/17 99/72     Lab Results   Component Value Date    MICROL 103 11/10/2015     Lab Results   Component Value Date    UMALCR 39.16 11/10/2015     Creatinine   Date Value Ref Range Status   07/24/2017 1.48 (H) 0.52 - 1.04 mg/dL Final   ]  GFR Estimate   Date Value Ref Range Status   07/24/2017 34 (L) >60 mL/min/1.7m2 Final     Comment:     Non  GFR Calc   06/15/2017 35 (L) >60 mL/min/1.7m2 Final     Comment:     Non  GFR Calc   05/15/2017 38 (L) >60 mL/min/1.7m2 Final     Comment:     Non  GFR Calc     GFR Estimate If Black   Date Value Ref Range Status   07/24/2017 41 (L) >60 mL/min/1.7m2 Final     Comment:      GFR Calc   06/15/2017 43 (L) >60 mL/min/1.7m2 Final     Comment:      GFR Calc   05/15/2017 45 (L) >60 mL/min/1.7m2 Final     Comment:      GFR Calc     Lab Results   Component Value Date    CHOL 137 07/27/2017     Lab Results   Component Value Date    HDL 56 07/27/2017     Lab Results   Component Value Date    LDL 66 07/27/2017     Lab Results   Component Value Date    TRIG 77 07/27/2017     Lab Results   Component Value Date    CHOLHDLRATIO 2.5 07/11/2014     Lab Results   Component Value Date    AST 15 06/15/2017     Lab Results   Component Value Date    ALT 20 06/15/2017     Lab Results   Component Value Date    A1C 8.2 07/27/2017    A1C 8.3 04/27/2017    A1C 8.9 12/19/2016    A1C 7.0 07/12/2016    A1C 7.6 04/05/2016     Potassium   Date Value Ref Range Status   07/24/2017 3.9 3.4 - 5.3 mmol/L Final     Laxmi Hawley MA

## 2017-10-17 RX ORDER — DULAGLUTIDE 0.75 MG/.5ML
INJECTION, SOLUTION SUBCUTANEOUS
Qty: 2 ML | Refills: 0 | Status: SHIPPED | OUTPATIENT
Start: 2017-10-17 | End: 2018-01-08

## 2017-10-17 NOTE — TELEPHONE ENCOUNTER
Prescription approved per [facility] Refill Protocol.  Patient due for office visit for further refills.  Celeste Ruiz RN - BC

## 2017-10-27 ENCOUNTER — TELEPHONE (OUTPATIENT)
Dept: EDUCATION SERVICES | Facility: CLINIC | Age: 82
End: 2017-10-27

## 2017-10-27 NOTE — TELEPHONE ENCOUNTER
Call out to patient to follow up with her re her blood glucose control and how she is tolerating Trulicity.    Messaging re patient 10/13/17      Are you OK with her calling in her blood glucose values and any concerns or would you like her to come in for a GFR  In the next 1 week?     Thanks,     Bess Dang RN  BSN CDE     Response Oct 16, 2017    Per Dr Abrams:  Yes. She can call in her blood glucose readings. Her gfr [ glomerular filtration rate ] is a concern of course. She has complex renal physiology and has had some follow up appointments with Dr. Emilie Ren nephrologist with [facility] . I will copy her on this message in case she is unaware of our effort to control patient weight with a (glucagon-like peptide-1) GLP-1 agonist and our worries about how this might or might not have any significant effects on her renal function.     Steven Abrams MD      Call out to patient to follow up with her re her blood glucose control and how she is tolerating Trulicity.  She states that she thinks she is doing pretty good.  Eating and drinking fine.  Has not weighed herself. Plans to weigh herself today.  Patient notes that she feels full faster but still is cleaning her plate. She does not pay much attention to this.  Recommend that patient stop eating if she starts to feel full to prevent occasional mild nausea she notes.    Reports blood glucose:  10/23  2 hr pp brunch 143      HS- 192 mg/dl  10/24- 2 hr pp brunch  152     2 hr pp supper 164  10/25  2 hr pp brunch 136      2 hr pp supper 206  10/26  2 hr pp brunch  154     2 hr pp supper 186  Patient requested review of goal ranges for her BG level.  Recommend  pre meal and less than 180 mg/dl 2 hr pp,   Patient currently taking Trulicity 0.75 mg weekly and plans to start Trulicity 1.5 mg next Monday.  Patient will call in her blood glucose values for review in 2 weeks or sooner if concerns.    Bess Dang RN  BSN CDE

## 2017-10-27 NOTE — TELEPHONE ENCOUNTER
----- Message from Steven Abrams MD sent at 10/27/2017 12:29 PM CDT -----  Regarding: RE: Trulicity  ya bartolo solis !    Steven Abrams MD    ----- Message -----     From: Bess Dang     Sent: 10/27/2017  11:15 AM       To: Steven Abrams MD  Subject: Trulicity                                        Hi Dr Abrams,    Please see tel enc for this patient from today. She is planning to increase her Trulicity dose up to 1.5 mg starting on Monday as she already has a box of this dose. Are you OK with her increasing her dose as above?   Thank you,  Bess Dang RN  BSN CDE

## 2017-11-02 NOTE — PROGRESS NOTES
SUBJECTIVE:   Mara Colindres is a 82 year old female who presents to clinic today for the following health issues:       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, without long-term current use of insulin (H)  Need for prophylactic vaccination and inoculation against influenza  Moderate persistent asthma with exacerbation  Chronic diarrhea  Hypertension goal BP (blood pressure) < 140/90  MRSA (methicillin resistant Staphylococcus aureus)  CKD (chronic kidney disease) stage 3, GFR 30-59 ml/min  Septic hip (H)  Long-term (current) use of anticoagulants  Persistent atrial fibrillation (H)  Lymphedema  Senile osteoporosis     Elderly woman I have followed for quite some time. Generally she is really doing pretty well at this point !     Diabetes -- Patient states she met with Kelsi Dang and started on Trulicity. When she first started on Trulicity, she had some brief vomiting, but it has resolved. Is now up to the dose of 1.5 MG Trulicity one time per week . This morning her blood glucose was 125. There's no question her diabetes mellitus and weight are heading in the right direction.  Lab Results   Component Value Date    A1C 7.8 11/07/2017    A1C 8.2 07/27/2017    A1C 8.3 04/27/2017    A1C 8.9 12/19/2016    A1C 7.0 07/12/2016     Pulmonary Hypertension -- She notes Dr. Akbar Thompson prescribed Cozaar [Losartan] for pulmonary hypertension. For a week she had presyncope so she decreased her dosages of Losartan and Metoprolol.    Hip Problem -- She had physical therapy more than 10 years ago for her hip which did not help much. Reports she still cannot move her hip well. Has not had any fevers or chills since she stopped antibiotics. Due to her relatively severe deconditioning [ after ten years of basically being non-ambulatory status , she has become quite easily fatigued and has dyspnea on exertion. She questions me as to why this is, see assessment and plan section . For about 10 years  she took antibiotics as a prophylaxis because of her septic hip but she finally stopped these antibiotics about 2 years ago and has had no untoward symptoms whatsoever     Asthma -- She reports she uses her inhalers occasionally, not every day. Feels her current plan of treatment is working for her.    Mental Health -- She notes she went to a therapist in the past which did not help her. Says things are going okay for her. She declines any further medication adjustments or counseling psychologist referrals    SOB -- Patient states she has dyspnea with exertion. She is curious if this is related to lungs or heart.     Diarrhea -- Patient says she has been taking probiotics and Lomotil which has been helping her diarrhea.     Lymphedema -- States she went to a lymphedema clinic and has one lymphedema garment.    Osteoporosis -- She is curious about Prolia. Says she takes a vitamin D supplement every day. We did review her most recent previous DEXA scan     Hypertension --  BP Readings from Last 3 Encounters:   11/07/17 108/70   07/27/17 110/70   05/24/17 127/82     Hyperlipidemia --  Recent Labs   Lab Test  07/27/17   1448  05/19/16   1425   07/11/14   1204  03/12/13   1134   CHOL  137  144   < >  130  140   HDL  56  64   < >  51  50   LDL  66  68   --   65  73   TRIG  77  63   --   71  84   CHOLHDLRATIO   --    --    --   2.5  2.8    < > = values in this interval not displayed.     Problem list and histories reviewed & adjusted, as indicated.  Additional history: as documented    Patient Active Problem List   Diagnosis     Morbid obesity (H)     Hypertension goal BP (blood pressure) < 140/90     DVT (deep venous thrombosis) (H)     MRSA (methicillin resistant Staphylococcus aureus)     Chronic diarrhea     HYPERLIPIDEMIA LDL GOAL <100     Chronic anticoagulation     CKD (chronic kidney disease) stage 3, GFR 30-59 ml/min     Septic hip (H)     Lymphedema     Tubular adenoma of colon     Abdominal wall hematoma      Advanced directives, counseling/discussion     Umbilical hernia     History of [facility] fever     Moderate persistent asthma     Pseudophakia, ou     Hypovitaminosis D     Bilateral leg pain     Positive YAMINI     Raynaud phenomenon     Pulmonary HTN     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     Scotoma involving central area in visual field, right     Failed total hip arthroplasty, sequela     Major depressive disorder, recurrent episode, mild (H)     Chronic diastolic congestive heart failure (H)     Long-term (current) use of anticoagulants [Z79.01]     Persistent atrial fibrillation (H)     Diabetic polyneuropathy associated with diabetes mellitus due to underlying condition (H)     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, without long-term current use of insulin (H)     Fatigue, unspecified type     Senile osteoporosis     Past Surgical History:   Procedure Laterality Date     APPENDECTOMY       COLONOSCOPY  2008     COLONOSCOPY  8/20/2012    Procedure: COLONOSCOPY;  COLONOSCOPY, TUBULAR ADENOMA OF COLON, CHRONIC DIARRHEA;  Surgeon: Yobany Hinojosa MD;  Location: [facility] OR     HC REMOVAL GALLBLADDER       JOINT REPLACEMENT, HIP RT/LT  2004    right     JOINT REPLACEMTN, KNEE RT/LT  2000    bilateral     PHACOEMULSIFICATION WITH STANDARD INTRAOCULAR LENS IMPLANT  8/2013    left eye; right eye     ROTATOR CUFF REPAIR RT/LT  1/93    right     TONSILLECTOMY         Social History   Substance Use Topics     Smoking status: Never Smoker     Smokeless tobacco: Never Used     Alcohol use No     Family History   Problem Relation Age of Onset     DIABETES Mother      CANCER Mother      Hypertension Mother      Glaucoma Mother      Hypertension Father      CANCER Son      thyroid cancer     Neurologic Disorder Sister      Alzheimer Disease Sister          Current Outpatient Prescriptions   Medication Sig Dispense Refill     budesonide-formoterol (SYMBICORT) 80-4.5 MCG/ACT  Inhaler INHALE 2 PUFFS INTO THE LUNGS TWICE DAILY 10.2 g 0     diphenoxylate-atropine (LOMOTIL) 2.5-0.025 MG per tablet TAKE 1 TABLET BY MOUTH FOUR TIMES DAILY 40 tablet 5     TRULICITY 0.75 MG/0.5ML pen INJECT 0.75 MG SUBCUTANEOUSLY EVERY 7 DAYS. AFTER 1 WEEK INCREASE TO 1.5 MG DOSE 2 mL 0     ACCU-CHEK PARVEZ PLUS test strip USE TO TEST FOUR TIMES DAILY OR AS DIRECTED 350 strip 5     dulaglutide (TRULICITY) 1.5 MG/0.5ML pen Inject 1.5 mg Subcutaneous every 7 days 0.5 mL 11     furosemide (LASIX) 40 MG tablet Take 1 tablet (40 mg) by mouth 2 times daily 180 tablet 3     PARoxetine (PAXIL) 20 MG tablet Take 1 tablet (20 mg) by mouth daily 90 tablet 1     potassium chloride (K-TAB,KLOR-CON) 10 MEQ tablet Take 2 tablets daily 180 tablet 3     losartan (COZAAR) 25 MG tablet Take 1 tablet (25 mg) by mouth 2 times daily 180 tablet 3     HYDROcodone-acetaminophen (NORCO) 5-325 MG per tablet TK 1 T PO  QID AS NEEDED 90 tablet 0     albuterol (2.5 MG/3ML) 0.083% neb solution Take 3 mLs (2.5 mg) by nebulization every 4 hours as needed for shortness of breath / dyspnea 30 vial 0     warfarin (COUMADIN) 5 MG tablet Take 1/2 tablet (2.5 mg) Monday, Wednesday, and Friday, and take 1 tablet (5 mg) by mouth all other days 90 tablet 1     metoprolol (LOPRESSOR) 100 MG tablet Take 1 tablet (100 mg) by mouth 2 times daily NOTE TABLET STRENGTH CHANGE-1 TABLET TWICE DAILY 180 tablet 3     order for DME Reasoning for requesting the hospital bed: Patient had multiple complications following hip replacement(blood clot/infection/removal of hip replacement) uses walker to ambulate now,she has a very hard time trying to lift her right foot. Hard to get herself into bed because of the height. She lives alone.  She uses devices at home to get herself into bed( step stool/extension to help lift her leg).Would also like the side rails 1 each 0     mupirocin (BACTROBAN) 2 % ointment Use 3 times a day to affected area for one week 22 g 1      albuterol (PROAIR HFA, PROVENTIL HFA, VENTOLIN HFA) 108 (90 BASE) MCG/ACT inhaler Inhale 2 puffs into the lungs every 6 hours as needed for shortness of breath / dyspnea 3 Inhaler 1     acetaminophen (TYLENOL) 500 MG tablet Take 2 tablets by mouth 3 times daily as needed.       Resveratrol 100 MG CAPS Take 1 capsule by mouth daily        STATIN NOT PRESCRIBED, INTENTIONAL, 1 each continuous prn. Statin not prescribed intentionally due to Other: patient has normal LDL within recommended guidelines on no medications 0 each 0     ASPIRIN NOT PRESCRIBED, INTENTIONAL, Antiplatelet medication not prescribed intentionally due to Current anticoagulant therapy (warfarin/enoxaparin) 0 each 3     Calcium Carbonate-Vitamin D (CALCIUM + D PO) Take 1 tablet by mouth 2 times daily.       ORDER FOR DME nebuliser 1 Device 1     OMEPRAZOLE CPCR 20 MG OR 1 CAPSULE DAILY       [DISCONTINUED] SYMBICORT 80-4.5 MCG/ACT Inhaler INHALE 2 PUFFS INTO THE LUNGS TWICE DAILY 10.2 g 0     denosumab (PROLIA) 60 MG/ML SOLN injection Inject 1 mL (60 mg) Subcutaneous once for 1 dose 1 mL 0     guaiFENesin-codeine (ROBITUSSIN AC) 100-10 MG/5ML SOLN solution Take 10 mLs by mouth every 4 hours as needed for cough (Patient not taking: Reported on 11/7/2017) 120 mL 3     [DISCONTINUED] diphenoxylate-atropine (LOMOTIL) 2.5-0.025 MG per tablet TAKE 1 TABLET BY MOUTH FOUR TIMES DAILY 40 tablet 5     Labs reviewed in EPIC      Reviewed and updated as needed this visit by clinical staffTobacco  Allergies  Meds  Med Hx  Surg Hx  Fam Hx  Soc Hx      Reviewed and updated as needed this visit by Provider         ROS:  Constitutional, HEENT, cardiovascular, pulmonary, GI, , musculoskeletal, neuro, skin, endocrine and psych systems are negative, except as otherwise noted.    This document serves as a record of the services and decisions personally performed and made by Steven Abrams MD. It was created on their behalf by Herbert Nguyen, a trained medical  scribe. The creation of this document is based the provider's statements to the medical scribe.  Herbert MCCULLOUGH Anandas November 7, 2017 2:39 PM    OBJECTIVE:   /70  Pulse 96  Temp 98.5  F (36.9  C) (Oral)  Wt 113.4 kg (250 lb)  SpO2 97%  BMI 44.29 kg/m2  Body mass index is 44.29 kg/(m^2).  GENERAL: morbidly obese, alert and no distress, answers all questions appropriately, appropriate grooming and affect, appears stated age , using a motorized scooter type of vehicle   EYES: Eyes grossly normal to inspection, EOMI, conjunctivae and sclerae normal  RESP: lungs clear to auscultation - no rales, rhonchi or wheezes  CV: regular rate and rhythm, normal S1 S2, no S3 or S4, no murmur, click or rub, peripheral pulses strong  MS: no gross musculoskeletal defects noted, significant bilateral lower extremity edema   SKIN: no suspicious lesions or rashes to visible skin   NEURO: mentation intact and speech normal  PSYCH: mentation appears normal, affect normal/bright    Diagnostic Test Results:  Results for orders placed or performed in visit on 11/07/17   Hemoglobin A1c   Result Value Ref Range    Hemoglobin A1C 7.8 (H) 4.3 - 6.0 %     ASSESSMENT/PLAN:     (E11.22,  N18.3) 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, without long-term current use of insulin (H)  (primary encounter diagnosis)  Comment: fundamentally we are quite pleased with her direction here. I am anxious to see how well things look in 3 more months . Continue current plan of care     Plan: Albumin Random Urine Quantitative with Creat         Ratio, Basic metabolic panel        Recheck laboratory studies in 3 months either with or without an appointment with me    (Z23) Need for prophylactic vaccination and inoculation against influenza  Comment: administered   Plan: FLU VACCINE, INCREASED ANTIGEN, PRESV FREE, AGE        65+ [83346], Vaccine Administration, Initial         [42113]            (J45.41) Moderate persistent asthma  with exacerbation  Comment: continue current plan of care  , asthma action plan and asthma control test completed  Plan: budesonide-formoterol (SYMBICORT) 80-4.5         MCG/ACT Inhaler, Asthma Action Plan (AAP)            (K52.9) Chronic diarrhea  Comment: she is past evaluations and simply lives with this problem   Plan: diphenoxylate-atropine (LOMOTIL) 2.5-0.025 MG         per tablet            (I10) Hypertension goal BP (blood pressure) < 140/90  Comment: controlled within acceptable limits   Plan: continue current plan of care      (A49.02) MRSA (methicillin resistant Staphylococcus aureus)  Comment: we delved into another discussion area  Plan: in about 2010 she had a positive nasal swab for MRSA (methicillin resistant Staphylococcus aureus)  But when we repeated this in 2013, we found a negative swab    (N18.3) CKD (chronic kidney disease) stage 3, GFR 30-59 ml/min  Comment: she does have advanced chronic kidney disease stage 3B and if this progresses we'd need a nephrologist consultation   Plan: Albumin Random Urine Quantitative with Creat         Ratio, Basic metabolic panel            (M00.9) Septic hip (H)  Comment: done with antibiotics   Plan: assume a posture of observation at this point     (Z79.01) Long-term (current) use of anticoagulants [Z79.01]  Comment: update inr clinic referral    Plan: INR CLINIC REFERRAL            (I48.1) Persistent atrial fibrillation (H)  Comment: as above   Plan: INR CLINIC REFERRAL            (I89.0) Lymphedema  Comment: continue current plan of care     Plan: no new orders     (M81.0) Senile osteoporosis  Comment: we then reviewed Denosumab injection (Prolia) and she is a good candidate for this treatment   Plan: Vitamin D Deficiency        And recheck calcium       Patient Instructions   - I will follow up with you about lab results.     - Follow up for an A1C lab recheck in 3 months. You can see me at that time if you would like. Otherwise follow up with me in 6  months.    The information in this document, created by the medical scribe for me, accurately reflects the services I personally performed and the decisions made by me. I have reviewed and approved this document for accuracy.   MD Steven Patterson MD  [facility]

## 2017-11-07 ENCOUNTER — OFFICE VISIT (OUTPATIENT)
Dept: INTERNAL MEDICINE | Facility: CLINIC | Age: 82
End: 2017-11-07
Payer: COMMERCIAL

## 2017-11-07 ENCOUNTER — ANTICOAGULATION THERAPY VISIT (OUTPATIENT)
Dept: NURSING | Facility: CLINIC | Age: 82
End: 2017-11-07
Payer: COMMERCIAL

## 2017-11-07 VITALS
OXYGEN SATURATION: 97 % | HEART RATE: 96 BPM | TEMPERATURE: 98.5 F | DIASTOLIC BLOOD PRESSURE: 70 MMHG | BODY MASS INDEX: 44.29 KG/M2 | SYSTOLIC BLOOD PRESSURE: 108 MMHG | WEIGHT: 250 LBS

## 2017-11-07 DIAGNOSIS — Z79.01 LONG-TERM (CURRENT) USE OF ANTICOAGULANTS: ICD-10-CM

## 2017-11-07 DIAGNOSIS — I10 HYPERTENSION GOAL BP (BLOOD PRESSURE) < 140/90: ICD-10-CM

## 2017-11-07 DIAGNOSIS — Z23 NEED FOR PROPHYLACTIC VACCINATION AND INOCULATION AGAINST INFLUENZA: ICD-10-CM

## 2017-11-07 DIAGNOSIS — N18.30 CONTROLLED TYPE 2 DIABETES MELLITUS WITH STAGE 3 CHRONIC KIDNEY DISEASE, WITHOUT LONG-TERM CURRENT USE OF INSULIN (H): Primary | ICD-10-CM

## 2017-11-07 DIAGNOSIS — I82.409 DVT (DEEP VENOUS THROMBOSIS) (H): ICD-10-CM

## 2017-11-07 DIAGNOSIS — I89.0 LYMPHEDEMA: ICD-10-CM

## 2017-11-07 DIAGNOSIS — M00.9 SEPTIC HIP (H): ICD-10-CM

## 2017-11-07 DIAGNOSIS — I48.19 PERSISTENT ATRIAL FIBRILLATION (H): ICD-10-CM

## 2017-11-07 DIAGNOSIS — J45.41 MODERATE PERSISTENT ASTHMA WITH EXACERBATION: ICD-10-CM

## 2017-11-07 DIAGNOSIS — N18.30 CKD (CHRONIC KIDNEY DISEASE) STAGE 3, GFR 30-59 ML/MIN (H): ICD-10-CM

## 2017-11-07 DIAGNOSIS — K52.9 CHRONIC DIARRHEA: ICD-10-CM

## 2017-11-07 DIAGNOSIS — M81.0 SENILE OSTEOPOROSIS: ICD-10-CM

## 2017-11-07 DIAGNOSIS — A49.02 MRSA (METHICILLIN RESISTANT STAPHYLOCOCCUS AUREUS): ICD-10-CM

## 2017-11-07 DIAGNOSIS — E11.22 CONTROLLED TYPE 2 DIABETES MELLITUS WITH STAGE 3 CHRONIC KIDNEY DISEASE, WITHOUT LONG-TERM CURRENT USE OF INSULIN (H): Primary | ICD-10-CM

## 2017-11-07 LAB
ANION GAP SERPL CALCULATED.3IONS-SCNC: 11 MMOL/L (ref 3–14)
BUN SERPL-MCNC: 29 MG/DL (ref 7–30)
CALCIUM SERPL-MCNC: 9 MG/DL (ref 8.5–10.1)
CHLORIDE SERPL-SCNC: 101 MMOL/L (ref 94–109)
CO2 SERPL-SCNC: 25 MMOL/L (ref 20–32)
CREAT SERPL-MCNC: 1.56 MG/DL (ref 0.52–1.04)
CREAT UR-MCNC: 209 MG/DL
GFR SERPL CREATININE-BSD FRML MDRD: 32 ML/MIN/1.7M2
GLUCOSE SERPL-MCNC: 228 MG/DL (ref 70–99)
HBA1C MFR BLD: 7.8 % (ref 4.3–6)
INR POINT OF CARE: 4.1 (ref 0.86–1.14)
MICROALBUMIN UR-MCNC: 45 MG/L
MICROALBUMIN/CREAT UR: 21.34 MG/G CR (ref 0–25)
POTASSIUM SERPL-SCNC: 3.7 MMOL/L (ref 3.4–5.3)
SODIUM SERPL-SCNC: 137 MMOL/L (ref 133–144)

## 2017-11-07 PROCEDURE — 99214 OFFICE O/P EST MOD 30 MIN: CPT | Mod: 25 | Performed by: INTERNAL MEDICINE

## 2017-11-07 PROCEDURE — 82306 VITAMIN D 25 HYDROXY: CPT | Performed by: INTERNAL MEDICINE

## 2017-11-07 PROCEDURE — 36416 COLLJ CAPILLARY BLOOD SPEC: CPT

## 2017-11-07 PROCEDURE — 85610 PROTHROMBIN TIME: CPT | Mod: QW

## 2017-11-07 PROCEDURE — 90662 IIV NO PRSV INCREASED AG IM: CPT | Performed by: INTERNAL MEDICINE

## 2017-11-07 PROCEDURE — 82043 UR ALBUMIN QUANTITATIVE: CPT | Performed by: INTERNAL MEDICINE

## 2017-11-07 PROCEDURE — 80048 BASIC METABOLIC PNL TOTAL CA: CPT | Performed by: INTERNAL MEDICINE

## 2017-11-07 PROCEDURE — 99207 ZZC NO CHARGE NURSE ONLY: CPT

## 2017-11-07 PROCEDURE — G0008 ADMIN INFLUENZA VIRUS VAC: HCPCS | Performed by: INTERNAL MEDICINE

## 2017-11-07 PROCEDURE — 83036 HEMOGLOBIN GLYCOSYLATED A1C: CPT | Performed by: INTERNAL MEDICINE

## 2017-11-07 RX ORDER — DIPHENOXYLATE HCL/ATROPINE 2.5-.025MG
TABLET ORAL
Qty: 40 TABLET | Refills: 5 | Status: SHIPPED | OUTPATIENT
Start: 2017-11-07 | End: 2019-02-28

## 2017-11-07 RX ORDER — BUDESONIDE AND FORMOTEROL FUMARATE DIHYDRATE 80; 4.5 UG/1; UG/1
AEROSOL RESPIRATORY (INHALATION)
Qty: 10.2 G | Refills: 0 | Status: SHIPPED | OUTPATIENT
Start: 2017-11-07 | End: 2018-11-27

## 2017-11-07 ASSESSMENT — PAIN SCALES - GENERAL: PAINLEVEL: NO PAIN (0)

## 2017-11-07 NOTE — PROGRESS NOTES
ANTICOAGULATION FOLLOW-UP CLINIC VISIT    Patient Name:  Mara Colindres  Date:  11/7/2017  Contact Type:  Face to Face    SUBJECTIVE:     Patient Findings     Positives Change in medications (stopped taking glipizide. Has started Trullicy), Change in diet/appetite (Has been eating less greens. ), No Problem Findings           OBJECTIVE    INR Protime   Date Value Ref Range Status   11/07/2017 4.1 (A) 0.86 - 1.14 Final       ASSESSMENT / PLAN  INR assessment SUPRA    Recheck INR In: 1 WEEK    INR Location Clinic      Anticoagulation Summary as of 11/7/2017     INR goal 2.0-3.0   Today's INR 4.1!   Maintenance plan 2.5 mg (5 mg x 0.5) on Mon, Wed, Fri; 5 mg (5 mg x 1) all other days   Full instructions 11/7: Hold; 11/14: 2.5 mg; Otherwise 2.5 mg on Mon, Wed, Fri; 5 mg all other days   Weekly total 27.5 mg   Plan last modified Celeste Ruiz RN (3/23/2017)   Next INR check 11/15/2017   Priority INR   Target end date     Indications   Long-term (current) use of anticoagulants [Z79.01] [Z79.01]  DVT (deep venous thrombosis) (H) [I82.409]         Anticoagulation Episode Summary     INR check location     Preferred lab     Send INR reminders to [facility]    Comments       Anticoagulation Care Providers     Provider Role Specialty Phone number    Steven Abrams MD [facility] Internal Medicine [phone]            See the Encounter Report to view Anticoagulation Flowsheet and Dosing Calendar (Go to Encounters tab in chart review, and find the Anticoagulation Therapy Visit)    Dosage adjustment made based on physician directed care plan.    Celeste Ruiz, AMAYA

## 2017-11-07 NOTE — LETTER
[facility]  [address] [place] [address]  MAXIME Ibrahim [id number]    November 9, 2017    Mara Colindres  [address] [facility] [address]          Dear Trell Terry is a copy of your results. We already discussed the hemoglobin A1c (diabetes test) at your appointment. Otherwise, all of these tests are within acceptable limits. I am excited to see how things are going to improve over the next few months.     Results for orders placed or performed in visit on 11/07/17   Hemoglobin A1c   Result Value Ref Range    Hemoglobin A1C 7.8 (H) 4.3 - 6.0 %   Albumin Random Urine Quantitative with Creat Ratio   Result Value Ref Range    Creatinine Urine 209 mg/dL    Albumin Urine mg/L 45 mg/L    Albumin Urine mg/g Cr 21.34 0 - 25 mg/g Cr   Basic metabolic panel   Result Value Ref Range    Sodium 137 133 - 144 mmol/L    Potassium 3.7 3.4 - 5.3 mmol/L    Chloride 101 94 - 109 mmol/L    Carbon Dioxide 25 20 - 32 mmol/L    Anion Gap 11 3 - 14 mmol/L    Glucose 228 (H) 70 - 99 mg/dL    Urea Nitrogen 29 7 - 30 mg/dL    Creatinine 1.56 (H) 0.52 - 1.04 mg/dL    GFR Estimate 32 (L) >60 mL/min/1.7m2    GFR Estimate If Black 38 (L) >60 mL/min/1.7m2    Calcium 9.0 8.5 - 10.1 mg/dL   Vitamin D Deficiency   Result Value Ref Range    Vitamin D Deficiency screening 34 20 - 75 ug/L             If you have any questions or concerns, please call myself or my nurse at [phone].      Sincerely,        Steven Abrams MD/jay

## 2017-11-07 NOTE — PROGRESS NOTES

## 2017-11-07 NOTE — MR AVS SNAPSHOT
Mara CHARLES Jens   11/7/2017 1:45 PM   Anticoagulation Therapy Visit    Description:  82 year old female   Provider:  STALIN ANTI COAG   Department:  [facility]           INR as of 11/7/2017     Today's INR 4.1!      Anticoagulation Summary as of 11/7/2017     INR goal 2.0-3.0   Today's INR 4.1!   Full instructions 11/7: Hold; 11/14: 2.5 mg; Otherwise 2.5 mg on Mon, Wed, Fri; 5 mg all other days   Next INR check 11/15/2017    Indications   Long-term (current) use of anticoagulants [Z79.01] [Z79.01]  DVT (deep venous thrombosis) (H) [I82.409]         Your next Anticoagulation Clinic appointment(s)     Nov 15, 2017  2:30 PM CST   Anticoagulation Visit with [facility]   [facility] [id number] [phone]              Contact Numbers     [facility]  Please call [phone] to cancel and/or reschedule your appointment   Please call [phone] with any problems or questions regarding your therapy.        November 2017 Details    Sun Mon Tue Wed Thu Fri Sat        1               2               3               4                 5               6               7      Hold   See details      8      2.5 mg         9      5 mg         10      2.5 mg         11      5 mg           12      5 mg         13      2.5 mg         14      2.5 mg         15            16               17               18                 19               20               21               22               23               24               25                 26               27               28               29               30                  Date Details   11/07 This INR check       Date of next INR:  11/15/2017         How to take your warfarin dose     To take:  2.5 mg Take 0.5 of a 5 mg tablet.    To take:  5 mg Take 1 of the 5 mg tablets.    Hold Do not take your warfarin dose. See the Details table to the right for additional instructions.

## 2017-11-07 NOTE — NURSING NOTE
"Chief Complaint   Patient presents with     Diabetes     Flu Shot       Initial /70  Pulse 96  Temp 98.5  F (36.9  C) (Oral)  Wt 250 lb (113.4 kg)  SpO2 97%  BMI 44.29 kg/m2 Estimated body mass index is 44.29 kg/(m^2) as calculated from the following:    Height as of 7/27/17: 5' 3\" (1.6 m).    Weight as of this encounter: 250 lb (113.4 kg).  Medication Reconciliation: complete   Annabelle Hartman MA    "

## 2017-11-07 NOTE — PATIENT INSTRUCTIONS
- I will follow up with you about lab results.     - Follow up for an A1C lab recheck in 3 months. You can see me at that time if you would like. Otherwise follow up with me in 6 months.      [facility]    If you have any questions regarding to your visit please contact your care team:     Team Pink:   Clinic Hours Telephone Number   Internal Medicine:  Dr. Alaina Davis, NP       7am-7pm  Monday - Thursday   7am-5pm  Fridays  [phone]  (Appointment scheduling available 24/7)    Questions about your visit?  Team Line  [phone]   Urgent Care - [place] - 11am-9pm Monday-Friday Saturday-Sunday- 9am-5pm   [place] - 5pm-9pm Monday-Friday Saturday-Sunday- 9am-5pm  [phone] - [place]   [phone] - [place]       What options do I have for visits at the clinic other than the traditional office visit?  To expand how we care for you, many of our providers are utilizing electronic visits (e-visits) and telephone visits, when medically appropriate, for interactions with their patients rather than a visit in the clinic.   We also offer nurse visits for many medical concerns. Just like any other service, we will bill your insurance company for this type of visit based on time spent on the phone with your provider. Not all insurance companies cover these visits. Please check with your medical insurance if this type of visit is covered. You will be responsible for any charges that are not paid by your insurance.      E-visits via [employer]:  generally incur a $35.00 fee.  Telephone visits:  Time spent on the phone: *charged based on time that is spent on the phone in increments of 10 minutes. Estimated cost:   5-10 mins $30.00   11-20 mins. $59.00   21-30 mins. $85.00   Use [employer] (secure email communication and access to your chart) to send your primary care provider a message or make an appointment. Ask someone on your Team how  to sign up for [employer].    For a Price Quote for your services, please call our Consumer Price Line at [phone].    As always, Thank you for trusting us with your health care needs!    Claudia Ornelas CMA

## 2017-11-07 NOTE — LETTER
My Asthma Action Plan  Name: Mara Colindres   YOB: 1934  Date: 11/7/2017   My doctor: Steven Abrams MD   My clinic: [facility]        My Control Medicine: Budesonide + formoterol (Symbicort) -  80/4.5 mcg    My Rescue Medicine: Albuterol (Proair/Ventolin/Proventil) inhaler  2 pullf qid prn   My Asthma Severity: intermittent  Avoid your asthma triggers: smoke               GREEN ZONE   Good Control    I feel good    No cough or wheeze    Can work, sleep and play without asthma symptoms       Take your asthma control medicine every day.     1. If exercise triggers your asthma, take your rescue medication    15 minutes before exercise or sports, and    During exercise if you have asthma symptoms  2. Spacer to use with inhaler: If you have a spacer, make sure to use it with your inhaler             YELLOW ZONE Getting Worse  I have ANY of these:    I do not feel good    Cough or wheeze    Chest feels tight    Wake up at night   1. Keep taking your Green Zone medications  2. Start taking your rescue medicine:    every 20 minutes for up to 1 hour. Then every 4 hours for 24-48 hours.  3. If you stay in the Yellow Zone for more than 12-24 hours, contact your doctor.  4. If you do not return to the Green Zone in 12-24 hours or you get worse, start taking your oral steroid medicine if prescribed by your provider.           RED ZONE Medical Alert - Get Help  I have ANY of these:    I feel awful    Medicine is not helping    Breathing getting harder    Trouble walking or talking    Nose opens wide to breathe       1. Take your rescue medicine NOW  2. If your provider has prescribed an oral steroid medicine, start taking it NOW  3. Call your doctor NOW  4. If you are still in the Red Zone after 20 minutes and you have not reached your doctor:    Take your rescue medicine again and    Call 911 or go to the emergency room right away    See your regular doctor within 2 weeks of an Emergency Room or  Urgent Care visit for follow-up treatment.        Electronically signed by: Steven Abrams, November 7, 2017    Annual Reminders:  Meet with Asthma Educator,  Flu Shot in the Fall, consider Pneumonia Vaccination for patients with asthma (aged 19 and older).    Pharmacy: [id number] - [place], [place] - [address] AT [facility] [address]                    Asthma Triggers  How To Control Things That Make Your Asthma Worse    Triggers are things that make your asthma worse.  Look at the list below to help you find your triggers and what you can do about them.  You can help prevent asthma flare-ups by staying away from your triggers.      Trigger                                                          What you can do   Cigarette Smoke  Tobacco smoke can make asthma worse. Do not allow smoking in your home, car or around you.  Be sure no one smokes at a child s day care or school.  If you smoke, ask your health care provider for ways to help you quit.  Ask family members to quit too.  Ask your health care provider for a referral to Quit Plan to help you quit smoking, or call 2-344-432-PLAN.     Colds, Flu, Bronchitis  These are common triggers of asthma. Wash your hands often.  Don t touch your eyes, nose or mouth.  Get a flu shot every year.     Dust Mites  These are tiny bugs that live in cloth or carpet. They are too small to see. Wash sheets and blankets in hot water every week.   Encase pillows and mattress in dust mite proof covers.  Avoid having carpet if you can. If you have carpet, vacuum weekly.   Use a dust mask and HEPA vacuum.   Pollen and Outdoor Mold  Some people are allergic to trees, grass, or weed pollen, or molds. Try to keep your windows closed.  Limit time out doors when pollen count is high.   Ask you health care provider about taking medicine during allergy season.     Animal Dander  Some people are allergic to skin flakes, urine or saliva from pets with fur or  feathers. Keep pets with fur or feathers out of your home.    If you can t keep the pet outdoors, then keep the pet out of your bedroom.  Keep the bedroom door closed.  Keep pets off cloth furniture and away from stuffed toys.     Mice, Rats, and Cockroaches  Some people are allergic to the waste from these pests.   Cover food and garbage.  Clean up spills and food crumbs.  Store grease in the refrigerator.   Keep food out of the bedroom.   Indoor Mold  This can be a trigger if your home has high moisture. Fix leaking faucets, pipes, or other sources of water.   Clean moldy surfaces.  Dehumidify basement if it is damp and smelly.   Smoke, Strong Odors, and Sprays  These can reduce air quality. Stay away from strong odors and sprays, such as perfume, powder, hair spray, paints, smoke incense, paint, cleaning products, candles and new carpet.   Exercise or Sports  Some people with asthma have this trigger. Be active!  Ask your doctor about taking medicine before sports or exercise to prevent symptoms.    Warm up for 5-10 minutes before and after sports or exercise.     Other Triggers of Asthma  Cold air:  Cover your nose and mouth with a scarf.  Sometimes laughing or crying can be a trigger.  Some medicines and food can trigger asthma.

## 2017-11-07 NOTE — MR AVS SNAPSHOT
After Visit Summary   11/7/2017    Mara Colindres    MRN: [id number]           Patient Information     Date Of Birth          11/30/1934        Visit Information        Provider Department      11/7/2017 2:10 PM Steven Abrams MD [facility]        Today's Diagnoses     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, without long-term current use of insulin (H)    -  1    Need for prophylactic vaccination and inoculation against influenza        Moderate persistent asthma with exacerbation        Chronic diarrhea        Hypertension goal BP (blood pressure) < 140/90        MRSA (methicillin resistant Staphylococcus aureus)        CKD (chronic kidney disease) stage 3, GFR 30-59 ml/min        Septic hip (H)        Long-term (current) use of anticoagulants [Z79.01]        Persistent atrial fibrillation (H)        Lymphedema        Senile osteoporosis          Care Instructions    - I will follow up with you about lab results.     - Follow up for an A1C lab recheck in 3 months. You can see me at that time if you would like. Otherwise follow up with me in 6 months.      [facility]    If you have any questions regarding to your visit please contact your care team:     Team Pink:   Clinic Hours Telephone Number   Internal Medicine:  Dr. Alaina Davis NP       7am-7pm  Monday - Thursday   7am-5pm  Fridays  [phone]  (Appointment scheduling available 24/7)    Questions about your visit?  Team Line  [phone]   Urgent Care - [place] - 11am-9pm Monday-Friday Saturday-Sunday- 9am-5pm   [place] - 5pm-9pm Monday-Friday Saturday-Sunday- 9am-5pm  [phone] - [place]   [phone] - [place]       What options do I have for visits at the clinic other than the traditional office visit?  To expand how we care for you, many of our providers are utilizing electronic visits (e-visits)  and telephone visits, when medically appropriate, for interactions with their patients rather than a visit in the clinic.   We also offer nurse visits for many medical concerns. Just like any other service, we will bill your insurance company for this type of visit based on time spent on the phone with your provider. Not all insurance companies cover these visits. Please check with your medical insurance if this type of visit is covered. You will be responsible for any charges that are not paid by your insurance.      E-visits via [employer]:  generally incur a $35.00 fee.  Telephone visits:  Time spent on the phone: *charged based on time that is spent on the phone in increments of 10 minutes. Estimated cost:   5-10 mins $30.00   11-20 mins. $59.00   21-30 mins. $85.00   Use [employer] (secure email communication and access to your chart) to send your primary care provider a message or make an appointment. Ask someone on your Team how to sign up for [employer]    For a Price Quote for your services, please call our [email or web address] Line at [phone].    As always, Thank you for trusting us with your health care needs!    Claudia Ornelas, JOVON            Follow-ups after your visit        Additional Services     INR CLINIC REFERRAL       Your provider has referred you to INR Services.    Please be aware that coverage of these services is subject to the terms and limitations of your health insurance plan.  Call member services at your health plan with any benefit or coverage questions.    Indication for Anticoagulation: Atrial Fibrillation  If nonstandard INR is desired, indicate goal range and explanation:   Expected Duration of Therapy: Lifetime                  Your next 10 appointments already scheduled     Nov 15, 2017  2:30 PM CST   Anticoagulation Visit with FZ ANTI COAG   [facility] [place] ([facility] [place])    [address]  [place] [address]   [phone]            Jan 08, 2018  2:30 PM CST  "  Return Visit with Emilie Ren MD   [facility] ([facility])    [address] [place] [address]  [place] [address] [phone]              Who to contact     If you have questions or need follow up information about today's clinic visit or your schedule please contact [facility] directly at [phone].  Normal or non-critical lab and imaging results will be communicated to you by MyChart, letter or phone within 4 business days after the clinic has received the results. If you do not hear from us within 7 days, please contact the clinic through [employer] or phone. If you have a critical or abnormal lab result, we will notify you by phone as soon as possible.  Submit refill requests through [employer] or call your pharmacy and they will forward the refill request to us. Please allow 3 business days for your refill to be completed.          Additional Information About Your Visit        [employer] Information     [employer] lets you send messages to your doctor, view your test results, renew your prescriptions, schedule appointments and more. To sign up, go to [email or web address] . Click on \"Log in\" on the left side of the screen, which will take you to the Welcome page. Then click on \"Sign up Now\" on the right side of the page.     You will be asked to enter the access code listed below, as well as some personal information. Please follow the directions to create your username and password.     Your access code is: [email or web address]  Expires: 2018  3:07 PM     Your access code will  in 90 days. If you need help or a new code, please call your [place] clinic or [phone].        Care EveryWhere ID     This is your Care EveryWhere ID. This could be used by other organizations to access your [place] medical records  [id number]        Your Vitals Were     Pulse Temperature Pulse Oximetry BMI (Body Mass Index)          96 98.5  F (36.9  C) (Oral) 97% 44.29 kg/m2         " Blood Pressure from Last 3 Encounters:   11/07/17 108/70   07/27/17 110/70   05/24/17 127/82    Weight from Last 3 Encounters:   11/07/17 250 lb (113.4 kg)   07/27/17 254 lb (115.2 kg)   05/24/17 250 lb 8 oz (113.6 kg)              We Performed the Following     Albumin Random Urine Quantitative with Creat Ratio     Asthma Action Plan (AAP)     Basic metabolic panel     FLU VACCINE, INCREASED ANTIGEN, PRESV FREE, AGE 65+ [56716]     Hemoglobin A1c     INR CLINIC REFERRAL     Vaccine Administration, Initial [46139]     Vitamin D Deficiency          Today's Medication Changes          These changes are accurate as of: 11/7/17  3:07 PM.  If you have any questions, ask your nurse or doctor.               These medicines have changed or have updated prescriptions.        Dose/Directions    budesonide-formoterol 80-4.5 MCG/ACT Inhaler   Commonly known as:  SYMBICORT   This may have changed:  See the new instructions.   Used for:  Moderate persistent asthma with exacerbation   Changed by:  Steven Abrams MD        INHALE 2 PUFFS INTO THE LUNGS TWICE DAILY   Quantity:  10.2 g   Refills:  0       diphenoxylate-atropine 2.5-0.025 MG per tablet   Commonly known as:  LOMOTIL   This may have changed:  See the new instructions.   Used for:  Chronic diarrhea   Changed by:  Steven Abrams MD        TAKE 1 TABLET BY MOUTH FOUR TIMES DAILY   Quantity:  40 tablet   Refills:  5            Where to get your medicines      These medications were sent to [id number] - [place], [place] - [address] AT [facility] [address], [place] [address]     Phone:  [phone]     budesonide-formoterol 80-4.5 MCG/ACT Inhaler         Some of these will need a paper prescription and others can be bought over the counter.  Ask your nurse if you have questions.     Bring a paper prescription for each of these medications     diphenoxylate-atropine 2.5-0.025 MG per tablet                 Primary Care Provider Office Phone # Fax #    Steven Abrams [phone] [address] [address]        Equal Access to Services     JOSHKRISHNA LENA : Hadii aad ku hadceeo Soomaali, waaxda luqadaha, qaybta kaalmada adeegyada, scott rootwillian michael. So [facility] [phone].    ATENCIÓN: Si habla español, tiene a mckeon disposición servicios gratuitos de asistencia lingüística. Llame al [phone].    We comply with applicable federal civil rights laws and [place] laws. We do not discriminate on the basis of race, color, national origin, age, disability, sex, sexual orientation, or gender identity.            Thank you!     Thank you for choosing [facility]  for your care. Our goal is always to provide you with excellent care. Hearing back from our patients is one way we can continue to improve our services. Please take a few minutes to complete the written survey that you may receive in the mail after your visit with us. Thank you!             Your Updated Medication List - Protect others around you: Learn how to safely use, store and throw away your medicines at www.disposemymeds.org.          This list is accurate as of: 11/7/17  3:07 PM.  Always use your most recent med list.                   Brand Name Dispense Instructions for use Diagnosis    ACCU-CHEK PARVEZ PLUS test strip   Generic drug:  blood glucose monitoring     350 strip    USE TO TEST FOUR TIMES DAILY OR AS DIRECTED    Type 2 diabetes, HbA1C goal < 8% (H)       * albuterol 108 (90 BASE) MCG/ACT Inhaler    PROAIR HFA/PROVENTIL HFA/VENTOLIN HFA    3 Inhaler    Inhale 2 puffs into the lungs every 6 hours as needed for shortness of breath / dyspnea    Moderate persistent asthma with exacerbation       * albuterol (2.5 MG/3ML) 0.083% neb solution     30 vial    Take 3 mLs (2.5 mg) by nebulization every 4 hours as needed for shortness of breath / dyspnea    Moderate persistent asthma with  exacerbation       * ASPIRIN NOT PRESCRIBED    INTENTIONAL    0 each    Antiplatelet medication not prescribed intentionally due to Current anticoagulant therapy (warfarin/enoxaparin)    Type 2 diabetes, HbA1C goal < 8% (H)       budesonide-formoterol 80-4.5 MCG/ACT Inhaler    SYMBICORT    10.2 g    INHALE 2 PUFFS INTO THE LUNGS TWICE DAILY    Moderate persistent asthma with exacerbation       CALCIUM + D PO      Take 1 tablet by mouth 2 times daily.        denosumab 60 MG/ML Soln injection    PROLIA    1 mL    Inject 1 mL (60 mg) Subcutaneous once for 1 dose    CKD (chronic kidney disease) stage 3, GFR 30-59 ml/min, Senile osteoporosis       diphenoxylate-atropine 2.5-0.025 MG per tablet    LOMOTIL    40 tablet    TAKE 1 TABLET BY MOUTH FOUR TIMES DAILY    Chronic diarrhea       * dulaglutide 1.5 MG/0.5ML pen    TRULICITY    0.5 mL    Inject 1.5 mg Subcutaneous every 7 days    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, without long-term current use of insulin (H)       * TRULICITY 0.75 MG/0.5ML pen   Generic drug:  dulaglutide     2 mL    INJECT 0.75 MG SUBCUTANEOUSLY EVERY 7 DAYS. AFTER 1 WEEK INCREASE TO 1.5 MG DOSE    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, without long-term current use of insulin (H)       furosemide 40 MG tablet    LASIX    180 tablet    Take 1 tablet (40 mg) by mouth 2 times daily    Chronic kidney disease, stage III (moderate)       guaiFENesin-codeine 100-10 MG/5ML Soln solution    ROBITUSSIN AC    120 mL    Take 10 mLs by mouth every 4 hours as needed for cough    Cough, Moderate persistent asthma with exacerbation       HYDROcodone-acetaminophen 5-325 MG per tablet    NORCO    90 tablet    TK 1 T PO  QID AS NEEDED    Primary osteoarthritis involving multiple joints       losartan 25 MG tablet    COZAAR    180 tablet    Take 1 tablet (25 mg) by mouth 2 times daily    Pulmonary HTN       metoprolol 100 MG tablet    LOPRESSOR     180 tablet    Take 1 tablet (100 mg) by mouth 2 times daily NOTE TABLET STRENGTH CHANGE-1 TABLET TWICE DAILY    Congestive heart failure, unspecified congestive heart failure chronicity, unspecified congestive heart failure type (H), Pulmonary HTN       mupirocin 2 % ointment    BACTROBAN    22 g    Use 3 times a day to affected area for one week    Post-operative state       OMEPRAZOLE CPCR 20 MG OR      1 CAPSULE DAILY        * order for DME     1 Device    nebuliser    Acute bronchitis treated with antibiotics in the past 60 days       order for DME     1 each    Reasoning for requesting the hospital bed: Patient had multiple complications following hip replacement(blood clot/infection/removal of hip replacement) uses walker to ambulate now,she has a very hard time trying to lift her right foot. Hard to get herself into bed because of the height. She lives alone.  She uses devices at home to get herself into bed( step stool/extension to help lift her leg).Would also like the side rails    Failed total hip arthroplasty, sequela, Septic hip (H), Lymphedema, Bilateral leg pain       PARoxetine 20 MG tablet    PAXIL    90 tablet    Take 1 tablet (20 mg) by mouth daily    Major depressive disorder, recurrent episode, mild (H)       potassium chloride 10 MEQ tablet    K-TAB,KLOR-CON    180 tablet    Take 2 tablets daily    Diuretic-induced hypokalemia       Resveratrol 100 MG Caps      Take 1 capsule by mouth daily        * STATIN NOT PRESCRIBED (INTENTIONAL)     0 each    1 each continuous prn. Statin not prescribed intentionally due to Other: patient has normal LDL within recommended guidelines on no medications    Hyperlipidemia LDL goal <100       TYLENOL 500 MG tablet   Generic drug:  acetaminophen      Take 2 tablets by mouth 3 times daily as needed.        warfarin 5 MG tablet    COUMADIN    90 tablet    Take 1/2 tablet (2.5 mg) Monday, Wednesday, and Friday, and take 1 tablet (5 mg) by mouth all other days     Long-term (current) use of anticoagulants, Persistent atrial fibrillation (H)       * Notice:  This list has 7 medication(s) that are the same as other medications prescribed for you. Read the directions carefully, and ask your doctor or other care provider to review them with you.

## 2017-11-08 DIAGNOSIS — J45.41 MODERATE PERSISTENT ASTHMA WITH EXACERBATION: ICD-10-CM

## 2017-11-08 LAB — DEPRECATED CALCIDIOL+CALCIFEROL SERPL-MC: 34 UG/L (ref 20–75)

## 2017-11-08 ASSESSMENT — ASTHMA QUESTIONNAIRES: ACT_TOTALSCORE: 20

## 2017-11-08 NOTE — TELEPHONE ENCOUNTER
Spoke to pharmacy and confirmed duplicate request.    budesonide-formoterol (SYMBICORT) 80-4.5 MCG/ACT Inhaler 10.2 g 0 11/7/2017  No      Sig: INHALE 2 PUFFS INTO THE LUNGS TWICE DAILY     Class: E-Prescribe     Order: [id number]     E-Prescribing Status: Receipt confirmed by pharmacy (11/7/2017  2:56 PM CST)       Usha LINDSEY CMA ([facility])

## 2017-11-10 RX ORDER — DILTIAZEM HYDROCHLORIDE 60 MG/1
TABLET, FILM COATED ORAL
Qty: 10.2 G | Refills: 5 | Status: SHIPPED | OUTPATIENT
Start: 2017-11-10 | End: 2018-01-08

## 2017-11-10 NOTE — TELEPHONE ENCOUNTER
Signed Prescriptions:                        Disp   Refills    SYMBICORT 80-4.5 MCG/ACT Inhaler           10.2 g 5        Sig: INHALE 2 PUFFS INTO THE LUNGS TWICE A DAY  Authorizing Provider: OZ WALSH  Ordering User: ANAID KENNEDY RN refilled medication per [facility] [place].     Anaid Kennedy RN

## 2017-11-15 ENCOUNTER — ANTICOAGULATION THERAPY VISIT (OUTPATIENT)
Dept: NURSING | Facility: CLINIC | Age: 82
End: 2017-11-15
Payer: COMMERCIAL

## 2017-11-15 DIAGNOSIS — Z79.01 LONG-TERM (CURRENT) USE OF ANTICOAGULANTS: ICD-10-CM

## 2017-11-15 DIAGNOSIS — I82.409 DVT (DEEP VENOUS THROMBOSIS) (H): ICD-10-CM

## 2017-11-15 LAB — INR POINT OF CARE: 2.6 (ref 0.86–1.14)

## 2017-11-15 PROCEDURE — 36416 COLLJ CAPILLARY BLOOD SPEC: CPT

## 2017-11-15 PROCEDURE — 85610 PROTHROMBIN TIME: CPT | Mod: QW

## 2017-11-15 PROCEDURE — 99207 ZZC NO CHARGE NURSE ONLY: CPT

## 2017-11-15 NOTE — PROGRESS NOTES
ANTICOAGULATION FOLLOW-UP CLINIC VISIT    Patient Name:  Mara Colindres  Date:  11/15/2017  Contact Type:  Face to Face    SUBJECTIVE:     Patient Findings     Positives No Problem Findings           OBJECTIVE    INR Protime   Date Value Ref Range Status   11/15/2017 2.6 (A) 0.86 - 1.14 Final       ASSESSMENT / PLAN  No question data found.  Anticoagulation Summary as of 11/15/2017     INR goal 2.0-3.0   Today's INR 2.6   Maintenance plan 2.5 mg (5 mg x 0.5) on Mon, Wed, Fri; 5 mg (5 mg x 1) all other days   Full instructions 2.5 mg on Mon, Wed, Fri; 5 mg all other days   Weekly total 27.5 mg   No change documented Celeste Ruiz RN   Plan last modified Celeste Ruiz RN (3/23/2017)   Next INR check 12/6/2017   Priority INR   Target end date     Indications   Long-term (current) use of anticoagulants [Z79.01] [Z79.01]  DVT (deep venous thrombosis) (H) [I82.409]         Anticoagulation Episode Summary     INR check location     Preferred lab     Send INR reminders to [facility]    Comments       Anticoagulation Care Providers     Provider Role Specialty Phone number    Steven Abrams MD Responsible Internal Medicine [phone]            See the Encounter Report to view Anticoagulation Flowsheet and Dosing Calendar (Go to Encounters tab in chart review, and find the Anticoagulation Therapy Visit)    Dosage adjustment made based on physician directed care plan.    Celeste Ruiz RN

## 2017-11-15 NOTE — MR AVS SNAPSHOT
Mara CHARLES Jens   11/15/2017 2:30 PM   Anticoagulation Therapy Visit    Description:  82 year old female   Provider:  STALIN ANTI COAG   Department:  [facility]           INR as of 11/15/2017     Today's INR 2.6      Anticoagulation Summary as of 11/15/2017     INR goal 2.0-3.0   Today's INR 2.6   Full instructions 2.5 mg on Mon, Wed, Fri; 5 mg all other days   Next INR check 12/6/2017    Indications   Long-term (current) use of anticoagulants [Z79.01] [Z79.01]  DVT (deep venous thrombosis) (H) [I82.409]         Your next Anticoagulation Clinic appointment(s)     Dec 06, 2017  2:30 PM CST   Anticoagulation Visit with STALIN ANTI LAYO   [facility] ([facility]    [address] [address] [phone]              Contact Numbers     [facility]  Please call [phone] to cancel and/or reschedule your appointment   Please call [phone] with any problems or questions regarding your therapy.        November 2017 Details    Sun Mon Tue Wed Thu Fri Sat        1               2               3               4                 5               6               7               8               9               10               11                 12               13               14               15      2.5 mg   See details      16      5 mg         17      2.5 mg         18      5 mg           19      5 mg         20      2.5 mg         21      5 mg         22      2.5 mg         23      5 mg         24      2.5 mg         25      5 mg           26      5 mg         27      2.5 mg         28      5 mg         29      2.5 mg         30      5 mg            Date Details   11/15 This INR check               How to take your warfarin dose     To take:  2.5 mg Take 0.5 of a 5 mg tablet.    To take:  5 mg Take 1 of the 5 mg tablets.           December 2017 Details    Sun Mon Tue Wed Thu Fri Sat          1      2.5 mg         2      5 mg           3      5 mg         4      2.5 mg          5      5 mg         6            7               8               9                 10               11               12               13               14               15               16                 17               18               19               20               21               22               23                 24               25               26               27               28               29               30                 31                      Date Details   No additional details    Date of next INR:  12/6/2017         How to take your warfarin dose     To take:  2.5 mg Take 0.5 of a 5 mg tablet.    To take:  5 mg Take 1 of the 5 mg tablets.

## 2017-11-27 DIAGNOSIS — I48.19 PERSISTENT ATRIAL FIBRILLATION (H): ICD-10-CM

## 2017-11-27 DIAGNOSIS — Z79.01 LONG-TERM (CURRENT) USE OF ANTICOAGULANTS: ICD-10-CM

## 2017-11-27 RX ORDER — WARFARIN SODIUM 5 MG/1
TABLET ORAL
Qty: 90 TABLET | Refills: 0 | Status: SHIPPED | OUTPATIENT
Start: 2017-11-27 | End: 2018-03-21

## 2017-11-27 NOTE — TELEPHONE ENCOUNTER
Prescription approved per [facility] Refill Protocol.    Usha Zhong RN  [facility]

## 2017-12-08 ENCOUNTER — TELEPHONE (OUTPATIENT)
Dept: EDUCATION SERVICES | Facility: CLINIC | Age: 82
End: 2017-12-08

## 2017-12-08 NOTE — TELEPHONE ENCOUNTER
Call out to patient to follow up with her re her blood glucose control since starting on the Trulicity.  She states that she has been feeling well and blood glucose values are doing good.  She has lost 4 lbs and her A1C is down 0.4 % .  She will call Dr Abrams if she starts having any concerns with N&V or other.    Bess Dang RN  BSN CDE

## 2018-01-05 DIAGNOSIS — I27.20 PULMONARY HTN (H): ICD-10-CM

## 2018-01-05 DIAGNOSIS — I50.9 CONGESTIVE HEART FAILURE, UNSPECIFIED CONGESTIVE HEART FAILURE CHRONICITY, UNSPECIFIED CONGESTIVE HEART FAILURE TYPE: ICD-10-CM

## 2018-01-05 RX ORDER — METOPROLOL TARTRATE 100 MG
100 TABLET ORAL 2 TIMES DAILY
Qty: 180 TABLET | Refills: 1 | Status: SHIPPED | OUTPATIENT
Start: 2018-01-05 | End: 2018-01-08

## 2018-01-08 ENCOUNTER — OFFICE VISIT (OUTPATIENT)
Dept: NEPHROLOGY | Facility: CLINIC | Age: 83
End: 2018-01-08
Payer: COMMERCIAL

## 2018-01-08 VITALS
HEIGHT: 63 IN | WEIGHT: 252 LBS | HEART RATE: 82 BPM | DIASTOLIC BLOOD PRESSURE: 70 MMHG | SYSTOLIC BLOOD PRESSURE: 100 MMHG | BODY MASS INDEX: 44.65 KG/M2 | OXYGEN SATURATION: 94 %

## 2018-01-08 DIAGNOSIS — R25.2 CRAMP OF LIMB: ICD-10-CM

## 2018-01-08 DIAGNOSIS — N18.30 CKD (CHRONIC KIDNEY DISEASE) STAGE 3, GFR 30-59 ML/MIN (H): Primary | ICD-10-CM

## 2018-01-08 DIAGNOSIS — I89.0 LYMPHEDEMA: ICD-10-CM

## 2018-01-08 DIAGNOSIS — I27.20 PULMONARY HTN (H): ICD-10-CM

## 2018-01-08 DIAGNOSIS — I50.9 CONGESTIVE HEART FAILURE, UNSPECIFIED CONGESTIVE HEART FAILURE CHRONICITY, UNSPECIFIED CONGESTIVE HEART FAILURE TYPE: ICD-10-CM

## 2018-01-08 DIAGNOSIS — E08.42 DIABETIC POLYNEUROPATHY ASSOCIATED WITH DIABETES MELLITUS DUE TO UNDERLYING CONDITION (H): ICD-10-CM

## 2018-01-08 DIAGNOSIS — I73.00 RAYNAUD'S PHENOMENON WITHOUT GANGRENE: ICD-10-CM

## 2018-01-08 LAB
ALBUMIN SERPL-MCNC: 3.8 G/DL (ref 3.4–5)
ANION GAP SERPL CALCULATED.3IONS-SCNC: 6 MMOL/L (ref 3–14)
BUN SERPL-MCNC: 31 MG/DL (ref 7–30)
CALCIUM SERPL-MCNC: 9.7 MG/DL (ref 8.5–10.1)
CHLORIDE SERPL-SCNC: 99 MMOL/L (ref 94–109)
CO2 SERPL-SCNC: 28 MMOL/L (ref 20–32)
CREAT SERPL-MCNC: 2.06 MG/DL (ref 0.52–1.04)
GFR SERPL CREATININE-BSD FRML MDRD: 23 ML/MIN/1.7M2
GLUCOSE SERPL-MCNC: 228 MG/DL (ref 70–99)
MAGNESIUM SERPL-MCNC: 2 MG/DL (ref 1.6–2.3)
PHOSPHATE SERPL-MCNC: 3.6 MG/DL (ref 2.5–4.5)
POTASSIUM SERPL-SCNC: 4.2 MMOL/L (ref 3.4–5.3)
SODIUM SERPL-SCNC: 133 MMOL/L (ref 133–144)

## 2018-01-08 PROCEDURE — 80069 RENAL FUNCTION PANEL: CPT | Performed by: INTERNAL MEDICINE

## 2018-01-08 PROCEDURE — 83735 ASSAY OF MAGNESIUM: CPT | Performed by: INTERNAL MEDICINE

## 2018-01-08 PROCEDURE — 36415 COLL VENOUS BLD VENIPUNCTURE: CPT | Performed by: INTERNAL MEDICINE

## 2018-01-08 RX ORDER — METOPROLOL TARTRATE 100 MG
50 TABLET ORAL 2 TIMES DAILY
Qty: 180 TABLET | Refills: 1 | Status: SHIPPED | OUTPATIENT
Start: 2018-01-08 | End: 2018-07-02

## 2018-01-08 NOTE — PROGRESS NOTES
Assessment and Plan:   83 year old female with history of CKD with SCr 1.2-1.5mg/dL, pulmonary hypertension, atrial fibrillation, DVT, diastolic dysfunction, diabetes x 10 years, complicated right hip replacement in 2004 with chronic infection and DVT, who presents for CKD followup  1. CKD stage 3b/4- her SCr has been 1.2-1.5 but was up to 1.9mg/dL winter 2016 but back down in February 2017. She has complex cardiorenal physiology with Afib, diastolic dysfunction and pulmonary hypertension. She has +ABILIO and Raynauds, invoking possibility of chronic interstitial process or autoimmune disease.   She has diabetes with neuropathy that could also explain some CKD.    - she is non proteinuric  -  F/u renal function today given muscle cramps- ?volume depletion or electrolytes (K or Mag)  -will have her monitor BP and other symptoms and call if worsens    2. Anemia- borderline anemia, mild   3. Hypertension- well controlled, low normal BP, on ACE, BB and diuretic-    - BP today 100 systolic and she is mildly symptomatic  - lowered losartan to 25mg bid last visit and she lowered it to daily. She also lowered metoprolol to 50mg bid. Her weight is down 3 lbs or so, likely this is low end of her ideal wieght (with winter boots on, she is 252 so her ideal is 252-534 lbs- if loses more weight, consider holding PM dose for 1-2 days  - continue losartan 25mg daily  4. Electrolytes/ acid-base- within normal  5. Bone- normal PTH (49) and normal calcium and phosphorus  6. Muscle cramps- charley horses in last couple months, will check K and Mag, if normal, ? Due to volume depletion, or CKD.    RTC 6 months  Assessment and plan was discussed with patient and she voiced her understanding and agreement.    Reason for Visit:  Mara Colindres is a 83 year old female with CKD from diabetes and cardiac dysfunction, who presents for followup    HPI:  She is a pleasant female with history of diabetes x 10 years with mild neuropathy, no  retinopathy,moderate pulmonary hypertension, diastolic heart failure, hypertension, atrial fibrillation, diabetes and chronic kidney disease. She has recently been following for ongoing management of pulmonary arterial hypertension and congestive heart failure. with CKD with SCr 1.2-1.5 mg/dL back to 2006 (eGFR 35-45 ml/min). In 2004 she had a right hip joint replacement with chronic infection and was on antibiotics for 10 years, and took herself off a few years ago.   She also has history of + ABILIO (2.2) in 2014 and Raynaud's.  She has dyspnea on exertion even with showering and other simple tasks. She lives with her son who moved here from [place] at time of her 's death. She has a grandson going to [employer] for oil business.     She was on metoprolol 100mg bid but felt quite tired thus she cut this down to 50mg bid, and losartan is 25mg  She has not had orthopnea recently, but if she skips the furosemide in afternoon, she notices she is more short of breath- she skips it when she goes out but takes it when she gets home.  She has not been checking bPs as her cuff is a wrist cuff  Her weight is 252 lbs, but has been having charley horses almost nightly for last couple months.    ROS:   A comprehensive review of systems was obtained and negative, except as noted in the HPI or PMH.    Active Medical Problems:  Patient Active Problem List   Diagnosis     Morbid obesity (H)     Hypertension goal BP (blood pressure) < 140/90     DVT (deep venous thrombosis) (H)     MRSA (methicillin resistant Staphylococcus aureus)     Chronic diarrhea     HYPERLIPIDEMIA LDL GOAL <100     Chronic anticoagulation     CKD (chronic kidney disease) stage 3, GFR 30-59 ml/min     Septic hip (H)     Lymphedema     Tubular adenoma of colon     Abdominal wall hematoma     Advanced directives, counseling/discussion     Umbilical hernia     History of [place] fever     Moderate persistent asthma     Pseudophakia, ou      Hypovitaminosis D     Bilateral leg pain     Positive YAMINI     Raynaud phenomenon     Pulmonary HTN     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     Scotoma involving central area in visual field, right     Failed total hip arthroplasty, sequela     Major depressive disorder, recurrent episode, mild (H)     Chronic diastolic congestive heart failure (H)     Long-term (current) use of anticoagulants [Z79.01]     Persistent atrial fibrillation (H)     Diabetic polyneuropathy associated with diabetes mellitus due to underlying condition (H)     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, without long-term current use of insulin (H)     Fatigue, unspecified type     Senile osteoporosis     PMH:   Medical record was reviewed and PMH was discussed with patient and noted below.  Past Medical History:   Diagnosis Date     A-fib (H)      Bilateral leg edema     fairly severe     Cataract      CHF (congestive heart failure) (H)      Chronic diarrhea     neg colonoscopy abt one year ago, even to the point of having fecal accidents     Chronic venous insufficiency      CKD (chronic kidney disease) stage 3, GFR 30-59 ml/min      Diabetic retinopathy (H)      DVT (deep venous thrombosis) (H)      GERD (gastroesophageal reflux disease)      Hyperlipidemia LDL goal <100 10/31/2010     Hypertension goal BP (blood pressure) < 140/90      Long-term (current) use of anticoagulants      Moderate persistent asthma 8/15/2012     MRSA (methicillin resistant Staphylococcus aureus)     postop hip     Obesity      S/P colonoscopy 2008 ( ?)     Septic hip (H)     sees an ID specialist Dr ANNEL DE LA CRUZ She remains on long-term suppressive antibiotic therapy using Minocin 50 mg twice daily     Stasis dermatitis      Type 2 diabetes, HbA1C goal < 8% (H)      Urinary incontinence, mixed      Vitiligo      PSH:   Past Surgical History:   Procedure Laterality Date     APPENDECTOMY       COLONOSCOPY  2008      COLONOSCOPY  8/20/2012    Procedure: COLONOSCOPY;  COLONOSCOPY, TUBULAR ADENOMA OF COLON, CHRONIC DIARRHEA;  Surgeon: Yobany Hinojosa MD;  Location: [facility] OR     HC REMOVAL GALLBLADDER       JOINT REPLACEMENT, HIP RT/LT  2004    right     JOINT REPLACEMTN, KNEE RT/LT  2000    bilateral     PHACOEMULSIFICATION WITH STANDARD INTRAOCULAR LENS IMPLANT  8/2013    left eye; right eye     ROTATOR CUFF REPAIR RT/LT  1/93    right     TONSILLECTOMY         Family Hx:   Family History   Problem Relation Age of Onset     DIABETES Mother      CANCER Mother      Hypertension Mother      Glaucoma Mother      Hypertension Father      CANCER Son      thyroid cancer     Neurologic Disorder Sister      Alzheimer Disease Sister      Personal Hx:   Social History     Social History     Marital status:      Spouse name: (Bill)     Number of children: 3     Years of education: N/A     Occupational History      Retired     Social History Main Topics     Smoking status: Never Smoker     Smokeless tobacco: Never Used     Alcohol use No     Drug use: No     Sexual activity: No     Other Topics Concern     Parent/Sibling W/ Cabg, Mi Or Angioplasty Before 65f 55m? No     Social History Narrative    [employer] in [place]       Allergies:  Allergies   Allergen Reactions     Atenolol Other (See Comments)     Arms became limp, almost stroke like symptoms per patient.   Tolerates metoprolol fine     Metformin      Side effects / gastrointestinal symptoms        Medications:  Prior to Admission medications    Medication Sig Start Date End Date Taking? Authorizing Provider   metoprolol (LOPRESSOR) 100 MG tablet Take 1 tablet (100 mg) by mouth 2 times daily NOTE TABLET STRENGTH CHANGE-1 TABLET TWICE DAILY 1/5/17   Akbar Thompson MD   losartan (COZAAR) 50 MG tablet Take 1 tablet (50 mg) by mouth 2 times daily .  Please note change in tablet strength. 1/5/17   Akbar Thompson MD   glipiZIDE (GLIPIZIDE XL) 10 MG 24 hr tablet Take 1  tablet (10 mg) by mouth daily 12/19/16   Steven Abrams MD   warfarin (COUMADIN) 5 MG tablet TAKE 1/2 TABLET ON MONDAY AND FRIDAY AND 1 TABLET BY MOUTH ON ALL OTHER DAYS 12/12/16   Ilene Davis APRN CNP   guaiFENesin-codeine (ROBITUSSIN AC) 100-10 MG/5ML SOLN solution Take 10 mLs by mouth every 4 hours as needed for cough 12/8/16   Steven Abrams MD   PARoxetine (PAXIL) 20 MG tablet TAKE 1 TABLET BY MOUTH EVERY DAY 11/8/16   Steven Abrams MD   diphenoxylate-atropine (LOMOTIL) 2.5-0.025 MG per tablet TAKE 1 TABLET BY MOUTH FOUR TIMES DAILY 11/2/16   Steven Abrams MD   furosemide (LASIX) 40 MG tablet Take 1 tablet (40 mg) by mouth 2 times daily 8/26/16   Akbar Thompson MD   losartan (COZAAR) 25 MG tablet TAKE 1 TABLET BY MOUTH TWICE DAILY 7/13/16   Steven Abrams MD   potassium chloride (K-DUR) 10 MEQ tablet Take 2 tablets daily 6/28/16   Sabrina Hicks APRN CNP   albuterol (2.5 MG/3ML) 0.083% nebulizer solution Take 3 mLs (2.5 mg) by nebulization every 4 hours as needed for shortness of breath / dyspnea 3/4/16   Steven Abrams MD   order for DME Reasoning for requesting the hospital bed: Patient had multiple complications following hip replacement(blood clot/infection/removal of hip replacement) uses walker to ambulate now,she has a very hard time trying to lift her right foot. Hard to get herself into bed because of the height. She lives alone.  She uses devices at home to get herself into bed( step stool/extension to help lift her leg).Would also like the side rails 1/13/16   Steven Abrams MD   ACCU-CHEK PARVEZ PLUS test strip TEST BLOOD GLUCOSE AS DIRECTED EVERY DAY 9/2/15   Steven Abrams MD   mupirocin (BACTROBAN) 2 % ointment Use 3 times a day to affected area for one week 6/10/15   Susie Villatoro MD   albuterol (PROAIR HFA, PROVENTIL HFA, VENTOLIN HFA) 108 (90 BASE) MCG/ACT inhaler Inhale 2 puffs into the lungs every 6 hours as needed for shortness of breath /  "dyspnea 4/15/15   Ilene Davis APRN CNP   budesonide-formoterol (SYMBICORT) 80-4.5 MCG/ACT inhaler Inhale 2 puffs into the lungs 2 times daily 4/15/15   Ilene Davis APRN CNP   acetaminophen (TYLENOL) 500 MG tablet Take 2 tablets by mouth 3 times daily as needed.    Reported, Patient   vitamin  B complex with vitamin C (VITAMIN  B COMPLEX) TABS Take 1 tablet by mouth daily.    Reported, Patient   Resveratrol 100 MG CAPS Take 1 capsule by mouth daily     Reported, Patient   STATIN NOT PRESCRIBED, INTENTIONAL, 1 each continuous prn. Statin not prescribed intentionally due to Other: patient has normal LDL within recommended guidelines on no medications 3/12/13   Steven Abrams MD   ASPIRIN NOT PRESCRIBED, INTENTIONAL, Antiplatelet medication not prescribed intentionally due to Current anticoagulant therapy (warfarin/enoxaparin) 10/8/12   Steven Abrams MD   Calcium Carbonate-Vitamin D (CALCIUM + D PO) Take 1 tablet by mouth 2 times daily.    Unknown, Entered By History   ORDER FOR DME nebuliser 5/23/12   Chip Vernon MD   OMEPRAZOLE CPCR 20 MG OR 1 CAPSULE DAILY    Reported, Patient     Vitals:  /70  Pulse 82  Ht 1.6 m (5' 3\")  Wt 114.3 kg (252 lb)  SpO2 94%  BMI 44.64 kg/m2,     Exam:  GENERAL APPEARANCE: alert and no distress  HENT: mouth without ulcers or lesions, +jvd  LYMPHATICS: no cervical or supraclavicular nodes  RESP: lungs clear to auscultation - no rales, rhonchi or wheezes  CV: regular rhythm, normal rate, no rub, no murmur  EDEMA: + LE edema bilaterally, right > L  ABDOMEN: soft, nondistended, nontender, bowel sounds normal  MS: extremities normal - no gross deformities noted, no evidence of inflammation in joints, no muscle tenderness  SKIN: no rash      Results:  No results found for this or any previous visit (from the past 336 hour(s)).  Component      Latest Ref Rng & Units 6/3/2016 10/27/2016 12/19/2016   Sodium      133 - 144 mmol/L 135 138 136   Potassium     "  3.4 - 5.3 mmol/L 4.3 4.0 4.1   Chloride      94 - 109 mmol/L 102 103 100   Carbon Dioxide      20 - 32 mmol/L 26 23 23   Anion Gap      3 - 14 mmol/L 7 12 13   Glucose      70 - 99 mg/dL 168 (H) 196 (H) 361 (H)   Urea Nitrogen      7 - 30 mg/dL 32 (H) 27 35 (H)   Creatinine      0.52 - 1.04 mg/dL 1.55 (H) 1.51 (H) 1.89 (H)   GFR Estimate      >60 mL/min/1.7m2 32 (L) 33 (L) 25 (L)   GFR Estimate If Black      >60 mL/min/1.7m2 39 (L) 40 (L) 31 (L)   Calcium      8.5 - 10.1 mg/dL 9.3 8.8 9.6   WBC      4.0 - 11.0 10e9/L  6.7    RBC Count      3.8 - 5.2 10e12/L  4.15    Hemoglobin      11.7 - 15.7 g/dL  12.6    Hematocrit      35.0 - 47.0 %  39.5    MCV      78 - 100 fl  95    MCH      26.5 - 33.0 pg  30.4    MCHC      31.5 - 36.5 g/dL  31.9    RDW      10.0 - 15.0 %  14.4    Platelet Count      150 - 450 10e9/L  232    N-Terminal Pro Bnp      0 - 450 pg/mL 1895 (H) 2796 (H)    Magnesium      1.6 - 2.3 mg/dL 1.8     Hemoglobin A1C      4.3 - 6.0 %   8.9 (H)   Parathyroid Hormone Intact      12 - 72 pg/mL   49   Alkaline Phosphatase      40 - 150 U/L   91   Phosphorus      2.5 - 4.5 mg/dL   2.9   Vitamin D Deficiency screening      20 - 75 ug/L   35   ALT      0 - 50 U/L   19

## 2018-01-08 NOTE — LETTER
1/8/2018      RE: Mara Colindres  [address] [facility] [address]       Assessment and Plan:   83 year old female with history of CKD with SCr 1.2-1.5mg/dL, pulmonary hypertension, atrial fibrillation, DVT, diastolic dysfunction, diabetes x 10 years, complicated right hip replacement in 2004 with chronic infection and DVT, who presents for CKD followup  1. CKD stage 3b/4- her SCr has been 1.2-1.5 but was up to 1.9mg/dL winter 2016 but back down in February 2017. She has complex cardiorenal physiology with Afib, diastolic dysfunction and pulmonary hypertension. She has +ABILIO and Raynauds, invoking possibility of chronic interstitial process or autoimmune disease.   She has diabetes with neuropathy that could also explain some CKD.    - she is non proteinuric  -  F/u renal function today given muscle cramps- ?volume depletion or electrolytes (K or Mag)  -will have her monitor BP and other symptoms and call if worsens    2. Anemia- borderline anemia, mild   3. Hypertension- well controlled, low normal BP, on ACE, BB and diuretic-    - BP today 100 systolic and she is mildly symptomatic  - lowered losartan to 25mg bid last visit and she lowered it to daily. She also lowered metoprolol to 50mg bid. Her weight is down 3 lbs or so, likely this is low end of her ideal wieght (with winter boots on, she is 252 so her ideal is 252-534 lbs- if loses more weight, consider holding PM dose for 1-2 days  - continue losartan 25mg daily  4. Electrolytes/ acid-base- within normal  5. Bone- normal PTH (49) and normal calcium and phosphorus  6. Muscle cramps- charley horses in last couple months, will check K and Mag, if normal, ? Due to volume depletion, or CKD.    RTC 6 months  Assessment and plan was discussed with patient and she voiced her understanding and agreement.    Reason for Visit:  Mara Colindres is a 83 year old female with CKD from diabetes and cardiac dysfunction, who presents for followup    HPI:  She is  a pleasant female with history of diabetes x 10 years with mild neuropathy, no retinopathy,moderate pulmonary hypertension, diastolic heart failure, hypertension, atrial fibrillation, diabetes and chronic kidney disease. She has recently been following for ongoing management of pulmonary arterial hypertension and congestive heart failure. with CKD with SCr 1.2-1.5 mg/dL back to 2006 (eGFR 35-45 ml/min). In 2004 she had a right hip joint replacement with chronic infection and was on antibiotics for 10 years, and took herself off a few years ago.   She also has history of + ABILIO (2.2) in 2014 and Raynaud's.  She has dyspnea on exertion even with showering and other simple tasks. She lives with her son who moved here from [place] at time of her 's death. She has a grandson going to [employer] for oil business.     She was on metoprolol 100mg bid but felt quite tired thus she cut this down to 50mg bid, and losartan is 25mg  She has not had orthopnea recently, but if she skips the furosemide in afternoon, she notices she is more short of breath- she skips it when she goes out but takes it when she gets home.  She has not been checking bPs as her cuff is a wrist cuff  Her weight is 252 lbs, but has been having charley horses almost nightly for last couple months.    ROS:   A comprehensive review of systems was obtained and negative, except as noted in the HPI or PMH.    Active Medical Problems:  Patient Active Problem List   Diagnosis     Morbid obesity (H)     Hypertension goal BP (blood pressure) < 140/90     DVT (deep venous thrombosis) (H)     MRSA (methicillin resistant Staphylococcus aureus)     Chronic diarrhea     HYPERLIPIDEMIA LDL GOAL <100     Chronic anticoagulation     CKD (chronic kidney disease) stage 3, GFR 30-59 ml/min     Septic hip (H)     Lymphedema     Tubular adenoma of colon     Abdominal wall hematoma     Advanced directives, counseling/discussion     Umbilical hernia     History of [place] fever     Moderate persistent asthma     Pseudophakia, ou     Hypovitaminosis D     Bilateral leg pain     Positive YAMINI     Raynaud phenomenon     Pulmonary HTN     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     Scotoma involving central area in visual field, right     Failed total hip arthroplasty, sequela     Major depressive disorder, recurrent episode, mild (H)     Chronic diastolic congestive heart failure (H)     Long-term (current) use of anticoagulants [Z79.01]     Persistent atrial fibrillation (H)     Diabetic polyneuropathy associated with diabetes mellitus due to underlying condition (H)     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, without long-term current use of insulin (H)     Fatigue, unspecified type     Senile osteoporosis     PMH:   Medical record was reviewed and PMH was discussed with patient and noted below.  Past Medical History:   Diagnosis Date     A-fib (H)      Bilateral leg edema     fairly severe     Cataract      CHF (congestive heart failure) (H)      Chronic diarrhea     neg colonoscopy abt one year ago, even to the point of having fecal accidents     Chronic venous insufficiency      CKD (chronic kidney disease) stage 3, GFR 30-59 ml/min      Diabetic retinopathy (H)      DVT (deep venous thrombosis) (H)      GERD (gastroesophageal reflux disease)      Hyperlipidemia LDL goal <100 10/31/2010     Hypertension goal BP (blood pressure) < 140/90      Long-term (current) use of anticoagulants      Moderate persistent asthma 8/15/2012     MRSA (methicillin resistant Staphylococcus aureus)     postop hip     Obesity      S/P colonoscopy 2008 ( ?)     Septic hip (H)     sees an ID specialist Dr ANNEL DE LA CRUZ She remains on long-term suppressive antibiotic therapy using Minocin 50 mg twice daily     Stasis dermatitis      Type 2 diabetes, HbA1C goal < 8% (H)      Urinary incontinence, mixed      Vitiligo      PSH:   Past Surgical History:    Procedure Laterality Date     APPENDECTOMY       COLONOSCOPY  2008     COLONOSCOPY  8/20/2012    Procedure: COLONOSCOPY;  COLONOSCOPY, TUBULAR ADENOMA OF COLON, CHRONIC DIARRHEA;  Surgeon: Yobany Hinojosa MD;  Location: [facility] OR     HC REMOVAL GALLBLADDER       JOINT REPLACEMENT, HIP RT/LT  2004    right     JOINT REPLACEMTN, KNEE RT/LT  2000    bilateral     PHACOEMULSIFICATION WITH STANDARD INTRAOCULAR LENS IMPLANT  8/2013    left eye; right eye     ROTATOR CUFF REPAIR RT/LT  1/93    right     TONSILLECTOMY         Family Hx:   Family History   Problem Relation Age of Onset     DIABETES Mother      CANCER Mother      Hypertension Mother      Glaucoma Mother      Hypertension Father      CANCER Son      thyroid cancer     Neurologic Disorder Sister      Alzheimer Disease Sister      Personal Hx:   Social History     Social History     Marital status:      Spouse name: (Bill)     Number of children: 3     Years of education: N/A     Occupational History      Retired     Social History Main Topics     Smoking status: Never Smoker     Smokeless tobacco: Never Used     Alcohol use No     Drug use: No     Sexual activity: No     Other Topics Concern     Parent/Sibling W/ Cabg, Mi Or Angioplasty Before 65f 55m? No     Social History Narrative    [employer] in [place]       Allergies:  Allergies   Allergen Reactions     Atenolol Other (See Comments)     Arms became limp, almost stroke like symptoms per patient.   Tolerates metoprolol fine     Metformin      Side effects / gastrointestinal symptoms        Medications:  Prior to Admission medications    Medication Sig Start Date End Date Taking? Authorizing Provider   metoprolol (LOPRESSOR) 100 MG tablet Take 1 tablet (100 mg) by mouth 2 times daily NOTE TABLET STRENGTH CHANGE-1 TABLET TWICE DAILY 1/5/17   Akbar Thompson MD   losartan (COZAAR) 50 MG tablet Take 1 tablet (50 mg) by mouth 2 times daily .  Please note change in tablet strength. 1/5/17    Akbar Thompson MD   glipiZIDE (GLIPIZIDE XL) 10 MG 24 hr tablet Take 1 tablet (10 mg) by mouth daily 12/19/16   Steven Abrams MD   warfarin (COUMADIN) 5 MG tablet TAKE 1/2 TABLET ON MONDAY AND FRIDAY AND 1 TABLET BY MOUTH ON ALL OTHER DAYS 12/12/16   Ilene Davis APRN CNP   guaiFENesin-codeine (ROBITUSSIN AC) 100-10 MG/5ML SOLN solution Take 10 mLs by mouth every 4 hours as needed for cough 12/8/16   Steven Abrams MD   PARoxetine (PAXIL) 20 MG tablet TAKE 1 TABLET BY MOUTH EVERY DAY 11/8/16   Steven Abrams MD   diphenoxylate-atropine (LOMOTIL) 2.5-0.025 MG per tablet TAKE 1 TABLET BY MOUTH FOUR TIMES DAILY 11/2/16   Steven Abrams MD   furosemide (LASIX) 40 MG tablet Take 1 tablet (40 mg) by mouth 2 times daily 8/26/16   Akbar Thompson MD   losartan (COZAAR) 25 MG tablet TAKE 1 TABLET BY MOUTH TWICE DAILY 7/13/16   Steven Abrams MD   potassium chloride (K-DUR) 10 MEQ tablet Take 2 tablets daily 6/28/16   Sabrina Hicks APRN CNP   albuterol (2.5 MG/3ML) 0.083% nebulizer solution Take 3 mLs (2.5 mg) by nebulization every 4 hours as needed for shortness of breath / dyspnea 3/4/16   Steven Abrams MD   order for DME Reasoning for requesting the hospital bed: Patient had multiple complications following hip replacement(blood clot/infection/removal of hip replacement) uses walker to ambulate now,she has a very hard time trying to lift her right foot. Hard to get herself into bed because of the height. She lives alone.  She uses devices at home to get herself into bed( step stool/extension to help lift her leg).Would also like the side rails 1/13/16   Steven Abrams MD   ACCU-CHEK PARVEZ PLUS test strip TEST BLOOD GLUCOSE AS DIRECTED EVERY DAY 9/2/15   Steven Abrams MD   mupirocin (BACTROBAN) 2 % ointment Use 3 times a day to affected area for one week 6/10/15   Susie Villatoro MD   albuterol (PROAIR HFA, PROVENTIL HFA, VENTOLIN HFA) 108 (90 BASE) MCG/ACT inhaler  "Inhale 2 puffs into the lungs every 6 hours as needed for shortness of breath / dyspnea 4/15/15   Ilene Davis APRN CNP   budesonide-formoterol (SYMBICORT) 80-4.5 MCG/ACT inhaler Inhale 2 puffs into the lungs 2 times daily 4/15/15   Ilene Davis APRN CNP   acetaminophen (TYLENOL) 500 MG tablet Take 2 tablets by mouth 3 times daily as needed.    Reported, Patient   vitamin  B complex with vitamin C (VITAMIN  B COMPLEX) TABS Take 1 tablet by mouth daily.    Reported, Patient   Resveratrol 100 MG CAPS Take 1 capsule by mouth daily     Reported, Patient   STATIN NOT PRESCRIBED, INTENTIONAL, 1 each continuous prn. Statin not prescribed intentionally due to Other: patient has normal LDL within recommended guidelines on no medications 3/12/13   Steven Abrams MD   ASPIRIN NOT PRESCRIBED, INTENTIONAL, Antiplatelet medication not prescribed intentionally due to Current anticoagulant therapy (warfarin/enoxaparin) 10/8/12   Steven Abrams MD   Calcium Carbonate-Vitamin D (CALCIUM + D PO) Take 1 tablet by mouth 2 times daily.    Unknown, Entered By History   ORDER FOR DME nebuliser 5/23/12   Chip Vernon MD   OMEPRAZOLE CPCR 20 MG OR 1 CAPSULE DAILY    Reported, Patient     Vitals:  /70  Pulse 82  Ht 1.6 m (5' 3\")  Wt 114.3 kg (252 lb)  SpO2 94%  BMI 44.64 kg/m2,     Exam:  GENERAL APPEARANCE: alert and no distress  HENT: mouth without ulcers or lesions, +jvd  LYMPHATICS: no cervical or supraclavicular nodes  RESP: lungs clear to auscultation - no rales, rhonchi or wheezes  CV: regular rhythm, normal rate, no rub, no murmur  EDEMA: + LE edema bilaterally, right > L  ABDOMEN: soft, nondistended, nontender, bowel sounds normal  MS: extremities normal - no gross deformities noted, no evidence of inflammation in joints, no muscle tenderness  SKIN: no rash      Results:  No results found for this or any previous visit (from the past 336 hour(s)).  Component      Latest Ref Rng & Units 6/3/2016 " 10/27/2016 12/19/2016   Sodium      133 - 144 mmol/L 135 138 136   Potassium      3.4 - 5.3 mmol/L 4.3 4.0 4.1   Chloride      94 - 109 mmol/L 102 103 100   Carbon Dioxide      20 - 32 mmol/L 26 23 23   Anion Gap      3 - 14 mmol/L 7 12 13   Glucose      70 - 99 mg/dL 168 (H) 196 (H) 361 (H)   Urea Nitrogen      7 - 30 mg/dL 32 (H) 27 35 (H)   Creatinine      0.52 - 1.04 mg/dL 1.55 (H) 1.51 (H) 1.89 (H)   GFR Estimate      >60 mL/min/1.7m2 32 (L) 33 (L) 25 (L)   GFR Estimate If Black      >60 mL/min/1.7m2 39 (L) 40 (L) 31 (L)   Calcium      8.5 - 10.1 mg/dL 9.3 8.8 9.6   WBC      4.0 - 11.0 10e9/L  6.7    RBC Count      3.8 - 5.2 10e12/L  4.15    Hemoglobin      11.7 - 15.7 g/dL  12.6    Hematocrit      35.0 - 47.0 %  39.5    MCV      78 - 100 fl  95    MCH      26.5 - 33.0 pg  30.4    MCHC      31.5 - 36.5 g/dL  31.9    RDW      10.0 - 15.0 %  14.4    Platelet Count      150 - 450 10e9/L  232    N-Terminal Pro Bnp      0 - 450 pg/mL 1895 (H) 2796 (H)    Magnesium      1.6 - 2.3 mg/dL 1.8     Hemoglobin A1C      4.3 - 6.0 %   8.9 (H)   Parathyroid Hormone Intact      12 - 72 pg/mL   49   Alkaline Phosphatase      40 - 150 U/L   91   Phosphorus      2.5 - 4.5 mg/dL   2.9   Vitamin D Deficiency screening      20 - 75 ug/L   35   ALT      0 - 50 U/L   19       Emilie Florencio Ren MD

## 2018-01-08 NOTE — NURSING NOTE
"Chief Complaint   Patient presents with     Follow Up For     Kidney Problem     ckd      Diabetes       Initial /70  Pulse 82  Ht 5' 3\" (1.6 m)  Wt 252 lb (114.3 kg)  SpO2 94%  BMI 44.64 kg/m2 Estimated body mass index is 44.64 kg/(m^2) as calculated from the following:    Height as of this encounter: 5' 3\" (1.6 m).    Weight as of this encounter: 252 lb (114.3 kg).  Medication Reconciliation: complete  "

## 2018-01-08 NOTE — MR AVS SNAPSHOT
After Visit Summary   2018    Mara Colindres    MRN: [id number]           Patient Information     Date Of Birth          1934        Visit Information        Provider Department      2018 2:30 PM Emilie Ren MD [facility]        Today's Diagnoses     CKD (chronic kidney disease) stage 3, GFR 30-59 ml/min    -  1    Congestive heart failure, unspecified congestive heart failure chronicity, unspecified congestive heart failure type (H)        Pulmonary HTN        Diabetic polyneuropathy associated with diabetes mellitus due to underlying condition (H)        Lymphedema        Raynaud's phenomenon without gangrene           Follow-ups after your visit        Follow-up notes from your care team     Return in about 6 months (around 2018).      Who to contact     Please call your clinic at [phone] to:    Ask questions about your health    Make or cancel appointments    Discuss your medicines    Learn about your test results    Speak to your doctor   If you have compliments or concerns about an experience at your clinic, or if you wish to file a complaint, please contact [facility] Patient Relations at [phone] or email us at [email or web address]         Additional Information About Your Visit        MyChart Information     [employer] is an electronic gateway that provides easy, online access to your medical records. With [employer], you can request a clinic appointment, read your test results, renew a prescription or communicate with your care team.     To sign up for [employer] visit the website at [email or web address]   You will be asked to enter the access code listed below, as well as some personal information. Please follow the directions to create your username and password.     Your access code is: [email or web address]  Expires: 2018  3:07 PM     Your access code will  in 90 days. If you need help or a new code,  "please contact your [facility] or call [phone] for assistance.        Care EveryWhere ID     This is your Care EveryWhere ID. This could be used by other organizations to access your [facility] medical records  [id number]        Your Vitals Were     Pulse Height Pulse Oximetry BMI (Body Mass Index)          82 1.6 m (5' 3\") 94% 44.64 kg/m2         Blood Pressure from Last 3 Encounters:   01/08/18 100/70   11/07/17 108/70   07/27/17 110/70    Weight from Last 3 Encounters:   01/08/18 114.3 kg (252 lb)   11/07/17 113.4 kg (250 lb)   07/27/17 115.2 kg (254 lb)              Today, you had the following     No orders found for display         Today's Medication Changes          These changes are accurate as of: 1/8/18  3:25 PM.  If you have any questions, ask your nurse or doctor.               These medicines have changed or have updated prescriptions.        Dose/Directions    metoprolol 100 MG tablet   Commonly known as:  LOPRESSOR   This may have changed:    - how much to take  - additional instructions   Used for:  Congestive heart failure, unspecified congestive heart failure chronicity, unspecified congestive heart failure type (H), Pulmonary HTN   Changed by:  Emilie Ren MD        Dose:  50 mg   Take 0.5 tablets (50 mg) by mouth 2 times daily Patient lowered to 50mg twice a day as she was tired and low blood pressure   Quantity:  180 tablet   Refills:  1            Where to get your medicines      These medications were sent to [id number] - [place], [place] - [address] AT [facility] [address]  [address], [place] [address]     Phone:  [phone]     metoprolol 100 MG tablet                Primary Care Provider Office Phone # Fax #    Steven Abrams [phone] [address] [facility] [place] [address]        Equal Access to Services     MARCIA PAREDES AH: Cerdic Cline, " wamarileeda natividadsocrates, qaybta kajohn jones, scott daviesaan ah. So [facility] [phone].    ATENCIÓN: Si jacob sepulveda, tiene a mckeon disposición servicios gratuitos de asistencia lingüística. Carin al [phone].    We comply with applicable federal civil rights laws and [place] laws. We do not discriminate on the basis of race, color, national origin, age, disability, sex, sexual orientation, or gender identity.            Thank you!     Thank you for choosing [facility]  for your care. Our goal is always to provide you with excellent care. Hearing back from our patients is one way we can continue to improve our services. Please take a few minutes to complete the written survey that you may receive in the mail after your visit with us. Thank you!             Your Updated Medication List - Protect others around you: Learn how to safely use, store and throw away your medicines at www.disposemymeds.org.          This list is accurate as of: 1/8/18  3:25 PM.  Always use your most recent med list.                   Brand Name Dispense Instructions for use Diagnosis    ACCU-CHEK PARVEZ PLUS test strip   Generic drug:  blood glucose monitoring     350 strip    USE TO TEST FOUR TIMES DAILY OR AS DIRECTED    Type 2 diabetes, HbA1C goal < 8% (H)       * albuterol 108 (90 BASE) MCG/ACT Inhaler    PROAIR HFA/PROVENTIL HFA/VENTOLIN HFA    3 Inhaler    Inhale 2 puffs into the lungs every 6 hours as needed for shortness of breath / dyspnea    Moderate persistent asthma with exacerbation       * albuterol (2.5 MG/3ML) 0.083% neb solution     30 vial    Take 3 mLs (2.5 mg) by nebulization every 4 hours as needed for shortness of breath / dyspnea    Moderate persistent asthma with exacerbation       * ASPIRIN NOT PRESCRIBED    INTENTIONAL    0 each    Antiplatelet medication not prescribed intentionally due to Current anticoagulant therapy (warfarin/enoxaparin)    Type 2 diabetes, HbA1C goal < 8% (H)        budesonide-formoterol 80-4.5 MCG/ACT Inhaler    SYMBICORT    10.2 g    INHALE 2 PUFFS INTO THE LUNGS TWICE DAILY    Moderate persistent asthma with exacerbation       CALCIUM + D PO      Take 1 tablet by mouth 2 times daily.        diphenoxylate-atropine 2.5-0.025 MG per tablet    LOMOTIL    40 tablet    TAKE 1 TABLET BY MOUTH FOUR TIMES DAILY    Chronic diarrhea       dulaglutide 1.5 MG/0.5ML pen    TRULICITY    0.5 mL    Inject 1.5 mg Subcutaneous every 7 days    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, without long-term current use of insulin (H)       furosemide 40 MG tablet    LASIX    180 tablet    Take 1 tablet (40 mg) by mouth 2 times daily    Chronic kidney disease, stage III (moderate)       guaiFENesin-codeine 100-10 MG/5ML Soln solution    ROBITUSSIN AC    120 mL    Take 10 mLs by mouth every 4 hours as needed for cough    Cough, Moderate persistent asthma with exacerbation       HYDROcodone-acetaminophen 5-325 MG per tablet    NORCO    90 tablet    TK 1 T PO  QID AS NEEDED    Primary osteoarthritis involving multiple joints       losartan 25 MG tablet    COZAAR    180 tablet    Take 1 tablet (25 mg) by mouth 2 times daily    Pulmonary HTN       metoprolol 100 MG tablet    LOPRESSOR    180 tablet    Take 0.5 tablets (50 mg) by mouth 2 times daily Patient lowered to 50mg twice a day as she was tired and low blood pressure    Congestive heart failure, unspecified congestive heart failure chronicity, unspecified congestive heart failure type (H), Pulmonary HTN       OMEPRAZOLE CPCR 20 MG OR      1 CAPSULE DAILY        * order for DME     1 Device    nebuliser    Acute bronchitis treated with antibiotics in the past 60 days       order for DME     1 each    Reasoning for requesting the hospital bed: Patient had multiple complications following hip replacement(blood clot/infection/removal of hip replacement) uses walker to ambulate now,she has a very hard time trying to lift  her right foot. Hard to get herself into bed because of the height. She lives alone.  She uses devices at home to get herself into bed( step stool/extension to help lift her leg).Would also like the side rails    Failed total hip arthroplasty, sequela, Septic hip (H), Lymphedema, Bilateral leg pain       PARoxetine 20 MG tablet    PAXIL    90 tablet    Take 1 tablet (20 mg) by mouth daily    Major depressive disorder, recurrent episode, mild (H)       potassium chloride 10 MEQ tablet    K-TAB,KLOR-CON    180 tablet    Take 2 tablets daily    Diuretic-induced hypokalemia       Resveratrol 100 MG Caps      Take 1 capsule by mouth daily        * STATIN NOT PRESCRIBED (INTENTIONAL)     0 each    1 each continuous prn. Statin not prescribed intentionally due to Other: patient has normal LDL within recommended guidelines on no medications    Hyperlipidemia LDL goal <100       TYLENOL 500 MG tablet   Generic drug:  acetaminophen      Take 2 tablets by mouth 3 times daily as needed.        warfarin 5 MG tablet    COUMADIN    90 tablet    TAKE 1/2 TABLET BY MOUTH ON MONDAY, WEDNESDAY AND FRIDAY AND TAKE 1 TABLET BY MOUTH ALL OTHER DAYS    Long-term (current) use of anticoagulants, Persistent atrial fibrillation (H)       * Notice:  This list has 5 medication(s) that are the same as other medications prescribed for you. Read the directions carefully, and ask your doctor or other care provider to review them with you.

## 2018-01-09 DIAGNOSIS — I27.20 PULMONARY HTN (H): ICD-10-CM

## 2018-01-10 RX ORDER — LOSARTAN POTASSIUM 25 MG/1
25 TABLET ORAL 2 TIMES DAILY
Qty: 180 TABLET | Refills: 1 | Status: SHIPPED | OUTPATIENT
Start: 2018-01-10 | End: 2018-05-21

## 2018-01-17 ENCOUNTER — TELEPHONE (OUTPATIENT)
Dept: NURSING | Facility: CLINIC | Age: 83
End: 2018-01-17

## 2018-01-17 NOTE — TELEPHONE ENCOUNTER
Message left for patient to call the INR Clinic # at [phone]. Patient overdue for INR appointment.     Celeste Ruiz RN - BC

## 2018-01-21 DIAGNOSIS — F33.0 MAJOR DEPRESSIVE DISORDER, RECURRENT EPISODE, MILD (H): ICD-10-CM

## 2018-01-22 ENCOUNTER — TELEPHONE (OUTPATIENT)
Dept: NEPHROLOGY | Facility: CLINIC | Age: 83
End: 2018-01-22

## 2018-01-22 DIAGNOSIS — N18.30 CHRONIC KIDNEY DISEASE, STAGE III (MODERATE) (H): ICD-10-CM

## 2018-01-22 DIAGNOSIS — T50.2X5A DIURETIC-INDUCED HYPOKALEMIA: ICD-10-CM

## 2018-01-22 DIAGNOSIS — E87.6 DIURETIC-INDUCED HYPOKALEMIA: ICD-10-CM

## 2018-01-22 RX ORDER — FUROSEMIDE 40 MG
40 TABLET ORAL DAILY
Qty: 180 TABLET | Refills: 3 | COMMUNITY
Start: 2018-01-22 | End: 2020-01-30

## 2018-01-22 RX ORDER — POTASSIUM CHLORIDE 750 MG/1
5 TABLET, EXTENDED RELEASE ORAL DAILY
Qty: 180 TABLET | Refills: 3 | COMMUNITY
Start: 2018-01-22 | End: 2019-01-28

## 2018-01-22 NOTE — TELEPHONE ENCOUNTER
Called patient to review results and recommendations from Dr. Ren:    Kidney function is a little worse, creatinine at 2, estimated GFR 28.  I suspect due to your lower blood pressure, so I would like you to take furosemide just once a day , monitor weight, and if you gain more than 1-2 pounds, let us know. Repeat labs in one month. Also can cut down the potassium pill to one a day    Patient reviewed recommendations to decrease furosemide to 40 mg daily (instead of BID). Her chart says she is taking potassium 10 MEQ BID, but patient says she has only been taking it once a day, so she will decrease it to 5 MEQ daily.     Labs in one month.     Patient advised to call with weight gain of 2 or more pounds or swelling.     Will follow up in one month when repeat labs are done.    Mg Krueger, RN, BAN  Nephrology RN Care Coordinator

## 2018-01-22 NOTE — TELEPHONE ENCOUNTER
MA - please call patient and complete a PHQ-9 then reroute to RN refill pool.    Celeste Ruiz RN - BC

## 2018-01-23 RX ORDER — PAROXETINE 20 MG/1
TABLET, FILM COATED ORAL
Qty: 90 TABLET | Refills: 0 | Status: SHIPPED | OUTPATIENT
Start: 2018-01-23 | End: 2018-04-27

## 2018-01-23 ASSESSMENT — PATIENT HEALTH QUESTIONNAIRE - PHQ9: SUM OF ALL RESPONSES TO PHQ QUESTIONS 1-9: 2

## 2018-01-29 ENCOUNTER — ANTICOAGULATION THERAPY VISIT (OUTPATIENT)
Dept: NURSING | Facility: CLINIC | Age: 83
End: 2018-01-29
Payer: COMMERCIAL

## 2018-01-29 DIAGNOSIS — Z79.01 LONG-TERM (CURRENT) USE OF ANTICOAGULANTS: ICD-10-CM

## 2018-01-29 DIAGNOSIS — I82.409 DVT (DEEP VENOUS THROMBOSIS) (H): ICD-10-CM

## 2018-01-29 LAB — INR POINT OF CARE: 3.4 (ref 0.86–1.14)

## 2018-01-29 PROCEDURE — 99207 ZZC NO CHARGE NURSE ONLY: CPT

## 2018-01-29 PROCEDURE — 36416 COLLJ CAPILLARY BLOOD SPEC: CPT

## 2018-01-29 PROCEDURE — 85610 PROTHROMBIN TIME: CPT | Mod: QW

## 2018-01-29 NOTE — PROGRESS NOTES
ANTICOAGULATION FOLLOW-UP CLINIC VISIT    Patient Name:  Mara Colindres  Date:  1/29/2018  Contact Type:  Face to Face    SUBJECTIVE:     Patient Findings     Positives No Problem Findings, Unexplained INR or factor level change           OBJECTIVE    INR Protime   Date Value Ref Range Status   01/29/2018 3.4 (A) 0.86 - 1.14 Final       ASSESSMENT / PLAN  INR assessment SUPRA    Recheck INR In: 4 WEEKS    INR Location Clinic      Anticoagulation Summary as of 1/29/2018     INR goal 2.0-3.0   Today's INR 3.4!   Maintenance plan 2.5 mg (5 mg x 0.5) on Mon, Wed, Fri; 5 mg (5 mg x 1) all other days   Full instructions 2.5 mg on Mon, Wed, Fri; 5 mg all other days   Weekly total 27.5 mg   No change documented Celeste Ruiz RN   Plan last modified Celeste Ruiz RN (3/23/2017)   Next INR check 2/26/2018   Priority INR   Target end date     Indications   Long-term (current) use of anticoagulants [Z79.01] [Z79.01]  DVT (deep venous thrombosis) (H) [I82.409]         Anticoagulation Episode Summary     INR check location     Preferred lab     Send INR reminders to [facility]    Comments       Anticoagulation Care Providers     Provider Role Specialty Phone number    Steven Abrams MD [facility] Internal Medicine [phone]            See the Encounter Report to view Anticoagulation Flowsheet and Dosing Calendar (Go to Encounters tab in chart review, and find the Anticoagulation Therapy Visit)    Dosage adjustment made based on physician directed care plan.    Celeste Ruiz RN

## 2018-01-29 NOTE — MR AVS SNAPSHOT
Mara CHARLES Jens   1/29/2018 2:00 PM   Anticoagulation Therapy Visit    Description:  83 year old female   Provider:  STALIN ANTI COAG   Department:  [facility]           INR as of 1/29/2018     Today's INR 3.4!      Anticoagulation Summary as of 1/29/2018     INR goal 2.0-3.0   Today's INR 3.4!   Full instructions 2.5 mg on Mon, Wed, Fri; 5 mg all other days   Next INR check 2/26/2018    Indications   Long-term (current) use of anticoagulants [Z79.01] [Z79.01]  DVT (deep venous thrombosis) (H) [I82.409]         Your next Anticoagulation Clinic appointment(s)     Feb 26, 2018  2:00 PM CST   Anticoagulation Visit with STALIN ANTI LAYO   [facility] ([address] [address] [phone]              Contact Numbers     [facility]  Please call [phone] to cancel and/or reschedule your appointment   Please call [phone] with any problems or questions regarding your therapy.        January 2018 Details    Sun Mon Tue Wed Thu Fri Sat      1               2               3               4               5               6                 7               8               9               10               11               12               13                 14               15               16               17               18               19               20                 21               22               23               24               25               26               27                 28               29      2.5 mg   See details      30      5 mg         31      2.5 mg             Date Details   01/29 This INR check               How to take your warfarin dose     To take:  2.5 mg Take 0.5 of a 5 mg tablet.    To take:  5 mg Take 1 of the 5 mg tablets.           February 2018 Details    Sun Mon Tue Wed Thu Fri Sat         1      5 mg         2      2.5 mg         3      5 mg           4      5 mg         5      2.5 mg         6      5 mg         7       2.5 mg         8      5 mg         9      2.5 mg         10      5 mg           11      5 mg         12      2.5 mg         13      5 mg         14      2.5 mg         15      5 mg         16      2.5 mg         17      5 mg           18      5 mg         19      2.5 mg         20      5 mg         21      2.5 mg         22      5 mg         23      2.5 mg         24      5 mg           25      5 mg         26            27               28                   Date Details   No additional details    Date of next INR:  2/26/2018         How to take your warfarin dose     To take:  2.5 mg Take 0.5 of a 5 mg tablet.    To take:  5 mg Take 1 of the 5 mg tablets.

## 2018-02-20 ENCOUNTER — CARE COORDINATION (OUTPATIENT)
Dept: NEPHROLOGY | Facility: CLINIC | Age: 83
End: 2018-02-20

## 2018-02-20 DIAGNOSIS — N18.30 CHRONIC KIDNEY DISEASE, STAGE 3 (MODERATE): Primary | ICD-10-CM

## 2018-02-20 NOTE — PROGRESS NOTES
Reason for Call    Called patient to follow up after we last spoke about a month ago. At that time, she had just been seen by Dr. Ren and her Cr was elevated from previous baseline and BP was low in clinic, so we had patient decrease furosemide to 40 mg once daily instead of BID.    Patient says this is going well for her. Has not noticed increased swelling or weight gain. Does not check BPs at home currently, but just got a cuff, so will start.    Order placed for repeat BMP. She will get this done next week at her local clinic when she gets her INR checked.    Mg Krueger, RN, BAN  Nephrology RN Care Coordinator

## 2018-02-27 ENCOUNTER — ANTICOAGULATION THERAPY VISIT (OUTPATIENT)
Dept: NURSING | Facility: CLINIC | Age: 83
End: 2018-02-27
Payer: COMMERCIAL

## 2018-02-27 DIAGNOSIS — N18.30 CHRONIC KIDNEY DISEASE, STAGE 3 (MODERATE): ICD-10-CM

## 2018-02-27 DIAGNOSIS — I48.19 PERSISTENT ATRIAL FIBRILLATION (H): ICD-10-CM

## 2018-02-27 DIAGNOSIS — I82.409 DVT (DEEP VENOUS THROMBOSIS) (H): ICD-10-CM

## 2018-02-27 DIAGNOSIS — Z79.01 LONG-TERM (CURRENT) USE OF ANTICOAGULANTS: ICD-10-CM

## 2018-02-27 LAB
ANION GAP SERPL CALCULATED.3IONS-SCNC: 13 MMOL/L (ref 3–14)
BUN SERPL-MCNC: 23 MG/DL (ref 7–30)
CALCIUM SERPL-MCNC: 9 MG/DL (ref 8.5–10.1)
CHLORIDE SERPL-SCNC: 100 MMOL/L (ref 94–109)
CO2 SERPL-SCNC: 23 MMOL/L (ref 20–32)
CREAT SERPL-MCNC: 1.56 MG/DL (ref 0.52–1.04)
GFR SERPL CREATININE-BSD FRML MDRD: 32 ML/MIN/1.7M2
GLUCOSE SERPL-MCNC: 222 MG/DL (ref 70–99)
INR POINT OF CARE: 2.7 (ref 0.86–1.14)
POTASSIUM SERPL-SCNC: 3.9 MMOL/L (ref 3.4–5.3)
SODIUM SERPL-SCNC: 136 MMOL/L (ref 133–144)

## 2018-02-27 PROCEDURE — 99207 ZZC NO CHARGE NURSE ONLY: CPT

## 2018-02-27 PROCEDURE — 85610 PROTHROMBIN TIME: CPT | Mod: QW

## 2018-02-27 PROCEDURE — 80048 BASIC METABOLIC PNL TOTAL CA: CPT | Performed by: INTERNAL MEDICINE

## 2018-02-27 PROCEDURE — 36416 COLLJ CAPILLARY BLOOD SPEC: CPT

## 2018-02-27 NOTE — PROGRESS NOTES
ANTICOAGULATION FOLLOW-UP CLINIC VISIT    Patient Name:  Mara Colindres  Date:  2/27/2018  Contact Type:  Face to Face    SUBJECTIVE:     Patient Findings     Positives No Problem Findings           OBJECTIVE    INR Protime   Date Value Ref Range Status   02/27/2018 2.7 (A) 0.86 - 1.14 Final       ASSESSMENT / PLAN  INR assessment THER    Recheck INR In: 5 WEEKS    INR Location Clinic      Anticoagulation Summary as of 2/27/2018     INR goal 2.0-3.0   Today's INR 2.7   Maintenance plan 2.5 mg (5 mg x 0.5) on Mon, Wed, Fri; 5 mg (5 mg x 1) all other days   Full instructions 2.5 mg on Mon, Wed, Fri; 5 mg all other days   Weekly total 27.5 mg   No change documented Usha Zhong RN   Plan last modified Celeste Ruiz RN (3/23/2017)   Next INR check 4/3/2018   Priority INR   Target end date Indefinite    Indications   Long-term (current) use of anticoagulants [Z79.01] [Z79.01]  DVT (deep venous thrombosis) (H) [I82.409]  Persistent atrial fibrillation (H) [I48.1]         Anticoagulation Episode Summary     INR check location     Preferred lab     Send INR reminders to [facility]    Comments       Anticoagulation Care Providers     Provider Role Specialty Phone number    Steven Abrams MD [facility] Internal Medicine [phone]            See the Encounter Report to view Anticoagulation Flowsheet and Dosing Calendar (Go to Encounters tab in chart review, and find the Anticoagulation Therapy Visit)    Dosage adjustment made based on physician directed care plan.    Usha Zhong RN

## 2018-02-27 NOTE — MR AVS SNAPSHOT
Mara CHARLES Jens   2/27/2018 1:30 PM   Anticoagulation Therapy Visit    Description:  83 year old female   Provider:  STALIN ANTI COAG   Department:  [facility] Nurse           INR as of 2/27/2018     Today's INR 2.7      Anticoagulation Summary as of 2/27/2018     INR goal 2.0-3.0   Today's INR 2.7   Full instructions 2.5 mg on Mon, Wed, Fri; 5 mg all other days   Next INR check 4/3/2018    Indications   Long-term (current) use of anticoagulants [Z79.01] [Z79.01]  DVT (deep venous thrombosis) (H) [I82.409]  Persistent atrial fibrillation (H) [I48.1]         Your next Anticoagulation Clinic appointment(s)     Apr 03, 2018  2:00 PM CDT   Anticoagulation Visit with STALIN ANTI LAYO   [facility] ([address] [id number] [phone]              Contact Numbers     [facility]  Please call [phone] to cancel and/or reschedule your appointment   Please call [phone] with any problems or questions regarding your therapy.        February 2018 Details    Sun Mon Tue Wed Thu Fri Sat         1               2               3                 4               5               6               7               8               9               10                 11               12               13               14               15               16               17                 18               19               20               21               22               23               24                 25               26               27      5 mg   See details      28      2.5 mg             Date Details   02/27 This INR check               How to take your warfarin dose     To take:  2.5 mg Take 0.5 of a 5 mg tablet.    To take:  5 mg Take 1 of the 5 mg tablets.           March 2018 Details    Sun Mon Tue Wed Thu Fri Sat         1      5 mg         2      2.5 mg         3      5 mg           4      5 mg         5      2.5 mg         6      5 mg         7      2.5 mg          8      5 mg         9      2.5 mg         10      5 mg           11      5 mg         12      2.5 mg         13      5 mg         14      2.5 mg         15      5 mg         16      2.5 mg         17      5 mg           18      5 mg         19      2.5 mg         20      5 mg         21      2.5 mg         22      5 mg         23      2.5 mg         24      5 mg           25      5 mg         26      2.5 mg         27      5 mg         28      2.5 mg         29      5 mg         30      2.5 mg         31      5 mg          Date Details   No additional details            How to take your warfarin dose     To take:  2.5 mg Take 0.5 of a 5 mg tablet.    To take:  5 mg Take 1 of the 5 mg tablets.           April 2018 Details    Sun Mon Tue Wed Thu Fri Sat     1      5 mg         2      2.5 mg         3            4               5               6               7                 8               9               10               11               12               13               14                 15               16               17               18               19               20               21                 22               23               24               25               26               27               28                 29               30                     Date Details   No additional details    Date of next INR:  4/3/2018         How to take your warfarin dose     To take:  2.5 mg Take 0.5 of a 5 mg tablet.    To take:  5 mg Take 1 of the 5 mg tablets.

## 2018-03-08 ENCOUNTER — OFFICE VISIT (OUTPATIENT)
Dept: OPHTHALMOLOGY | Facility: CLINIC | Age: 83
End: 2018-03-08
Payer: COMMERCIAL

## 2018-03-08 DIAGNOSIS — H52.4 PRESBYOPIA: ICD-10-CM

## 2018-03-08 DIAGNOSIS — Z96.1 PSEUDOPHAKIA: ICD-10-CM

## 2018-03-08 DIAGNOSIS — H53.411: ICD-10-CM

## 2018-03-08 DIAGNOSIS — H43.812 POSTERIOR VITREOUS DETACHMENT OF LEFT EYE: ICD-10-CM

## 2018-03-08 DIAGNOSIS — N18.30 CONTROLLED TYPE 2 DIABETES MELLITUS WITH STAGE 3 CHRONIC KIDNEY DISEASE, WITHOUT LONG-TERM CURRENT USE OF INSULIN (H): Primary | ICD-10-CM

## 2018-03-08 DIAGNOSIS — E11.3299 BACKGROUND DIABETIC RETINOPATHY (H): ICD-10-CM

## 2018-03-08 DIAGNOSIS — E11.22 CONTROLLED TYPE 2 DIABETES MELLITUS WITH STAGE 3 CHRONIC KIDNEY DISEASE, WITHOUT LONG-TERM CURRENT USE OF INSULIN (H): Primary | ICD-10-CM

## 2018-03-08 PROCEDURE — 92014 COMPRE OPH EXAM EST PT 1/>: CPT | Performed by: OPHTHALMOLOGY

## 2018-03-08 PROCEDURE — 92015 DETERMINE REFRACTIVE STATE: CPT | Performed by: OPHTHALMOLOGY

## 2018-03-08 ASSESSMENT — TONOMETRY
OS_IOP_MMHG: 15
IOP_METHOD: APPLANATION
OD_IOP_MMHG: 15

## 2018-03-08 ASSESSMENT — REFRACTION_WEARINGRX
OS_CYLINDER: SPHERE
OD_AXIS: 045
OS_SPHERE: -1.50
OD_SPHERE: -1.00
OS_AXIS: 153
OD_ADD: +3.00
OD_CYLINDER: +0.50
OS_CYLINDER: +0.50
OS_SPHERE: +2.50
OD_CYLINDER: SPHERE
OS_ADD: +3.00
OD_SPHERE: +2.50
SPECS_TYPE: BIFOCAL

## 2018-03-08 ASSESSMENT — EXTERNAL EXAM - RIGHT EYE: OD_EXAM: 1+ BROW PTOSIS

## 2018-03-08 ASSESSMENT — VISUAL ACUITY
OS_CC: 20/30
CORRECTION_TYPE: GLASSES
OD_CC: 20/20
OD_CC+: -2
OS_CC+: -2
METHOD: SNELLEN - LINEAR

## 2018-03-08 ASSESSMENT — REFRACTION_MANIFEST
OD_CYLINDER: +0.75
OS_SPHERE: -1.75
OS_ADD: +3.00
OD_AXIS: 031
OS_AXIS: 144
OD_ADD: +3.00
OD_SPHERE: -0.75
OS_CYLINDER: +0.75

## 2018-03-08 ASSESSMENT — CUP TO DISC RATIO
OS_RATIO: 0.5
OD_RATIO: 0.5

## 2018-03-08 ASSESSMENT — SLIT LAMP EXAM - LIDS
COMMENTS: 1+ DERMATOCHALASIS
COMMENTS: 1+ DERMATOCHALASIS

## 2018-03-08 ASSESSMENT — CONF VISUAL FIELD: OS_NORMAL: 1

## 2018-03-08 ASSESSMENT — EXTERNAL EXAM - LEFT EYE: OS_EXAM: 1+ BROW PTOSIS

## 2018-03-08 NOTE — PROGRESS NOTES
Current Eye Medications: Systane as needed both eyes      Subjective: here for complete today. Since she had her cataracts out, she had hair like floaters, but now have changed to black spots that move around. Sometimes when she is reading, the right eye seems to have a blank spot that comes and goes. DM, takes injection once a week.  Last a1c was 7.8 on 11-7-17     Objective:  See Ophthalmology Exam.       Assessment:  Posterior capsule opacity right eye approaching visual significance.  Mild background diabetic retinopathy both eyes.      ICD-10-CM    1. 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 Background diabetic retinopathy, mild, ou E11.3299    3. Pseudophakia, ou Z96.1    4. 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 Posterior vitreous detachment of left eye H43.812    6. Presbyopia H52.4 REFRACTIVE STATUS        Plan:  Possible clouding of posterior capsule discussed.   If vision right eye becomes consistently blurred, call to schedule Yag laser capsulotomy.  Possible posterior vitreous detachment (sudden onset large floater and/or flashing lights) discussed right eye.  Continue same glasses.  Call in November 2018 for an appointment in March 2019 for Complete Exam.    Dr. Tavares [phone]

## 2018-03-08 NOTE — LETTER
3/8/2018         RE: Mara Colindres  [address] [facility] [address]        Dear Colleague,    Thank you for referring your patient, Mara Colindres, to the [facility]. Please see a copy of my visit note below.     Current Eye Medications: Systane as needed both eyes      Subjective: here for complete today. Since she had her cataracts out, she had hair like floaters, but now have changed to black spots that move around. Sometimes when she is reading, the right eye seems to have a blank spot that comes and goes. DM, takes injection once a week.  Last a1c was 7.8 on 11-7-17     Objective:  See Ophthalmology Exam.       Assessment:  Posterior capsule opacity right eye approaching visual significance.  Mild background diabetic retinopathy both eyes.      ICD-10-CM    1. 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 Background diabetic retinopathy, mild, ou E11.3299    3. Pseudophakia, ou Z96.1    4. 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 Posterior vitreous detachment of left eye H43.812    6. Presbyopia H52.4 REFRACTIVE STATUS        Plan:  Possible clouding of posterior capsule discussed.   If vision right eye becomes consistently blurred, call to schedule Yag laser capsulotomy.  Possible posterior vitreous detachment (sudden onset large floater and/or flashing lights) discussed right eye.  Continue same glasses.  Call in November 2018 for an appointment in March 2019 for Complete Exam.    Dr. Tavares [phone]      Again, thank you for allowing me to participate in the care of your patient.        Sincerely,        Matty Tavares MD

## 2018-03-08 NOTE — MR AVS SNAPSHOT
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   If vision right eye becomes consistently blurred, call to schedule Yag laser capsulotomy.  Possible posterior vitreous detachment (sudden onset large floater and/or flashing lights) discussed right eye.  Continue same glasses.  Call in November 2018 for an appointment in March 2019 for Complete Exam.    Dr. Tavares [phone]    Diabetes weakens the blood vessels all over the body, including the eyes. Damage to the blood vessels in the eyes can cause swelling or bleeding into part of the eye (called the retina). This is called diabetic retinopathy (DENISSE-tin--[facility]-thee). If not treated, this disease can cause vision loss or blindness.   Symptoms may include blurred or distorted vision, but many people have no symptoms. It's important to see your eye doctor regularly to check for problems.   Early treatment and good control can help protect your vision. Here are the things you can do to help prevent vision loss:      1. Keep your blood sugar levels under tight control.      2. Bring high blood pressure under control.      3. No smoking.      4. Have yearly dilated eye exams.              Follow-ups after your visit        Your next 10 appointments already scheduled     Apr 03, 2018  2:00 PM CDT   Anticoagulation Visit  "with FZ ANTI COAG   [facility] ([facility])    [address]  [place] [address] [phone]            2018  1:30 PM CDT   Return Visit with Emilie Ren MD   [facility] ([facility])    [address]  [place] [address] [phone]              Who to contact     If you have questions or need follow up information about today's clinic visit or your schedule please contact [facility] directly at [phone].  Normal or non-critical lab and imaging results will be communicated to you by MyChart, letter or phone within 4 business days after the clinic has received the results. If you do not hear from us within 7 days, please contact the clinic through [employer] or phone. If you have a critical or abnormal lab result, we will notify you by phone as soon as possible.  Submit refill requests through [employer] or call your pharmacy and they will forward the refill request to us. Please allow 3 business days for your refill to be completed.          Additional Information About Your Visit        MyChart Information     [employer] lets you send messages to your doctor, view your test results, renew your prescriptions, schedule appointments and more. To sign up, go to [email or web address] . Click on \"Log in\" on the left side of the screen, which will take you to the Welcome page. Then click on \"Sign up Now\" on the right side of the page.     You will be asked to enter the access code listed below, as well as some personal information. Please follow the directions to create your username and password.     Your access code is: [email or web address]  Expires: 2018  1:54 PM     Your access code will  in 90 days. If you need help or a new code, please call your [facility] or [phone].        Care EveryWhere ID     This is your Care EveryWhere ID. This could be used by other organizations to access your [place] " medical records  [id number]         Blood Pressure from Last 3 Encounters:   01/08/18 100/70   11/07/17 108/70   07/27/17 110/70    Weight from Last 3 Encounters:   01/08/18 114.3 kg (252 lb)   11/07/17 113.4 kg (250 lb)   07/27/17 115.2 kg (254 lb)              We Performed the Following     EYE EXAM (SIMPLE-NONBILLABLE)     REFRACTIVE STATUS        Primary Care Provider Office Phone # Fax #    Steven Abrams [phone] [address] [facility] [address]        Equal Access to Services     [facility]: Hadii aad tigre hadasho Sosteven, waaxda luqadaha, qaybta kaalmada adeegyada, scott fenton . So [facility] [phone].    ATENCIÓN: Si habla español, tiene a mckeon disposición servicios gratuitos de asistencia lingüística. [facility] [phone].    We comply with applicable federal civil rights laws and [place] laws. We do not discriminate on the basis of race, color, national origin, age, disability, sex, sexual orientation, or gender identity.            Thank you!     Thank you for choosing [facility]  for your care. Our goal is always to provide you with excellent care. Hearing back from our patients is one way we can continue to improve our services. Please take a few minutes to complete the written survey that you may receive in the mail after your visit with us. Thank you!             Your Updated Medication List - Protect others around you: Learn how to safely use, store and throw away your medicines at www.disposemymeds.org.          This list is accurate as of 3/8/18  1:56 PM.  Always use your most recent med list.                   Brand Name Dispense Instructions for use Diagnosis    ACCU-CHEK PARVEZ PLUS test strip   Generic drug:  blood glucose monitoring     350 strip    USE TO TEST FOUR TIMES DAILY OR AS DIRECTED    Type 2 diabetes, HbA1C goal < 8% (H)       * albuterol 108 (90 BASE) MCG/ACT Inhaler    PROAIR HFA/PROVENTIL HFA/VENTOLIN  HFA    3 Inhaler    Inhale 2 puffs into the lungs every 6 hours as needed for shortness of breath / dyspnea    Moderate persistent asthma with exacerbation       * albuterol (2.5 MG/3ML) 0.083% neb solution     30 vial    Take 3 mLs (2.5 mg) by nebulization every 4 hours as needed for shortness of breath / dyspnea    Moderate persistent asthma with exacerbation       * ASPIRIN NOT PRESCRIBED    INTENTIONAL    0 each    Antiplatelet medication not prescribed intentionally due to Current anticoagulant therapy (warfarin/enoxaparin)    Type 2 diabetes, HbA1C goal < 8% (H)       budesonide-formoterol 80-4.5 MCG/ACT Inhaler    SYMBICORT    10.2 g    INHALE 2 PUFFS INTO THE LUNGS TWICE DAILY    Moderate persistent asthma with exacerbation       CALCIUM + D PO      Take 1 tablet by mouth 2 times daily.        diphenoxylate-atropine 2.5-0.025 MG per tablet    LOMOTIL    40 tablet    TAKE 1 TABLET BY MOUTH FOUR TIMES DAILY    Chronic diarrhea       dulaglutide 1.5 MG/0.5ML pen    TRULICITY    0.5 mL    Inject 1.5 mg Subcutaneous every 7 days    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, without long-term current use of insulin (H)       furosemide 40 MG tablet    LASIX    180 tablet    Take 1 tablet (40 mg) by mouth daily    Chronic kidney disease, stage III (moderate)       guaiFENesin-codeine 100-10 MG/5ML Soln solution    ROBITUSSIN AC    120 mL    Take 10 mLs by mouth every 4 hours as needed for cough    Cough, Moderate persistent asthma with exacerbation       HYDROcodone-acetaminophen 5-325 MG per tablet    NORCO    90 tablet    TK 1 T PO  QID AS NEEDED    Primary osteoarthritis involving multiple joints       losartan 25 MG tablet    COZAAR    180 tablet    Take 1 tablet (25 mg) by mouth 2 times daily    Pulmonary HTN       metoprolol tartrate 100 MG tablet    LOPRESSOR    180 tablet    Take 0.5 tablets (50 mg) by mouth 2 times daily Patient lowered to 50mg twice a day as she was tired and  low blood pressure    Congestive heart failure, unspecified congestive heart failure chronicity, unspecified congestive heart failure type (H), Pulmonary HTN       OMEPRAZOLE CPCR 20 MG OR      1 CAPSULE DAILY        * order for DME     1 Device    nebuliser    Acute bronchitis treated with antibiotics in the past 60 days       order for DME     1 each    Reasoning for requesting the hospital bed: Patient had multiple complications following hip replacement(blood clot/infection/removal of hip replacement) uses walker to ambulate now,she has a very hard time trying to lift her right foot. Hard to get herself into bed because of the height. She lives alone.  She uses devices at home to get herself into bed( step stool/extension to help lift her leg).Would also like the side rails    Failed total hip arthroplasty, sequela, Septic hip (H), Lymphedema, Bilateral leg pain       PARoxetine 20 MG tablet    PAXIL    90 tablet    TAKE 1 TABLET(20 MG) BY MOUTH DAILY    Major depressive disorder, recurrent episode, mild (H)       potassium chloride 10 MEQ tablet    K-TAB,KLOR-CON    180 tablet    Take 0.5 tablets (5 mEq) by mouth daily    Diuretic-induced hypokalemia       Resveratrol 100 MG Caps      Take 1 capsule by mouth daily        * STATIN NOT PRESCRIBED (INTENTIONAL)     0 each    1 each continuous prn. Statin not prescribed intentionally due to Other: patient has normal LDL within recommended guidelines on no medications    Hyperlipidemia LDL goal <100       TYLENOL 500 MG tablet   Generic drug:  acetaminophen      Take 2 tablets by mouth 3 times daily as needed.        warfarin 5 MG tablet    COUMADIN    90 tablet    TAKE 1/2 TABLET BY MOUTH ON MONDAY, WEDNESDAY AND FRIDAY AND TAKE 1 TABLET BY MOUTH ALL OTHER DAYS    Long-term (current) use of anticoagulants, Persistent atrial fibrillation (H)       * Notice:  This list has 5 medication(s) that are the same as other medications prescribed for you. Read the directions  carefully, and ask your doctor or other care provider to review them with you.

## 2018-03-08 NOTE — PATIENT INSTRUCTIONS
Possible clouding of posterior capsule discussed.   If vision right eye becomes consistently blurred, call to schedule Yag laser capsulotomy.  Possible posterior vitreous detachment (sudden onset large floater and/or flashing lights) discussed right eye.  Continue same glasses.  Call in November 2018 for an appointment in March 2019 for Complete Exam.    Dr. Tavares [phone]    Diabetes weakens the blood vessels all over the body, including the eyes. Damage to the blood vessels in the eyes can cause swelling or bleeding into part of the eye (called the retina). This is called diabetic retinopathy (DENISSE-tin--[facility]-thee). If not treated, this disease can cause vision loss or blindness.   Symptoms may include blurred or distorted vision, but many people have no symptoms. It's important to see your eye doctor regularly to check for problems.   Early treatment and good control can help protect your vision. Here are the things you can do to help prevent vision loss:      1. Keep your blood sugar levels under tight control.      2. Bring high blood pressure under control.      3. No smoking.      4. Have yearly dilated eye exams.

## 2018-03-10 PROBLEM — E11.3299 BDR (BACKGROUND DIABETIC RETINOPATHY) (H): Status: RESOLVED | Noted: 2018-03-08 | Resolved: 2018-03-10

## 2018-03-10 PROBLEM — E11.3299 BACKGROUND DIABETIC RETINOPATHY (H): Status: ACTIVE | Noted: 2018-03-10

## 2018-03-21 DIAGNOSIS — Z79.01 LONG-TERM (CURRENT) USE OF ANTICOAGULANTS: ICD-10-CM

## 2018-03-21 DIAGNOSIS — I48.19 PERSISTENT ATRIAL FIBRILLATION (H): ICD-10-CM

## 2018-03-21 RX ORDER — WARFARIN SODIUM 5 MG/1
TABLET ORAL
Qty: 90 TABLET | Refills: 0 | Status: SHIPPED | OUTPATIENT
Start: 2018-03-21 | End: 2018-07-18

## 2018-04-07 ENCOUNTER — TELEPHONE (OUTPATIENT)
Dept: CARDIOLOGY | Facility: CLINIC | Age: 83
End: 2018-04-07

## 2018-04-07 NOTE — TELEPHONE ENCOUNTER
Pt requesting Rx not listed Losartan 50mg tab twice daily. Current Rx is written for 25mg twice daily

## 2018-04-11 DIAGNOSIS — M15.0 PRIMARY OSTEOARTHRITIS INVOLVING MULTIPLE JOINTS: ICD-10-CM

## 2018-04-11 NOTE — TELEPHONE ENCOUNTER
Routing refill request to provider for review/approval because:  Drug not on the [facility] refill protocol       Requested Prescriptions   Pending Prescriptions Disp Refills     HYDROcodone-acetaminophen (NORCO) 5-325 MG per tablet 90 tablet 0     Sig: TK 1 T PO  QID AS NEEDED    There is no refill protocol information for this order        Swetha Alcocer RN

## 2018-04-11 NOTE — TELEPHONE ENCOUNTER
Reason for Call:  Other call back    Detailed comments: Patient would like a refill of her HYDROcodone-acetaminophen (NORCO) 5-325 MG per tablet. Please call when ready.    Phone Number Patient can be reached at: Home number on file [phone] (home)    Best Time: Any    Can we leave a detailed message on this number? YES    Call taken on 4/11/2018 at 4:53 PM by Geno Quick

## 2018-04-12 RX ORDER — HYDROCODONE BITARTRATE AND ACETAMINOPHEN 5; 325 MG/1; MG/1
TABLET ORAL
Qty: 90 TABLET | Refills: 0 | Status: SHIPPED | OUTPATIENT
Start: 2018-04-12 | End: 2019-02-25

## 2018-04-12 NOTE — TELEPHONE ENCOUNTER
LM for patient that the Norco prescription is ready for pick-up at the first floor . Halima Bob,

## 2018-04-19 ENCOUNTER — ANTICOAGULATION THERAPY VISIT (OUTPATIENT)
Dept: NURSING | Facility: CLINIC | Age: 83
End: 2018-04-19
Payer: COMMERCIAL

## 2018-04-19 DIAGNOSIS — I82.409 DVT (DEEP VENOUS THROMBOSIS) (H): ICD-10-CM

## 2018-04-19 DIAGNOSIS — Z79.01 LONG-TERM (CURRENT) USE OF ANTICOAGULANTS: ICD-10-CM

## 2018-04-19 DIAGNOSIS — I48.19 PERSISTENT ATRIAL FIBRILLATION (H): ICD-10-CM

## 2018-04-19 LAB — INR POINT OF CARE: 2.6 (ref 0.86–1.14)

## 2018-04-19 PROCEDURE — 99207 ZZC NO CHARGE NURSE ONLY: CPT

## 2018-04-19 PROCEDURE — 85610 PROTHROMBIN TIME: CPT | Mod: QW

## 2018-04-19 PROCEDURE — 36416 COLLJ CAPILLARY BLOOD SPEC: CPT

## 2018-04-19 NOTE — MR AVS SNAPSHOT
Mara CHARLES Jens   4/19/2018 3:00 PM   Anticoagulation Therapy Visit    Description:  83 year old female   Provider:  STALIN ANTI COAG   Department:  [facility]           INR as of 4/19/2018     Today's INR 2.6      Anticoagulation Summary as of 4/19/2018     INR goal 2.0-3.0   Today's INR 2.6   Full instructions 2.5 mg on Mon, Wed, Fri; 5 mg all other days   Next INR check 5/31/2018    Indications   Long-term (current) use of anticoagulants [Z79.01] [Z79.01]  DVT (deep venous thrombosis) (H) [I82.409]  Persistent atrial fibrillation (H) [I48.1]         Your next Anticoagulation Clinic appointment(s)     May 31, 2018  2:30 PM CDT   Anticoagulation Visit with STALIN ANTI LAYO   [facility] ([address] [address] [phone]              Contact Numbers     [facility]  Please call [phone] to cancel and/or reschedule your appointment   Please call [phone] with any problems or questions regarding your therapy.        April 2018 Details    Sun Mon Tue Wed Thu Fri Sat     1               2               3               4               5               6               7                 8               9               10               11               12               13               14                 15               16               17               18               19      5 mg   See details      20      2.5 mg         21      5 mg           22      5 mg         23      2.5 mg         24      5 mg         25      2.5 mg         26      5 mg         27      2.5 mg         28      5 mg           29      5 mg         30      2.5 mg               Date Details   04/19 This INR check               How to take your warfarin dose     To take:  2.5 mg Take 0.5 of a 5 mg tablet.    To take:  5 mg Take 1 of the 5 mg tablets.           May 2018 Details    Sun Mon Tue Wed Thu Fri Sat       1      5 mg         2      2.5 mg         3      5 mg         4       2.5 mg         5      5 mg           6      5 mg         7      2.5 mg         8      5 mg         9      2.5 mg         10      5 mg         11      2.5 mg         12      5 mg           13      5 mg         14      2.5 mg         15      5 mg         16      2.5 mg         17      5 mg         18      2.5 mg         19      5 mg           20      5 mg         21      2.5 mg         22      5 mg         23      2.5 mg         24      5 mg         25      2.5 mg         26      5 mg           27      5 mg         28      2.5 mg         29      5 mg         30      2.5 mg         31               Date Details   No additional details    Date of next INR:  5/31/2018         How to take your warfarin dose     To take:  2.5 mg Take 0.5 of a 5 mg tablet.    To take:  5 mg Take 1 of the 5 mg tablets.

## 2018-04-19 NOTE — PROGRESS NOTES
ANTICOAGULATION FOLLOW-UP CLINIC VISIT    Patient Name:  Mara Colindres  Date:  4/19/2018  Contact Type:  Face to Face    SUBJECTIVE:     Patient Findings     Positives No Problem Findings           OBJECTIVE    INR Protime   Date Value Ref Range Status   04/19/2018 2.6 (A) 0.86 - 1.14 Final       ASSESSMENT / PLAN  INR assessment THER    Recheck INR In: 6 WEEKS    INR Location Clinic      Anticoagulation Summary as of 4/19/2018     INR goal 2.0-3.0   Today's INR 2.6   Maintenance plan 2.5 mg (5 mg x 0.5) on Mon, Wed, Fri; 5 mg (5 mg x 1) all other days   Full instructions 2.5 mg on Mon, Wed, Fri; 5 mg all other days   Weekly total 27.5 mg   No change documented Celeste Ruiz RN   Plan last modified Celeste Ruiz RN (3/23/2017)   Next INR check 5/31/2018   Priority INR   Target end date Indefinite    Indications   Long-term (current) use of anticoagulants [Z79.01] [Z79.01]  DVT (deep venous thrombosis) (H) [I82.409]  Persistent atrial fibrillation (H) [I48.1]         Anticoagulation Episode Summary     INR check location     Preferred lab     Send INR reminders to [facility]    Comments       Anticoagulation Care Providers     Provider Role Specialty Phone number    Steven Abrams MD [facility] Internal Medicine [phone]            See the Encounter Report to view Anticoagulation Flowsheet and Dosing Calendar (Go to Encounters tab in chart review, and find the Anticoagulation Therapy Visit)    Dosage adjustment made based on physician directed care plan.    Celeste Ruiz RN

## 2018-05-21 ENCOUNTER — TELEPHONE (OUTPATIENT)
Dept: NEPHROLOGY | Facility: CLINIC | Age: 83
End: 2018-05-21

## 2018-05-21 DIAGNOSIS — I27.20 PULMONARY HTN (H): ICD-10-CM

## 2018-05-21 RX ORDER — LOSARTAN POTASSIUM 25 MG/1
25 TABLET ORAL 2 TIMES DAILY
Qty: 180 TABLET | Refills: 1 | Status: SHIPPED | OUTPATIENT
Start: 2018-05-21 | End: 2020-01-15

## 2018-05-31 ENCOUNTER — ANTICOAGULATION THERAPY VISIT (OUTPATIENT)
Dept: NURSING | Facility: CLINIC | Age: 83
End: 2018-05-31
Payer: COMMERCIAL

## 2018-05-31 DIAGNOSIS — I82.409 DVT (DEEP VENOUS THROMBOSIS) (H): ICD-10-CM

## 2018-05-31 DIAGNOSIS — I48.19 PERSISTENT ATRIAL FIBRILLATION (H): ICD-10-CM

## 2018-05-31 DIAGNOSIS — Z79.01 LONG-TERM (CURRENT) USE OF ANTICOAGULANTS: ICD-10-CM

## 2018-05-31 LAB — INR POINT OF CARE: 3.4 (ref 0.86–1.14)

## 2018-05-31 PROCEDURE — 99207 ZZC NO CHARGE NURSE ONLY: CPT

## 2018-05-31 PROCEDURE — 85610 PROTHROMBIN TIME: CPT | Mod: QW

## 2018-05-31 PROCEDURE — 36416 COLLJ CAPILLARY BLOOD SPEC: CPT

## 2018-05-31 NOTE — MR AVS SNAPSHOT
Mara CHARLES Jens   5/31/2018 2:30 PM   Anticoagulation Therapy Visit    Description:  83 year old female   Provider:  STALIN ANTI COAG   Department:  [facility]           INR as of 5/31/2018     Today's INR 3.4!      Anticoagulation Summary as of 5/31/2018     INR goal 2.0-3.0   Today's INR 3.4!   Full warfarin instructions 5/31: 2.5 mg; Otherwise 2.5 mg on Mon, Wed, Fri; 5 mg all other days   Next INR check 6/21/2018    Indications   Long-term (current) use of anticoagulants [Z79.01] [Z79.01]  DVT (deep venous thrombosis) (H) [I82.409]  Persistent atrial fibrillation (H) [I48.1]         Your next Anticoagulation Clinic appointment(s)     Jun 21, 2018  2:30 PM CDT   Anticoagulation Visit with STALIN ANTI LAYO   [facility] ([address] [id number] [phone]              Contact Numbers     [facility]  Please call [phone] to cancel and/or reschedule your appointment   Please call [phone] with any problems or questions regarding your therapy.        May 2018 Details    Sun Mon Tue Wed Thu Fri Sat       1               2               3               4               5                 6               7               8               9               10               11               12                 13               14               15               16               17               18               19                 20               21               22               23               24               25               26                 27               28               29               30               31      2.5 mg   See details         Date Details   05/31 This INR check               How to take your warfarin dose     To take:  2.5 mg Take 0.5 of a 5 mg tablet.           June 2018 Details    Sun Mon Tue Wed Thu Fri Sat          1      2.5 mg         2      5 mg           3      5 mg         4      2.5 mg         5      5 mg         6       2.5 mg         7      5 mg         8      2.5 mg         9      5 mg           10      5 mg         11      2.5 mg         12      5 mg         13      2.5 mg         14      5 mg         15      2.5 mg         16      5 mg           17      5 mg         18      2.5 mg         19      5 mg         20      2.5 mg         21            22               23                 24               25               26               27               28               29               30                Date Details   No additional details    Date of next INR:  6/21/2018         How to take your warfarin dose     To take:  2.5 mg Take 0.5 of a 5 mg tablet.    To take:  5 mg Take 1 of the 5 mg tablets.

## 2018-05-31 NOTE — PROGRESS NOTES
ANTICOAGULATION FOLLOW-UP CLINIC VISIT    Patient Name:  Mara Colindres  Date:  5/31/2018  Contact Type:  Face to Face    SUBJECTIVE:     Patient Findings     Positives No Problem Findings           OBJECTIVE    INR Protime   Date Value Ref Range Status   05/31/2018 3.4 (A) 0.86 - 1.14 Final       ASSESSMENT / PLAN  INR assessment SUPRA    Recheck INR In: 3 WEEKS    INR Location Clinic      Anticoagulation Summary as of 5/31/2018     INR goal 2.0-3.0   Today's INR 3.4!   Warfarin maintenance plan 2.5 mg (5 mg x 0.5) on Mon, Wed, Fri; 5 mg (5 mg x 1) all other days   Full warfarin instructions 5/31: 2.5 mg; Otherwise 2.5 mg on Mon, Wed, Fri; 5 mg all other days   Weekly warfarin total 27.5 mg   Plan last modified Celeste Ruiz RN (3/23/2017)   Next INR check 6/21/2018   Priority INR   Target end date Indefinite    Indications   Long-term (current) use of anticoagulants [Z79.01] [Z79.01]  DVT (deep venous thrombosis) (H) [I82.409]  Persistent atrial fibrillation (H) [I48.1]         Anticoagulation Episode Summary     INR check location     Preferred lab     Send INR reminders to [facility]    Comments       Anticoagulation Care Providers     Provider Role Specialty Phone number    Steven Abrams MD [facility] Internal Medicine [phone]            See the Encounter Report to view Anticoagulation Flowsheet and Dosing Calendar (Go to Encounters tab in chart review, and find the Anticoagulation Therapy Visit)    Dosage adjustment made based on physician directed care plan.    Celeste Ruiz, AMAYA

## 2018-06-28 ENCOUNTER — ANTICOAGULATION THERAPY VISIT (OUTPATIENT)
Dept: NURSING | Facility: CLINIC | Age: 83
End: 2018-06-28
Payer: COMMERCIAL

## 2018-06-28 DIAGNOSIS — I82.409 DVT (DEEP VENOUS THROMBOSIS) (H): ICD-10-CM

## 2018-06-28 DIAGNOSIS — Z79.01 LONG-TERM (CURRENT) USE OF ANTICOAGULANTS: ICD-10-CM

## 2018-06-28 DIAGNOSIS — I48.19 PERSISTENT ATRIAL FIBRILLATION (H): ICD-10-CM

## 2018-06-28 LAB — INR POINT OF CARE: 3.7 (ref 0.86–1.14)

## 2018-06-28 PROCEDURE — 36416 COLLJ CAPILLARY BLOOD SPEC: CPT

## 2018-06-28 PROCEDURE — 85610 PROTHROMBIN TIME: CPT | Mod: QW

## 2018-06-28 PROCEDURE — 99207 ZZC NO CHARGE NURSE ONLY: CPT

## 2018-06-28 NOTE — MR AVS SNAPSHOT
Mara CHARLES Jens   6/28/2018 2:30 PM   Anticoagulation Therapy Visit    Description:  83 year old female   Provider:  STALIN ANTI COAG   Department:  [facility]           INR as of 6/28/2018     Today's INR 3.7!      Anticoagulation Summary as of 6/28/2018     INR goal 2.0-3.0   Today's INR 3.7!   Full warfarin instructions 6/28: Hold; Otherwise 5 mg on Tue, Thu, Sat; 2.5 mg all other days   Next INR check 7/19/2018    Indications   Long-term (current) use of anticoagulants [Z79.01] [Z79.01]  DVT (deep venous thrombosis) (H) [I82.409]  Persistent atrial fibrillation (H) [I48.1]         Your next Anticoagulation Clinic appointment(s)     Jul 19, 2018  2:30 PM CDT   Anticoagulation Visit with STALIN ANTI LAYO   [facility] ([facility]    [address] [id number] [phone]              Contact Numbers     [facility]  Please call [phone] to cancel and/or reschedule your appointment   Please call [phone] with any problems or questions regarding your therapy.        June 2018 Details    Sun Mon Tue Wed Thu Fri Sat          1               2                 3               4               5               6               7               8               9                 10               11               12               13               14               15               16                 17               18               19               20               21               22               23                 24               25               26               27               28      Hold   See details      29      2.5 mg         30      5 mg          Date Details   06/28 This INR check               How to take your warfarin dose     To take:  2.5 mg Take 0.5 of a 5 mg tablet.    To take:  5 mg Take 1 of the 5 mg tablets.    Hold Do not take your warfarin dose. See the Details table to the right for additional instructions.                July 2018 Details    Sun  Mon Tue Wed Thu Fri Sat     1      2.5 mg         2      2.5 mg         3      5 mg         4      2.5 mg         5      5 mg         6      2.5 mg         7      5 mg           8      2.5 mg         9      2.5 mg         10      5 mg         11      2.5 mg         12      5 mg         13      2.5 mg         14      5 mg           15      2.5 mg         16      2.5 mg         17      5 mg         18      2.5 mg         19            20               21                 22               23               24               25               26               27               28                 29               30               31                    Date Details   No additional details    Date of next INR:  7/19/2018         How to take your warfarin dose     To take:  2.5 mg Take 0.5 of a 5 mg tablet.    To take:  5 mg Take 1 of the 5 mg tablets.

## 2018-06-28 NOTE — PROGRESS NOTES
ANTICOAGULATION FOLLOW-UP CLINIC VISIT    Patient Name:  Mara Colindres  Date:  6/28/2018  Contact Type:  Face to Face    SUBJECTIVE:     Patient Findings     Positives No Problem Findings, Unexplained INR or factor level change    Comments Due to multiple supra therapeutic Coumadin was decreased by 9%.           OBJECTIVE    INR Protime   Date Value Ref Range Status   06/28/2018 3.7 (A) 0.86 - 1.14 Final       ASSESSMENT / PLAN  INR assessment SUPRA    Recheck INR In: 3 WEEKS    INR Location Clinic      Anticoagulation Summary as of 6/28/2018     INR goal 2.0-3.0   Today's INR 3.7!   Warfarin maintenance plan 5 mg (5 mg x 1) on Tue, Thu, Sat; 2.5 mg (5 mg x 0.5) all other days   Full warfarin instructions 6/28: Hold; Otherwise 5 mg on Tue, Thu, Sat; 2.5 mg all other days   Weekly warfarin total 25 mg   Plan last modified Celeste Ruiz RN (6/28/2018)   Next INR check 7/19/2018   Priority INR   Target end date Indefinite    Indications   Long-term (current) use of anticoagulants [Z79.01] [Z79.01]  DVT (deep venous thrombosis) (H) [I82.409]  Persistent atrial fibrillation (H) [I48.1]         Anticoagulation Episode Summary     INR check location     Preferred lab     Send INR reminders to [facility]    Comments       Anticoagulation Care Providers     Provider Role Specialty Phone number    Steven Abrams MD [facility] Internal Medicine [phone]            See the Encounter Report to view Anticoagulation Flowsheet and Dosing Calendar (Go to Encounters tab in chart review, and find the Anticoagulation Therapy Visit)    Dosage adjustment made based on physician directed care plan.    Celeste Ruiz RN

## 2018-07-02 DIAGNOSIS — I27.20 PULMONARY HTN (H): ICD-10-CM

## 2018-07-02 DIAGNOSIS — I50.9 CONGESTIVE HEART FAILURE, UNSPECIFIED CONGESTIVE HEART FAILURE CHRONICITY, UNSPECIFIED CONGESTIVE HEART FAILURE TYPE: ICD-10-CM

## 2018-07-03 RX ORDER — METOPROLOL TARTRATE 100 MG
50 TABLET ORAL 2 TIMES DAILY
Qty: 90 TABLET | Refills: 0 | Status: SHIPPED | OUTPATIENT
Start: 2018-07-03 | End: 2020-02-01

## 2018-07-09 ENCOUNTER — OFFICE VISIT (OUTPATIENT)
Dept: NEPHROLOGY | Facility: CLINIC | Age: 83
End: 2018-07-09
Payer: COMMERCIAL

## 2018-07-09 VITALS — SYSTOLIC BLOOD PRESSURE: 125 MMHG | DIASTOLIC BLOOD PRESSURE: 50 MMHG | HEART RATE: 80 BPM

## 2018-07-09 DIAGNOSIS — N18.30 CKD (CHRONIC KIDNEY DISEASE) STAGE 3, GFR 30-59 ML/MIN (H): ICD-10-CM

## 2018-07-09 DIAGNOSIS — I27.20 PULMONARY HTN (H): ICD-10-CM

## 2018-07-09 DIAGNOSIS — E87.6 HYPOKALEMIA: ICD-10-CM

## 2018-07-09 DIAGNOSIS — I10 HYPERTENSION GOAL BP (BLOOD PRESSURE) < 140/90: Primary | ICD-10-CM

## 2018-07-09 DIAGNOSIS — N18.30 CKD (CHRONIC KIDNEY DISEASE) STAGE 3, GFR 30-59 ML/MIN (H): Primary | ICD-10-CM

## 2018-07-09 DIAGNOSIS — E08.42 DIABETIC POLYNEUROPATHY ASSOCIATED WITH DIABETES MELLITUS DUE TO UNDERLYING CONDITION (H): ICD-10-CM

## 2018-07-09 DIAGNOSIS — K52.9 CHRONIC DIARRHEA: ICD-10-CM

## 2018-07-09 DIAGNOSIS — E11.22 CONTROLLED TYPE 2 DIABETES MELLITUS WITH STAGE 3 CHRONIC KIDNEY DISEASE, WITHOUT LONG-TERM CURRENT USE OF INSULIN (H): ICD-10-CM

## 2018-07-09 DIAGNOSIS — I10 HYPERTENSION GOAL BP (BLOOD PRESSURE) < 140/90: ICD-10-CM

## 2018-07-09 DIAGNOSIS — N18.30 CONTROLLED TYPE 2 DIABETES MELLITUS WITH STAGE 3 CHRONIC KIDNEY DISEASE, WITHOUT LONG-TERM CURRENT USE OF INSULIN (H): ICD-10-CM

## 2018-07-09 LAB
ALBUMIN SERPL-MCNC: 3.7 G/DL (ref 3.4–5)
ANION GAP SERPL CALCULATED.3IONS-SCNC: 12 MMOL/L (ref 3–14)
BUN SERPL-MCNC: 24 MG/DL (ref 7–30)
CALCIUM SERPL-MCNC: 9.2 MG/DL (ref 8.5–10.1)
CHLORIDE SERPL-SCNC: 103 MMOL/L (ref 94–109)
CO2 SERPL-SCNC: 24 MMOL/L (ref 20–32)
CREAT SERPL-MCNC: 1.67 MG/DL (ref 0.52–1.04)
GFR SERPL CREATININE-BSD FRML MDRD: 29 ML/MIN/1.7M2
GLUCOSE SERPL-MCNC: 159 MG/DL (ref 70–99)
HBA1C MFR BLD: 7.3 % (ref 0–5.6)
HGB BLD-MCNC: 13.4 G/DL (ref 11.7–15.7)
PHOSPHATE SERPL-MCNC: 3.3 MG/DL (ref 2.5–4.5)
POTASSIUM SERPL-SCNC: 3.8 MMOL/L (ref 3.4–5.3)
PTH-INTACT SERPL-MCNC: 93 PG/ML (ref 18–80)
SODIUM SERPL-SCNC: 139 MMOL/L (ref 133–144)

## 2018-07-09 PROCEDURE — 83970 ASSAY OF PARATHORMONE: CPT | Performed by: INTERNAL MEDICINE

## 2018-07-09 PROCEDURE — 83036 HEMOGLOBIN GLYCOSYLATED A1C: CPT | Performed by: INTERNAL MEDICINE

## 2018-07-09 PROCEDURE — 85018 HEMOGLOBIN: CPT | Performed by: INTERNAL MEDICINE

## 2018-07-09 PROCEDURE — 36415 COLL VENOUS BLD VENIPUNCTURE: CPT | Performed by: INTERNAL MEDICINE

## 2018-07-09 PROCEDURE — 80069 RENAL FUNCTION PANEL: CPT | Performed by: INTERNAL MEDICINE

## 2018-07-09 NOTE — LETTER
7/9/2018       RE: Mara Colindres  [address] [address]     Dear Colleague,    Thank you for referring your patient, Mara Colindres, to the [facility] at [facility]. Please see a copy of my visit note below.    Assessment and Plan:   83 year old female with history of CKD with SCr 1.2-1.5mg/dL, pulmonary hypertension, atrial fibrillation, DVT, diastolic dysfunction, diabetes x 10 years, complicated right hip replacement in 2004 with chronic infection and DVT, who presents for CKD followup  1. CKD stage 3b/4- her SCr has been 1.2-1.5 but was up to 1.9mg/dL winter 2016 but back down in February 2017. She has complex cardiorenal physiology with Afib, diastolic dysfunction and pulmonary hypertension. She has +ABILIO and Raynauds, invoking possibility of chronic interstitial process or autoimmune disease.   She has diabetes with neuropathy that could also explain some CKD.    - she is non proteinuric  -will have her monitor BP and other symptoms and call if worsens  - labs today  2. Anemia- borderline anemia, mild   3. Hypertension- well controlled, low normal BP, on ACE, BB and diuretic-    - BP today 100 systolic and she is mildly symptomatic  - lowered losartan to 25mg bid last visit and she lowered it to daily. She also lowered metoprolol to 50mg bid. Her weight is down 3 lbs or so, likely this is low end of her ideal wieght (with winter boots on, she is 252 so her ideal is 252-534 lbs- if loses more weight, consider holding PM dose for 1-2 days  - continue losartan 25mg daily  4. Electrolytes/ acid-base- within normal, on potassium supplement 5mEq  5. Bone- normal PTH (49) and normal calcium and phosphorus to date, check today  6. Muscle cramps- still there, maybe not as bad, K and Mag in range, ? Dehydration- she will try to drink a little more water    RTC 8 months if labs stable  Assessment and plan was discussed with patient and she voiced her  understanding and agreement.    Reason for Visit:  Mara Colindres is a 83 year old female with CKD from diabetes and cardiac dysfunction, who presents for followup    HPI:  She is a pleasant female with history of diabetes x 10-15 years with mild neuropathy, no retinopathy,moderate pulmonary hypertension, diastolic heart failure, hypertension, atrial fibrillation, diabetes and chronic kidney disease. She has recently been following for ongoing management of pulmonary arterial hypertension and congestive heart failure. with CKD with SCr 1.2-1.5 mg/dL back to 2006 (eGFR 35-45 ml/min). In 2004 she had a right hip joint replacement with chronic infection and was on antibiotics for 10 years, and took herself off a few years ago.   She also has history of + ABILIO (2.2) in 2014 and Raynaud's.  She has dyspnea on exertion even with showering and other simple tasks. She lives with her son who moved here from [place] at time of her 's death. She has a grandson going to [employer] for oil business.     She was on metoprolol 100mg bid but felt quite tired thus she cut this down to 50mg bid, and losartan is 25mg  She has not been checking bPs as her cuff is a wrist cuff  Her weight is near 245lbs, likely lost some weight with trulicity.    She was started on trulicity and blood sugars have been great. -135  She has dyspnea with exertion. She denies orthopnea  She takes 40mg furosemide, but takes 80mg sometimes if more swelling or more dyspnea.    She had diarrhea a few weeks ago but it resolved.  She had a hemorrhoid act up but otherwise denies blood in stool or other symptoms.  She has some achiness but does not   Taking melatonin (OTC) as needed to help her sleep.    ROS:   A comprehensive review of systems was obtained and negative, except as noted in the HPI or PMH.    Active Medical Problems:  Patient Active Problem List   Diagnosis     Morbid obesity (H)     Hypertension goal BP (blood pressure) < 140/90      DVT (deep venous thrombosis) (H)     MRSA (methicillin resistant Staphylococcus aureus)     Chronic diarrhea     HYPERLIPIDEMIA LDL GOAL <100     Chronic anticoagulation     CKD (chronic kidney disease) stage 3, GFR 30-59 ml/min     Septic hip (H)     Lymphedema     Tubular adenoma of colon     Abdominal wall hematoma     Advanced directives, counseling/discussion     Umbilical hernia     History of [facility] fever     Moderate persistent asthma     Pseudophakia, ou     Hypovitaminosis D     Bilateral leg pain     Positive YAMINI     Raynaud phenomenon     Pulmonary HTN     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     Scotoma involving central area in visual field, right     Failed total hip arthroplasty, sequela     Major depressive disorder, recurrent episode, mild (H)     Chronic diastolic congestive heart failure (H)     Long-term (current) use of anticoagulants [Z79.01]     Persistent atrial fibrillation (H)     Diabetic polyneuropathy associated with diabetes mellitus due to underlying condition (H)     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, without long-term current use of insulin (H)     Fatigue, unspecified type     Senile osteoporosis     Posterior vitreous detachment of left eye     Background diabetic retinopathy, mild, ou     PMH:   Medical record was reviewed and PMH was discussed with patient and noted below.  Past Medical History:   Diagnosis Date     A-fib (H)      Bilateral leg edema     fairly severe     Cataract      CHF (congestive heart failure) (H)      Chronic diarrhea     neg colonoscopy abt one year ago, even to the point of having fecal accidents     Chronic venous insufficiency      CKD (chronic kidney disease) stage 3, GFR 30-59 ml/min      Diabetic retinopathy (H)      DVT (deep venous thrombosis) (H)      GERD (gastroesophageal reflux disease)      Hyperlipidemia LDL goal <100 10/31/2010     Hypertension goal BP (blood pressure) < 140/90       Long-term (current) use of anticoagulants      Moderate persistent asthma 8/15/2012     MRSA (methicillin resistant Staphylococcus aureus)     postop hip     Obesity      S/P colonoscopy 2008 ( ?)     Septic hip (H)     sees an ID specialist Dr ANNEL DE LA CRUZ She remains on long-term suppressive antibiotic therapy using Minocin 50 mg twice daily     Stasis dermatitis      Type 2 diabetes, HbA1C goal < 8% (H)      Urinary incontinence, mixed      Vitiligo      PSH:   Past Surgical History:   Procedure Laterality Date     APPENDECTOMY       CATARACT IOL, RT/LT       COLONOSCOPY  2008     COLONOSCOPY  8/20/2012    Procedure: COLONOSCOPY;  COLONOSCOPY, TUBULAR ADENOMA OF COLON, CHRONIC DIARRHEA;  Surgeon: Yobany Hinojosa MD;  Location: [facility] OR     HC REMOVAL GALLBLADDER       JOINT REPLACEMENT, HIP RT/LT  2004    right     JOINT REPLACEMTN, KNEE RT/LT  2000    bilateral     PHACOEMULSIFICATION WITH STANDARD INTRAOCULAR LENS IMPLANT  8/2013    left eye; right eye     ROTATOR CUFF REPAIR RT/LT  1/93    right     TONSILLECTOMY         Family Hx:   Family History   Problem Relation Age of Onset     Diabetes Mother      Cancer Mother      Hypertension Mother      Cataracts Mother      Hypertension Father      Glaucoma Father      Cataracts Father      Cancer Son      thyroid cancer     Neurologic Disorder Sister      Alzheimer Disease Sister      Personal Hx:   Social History     Social History     Marital status:      Spouse name: (Bill)     Number of children: 3     Years of education: N/A     Occupational History      Retired     Social History Main Topics     Smoking status: Never Smoker     Smokeless tobacco: Never Used     Alcohol use No     Drug use: No     Sexual activity: No     Other Topics Concern     Parent/Sibling W/ Cabg, Mi Or Angioplasty Before 65f 55m? No     Social History Narrative    [place] in [place]       Allergies:  Allergies   Allergen Reactions     Atenolol Other (See Comments)     Arms  became limp, almost stroke like symptoms per patient.   Tolerates metoprolol fine     Metformin      Side effects / gastrointestinal symptoms        Medications:  Prior to Admission medications    Medication Sig Start Date End Date Taking? Authorizing Provider   metoprolol (LOPRESSOR) 100 MG tablet Take 1 tablet (100 mg) by mouth 2 times daily NOTE TABLET STRENGTH CHANGE-1 TABLET TWICE DAILY 1/5/17   Akbar Thompson MD   losartan (COZAAR) 50 MG tablet Take 1 tablet (50 mg) by mouth 2 times daily .  Please note change in tablet strength. 1/5/17   Akbar Thompson MD   glipiZIDE (GLIPIZIDE XL) 10 MG 24 hr tablet Take 1 tablet (10 mg) by mouth daily 12/19/16   Steven Abrams MD   warfarin (COUMADIN) 5 MG tablet TAKE 1/2 TABLET ON MONDAY AND FRIDAY AND 1 TABLET BY MOUTH ON ALL OTHER DAYS 12/12/16   Ilene Davis APRN CNP   guaiFENesin-codeine (ROBITUSSIN AC) 100-10 MG/5ML SOLN solution Take 10 mLs by mouth every 4 hours as needed for cough 12/8/16   Steven Abrams MD   PARoxetine (PAXIL) 20 MG tablet TAKE 1 TABLET BY MOUTH EVERY DAY 11/8/16   Steven Abrams MD   diphenoxylate-atropine (LOMOTIL) 2.5-0.025 MG per tablet TAKE 1 TABLET BY MOUTH FOUR TIMES DAILY 11/2/16   Steven Abrams MD   furosemide (LASIX) 40 MG tablet Take 1 tablet (40 mg) by mouth 2 times daily 8/26/16   Akbar Thompson MD   losartan (COZAAR) 25 MG tablet TAKE 1 TABLET BY MOUTH TWICE DAILY 7/13/16   Steven Abrams MD   potassium chloride (K-DUR) 10 MEQ tablet Take 2 tablets daily 6/28/16   Sabrina Hicks APRN CNP   albuterol (2.5 MG/3ML) 0.083% nebulizer solution Take 3 mLs (2.5 mg) by nebulization every 4 hours as needed for shortness of breath / dyspnea 3/4/16   Steven Abrams MD   order for DME Reasoning for requesting the hospital bed: Patient had multiple complications following hip replacement(blood clot/infection/removal of hip replacement) uses walker to ambulate now,she has a very hard time trying  to lift her right foot. Hard to get herself into bed because of the height. She lives alone.  She uses devices at home to get herself into bed( step stool/extension to help lift her leg).Would also like the side rails 1/13/16   Steven Abrams MD   ACCU-CHEK PARVEZ PLUS test strip TEST BLOOD GLUCOSE AS DIRECTED EVERY DAY 9/2/15   Steven Abrams MD   mupirocin (BACTROBAN) 2 % ointment Use 3 times a day to affected area for one week 6/10/15   Susie Villatoro MD   albuterol (PROAIR HFA, PROVENTIL HFA, VENTOLIN HFA) 108 (90 BASE) MCG/ACT inhaler Inhale 2 puffs into the lungs every 6 hours as needed for shortness of breath / dyspnea 4/15/15   Ilene Davis APRN CNP   budesonide-formoterol (SYMBICORT) 80-4.5 MCG/ACT inhaler Inhale 2 puffs into the lungs 2 times daily 4/15/15   Ilene Davis APRN CNP   acetaminophen (TYLENOL) 500 MG tablet Take 2 tablets by mouth 3 times daily as needed.    Reported, Patient   vitamin  B complex with vitamin C (VITAMIN  B COMPLEX) TABS Take 1 tablet by mouth daily.    Reported, Patient   Resveratrol 100 MG CAPS Take 1 capsule by mouth daily     Reported, Patient   STATIN NOT PRESCRIBED, INTENTIONAL, 1 each continuous prn. Statin not prescribed intentionally due to Other: patient has normal LDL within recommended guidelines on no medications 3/12/13   Steven Abrams MD   ASPIRIN NOT PRESCRIBED, INTENTIONAL, Antiplatelet medication not prescribed intentionally due to Current anticoagulant therapy (warfarin/enoxaparin) 10/8/12   Steven Abrams MD   Calcium Carbonate-Vitamin D (CALCIUM + D PO) Take 1 tablet by mouth 2 times daily.    Unknown, Entered By History   ORDER FOR DME nebuliser 5/23/12   Chip Vernon MD   OMEPRAZOLE CPCR 20 MG OR 1 CAPSULE DAILY    Reported, Patient     Vitals:  /50 (BP Location: Left arm, Patient Position: Sitting)  Pulse 80,     Exam:  GENERAL APPEARANCE: alert and no distress  HENT: mouth without ulcers or lesions,  +jvd  LYMPHATICS: no cervical or supraclavicular nodes  RESP: lungs clear to auscultation - no rales, rhonchi or wheezes  CV: regular rhythm, normal rate, no rub, no murmur  EDEMA: + LE edema bilaterally, right > L  ABDOMEN: soft, nondistended, nontender, bowel sounds normal  MS: extremities normal - no gross deformities noted, no evidence of inflammation in joints, no muscle tenderness  SKIN: no rash      Results:  Recent Results (from the past 336 hour(s))   INR point of care    Collection Time: 06/28/18 12:00 AM   Result Value Ref Range    INR Protime 3.7 (A) 0.86 - 1.14     Component      Latest Ref Rng & Units 6/3/2016 10/27/2016 12/19/2016   Sodium      133 - 144 mmol/L 135 138 136   Potassium      3.4 - 5.3 mmol/L 4.3 4.0 4.1   Chloride      94 - 109 mmol/L 102 103 100   Carbon Dioxide      20 - 32 mmol/L 26 23 23   Anion Gap      3 - 14 mmol/L 7 12 13   Glucose      70 - 99 mg/dL 168 (H) 196 (H) 361 (H)   Urea Nitrogen      7 - 30 mg/dL 32 (H) 27 35 (H)   Creatinine      0.52 - 1.04 mg/dL 1.55 (H) 1.51 (H) 1.89 (H)   GFR Estimate      >60 mL/min/1.7m2 32 (L) 33 (L) 25 (L)   GFR Estimate If Black      >60 mL/min/1.7m2 39 (L) 40 (L) 31 (L)   Calcium      8.5 - 10.1 mg/dL 9.3 8.8 9.6   WBC      4.0 - 11.0 10e9/L  6.7    RBC Count      3.8 - 5.2 10e12/L  4.15    Hemoglobin      11.7 - 15.7 g/dL  12.6    Hematocrit      35.0 - 47.0 %  39.5    MCV      78 - 100 fl  95    MCH      26.5 - 33.0 pg  30.4    MCHC      31.5 - 36.5 g/dL  31.9    RDW      10.0 - 15.0 %  14.4    Platelet Count      150 - 450 10e9/L  232    N-Terminal Pro Bnp      0 - 450 pg/mL 1895 (H) 2796 (H)    Magnesium      1.6 - 2.3 mg/dL 1.8     Hemoglobin A1C      4.3 - 6.0 %   8.9 (H)   Parathyroid Hormone Intact      12 - 72 pg/mL   49   Alkaline Phosphatase      40 - 150 U/L   91   Phosphorus      2.5 - 4.5 mg/dL   2.9   Vitamin D Deficiency screening      20 - 75 ug/L   35   ALT      0 - 50 U/L   19     Emilie Ren MD    of Medicine  Department of Nephrology  [facility]

## 2018-07-09 NOTE — PROGRESS NOTES
Assessment and Plan:   83 year old female with history of CKD with SCr 1.2-1.5mg/dL, pulmonary hypertension, atrial fibrillation, DVT, diastolic dysfunction, diabetes x 10 years, complicated right hip replacement in 2004 with chronic infection and DVT, who presents for CKD followup  1. CKD stage 3b/4- her SCr has been 1.2-1.5 but was up to 1.9mg/dL winter 2016 but back down in February 2017. She has complex cardiorenal physiology with Afib, diastolic dysfunction and pulmonary hypertension. She has +ABILIO and Raynauds, invoking possibility of chronic interstitial process or autoimmune disease.   She has diabetes with neuropathy that could also explain some CKD.    - she is non proteinuric  -will have her monitor BP and other symptoms and call if worsens  - labs today  2. Anemia- borderline anemia, mild   3. Hypertension- well controlled, low normal BP, on ACE, BB and diuretic-    - BP today 100 systolic and she is mildly symptomatic  - lowered losartan to 25mg bid last visit and she lowered it to daily. She also lowered metoprolol to 50mg bid. Her weight is down 3 lbs or so, likely this is low end of her ideal wieght (with winter boots on, she is 252 so her ideal is 252-534 lbs- if loses more weight, consider holding PM dose for 1-2 days  - continue losartan 25mg daily  4. Electrolytes/ acid-base- within normal, on potassium supplement 5mEq  5. Bone- normal PTH (49) and normal calcium and phosphorus to date, check today  6. Muscle cramps- still there, maybe not as bad, K and Mag in range, ? Dehydration- she will try to drink a little more water    RTC 8 months if labs stable  Assessment and plan was discussed with patient and she voiced her understanding and agreement.    Reason for Visit:  Mara Colindres is a 83 year old female with CKD from diabetes and cardiac dysfunction, who presents for followup    HPI:  She is a pleasant female with history of diabetes x 10-15 years with mild neuropathy, no  retinopathy,moderate pulmonary hypertension, diastolic heart failure, hypertension, atrial fibrillation, diabetes and chronic kidney disease. She has recently been following for ongoing management of pulmonary arterial hypertension and congestive heart failure. with CKD with SCr 1.2-1.5 mg/dL back to 2006 (eGFR 35-45 ml/min). In 2004 she had a right hip joint replacement with chronic infection and was on antibiotics for 10 years, and took herself off a few years ago.   She also has history of + ABILIO (2.2) in 2014 and Raynaud's.  She has dyspnea on exertion even with showering and other simple tasks. She lives with her son who moved here from [place] at time of her 's death. She has a grandson going to [employer] for oil business.     She was on metoprolol 100mg bid but felt quite tired thus she cut this down to 50mg bid, and losartan is 25mg  She has not been checking bPs as her cuff is a wrist cuff  Her weight is near 245lbs, likely lost some weight with trulicity.    She was started on trulicity and blood sugars have been great. -135  She has dyspnea with exertion. She denies orthopnea  She takes 40mg furosemide, but takes 80mg sometimes if more swelling or more dyspnea.    She had diarrhea a few weeks ago but it resolved.  She had a hemorrhoid act up but otherwise denies blood in stool or other symptoms.  She has some achiness but does not   Taking melatonin (OTC) as needed to help her sleep.    ROS:   A comprehensive review of systems was obtained and negative, except as noted in the HPI or PMH.    Active Medical Problems:  Patient Active Problem List   Diagnosis     Morbid obesity (H)     Hypertension goal BP (blood pressure) < 140/90     DVT (deep venous thrombosis) (H)     MRSA (methicillin resistant Staphylococcus aureus)     Chronic diarrhea     HYPERLIPIDEMIA LDL GOAL <100     Chronic anticoagulation     CKD (chronic kidney disease) stage 3, GFR 30-59 ml/min     Septic hip (H)      Lymphedema     Tubular adenoma of colon     Abdominal wall hematoma     Advanced directives, counseling/discussion     Umbilical hernia     History of [facility] fever     Moderate persistent asthma     Pseudophakia, ou     Hypovitaminosis D     Bilateral leg pain     Positive YAMINI     Raynaud phenomenon     Pulmonary HTN     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     Scotoma involving central area in visual field, right     Failed total hip arthroplasty, sequela     Major depressive disorder, recurrent episode, mild (H)     Chronic diastolic congestive heart failure (H)     Long-term (current) use of anticoagulants [Z79.01]     Persistent atrial fibrillation (H)     Diabetic polyneuropathy associated with diabetes mellitus due to underlying condition (H)     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, without long-term current use of insulin (H)     Fatigue, unspecified type     Senile osteoporosis     Posterior vitreous detachment of left eye     Background diabetic retinopathy, mild, ou     PMH:   Medical record was reviewed and PMH was discussed with patient and noted below.  Past Medical History:   Diagnosis Date     A-fib (H)      Bilateral leg edema     fairly severe     Cataract      CHF (congestive heart failure) (H)      Chronic diarrhea     neg colonoscopy abt one year ago, even to the point of having fecal accidents     Chronic venous insufficiency      CKD (chronic kidney disease) stage 3, GFR 30-59 ml/min      Diabetic retinopathy (H)      DVT (deep venous thrombosis) (H)      GERD (gastroesophageal reflux disease)      Hyperlipidemia LDL goal <100 10/31/2010     Hypertension goal BP (blood pressure) < 140/90      Long-term (current) use of anticoagulants      Moderate persistent asthma 8/15/2012     MRSA (methicillin resistant Staphylococcus aureus)     postop hip     Obesity      S/P colonoscopy 2008 ( ?)     Septic hip (H)     sees an ID specialist Dr UP  JONO She remains on long-term suppressive antibiotic therapy using Minocin 50 mg twice daily     Stasis dermatitis      Type 2 diabetes, HbA1C goal < 8% (H)      Urinary incontinence, mixed      Vitiligo      PSH:   Past Surgical History:   Procedure Laterality Date     APPENDECTOMY       CATARACT IOL, RT/LT       COLONOSCOPY  2008     COLONOSCOPY  8/20/2012    Procedure: COLONOSCOPY;  COLONOSCOPY, TUBULAR ADENOMA OF COLON, CHRONIC DIARRHEA;  Surgeon: Yobany Hinojosa MD;  Location: [facility] OR     HC REMOVAL GALLBLADDER       JOINT REPLACEMENT, HIP RT/LT  2004    right     JOINT REPLACEMTN, KNEE RT/LT  2000    bilateral     PHACOEMULSIFICATION WITH STANDARD INTRAOCULAR LENS IMPLANT  8/2013    left eye; right eye     ROTATOR CUFF REPAIR RT/LT  1/93    right     TONSILLECTOMY         Family Hx:   Family History   Problem Relation Age of Onset     Diabetes Mother      Cancer Mother      Hypertension Mother      Cataracts Mother      Hypertension Father      Glaucoma Father      Cataracts Father      Cancer Son      thyroid cancer     Neurologic Disorder Sister      Alzheimer Disease Sister      Personal Hx:   Social History     Social History     Marital status:      Spouse name: (Gagandeep)     Number of children: 3     Years of education: N/A     Occupational History      Retired     Social History Main Topics     Smoking status: Never Smoker     Smokeless tobacco: Never Used     Alcohol use No     Drug use: No     Sexual activity: No     Other Topics Concern     Parent/Sibling W/ Cabg, Mi Or Angioplasty Before 65f 55m? No     Social History Narrative    [employer] [place]       Allergies:  Allergies   Allergen Reactions     Atenolol Other (See Comments)     Arms became limp, almost stroke like symptoms per patient.   Tolerates metoprolol fine     Metformin      Side effects / gastrointestinal symptoms        Medications:  Prior to Admission medications    Medication Sig Start Date End Date Taking? Authorizing  Provider   metoprolol (LOPRESSOR) 100 MG tablet Take 1 tablet (100 mg) by mouth 2 times daily NOTE TABLET STRENGTH CHANGE-1 TABLET TWICE DAILY 1/5/17   Akbar Thompson MD   losartan (COZAAR) 50 MG tablet Take 1 tablet (50 mg) by mouth 2 times daily .  Please note change in tablet strength. 1/5/17   Akbar Thompson MD   glipiZIDE (GLIPIZIDE XL) 10 MG 24 hr tablet Take 1 tablet (10 mg) by mouth daily 12/19/16   Steven Abrams MD   warfarin (COUMADIN) 5 MG tablet TAKE 1/2 TABLET ON MONDAY AND FRIDAY AND 1 TABLET BY MOUTH ON ALL OTHER DAYS 12/12/16   Ilene Davis APRN CNP   guaiFENesin-codeine (ROBITUSSIN AC) 100-10 MG/5ML SOLN solution Take 10 mLs by mouth every 4 hours as needed for cough 12/8/16   Steven Abrams MD   PARoxetine (PAXIL) 20 MG tablet TAKE 1 TABLET BY MOUTH EVERY DAY 11/8/16   Steven Abrams MD   diphenoxylate-atropine (LOMOTIL) 2.5-0.025 MG per tablet TAKE 1 TABLET BY MOUTH FOUR TIMES DAILY 11/2/16   Steven Abrams MD   furosemide (LASIX) 40 MG tablet Take 1 tablet (40 mg) by mouth 2 times daily 8/26/16   Akbar Thompson MD   losartan (COZAAR) 25 MG tablet TAKE 1 TABLET BY MOUTH TWICE DAILY 7/13/16   Steven Abrams MD   potassium chloride (K-DUR) 10 MEQ tablet Take 2 tablets daily 6/28/16   Sabrina Hicks APRN CNP   albuterol (2.5 MG/3ML) 0.083% nebulizer solution Take 3 mLs (2.5 mg) by nebulization every 4 hours as needed for shortness of breath / dyspnea 3/4/16   Steven Abrams MD   order for DME Reasoning for requesting the hospital bed: Patient had multiple complications following hip replacement(blood clot/infection/removal of hip replacement) uses walker to ambulate now,she has a very hard time trying to lift her right foot. Hard to get herself into bed because of the height. She lives alone.  She uses devices at home to get herself into bed( step stool/extension to help lift her leg).Would also like the side rails 1/13/16   Steven Abrams MD    ACCU-CHEK PARVEZ PLUS test strip TEST BLOOD GLUCOSE AS DIRECTED EVERY DAY 9/2/15   Steven Abrams MD   mupirocin (BACTROBAN) 2 % ointment Use 3 times a day to affected area for one week 6/10/15   Susie Villatoro MD   albuterol (PROAIR HFA, PROVENTIL HFA, VENTOLIN HFA) 108 (90 BASE) MCG/ACT inhaler Inhale 2 puffs into the lungs every 6 hours as needed for shortness of breath / dyspnea 4/15/15   Ilene Davis APRN CNP   budesonide-formoterol (SYMBICORT) 80-4.5 MCG/ACT inhaler Inhale 2 puffs into the lungs 2 times daily 4/15/15   Ilene Davis APRN CNP   acetaminophen (TYLENOL) 500 MG tablet Take 2 tablets by mouth 3 times daily as needed.    Reported, Patient   vitamin  B complex with vitamin C (VITAMIN  B COMPLEX) TABS Take 1 tablet by mouth daily.    Reported, Patient   Resveratrol 100 MG CAPS Take 1 capsule by mouth daily     Reported, Patient   STATIN NOT PRESCRIBED, INTENTIONAL, 1 each continuous prn. Statin not prescribed intentionally due to Other: patient has normal LDL within recommended guidelines on no medications 3/12/13   Steven Abrams MD   ASPIRIN NOT PRESCRIBED, INTENTIONAL, Antiplatelet medication not prescribed intentionally due to Current anticoagulant therapy (warfarin/enoxaparin) 10/8/12   Steven Abrams MD   Calcium Carbonate-Vitamin D (CALCIUM + D PO) Take 1 tablet by mouth 2 times daily.    Unknown, Entered By History   ORDER FOR DME nebuliser 5/23/12   Chip Vernon MD   OMEPRAZOLE CPCR 20 MG OR 1 CAPSULE DAILY    Reported, Patient     Vitals:  /50 (BP Location: Left arm, Patient Position: Sitting)  Pulse 80,     Exam:  GENERAL APPEARANCE: alert and no distress  HENT: mouth without ulcers or lesions, +jvd  LYMPHATICS: no cervical or supraclavicular nodes  RESP: lungs clear to auscultation - no rales, rhonchi or wheezes  CV: regular rhythm, normal rate, no rub, no murmur  EDEMA: + LE edema bilaterally, right > L  ABDOMEN: soft, nondistended, nontender,  bowel sounds normal  MS: extremities normal - no gross deformities noted, no evidence of inflammation in joints, no muscle tenderness  SKIN: no rash      Results:  Recent Results (from the past 336 hour(s))   INR point of care    Collection Time: 06/28/18 12:00 AM   Result Value Ref Range    INR Protime 3.7 (A) 0.86 - 1.14     Component      Latest Ref Rng & Units 6/3/2016 10/27/2016 12/19/2016   Sodium      133 - 144 mmol/L 135 138 136   Potassium      3.4 - 5.3 mmol/L 4.3 4.0 4.1   Chloride      94 - 109 mmol/L 102 103 100   Carbon Dioxide      20 - 32 mmol/L 26 23 23   Anion Gap      3 - 14 mmol/L 7 12 13   Glucose      70 - 99 mg/dL 168 (H) 196 (H) 361 (H)   Urea Nitrogen      7 - 30 mg/dL 32 (H) 27 35 (H)   Creatinine      0.52 - 1.04 mg/dL 1.55 (H) 1.51 (H) 1.89 (H)   GFR Estimate      >60 mL/min/1.7m2 32 (L) 33 (L) 25 (L)   GFR Estimate If Black      >60 mL/min/1.7m2 39 (L) 40 (L) 31 (L)   Calcium      8.5 - 10.1 mg/dL 9.3 8.8 9.6   WBC      4.0 - 11.0 10e9/L  6.7    RBC Count      3.8 - 5.2 10e12/L  4.15    Hemoglobin      11.7 - 15.7 g/dL  12.6    Hematocrit      35.0 - 47.0 %  39.5    MCV      78 - 100 fl  95    MCH      26.5 - 33.0 pg  30.4    MCHC      31.5 - 36.5 g/dL  31.9    RDW      10.0 - 15.0 %  14.4    Platelet Count      150 - 450 10e9/L  232    N-Terminal Pro Bnp      0 - 450 pg/mL 1895 (H) 2796 (H)    Magnesium      1.6 - 2.3 mg/dL 1.8     Hemoglobin A1C      4.3 - 6.0 %   8.9 (H)   Parathyroid Hormone Intact      12 - 72 pg/mL   49   Alkaline Phosphatase      40 - 150 U/L   91   Phosphorus      2.5 - 4.5 mg/dL   2.9   Vitamin D Deficiency screening      20 - 75 ug/L   35   ALT      0 - 50 U/L   19     Emilie Ren MD   of Medicine  Department of Nephrology  [facility]

## 2018-07-19 ENCOUNTER — ANTICOAGULATION THERAPY VISIT (OUTPATIENT)
Dept: NURSING | Facility: CLINIC | Age: 83
End: 2018-07-19
Payer: COMMERCIAL

## 2018-07-19 DIAGNOSIS — Z79.01 LONG-TERM (CURRENT) USE OF ANTICOAGULANTS: ICD-10-CM

## 2018-07-19 DIAGNOSIS — I82.409 DVT (DEEP VENOUS THROMBOSIS) (H): ICD-10-CM

## 2018-07-19 DIAGNOSIS — I48.19 PERSISTENT ATRIAL FIBRILLATION (H): ICD-10-CM

## 2018-07-19 LAB — INR POINT OF CARE: 2.8 (ref 0.86–1.14)

## 2018-07-19 PROCEDURE — 36416 COLLJ CAPILLARY BLOOD SPEC: CPT

## 2018-07-19 PROCEDURE — 85610 PROTHROMBIN TIME: CPT | Mod: QW

## 2018-07-19 PROCEDURE — 99207 ZZC NO CHARGE NURSE ONLY: CPT

## 2018-07-19 NOTE — PROGRESS NOTES
ANTICOAGULATION FOLLOW-UP CLINIC VISIT    Patient Name:  Mara Colindres  Date:  7/19/2018  Contact Type:  Face to Face    SUBJECTIVE:     Patient Findings     Positives No Problem Findings           OBJECTIVE    INR Protime   Date Value Ref Range Status   07/19/2018 2.8 (A) 0.86 - 1.14 Final       ASSESSMENT / PLAN  INR assessment THER    Recheck INR In: 4 WEEKS    INR Location Clinic      Anticoagulation Summary as of 7/19/2018     INR goal 2.0-3.0   Today's INR 2.8   Warfarin maintenance plan 5 mg (5 mg x 1) on Tue, Thu, Sat; 2.5 mg (5 mg x 0.5) all other days   Full warfarin instructions 5 mg on Tue, Thu, Sat; 2.5 mg all other days   Weekly warfarin total 25 mg   No change documented Celeste Ruiz RN   Plan last modified Celeste Ruiz RN (6/28/2018)   Next INR check 8/16/2018   Priority INR   Target end date Indefinite    Indications   Long-term (current) use of anticoagulants [Z79.01] [Z79.01]  DVT (deep venous thrombosis) (H) [I82.409]  Persistent atrial fibrillation (H) [I48.1]         Anticoagulation Episode Summary     INR check location     Preferred lab     Send INR reminders to [facility]    Comments       Anticoagulation Care Providers     Provider Role Specialty Phone number    Steven Abrams MD [facility] Internal Medicine [phone]            See the Encounter Report to view Anticoagulation Flowsheet and Dosing Calendar (Go to Encounters tab in chart review, and find the Anticoagulation Therapy Visit)    Dosage adjustment made based on physician directed care plan.    Celeste Ruiz RN

## 2018-07-19 NOTE — MR AVS SNAPSHOT
Mara CHARLES Jens   7/19/2018 2:30 PM   Anticoagulation Therapy Visit    Description:  83 year old female   Provider:  STALIN ANTI COAG   Department:  [facility]           INR as of 7/19/2018     Today's INR 2.8      Anticoagulation Summary as of 7/19/2018     INR goal 2.0-3.0   Today's INR 2.8   Full warfarin instructions 5 mg on Tue, Thu, Sat; 2.5 mg all other days   Next INR check 8/16/2018    Indications   Long-term (current) use of anticoagulants [Z79.01] [Z79.01]  DVT (deep venous thrombosis) (H) [I82.409]  Persistent atrial fibrillation (H) [I48.1]         Your next Anticoagulation Clinic appointment(s)     Aug 16, 2018  2:30 PM CDT   Anticoagulation Visit with STALIN ANTI LAYO   [facility] ([address] [address] [phone]              Contact Numbers     [facility] [address]  Please call [phone] to cancel and/or reschedule your appointment   Please call [phone] with any problems or questions regarding your therapy.        July 2018 Details    Sun Mon Tue Wed Thu Fri Sat     1               2               3               4               5               6               7                 8               9               10               11               12               13               14                 15               16               17               18               19      5 mg   See details      20      2.5 mg         21      5 mg           22      2.5 mg         23      2.5 mg         24      5 mg         25      2.5 mg         26      5 mg         27      2.5 mg         28      5 mg           29      2.5 mg         30      2.5 mg         31      5 mg              Date Details   07/19 This INR check               How to take your warfarin dose     To take:  2.5 mg Take 0.5 of a 5 mg tablet.    To take:  5 mg Take 1 of the 5 mg tablets.           August 2018 Details    Sun Mon Tue Wed Thu Fri Sat        1      2.5 mg         2      5  mg         3      2.5 mg         4      5 mg           5      2.5 mg         6      2.5 mg         7      5 mg         8      2.5 mg         9      5 mg         10      2.5 mg         11      5 mg           12      2.5 mg         13      2.5 mg         14      5 mg         15      2.5 mg         16            17               18                 19               20               21               22               23               24               25                 26               27               28               29               30               31                 Date Details   No additional details    Date of next INR:  8/16/2018         How to take your warfarin dose     To take:  2.5 mg Take 0.5 of a 5 mg tablet.    To take:  5 mg Take 1 of the 5 mg tablets.

## 2018-07-31 DIAGNOSIS — F33.0 MAJOR DEPRESSIVE DISORDER, RECURRENT EPISODE, MILD (H): ICD-10-CM

## 2018-07-31 RX ORDER — PAROXETINE 20 MG/1
TABLET, FILM COATED ORAL
Qty: 90 TABLET | Refills: 0 | Status: SHIPPED | OUTPATIENT
Start: 2018-07-31 | End: 2019-07-27

## 2018-07-31 NOTE — TELEPHONE ENCOUNTER
PHQ-9 SCORE 5/8/2017 7/27/2017 1/23/2018   Total Score - - -   Total Score 6 5 2     Pt overdue for 6 month recheck with . Please call to schedule.  Erica Adam RN

## 2018-08-16 ENCOUNTER — ANTICOAGULATION THERAPY VISIT (OUTPATIENT)
Dept: NURSING | Facility: CLINIC | Age: 83
End: 2018-08-16
Payer: COMMERCIAL

## 2018-08-16 DIAGNOSIS — I48.19 PERSISTENT ATRIAL FIBRILLATION (H): ICD-10-CM

## 2018-08-16 DIAGNOSIS — Z79.01 LONG-TERM (CURRENT) USE OF ANTICOAGULANTS: ICD-10-CM

## 2018-08-16 DIAGNOSIS — I82.409 DVT (DEEP VENOUS THROMBOSIS) (H): ICD-10-CM

## 2018-08-16 LAB — INR POINT OF CARE: 3.3 (ref 0.86–1.14)

## 2018-08-16 PROCEDURE — 85610 PROTHROMBIN TIME: CPT | Mod: QW

## 2018-08-16 PROCEDURE — 99207 ZZC NO CHARGE NURSE ONLY: CPT

## 2018-08-16 PROCEDURE — 36416 COLLJ CAPILLARY BLOOD SPEC: CPT

## 2018-08-16 NOTE — MR AVS SNAPSHOT
Mara CHARLES Jens   8/16/2018 2:30 PM   Anticoagulation Therapy Visit    Description:  83 year old female   Provider:  STALIN ANTI COAG   Department:  [facility]           INR as of 8/16/2018     Today's INR 3.3!      Anticoagulation Summary as of 8/16/2018     INR goal 2.0-3.0   Today's INR 3.3!   Full warfarin instructions 5 mg on Tue, Thu, Sat; 2.5 mg all other days   Next INR check 9/13/2018    Indications   Long-term (current) use of anticoagulants [Z79.01] [Z79.01]  DVT (deep venous thrombosis) (H) [I82.409]  Persistent atrial fibrillation (H) [I48.1]         Your next Anticoagulation Clinic appointment(s)     Sep 13, 2018  2:30 PM CDT   Anticoagulation Visit with SATLIN ANTI LAYO   [facility] ([address] [address] [phone]              Contact Numbers     [facility]  Please call [phone] to cancel and/or reschedule your appointment   Please call [phone] with any problems or questions regarding your therapy.        August 2018 Details    Sun Mon Tue Wed Thu Fri Sat        1               2               3               4                 5               6               7               8               9               10               11                 12               13               14               15               16      5 mg   See details      17      2.5 mg         18      5 mg           19      2.5 mg         20      2.5 mg         21      5 mg         22      2.5 mg         23      5 mg         24      2.5 mg         25      5 mg           26      2.5 mg         27      2.5 mg         28      5 mg         29      2.5 mg         30      5 mg         31      2.5 mg           Date Details   08/16 This INR check               How to take your warfarin dose     To take:  2.5 mg Take 0.5 of a 5 mg tablet.    To take:  5 mg Take 1 of the 5 mg tablets.           September 2018 Details    Sun Mon Tue Wed Thu Fri Sat           1       5 mg           2      2.5 mg         3      2.5 mg         4      5 mg         5      2.5 mg         6      5 mg         7      2.5 mg         8      5 mg           9      2.5 mg         10      2.5 mg         11      5 mg         12      2.5 mg         13            14               15                 16               17               18               19               20               21               22                 23               24               25               26               27               28               29                 30                      Date Details   No additional details    Date of next INR:  9/13/2018         How to take your warfarin dose     To take:  2.5 mg Take 0.5 of a 5 mg tablet.    To take:  5 mg Take 1 of the 5 mg tablets.

## 2018-08-16 NOTE — PROGRESS NOTES
ANTICOAGULATION FOLLOW-UP CLINIC VISIT    Patient Name:  Mara Colindres  Date:  8/16/2018  Contact Type:  Face to Face    SUBJECTIVE:     Patient Findings     Positives No Problem Findings, Unexplained INR or factor level change    Comments Bleeding Signs/Symptoms: NO  Thromboembolic Signs/Symptoms: NO     Medication Changes:  NO  Dietary Changes:  NO  Bacterial/Viral Infection: NO     Missed Coumadin Doses: NO  Other Concerns:  NO             OBJECTIVE    INR Protime   Date Value Ref Range Status   08/16/2018 3.3 (A) 0.86 - 1.14 Final       ASSESSMENT / PLAN  INR assessment SUPRA    Recheck INR In: 4 WEEKS    INR Location Clinic      Anticoagulation Summary as of 8/16/2018     INR goal 2.0-3.0   Today's INR 3.3!   Warfarin maintenance plan 5 mg (5 mg x 1) on Tue, Thu, Sat; 2.5 mg (5 mg x 0.5) all other days   Full warfarin instructions 5 mg on Tue, Thu, Sat; 2.5 mg all other days   Weekly warfarin total 25 mg   No change documented Celeste Ruiz RN   Plan last modified Celeste Ruiz RN (6/28/2018)   Next INR check 9/13/2018   Priority INR   Target end date Indefinite    Indications   Long-term (current) use of anticoagulants [Z79.01] [Z79.01]  DVT (deep venous thrombosis) (H) [I82.409]  Persistent atrial fibrillation (H) [I48.1]         Anticoagulation Episode Summary     INR check location     Preferred lab     Send INR reminders to [facility] CL[facility]    Comments       Anticoagulation Care Providers     Provider Role Specialty Phone number    Steven Abrams MD [facility] Internal Medicine [phone]            See the Encounter Report to view Anticoagulation Flowsheet and Dosing Calendar (Go to Encounters tab in chart review, and find the Anticoagulation Therapy Visit)    Dosage adjustment made based on physician directed care plan.    Celeste Ruiz RN

## 2018-09-13 ENCOUNTER — OFFICE VISIT (OUTPATIENT)
Dept: FAMILY MEDICINE | Facility: CLINIC | Age: 83
End: 2018-09-13
Payer: COMMERCIAL

## 2018-09-13 ENCOUNTER — ANTICOAGULATION THERAPY VISIT (OUTPATIENT)
Dept: NURSING | Facility: CLINIC | Age: 83
End: 2018-09-13
Payer: COMMERCIAL

## 2018-09-13 VITALS
DIASTOLIC BLOOD PRESSURE: 64 MMHG | BODY MASS INDEX: 44.57 KG/M2 | WEIGHT: 251.6 LBS | HEART RATE: 95 BPM | RESPIRATION RATE: 16 BRPM | OXYGEN SATURATION: 87 % | SYSTOLIC BLOOD PRESSURE: 100 MMHG | TEMPERATURE: 97.1 F

## 2018-09-13 DIAGNOSIS — E11.22 CONTROLLED TYPE 2 DIABETES MELLITUS WITH STAGE 3 CHRONIC KIDNEY DISEASE, WITHOUT LONG-TERM CURRENT USE OF INSULIN (H): ICD-10-CM

## 2018-09-13 DIAGNOSIS — I82.511 CHRONIC DEEP VEIN THROMBOSIS (DVT) OF FEMORAL VEIN OF RIGHT LOWER EXTREMITY (H): ICD-10-CM

## 2018-09-13 DIAGNOSIS — R53.81 PHYSICAL DECONDITIONING: ICD-10-CM

## 2018-09-13 DIAGNOSIS — N18.30 CONTROLLED TYPE 2 DIABETES MELLITUS WITH STAGE 3 CHRONIC KIDNEY DISEASE, WITHOUT LONG-TERM CURRENT USE OF INSULIN (H): ICD-10-CM

## 2018-09-13 DIAGNOSIS — N18.30 CKD (CHRONIC KIDNEY DISEASE) STAGE 3, GFR 30-59 ML/MIN (H): Primary | ICD-10-CM

## 2018-09-13 DIAGNOSIS — I48.19 PERSISTENT ATRIAL FIBRILLATION (H): ICD-10-CM

## 2018-09-13 DIAGNOSIS — E78.5 HYPERLIPIDEMIA LDL GOAL <100: ICD-10-CM

## 2018-09-13 DIAGNOSIS — Z13.29 SCREENING FOR HYPOTHYROIDISM: ICD-10-CM

## 2018-09-13 DIAGNOSIS — I50.32 CHRONIC DIASTOLIC CONGESTIVE HEART FAILURE (H): ICD-10-CM

## 2018-09-13 DIAGNOSIS — Z23 NEED FOR SHINGLES VACCINE: ICD-10-CM

## 2018-09-13 DIAGNOSIS — R09.02 OXYGEN DESATURATION: ICD-10-CM

## 2018-09-13 DIAGNOSIS — J45.40 MODERATE PERSISTENT ASTHMA, UNSPECIFIED WHETHER COMPLICATED: ICD-10-CM

## 2018-09-13 DIAGNOSIS — D22.72 MELANOCYTIC NEVUS OF LEFT LOWER EXTREMITY: ICD-10-CM

## 2018-09-13 DIAGNOSIS — I27.20 PULMONARY HTN (H): ICD-10-CM

## 2018-09-13 DIAGNOSIS — Z79.01 LONG-TERM (CURRENT) USE OF ANTICOAGULANTS: ICD-10-CM

## 2018-09-13 LAB — INR POINT OF CARE: 1.9 (ref 0.86–1.14)

## 2018-09-13 PROCEDURE — 99214 OFFICE O/P EST MOD 30 MIN: CPT | Performed by: INTERNAL MEDICINE

## 2018-09-13 PROCEDURE — 85610 PROTHROMBIN TIME: CPT | Mod: QW

## 2018-09-13 PROCEDURE — 36416 COLLJ CAPILLARY BLOOD SPEC: CPT

## 2018-09-13 PROCEDURE — 99207 ZZC NO CHARGE NURSE ONLY: CPT

## 2018-09-13 ASSESSMENT — ANXIETY QUESTIONNAIRES
IF YOU CHECKED OFF ANY PROBLEMS ON THIS QUESTIONNAIRE, HOW DIFFICULT HAVE THESE PROBLEMS MADE IT FOR YOU TO DO YOUR WORK, TAKE CARE OF THINGS AT HOME, OR GET ALONG WITH OTHER PEOPLE: NOT DIFFICULT AT ALL
3. WORRYING TOO MUCH ABOUT DIFFERENT THINGS: SEVERAL DAYS
5. BEING SO RESTLESS THAT IT IS HARD TO SIT STILL: NOT AT ALL
6. BECOMING EASILY ANNOYED OR IRRITABLE: NOT AT ALL
2. NOT BEING ABLE TO STOP OR CONTROL WORRYING: SEVERAL DAYS
GAD7 TOTAL SCORE: 3
7. FEELING AFRAID AS IF SOMETHING AWFUL MIGHT HAPPEN: NOT AT ALL
1. FEELING NERVOUS, ANXIOUS, OR ON EDGE: SEVERAL DAYS

## 2018-09-13 ASSESSMENT — PATIENT HEALTH QUESTIONNAIRE - PHQ9: 5. POOR APPETITE OR OVEREATING: NOT AT ALL

## 2018-09-13 ASSESSMENT — PAIN SCALES - GENERAL: PAINLEVEL: NO PAIN (0)

## 2018-09-13 NOTE — MR AVS SNAPSHOT
Mara CHARLES Jens   9/13/2018 2:30 PM   Anticoagulation Therapy Visit    Description:  83 year old female   Provider:  STALIN ANTI COAG   Department:  [facility]           INR as of 9/13/2018     Today's INR 1.9!      Anticoagulation Summary as of 9/13/2018     INR goal 2.0-3.0   Today's INR 1.9!   Full warfarin instructions 5 mg on Tue, Thu, Sat; 2.5 mg all other days   Next INR check 10/11/2018    Indications   Long-term (current) use of anticoagulants [Z79.01] [Z79.01]  Persistent atrial fibrillation (H) [I48.1]         Your next Anticoagulation Clinic appointment(s)     Oct 11, 2018  2:30 PM CDT   Anticoagulation Visit with STALIN ANTI COAЕЛЕНА   [facility] ([address] [address] [phone]              Contact Numbers     [facility]  Please call [phone] to cancel and/or reschedule your appointment   Please call [phone] with any problems or questions regarding your therapy.        September 2018 Details    Sun Mon Tue Wed Thu Fri Sat           1                 2               3               4               5               6               7               8                 9               10               11               12               13      5 mg   See details      14      2.5 mg         15      5 mg           16      2.5 mg         17      2.5 mg         18      5 mg         19      2.5 mg         20      5 mg         21      2.5 mg         22      5 mg           23      2.5 mg         24      2.5 mg         25      5 mg         26      2.5 mg         27      5 mg         28      2.5 mg         29      5 mg           30      2.5 mg                Date Details   09/13 This INR check               How to take your warfarin dose     To take:  2.5 mg Take 0.5 of a 5 mg tablet.    To take:  5 mg Take 1 of the 5 mg tablets.           October 2018 Details    Sun Mon Tue Wed Thu Fri Sat      1      2.5 mg         2      5 mg         3       2.5 mg         4      5 mg         5      2.5 mg         6      5 mg           7      2.5 mg         8      2.5 mg         9      5 mg         10      2.5 mg         11            12               13                 14               15               16               17               18               19               20                 21               22               23               24               25               26               27                 28               29               30               31                   Date Details   No additional details    Date of next INR:  10/11/2018         How to take your warfarin dose     To take:  2.5 mg Take 0.5 of a 5 mg tablet.    To take:  5 mg Take 1 of the 5 mg tablets.

## 2018-09-13 NOTE — PROGRESS NOTES
ANTICOAGULATION FOLLOW-UP CLINIC VISIT    Patient Name:  Mara Colindres  Date:  9/13/2018  Contact Type:  Face to Face    SUBJECTIVE:     Patient Findings     Positives No Problem Findings    Comments Bleeding Signs/Symptoms: NO  Thromboembolic Signs/Symptoms: NO     Medication Changes:  NO  Dietary Changes:  NO  Bacterial/Viral Infection: NO     Missed Coumadin Doses: NO  Other Concerns:  NO             OBJECTIVE    INR Protime   Date Value Ref Range Status   09/13/2018 1.9 (A) 0.86 - 1.14 Final       ASSESSMENT / PLAN  INR assessment THER    Recheck INR In: 4 WEEKS    INR Location Clinic      Anticoagulation Summary as of 9/13/2018     INR goal 2.0-3.0   Today's INR 1.9!   Warfarin maintenance plan 5 mg (5 mg x 1) on Tue, Thu, Sat; 2.5 mg (5 mg x 0.5) all other days   Full warfarin instructions 5 mg on Tue, Thu, Sat; 2.5 mg all other days   Weekly warfarin total 25 mg   No change documented Celeste Ruiz RN   Plan last modified Celeste Ruiz RN (6/28/2018)   Next INR check 10/11/2018   Priority INR   Target end date Indefinite    Indications   Long-term (current) use of anticoagulants [Z79.01] [Z79.01]  Persistent atrial fibrillation (H) [I48.1]         Anticoagulation Episode Summary     INR check location     Preferred lab     Send INR reminders to [facility]    Comments       Anticoagulation Care Providers     Provider Role Specialty Phone number    Steven Abrams MD [facility] [phone]            See the Encounter Report to view Anticoagulation Flowsheet and Dosing Calendar (Go to Encounters tab in chart review, and find the Anticoagulation Therapy Visit)    Dosage adjustment made based on physician directed care plan.    Celeste Ruiz RN

## 2018-09-13 NOTE — PROGRESS NOTES
SUBJECTIVE:   Mara Colindres is a 83 year old female who presents to clinic today for the following health issues:    Follow-up  She recently got her paxil prescription refilled, so she does not need that today, but was told she needed to be seen for a depression recheck.     She has been seeing the nephrologist, Dr. Ren, for regular follow ups. She has chronic kidney disease at the bottom of chronic kidney disease stage 3 / upper 20's consistent with early chronic kidney disease stage 4. She hasn't been back to see Dr. Thompson , cardiologist specialized in pulmonary hypertension this year yet to discuss her pulmonary hypertension, though Dr. Ren recommended she see him once a year. She did cut back on her Metoprolol after it was increased because she was getting some lightheadedness. She is clearly agreeing that her shortness of breath these days is quite noteworthy and she can scarcely function anymore. Using a motorized scooter type of vehicle for any outdoors travel and can barely stand long enough for example to cook food at home. She agrees with me that she is concerned that she may need to consider home oxygen, as she has been experiencing more shortness of breath. She does not currently have oxygen at home nor has it ever been prescribed before. She does have a diagnosis of asthma, lifetime non-smoker. She reports that most of the time she feels pretty good. She had her labs checked most recently with Dr. Ren.     She had a fall 2 weeks ago off of her mobile chair onto the side walk. A passersby stopped and help her back up. After this she had widespread pain, and had pain in her neck. She would like her neck looked at today.       Problem list and histories reviewed & adjusted, as indicated.  Additional history: as documented    Patient Active Problem List   Diagnosis     Morbid obesity (H)     Hypertension goal BP (blood pressure) < 140/90     DVT (deep venous thrombosis) (H)     MRSA  (methicillin resistant Staphylococcus aureus)     Chronic diarrhea     HYPERLIPIDEMIA LDL GOAL <100     Chronic anticoagulation     CKD (chronic kidney disease) stage 3, GFR 30-59 ml/min     Septic hip (H)     Lymphedema     Tubular adenoma of colon     Abdominal wall hematoma     Advanced directives, counseling/discussion     Umbilical hernia     History of [place] fever     Moderate persistent asthma     Pseudophakia, ou     Hypovitaminosis D     Bilateral leg pain     Positive YAMINI     Raynaud phenomenon     Pulmonary HTN     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     Scotoma involving central area in visual field, right     Failed total hip arthroplasty, sequela     Major depressive disorder, recurrent episode, mild (H)     Chronic diastolic congestive heart failure (H)     Long-term (current) use of anticoagulants [Z79.01]     Persistent atrial fibrillation (H)     Diabetic polyneuropathy associated with diabetes mellitus due to underlying condition (H)     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, without long-term current use of insulin (H)     Fatigue, unspecified type     Senile osteoporosis     Posterior vitreous detachment of left eye     Background diabetic retinopathy, mild, ou     Past Surgical History:   Procedure Laterality Date     APPENDECTOMY       CATARACT IOL, RT/LT       COLONOSCOPY  2008     COLONOSCOPY  8/20/2012    Procedure: COLONOSCOPY;  COLONOSCOPY, TUBULAR ADENOMA OF COLON, CHRONIC DIARRHEA;  Surgeon: Yobany Hinojosa MD;  Location: [facility] OR     HC REMOVAL GALLBLADDER       JOINT REPLACEMENT, HIP RT/LT  2004    right     JOINT REPLACEMTN, KNEE RT/LT  2000    bilateral     PHACOEMULSIFICATION WITH STANDARD INTRAOCULAR LENS IMPLANT  8/2013    left eye; right eye     ROTATOR CUFF REPAIR RT/LT  1/93    right     TONSILLECTOMY         Social History   Substance Use Topics     Smoking status: Never Smoker     Smokeless tobacco: Never Used      Alcohol use No     Family History   Problem Relation Age of Onset     Diabetes Mother      Cancer Mother      Hypertension Mother      Cataracts Mother      Hypertension Father      Glaucoma Father      Cataracts Father      Cancer Son      thyroid cancer     Neurologic Disorder Sister      Alzheimer Disease Sister          BP Readings from Last 3 Encounters:   09/13/18 100/64   07/09/18 125/50   01/08/18 100/70    Wt Readings from Last 3 Encounters:   09/13/18 114.1 kg (251 lb 9.6 oz)   01/08/18 114.3 kg (252 lb)   11/07/17 113.4 kg (250 lb)         Reviewed and updated as needed this visit by clinical staff  Allergies  Meds       Reviewed and updated as needed this visit by Provider         ROS:  Constitutional, HEENT, cardiovascular, pulmonary, GI, , musculoskeletal, neuro, skin, endocrine and psych systems are negative, except as otherwise noted.    This document serves as a record of the services and decisions personally performed and made by Steven Abrams MD. It was created on his behalf by Leonila Graham, a trained medical scribe. The creation of this document is based on the provider's statements to the medical scribe.  Leonila Graham September 13, 2018 2:12 PM    OBJECTIVE:     /64  Pulse 95  Temp 97.1  F (36.2  C) (Oral)  Resp 16  Wt 114.1 kg (251 lb 9.6 oz)  SpO2 95%  BMI 44.57 kg/m2  Body mass index is 44.57 kg/(m^2).  GENERAL: healthy, alert and no distress, morbidly obese  EYES: Eyes grossly normal to inspection, PERRL and conjunctivae and sclerae normal  NECK: no adenopathy, no asymmetry, masses, or scars and thyroid normal to palpation  RESP: lungs clear to auscultation - no rales, rhonchi or wheezes  CV: regular rate and rhythm, normal S1 S2, no S3 or S4, no murmur, click or rub, no peripheral edema and peripheral pulses strong  MS: no noted musculoskeletal defects of the neck are noted.   SKIN: large seborrheic keratosis noted on right posterior shoulder, age spots seen on the left leg,  advised to watch the size of these spots.   NEURO: mentation intact and speech normal  PSYCH: mentation appears normal, affect normal/bright    Diagnostic Test Results:  No results found for this or any previous visit (from the past 24 hour(s)).    ASSESSMENT/PLAN:     (N18.3) CKD (chronic kidney disease) stage 3, GFR 30-59 ml/min  (primary encounter diagnosis)  Comment: Following with Dr. Ren, labs completed recently.   Plan: Continue following with Dr. Ren.    (J45.40) Moderate persistent asthma, unspecified whether complicated  Comment: No acute exacerbations noted. Her current symptoms of shortness of breath are nothing whatsoever consistent with asthma , she has outright oxygen desaturation easily provoked today with hallway ambulation to test for oxygen saturation.   Plan: no change with asthma treatment     (I27.20) Pulmonary HTN  Comment: Will check oxygen saturations with walking today. Oxygen saturations dropped to 86% with mild activity, patient would qualify for home oxygen, which would help her increase her activity level.   Plan: Offered patient home oxygen, she will think about starting this but declines for today . I don't understand any coherent pathological process here besides pulmonary hypertension . Follow-up with Dr. Thompson is advised . This is an extremely serious diagnosis and there is evidence of worsened status     (I50.32) Chronic diastolic congestive heart failure (H)  Comment: secondary to pulmonary hypertension in my opinion.  Plan: I don't see role for echocardiograms today     (R53.81) Physical deconditioning  Comment: patient wanted to believe that her becoming so easily out of breath  Was just form physical deconditioning but commented that being old and out of shape doesn't generally provoke oxygen dependency / desaturation    Plan: follow up with Dr. Thompson, Please let me know if you have any questions for me for getting set up with home oxygen !    (E11.22,  N18.3)  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, without long-term current use of insulin (H)  Comment: Patient not due for any labs today.   Plan: no change at this time.     (I82.511) Chronic deep vein thrombosis (DVT) of femoral vein of right lower extremity (H)  Comment: Patient needs renewal of INR clinic order  Plan: INR CLINIC REFERRAL          (Z13.29) Screening for hypothyroidism  Comment: Future labs orders placed.   Plan: **TSH with free T4 reflex FUTURE anytime          (E78.5) Hyperlipidemia LDL goal <100  Comment: Patient may complete in the future, no plan for treating at this time.   Plan: Lipid panel reflex to direct LDL Fasting     (Z23) Need for shingles vaccine  Comment: Patient defers shingles vaccine today, but recommended that she consider this in the future.   Plan: as above     (D22.72) Melanocytic nevus of left lower extremity  Comment: Does not appear to be malignant at this time  Plan: watch for changes/growth of the lesion.         The information in this document, created by the medical scribe for me, accurately reflects the services I personally performed and the decisions made by me. I have reviewed and approved this document for accuracy.   MD Steven Patterson MD  [facility]

## 2018-09-13 NOTE — LETTER
My Depression Action Plan  Name: Mara Colindres   Date of Birth 11/30/1934  Date: 9/13/2018    My doctor: Steven Abrams   My clinic: [address]  [place] [address]  [phone]          GREEN    ZONE   Good Control    What it looks like:     Things are going generally well. You have normal up s and down s. You may even feel depressed from time to time, but bad moods usually last less than a day.   What you need to do:  1. Continue to care for yourself (see self care plan)  2. Check your depression survival kit and update it as needed  3. Follow your physician s recommendations including any medication.  4. Do not stop taking medication unless you consult with your physician first.           YELLOW         ZONE Getting Worse    What it looks like:     Depression is starting to interfere with your life.     It may be hard to get out of bed; you may be starting to isolate yourself from others.    Symptoms of depression are starting to last most all day and this has happened for several days.     You may have suicidal thoughts but they are not constant.   What you need to do:     1. Call your care team, your response to treatment will improve if you keep your care team informed of your progress. Yellow periods are signs an adjustment may need to be made.     2. Continue your self-care, even if you have to fake it!    3. Talk to someone in your support network    4. Open up your depression survival kit           RED    ZONE Medical Alert - Get Help    What it looks like:     Depression is seriously interfering with your life.     You may experience these or other symptoms: You can t get out of bed most days, can t work or engage in other necessary activities, you have trouble taking care of basic hygiene, or basic responsibilities, thoughts of suicide or death that will not go away, self-injurious behavior.     What you need to do:  1. Call your care team and  request a same-day appointment. If they are not available (weekends or after hours) call your local crisis line, emergency room or 911.            Depression Self Care Plan / Survival Kit    Self-Care for Depression  Here s the deal. Your body and mind are really not as separate as most people think.  What you do and think affects how you feel and how you feel influences what you do and think. This means if you do things that people who feel good do, it will help you feel better.  Sometimes this is all it takes.  There is also a place for medication and therapy depending on how severe your depression is, so be sure to consult with your medical provider and/ or Behavioral Health Consultant if your symptoms are worsening or not improving.     In order to better manage my stress, I will:    Exercise  Get some form of exercise, every day. This will help reduce pain and release endorphins, the  feel good  chemicals in your brain. This is almost as good as taking antidepressants!  This is not the same as joining a gym and then never going! (they count on that by the way ) It can be as simple as just going for a walk or doing some gardening, anything that will get you moving.      Hygiene   Maintain good hygiene (Get out of bed in the morning, Make your bed, Brush your teeth, Take a shower, and Get dressed like you were going to work, even if you are unemployed).  If your clothes don't fit try to get ones that do.    Diet  I will strive to eat foods that are good for me, drink plenty of water, and avoid excessive sugar, caffeine, alcohol, and other mood-altering substances.  Some foods that are helpful in depression are: complex carbohydrates, B vitamins, flaxseed, fish or fish oil, fresh fruits and vegetables.    Psychotherapy  I agree to participate in Individual Therapy (if recommended).    Medication  If prescribed medications, I agree to take them.  Missing doses can result in serious side effects.  I understand that  drinking alcohol, or other illicit drug use, may cause potential side effects.  I will not stop my medication abruptly without first discussing it with my provider.    Staying Connected With Others  I will stay in touch with my friends, family members, and my primary care provider/team.    Use your imagination  Be creative.  We all have a creative side; it doesn t matter if it s oil painting, sand castles, or mud pies! This will also kick up the endorphins.    Witness Beauty  (AKA stop and smell the roses) Take a look outside, even in mid-winter. Notice colors, textures. Watch the squirrels and birds.     Service to others  Be of service to others.  There is always someone else in need.  By helping others we can  get out of ourselves  and remember the really important things.  This also provides opportunities for practicing all the other parts of the program.    Humor  Laugh and be silly!  Adjust your TV habits for less news and crime-drama and more comedy.    Control your stress  Try breathing deep, massage therapy, biofeedback, and meditation. Find time to relax each day.     My support system    Clinic Contact:  Phone number:    Contact 1:  Phone number:    Contact 2:  Phone number:    Orthodoxy/:  Phone number:    Therapist:  Phone number:    Local crisis center:    Phone number:    Other community support:  Phone number:

## 2018-09-13 NOTE — PATIENT INSTRUCTIONS
Check at the pharmacy for coverage of the new shingles vaccine, Shingrix. If it is not covered now, it may be covered next year, or later in the year. Shingrix is given in a 2 shot series, between 2 and 6 months apart. It is recommended for adults age 50 and older. Initial studies have indicated that this new vaccine is 85-90% effective at preventing shingles, and is preferred over the old Zostavax vaccine.     Try to get in for your flu shot before the flu season starts in November.     Follow-up in 6 months for recheck.     [facility]    If you have any questions regarding to your visit please contact your care team:     Team Pink:   Clinic Hours Telephone Number   Internal Medicine:  Dr. Alaina Davis, NP 7am-7pm  Monday - Thursday   7am-5pm  Fridays  [phone]  (Appointment scheduling available 24/7)   Urgent [facility] - [place] [facility] [place] - 11am-9pm Monday-Friday Saturday-Sunday- 9am-5pm   [place] - 5pm-9pm Monday-Friday Saturday-Sunday- 9am-5pm  [phone] - [place]  [phone] - [place]       What options do I have for a visit other than an office visit? We offer electronic visits (e-visits) and telephone visits, when medically appropriate.  Please check with your medical insurance to see if these types of visits are covered, as you will be responsible for any charges that are not paid by your insurance.      You can use [employer] (secure electronic communication) to access to your chart, send your primary care provider a message, or make an appointment. Ask a team member how to get started.     For a price quote for your services, please call our Consumer Price Line at [phone] or our Imaging Cost estimation line at [phone] (for imaging tests).  Maria Del Carmen Mosqueda MA

## 2018-09-13 NOTE — NURSING NOTE
O2 94% on RA at Rest.   O2 87% on RA with ambulation.  02 96% on RA after resting 5 minutes. Maria Del Carmen Mosqueda MA     None

## 2018-09-13 NOTE — MR AVS SNAPSHOT
After Visit Summary   9/13/2018    Mara Colindres    MRN: [id number]           Patient Information     Date Of Birth          11/30/1934        Visit Information        Provider Department      9/13/2018 1:50 PM Steven Abrams MD [facility]        Today's Diagnoses     CKD (chronic kidney disease) stage 3, GFR 30-59 ml/min    -  1    Moderate persistent asthma, unspecified whether complicated        Pulmonary HTN        Chronic diastolic congestive heart failure (H)        Physical deconditioning        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, without long-term current use of insulin (H)        Chronic deep vein thrombosis (DVT) of femoral vein of right lower extremity (H)        Screening for hypothyroidism        Hyperlipidemia LDL goal <100        Need for shingles vaccine        Melanocytic nevus of left lower extremity          Care Instructions    Check at the pharmacy for coverage of the new shingles vaccine, Shingrix. If it is not covered now, it may be covered next year, or later in the year. Shingrix is given in a 2 shot series, between 2 and 6 months apart. It is recommended for adults age 50 and older. Initial studies have indicated that this new vaccine is 85-90% effective at preventing shingles, and is preferred over the old Zostavax vaccine.     Try to get in for your flu shot before the flu season starts in November.     Follow-up in 6 months for recheck.     [facility]    If you have any questions regarding to your visit please contact your care team:     Team Pink:   Clinic Hours Telephone Number   Internal Medicine:  Dr. Alaina Davis NP 7am-7pm  Monday - Thursday   7am-5pm  Fridays  [phone]  (Appointment scheduling available 24/7)   Urgent Care - [place] - 11am-9pm Monday-Friday Saturday-Sunday- 9am-5pm   [place] - 5pm-9pm Monday-Friday  Saturday-Sunday- 9am-5pm  [phone] - Jocelyn Schultz  [phone] - [place]       What options do I have for a visit other than an office visit? We offer electronic visits (e-visits) and telephone visits, when medically appropriate.  Please check with your medical insurance to see if these types of visits are covered, as you will be responsible for any charges that are not paid by your insurance.      You can use Explore Engage (secure electronic communication) to access to your chart, send your primary care provider a message, or make an appointment. Ask a team member how to get started.     For a price quote for your services, please call our Consumer Price Line at [phone] or our Imaging Cost estimation line at [phone] (for imaging tests).  Maria Del Carmen Mosqueda MA            Follow-ups after your visit        Additional Services     INR CLINIC REFERRAL       Your provider has referred you to INR Services.    Please be aware that coverage of these services is subject to the terms and limitations of your health insurance plan.  Call member services at your health plan with any benefit or coverage questions.    Indication for Anticoagulation: DVT (recurrent)  If nonstandard INR is desired, indicate goal range and explanation:   Expected Duration of Therapy: Lifetime                  Follow-up notes from your care team     Return in about 6 months (around 3/13/2019) for Medication Re-check.      Future tests that were ordered for you today     Open Future Orders        Priority Expected Expires Ordered    Lipid panel reflex to direct LDL Fasting Routine 9/13/2018 9/13/2019 9/13/2018    **TSH with free T4 reflex FUTURE anytime Routine 9/13/2018 9/13/2019 9/13/2018            Who to contact     If you have questions or need follow up information about today's clinic visit or your schedule please contact [facility] directly at [phone].  Normal or non-critical lab and imaging results will be communicated  to you by MyChart, letter or phone within 4 business days after the clinic has received the results. If you do not hear from us within 7 days, please contact the clinic through MyChart or phone. If you have a critical or abnormal lab result, we will notify you by phone as soon as possible.  Submit refill requests through [employer] or call your pharmacy and they will forward the refill request to us. Please allow 3 business days for your refill to be completed.          Additional Information About Your Visit        Care EveryWhere ID     This is your Care EveryWhere ID. This could be used by other organizations to access your [place] medical records  [id number]        Your Vitals Were     Pulse Temperature Respirations Pulse Oximetry BMI (Body Mass Index)       95 97.1  F (36.2  C) (Oral) 16 95% 44.57 kg/m2        Blood Pressure from Last 3 Encounters:   09/13/18 100/64   07/09/18 125/50   01/08/18 100/70    Weight from Last 3 Encounters:   09/13/18 251 lb 9.6 oz (114.1 kg)   01/08/18 252 lb (114.3 kg)   11/07/17 250 lb (113.4 kg)              We Performed the Following     INR CLINIC REFERRAL        Primary Care Provider Office Phone # Fax #    Steven Abrams [phone] [address] [address]        Equal Access to Services     [facility] LENA : Hadii aad ku hadasho Soomaali, waaxda luqadaha, qaybta kaalmada adeeliezeryada, scott rodriguez. So [facility] [phone].    ATENCIÓN: Si habla español, tiene a mckeon disposición servicios gratuitos de asistencia lingüística. Llame al [phone].    We comply with applicable federal civil rights laws and [place] laws. We do not discriminate on the basis of race, color, national origin, age, disability, sex, sexual orientation, or gender identity.            Thank you!     Thank you for choosing [facility]  for your care. Our goal is always to provide you with excellent care. Hearing back from our  patients is one way we can continue to improve our services. Please take a few minutes to complete the written survey that you may receive in the mail after your visit with us. Thank you!             Your Updated Medication List - Protect others around you: Learn how to safely use, store and throw away your medicines at www.disposemymeds.org.          This list is accurate as of 9/13/18  2:33 PM.  Always use your most recent med list.                   Brand Name Dispense Instructions for use Diagnosis    ACCU-CHEK PARVEZ PLUS test strip   Generic drug:  blood glucose monitoring     350 strip    USE TO TEST FOUR TIMES DAILY OR AS DIRECTED    Type 2 diabetes, HbA1C goal < 8% (H)       * albuterol 108 (90 Base) MCG/ACT inhaler    PROAIR HFA/PROVENTIL HFA/VENTOLIN HFA    3 Inhaler    Inhale 2 puffs into the lungs every 6 hours as needed for shortness of breath / dyspnea    Moderate persistent asthma with exacerbation       * albuterol (2.5 MG/3ML) 0.083% neb solution     30 vial    Take 3 mLs (2.5 mg) by nebulization every 4 hours as needed for shortness of breath / dyspnea    Moderate persistent asthma with exacerbation       * ASPIRIN NOT PRESCRIBED    INTENTIONAL    0 each    Antiplatelet medication not prescribed intentionally due to Current anticoagulant therapy (warfarin/enoxaparin)    Type 2 diabetes, HbA1C goal < 8% (H)       budesonide-formoterol 80-4.5 MCG/ACT Inhaler    SYMBICORT    10.2 g    INHALE 2 PUFFS INTO THE LUNGS TWICE DAILY    Moderate persistent asthma with exacerbation       CALCIUM + D PO      Take 1 tablet by mouth 2 times daily.        diphenoxylate-atropine 2.5-0.025 MG per tablet    LOMOTIL    40 tablet    TAKE 1 TABLET BY MOUTH FOUR TIMES DAILY    Chronic diarrhea       dulaglutide 1.5 MG/0.5ML pen    TRULICITY    0.5 mL    Inject 1.5 mg Subcutaneous every 7 days    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, without long-term current use of insulin (H)        furosemide 40 MG tablet    LASIX    180 tablet    Take 1 tablet (40 mg) by mouth daily    Chronic kidney disease, stage III (moderate)       guaiFENesin-codeine 100-10 MG/5ML Soln solution    ROBITUSSIN AC    120 mL    Take 10 mLs by mouth every 4 hours as needed for cough    Cough, Moderate persistent asthma with exacerbation       HYDROcodone-acetaminophen 5-325 MG per tablet    NORCO    90 tablet    1 tab QID AS NEEDED    Primary osteoarthritis involving multiple joints       losartan 25 MG tablet    COZAAR    180 tablet    Take 1 tablet (25 mg) by mouth 2 times daily    Pulmonary HTN       metoprolol tartrate 100 MG tablet    LOPRESSOR    90 tablet    Take 0.5 tablets (50 mg) by mouth 2 times daily Patient lowered to 50mg twice a day as she was tired and low blood pressure    Congestive heart failure, unspecified congestive heart failure chronicity, unspecified congestive heart failure type (H), Pulmonary HTN       OMEPRAZOLE CPCR 20 MG OR      1 CAPSULE DAILY        * order for DME     1 Device    nebuliser    Acute bronchitis treated with antibiotics in the past 60 days       order for DME     1 each    Reasoning for requesting the hospital bed: Patient had multiple complications following hip replacement(blood clot/infection/removal of hip replacement) uses walker to ambulate now,she has a very hard time trying to lift her right foot. Hard to get herself into bed because of the height. She lives alone.  She uses devices at home to get herself into bed( step stool/extension to help lift her leg).Would also like the side rails    Failed total hip arthroplasty, sequela, Septic hip (H), Lymphedema, Bilateral leg pain       PARoxetine 20 MG tablet    PAXIL    90 tablet    TAKE 1 TABLET(20 MG) BY MOUTH DAILY    Major depressive disorder, recurrent episode, mild (H)       potassium chloride 10 MEQ tablet    K-TAB,KLOR-CON    180 tablet    Take 0.5 tablets (5 mEq) by mouth daily    Diuretic-induced hypokalemia        Resveratrol 100 MG Caps      Take 1 capsule by mouth daily        * STATIN NOT PRESCRIBED (INTENTIONAL)     0 each    1 each continuous prn. Statin not prescribed intentionally due to Other: patient has normal LDL within recommended guidelines on no medications    Hyperlipidemia LDL goal <100       TYLENOL 500 MG tablet   Generic drug:  acetaminophen      Take 2 tablets by mouth 3 times daily as needed.        warfarin 5 MG tablet    COUMADIN    70 tablet    TAKE ONE-HALF TABLET BY MOUTH ON MONDAY,WED,AND FRIDAY, AND TAKE 1 TABLET BY MOUTH ALL OTHER DAYS    Long-term (current) use of anticoagulants, Persistent atrial fibrillation (H)       * Notice:  This list has 5 medication(s) that are the same as other medications prescribed for you. Read the directions carefully, and ask your doctor or other care provider to review them with you.

## 2018-09-14 ENCOUNTER — TELEPHONE (OUTPATIENT)
Dept: INTERNAL MEDICINE | Facility: CLINIC | Age: 83
End: 2018-09-14

## 2018-09-14 DIAGNOSIS — I27.20 PULMONARY HTN (H): Primary | ICD-10-CM

## 2018-09-14 DIAGNOSIS — R09.02 OXYGEN DESATURATION: ICD-10-CM

## 2018-09-14 ASSESSMENT — ANXIETY QUESTIONNAIRES: GAD7 TOTAL SCORE: 3

## 2018-09-14 ASSESSMENT — ASTHMA QUESTIONNAIRES: ACT_TOTALSCORE: 17

## 2018-09-14 ASSESSMENT — PATIENT HEALTH QUESTIONNAIRE - PHQ9: SUM OF ALL RESPONSES TO PHQ QUESTIONS 1-9: 3

## 2018-09-14 NOTE — TELEPHONE ENCOUNTER
Please enter the data into the prescription for durable medical equipment or supplies fields and reroute for signing    Steven Abrams MD

## 2018-09-14 NOTE — TELEPHONE ENCOUNTER
Reason for Call: Request for an order or referral:    Order or referral being requested: oxygen     Date needed: as soon as possible    Has the patient been seen by the PCP for this problem? YES    Additional comments: she would like to get oxygen. Call and let know.     Phone number Patient can be reached at:  Home number on file [phone] (home)    Best Time:  Any     Can we leave a detailed message on this number?  YES    Call taken on 9/14/2018 at 10:08 AM by Katlin Mcarthur

## 2018-09-14 NOTE — TELEPHONE ENCOUNTER
Lets see if we have the needed data to answer these questions     Oxygen at ? liters while at rest is at ? %.  Oxygen at ? liters walking is at ? %.     Without oxygen at rest is at ? %.  Without oxygen while walking is at ? %.    If we can't answer these questions patient will need quick ancillary appointment to get the complete set of oxygen levels to get prescription for durable medical equipment or supplies approved. Please huddle with me on this case !    Steven Abrams MD

## 2018-09-14 NOTE — NURSING NOTE
Oxygen resting at room air was 94%.   Oxygen on room air walking about 50 feet was 87% then after returning to room dropped lower to 86%.   With oxygen at 2 liters resting was 97%.   With oxygen at 2 liters walking about 50 feet was only at 91%.     Kira CANTU CMA ([facility])

## 2018-09-17 NOTE — TELEPHONE ENCOUNTER
Prescription for durable medical equipment or supplies is signed. Alert patient to this fact. lets get this going as soon as possible !    Steven Abrams MD

## 2018-09-21 ENCOUNTER — MEDICAL CORRESPONDENCE (OUTPATIENT)
Dept: HEALTH INFORMATION MANAGEMENT | Facility: CLINIC | Age: 83
End: 2018-09-21

## 2018-09-27 DIAGNOSIS — N18.30 CONTROLLED TYPE 2 DIABETES MELLITUS WITH STAGE 3 CHRONIC KIDNEY DISEASE, WITHOUT LONG-TERM CURRENT USE OF INSULIN (H): ICD-10-CM

## 2018-09-27 DIAGNOSIS — E11.22 CONTROLLED TYPE 2 DIABETES MELLITUS WITH STAGE 3 CHRONIC KIDNEY DISEASE, WITHOUT LONG-TERM CURRENT USE OF INSULIN (H): ICD-10-CM

## 2018-09-28 RX ORDER — DULAGLUTIDE 1.5 MG/.5ML
INJECTION, SOLUTION SUBCUTANEOUS
Qty: 2 ML | Refills: 0 | Status: SHIPPED | OUTPATIENT
Start: 2018-09-28 | End: 2018-10-25

## 2018-09-28 NOTE — TELEPHONE ENCOUNTER
"Routing refill request to provider for review/approval because:  Labs out of range:    Labs not current:        Requested Prescriptions   Pending Prescriptions Disp Refills     TRULICITY 1.5 MG/0.5ML pen [Pharmacy Med Name: TRULICITY 1.5MG/0.5ML SDP 4X0.5ML]  Last Written Prescription Date:  9/25/17  Last Fill Quantity: 0.5 mL,  # refills: 11   Last office visit: 11/7/2017 with prescribing provider:     Future Office Visit:     2 mL 0     Sig: INJECT 1.5 MG SUBCUTANEOUSLY EVERY 7 DAYS    GLP-1 Agonists Protocol Failed    9/28/2018  7:20 AM       Failed - LDL on file in past 12 months    Recent Labs   Lab Test  07/27/17   1448   LDL  66            Failed - Normal serum creatinine on file in past 12 months    Recent Labs   Lab Test  07/09/18   1435   CR  1.67*            Passed - Blood pressure less than 140/90 in past 6 months    BP Readings from Last 3 Encounters:   09/13/18 100/64   07/09/18 125/50   01/08/18 100/70                Passed - Microalbumin on file in past 12 months    Recent Labs   Lab Test  11/07/17   1510   MICROL  45   UMALCR  21.34            Passed - HgbA1C in past 3 or 6 months    If HgbA1C is 8 or greater, it needs to be on file within the past 3 months.  If less than 8, must be on file within the past 6 months.     Recent Labs   Lab Test  07/09/18   1435   A1C  7.3*            Passed - Patient is age 18 or older       Passed - No active pregnancy on record       Passed - No positive pregnancy test in past 12 months       Passed - Recent (6 mo) or future (30 days) visit within the authorizing provider's specialty    Patient had office visit in the last 6 months or has a visit in the next 30 days with authorizing provider.  See \"Patient Info\" tab in inbasket, or \"Choose Columns\" in Meds & Orders section of the refill encounter.            Celeste Ruiz, AMAYA - BC      "

## 2018-10-18 ENCOUNTER — ANTICOAGULATION THERAPY VISIT (OUTPATIENT)
Dept: NURSING | Facility: CLINIC | Age: 83
End: 2018-10-18
Payer: COMMERCIAL

## 2018-10-18 DIAGNOSIS — I48.19 PERSISTENT ATRIAL FIBRILLATION (H): ICD-10-CM

## 2018-10-18 DIAGNOSIS — Z23 NEED FOR PROPHYLACTIC VACCINATION AND INOCULATION AGAINST INFLUENZA: Primary | ICD-10-CM

## 2018-10-18 LAB — INR POINT OF CARE: 3 (ref 0.86–1.14)

## 2018-10-18 PROCEDURE — 36416 COLLJ CAPILLARY BLOOD SPEC: CPT

## 2018-10-18 PROCEDURE — 85610 PROTHROMBIN TIME: CPT | Mod: QW

## 2018-10-18 PROCEDURE — G0008 ADMIN INFLUENZA VIRUS VAC: HCPCS

## 2018-10-18 PROCEDURE — 90662 IIV NO PRSV INCREASED AG IM: CPT

## 2018-10-18 NOTE — PROGRESS NOTES
Injectable Influenza Immunization Documentation    1.  Is the person to be vaccinated sick today?   No    2. Does the person to be vaccinated have an allergy to a component   of the vaccine?   No  Egg Allergy Algorithm Link    3. Has the person to be vaccinated ever had a serious reaction   to influenza vaccine in the past?   No    4. Has the person to be vaccinated ever had Guillain-Barré syndrome?   No    Form completed by Celeste Ruiz RN - BC               ANTICOAGULATION FOLLOW-UP CLINIC VISIT    Patient Name:  Mara Colindres  Date:  10/18/2018  Contact Type:  Face to Face    SUBJECTIVE:     Patient Findings     Positives No Problem Findings    Comments Bleeding Signs/Symptoms: NO  Thromboembolic Signs/Symptoms: NO     Medication Changes:  NO  Dietary Changes:  NO  Bacterial/Viral Infection: NO     Missed Coumadin Doses: NO  Other Concerns:  NO             OBJECTIVE    INR Protime   Date Value Ref Range Status   10/18/2018 3.0 (A) 0.86 - 1.14 Final       ASSESSMENT / PLAN  INR assessment THER    Recheck INR In: 6 WEEKS    INR Location Clinic      Anticoagulation Summary as of 10/18/2018     INR goal 2.0-3.0   Today's INR 3.0   Warfarin maintenance plan 5 mg (5 mg x 1) on Tue, Thu, Sat; 2.5 mg (5 mg x 0.5) all other days   Full warfarin instructions 5 mg on Tue, Thu, Sat; 2.5 mg all other days   Weekly warfarin total 25 mg   No change documented Celeste Ruiz RN   Plan last modified Celeste Ruiz RN (6/28/2018)   Next INR check 11/29/2018   Priority INR   Target end date Indefinite    Indications   Long-term (current) use of anticoagulants [Z79.01] [Z79.01]  Persistent atrial fibrillation (H) [I48.1]         Anticoagulation Episode Summary     INR check location     Preferred lab     Send INR reminders to [facility] CL[facility]    Comments       Anticoagulation Care Providers     Provider Role Specialty Phone number    Stevne Abrams MD [facility] Internal Medicine [phone]            See the Encounter  Report to view Anticoagulation Flowsheet and Dosing Calendar (Go to Encounters tab in chart review, and find the Anticoagulation Therapy Visit)    Dosage adjustment made based on physician directed care plan.    Celeste uRiz RN

## 2018-10-18 NOTE — MR AVS SNAPSHOT
Mara CHARLES Jens   10/18/2018 2:30 PM   Anticoagulation Therapy Visit    Description:  83 year old female   Provider:  STALIN ANTI COAG   Department:  [facility]           INR as of 10/18/2018     Today's INR 3.0      Anticoagulation Summary as of 10/18/2018     INR goal 2.0-3.0   Today's INR 3.0   Full warfarin instructions 5 mg on Tue, Thu, Sat; 2.5 mg all other days   Next INR check 11/29/2018    Indications   Long-term (current) use of anticoagulants [Z79.01] [Z79.01]  Persistent atrial fibrillation (H) [I48.1]         Your next Anticoagulation Clinic appointment(s)     Nov 29, 2018  2:30 PM CST   Anticoagulation Visit with STALIN ANTI LAYO   [facility] ([facility]    [address] [address] [phone]              Contact Numbers     [facility]  Please call [phone] to cancel and/or reschedule your appointment   Please call [phone] with any problems or questions regarding your therapy.        October 2018 Details    Sun Mon Tue Wed Thu Fri Sat      1               2               3               4               5               6                 7               8               9               10               11               12               13                 14               15               16               17               18      5 mg   See details      19      2.5 mg         20      5 mg           21      2.5 mg         22      2.5 mg         23      5 mg         24      2.5 mg         25      5 mg         26      2.5 mg         27      5 mg           28      2.5 mg         29      2.5 mg         30      5 mg         31      2.5 mg             Date Details   10/18 This INR check               How to take your warfarin dose     To take:  2.5 mg Take 0.5 of a 5 mg tablet.    To take:  5 mg Take 1 of the 5 mg tablets.           November 2018 Details    Sun Mon Tue Wed Thu Fri Sat         1      5 mg         2      2.5 mg         3      5 mg            4      2.5 mg         5      2.5 mg         6      5 mg         7      2.5 mg         8      5 mg         9      2.5 mg         10      5 mg           11      2.5 mg         12      2.5 mg         13      5 mg         14      2.5 mg         15      5 mg         16      2.5 mg         17      5 mg           18      2.5 mg         19      2.5 mg         20      5 mg         21      2.5 mg         22      5 mg         23      2.5 mg         24      5 mg           25      2.5 mg         26      2.5 mg         27      5 mg         28      2.5 mg         29            30                 Date Details   No additional details    Date of next INR:  11/29/2018         How to take your warfarin dose     To take:  2.5 mg Take 0.5 of a 5 mg tablet.    To take:  5 mg Take 1 of the 5 mg tablets.

## 2018-10-25 DIAGNOSIS — E11.22 CONTROLLED TYPE 2 DIABETES MELLITUS WITH STAGE 3 CHRONIC KIDNEY DISEASE, WITHOUT LONG-TERM CURRENT USE OF INSULIN (H): ICD-10-CM

## 2018-10-25 DIAGNOSIS — N18.30 CONTROLLED TYPE 2 DIABETES MELLITUS WITH STAGE 3 CHRONIC KIDNEY DISEASE, WITHOUT LONG-TERM CURRENT USE OF INSULIN (H): ICD-10-CM

## 2018-10-26 NOTE — TELEPHONE ENCOUNTER
"Routing refill request to provider for review/approval because:  Failed [facility] refill protocol, see below:      Requested Prescriptions   Pending Prescriptions Disp Refills     TRULICITY 1.5 MG/0.5ML pen [Pharmacy Med Name: TRULICITY 1.5MG/0.5ML SDP 4X0.5ML]  Last Written Prescription Date:  9/28/18  Last Fill Quantity: 2 mL,  # refills: 0   Last office visit: 11/7/2017 with prescribing provider:     Future Office Visit:     2 mL 0     Sig: INJECT 1.5 MG SUBCUTANEOUS EVERY 7 DAYS    GLP-1 Agonists Protocol Failed    10/26/2018  7:29 AM       Failed - LDL on file in past 12 months    Recent Labs   Lab Test  07/27/17   1448   LDL  66            Failed - Normal serum creatinine on file in past 12 months    Recent Labs   Lab Test  07/09/18   1435   CR  1.67*            Passed - Blood pressure less than 140/90 in past 6 months    BP Readings from Last 3 Encounters:   09/13/18 100/64   07/09/18 125/50   01/08/18 100/70                Passed - Microalbumin on file in past 12 months    Recent Labs   Lab Test  11/07/17   1510   MICROL  45   UMALCR  21.34            Passed - HgbA1C in past 3 or 6 months    If HgbA1C is 8 or greater, it needs to be on file within the past 3 months.  If less than 8, must be on file within the past 6 months.     Recent Labs   Lab Test  07/09/18   1435   A1C  7.3*            Passed - Patient is age 18 or older       Passed - No active pregnancy on record       Passed - No positive pregnancy test in past 12 months       Passed - Recent (6 mo) or future (30 days) visit within the authorizing provider's specialty    Patient had office visit in the last 6 months or has a visit in the next 30 days with authorizing provider.  See \"Patient Info\" tab in inbasket, or \"Choose Columns\" in Meds & Orders section of the refill encounter.            Celeste Ruiz RN - BC      "

## 2018-10-29 RX ORDER — DULAGLUTIDE 1.5 MG/.5ML
INJECTION, SOLUTION SUBCUTANEOUS
Qty: 2 ML | Refills: 0 | Status: SHIPPED | OUTPATIENT
Start: 2018-10-29 | End: 2018-11-23

## 2018-11-23 DIAGNOSIS — E11.22 CONTROLLED TYPE 2 DIABETES MELLITUS WITH STAGE 3 CHRONIC KIDNEY DISEASE, WITHOUT LONG-TERM CURRENT USE OF INSULIN (H): ICD-10-CM

## 2018-11-23 DIAGNOSIS — N18.30 CONTROLLED TYPE 2 DIABETES MELLITUS WITH STAGE 3 CHRONIC KIDNEY DISEASE, WITHOUT LONG-TERM CURRENT USE OF INSULIN (H): ICD-10-CM

## 2018-11-23 RX ORDER — DULAGLUTIDE 1.5 MG/.5ML
INJECTION, SOLUTION SUBCUTANEOUS
Qty: 2 ML | Refills: 0 | Status: SHIPPED | OUTPATIENT
Start: 2018-11-23 | End: 2018-12-20

## 2018-11-23 NOTE — TELEPHONE ENCOUNTER
"Routing refill request to provider for review/approval because:  Labs out of range:    Labs not current:        Requested Prescriptions   Pending Prescriptions Disp Refills     TRULICITY 1.5 MG/0.5ML pen [Pharmacy Med Name: TRULICITY 1.5MG/0.5ML SDP 4X0.5ML]  Last Written Prescription Date:  10/29/18  Last Fill Quantity: 2 mL,  # refills: 0   Last office visit: 11/7/2017 with prescribing provider:     Future Office Visit:     2 mL 0     Sig: INJECT 1.5 MG SUBCUTANEOUSLY EVERY 7 DAYS    GLP-1 Agonists Protocol Failed    11/23/2018  1:34 PM       Failed - LDL on file in past 12 months    Recent Labs   Lab Test  07/27/17   1448   LDL  66            Failed - Normal serum creatinine on file in past 12 months    Recent Labs   Lab Test  07/09/18   1435   CR  1.67*            Passed - Blood pressure less than 140/90 in past 6 months    BP Readings from Last 3 Encounters:   09/13/18 100/64   07/09/18 125/50   01/08/18 100/70                Passed - Microalbumin on file in past 12 months    Recent Labs   Lab Test  11/07/17   1510   MICROL  45   UMALCR  21.34            Passed - HgbA1C in past 3 or 6 months    If HgbA1C is 8 or greater, it needs to be on file within the past 3 months.  If less than 8, must be on file within the past 6 months.     Recent Labs   Lab Test  07/09/18   1435   A1C  7.3*            Passed - Patient is age 18 or older       Passed - No active pregnancy on record       Passed - No positive pregnancy test in past 12 months       Passed - Recent (6 mo) or future (30 days) visit within the authorizing provider's specialty    Patient had office visit in the last 6 months or has a visit in the next 30 days with authorizing provider.  See \"Patient Info\" tab in inbasket, or \"Choose Columns\" in Meds & Orders section of the refill encounter.            Celeste Ruiz RN - BC      "

## 2018-11-27 DIAGNOSIS — I27.20 PULMONARY HYPERTENSION (H): Primary | ICD-10-CM

## 2018-11-27 DIAGNOSIS — R06.02 SOB (SHORTNESS OF BREATH): ICD-10-CM

## 2018-11-28 ENCOUNTER — OFFICE VISIT (OUTPATIENT)
Dept: CARDIOLOGY | Facility: CLINIC | Age: 83
End: 2018-11-28
Attending: INTERNAL MEDICINE
Payer: MEDICARE

## 2018-11-28 VITALS
HEART RATE: 87 BPM | WEIGHT: 243.9 LBS | BODY MASS INDEX: 41.64 KG/M2 | OXYGEN SATURATION: 95 % | SYSTOLIC BLOOD PRESSURE: 98 MMHG | DIASTOLIC BLOOD PRESSURE: 73 MMHG | HEIGHT: 64 IN

## 2018-11-28 DIAGNOSIS — I50.9 HEART FAILURE, UNSPECIFIED HF CHRONICITY, UNSPECIFIED HEART FAILURE TYPE (H): Primary | ICD-10-CM

## 2018-11-28 DIAGNOSIS — R06.02 SOB (SHORTNESS OF BREATH): ICD-10-CM

## 2018-11-28 DIAGNOSIS — I27.20 PULMONARY HYPERTENSION (H): ICD-10-CM

## 2018-11-28 LAB
ANION GAP SERPL CALCULATED.3IONS-SCNC: 9 MMOL/L (ref 3–14)
BUN SERPL-MCNC: 30 MG/DL (ref 7–30)
CALCIUM SERPL-MCNC: 8.8 MG/DL (ref 8.5–10.1)
CHLORIDE SERPL-SCNC: 101 MMOL/L (ref 94–109)
CO2 SERPL-SCNC: 26 MMOL/L (ref 20–32)
CREAT SERPL-MCNC: 1.67 MG/DL (ref 0.52–1.04)
ERYTHROCYTE [DISTWIDTH] IN BLOOD BY AUTOMATED COUNT: 13.7 % (ref 10–15)
GFR SERPL CREATININE-BSD FRML MDRD: 29 ML/MIN/1.7M2
GLUCOSE SERPL-MCNC: 160 MG/DL (ref 70–99)
HCT VFR BLD AUTO: 42.1 % (ref 35–47)
HGB BLD-MCNC: 13.5 G/DL (ref 11.7–15.7)
MCH RBC QN AUTO: 31.5 PG (ref 26.5–33)
MCHC RBC AUTO-ENTMCNC: 32.1 G/DL (ref 31.5–36.5)
MCV RBC AUTO: 98 FL (ref 78–100)
NT-PROBNP SERPL-MCNC: 3238 PG/ML (ref 0–450)
PLATELET # BLD AUTO: 219 10E9/L (ref 150–450)
POTASSIUM SERPL-SCNC: 3.6 MMOL/L (ref 3.4–5.3)
RBC # BLD AUTO: 4.28 10E12/L (ref 3.8–5.2)
SODIUM SERPL-SCNC: 137 MMOL/L (ref 133–144)
WBC # BLD AUTO: 8.9 10E9/L (ref 4–11)

## 2018-11-28 PROCEDURE — G0463 HOSPITAL OUTPT CLINIC VISIT: HCPCS | Mod: ZF

## 2018-11-28 PROCEDURE — 80048 BASIC METABOLIC PNL TOTAL CA: CPT | Performed by: INTERNAL MEDICINE

## 2018-11-28 PROCEDURE — 36415 COLL VENOUS BLD VENIPUNCTURE: CPT | Performed by: INTERNAL MEDICINE

## 2018-11-28 PROCEDURE — 85027 COMPLETE CBC AUTOMATED: CPT | Performed by: INTERNAL MEDICINE

## 2018-11-28 PROCEDURE — 99214 OFFICE O/P EST MOD 30 MIN: CPT | Mod: ZP | Performed by: INTERNAL MEDICINE

## 2018-11-28 PROCEDURE — 83880 ASSAY OF NATRIURETIC PEPTIDE: CPT | Performed by: INTERNAL MEDICINE

## 2018-11-28 ASSESSMENT — PAIN SCALES - GENERAL: PAINLEVEL: NO PAIN (0)

## 2018-11-28 NOTE — NURSING NOTE
Vitals completed successfully and medication reconciled. Leonila Roman MA  Chief Complaint   Patient presents with     Follow Up For     Return for 1.5 yr PH F/U w/labs prior

## 2018-11-28 NOTE — NURSING NOTE
Procedures and/or Testing: Patient given instructions regarding  O. Discussed purpose, preparation, procedure and when to expect results reported back to the patient. Patient demonstrated understanding of this information and agreed to call with further questions or concerns.    Med Reconcile: Reviewed and verified all current medications with the patient. The updated medication list was printed and given to the patient.    Labs: Patient was given results of the laboratory testing obtained today. Patient demonstrated understanding of this information and agreed to call with further questions or concerns.     Diet: Patient instructed regarding a heart healthy diet, including discussion of reduced fat and sodium intake. Patient demonstrated understanding of this information and agreed to call with further questions or concerns.    Return Appointment: Patient given instructions regarding scheduling next clinic visit. Patient demonstrated understanding of this information and agreed to call with further questions or concerns.    Patient stated she understood all health information given and agreed to call with further questions or concerns.    Medication Changes:  No medication changes at this time. Please continue current medication regiment.    Patient Instructions:  -Continue staying active and eat a heart healthy diet.  -Please keep a current list of medications with you at all times.     Location Estimated Length Procedure   Name        Patient Home 12 hours Overnight Oximetry     Procedure Preparations & Instructions     This is not an invasive procedure that DOES require preparation:    - Remove nail polish on your fingers.  - No caffeine, alcohol or sedative medication for 6 hours before going to sleep.  - Sleep in a quite, comfortable environment for at least 6 hours  If you need to go to the bathroom  - DO NOT turn the machine off for any reason.  Take the probe off and go the to the bathroom, replace and go  back to sleep.     Additional Instructions: ? Test Should be completed on:      Oxygen at 2 lpm       Follow up Appointment Information:  -follow up with Dr. Thompson in August 2019.    Results:  Component      Latest Ref Rng & Units 11/28/2018   Sodium      133 - 144 mmol/L 137   Potassium      3.4 - 5.3 mmol/L 3.6   Chloride      94 - 109 mmol/L 101   Carbon Dioxide      20 - 32 mmol/L 26   Anion Gap      3 - 14 mmol/L 9   Glucose      70 - 99 mg/dL 160 (H)   Urea Nitrogen      7 - 30 mg/dL 30   Creatinine      0.52 - 1.04 mg/dL 1.67 (H)   GFR Estimate      >60 mL/min/1.7m2 29 (L)   GFR Estimate If Black      >60 mL/min/1.7m2 35 (L)   Calcium      8.5 - 10.1 mg/dL 8.8   WBC      4.0 - 11.0 10e9/L 8.9   RBC Count      3.8 - 5.2 10e12/L 4.28   Hemoglobin      11.7 - 15.7 g/dL 13.5   Hematocrit      35.0 - 47.0 % 42.1   MCV      78 - 100 fl 98   MCH      26.5 - 33.0 pg 31.5   MCHC      31.5 - 36.5 g/dL 32.1   RDW      10.0 - 15.0 % 13.7   Platelet Count      150 - 450 10e9/L 219   N-Terminal Pro Bnp      0 - 450 pg/mL 3238 (H)     We are located on the third floor of [facility] [place] the [facility].  Our address is     [address] [place] [address] on [address]rd [place] [address]    Gokul Marino RN  [facility]  Cardiology Care Coordinator-Heart Failure    Thank you for allowing us to be a part of your care here at the [facility]    If you have questions or concerns please contact us at:    Appointment scheduling or nurse questions: [phone]    On Call Cardiologist for after hours or on weekends: [phone]    option #4    **pt believes her Home Oxygen company is [employer], so I will fax orders to [facility] [employer] of [facility] for the overnight Oximetry study to be performed on 2L O2.  She should be hearing from the company to set up delivery of device once they receive order.  Patient verbalized understanding, agreed with plan and denied any  further questions. Kayla Echeverria RN on 11/28/2018 at 12:55 PM

## 2018-11-28 NOTE — MR AVS SNAPSHOT
After Visit Summary   11/28/2018    Mara Colindres    MRN: [id number]           Patient Information     Date Of Birth          11/30/1934        Visit Information        Provider Department      11/28/2018 10:30 AM Akbar Thompson MD [facility]        Today's Diagnoses     Heart failure, unspecified HF chronicity, unspecified heart failure type (H)    -  1    Pulmonary hypertension (H)          Care Instructions    Medication Changes:  No medication changes at this time. Please continue current medication regiment.    Patient Instructions:  -Continue staying active and eat a heart healthy diet.  -Please keep a current list of medications with you at all times.     Location Estimated Length Procedure   Name        Patient Home 12 hours Overnight Oximetry     Procedure Preparations & Instructions     This is not an invasive procedure that DOES require preparation:    - Remove nail polish on your fingers.  - No caffeine, alcohol or sedative medication for 6 hours before going to sleep.  - Sleep in a quite, comfortable environment for at least 6 hours  If you need to go to the bathroom  - DO NOT turn the machine off for any reason.  Take the probe off and go the to the bathroom, replace and go back to sleep.     Additional Instructions: ? Test Should be completed on:      Oxygen at 2 lpm       Follow up Appointment Information:  -follow up with Dr. Thompson in August 2019.    Results:  Component      Latest Ref Rng & Units 11/28/2018   Sodium      133 - 144 mmol/L 137   Potassium      3.4 - 5.3 mmol/L 3.6   Chloride      94 - 109 mmol/L 101   Carbon Dioxide      20 - 32 mmol/L 26   Anion Gap      3 - 14 mmol/L 9   Glucose      70 - 99 mg/dL 160 (H)   Urea Nitrogen      7 - 30 mg/dL 30   Creatinine      0.52 - 1.04 mg/dL 1.67 (H)   GFR Estimate      >60 mL/min/1.7m2 29 (L)   GFR Estimate If Black      >60 mL/min/1.7m2 35 (L)   Calcium      8.5 - 10.1 mg/dL 8.8   WBC      4.0 - 11.0 10e9/L  8.9   RBC Count      3.8 - 5.2 10e12/L 4.28   Hemoglobin      11.7 - 15.7 g/dL 13.5   Hematocrit      35.0 - 47.0 % 42.1   MCV      78 - 100 fl 98   MCH      26.5 - 33.0 pg 31.5   MCHC      31.5 - 36.5 g/dL 32.1   RDW      10.0 - 15.0 % 13.7   Platelet Count      150 - 450 10e9/L 219   N-Terminal Pro Bnp      0 - 450 pg/mL 3238 (H)     We are located on the third floor of the [facility] ([facility] on the [facility].  Our address is     [address] [place] [address] on [address] [place] [address]    Gokul Marino RN  [facility]  Cardiology Care Coordinator-Heart Failure    Thank you for allowing us to be a part of your care here at the [facility]    If you have questions or concerns please contact us at:    Appointment scheduling or nurse questions: [phone]    On Call Cardiologist for after hours or on weekends: [phone]    option #4                  Follow-ups after your visit        Additional Services     Follow-Up with Advanced Heart Failure Cardiologist                 Your next 10 appointments already scheduled     Nov 29, 2018  2:30 PM CST   Anticoagulation Visit with [facility]   [facility]    [address] [facility] [id number] [phone]              Future tests that were ordered for you today     Open Future Orders        Priority Expected Expires Ordered    Follow-Up with Advanced Heart Failure Cardiologist Routine 8/5/2019 11/28/2019 11/28/2018            Who to contact     If you have questions or need follow up information about today's clinic visit or your schedule please contact [facility] directly at [phone].  Normal or non-critical lab and imaging results will be communicated to you by MyChart, letter or phone within 4 business days after the clinic has received the results. If you do not hear from us within 7 days, please contact the  "clinic through [employer] or phone. If you have a critical or abnormal lab result, we will notify you by phone as soon as possible.  Submit refill requests through [employer] or call your pharmacy and they will forward the refill request to us. Please allow 3 business days for your refill to be completed.          Additional Information About Your Visit        Care EveryWhere ID     This is your Care EveryWhere ID. This could be used by other organizations to access your [place] medical records  [id number]        Your Vitals Were     Pulse Height Pulse Oximetry BMI (Body Mass Index)          87 1.626 m (5' 4\") 95% 41.87 kg/m2         Blood Pressure from Last 3 Encounters:   11/28/18 98/73   09/13/18 100/64   07/09/18 125/50    Weight from Last 3 Encounters:   11/28/18 110.6 kg (243 lb 14.4 oz)   09/13/18 114.1 kg (251 lb 9.6 oz)   01/08/18 114.3 kg (252 lb)               Primary Care Provider Office Phone # Fax #    Steven Abrams [phone] [address] [facility] [address]        Equal Access to Services     [facility] : Hadii aad ku hadasho Sosheritaali, waaxda luqadaha, qaybta kaalmada adeegyada, scott rodriguez. So [facility] [phone].    ATENCIÓN: Si habla español, tiene a mckeon disposición servicios gratuitos de asistencia lingüística. [facility] [phone].    We comply with applicable federal civil rights laws and [place] laws. We do not discriminate on the basis of race, color, national origin, age, disability, sex, sexual orientation, or gender identity.            Thank you!     Thank you for choosing [facility]  for your care. Our goal is always to provide you with excellent care. Hearing back from our patients is one way we can continue to improve our services. Please take a few minutes to complete the written survey that you may receive in the mail after your visit with us. Thank you!             Your Updated Medication List - Protect others " around you: Learn how to safely use, store and throw away your medicines at www.disposemymeds.org.          This list is accurate as of 11/28/18 11:59 AM.  Always use your most recent med list.                   Brand Name Dispense Instructions for use Diagnosis    ACCU-CHEK PARVEZ PLUS test strip   Generic drug:  blood glucose monitoring     350 strip    USE TO TEST FOUR TIMES DAILY OR AS DIRECTED    Type 2 diabetes, HbA1C goal < 8% (H)       * albuterol 108 (90 Base) MCG/ACT inhaler    PROAIR HFA/PROVENTIL HFA/VENTOLIN HFA    3 Inhaler    Inhale 2 puffs into the lungs every 6 hours as needed for shortness of breath / dyspnea    Moderate persistent asthma with exacerbation       * albuterol (2.5 MG/3ML) 0.083% neb solution    PROVENTIL    30 vial    Take 3 mLs (2.5 mg) by nebulization every 4 hours as needed for shortness of breath / dyspnea    Moderate persistent asthma with exacerbation       * ASPIRIN NOT PRESCRIBED    INTENTIONAL    0 each    Antiplatelet medication not prescribed intentionally due to Current anticoagulant therapy (warfarin/enoxaparin)    Type 2 diabetes, HbA1C goal < 8% (H)       CALCIUM + D PO      Take 1 tablet by mouth 2 times daily.        diphenoxylate-atropine 2.5-0.025 MG tablet    LOMOTIL    40 tablet    TAKE 1 TABLET BY MOUTH FOUR TIMES DAILY    Chronic diarrhea       furosemide 40 MG tablet    LASIX    180 tablet    Take 1 tablet (40 mg) by mouth daily    Chronic kidney disease, stage III (moderate) (H)       guaiFENesin-codeine 100-10 MG/5ML Soln solution    ROBITUSSIN AC    120 mL    Take 10 mLs by mouth every 4 hours as needed for cough    Cough, Moderate persistent asthma with exacerbation       HYDROcodone-acetaminophen 5-325 MG tablet    NORCO    90 tablet    1 tab QID AS NEEDED    Primary osteoarthritis involving multiple joints       losartan 25 MG tablet    COZAAR    180 tablet    Take 1 tablet (25 mg) by mouth 2 times daily    Pulmonary HTN (H)       metoprolol tartrate 100  MG tablet    LOPRESSOR    90 tablet    Take 0.5 tablets (50 mg) by mouth 2 times daily Patient lowered to 50mg twice a day as she was tired and low blood pressure    Congestive heart failure, unspecified congestive heart failure chronicity, unspecified congestive heart failure type, Pulmonary HTN (H)       OMEPRAZOLE CPCR 20 MG OR      1 CAPSULE DAILY        * order for DME     1 Device    nebuliser    Acute bronchitis treated with antibiotics in the past 60 days       order for DME     1 Device    Equipment being ordered: Oxygen. Oxygen resting at room air was 94%.  Oxygen on room air walking about 50 feet was 87% then after returning to room dropped lower to 86%.  With oxygen at 2 liters resting was 97%.  With oxygen at 2 liters walking about 50 feet was only at 91%.    Pulmonary HTN (H), Oxygen desaturation       PARoxetine 20 MG tablet    PAXIL    90 tablet    TAKE 1 TABLET(20 MG) BY MOUTH DAILY    Major depressive disorder, recurrent episode, mild (H)       potassium chloride ER 10 MEQ CR tablet    K-TAB/KLOR-CON    180 tablet    Take 0.5 tablets (5 mEq) by mouth daily    Diuretic-induced hypokalemia       Resveratrol 100 MG Caps      Take 1 capsule by mouth daily        * STATIN NOT PRESCRIBED    INTENTIONAL    0 each    1 each continuous prn. Statin not prescribed intentionally due to Other: patient has normal LDL within recommended guidelines on no medications    Hyperlipidemia LDL goal <100       TRULICITY 1.5 MG/0.5ML pen   Generic drug:  dulaglutide     2 mL    INJECT 1.5 MG SUBCUTANEOUSLY EVERY 7 DAYS    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, without long-term current use of insulin (H)       TYLENOL 500 MG tablet   Generic drug:  acetaminophen      Take 2 tablets by mouth 3 times daily as needed.        warfarin 5 MG tablet    COUMADIN    70 tablet    TAKE ONE-HALF TABLET BY MOUTH ON MONDAY,WED,AND FRIDAY, AND TAKE 1 TABLET BY MOUTH ALL OTHER DAYS    Long-term (current)  use of anticoagulants, Persistent atrial fibrillation (H)       * Notice:  This list has 5 medication(s) that are the same as other medications prescribed for you. Read the directions carefully, and ask your doctor or other care provider to review them with you.

## 2018-11-28 NOTE — LETTER
11/28/2018      RE: Mara Colindres  [address] [facility] [address]       Dear Colleague,    Thank you for the opportunity to participate in the care of your patient, Mara Colindres, at the [facility] at [facility]. Please see a copy of my visit note below.    Akbar Thompson M.D.  Cardiovascular Medicine    I personally saw and examined this patient, discussed care with housestaff and other consultants, reviewed current laboratories and imaging studies, and conveyed impression and diagnostic/therapeutic plan to patient.    Problem List  1. Who Group 2 PAH  2. HFpEF    History  The patient returns for follow-up of heart failure.  There is no interim history of chest pain, tightness, paroxysmal nocturnal dyspnea, orthopnea, peripheral edema controlled with furosemide, palpitation,no pre-syncope, syncope, device discharge.  Exercise tolerance is stable.  The patient is attempting to exercise regularly and following a sodium restricted, calorically appropriate diet.  Medications are reviewed and the patient is taking medications as prescribed.  The patient is generally sleeping well. Patient is now utilizing oxygen to control shortness of breath.  She has infrequent chest pain.  Her blood pressure is well controlled.  She is utilizing lymphedema stockings.  Patient has excessive daytime fatigue.  She utilizes oxygen.  She does not utilize CPAP.      The patient returns for follow-up.       Morbid obesity (H)    Hypertension goal BP (blood pressure) < 140/90    DVT (deep venous thrombosis) (H)    MRSA (methicillin resistant Staphylococcus aureus)    Chronic diarrhea    HYPERLIPIDEMIA LDL GOAL <100    Chronic anticoagulation    CKD (chronic kidney disease) stage 3, GFR 30-59 ml/min    Septic hip (H)    Lymphedema    Tubular adenoma of colon    Abdominal wall hematoma    Advanced directives, counseling/discussion    Umbilical hernia    History of [place] fever    Moderate persistent asthma    Pseudophakia, ou    Hypovitaminosis D    Bilateral leg pain    Positive YAMINI    Raynaud phenomenon    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    Scotoma involving central area in visual field, right    Failed total hip arthroplasty, sequela    Major depressive disorder, recurrent episode, mild (H)    Chronic diastolic congestive heart failure (H)    Long-term (current) use of anticoagulants [Z79.01]    Persistent atrial fibrillation (H)    Diabetic polyneuropathy associated with diabetes mellitus due to underlying condition (H)    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, without long-term current use of insulin (H)    Senile osteoporosis    Posterior vitreous detachment of left eye    Background diabetic retinopathy, mild, ou    Wt Readings from Last 24 Encounters:   09/13/18 114.1 kg (251 lb 9.6 oz)   01/08/18 114.3 kg (252 lb)   11/07/17 113.4 kg (250 lb)   07/27/17 115.2 kg (254 lb)   05/24/17 113.6 kg (250 lb 8 oz)   05/15/17 115.7 kg (255 lb)   04/27/17 113.9 kg (251 lb)   02/20/17 115.2 kg (254 lb)   10/27/16 115.8 kg (255 lb 6.4 oz)   08/09/16 115.1 kg (253 lb 12.8 oz)   07/12/16 111.4 kg (245 lb 11.2 oz)   06/28/16 116.6 kg (257 lb 1.6 oz)   06/27/16 117.5 kg (259 lb)   06/03/16 112.3 kg (247 lb 8 oz)   05/26/16 115.1 kg (253 lb 12 oz)   05/19/16 113.4 kg (250 lb)   04/28/16 115.3 kg (254 lb 3.2 oz)   04/05/16 113.9 kg (251 lb)   11/10/15 114.8 kg (253 lb)   10/02/14 109.3 kg (241 lb)   09/23/14 107.9 kg (237 lb 12.8 oz)   08/27/14 112.8 kg (248 lb 9.6 oz)   07/11/14 109.8 kg (242 lb)   10/23/13 108.6 kg (239 lb 6.4 oz)       Objective  Wt Readings from Last 5 Encounters:   09/13/18 114.1 kg (251 lb 9.6 oz)   01/08/18 114.3 kg (252 lb)   11/07/17 113.4 kg (250 lb)   07/27/17 115.2 kg (254 lb)   05/24/17 113.6 kg (250 lb 8 oz)     Constitutional: alert, oriented, normal gait and station, normal mentation.  Oral: moist mucous  membranes  Lymph: without pathologic adenopathy  Chest: clear to ausculation and percussion save basilar rales  Cor: No evidence of left or right ventricular activity.  Rhythm is irregular.  S1 variablel, S2 split physiologically. Murmurs are not present  Abdomen: without tenderness, rebound, guarding, masses, ascites  Extremities:Massive lymphedema   Neuro: no focal defects, normal mentation  Skin: without open lesions  Psych: oriented, verbal, mental status in tact    Meds  Current Outpatient Prescriptions   Medication     ACCU-CHEK PARVEZ PLUS test strip     acetaminophen (TYLENOL) 500 MG tablet     albuterol (2.5 MG/3ML) 0.083% neb solution     albuterol (PROAIR HFA, PROVENTIL HFA, VENTOLIN HFA) 108 (90 BASE) MCG/ACT inhaler     ASPIRIN NOT PRESCRIBED, INTENTIONAL,     Calcium Carbonate-Vitamin D (CALCIUM + D PO)     diphenoxylate-atropine (LOMOTIL) 2.5-0.025 MG per tablet     furosemide (LASIX) 40 MG tablet     guaiFENesin-codeine (ROBITUSSIN AC) 100-10 MG/5ML SOLN solution     HYDROcodone-acetaminophen (NORCO) 5-325 MG per tablet     losartan (COZAAR) 25 MG tablet     metoprolol tartrate (LOPRESSOR) 100 MG tablet     OMEPRAZOLE CPCR 20 MG OR     order for DME     order for DME     ORDER FOR DME     PARoxetine (PAXIL) 20 MG tablet     potassium chloride (K-TAB,KLOR-CON) 10 MEQ tablet     Resveratrol 100 MG CAPS     STATIN NOT PRESCRIBED, INTENTIONAL,     TRULICITY 1.5 MG/0.5ML pen     warfarin (COUMADIN) 5 MG tablet     No current facility-administered medications for this visit.          Labs  Results for SANIA MAHAN (MRN [id number]) as of 11/28/2018 11:30   Ref. Range 10/18/2018 00:00 11/28/2018 10:39   Sodium Latest Ref Range: 133 - 144 mmol/L  137   Potassium Latest Ref Range: 3.4 - 5.3 mmol/L  3.6   Chloride Latest Ref Range: 94 - 109 mmol/L  101   Carbon Dioxide Latest Ref Range: 20 - 32 mmol/L  26   Urea Nitrogen Latest Ref Range: 7 - 30 mg/dL  30   Creatinine Latest Ref Range: 0.52 - 1.04 mg/dL   1.67 (H)   GFR Estimate Latest Ref Range: >60 mL/min/1.7m2  29 (L)   GFR Estimate If Black Latest Ref Range: >60 mL/min/1.7m2  35 (L)   Calcium Latest Ref Range: 8.5 - 10.1 mg/dL  8.8   Anion Gap Latest Ref Range: 3 - 14 mmol/L  9   N-Terminal Pro Bnp Latest Ref Range: 0 - 450 pg/mL  3238 (H)   Glucose Latest Ref Range: 70 - 99 mg/dL  160 (H)   WBC Latest Ref Range: 4.0 - 11.0 10e9/L  8.9   Hemoglobin Latest Ref Range: 11.7 - 15.7 g/dL  13.5   Hematocrit Latest Ref Range: 35.0 - 47.0 %  42.1   Platelet Count Latest Ref Range: 150 - 450 10e9/L  219   RBC Count Latest Ref Range: 3.8 - 5.2 10e12/L  4.28   MCV Latest Ref Range: 78 - 100 fl  98   MCH Latest Ref Range: 26.5 - 33.0 pg  31.5   MCHC Latest Ref Range: 31.5 - 36.5 g/dL  32.1   RDW Latest Ref Range: 10.0 - 15.0 %  13.7   INR Latest Ref Range: 0.86 - 1.14  3.0 (A)        Results for SANIA MAHAN (MRN [id number]) as of 11/28/2018 09:39   Ref. Range 7/9/2018 14:35 7/19/2018 00:00 8/16/2018 00:00 9/13/2018 00:00 10/18/2018 00:00   Sodium Latest Ref Range: 133 - 144 mmol/L 139       Potassium Latest Ref Range: 3.4 - 5.3 mmol/L 3.8       Chloride Latest Ref Range: 94 - 109 mmol/L 103       Carbon Dioxide Latest Ref Range: 20 - 32 mmol/L 24       Urea Nitrogen Latest Ref Range: 7 - 30 mg/dL 24       Creatinine Latest Ref Range: 0.52 - 1.04 mg/dL 1.67 (H)       GFR Estimate Latest Ref Range: >60 mL/min/1.7m2 29 (L)       GFR Estimate If Black Latest Ref Range: >60 mL/min/1.7m2 35 (L)       Calcium Latest Ref Range: 8.5 - 10.1 mg/dL 9.2       Anion Gap Latest Ref Range: 3 - 14 mmol/L 12       Phosphorus Latest Ref Range: 2.5 - 4.5 mg/dL 3.3       Albumin Latest Ref Range: 3.4 - 5.0 g/dL 3.7       Hemoglobin A1C Latest Ref Range: 0 - 5.6 % 7.3 (H)       Glucose Latest Ref Range: 70 - 99 mg/dL 159 (H)       Hemoglobin Latest Ref Range: 11.7 - 15.7 g/dL 13.4       INR Latest Ref Range: 0.86 - 1.14   2.8 (A) 3.3 (A) 1.9 (A) 3.0 (A)   Parathyroid Hormone Intact Latest  Ref Range: 18 - 80 pg/mL 93 (H)           Imaging   Name: SANIA MAHAN  MRN: [id number]  : 1934  Study Date: 2016 02:18 PM  Age: 81 yrs  Gender: Female  Patient Location: [facility]  Reason For Study: Other secondary pulmonary hypertension  Ordering Physician: OZ WALSH  Referring Physician: OZ WALSH  Performed By: Sugar Freed RDCS     BSA: 2.1 m2  Height: 63 in  Weight: 251 lb  HR: 88  BP: 97/70 mmHg  ______________________________________________________________________________        Procedure  Echocardiogram with two-dimensional, color and spectral Doppler performed.  ______________________________________________________________________________     Interpretation Summary     Left ventricular function, chamber size, wall motion, and wall thickness are  normal.The EF is 60-65%.     Right ventricular function, chamber size, wall motion, and thickness are  normal.  PA systolic pressure is elevated at 53 mmHg. RA pressure is normal at 3 mmHg.  ______________________________________________________________________________           Left Ventricle  Left ventricular function, chamber size, wall motion, and wall thickness are  normal.The EF is 60-65%.     Right Ventricle  Right ventricular function, chamber size, wall motion, and thickness are  normal.  Atria  Moderate right atrial enlargement is present. Severe left atrial enlargement  is present.     Mitral Valve  The mitral valve is normal. Mild mitral insufficiency is present.     Aortic Valve  The aortic valve is tricuspid. Mild aortic valve sclerosis is present.     Tricuspid Valve  The tricuspid valve is normal. Mild to moderate tricuspid insufficiency is  present. PASP 53/12.     Pulmonic Valve  The pulmonic valve is normal.     Vessels  The aorta root is normal. The inferior vena cava is normal.  ______________________________________________________________________________     MMode/2D Measurements & Calculations  IVSd: 1.0  cm  LVIDd: 4.3 cm  LVIDs: 2.1 cm  LVPWd: 0.81 cm  FS: 50.8 %  EDV(Teich): 83.5 ml  ESV(Teich): 14.8 ml  LV mass(C)d: 129.4 grams  Ao root diam: 2.9 cm  LA dimension: 5.4 cm  asc Aorta Diam: 2.8 cm  LA/Ao: 1.9  LVOT diam: 2.0 cm  LVOT area: 3.1 cm2  LA Volume (BP): 109.0 ml  LA Volume Index (BP): 51.2 ml/m2           Doppler Measurements & Calculations  MV E max mario: 63.3 cm/sec  MV dec time: 0.16 sec  Ao V2 max: 132.3 cm/sec  Ao max P.1 mmHg  OFELIA(V,D): 1.7 cm2  LV V1 max: 70.3 cm/sec  LV V1 VTI: 12.9 cm  CO(LVOT): 3.1 l/min  CI(LVOT): 1.5 l/min/m2  SV(LVOT): 40.6 ml  PA V2 max: 91.8 cm/sec  PA max PG: 3.4 mmHg  PI end-d mario: 146.0 cm/sec  PI max mario: 217.2 cm/sec  PI max P.9 mmHg  TR max mario: 340.5 cm/sec  TR max P.4 mmHg  Pulm Sys Mario: 27.4 cm/sec  Pulm Chavis Mario: 38.3 cm/sec  Pulm S/D: 0.72  Lateral E/e': 6.7     Assessment/Plan     1. Patient wishes to be considered for NWOAC, risks and benefits discussed.    2. From medical perspective her cardiopulmonary disease is as compensated as we can get it.  There are no new treatments  3. RTC next , sooner if no doing well  4. Overnight recording oximetry.  If low, despite supplemental oxygen will need follow-up for evaluation of EVONNE    Please do not hesitate to contact me if you have any questions/concerns.     Sincerely,     Akbar Thompson MD

## 2018-11-28 NOTE — PATIENT INSTRUCTIONS
Medication Changes:  No medication changes at this time. Please continue current medication regiment.    Patient Instructions:  -Continue staying active and eat a heart healthy diet.  -Please keep a current list of medications with you at all times.     Location Estimated Length Procedure   Name        Patient Home 12 hours Overnight Oximetry     Procedure Preparations & Instructions     This is not an invasive procedure that DOES require preparation:    - Remove nail polish on your fingers.  - No caffeine, alcohol or sedative medication for 6 hours before going to sleep.  - Sleep in a quite, comfortable environment for at least 6 hours  If you need to go to the bathroom  - DO NOT turn the machine off for any reason.  Take the probe off and go the to the bathroom, replace and go back to sleep.     Additional Instructions: ? Test Should be completed on:      Oxygen at 2 lpm       Follow up Appointment Information:  -follow up with Dr. Thompson in August 2019.    Results:  Component      Latest Ref Rng & Units 11/28/2018   Sodium      133 - 144 mmol/L 137   Potassium      3.4 - 5.3 mmol/L 3.6   Chloride      94 - 109 mmol/L 101   Carbon Dioxide      20 - 32 mmol/L 26   Anion Gap      3 - 14 mmol/L 9   Glucose      70 - 99 mg/dL 160 (H)   Urea Nitrogen      7 - 30 mg/dL 30   Creatinine      0.52 - 1.04 mg/dL 1.67 (H)   GFR Estimate      >60 mL/min/1.7m2 29 (L)   GFR Estimate If Black      >60 mL/min/1.7m2 35 (L)   Calcium      8.5 - 10.1 mg/dL 8.8   WBC      4.0 - 11.0 10e9/L 8.9   RBC Count      3.8 - 5.2 10e12/L 4.28   Hemoglobin      11.7 - 15.7 g/dL 13.5   Hematocrit      35.0 - 47.0 % 42.1   MCV      78 - 100 fl 98   MCH      26.5 - 33.0 pg 31.5   MCHC      31.5 - 36.5 g/dL 32.1   RDW      10.0 - 15.0 % 13.7   Platelet Count      150 - 450 10e9/L 219   N-Terminal Pro Bnp      0 - 450 pg/mL 3238 (H)     We are located on the third floor of [facility]  [place].  Our address is     [address] [place] [address] on [address] [place] [address]    Gokul Marino RN  [facility]  Cardiology Care Coordinator-Heart Failure    Thank you for allowing us to be a part of your care here at the [facility]    If you have questions or concerns please contact us at:    Appointment scheduling or nurse questions: [phone]    On Call Cardiologist for after hours or on weekends: [phone]    option #4

## 2018-11-28 NOTE — PROGRESS NOTES
Akbar Thompson M.D.  Cardiovascular Medicine    I personally saw and examined this patient, discussed care with housestaff and other consultants, reviewed current laboratories and imaging studies, and conveyed impression and diagnostic/therapeutic plan to patient.    Problem List  1. Who Group 2 PAH  2. HFpEF    History  The patient returns for follow-up of heart failure.  There is no interim history of chest pain, tightness, paroxysmal nocturnal dyspnea, orthopnea, peripheral edema controlled with furosemide, palpitation,no pre-syncope, syncope, device discharge.  Exercise tolerance is stable.  The patient is attempting to exercise regularly and following a sodium restricted, calorically appropriate diet.  Medications are reviewed and the patient is taking medications as prescribed.  The patient is generally sleeping well. Patient is now utilizing oxygen to control shortness of breath.  She has infrequent chest pain.  Her blood pressure is well controlled.  She is utilizing lymphedema stockings.  Patient has excessive daytime fatigue.  She utilizes oxygen.  She does not utilize CPAP.      The patient returns for follow-up.       Morbid obesity (H)    Hypertension goal BP (blood pressure) < 140/90    DVT (deep venous thrombosis) (H)    MRSA (methicillin resistant Staphylococcus aureus)    Chronic diarrhea    HYPERLIPIDEMIA LDL GOAL <100    Chronic anticoagulation    CKD (chronic kidney disease) stage 3, GFR 30-59 ml/min    Septic hip (H)    Lymphedema    Tubular adenoma of colon    Abdominal wall hematoma    Advanced directives, counseling/discussion    Umbilical hernia    History of [place] fever    Moderate persistent asthma    Pseudophakia, ou    Hypovitaminosis D    Bilateral leg pain    Positive YAMINI    Raynaud phenomenon    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    Scotoma involving central area in visual field, right    Failed total hip arthroplasty, sequela    Major depressive disorder,  recurrent episode, mild (H)    Chronic diastolic congestive heart failure (H)    Long-term (current) use of anticoagulants [Z79.01]    Persistent atrial fibrillation (H)    Diabetic polyneuropathy associated with diabetes mellitus due to underlying condition (H)    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, without long-term current use of insulin (H)    Senile osteoporosis    Posterior vitreous detachment of left eye    Background diabetic retinopathy, mild, ou    Wt Readings from Last 24 Encounters:   09/13/18 114.1 kg (251 lb 9.6 oz)   01/08/18 114.3 kg (252 lb)   11/07/17 113.4 kg (250 lb)   07/27/17 115.2 kg (254 lb)   05/24/17 113.6 kg (250 lb 8 oz)   05/15/17 115.7 kg (255 lb)   04/27/17 113.9 kg (251 lb)   02/20/17 115.2 kg (254 lb)   10/27/16 115.8 kg (255 lb 6.4 oz)   08/09/16 115.1 kg (253 lb 12.8 oz)   07/12/16 111.4 kg (245 lb 11.2 oz)   06/28/16 116.6 kg (257 lb 1.6 oz)   06/27/16 117.5 kg (259 lb)   06/03/16 112.3 kg (247 lb 8 oz)   05/26/16 115.1 kg (253 lb 12 oz)   05/19/16 113.4 kg (250 lb)   04/28/16 115.3 kg (254 lb 3.2 oz)   04/05/16 113.9 kg (251 lb)   11/10/15 114.8 kg (253 lb)   10/02/14 109.3 kg (241 lb)   09/23/14 107.9 kg (237 lb 12.8 oz)   08/27/14 112.8 kg (248 lb 9.6 oz)   07/11/14 109.8 kg (242 lb)   10/23/13 108.6 kg (239 lb 6.4 oz)       Objective  Wt Readings from Last 5 Encounters:   09/13/18 114.1 kg (251 lb 9.6 oz)   01/08/18 114.3 kg (252 lb)   11/07/17 113.4 kg (250 lb)   07/27/17 115.2 kg (254 lb)   05/24/17 113.6 kg (250 lb 8 oz)     Constitutional: alert, oriented, normal gait and station, normal mentation.  Oral: moist mucous membranes  Lymph: without pathologic adenopathy  Chest: clear to ausculation and percussion save basilar rales  Cor: No evidence of left or right ventricular activity.  Rhythm is irregular.  S1 variablel, S2 split physiologically. Murmurs are not present  Abdomen: without tenderness, rebound, guarding, masses,  ascites  Extremities:Massive lymphedema   Neuro: no focal defects, normal mentation  Skin: without open lesions  Psych: oriented, verbal, mental status in tact    Meds  Current Outpatient Prescriptions   Medication     ACCU-CHEK PARVEZ PLUS test strip     acetaminophen (TYLENOL) 500 MG tablet     albuterol (2.5 MG/3ML) 0.083% neb solution     albuterol (PROAIR HFA, PROVENTIL HFA, VENTOLIN HFA) 108 (90 BASE) MCG/ACT inhaler     ASPIRIN NOT PRESCRIBED, INTENTIONAL,     Calcium Carbonate-Vitamin D (CALCIUM + D PO)     diphenoxylate-atropine (LOMOTIL) 2.5-0.025 MG per tablet     furosemide (LASIX) 40 MG tablet     guaiFENesin-codeine (ROBITUSSIN AC) 100-10 MG/5ML SOLN solution     HYDROcodone-acetaminophen (NORCO) 5-325 MG per tablet     losartan (COZAAR) 25 MG tablet     metoprolol tartrate (LOPRESSOR) 100 MG tablet     OMEPRAZOLE CPCR 20 MG OR     order for DME     order for DME     ORDER FOR DME     PARoxetine (PAXIL) 20 MG tablet     potassium chloride (K-TAB,KLOR-CON) 10 MEQ tablet     Resveratrol 100 MG CAPS     STATIN NOT PRESCRIBED, INTENTIONAL,     TRULICITY 1.5 MG/0.5ML pen     warfarin (COUMADIN) 5 MG tablet     No current facility-administered medications for this visit.          Labs  Results for SANIA MAHAN (MRN [id number]) as of 11/28/2018 11:30   Ref. Range 10/18/2018 00:00 11/28/2018 10:39   Sodium Latest Ref Range: 133 - 144 mmol/L  137   Potassium Latest Ref Range: 3.4 - 5.3 mmol/L  3.6   Chloride Latest Ref Range: 94 - 109 mmol/L  101   Carbon Dioxide Latest Ref Range: 20 - 32 mmol/L  26   Urea Nitrogen Latest Ref Range: 7 - 30 mg/dL  30   Creatinine Latest Ref Range: 0.52 - 1.04 mg/dL  1.67 (H)   GFR Estimate Latest Ref Range: >60 mL/min/1.7m2  29 (L)   GFR Estimate If Black Latest Ref Range: >60 mL/min/1.7m2  35 (L)   Calcium Latest Ref Range: 8.5 - 10.1 mg/dL  8.8   Anion Gap Latest Ref Range: 3 - 14 mmol/L  9   N-Terminal Pro Bnp Latest Ref Range: 0 - 450 pg/mL  3238 (H)   Glucose Latest Ref  Range: 70 - 99 mg/dL  160 (H)   WBC Latest Ref Range: 4.0 - 11.0 10e9/L  8.9   Hemoglobin Latest Ref Range: 11.7 - 15.7 g/dL  13.5   Hematocrit Latest Ref Range: 35.0 - 47.0 %  42.1   Platelet Count Latest Ref Range: 150 - 450 10e9/L  219   RBC Count Latest Ref Range: 3.8 - 5.2 10e12/L  4.28   MCV Latest Ref Range: 78 - 100 fl  98   MCH Latest Ref Range: 26.5 - 33.0 pg  31.5   MCHC Latest Ref Range: 31.5 - 36.5 g/dL  32.1   RDW Latest Ref Range: 10.0 - 15.0 %  13.7   INR Latest Ref Range: 0.86 - 1.14  3.0 (A)        Results for SANIA MAHAN (MRN [id number]) as of 2018 09:39   Ref. Range 2018 14:35 2018 00:00 2018 00:00 2018 00:00 10/18/2018 00:00   Sodium Latest Ref Range: 133 - 144 mmol/L 139       Potassium Latest Ref Range: 3.4 - 5.3 mmol/L 3.8       Chloride Latest Ref Range: 94 - 109 mmol/L 103       Carbon Dioxide Latest Ref Range: 20 - 32 mmol/L 24       Urea Nitrogen Latest Ref Range: 7 - 30 mg/dL 24       Creatinine Latest Ref Range: 0.52 - 1.04 mg/dL 1.67 (H)       GFR Estimate Latest Ref Range: >60 mL/min/1.7m2 29 (L)       GFR Estimate If Black Latest Ref Range: >60 mL/min/1.7m2 35 (L)       Calcium Latest Ref Range: 8.5 - 10.1 mg/dL 9.2       Anion Gap Latest Ref Range: 3 - 14 mmol/L 12       Phosphorus Latest Ref Range: 2.5 - 4.5 mg/dL 3.3       Albumin Latest Ref Range: 3.4 - 5.0 g/dL 3.7       Hemoglobin A1C Latest Ref Range: 0 - 5.6 % 7.3 (H)       Glucose Latest Ref Range: 70 - 99 mg/dL 159 (H)       Hemoglobin Latest Ref Range: 11.7 - 15.7 g/dL 13.4       INR Latest Ref Range: 0.86 - 1.14   2.8 (A) 3.3 (A) 1.9 (A) 3.0 (A)   Parathyroid Hormone Intact Latest Ref Range: 18 - 80 pg/mL 93 (H)           Imaging   Name: SANIA MAHAN  MRN: [id number]  : 1934  Study Date: 2016 02:18 PM  Age: 81 yrs  Gender: Female  Patient Location: [facility]  Reason For Study: Other secondary pulmonary hypertension  Ordering Physician: OZ WALSH  Referring Physician:  OZ WALSH  Performed By: Sugar Freed RDCS     BSA: 2.1 m2  Height: 63 in  Weight: 251 lb  HR: 88  BP: 97/70 mmHg  ______________________________________________________________________________        Procedure  Echocardiogram with two-dimensional, color and spectral Doppler performed.  ______________________________________________________________________________     Interpretation Summary     Left ventricular function, chamber size, wall motion, and wall thickness are  normal.The EF is 60-65%.     Right ventricular function, chamber size, wall motion, and thickness are  normal.  PA systolic pressure is elevated at 53 mmHg. RA pressure is normal at 3 mmHg.  ______________________________________________________________________________           Left Ventricle  Left ventricular function, chamber size, wall motion, and wall thickness are  normal.The EF is 60-65%.     Right Ventricle  Right ventricular function, chamber size, wall motion, and thickness are  normal.  Atria  Moderate right atrial enlargement is present. Severe left atrial enlargement  is present.     Mitral Valve  The mitral valve is normal. Mild mitral insufficiency is present.     Aortic Valve  The aortic valve is tricuspid. Mild aortic valve sclerosis is present.     Tricuspid Valve  The tricuspid valve is normal. Mild to moderate tricuspid insufficiency is  present. PASP 53/12.     Pulmonic Valve  The pulmonic valve is normal.     Vessels  The aorta root is normal. The inferior vena cava is normal.  ______________________________________________________________________________     MMode/2D Measurements & Calculations  IVSd: 1.0 cm  LVIDd: 4.3 cm  LVIDs: 2.1 cm  LVPWd: 0.81 cm  FS: 50.8 %  EDV(Teich): 83.5 ml  ESV(Teich): 14.8 ml  LV mass(C)d: 129.4 grams  Ao root diam: 2.9 cm  LA dimension: 5.4 cm  asc Aorta Diam: 2.8 cm  LA/Ao: 1.9  LVOT diam: 2.0 cm  LVOT area: 3.1 cm2  LA Volume (BP): 109.0 ml  LA Volume Index (BP): 51.2  ml/m2           Doppler Measurements & Calculations  MV E max mario: 63.3 cm/sec  MV dec time: 0.16 sec  Ao V2 max: 132.3 cm/sec  Ao max P.1 mmHg  OFELIA(V,D): 1.7 cm2  LV V1 max: 70.3 cm/sec  LV V1 VTI: 12.9 cm  CO(LVOT): 3.1 l/min  CI(LVOT): 1.5 l/min/m2  SV(LVOT): 40.6 ml  PA V2 max: 91.8 cm/sec  PA max PG: 3.4 mmHg  PI end-d mario: 146.0 cm/sec  PI max mario: 217.2 cm/sec  PI max P.9 mmHg  TR max mario: 340.5 cm/sec  TR max P.4 mmHg  Pulm Sys Mario: 27.4 cm/sec  Pulm Chavis Mario: 38.3 cm/sec  Pulm S/D: 0.72  Lateral E/e': 6.7     Assessment/Plan     1. Patient wishes to be considered for NWOAC, risks and benefits discussed.    2. From medical perspective her cardiopulmonary disease is as compensated as we can get it.  There are no new treatments  3. RTC next , sooner if no doing well  4. Overnight recording oximetry.  If low, despite supplemental oxygen will need follow-up for evaluation of EVNONE

## 2018-11-29 ENCOUNTER — ANTICOAGULATION THERAPY VISIT (OUTPATIENT)
Dept: NURSING | Facility: CLINIC | Age: 83
End: 2018-11-29
Payer: COMMERCIAL

## 2018-11-29 DIAGNOSIS — I48.19 PERSISTENT ATRIAL FIBRILLATION (H): ICD-10-CM

## 2018-11-29 LAB — INR POINT OF CARE: 2.4 (ref 0.86–1.14)

## 2018-11-29 PROCEDURE — 36416 COLLJ CAPILLARY BLOOD SPEC: CPT

## 2018-11-29 PROCEDURE — 99207 ZZC NO CHARGE NURSE ONLY: CPT

## 2018-11-29 PROCEDURE — 85610 PROTHROMBIN TIME: CPT | Mod: QW

## 2018-11-29 NOTE — PROGRESS NOTES
ANTICOAGULATION FOLLOW-UP CLINIC VISIT    Patient Name:  Mara Colindres  Date:  11/29/2018  Contact Type:  Face to Face    SUBJECTIVE:     Patient Findings     Positives No Problem Findings           OBJECTIVE    INR Protime   Date Value Ref Range Status   11/29/2018 2.4 (A) 0.86 - 1.14 Final       ASSESSMENT / PLAN  INR assessment THER    Recheck INR In: 6 WEEKS    INR Location Clinic      Anticoagulation Summary as of 11/29/2018     INR goal 2.0-3.0   Today's INR 2.4   Warfarin maintenance plan 5 mg (5 mg x 1) on Tue, Thu, Sat; 2.5 mg (5 mg x 0.5) all other days   Full warfarin instructions 5 mg on Tue, Thu, Sat; 2.5 mg all other days   Weekly warfarin total 25 mg   No change documented Usha Zhong RN   Plan last modified Celeste Ruiz RN (6/28/2018)   Next INR check 1/10/2019   Priority INR   Target end date Indefinite    Indications   Long-term (current) use of anticoagulants [Z79.01] [Z79.01]  Persistent atrial fibrillation (H) [I48.1]         Anticoagulation Episode Summary     INR check location     Preferred lab     Send INR reminders to [facility]    Comments       Anticoagulation Care Providers     Provider Role Specialty Phone number    Steven Abrams MD [facility] Internal Medicine [phone]            See the Encounter Report to view Anticoagulation Flowsheet and Dosing Calendar (Go to Encounters tab in chart review, and find the Anticoagulation Therapy Visit)    Dosage adjustment made based on physician directed care plan.    Usha Zhong RN

## 2018-11-29 NOTE — MR AVS SNAPSHOT
Mara CHARLES Jens   11/29/2018 2:30 PM   Anticoagulation Therapy Visit    Description:  83 year old female   Provider:  STALIN ANTI COAG   Department:  [facility]           INR as of 11/29/2018     Today's INR 2.4      Anticoagulation Summary as of 11/29/2018     INR goal 2.0-3.0   Today's INR 2.4   Full warfarin instructions 5 mg on Tue, Thu, Sat; 2.5 mg all other days   Next INR check 1/10/2019    Indications   Long-term (current) use of anticoagulants [Z79.01] [Z79.01]  Persistent atrial fibrillation (H) [I48.1]         Your next Anticoagulation Clinic appointment(s)     Robert 10, 2019  2:30 PM CST   Anticoagulation Visit with STALIN ANTI LAYO   [facility] ([facility])    [address] [address] [phone]              Contact Numbers     [facility]  Please call [phone] to cancel and/or reschedule your appointment   Please call [phone] with any problems or questions regarding your therapy.        November 2018 Details    Sun Mon Tue Wed Thu Fri Sat         1               2               3                 4               5               6               7               8               9               10                 11               12               13               14               15               16               17                 18               19               20               21               22               23               24                 25               26               27               28               29      5 mg   See details      30      2.5 mg           Date Details   11/29 This INR check               How to take your warfarin dose     To take:  2.5 mg Take 0.5 of a 5 mg tablet.    To take:  5 mg Take 1 of the 5 mg tablets.           December 2018 Details    Sun Mon Tue Wed Thu Fri Sat           1      5 mg           2      2.5 mg         3      2.5 mg         4      5 mg         5      2.5 mg         6      5 mg         7       2.5 mg         8      5 mg           9      2.5 mg         10      2.5 mg         11      5 mg         12      2.5 mg         13      5 mg         14      2.5 mg         15      5 mg           16      2.5 mg         17      2.5 mg         18      5 mg         19      2.5 mg         20      5 mg         21      2.5 mg         22      5 mg           23      2.5 mg         24      2.5 mg         25      5 mg         26      2.5 mg         27      5 mg         28      2.5 mg         29      5 mg           30      2.5 mg         31      2.5 mg               Date Details   No additional details            How to take your warfarin dose     To take:  2.5 mg Take 0.5 of a 5 mg tablet.    To take:  5 mg Take 1 of the 5 mg tablets.           January 2019 Details    Sun Mon Tue Wed Thu Fri Sat       1      5 mg         2      2.5 mg         3      5 mg         4      2.5 mg         5      5 mg           6      2.5 mg         7      2.5 mg         8      5 mg         9      2.5 mg         10            11               12                 13               14               15               16               17               18               19                 20               21               22               23               24               25               26                 27               28               29               30               31                  Date Details   No additional details    Date of next INR:  1/10/2019         How to take your warfarin dose     To take:  2.5 mg Take 0.5 of a 5 mg tablet.    To take:  5 mg Take 1 of the 5 mg tablets.

## 2018-12-20 DIAGNOSIS — Z79.01 LONG TERM CURRENT USE OF ANTICOAGULANT THERAPY: ICD-10-CM

## 2018-12-20 DIAGNOSIS — E11.22 CONTROLLED TYPE 2 DIABETES MELLITUS WITH STAGE 3 CHRONIC KIDNEY DISEASE, WITHOUT LONG-TERM CURRENT USE OF INSULIN (H): ICD-10-CM

## 2018-12-20 DIAGNOSIS — N18.30 CONTROLLED TYPE 2 DIABETES MELLITUS WITH STAGE 3 CHRONIC KIDNEY DISEASE, WITHOUT LONG-TERM CURRENT USE OF INSULIN (H): ICD-10-CM

## 2018-12-20 DIAGNOSIS — I48.19 PERSISTENT ATRIAL FIBRILLATION (H): ICD-10-CM

## 2018-12-20 RX ORDER — WARFARIN SODIUM 5 MG/1
TABLET ORAL
Qty: 70 TABLET | Refills: 0 | Status: SHIPPED | OUTPATIENT
Start: 2018-12-20 | End: 2018-12-21

## 2018-12-20 RX ORDER — DULAGLUTIDE 1.5 MG/.5ML
INJECTION, SOLUTION SUBCUTANEOUS
Qty: 2 ML | Refills: 0 | Status: SHIPPED | OUTPATIENT
Start: 2018-12-20 | End: 2019-01-17

## 2018-12-20 NOTE — TELEPHONE ENCOUNTER
"Routing refill request to provider for review/approval because:  Labs not current:        Requested Prescriptions   Pending Prescriptions Disp Refills     TRULICITY 1.5 MG/0.5ML pen [Pharmacy Med Name: TRULICITY 1.5MG/0.5ML SDP 4X0.5ML]  Last Written Prescription Date:  11/23/18  Last Fill Quantity: 2 mL,  # refills: 0   Last office visit: 11/7/2017 with prescribing provider:     Future Office Visit:     2 mL 0     Sig: INJECT 1.5 MG SUBCUTANEOUSLY EVERY 7 DAYS    GLP-1 Agonists Protocol Failed - 12/20/2018  9:45 AM       Failed - LDL on file in past 12 months    Recent Labs   Lab Test 07/27/17  1448   LDL 66            Failed - Normal serum creatinine on file in past 12 months    Recent Labs   Lab Test 11/28/18  1039   CR 1.67*            Passed - Blood pressure less than 140/90 in past 6 months    BP Readings from Last 3 Encounters:   11/28/18 98/73   09/13/18 100/64   07/09/18 125/50                Passed - Microalbumin on file in past 12 months    Recent Labs   Lab Test 11/07/17  1510   MICROL 45   UMALCR 21.34            Passed - HgbA1C in past 3 or 6 months    If HgbA1C is 8 or greater, it needs to be on file within the past 3 months.  If less than 8, must be on file within the past 6 months.     Recent Labs   Lab Test 07/09/18  1435   A1C 7.3*            Passed - Patient is age 18 or older       Passed - No active pregnancy on record       Passed - No positive pregnancy test in past 12 months       Passed - Recent (6 mo) or future (30 days) visit within the authorizing provider's specialty    Patient had office visit in the last 6 months or has a visit in the next 30 days with authorizing provider.  See \"Patient Info\" tab in inbasket, or \"Choose Columns\" in Meds & Orders section of the refill encounter.            Celeste Ruiz RN - BC        "

## 2018-12-21 ENCOUNTER — TRANSFERRED RECORDS (OUTPATIENT)
Dept: HEALTH INFORMATION MANAGEMENT | Facility: CLINIC | Age: 83
End: 2018-12-21

## 2018-12-21 ENCOUNTER — TELEPHONE (OUTPATIENT)
Dept: FAMILY MEDICINE | Facility: CLINIC | Age: 83
End: 2018-12-21

## 2018-12-21 DIAGNOSIS — Z79.01 LONG TERM CURRENT USE OF ANTICOAGULANT THERAPY: ICD-10-CM

## 2018-12-21 DIAGNOSIS — I48.19 PERSISTENT ATRIAL FIBRILLATION (H): ICD-10-CM

## 2018-12-21 RX ORDER — WARFARIN SODIUM 5 MG/1
TABLET ORAL
Qty: 70 TABLET | Refills: 0 | Status: SHIPPED | OUTPATIENT
Start: 2018-12-21 | End: 2019-06-16

## 2018-12-21 NOTE — TELEPHONE ENCOUNTER
Per pharmacy preferred medication for warfarin (COUMADIN) 5 MG tablet is Jantovent tablets.  Usha LINDSEY CMA ([facility])

## 2019-01-02 ENCOUNTER — MEDICAL CORRESPONDENCE (OUTPATIENT)
Dept: HEALTH INFORMATION MANAGEMENT | Facility: CLINIC | Age: 84
End: 2019-01-02

## 2019-01-02 NOTE — PROGRESS NOTES
Overnight Oximetry orders faxed to [facility] @ [phone]. Kayla Echeverria RN on 1/2/2019 at 10:35 AM

## 2019-01-10 ENCOUNTER — ANTICOAGULATION THERAPY VISIT (OUTPATIENT)
Dept: NURSING | Facility: CLINIC | Age: 84
End: 2019-01-10
Payer: COMMERCIAL

## 2019-01-10 DIAGNOSIS — I48.19 PERSISTENT ATRIAL FIBRILLATION (H): ICD-10-CM

## 2019-01-10 LAB — INR POINT OF CARE: 2.7 (ref 0.86–1.14)

## 2019-01-10 PROCEDURE — 85610 PROTHROMBIN TIME: CPT | Mod: QW

## 2019-01-10 PROCEDURE — 99207 ZZC NO CHARGE NURSE ONLY: CPT

## 2019-01-10 PROCEDURE — 36416 COLLJ CAPILLARY BLOOD SPEC: CPT

## 2019-01-10 NOTE — PATIENT INSTRUCTIONS
[facility]:  Please call [phone] to cancel and/or reschedule your appointment   Please call [phone] with any problems or questions regarding your Warfarin therapy.

## 2019-01-10 NOTE — PROGRESS NOTES
ANTICOAGULATION FOLLOW-UP CLINIC VISIT    Patient Name:  Mara Colindres  Date:  1/10/2019  Contact Type:  Face to Face    SUBJECTIVE:     Patient Findings     Positives:   No Problem Findings    Comments:   Bleeding Signs/Symptoms: NO  Thromboembolic Signs/Symptoms: NO     Medication Changes:  NO  Dietary Changes:  NO  Bacterial/Viral Infection: NO     Missed Coumadin Doses: NO  Other Concerns:  NO             OBJECTIVE    INR Protime   Date Value Ref Range Status   01/10/2019 2.7 (A) 0.86 - 1.14 Final       ASSESSMENT / PLAN  No question data found.  Anticoagulation Summary  As of 1/10/2019    INR goal:   2.0-3.0   TTR:   51.0 % (2.6 y)   INR used for dosin.7 (1/10/2019)   Warfarin maintenance plan:   5 mg (5 mg x 1) every Tue, Thu, Sat; 2.5 mg (5 mg x 0.5) all other days   Full warfarin instructions:   5 mg every Tue, Thu, Sat; 2.5 mg all other days   Weekly warfarin total:   25 mg   No change documented:   Celeste Ruiz RN   Plan last modified:   Celeste Ruiz RN (2018)   Next INR check:   2019   Priority:   INR   Target end date:   Indefinite    Indications    Long-term (current) use of anticoagulants [Z79.01] [Z79.01]  Persistent atrial fibrillation (H) [I48.1]             Anticoagulation Episode Summary     INR check location:       Preferred lab:       Send INR reminders to:   [facility]    Comments:         Anticoagulation Care Providers     Provider Role Specialty Phone number    Steven Abrams MD [facility] Internal Medicine [phone]            See the Encounter Report to view Anticoagulation Flowsheet and Dosing Calendar (Go to Encounters tab in chart review, and find the Anticoagulation Therapy Visit)        Celeste Ruiz RN

## 2019-01-17 DIAGNOSIS — N18.30 CONTROLLED TYPE 2 DIABETES MELLITUS WITH STAGE 3 CHRONIC KIDNEY DISEASE, WITHOUT LONG-TERM CURRENT USE OF INSULIN (H): ICD-10-CM

## 2019-01-17 DIAGNOSIS — E11.22 CONTROLLED TYPE 2 DIABETES MELLITUS WITH STAGE 3 CHRONIC KIDNEY DISEASE, WITHOUT LONG-TERM CURRENT USE OF INSULIN (H): ICD-10-CM

## 2019-01-21 RX ORDER — DULAGLUTIDE 1.5 MG/.5ML
INJECTION, SOLUTION SUBCUTANEOUS
Qty: 2 ML | Refills: 1 | Status: SHIPPED | OUTPATIENT
Start: 2019-01-21 | End: 2019-03-16

## 2019-01-21 NOTE — TELEPHONE ENCOUNTER
"Routing refill request to provider for review/approval because:  Labs not current:        Requested Prescriptions   Pending Prescriptions Disp Refills     TRULICITY 1.5 MG/0.5ML pen [Pharmacy Med Name: TRULICITY 1.5MG/0.5ML SDP 4X0.5ML]  Last Written Prescription Date:  12/20/18  Last Fill Quantity: 2 mL,  # refills: 0   Last office visit: 11/7/2017 with prescribing provider:   9/13/18 Dr. Abrams  Future Office Visit:     2 mL 0     Sig: INJECT 1.5 MG SUBCUTANEOUSLY EVERY 7 DAYS    GLP-1 Agonists Protocol Failed - 1/17/2019  1:22 PM       Failed - LDL on file in past 12 months    Recent Labs   Lab Test 07/27/17  1448   LDL 66            Failed - HgbA1C in past 3 or 6 months    If HgbA1C is 8 or greater, it needs to be on file within the past 3 months.  If less than 8, must be on file within the past 6 months.     Recent Labs   Lab Test 07/09/18  1435   A1C 7.3*            Failed - Normal serum creatinine on file in past 12 months    Recent Labs   Lab Test 11/28/18  1039   CR 1.67*            Passed - Blood pressure less than 140/90 in past 6 months    BP Readings from Last 3 Encounters:   11/28/18 98/73   09/13/18 100/64   07/09/18 125/50                Passed - Microalbumin on file in past 12 months    Recent Labs   Lab Test 11/07/17  1510   MICROL 45   UMALCR 21.34            Passed - Medication is active on med list       Passed - Patient is age 18 or older       Passed - No active pregnancy on record       Passed - No positive pregnancy test in past 12 months       Passed - Recent (6 mo) or future (30 days) visit within the authorizing provider's specialty    Patient had office visit in the last 6 months or has a visit in the next 30 days with authorizing provider.  See \"Patient Info\" tab in inbasket, or \"Choose Columns\" in Meds & Orders section of the refill encounter.            Celeste Ruiz RN - BC      "

## 2019-01-28 DIAGNOSIS — E87.6 DIURETIC-INDUCED HYPOKALEMIA: Primary | ICD-10-CM

## 2019-01-28 DIAGNOSIS — T50.2X5A DIURETIC-INDUCED HYPOKALEMIA: ICD-10-CM

## 2019-01-28 DIAGNOSIS — T50.2X5A DIURETIC-INDUCED HYPOKALEMIA: Primary | ICD-10-CM

## 2019-01-28 DIAGNOSIS — E87.6 DIURETIC-INDUCED HYPOKALEMIA: ICD-10-CM

## 2019-01-28 RX ORDER — POTASSIUM CHLORIDE 750 MG/1
5 TABLET, EXTENDED RELEASE ORAL DAILY
Qty: 180 TABLET | Refills: 3 | Status: SHIPPED | OUTPATIENT
Start: 2019-01-28 | End: 2019-02-11

## 2019-01-28 RX ORDER — POTASSIUM CHLORIDE 750 MG/1
5 TABLET, EXTENDED RELEASE ORAL DAILY
Qty: 180 TABLET | Refills: 3 | Status: CANCELLED | OUTPATIENT
Start: 2019-01-28

## 2019-01-28 NOTE — TELEPHONE ENCOUNTER
Medication Refill double check:    Last office visit was on 11/28/19 with .    Follow up was recommended for August 2019.    Any additional encounters with changes to requested med?     Authorizing provider is: Dr. Thompson    Refill was approved.     Additional orders/notes:       Kayla Echeverria RN on 1/28/2019 at 2:38 PM

## 2019-02-01 DIAGNOSIS — E87.6 DIURETIC-INDUCED HYPOKALEMIA: ICD-10-CM

## 2019-02-01 DIAGNOSIS — T50.2X5A DIURETIC-INDUCED HYPOKALEMIA: ICD-10-CM

## 2019-02-11 RX ORDER — POTASSIUM CHLORIDE 750 MG/1
5 TABLET, EXTENDED RELEASE ORAL DAILY
Qty: 45 TABLET | Refills: 3 | Status: SHIPPED | OUTPATIENT
Start: 2019-02-11 | End: 2020-01-23

## 2019-02-25 DIAGNOSIS — M15.0 PRIMARY OSTEOARTHRITIS INVOLVING MULTIPLE JOINTS: ICD-10-CM

## 2019-02-25 RX ORDER — HYDROCODONE BITARTRATE AND ACETAMINOPHEN 5; 325 MG/1; MG/1
TABLET ORAL
Qty: 90 TABLET | Refills: 0 | Status: SHIPPED | OUTPATIENT
Start: 2019-02-25 | End: 2020-06-19

## 2019-02-25 NOTE — TELEPHONE ENCOUNTER
Controlled Substance Refill Request for HYDROcodone-acetaminophen (NORCO) 5-325 MG per tablet  Problem List Complete:  No     PROVIDER TO CONSIDER COMPLETION OF PROBLEM LIST AND OVERVIEW/CONTROLLED SUBSTANCE AGREEMENT    Last Written Prescription Date:  4-12-18  Last Fill Quantity: 90,   # refills: 0    Last Office Visit with [facility] primary care provider: 9-13-18    Future Office visit:     Controlled substance agreement:   Encounter-Level CSA:    There are no encounter-level csa.     Patient-Level CSA:    There are no patient-level csa.         Last Urine Drug Screen: No results found for: CDAUT, No results found for: COMDAT, No results found for: THC13, PCP13, COC13, MAMP13, OPI13, AMP13, BZO13, TCA13, MTD13, BAR13, OXY13, PPX13, BUP13     Processing:  Patient will  in clinic     [email or web address]       checked in past 3 months?  No, route to RN

## 2019-02-25 NOTE — TELEPHONE ENCOUNTER
reviewed. No concerns. Last dispensed 4/20/18 #90 for a 23 day supply.    Usha Zhong RN  [facility]

## 2019-02-25 NOTE — TELEPHONE ENCOUNTER
Reason for call:  Other   Patient called regarding (reason for call): prescription -   Additional comments: Patient requesting refill - would like to  Thursday when she comes for INR - please follow up with patient.    Phone number to reach patient:  Home number on file [phone] (home)    Best Time:  ASAP    Can we leave a detailed message on this number?  YES

## 2019-02-26 NOTE — TELEPHONE ENCOUNTER
Norco prescription at  for .  Patient called and informed. Halima Bob,     Patient received prescription from  2/28/2019.   Leonardo CHARLES

## 2019-02-28 ENCOUNTER — ANTICOAGULATION THERAPY VISIT (OUTPATIENT)
Dept: NURSING | Facility: CLINIC | Age: 84
End: 2019-02-28
Payer: COMMERCIAL

## 2019-02-28 DIAGNOSIS — K52.9 CHRONIC DIARRHEA: ICD-10-CM

## 2019-02-28 DIAGNOSIS — I48.19 PERSISTENT ATRIAL FIBRILLATION (H): ICD-10-CM

## 2019-02-28 LAB — INR POINT OF CARE: 2.5 (ref 0.86–1.14)

## 2019-02-28 PROCEDURE — 36416 COLLJ CAPILLARY BLOOD SPEC: CPT

## 2019-02-28 PROCEDURE — 85610 PROTHROMBIN TIME: CPT | Mod: QW

## 2019-02-28 PROCEDURE — 99207 ZZC NO CHARGE NURSE ONLY: CPT

## 2019-02-28 RX ORDER — DIPHENOXYLATE HCL/ATROPINE 2.5-.025MG
TABLET ORAL
Qty: 40 TABLET | Refills: 0 | Status: SHIPPED | OUTPATIENT
Start: 2019-02-28 | End: 2020-01-15

## 2019-02-28 NOTE — TELEPHONE ENCOUNTER
diphenoxylate-atropine (LOMOTIL) 2.5-0.025 MG per tablet      Last Written Prescription Date:  11/7/2017  Last Fill Quantity: 40,   # refills: 5  Last Office Visit: 9/13/2018  Future Office visit:       Routing refill request to provider for review/approval because:  Drug not on the [facility], [facility] or [facility] refill protocol or controlled substance

## 2019-02-28 NOTE — PROGRESS NOTES
ANTICOAGULATION FOLLOW-UP CLINIC VISIT    Patient Name:  Mara Colinrdes  Date:  2019  Contact Type:  Face to Face    SUBJECTIVE:     Patient Findings     Positives:   No Problem Findings           OBJECTIVE    INR Protime   Date Value Ref Range Status   2019 2.5 (A) 0.86 - 1.14 Final       ASSESSMENT / PLAN  INR assessment THER    Recheck INR In: 6 WEEKS    INR Location Clinic      Anticoagulation Summary  As of 2019    INR goal:   2.0-3.0   TTR:   53.4 % (2.7 y)   INR used for dosin.5 (2019)   Warfarin maintenance plan:   5 mg (5 mg x 1) every Tue, Thu, Sat; 2.5 mg (5 mg x 0.5) all other days   Full warfarin instructions:   5 mg every Tue, Thu, Sat; 2.5 mg all other days   Weekly warfarin total:   25 mg   No change documented:   Usha Zhong RN   Plan last modified:   Celeste Ruiz RN (2018)   Next INR check:   2019   Priority:   INR   Target end date:   Indefinite    Indications    Long-term (current) use of anticoagulants [Z79.01] [Z79.01]  Persistent atrial fibrillation (H) [I48.1]             Anticoagulation Episode Summary     INR check location:       Preferred lab:       [facility]    Comments:         Anticoagulation Care Providers     Provider Role Specialty Phone number    Steven Abrams MD [facility] Internal Medicine [phone]            See the Encounter Report to view Anticoagulation Flowsheet and Dosing Calendar (Go to Encounters tab in chart review, and find the Anticoagulation Therapy Visit)    Dosage adjustment made based on physician directed care plan.    Usha Zhong RN

## 2019-02-28 NOTE — PATIENT INSTRUCTIONS
[facility]:  Please call [phone] to cancel and/or reschedule your appointment   Please call [phone] with any problems or questions regarding your Warfarin therapy.

## 2019-03-01 NOTE — TELEPHONE ENCOUNTER
Lomotil prescription faxed to [facility] pharmacy at [phone].  Halima Bob,

## 2019-03-06 ENCOUNTER — TELEPHONE (OUTPATIENT)
Dept: FAMILY MEDICINE | Facility: CLINIC | Age: 84
End: 2019-03-06

## 2019-03-06 ENCOUNTER — DOCUMENTATION ONLY (OUTPATIENT)
Dept: OPHTHALMOLOGY | Facility: CLINIC | Age: 84
End: 2019-03-06

## 2019-03-06 NOTE — TELEPHONE ENCOUNTER
Prior Authorization Retail Medication Request    Medication/Dose: HYDROcodone-acetaminophen (NORCO) 5-325 MG tablet      ICD code (if different than what is on RX):    Previously Tried and Failed:    Rationale:      Insurance Name:  [phone]  Insurance ID:  [id number]      Pharmacy Information (if different than what is on RX)  Name:    Phone:

## 2019-03-08 NOTE — TELEPHONE ENCOUNTER
PA Initiation    Medication: HYDROcodone-acetaminophen (NORCO) 5-325 MG tablet - INITIATED  Insurance Company: [employer] - Phone [phone] Fax [phone]  Pharmacy Filling the Rx: [id number] - [place], [place] - [facility] [address] AT [facility] [address]  Filling Pharmacy Phone: [phone]  Filling Pharmacy Fax:    Start Date: 3/8/2019

## 2019-03-08 NOTE — TELEPHONE ENCOUNTER
Prior Authorization Approval    Authorization Effective Date: 12/21/2018  Authorization Expiration Date: 12/31/2019  Medication: HYDROcodone-acetaminophen (NORCO) 5-325 MG tablet - APPROVED  Approved Dose/Quantity: 90 FOR 23  Reference #:     Insurance Company: [employer] [phone] Fax [phone]  Expected CoPay:       CoPay Card Available:      Foundation Assistance Needed:    Which Pharmacy is filling the prescription (Not needed for infusion/clinic administered): [id number] - [place], [place] - [address] AT [facility] [address] [facility]  Pharmacy Notified: Yes  Patient Notified: Yes

## 2019-03-16 DIAGNOSIS — N18.30 CONTROLLED TYPE 2 DIABETES MELLITUS WITH STAGE 3 CHRONIC KIDNEY DISEASE, WITHOUT LONG-TERM CURRENT USE OF INSULIN (H): ICD-10-CM

## 2019-03-16 DIAGNOSIS — E11.22 CONTROLLED TYPE 2 DIABETES MELLITUS WITH STAGE 3 CHRONIC KIDNEY DISEASE, WITHOUT LONG-TERM CURRENT USE OF INSULIN (H): ICD-10-CM

## 2019-03-18 RX ORDER — DULAGLUTIDE 1.5 MG/.5ML
INJECTION, SOLUTION SUBCUTANEOUS
Qty: 2 ML | Refills: 0 | Status: SHIPPED | OUTPATIENT
Start: 2019-03-18 | End: 2019-04-11

## 2019-04-10 ENCOUNTER — ANTICOAGULATION THERAPY VISIT (OUTPATIENT)
Dept: NURSING | Facility: CLINIC | Age: 84
End: 2019-04-10
Payer: COMMERCIAL

## 2019-04-10 DIAGNOSIS — I48.19 PERSISTENT ATRIAL FIBRILLATION (H): ICD-10-CM

## 2019-04-10 LAB — INR POINT OF CARE: 2.8 (ref 0.86–1.14)

## 2019-04-10 PROCEDURE — 36416 COLLJ CAPILLARY BLOOD SPEC: CPT

## 2019-04-10 PROCEDURE — 99207 ZZC NO CHARGE NURSE ONLY: CPT

## 2019-04-10 PROCEDURE — 85610 PROTHROMBIN TIME: CPT | Mod: QW

## 2019-04-10 NOTE — PATIENT INSTRUCTIONS
[facility]:  Please call [phone] to cancel and/or reschedule your appointment   Please call [phone] with any problems or questions regarding your Warfarin therapy.

## 2019-04-10 NOTE — PROGRESS NOTES
ANTICOAGULATION FOLLOW-UP CLINIC VISIT    Patient Name:  Mara Colindres  Date:  4/10/2019  Contact Type:  Face to Face    SUBJECTIVE:     Patient Findings     Comments:   Bleeding Signs/Symptoms: NO  Thromboembolic Signs/Symptoms: NO     Medication Changes:  NO  Dietary Changes:  NO  Bacterial/Viral Infection: NO     Missed Coumadin Doses: NO  Other Concerns:  NO             OBJECTIVE    INR Protime   Date Value Ref Range Status   04/10/2019 2.8 (A) 0.86 - 1.14 Final       ASSESSMENT / PLAN  No question data found.  Anticoagulation Summary  As of 4/10/2019    INR goal:   2.0-3.0   TTR:   55.3 % (2.8 y)   INR used for dosin.8 (4/10/2019)   Warfarin maintenance plan:   5 mg (5 mg x 1) every Tue, Thu, Sat; 2.5 mg (5 mg x 0.5) all other days   Full warfarin instructions:   5 mg every Tue, Thu, Sat; 2.5 mg all other days   Weekly warfarin total:   25 mg   No change documented:   Celeste Ruiz RN   Plan last modified:   Celeste Ruiz RN (2018)   Next INR check:   2019   Priority:   INR   Target end date:   Indefinite    Indications    Long-term (current) use of anticoagulants [Z79.01] [Z79.01]  Persistent atrial fibrillation (H) [I48.1]             Anticoagulation Episode Summary     INR check location:       Preferred lab:       Send INR reminders to:   [facility]    Comments:         Anticoagulation Care Providers     Provider Role Specialty Phone number    Steven Abrams MD [facility] Internal Medicine [phone]            See the Encounter Report to view Anticoagulation Flowsheet and Dosing Calendar (Go to Encounters tab in chart review, and find the Anticoagulation Therapy Visit)        Celeste Ruiz RN

## 2019-04-11 DIAGNOSIS — E11.22 CONTROLLED TYPE 2 DIABETES MELLITUS WITH STAGE 3 CHRONIC KIDNEY DISEASE, WITHOUT LONG-TERM CURRENT USE OF INSULIN (H): ICD-10-CM

## 2019-04-11 DIAGNOSIS — N18.30 CONTROLLED TYPE 2 DIABETES MELLITUS WITH STAGE 3 CHRONIC KIDNEY DISEASE, WITHOUT LONG-TERM CURRENT USE OF INSULIN (H): ICD-10-CM

## 2019-04-12 NOTE — TELEPHONE ENCOUNTER
Routing refill request to provider for review/approval because:  Norah given x1 and patient did not follow up, please advise  Annette Drake BSN, RN

## 2019-04-14 NOTE — TELEPHONE ENCOUNTER
It has been 7 months since our last office visit and patient is due for a follow up appointment. We can extend all refills but only for one month. Please help see to it that appointment is generated . We can't do this again. Further refill requests without appointment should be declined.    Steven Abrams MD

## 2019-04-15 RX ORDER — DULAGLUTIDE 1.5 MG/.5ML
INJECTION, SOLUTION SUBCUTANEOUS
Qty: 2 ML | Refills: 0 | Status: SHIPPED | OUTPATIENT
Start: 2019-04-15 | End: 2019-05-09

## 2019-04-15 NOTE — TELEPHONE ENCOUNTER
Spoke to patient and made appointment 4/29/2019.  Usha LINDSEY CMA ([facility])

## 2019-04-19 ENCOUNTER — TELEPHONE (OUTPATIENT)
Dept: FAMILY MEDICINE | Facility: CLINIC | Age: 84
End: 2019-04-19

## 2019-04-19 NOTE — TELEPHONE ENCOUNTER
Reason for call:  Patient reporting a symptom    Symptom or request:  Food getting stock in her chest area.     Duration (how long have symptoms been present):  It started last night.     Have you been treated for this before? No    Additional comments:  Na     Phone Number patient can be reached at:  Home number on file [phone] (home)    Best Time:   Any     Can we leave a detailed message on this number:  YES    Call taken on 4/19/2019 at 3:29 PM by Katlin Mcarthur

## 2019-04-19 NOTE — TELEPHONE ENCOUNTER
"Per patient, \"every now and then,\" she has the sensation of food getting stuck in her esophagus in the chest area  She denies choking  Last time this happened was last night  She made herself throw up and it made her feel better  Denies feeling nauseated when this happened  She also resports that it feels like her stomach sometime has \"something rolling around in there\" but denies pain  She is not sure if it is gas or her hernia    She has an appointment with Dr. Abrams on 4/29/19 and declines a sooner appointment  Advised patient to take small bites, chew food well, and completely swallow before taking the next bite, and discuss further with Dr. Abrams at the appointment  Patient verbalized understanding.    Soraida Marmolejo RN      "

## 2019-04-23 ENCOUNTER — OFFICE VISIT (OUTPATIENT)
Dept: OPHTHALMOLOGY | Facility: CLINIC | Age: 84
End: 2019-04-23
Payer: COMMERCIAL

## 2019-04-23 DIAGNOSIS — Z96.1 PSEUDOPHAKIA: ICD-10-CM

## 2019-04-23 DIAGNOSIS — H52.4 PRESBYOPIA: ICD-10-CM

## 2019-04-23 DIAGNOSIS — E11.22 CONTROLLED TYPE 2 DIABETES MELLITUS WITH STAGE 3 CHRONIC KIDNEY DISEASE, WITHOUT LONG-TERM CURRENT USE OF INSULIN (H): Primary | ICD-10-CM

## 2019-04-23 DIAGNOSIS — H43.812 POSTERIOR VITREOUS DETACHMENT OF LEFT EYE: ICD-10-CM

## 2019-04-23 DIAGNOSIS — E11.3299 BACKGROUND DIABETIC RETINOPATHY (H): ICD-10-CM

## 2019-04-23 DIAGNOSIS — N18.30 CONTROLLED TYPE 2 DIABETES MELLITUS WITH STAGE 3 CHRONIC KIDNEY DISEASE, WITHOUT LONG-TERM CURRENT USE OF INSULIN (H): Primary | ICD-10-CM

## 2019-04-23 DIAGNOSIS — H53.411: ICD-10-CM

## 2019-04-23 PROCEDURE — 92014 COMPRE OPH EXAM EST PT 1/>: CPT | Performed by: OPHTHALMOLOGY

## 2019-04-23 PROCEDURE — 92015 DETERMINE REFRACTIVE STATE: CPT | Performed by: OPHTHALMOLOGY

## 2019-04-23 ASSESSMENT — VISUAL ACUITY
OS_CC: 2
OD_CC: 2
CORRECTION_TYPE: GLASSES
OS_CC: 20/20
OD_CC+: -1
METHOD: SNELLEN - LINEAR
OD_CC: 20/20

## 2019-04-23 ASSESSMENT — CONF VISUAL FIELD
OD_NORMAL: 1
OS_NORMAL: 1

## 2019-04-23 ASSESSMENT — REFRACTION_WEARINGRX
OS_AXIS: 162
OD_ADD: +3.00
OD_CYLINDER: +0.25
OS_CYLINDER: +1.00
OS_SPHERE: -1.50
OD_AXIS: 035
SPECS_TYPE: PAL
OS_ADD: +2.75
OD_SPHERE: -1.00

## 2019-04-23 ASSESSMENT — REFRACTION_MANIFEST
OS_SPHERE: -1.25
OS_CYLINDER: +0.75
OD_SPHERE: -1.00
OD_CYLINDER: +0.75
OD_AXIS: 015
OS_AXIS: 162
OD_ADD: +2.75
OS_ADD: +2.75

## 2019-04-23 ASSESSMENT — CUP TO DISC RATIO
OD_RATIO: 0.5
OS_RATIO: 0.5

## 2019-04-23 ASSESSMENT — EXTERNAL EXAM - RIGHT EYE: OD_EXAM: 1+ BROW PTOSIS

## 2019-04-23 ASSESSMENT — SLIT LAMP EXAM - LIDS
COMMENTS: 1+ DERMATOCHALASIS
COMMENTS: 1+ DERMATOCHALASIS

## 2019-04-23 ASSESSMENT — TONOMETRY
OD_IOP_MMHG: 13
OS_IOP_MMHG: 16
IOP_METHOD: APPLANATION

## 2019-04-23 ASSESSMENT — EXTERNAL EXAM - LEFT EYE: OS_EXAM: 1+ BROW PTOSIS

## 2019-04-23 NOTE — PATIENT INSTRUCTIONS
Glasses Rx given - optional  Yag laser to posterior capsule in right eye is an option at anytime.  Call to schedule if desire at 7:45 or 12:45.  Otherwise Call in December 2019 for an appointment in April 2020 for Complete Exam    Dr. Tavares [phone]

## 2019-04-23 NOTE — PROGRESS NOTES
" Current Eye Medications:  Refresh both eyes a few times a week.      Subjective:  Patient is here for a Diabetic Eye Exam.    Lab Results   Component Value Date    A1C 7.3 07/09/2018    A1C 7.8 11/07/2017    A1C 8.2 07/27/2017    A1C 8.3 04/27/2017    A1C 8.9 12/19/2016      Patient complains of foggy vision in each eye.  For long term reading, she prefers to wear +3.00 over-the-counter readers.  She has noticed a \"flap\" on her left conjunctiva.      Objective:  See Ophthalmology Exam.       Assessment:  Posterior capsule opacity right eye visually significant.  Stable mild background diabetic retinopathy both eyes.      ICD-10-CM    1. 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 Pseudophakia, ou Z96.1    3. 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 Background diabetic retinopathy, mild, ou E11.3299    5. Posterior vitreous detachment of left eye H43.812    6. Presbyopia H52.4 REFRACTIVE STATUS        Plan:  Glasses Rx given - optional  Yag laser to posterior capsule in right eye is an option at anytime.  Call to schedule if desire at 7:45 or 12:45.  Otherwise Call in December 2019 for an appointment in April 2020 for Complete Exam    Dr. Tavares [phone]       "

## 2019-04-23 NOTE — LETTER
"    4/23/2019         RE: Mara Colindres  [address] [address]        Dear Colleague,    Thank you for referring your patient, Mara Colindres, to the [facility]. Please see a copy of my visit note below.     Current Eye Medications:  Refresh both eyes a few times a week.      Subjective:  Patient is here for a Diabetic Eye Exam.    Lab Results   Component Value Date    A1C 7.3 07/09/2018    A1C 7.8 11/07/2017    A1C 8.2 07/27/2017    A1C 8.3 04/27/2017    A1C 8.9 12/19/2016      Patient complains of foggy vision in each eye.  For long term reading, she prefers to wear +3.00 over-the-counter readers.  She has noticed a \"flap\" on her left conjunctiva.      Objective:  See Ophthalmology Exam.       Assessment:  Posterior capsule opacity right eye visually significant.  Stable mild background diabetic retinopathy both eyes.      ICD-10-CM    1. 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 Pseudophakia, ou Z96.1    3. 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 Background diabetic retinopathy, mild, ou E11.3299    5. Posterior vitreous detachment of left eye H43.812    6. Presbyopia H52.4 REFRACTIVE STATUS        Plan:  Glasses Rx given - optional  Yag laser to posterior capsule in right eye is an option at anytime.  Call to schedule if desire at 7:45 or 12:45.  Otherwise Call in December 2019 for an appointment in April 2020 for Complete Exam    Dr. Tavares [phone]         Again, thank you for allowing me to participate in the care of your patient.        Sincerely,        Matty Tavares MD    "

## 2019-04-28 PROBLEM — N18.4 CKD (CHRONIC KIDNEY DISEASE) STAGE 4, GFR 15-29 ML/MIN (H): Status: ACTIVE | Noted: 2019-04-28

## 2019-04-29 ENCOUNTER — OFFICE VISIT (OUTPATIENT)
Dept: FAMILY MEDICINE | Facility: CLINIC | Age: 84
End: 2019-04-29
Payer: COMMERCIAL

## 2019-04-29 VITALS
RESPIRATION RATE: 20 BRPM | SYSTOLIC BLOOD PRESSURE: 106 MMHG | TEMPERATURE: 97.1 F | OXYGEN SATURATION: 94 % | WEIGHT: 259 LBS | HEART RATE: 100 BPM | BODY MASS INDEX: 44.46 KG/M2 | DIASTOLIC BLOOD PRESSURE: 78 MMHG

## 2019-04-29 DIAGNOSIS — I10 HYPERTENSION GOAL BP (BLOOD PRESSURE) < 140/90: ICD-10-CM

## 2019-04-29 DIAGNOSIS — E55.9 HYPOVITAMINOSIS D: ICD-10-CM

## 2019-04-29 DIAGNOSIS — Z13.29 SCREENING FOR HYPOTHYROIDISM: ICD-10-CM

## 2019-04-29 DIAGNOSIS — J45.41 MODERATE PERSISTENT ASTHMA WITH ACUTE EXACERBATION: ICD-10-CM

## 2019-04-29 DIAGNOSIS — N18.30 CONTROLLED TYPE 2 DIABETES MELLITUS WITH STAGE 3 CHRONIC KIDNEY DISEASE, WITHOUT LONG-TERM CURRENT USE OF INSULIN (H): Primary | ICD-10-CM

## 2019-04-29 DIAGNOSIS — E78.5 HYPERLIPIDEMIA LDL GOAL <100: ICD-10-CM

## 2019-04-29 DIAGNOSIS — R09.02 OXYGEN DESATURATION: ICD-10-CM

## 2019-04-29 DIAGNOSIS — Z71.89 ADVANCED DIRECTIVES, COUNSELING/DISCUSSION: ICD-10-CM

## 2019-04-29 DIAGNOSIS — I82.511 CHRONIC DEEP VEIN THROMBOSIS (DVT) OF FEMORAL VEIN OF RIGHT LOWER EXTREMITY (H): ICD-10-CM

## 2019-04-29 DIAGNOSIS — E11.22 CONTROLLED TYPE 2 DIABETES MELLITUS WITH STAGE 3 CHRONIC KIDNEY DISEASE, WITHOUT LONG-TERM CURRENT USE OF INSULIN (H): Primary | ICD-10-CM

## 2019-04-29 DIAGNOSIS — R10.84 ABDOMINAL PAIN, GENERALIZED: ICD-10-CM

## 2019-04-29 DIAGNOSIS — N18.4 CKD (CHRONIC KIDNEY DISEASE) STAGE 4, GFR 15-29 ML/MIN (H): ICD-10-CM

## 2019-04-29 DIAGNOSIS — E08.42 DIABETIC POLYNEUROPATHY ASSOCIATED WITH DIABETES MELLITUS DUE TO UNDERLYING CONDITION (H): ICD-10-CM

## 2019-04-29 DIAGNOSIS — I48.19 PERSISTENT ATRIAL FIBRILLATION (H): ICD-10-CM

## 2019-04-29 DIAGNOSIS — I27.20 PULMONARY HTN (H): ICD-10-CM

## 2019-04-29 DIAGNOSIS — R13.10 PROBLEMS WITH SWALLOWING: ICD-10-CM

## 2019-04-29 LAB
ALBUMIN SERPL-MCNC: 3.9 G/DL (ref 3.4–5)
ALP SERPL-CCNC: 88 U/L (ref 40–150)
ALT SERPL W P-5'-P-CCNC: 19 U/L (ref 0–50)
ANION GAP SERPL CALCULATED.3IONS-SCNC: 8 MMOL/L (ref 3–14)
AST SERPL W P-5'-P-CCNC: 22 U/L (ref 0–45)
BASOPHILS # BLD AUTO: 0.1 10E9/L (ref 0–0.2)
BASOPHILS NFR BLD AUTO: 0.8 %
BILIRUB SERPL-MCNC: 0.9 MG/DL (ref 0.2–1.3)
BUN SERPL-MCNC: 23 MG/DL (ref 7–30)
CALCIUM SERPL-MCNC: 9 MG/DL (ref 8.5–10.1)
CHLORIDE SERPL-SCNC: 105 MMOL/L (ref 94–109)
CHOLEST SERPL-MCNC: 141 MG/DL
CO2 SERPL-SCNC: 26 MMOL/L (ref 20–32)
CREAT SERPL-MCNC: 1.42 MG/DL (ref 0.52–1.04)
CRP SERPL-MCNC: <2.9 MG/L (ref 0–8)
DIFFERENTIAL METHOD BLD: NORMAL
EOSINOPHIL # BLD AUTO: 0.3 10E9/L (ref 0–0.7)
EOSINOPHIL NFR BLD AUTO: 4.2 %
ERYTHROCYTE [DISTWIDTH] IN BLOOD BY AUTOMATED COUNT: 13.7 % (ref 10–15)
ERYTHROCYTE [SEDIMENTATION RATE] IN BLOOD BY WESTERGREN METHOD: 25 MM/H (ref 0–30)
GFR SERPL CREATININE-BSD FRML MDRD: 34 ML/MIN/{1.73_M2}
GLUCOSE SERPL-MCNC: 180 MG/DL (ref 70–99)
HBA1C MFR BLD: 7 % (ref 0–5.6)
HCT VFR BLD AUTO: 38.2 % (ref 35–47)
HDLC SERPL-MCNC: 60 MG/DL
HGB BLD-MCNC: 12.4 G/DL (ref 11.7–15.7)
LDLC SERPL CALC-MCNC: 67 MG/DL
LYMPHOCYTES # BLD AUTO: 1 10E9/L (ref 0.8–5.3)
LYMPHOCYTES NFR BLD AUTO: 13.7 %
MCH RBC QN AUTO: 32.1 PG (ref 26.5–33)
MCHC RBC AUTO-ENTMCNC: 32.5 G/DL (ref 31.5–36.5)
MCV RBC AUTO: 99 FL (ref 78–100)
MONOCYTES # BLD AUTO: 0.5 10E9/L (ref 0–1.3)
MONOCYTES NFR BLD AUTO: 6.4 %
NEUTROPHILS # BLD AUTO: 5.5 10E9/L (ref 1.6–8.3)
NEUTROPHILS NFR BLD AUTO: 74.9 %
NONHDLC SERPL-MCNC: 81 MG/DL
PLATELET # BLD AUTO: 232 10E9/L (ref 150–450)
POTASSIUM SERPL-SCNC: 3.7 MMOL/L (ref 3.4–5.3)
PROT SERPL-MCNC: 7.2 G/DL (ref 6.8–8.8)
RBC # BLD AUTO: 3.86 10E12/L (ref 3.8–5.2)
SODIUM SERPL-SCNC: 139 MMOL/L (ref 133–144)
TRIGL SERPL-MCNC: 69 MG/DL
TSH SERPL DL<=0.005 MIU/L-ACNC: 1.33 MU/L (ref 0.4–4)
WBC # BLD AUTO: 7.4 10E9/L (ref 4–11)

## 2019-04-29 PROCEDURE — 80061 LIPID PANEL: CPT | Performed by: INTERNAL MEDICINE

## 2019-04-29 PROCEDURE — 85025 COMPLETE CBC W/AUTO DIFF WBC: CPT | Performed by: INTERNAL MEDICINE

## 2019-04-29 PROCEDURE — 80053 COMPREHEN METABOLIC PANEL: CPT | Performed by: INTERNAL MEDICINE

## 2019-04-29 PROCEDURE — 83036 HEMOGLOBIN GLYCOSYLATED A1C: CPT | Performed by: INTERNAL MEDICINE

## 2019-04-29 PROCEDURE — 99214 OFFICE O/P EST MOD 30 MIN: CPT | Performed by: INTERNAL MEDICINE

## 2019-04-29 PROCEDURE — 82306 VITAMIN D 25 HYDROXY: CPT | Performed by: INTERNAL MEDICINE

## 2019-04-29 PROCEDURE — 84443 ASSAY THYROID STIM HORMONE: CPT | Performed by: INTERNAL MEDICINE

## 2019-04-29 PROCEDURE — 99207 C FOOT EXAM  NO CHARGE: CPT | Performed by: INTERNAL MEDICINE

## 2019-04-29 PROCEDURE — 36415 COLL VENOUS BLD VENIPUNCTURE: CPT | Performed by: INTERNAL MEDICINE

## 2019-04-29 PROCEDURE — 86140 C-REACTIVE PROTEIN: CPT | Performed by: INTERNAL MEDICINE

## 2019-04-29 PROCEDURE — 85652 RBC SED RATE AUTOMATED: CPT | Performed by: INTERNAL MEDICINE

## 2019-04-29 ASSESSMENT — PATIENT HEALTH QUESTIONNAIRE - PHQ9: SUM OF ALL RESPONSES TO PHQ QUESTIONS 1-9: 2

## 2019-04-29 NOTE — PROGRESS NOTES
SUBJECTIVE:   Mara Colindres is a 84 year old female who presents to clinic today for the following   health issues:       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, without long-term current use of insulin (H)  Hypertension goal BP (blood pressure) < 140/90  Hyperlipidemia LDL goal <100  Advanced directives, counseling/discussion  Moderate persistent asthma with acute exacerbation  Pulmonary HTN (H)  Oxygen desaturation  Chronic deep vein thrombosis (DVT) of femoral vein of right lower extremity (H)  Diabetic polyneuropathy associated with diabetes mellitus due to underlying condition (H)  Hypovitaminosis D  Persistent atrial fibrillation (H)  Screening for hypothyroidism  CKD (chronic kidney disease) stage 4, GFR 15-29 ml/min (H)  Problems with swallowing  Abdominal pain, generalized     Last appointment with me was 9-13-18    Preventative healthcare maintenance goals including ShingRx vaccination against herpes zoster, inr clinic referral  , Medicare Annual Wellness Visit, advanced directive counseling and discussion, DIABETIC FOOT EXAM, TSH (thyroid test), ALT [ liver test ], fasting lipid panel, asthma action plan, Albumin random urine quantitative, hemoglobin a1c  [ diabetes test ], asthma control test, PHQ9 depression survey , Heart failure action plan , basic metabolic panel is due 5-18-19 but we can get early !    Saw Dr. Gokul Thompson, cardiologist with the [facility] on 11-28-19 for pulmonary hypertension, still on home oxygen therapy.    Mara Colindres is a very highly complex challenging patient with multiple medical problems and it's always difficult to really feel as if we covered every issue, this isn't possible.    Diabetes Follow-up    Patient is checking blood sugars: once daily.  Results are as follows:         am - average of 130-160    Diabetic concerns: None     Symptoms of hypoglycemia (low blood sugar): none     Paresthesias (numbness or  "burning in feet) or sores: Yes, numbness under her toes, worse in her right foot.      Date of last diabetic eye exam: last week    She started on Trulicity at her last appointment and she thinks that this has been working well for her. She usually checks her blood sugar in the morning or at night. Average is 130-160, with one notable spike of 211. She is fasting today. She does check her feet, though not very regularly.    Wt Readings from Last 5 Encounters:   04/29/19 117.5 kg (259 lb)   11/28/18 110.6 kg (243 lb 14.4 oz)   09/13/18 114.1 kg (251 lb 9.6 oz)   01/08/18 114.3 kg (252 lb)   11/07/17 113.4 kg (250 lb)         BP Readings from Last 2 Encounters:   04/29/19 106/78   11/28/18 98/73     Hemoglobin A1C (%)   Date Value   04/29/2019 7.0 (H)   07/09/2018 7.3 (H)     LDL Cholesterol Calculated (mg/dL)   Date Value   07/27/2017 66   05/19/2016 68     Diabetes Management Resources    Amount of exercise or physical activity: None    Problems taking medications regularly: No    Medication side effects: none    Diet: regular (no restrictions)    Asthma  She feels that her asthma is well controlled, and is only a problem when she has a cold. She notes she does not get out much, and when she does she tries to wash her hands thoroughly and take Vitamin C to avoid getting a cold. She continues to use home oxygen and sees the pulmonologist for her pulmonary hypertension.     Swallowing Difficulty  She has a sensation of food getting caught in her lower esophagus sometimes when she is eating. Last week this happened and was so severe that she vomited. This has happened once before a couple of years ago. She notes that she does not eat quickly, tries to chew very thoroughly. She also notes that she has an umbilical hernia, and while she hasn't had an OB/GYN check up in a few years, the last time he was able to reduce the hernia well. It feels to her like more often than not there is something \"rolling around\" in the area " of the hernia.       Additional history: as documented    Reviewed  and updated as needed this visit by clinical staff  Tobacco  Allergies  Meds  Med Hx  Surg Hx  Fam Hx  Soc Hx      Reviewed and updated as needed this visit by Provider       Patient Active Problem List   Diagnosis     Morbid obesity (H)     Hypertension goal BP (blood pressure) < 140/90     MRSA (methicillin resistant Staphylococcus aureus)     Chronic diarrhea     HYPERLIPIDEMIA LDL GOAL <100     Chronic anticoagulation     CKD (chronic kidney disease) stage 3, GFR 30-59 ml/min (H)     Septic hip (H)     Lymphedema     Tubular adenoma of colon     Abdominal wall hematoma     Advanced directives, counseling/discussion     Umbilical hernia     History of [facility] fever     Moderate persistent asthma     Pseudophakia, ou     Hypovitaminosis D     Bilateral leg pain     Positive YAMINI     Raynaud phenomenon     Pulmonary HTN (H)     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     Scotoma involving central area in visual field, right     Failed total hip arthroplasty, sequela     Major depressive disorder, recurrent episode, mild (H)     Chronic diastolic congestive heart failure (H)     Long-term (current) use of anticoagulants [Z79.01]     Persistent atrial fibrillation (H)     Diabetic polyneuropathy associated with diabetes mellitus due to underlying condition (H)     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, without long-term current use of insulin (H)     Fatigue, unspecified type     Senile osteoporosis     Posterior vitreous detachment of left eye     Background diabetic retinopathy, mild, ou     Chronic deep vein thrombosis (DVT) of femoral vein of right lower extremity (H)     Oxygen desaturation     CKD (chronic kidney disease) stage 4, GFR 15-29 ml/min (H)     Past Surgical History:   Procedure Laterality Date     APPENDECTOMY       CATARACT IOL, RT/LT       COLONOSCOPY  2008     COLONOSCOPY   8/20/2012    Procedure: COLONOSCOPY;  COLONOSCOPY, TUBULAR ADENOMA OF COLON, CHRONIC DIARRHEA;  Surgeon: Yobany Hinojosa MD;  Location: [facility] OR     HC REMOVAL GALLBLADDER       JOINT REPLACEMENT, HIP RT/LT  2004    right     JOINT REPLACEMTN, KNEE RT/LT  2000    bilateral     PHACOEMULSIFICATION WITH STANDARD INTRAOCULAR LENS IMPLANT  8/2013    left eye; right eye     ROTATOR CUFF REPAIR RT/LT  1/93    right     TONSILLECTOMY         Social History     Tobacco Use     Smoking status: Never Smoker     Smokeless tobacco: Never Used   Substance Use Topics     Alcohol use: No     Family History   Problem Relation Age of Onset     Diabetes Mother      Cancer Mother      Hypertension Mother      Cataracts Mother      Hypertension Father      Glaucoma Father      Cataracts Father      Cancer Son         thyroid cancer     Neurologic Disorder Sister      Alzheimer Disease Sister          Current Outpatient Medications   Medication Sig Dispense Refill     ACCU-CHEK PARVEZ PLUS test strip USE TO TEST FOUR TIMES DAILY OR AS DIRECTED 350 strip 5     acetaminophen (TYLENOL) 500 MG tablet Take 2 tablets by mouth 3 times daily as needed.       albuterol (2.5 MG/3ML) 0.083% neb solution Take 3 mLs (2.5 mg) by nebulization every 4 hours as needed for shortness of breath / dyspnea 30 vial 0     albuterol (PROAIR HFA, PROVENTIL HFA, VENTOLIN HFA) 108 (90 BASE) MCG/ACT inhaler Inhale 2 puffs into the lungs every 6 hours as needed for shortness of breath / dyspnea 3 Inhaler 1     ASPIRIN NOT PRESCRIBED, INTENTIONAL, Antiplatelet medication not prescribed intentionally due to Current anticoagulant therapy (warfarin/enoxaparin) 0 each 3     Calcium Carbonate-Vitamin D (CALCIUM + D PO) Take 1 tablet by mouth 2 times daily.       diphenoxylate-atropine (LOMOTIL) 2.5-0.025 MG tablet TAKE 1 TABLET BY MOUTH FOUR TIMES DAILY 40 tablet 0     furosemide (LASIX) 40 MG tablet Take 1 tablet (40 mg) by mouth daily 180 tablet 3      guaiFENesin-codeine (ROBITUSSIN AC) 100-10 MG/5ML SOLN solution Take 10 mLs by mouth every 4 hours as needed for cough 120 mL 3     HYDROcodone-acetaminophen (NORCO) 5-325 MG tablet 1 tab QID AS NEEDED 90 tablet 0     losartan (COZAAR) 25 MG tablet Take 1 tablet (25 mg) by mouth 2 times daily 180 tablet 1     metoprolol tartrate (LOPRESSOR) 100 MG tablet Take 0.5 tablets (50 mg) by mouth 2 times daily Patient lowered to 50mg twice a day as she was tired and low blood pressure 90 tablet 0     OMEPRAZOLE CPCR 20 MG OR 1 CAPSULE DAILY       order for DME Equipment being ordered: Oxygen.  Oxygen resting at room air was 94%.   Oxygen on room air walking about 50 feet was 87% then after returning to room dropped lower to 86%.   With oxygen at 2 liters resting was 97%.   With oxygen at 2 liters walking about 50 feet was only at 91%. 1 Device 0     ORDER FOR DME nebuliser 1 Device 1     PARoxetine (PAXIL) 20 MG tablet TAKE 1 TABLET(20 MG) BY MOUTH DAILY 90 tablet 0     potassium chloride ER (K-TAB/KLOR-CON) 10 MEQ CR tablet Take 0.5 tablets (5 mEq) by mouth daily 45 tablet 3     Resveratrol 100 MG CAPS Take 1 capsule by mouth daily        STATIN NOT PRESCRIBED, INTENTIONAL, 1 each continuous prn. Statin not prescribed intentionally due to Other: patient has normal LDL within recommended guidelines on no medications 0 each 0     TRULICITY 1.5 MG/0.5ML pen INJECT 1.5 MG SUBCUTANEOUS EVERY 7 DAYS 2 mL 0     warfarin (JANTOVEN) 5 MG tablet Take 5 mg Tue/Thurs/Sat and 2.5 mg all other days 70 tablet 0       ROS:  - Her lower legs were very swollen this morning, and she suspects that she is holding more water.   - She gets pains in her hands and fingers very often. She notes that she gets a spasming pain in two of her fingers frequently.     Constitutional, HEENT, cardiovascular, pulmonary, GI, , musculoskeletal, neuro, skin, endocrine and psych systems are negative, except as otherwise noted.    This document serves as a  record of the services and decisions personally performed and made by Steven Abrams MD. It was created on his behalf by Leonila Graham, a trained medical scribe. The creation of this document is based on the provider's statements to the medical scribe.  Leonila Graham April 29, 2019 10:09 AM    OBJECTIVE:     /78   Pulse 100   Temp 97.1  F (36.2  C) (Oral)   Resp 20   Wt 117.5 kg (259 lb)   SpO2 94%   BMI 44.46 kg/m    Body mass index is 44.46 kg/m .  GENERAL: healthy, alert and no distress, obese  RESP: lungs clear to auscultation - no rales, rhonchi or wheezes  CV: regular rate and rhythm, normal S1 S2, no S3 or S4, no murmur, click or rub, no peripheral edema and peripheral pulses strong  ABDOMEN: this is a quite obese woman with a motorized scooter type of vehicle and it was not tried to completely lay her flat on the exam table. I commented on the reduced accuracy with examining her only reclining in her device chair but we note soft, nontender belly, no hepatosplenomegaly, no masses and bowel sounds normal  MS: no gross musculoskeletal defects noted, no edema  SKIN: no suspicious lesions or rashes  NEURO: Normal strength and tone, mentation intact and speech normal  PSYCH: mentation appears normal, affect normal/bright    Diagnostic Test Results:  Results for orders placed or performed in visit on 04/29/19 (from the past 24 hour(s))   ESR: Erythrocyte sedimentation rate   Result Value Ref Range    Sed Rate 25 0 - 30 mm/h   CBC with platelets differential   Result Value Ref Range    WBC 7.4 4.0 - 11.0 10e9/L    RBC Count 3.86 3.8 - 5.2 10e12/L    Hemoglobin 12.4 11.7 - 15.7 g/dL    Hematocrit 38.2 35.0 - 47.0 %    MCV 99 78 - 100 fl    MCH 32.1 26.5 - 33.0 pg    MCHC 32.5 31.5 - 36.5 g/dL    RDW 13.7 10.0 - 15.0 %    Platelet Count 232 150 - 450 10e9/L    % Neutrophils 74.9 %    % Lymphocytes 13.7 %    % Monocytes 6.4 %    % Eosinophils 4.2 %    % Basophils 0.8 %    Absolute Neutrophil 5.5 1.6 - 8.3 10e9/L     Absolute Lymphocytes 1.0 0.8 - 5.3 10e9/L    Absolute Monocytes 0.5 0.0 - 1.3 10e9/L    Absolute Eosinophils 0.3 0.0 - 0.7 10e9/L    Absolute Basophils 0.1 0.0 - 0.2 10e9/L    Diff Method Automated Method    Hemoglobin A1c   Result Value Ref Range    Hemoglobin A1C 7.0 (H) 0 - 5.6 %       ASSESSMENT/PLAN:     (E11.22,  N18.3) 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, without long-term current use of insulin (H)  (primary encounter diagnosis)  Comment: positive findings for diabetes neuropathy without open sores or signs or symptoms  Of infection   Plan: FOOT EXAM, Albumin Random Urine Quantitative         with Creat Ratio, Hemoglobin A1c, Comprehensive        metabolic panel, Hemoglobin A1c            (I10) Hypertension goal BP (blood pressure) < 140/90  Comment: controlled within acceptable limits   Plan: Albumin Random Urine Quantitative with Creat         Ratio            (E78.5) Hyperlipidemia LDL goal <100  Comment: continue current plan of care    Plan: Lipid panel reflex to direct LDL Non-fasting            (Z71.89) Advanced directives, counseling/discussion  Comment: advanced directive counseling and discussion   Plan:     (J45.41) Moderate persistent asthma with acute exacerbation  Comment: stable phase of chronic illness   Plan: see asthma action plan     (I27.20) Pulmonary HTN (H)  Comment: Following with pulmonologist.   Plan: Comprehensive metabolic panel            (R09.02) Oxygen desaturation  Comment: ongoing management   Plan: no new findings     (I82.511) Chronic deep vein thrombosis (DVT) of femoral vein of right lower extremity (H)  Comment: continue current plan of care    Plan: INR CLINIC REFERRAL            (E08.42) Diabetic polyneuropathy associated with diabetes mellitus due to underlying condition (H)  Comment:   Plan: FOOT EXAM            (E55.9) Hypovitaminosis D  Comment:   25 OH Vit D2   Date Value Ref Range Status   08/15/2011 <5 ug/L Final     25 OH  Vit D3   Date Value Ref Range Status   08/15/2011 28 ug/L Final     25 OH Vit D total   Date Value Ref Range Status   08/15/2011  30 - 75 ug/L Final    <33  Season, race, dietary intake, and treatment affect the concentration of   25-hydroxy-Vitamin D. Values may decrease during winter months and increase   during summer months. Values less than 30 ug/L may indicate Vitamin D   deficiency.       Plan: Comprehensive metabolic panel        Vitamin D levels      (I48.1) Persistent atrial fibrillation (H)  Comment: Doing well, INR clinic referral was updated for another year.   Plan: INR CLINIC REFERRAL            (Z13.29) Screening for hypothyroidism  Comment: routine screening   Plan: TSH with free T4 reflex            (N18.4) CKD (chronic kidney disease) stage 4, GFR 15-29 ml/min (H)  Comment: recheck - she's seeing Dr. Emilie Ren nephrologist with [facility]   Plan: Comprehensive metabolic panel            (R13.10) Problems with swallowing  Comment: She will continue to monitor and follow up if things seem to be worsening, and a barium swallow test can be done.   Plan: Comprehensive metabolic panel, ESR: Erythrocyte        sedimentation rate, CRP inflammation, CBC with         platelets differential            (R10.84) Abdominal pain, generalized  Comment: entirely nonspecific , no evidence of organic pathological process , in this difficult patient , screening laboratory studies are important and include inflammatory markers   Plan: Comprehensive metabolic panel, ESR: Erythrocyte        sedimentation rate, CRP inflammation, CBC with         platelets differential            See Patient Instructions    The information in this document, created by the medical scribe for me, accurately reflects the services I personally performed and the decisions made by me. I have reviewed and approved this document for accuracy.     Steven Abrams MD  [facility]

## 2019-04-29 NOTE — LETTER
My Heart Failure Action Plan   Name: Mara Colindres    YOB: 1934   Date: 4/28/2019    My doctor: Steven Abrams     [address]  [place] [address] [phone]  My Diagnosis: Combined Heart Failure   My Ejection Fraction: Over 50%    My Exercise Goal: 30 minutes daily  .     My Weight Goal:   Wt Readings from Last 2 Encounters:   11/28/18 110.6 kg (243 lb 14.4 oz)   09/13/18 114.1 kg (251 lb 9.6 oz)     Weigh yourself daily using the same scale. If you gain more than 2 pounds in 24 hours or 5 pounds in a week call the clinic    My Diet Goal: No added salt    Emergency Room Visits:    Our goal is to improve your quality of life and help you avoid a visit to the emergency room or hospital.  If we work together, we can achieve this goal. But, if you feel you need to call 911 or go to the emergency room, please do so.  If you go to the emergency room, please bring your list of medicines and your daily weight chart with you.       GREEN ZONE     Doing well today    Weight gained is no more than 2 pounds a day or 5 pounds a week.    No swelling in feet, ankles, legs or stomach.    No more swelling than usual.    No more trouble breathing than usual.    No change in my sleep.    No other problems. Actions:    I am doing fine.  I will take my medicine, follow my diet, see my doctor, exercise, and watch for symptoms.           YELLOW ZONE         Having a bad day or flare up    Weight gain of more than 2 pounds in one day or 5 pounds in one week.    New swelling in ankle, leg, knee or thigh.    Bloating in belly, pants feel tighter.    Swelling in hands or face.    Coughing or trouble breathing while walking or talking.    Harder to breathe last night.    Have trouble sleeping, wake up short of breath.    Much more tired than usual.    Not eating.    Pain in my chest or bad leg cramps.    Feel weak or dizzy. Actions:    I need to take action and call my doctor or  nurse today.                 RED ZONE         Need medical care now    Weight gain of 5 pounds overnight.    Chest pain or pressure that does not go away.    Feel less alert.    Wheezing or have trouble breathing when at rest.    Cannot sleep lying down.    Cannot take my water pill.    Pass out or faint. Actions:    I need to call my doctor or nurse now!    Call 911 if I have chest pain or cannot breathe.

## 2019-04-29 NOTE — LETTER
My Asthma Action Plan  Name: Mara Colindres   YOB: 1934  Date: 4/28/2019   My doctor: Steven Abrams MD   My clinic: [facility]        My Control Medicine: None  My Rescue Medicine: Albuterol (Proair/Ventolin/Proventil) inhaler     My Asthma Severity: intermittent  Avoid your asthma triggers: Patient is unaware of triggers               GREEN ZONE   Good Control    I feel good    No cough or wheeze    Can work, sleep and play without asthma symptoms       Take your asthma control medicine every day.     1. If exercise triggers your asthma, take your rescue medication    15 minutes before exercise or sports, and    During exercise if you have asthma symptoms  2. Spacer to use with inhaler: If you have a spacer, make sure to use it with your inhaler             YELLOW ZONE Getting Worse  I have ANY of these:    I do not feel good    Cough or wheeze    Chest feels tight    Wake up at night   1. Keep taking your Green Zone medications  2. Start taking your rescue medicine:    every 20 minutes for up to 1 hour. Then every 4 hours for 24-48 hours.  3. If you stay in the Yellow Zone for more than 12-24 hours, contact your doctor.  4. If you do not return to the Green Zone in 12-24 hours or you get worse, start taking your oral steroid medicine if prescribed by your provider.           RED ZONE Medical Alert - Get Help  I have ANY of these:    I feel awful    Medicine is not helping    Breathing getting harder    Trouble walking or talking    Nose opens wide to breathe       1. Take your rescue medicine NOW  2. If your provider has prescribed an oral steroid medicine, start taking it NOW  3. Call your doctor NOW  4. If you are still in the Red Zone after 20 minutes and you have not reached your doctor:    Take your rescue medicine again and    Call 911 or go to the emergency room right away    See your regular doctor within 2 weeks of an Emergency Room or Urgent Care visit for follow-up  treatment.          Annual Reminders:  Meet with Asthma Educator,  Flu Shot in the Fall, consider Pneumonia Vaccination for patients with asthma (aged 19 and older).    Pharmacy: [phone] [place] [address] AT [facility] [address] [facility]                      Asthma Triggers  How To Control Things That Make Your Asthma Worse    Triggers are things that make your asthma worse.  Look at the list below to help you find your triggers and what you can do about them.  You can help prevent asthma flare-ups by staying away from your triggers.      Trigger                                                          What you can do   Cigarette Smoke  Tobacco smoke can make asthma worse. Do not allow smoking in your home, car or around you.  Be sure no one smokes at a child s day care or school.  If you smoke, ask your health care provider for ways to help you quit.  Ask family members to quit too.  Ask your health care provider for a referral to Quit Plan to help you quit smoking, or call 6-191-112-PLAN.     Colds, Flu, Bronchitis  These are common triggers of asthma. Wash your hands often.  Don t touch your eyes, nose or mouth.  Get a flu shot every year.     Dust Mites  These are tiny bugs that live in cloth or carpet. They are too small to see. Wash sheets and blankets in hot water every week.   Encase pillows and mattress in dust mite proof covers.  Avoid having carpet if you can. If you have carpet, vacuum weekly.   Use a dust mask and HEPA vacuum.   Pollen and Outdoor Mold  Some people are allergic to trees, grass, or weed pollen, or molds. Try to keep your windows closed.  Limit time out doors when pollen count is high.   Ask you health care provider about taking medicine during allergy season.     Animal Dander  Some people are allergic to skin flakes, urine or saliva from pets with fur or feathers. Keep pets with fur or feathers out of your home.    If you can t keep the pet  outdoors, then keep the pet out of your bedroom.  Keep the bedroom door closed.  Keep pets off cloth furniture and away from stuffed toys.     Mice, Rats, and Cockroaches  Some people are allergic to the waste from these pests.   Cover food and garbage.  Clean up spills and food crumbs.  Store grease in the refrigerator.   Keep food out of the bedroom.   Indoor Mold  This can be a trigger if your home has high moisture. Fix leaking faucets, pipes, or other sources of water.   Clean moldy surfaces.  Dehumidify basement if it is damp and smelly.   Smoke, Strong Odors, and Sprays  These can reduce air quality. Stay away from strong odors and sprays, such as perfume, powder, hair spray, paints, smoke incense, paint, cleaning products, candles and new carpet.   Exercise or Sports  Some people with asthma have this trigger. Be active!  Ask your doctor about taking medicine before sports or exercise to prevent symptoms.    Warm up for 5-10 minutes before and after sports or exercise.     Other Triggers of Asthma  Cold air:  Cover your nose and mouth with a scarf.  Sometimes laughing or crying can be a trigger.  Some medicines and food can trigger asthma.

## 2019-04-29 NOTE — LETTER
[facility]  [address] [place] [address]  [place] [address]    May 1, 2019    Mara Colindres  [address] [facility] [address]          Dear Mara,    All of these tests are within acceptable limits. Things look good !     Enclosed is a copy of your results.     Results for orders placed or performed in visit on 04/29/19   TSH with free T4 reflex   Result Value Ref Range    TSH 1.33 0.40 - 4.00 mU/L   Lipid panel reflex to direct LDL Non-fasting   Result Value Ref Range    Cholesterol 141 <200 mg/dL    Triglycerides 69 <150 mg/dL    HDL Cholesterol 60 >49 mg/dL    LDL Cholesterol Calculated 67 <100 mg/dL    Non HDL Cholesterol 81 <130 mg/dL   Comprehensive metabolic panel   Result Value Ref Range    Sodium 139 133 - 144 mmol/L    Potassium 3.7 3.4 - 5.3 mmol/L    Chloride 105 94 - 109 mmol/L    Carbon Dioxide 26 20 - 32 mmol/L    Anion Gap 8 3 - 14 mmol/L    Glucose 180 (H) 70 - 99 mg/dL    Urea Nitrogen 23 7 - 30 mg/dL    Creatinine 1.42 (H) 0.52 - 1.04 mg/dL    GFR Estimate 34 (L) >60 mL/min/[1.73_m2]    GFR Estimate If Black 39 (L) >60 mL/min/[1.73_m2]    Calcium 9.0 8.5 - 10.1 mg/dL    Bilirubin Total 0.9 0.2 - 1.3 mg/dL    Albumin 3.9 3.4 - 5.0 g/dL    Protein Total 7.2 6.8 - 8.8 g/dL    Alkaline Phosphatase 88 40 - 150 U/L    ALT 19 0 - 50 U/L    AST 22 0 - 45 U/L   ESR: Erythrocyte sedimentation rate   Result Value Ref Range    Sed Rate 25 0 - 30 mm/h   CRP inflammation   Result Value Ref Range    CRP Inflammation <2.9 0.0 - 8.0 mg/L   CBC with platelets differential   Result Value Ref Range    WBC 7.4 4.0 - 11.0 10e9/L    RBC Count 3.86 3.8 - 5.2 10e12/L    Hemoglobin 12.4 11.7 - 15.7 g/dL    Hematocrit 38.2 35.0 - 47.0 %    MCV 99 78 - 100 fl    MCH 32.1 26.5 - 33.0 pg    MCHC 32.5 31.5 - 36.5 g/dL    RDW 13.7 10.0 - 15.0 %    Platelet Count 232 150 - 450 10e9/L    % Neutrophils 74.9 %    % Lymphocytes 13.7 %    % Monocytes 6.4 %     % Eosinophils 4.2 %    % Basophils 0.8 %    Absolute Neutrophil 5.5 1.6 - 8.3 10e9/L    Absolute Lymphocytes 1.0 0.8 - 5.3 10e9/L    Absolute Monocytes 0.5 0.0 - 1.3 10e9/L    Absolute Eosinophils 0.3 0.0 - 0.7 10e9/L    Absolute Basophils 0.1 0.0 - 0.2 10e9/L    Diff Method Automated Method    Hemoglobin A1c   Result Value Ref Range    Hemoglobin A1C 7.0 (H) 0 - 5.6 %   Vitamin D Deficiency   Result Value Ref Range    Vitamin D Deficiency screening 44 20 - 75 ug/L       If you have any questions or concerns, please call myself or my nurse at [phone].      Sincerely,        Steven Abrams MD/bartolome

## 2019-04-30 LAB — DEPRECATED CALCIDIOL+CALCIFEROL SERPL-MC: 44 UG/L (ref 20–75)

## 2019-04-30 ASSESSMENT — ASTHMA QUESTIONNAIRES: ACT_TOTALSCORE: 17

## 2019-05-09 DIAGNOSIS — N18.30 CONTROLLED TYPE 2 DIABETES MELLITUS WITH STAGE 3 CHRONIC KIDNEY DISEASE, WITHOUT LONG-TERM CURRENT USE OF INSULIN (H): ICD-10-CM

## 2019-05-09 DIAGNOSIS — E11.22 CONTROLLED TYPE 2 DIABETES MELLITUS WITH STAGE 3 CHRONIC KIDNEY DISEASE, WITHOUT LONG-TERM CURRENT USE OF INSULIN (H): ICD-10-CM

## 2019-05-13 RX ORDER — DULAGLUTIDE 1.5 MG/.5ML
INJECTION, SOLUTION SUBCUTANEOUS
Qty: 2 ML | Refills: 0 | Status: SHIPPED | OUTPATIENT
Start: 2019-05-13 | End: 2019-10-03

## 2019-05-13 NOTE — TELEPHONE ENCOUNTER
"Routing refill request to provider for review/approval because:  Labs out of range:  Cr  Labs not current:  Microalbumin    Requested Prescriptions   Pending Prescriptions Disp Refills     TRULICITY 1.5 MG/0.5ML pen [Pharmacy Med Name: TRULICITY 1.5MG/0.5ML SDP 4X0.5ML] 2 mL 0     Sig: INJECT 1.5 MG SUBCUTANEOUS EVERY SEVEN DAYS       GLP-1 Agonists Protocol Failed - 5/9/2019  1:32 PM        Failed - Microalbumin on file in past 12 months     Recent Labs   Lab Test 11/07/17  1510   MICROL 45   UMALCR 21.34             Failed - Normal serum creatinine on file in past 12 months     Recent Labs   Lab Test 04/29/19  1034   CR 1.42*             Passed - Blood pressure less than 140/90 in past 6 months     BP Readings from Last 3 Encounters:   04/29/19 106/78   11/28/18 98/73   09/13/18 100/64                 Passed - LDL on file in past 12 months     Recent Labs   Lab Test 04/29/19  1034   LDL 67             Passed - HgbA1C in past 3 or 6 months     If HgbA1C is 8 or greater, it needs to be on file within the past 3 months.  If less than 8, must be on file within the past 6 months.     Recent Labs   Lab Test 04/29/19  1034   A1C 7.0*             Passed - Medication is active on med list        Passed - Patient is age 18 or older        Passed - No active pregnancy on record        Passed - No positive pregnancy test in past 12 months        Passed - Recent (6 mo) or future (30 days) visit within the authorizing provider's specialty     Patient had office visit in the last 6 months or has a visit in the next 30 days with authorizing provider.  See \"Patient Info\" tab in inbasket, or \"Choose Columns\" in Meds & Orders section of the refill encounter.            Maryuri Hurst RN  "

## 2019-05-22 ENCOUNTER — ANTICOAGULATION THERAPY VISIT (OUTPATIENT)
Dept: NURSING | Facility: CLINIC | Age: 84
End: 2019-05-22
Payer: COMMERCIAL

## 2019-05-22 DIAGNOSIS — I48.19 PERSISTENT ATRIAL FIBRILLATION (H): ICD-10-CM

## 2019-05-22 LAB — INR POINT OF CARE: 2.9 (ref 0.86–1.14)

## 2019-05-22 PROCEDURE — 99207 ZZC NO CHARGE NURSE ONLY: CPT

## 2019-05-22 PROCEDURE — 36416 COLLJ CAPILLARY BLOOD SPEC: CPT

## 2019-05-22 PROCEDURE — 85610 PROTHROMBIN TIME: CPT | Mod: QW

## 2019-05-22 NOTE — PATIENT INSTRUCTIONS
[facility]:  Please call [phone] to cancel and/or reschedule your appointment   Please call [phone] with any problems or questions regarding your Warfarin therapy.

## 2019-05-22 NOTE — PROGRESS NOTES
ANTICOAGULATION FOLLOW-UP CLINIC VISIT    Patient Name:  Mara Colindres  Date:  2019  Contact Type:  Face to Face    SUBJECTIVE:  Patient Findings     Comments:   Bleeding Signs/Symptoms: NO  Thromboembolic Signs/Symptoms: NO     Medication Changes:  NO  Dietary Changes:  NO  Bacterial/Viral Infection: NO     Missed Coumadin Doses: NO  Other Concerns:  NO          Clinical Outcomes     Negatives:   Major bleeding event, Thromboembolic event, Anticoagulation-related hospital admission, Anticoagulation-related ED visit, Anticoagulation-related fatality    Comments:   Bleeding Signs/Symptoms: NO  Thromboembolic Signs/Symptoms: NO     Medication Changes:  NO  Dietary Changes:  NO  Bacterial/Viral Infection: NO     Missed Coumadin Doses: NO  Other Concerns:  NO             OBJECTIVE    INR Protime   Date Value Ref Range Status   2019 2.9 (A) 0.86 - 1.14 Final       ASSESSMENT / PLAN  No question data found.  Anticoagulation Summary  As of 2019    INR goal:   2.0-3.0   TTR:   57.0 % (2.9 y)   INR used for dosin.9 (2019)   Warfarin maintenance plan:   5 mg (5 mg x 1) every Tue, Thu, Sat; 2.5 mg (5 mg x 0.5) all other days   Full warfarin instructions:   5 mg every Tue, Thu, Sat; 2.5 mg all other days   Weekly warfarin total:   25 mg   No change documented:   Celeste Ruiz RN   Plan last modified:   Celeste Ruiz RN (2018)   Next INR check:   7/10/2019   Priority:   INR   Target end date:   Indefinite    Indications    Long-term (current) use of anticoagulants [Z79.01] [Z79.01]  Persistent atrial fibrillation (H) [I48.1]             Anticoagulation Episode Summary     INR check location:       Preferred lab:       Send INR reminders to:   [facility]    Comments:         Anticoagulation Care Providers     Provider Role Specialty Phone number    Steven Abrams MD [facility] Internal Medicine [phone]            See the Encounter Report to view Anticoagulation Flowsheet and  Dosing Calendar (Go to Encounters tab in chart review, and find the Anticoagulation Therapy Visit)        Celeste Ruiz RN

## 2019-06-06 DIAGNOSIS — E11.22 CONTROLLED TYPE 2 DIABETES MELLITUS WITH STAGE 3 CHRONIC KIDNEY DISEASE, WITHOUT LONG-TERM CURRENT USE OF INSULIN (H): ICD-10-CM

## 2019-06-06 DIAGNOSIS — N18.30 CONTROLLED TYPE 2 DIABETES MELLITUS WITH STAGE 3 CHRONIC KIDNEY DISEASE, WITHOUT LONG-TERM CURRENT USE OF INSULIN (H): ICD-10-CM

## 2019-06-07 DIAGNOSIS — E11.22 CONTROLLED TYPE 2 DIABETES MELLITUS WITH STAGE 3 CHRONIC KIDNEY DISEASE, WITHOUT LONG-TERM CURRENT USE OF INSULIN (H): ICD-10-CM

## 2019-06-07 DIAGNOSIS — N18.30 CONTROLLED TYPE 2 DIABETES MELLITUS WITH STAGE 3 CHRONIC KIDNEY DISEASE, WITHOUT LONG-TERM CURRENT USE OF INSULIN (H): ICD-10-CM

## 2019-06-07 RX ORDER — DULAGLUTIDE 1.5 MG/.5ML
INJECTION, SOLUTION SUBCUTANEOUS
Qty: 2 ML | Refills: 0
Start: 2019-06-07

## 2019-06-07 NOTE — TELEPHONE ENCOUNTER
"Routing refill request to provider for review/approval because:  Labs not current:      Requested Prescriptions   Pending Prescriptions Disp Refills     TRULICITY 1.5 MG/0.5ML pen [Pharmacy Med Name: TRULICITY 1.5MG/0.5ML SDP 4X0.5ML]  Last Written Prescription Date:  5/13/19  Last Fill Quantity: 2,  # refills: 0   Last office visit: 11/7/2017 with prescribing provider:     Future Office Visit:   2 mL 0     Sig: INJECT 1.5 MG SUBCUTANEOUS EVERY SEVEN DAYS       GLP-1 Agonists Protocol Failed - 6/6/2019 12:26 PM        Failed - Microalbumin on file in past 12 months     Recent Labs   Lab Test 11/07/17  1510   MICROL 45   UMALCR 21.34             Failed - Normal serum creatinine on file in past 12 months     Recent Labs   Lab Test 04/29/19  1034   CR 1.42*             Passed - Blood pressure less than 140/90 in past 6 months     BP Readings from Last 3 Encounters:   04/29/19 106/78   11/28/18 98/73   09/13/18 100/64                 Passed - LDL on file in past 12 months     Recent Labs   Lab Test 04/29/19  1034   LDL 67             Passed - HgbA1C in past 3 or 6 months     If HgbA1C is 8 or greater, it needs to be on file within the past 3 months.  If less than 8, must be on file within the past 6 months.     Recent Labs   Lab Test 04/29/19  1034   A1C 7.0*             Passed - Medication is active on med list        Passed - Patient is age 18 or older        Passed - No active pregnancy on record        Passed - No positive pregnancy test in past 12 months        Passed - Recent (6 mo) or future (30 days) visit within the authorizing provider's specialty     Patient had office visit in the last 6 months or has a visit in the next 30 days with authorizing provider.  See \"Patient Info\" tab in inbasket, or \"Choose Columns\" in Meds & Orders section of the refill encounter.            Celeste Ruiz, AMAYA - BC      "

## 2019-06-09 RX ORDER — DULAGLUTIDE 1.5 MG/.5ML
INJECTION, SOLUTION SUBCUTANEOUS
Qty: 2 ML | Refills: 4 | Status: SHIPPED | OUTPATIENT
Start: 2019-06-09 | End: 2019-10-20

## 2019-06-16 DIAGNOSIS — Z79.01 LONG TERM CURRENT USE OF ANTICOAGULANT THERAPY: ICD-10-CM

## 2019-06-16 DIAGNOSIS — I48.19 PERSISTENT ATRIAL FIBRILLATION (H): ICD-10-CM

## 2019-06-17 RX ORDER — WARFARIN SODIUM 5 MG/1
TABLET ORAL
Qty: 70 TABLET | Refills: 0 | Status: SHIPPED | OUTPATIENT
Start: 2019-06-17 | End: 2019-06-21 | Stop reason: DRUGHIGH

## 2019-06-17 RX ORDER — WARFARIN SODIUM 5 MG/1
TABLET ORAL
Qty: 70 TABLET | Refills: 0 | Status: SHIPPED | OUTPATIENT
Start: 2019-06-17 | End: 2019-06-17

## 2019-06-18 ENCOUNTER — NURSE TRIAGE (OUTPATIENT)
Dept: FAMILY MEDICINE | Facility: CLINIC | Age: 84
End: 2019-06-18

## 2019-06-18 NOTE — TELEPHONE ENCOUNTER
Reason for call:  Symptom   Symptom or request: Cold, cough or allergies    Duration (how long have symptoms been present): 5 days now  Have you been treated for this before? No    Additional comments: Coughing is non stop. Please call to advise.    Phone number to reach patient:  Home number on file [phone] (home)    Best Time:  any    Can we leave a detailed message on this number?  NO

## 2019-06-18 NOTE — TELEPHONE ENCOUNTER
Spoke with patient who reports that for the past 5 days she's been having non-stop coughing. Reports she has been having some SOB but normally has this as she's on supplemental O2.   Patient reports she has been coughing up green/yellow phlegm and yesterday had some pink streaks in her phlegm and has been feeling very fatigued.   Reports her head and chest hurt from coughing so much.  Patient states she has tried Mucinex and Vitamin C but hasn't helped.   Advised patient she should continue to try OTC cough syrups, nasal rinses and drink plenty of fluids and come in for in appointment if symptoms don't improve. Patient states she can't come in for an appointment and would like an Rx sent to her pharmacy, like an steroid or abx. Advised her this typically isn't done without an appointment  Patient denies fever.     Please advise.     Additional Information    Negative: Bluish (or gray) lips or face    Negative: Severe difficulty breathing (e.g., struggling for each breath, speaks in single words)    Negative: Rapid onset of cough and has hives    Negative: Coughing started suddenly after medicine, an allergic food or bee sting    Negative: Difficulty breathing after exposure to flames, smoke, or fumes    Negative: Sounds like a life-threatening emergency to the triager    Negative: Previous asthma attacks and this feels like asthma attack    Negative: Chest pain present when not coughing    Negative: Difficulty breathing    Negative: Passed out (i.e., fainted, collapsed and was not responding)    Negative: Patient sounds very sick or weak to the triager    Negative: Coughed up > 1 tablespoon (15 ml) blood (Exception: blood-tinged sputum)    Negative: Fever > 103 F (39.4 C)    Negative: Fever > 101 F (38.3 C) and over 60 years of age    Negative: Fever > 100.0 F (37.8 C) and has diabetes mellitus or a weak immune system (e.g., HIV positive, cancer chemotherapy, organ transplant, splenectomy, chronic steroids)     "Negative: Fever > 100.0 F (37.8 C) and bedridden (e.g., nursing home patient, stroke, chronic illness, recovering from surgery)    Negative: Increasing ankle swelling    Negative: Wheezing is present    Negative: SEVERE coughing spells (e.g., whooping sound after coughing, vomiting after coughing)    Negative: Coughing up priscila-colored (reddish-brown) or blood-tinged sputum    Negative: Fever present > 3 days (72 hours)    Negative: Fever returns after gone for over 24 hours and symptoms worse or not improved    Negative: Using nasal washes and pain medicine > 24 hours and sinus pain persists    Negative: Known COPD or other severe lung disease (i.e., bronchiectasis, cystic fibrosis, lung surgery) and worsening symptoms (i.e., increased sputum purulence or amount, increased breathing difficulty)    Continuous (nonstop) coughing interferes with work or school and no improvement using cough treatment per Care Advice    Answer Assessment - Initial Assessment Questions  1. ONSET: \"When did the cough begin?\"       About 5 days   2. SEVERITY: \"How bad is the cough today?\"       Has been about the same  3. RESPIRATORY DISTRESS: \"Describe your breathing.\"       SOB but does use supplemental O2  4. FEVER: \"Do you have a fever?\" If so, ask: \"What is your temperature, how was it measured, and when did it start?\"      No  5. HEMOPTYSIS: \"Are you coughing up any blood?\" If so ask: \"How much?\" (flecks, streaks, tablespoons, etc.)      Yesterday had some flecks, phlegm is green/yellow  6. TREATMENT: \"What have you done so far to treat the cough?\" (e.g., meds, fluids, humidifier)      Has been taking Mucinex, Vitamin C and airborne.   7. CARDIAC HISTORY: \"Do you have any history of heart disease?\" (e.g., heart attack, congestive heart failure)       Yes, has A. Fib.  8. LUNG HISTORY: \"Do you have any history of lung disease?\"  (e.g., pulmonary embolus, asthma, emphysema)      Had bouts with asthma in the past and using supplemental " "O2, using 3L, have been above 90%  9. PE RISK FACTORS: \"Do you have a history of blood clots?\" (or: recent major surgery, recent prolonged travel, bedridden )      No  10. OTHER SYMPTOMS: \"Do you have any other symptoms? (e.g., runny nose, wheezing, chest pain)        Wheezing, chest pain and head from coughing so much, runny nose   11. PREGNANCY: \"Is there any chance you are pregnant?\" \"When was your last menstrual period?\"        No  12. TRAVEL: \"Have you traveled out of the country in the last month?\" (e.g., travel history, exposures)        No    Protocols used: COUGH-A-OH    Maryuri Hurst RN  "

## 2019-06-18 NOTE — TELEPHONE ENCOUNTER
I am concerned about pneumonia or congestive heart failure or both and treatment without clinical evaluation is not appropriate for this patient who has so many serious co-morbities. I would agree to have her squeezed in for an urgent appointment . Today or most likely Thursday ? She could see probably one of my partners tomorrow , I suggest JUNAID Hernández nurse practitioner      Steven Abrams MD

## 2019-06-18 NOTE — TELEPHONE ENCOUNTER
Called and spoke with patient and gave information. Patient very hesitant to schedule an appointment but reluctantly agreed for an appointment on Thursday.   What time on Thursday would you like patient to come in as schedule is full?    Maryuri Hurst RN

## 2019-06-20 ENCOUNTER — ANCILLARY PROCEDURE (OUTPATIENT)
Dept: GENERAL RADIOLOGY | Facility: CLINIC | Age: 84
End: 2019-06-20
Attending: INTERNAL MEDICINE
Payer: COMMERCIAL

## 2019-06-20 ENCOUNTER — OFFICE VISIT (OUTPATIENT)
Dept: FAMILY MEDICINE | Facility: CLINIC | Age: 84
End: 2019-06-20
Payer: COMMERCIAL

## 2019-06-20 ENCOUNTER — TELEPHONE (OUTPATIENT)
Dept: FAMILY MEDICINE | Facility: CLINIC | Age: 84
End: 2019-06-20

## 2019-06-20 VITALS
HEART RATE: 104 BPM | DIASTOLIC BLOOD PRESSURE: 72 MMHG | TEMPERATURE: 102.3 F | RESPIRATION RATE: 18 BRPM | SYSTOLIC BLOOD PRESSURE: 108 MMHG | OXYGEN SATURATION: 94 %

## 2019-06-20 DIAGNOSIS — H10.021 PINK EYE DISEASE OF RIGHT EYE: ICD-10-CM

## 2019-06-20 DIAGNOSIS — J45.41 MODERATE PERSISTENT ASTHMA WITH EXACERBATION: ICD-10-CM

## 2019-06-20 DIAGNOSIS — J06.9 UPPER RESPIRATORY TRACT INFECTION, UNSPECIFIED TYPE: ICD-10-CM

## 2019-06-20 DIAGNOSIS — R05.9 COUGH: ICD-10-CM

## 2019-06-20 DIAGNOSIS — J06.9 UPPER RESPIRATORY TRACT INFECTION, UNSPECIFIED TYPE: Primary | ICD-10-CM

## 2019-06-20 PROCEDURE — 99214 OFFICE O/P EST MOD 30 MIN: CPT | Performed by: INTERNAL MEDICINE

## 2019-06-20 PROCEDURE — 71046 X-RAY EXAM CHEST 2 VIEWS: CPT

## 2019-06-20 RX ORDER — CIPROFLOXACIN HYDROCHLORIDE 3.5 MG/ML
1 SOLUTION/ DROPS TOPICAL EVERY 4 HOURS
Qty: 5 ML | Refills: 0 | Status: SHIPPED | OUTPATIENT
Start: 2019-06-20 | End: 2019-12-09

## 2019-06-20 RX ORDER — CODEINE PHOSPHATE AND GUAIFENESIN 10; 100 MG/5ML; MG/5ML
2 SOLUTION ORAL EVERY 4 HOURS PRN
Qty: 120 ML | Refills: 3 | Status: SHIPPED | OUTPATIENT
Start: 2019-06-20 | End: 2019-12-09

## 2019-06-20 NOTE — TELEPHONE ENCOUNTER
I am not sure where the idea of prednisone comes from ? Can you help me understand this request accurately ? We need more information !    Steven Abrams MD

## 2019-06-20 NOTE — PATIENT INSTRUCTIONS
1) We will start antibiotic treatment today     2) We will also provide drops for your eye    3) We will do a chest x-ray today to hopefully rule out pneumonia    4) Do some nebulizer treatments at home to help with clearing you up    5) Make sure to rest and drink lots of fluids     6) If you have seen no improvement or things worsen for you over the next 24-48 hours please get evaluated at the Emergency Room.

## 2019-06-20 NOTE — TELEPHONE ENCOUNTER
"Huddled with Dr. Abrams and he is not sure why patient is asking for prednisone.   She possibly has pneumonia and prednisone would be contraindicated    Called patient and she thought that prednisone would make her feel better because, \"he always give me that to make me feel better\"  However, she understands now that it is not appropriate  Advised her to continue with abx, rest, and fluids    She asked for a prescription for cough syrup with codeine    Please call her back with updates    Soraida Marmolejo RN    "

## 2019-06-20 NOTE — PROGRESS NOTES
Subjective     Mraa Colindres is a 84 year old female who presents to clinic today for the following health issues:    HPI     DIXON Terry reports symptoms of violent coughing, shortness of breath, chest congestion, green-yellow phlegm, itchy swollen eyes that have been present for about 10 days. Her daughter had similar symptoms apart from her eyes. Her son drove her to the clinic today. She uses supplemental oxygen but did not bring this with her today.     Conjunctivitis of right Eye  She notes some conjunctivitis of the right eye which began around the same time. This eye is sore and is a pinkish eye glued shut with goopy material     Patient Active Problem List   Diagnosis     Morbid obesity (H)     Hypertension goal BP (blood pressure) < 140/90     MRSA (methicillin resistant Staphylococcus aureus)     Chronic diarrhea     HYPERLIPIDEMIA LDL GOAL <100     Chronic anticoagulation     CKD (chronic kidney disease) stage 3, GFR 30-59 ml/min (H)     Septic hip (H)     Lymphedema     Tubular adenoma of colon     Abdominal wall hematoma     Advanced directives, counseling/discussion     Umbilical hernia     History of [facility] fever     Moderate persistent asthma     Pseudophakia, ou     Hypovitaminosis D     Bilateral leg pain     Positive YAMINI     Raynaud phenomenon     Pulmonary HTN (H)     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     Scotoma involving central area in visual field, right     Failed total hip arthroplasty, sequela     Major depressive disorder, recurrent episode, mild (H)     Chronic diastolic congestive heart failure (H)     Long-term (current) use of anticoagulants [Z79.01]     Persistent atrial fibrillation (H)     Diabetic polyneuropathy associated with diabetes mellitus due to underlying condition (H)     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, without long-term current use of insulin (H)     Fatigue, unspecified type     Senile osteoporosis      Posterior vitreous detachment of left eye     Background diabetic retinopathy, mild, ou     Chronic deep vein thrombosis (DVT) of femoral vein of right lower extremity (H)     Oxygen desaturation     CKD (chronic kidney disease) stage 4, GFR 15-29 ml/min (H)     Past Surgical History:   Procedure Laterality Date     APPENDECTOMY       CATARACT IOL, RT/LT       COLONOSCOPY  2008     COLONOSCOPY  8/20/2012    Procedure: COLONOSCOPY;  COLONOSCOPY, TUBULAR ADENOMA OF COLON, CHRONIC DIARRHEA;  Surgeon: Yobany Hinojosa MD;  Location: [facility] OR     HC REMOVAL GALLBLADDER       JOINT REPLACEMENT, HIP RT/LT  2004    right     JOINT REPLACEMTN, KNEE RT/LT  2000    bilateral     PHACOEMULSIFICATION WITH STANDARD INTRAOCULAR LENS IMPLANT  8/2013    left eye; right eye     ROTATOR CUFF REPAIR RT/LT  1/93    right     TONSILLECTOMY         Social History     Tobacco Use     Smoking status: Never Smoker     Smokeless tobacco: Never Used   Substance Use Topics     Alcohol use: No     Family History   Problem Relation Age of Onset     Diabetes Mother      Cancer Mother      Hypertension Mother      Cataracts Mother      Hypertension Father      Glaucoma Father      Cataracts Father      Cancer Son         thyroid cancer     Neurologic Disorder Sister      Alzheimer Disease Sister          BP Readings from Last 3 Encounters:   06/20/19 108/72   04/29/19 106/78   11/28/18 98/73    Wt Readings from Last 3 Encounters:   04/29/19 117.5 kg (259 lb)   11/28/18 110.6 kg (243 lb 14.4 oz)   09/13/18 114.1 kg (251 lb 9.6 oz)         Reviewed and updated as needed this visit by Provider       Review of Systems   ROS COMP: Constitutional, HEENT, cardiovascular, pulmonary, GI, , musculoskeletal, neuro, skin, endocrine and psych systems are negative, except as otherwise noted.    This document serves as a record of the services and decisions personally performed and made by Steven Abrams MD. It was created on his behalf by jacob Arredondo  trained medical scribe. The creation of this document is based on the provider's statements to the medical scribe.  Rivas Hamlin 1:26 PM June 20, 2019      Objective    /72   Pulse 104   Temp 102.3  F (39.1  C) (Oral)   Resp 18   SpO2 94%   There is no height or weight on file to calculate BMI.  Physical Exam   GENERAL: alert and answering all of my questions fine, however she appears sickly, she is not somnolent.  EYES: she has some goopy discharge around the right eye and is bloodshot which looks like probably bacterial conjunctivitis left eye appears normal   HENT: no post oropharynx erythema whitish patches or exudates  NECK: neck is warm to touch consistent with fever  RESP: she has some rattling in the left lower lung base  CV: regular rhythm her rate is a little fast with a pulse of 104, initially was 128  SKIN: no suspicious lesions or rashes to visible skin   NEURO: Normal strength and tone, mentation intact and speech normal  PSYCH: mentation appears normal, affect normal/bright    Diagnostic Test Results:  Labs reviewed in Epic  No results found for this or any previous visit (from the past 24 hour(s)).        Assessment & Plan     (J06.9) Upper respiratory tract infection, unspecified type  (primary encounter diagnosis)  Comment: CURB-65 Score for Pneumonia Severity is +1 [ although we didn't do laboratory studies so I am not certainly of the BUN . However clinically I think this patient has pneumonia and this is a grey area as far as hospitalization goes. The fever is concerning and I recommended we send patient to the emergency room for further evaluation although we could wait to see what the chest xray shows first    Plan: amoxicillin-clavulanate (AUGMENTIN) 875-125 MG         tablet, XR Chest 2 Views            (H10.021) Pink eye disease of right eye  Comment: separate diagnosis and we prescribed eye drops  Plan: ciprofloxacin (CILOXAN) 0.3 % ophthalmic         solution               See  Patient Instructions    No follow-ups on file.    The information in this document, created by the medical scribe for me, accurately reflects the services I personally performed and the decisions made by me. I have reviewed and approved this document for accuracy prior to leaving the patient care area.  June 20, 2019 1:26 PM    Steven Abrams MD  [facility]

## 2019-06-20 NOTE — TELEPHONE ENCOUNTER
Reason for Call:  Other prescription    Detailed comments: Patient was just in and forgot to ask for prednisone. They are on the way to the pharmacy now for her other medications. Please call [facility] [id number] - [place] - [address] AT [facility] [address]    Phone Number Patient can be reached at: Home number on file [phone] (home)    Best Time: ASAP    Can we leave a detailed message on this number? YES    Call taken on 6/20/2019 at 3:13 PM by Geno Quick

## 2019-06-21 ENCOUNTER — TELEPHONE (OUTPATIENT)
Dept: FAMILY MEDICINE | Facility: CLINIC | Age: 84
End: 2019-06-21

## 2019-06-21 DIAGNOSIS — Z79.01 LONG TERM CURRENT USE OF ANTICOAGULANT THERAPY: ICD-10-CM

## 2019-06-21 DIAGNOSIS — I48.19 PERSISTENT ATRIAL FIBRILLATION (H): ICD-10-CM

## 2019-06-21 DIAGNOSIS — Z79.01 CHRONIC ANTICOAGULATION: Primary | ICD-10-CM

## 2019-06-21 DIAGNOSIS — Z79.01 CHRONIC ANTICOAGULATION: ICD-10-CM

## 2019-06-21 RX ORDER — WARFARIN SODIUM 5 MG/1
TABLET ORAL
Qty: 70 TABLET | Refills: 0 | Status: SHIPPED | OUTPATIENT
Start: 2019-06-21 | End: 2020-01-15

## 2019-06-21 NOTE — TELEPHONE ENCOUNTER
Updated Rx sent, with note to discontinue any other Rxs on file.    Alis Hartman, PharmD BCACP  Anticoagulation Clinical Pharmacist

## 2019-06-21 NOTE — TELEPHONE ENCOUNTER
Received fax from pharmacy [facility] stating that they received 2 different prescriptions for warfarin and 2 different sigs on these as well please verify the correct dose for patient and directions.       Kiar MURILLO CMA ([facility])

## 2019-06-24 RX ORDER — WARFARIN SODIUM 5 MG/1
TABLET ORAL
Qty: 112 TABLET | Refills: 0
Start: 2019-06-24

## 2019-07-10 ENCOUNTER — ANTICOAGULATION THERAPY VISIT (OUTPATIENT)
Dept: NURSING | Facility: CLINIC | Age: 84
End: 2019-07-10
Payer: COMMERCIAL

## 2019-07-10 DIAGNOSIS — Z79.01 LONG TERM CURRENT USE OF ANTICOAGULANT THERAPY: ICD-10-CM

## 2019-07-10 DIAGNOSIS — I48.19 PERSISTENT ATRIAL FIBRILLATION (H): ICD-10-CM

## 2019-07-10 LAB — INR POINT OF CARE: 2.5 (ref 0.86–1.14)

## 2019-07-10 PROCEDURE — 36416 COLLJ CAPILLARY BLOOD SPEC: CPT

## 2019-07-10 PROCEDURE — 85610 PROTHROMBIN TIME: CPT | Mod: QW

## 2019-07-10 PROCEDURE — 99207 ZZC NO CHARGE NURSE ONLY: CPT

## 2019-07-10 NOTE — PROGRESS NOTES
ANTICOAGULATION FOLLOW-UP CLINIC VISIT    Patient Name:  Mara Colindres  Date:  7/10/2019  Contact Type:  Face to Face    SUBJECTIVE:         OBJECTIVE    INR Protime   Date Value Ref Range Status   07/10/2019 2.5 (A) 0.86 - 1.14 Final       ASSESSMENT / PLAN  No question data found.  Anticoagulation Summary  As of 7/10/2019    INR goal:   2.0-3.0   TTR:   58.9 % (3.1 y)   INR used for dosin.5 (7/10/2019)   Warfarin maintenance plan:   5 mg (5 mg x 1) every Tue, Thu, Sat; 2.5 mg (5 mg x 0.5) all other days   Full warfarin instructions:   5 mg every Tue, Thu, Sat; 2.5 mg all other days   Weekly warfarin total:   25 mg   No change documented:   Celeste Ruiz RN   Plan last modified:   Celeste Ruiz RN (2018)   Next INR check:   2019   Priority:   INR   Target end date:   Indefinite    Indications    Long-term (current) use of anticoagulants [Z79.01] [Z79.01]  Persistent atrial fibrillation (H) [I48.1]             Anticoagulation Episode Summary     INR check location:       Preferred lab:       Send INR reminders to:   KELLY ROJAS    Comments:         Anticoagulation Care Providers     Provider Role Specialty Phone number    Steven Abrams MD [facility] Internal Medicine [phone]            See the Encounter Report to view Anticoagulation Flowsheet and Dosing Calendar (Go to Encounters tab in chart review, and find the Anticoagulation Therapy Visit)        Celeste Ruiz RN

## 2019-07-17 ENCOUNTER — TELEPHONE (OUTPATIENT)
Dept: FAMILY MEDICINE | Facility: CLINIC | Age: 84
End: 2019-07-17

## 2019-07-17 NOTE — TELEPHONE ENCOUNTER
Reason for call:  Other   Patient called regarding (reason for call): call back  Additional comments: Patient is being treated for Pneumonia and wants to know if she can be put on a different medication. Please call back     Phone number to reach patient:  Home number on file [phone] (home)    Best Time:  any    Can we leave a detailed message on this number?  YES

## 2019-07-17 NOTE — TELEPHONE ENCOUNTER
Per patient, she finished her abx for pneumonia and feels better  But still having SOB and productive cough with yellow/white sputum  Denies fever  Using oxygen at 3L and oxygen level has been around 94%    She has not been using her neb/inhaler  Advised her to use her inhaler and nebs to see if it helps with cough and SOB  Patient verbalized understanding.    Patient asking if Benzonatate would be appropriate.  A friend told her it was effective for him  She has a cardiologist for her Afib but is wondering if she should also see a pulmonologist for her symptoms or what she should do for continued symptoms (f/u with Dr. Abrams again?)  She mentioned she has a chronic cough as well    Advised her to call 911 or have someone take her to the ED if having any respiratory distress or low SpO2    Soraida Marmolejo RN

## 2019-07-18 NOTE — TELEPHONE ENCOUNTER
Patient updated with provider's message  She stated that she tried the neb treatments yesterday and it helped.  She felt better overnight but just woke up this morning so is not sure how her symptoms are today yet  She will continue to monitor and use nebs or inhaler PRN as prescribed  Advised her to not use the neb and inhaler at the same time as they are both albuterol  Appointment made for Monday 7/22/19  Advised her to go to UC if cough gets worse or fever. Or go to ER if having worsening SOB or low SpO2  Patient verbalized understanding.    Soraida Marmolejo RN

## 2019-07-18 NOTE — TELEPHONE ENCOUNTER
Appreciate the detail ! This is a patient who is generally sick and sick and sick.    Using Benzonatate [ tessalon perles ] is only going to suppress coughing. It otherwise provides no treatment whatsoever. We can prescribe some Benzonatate [ tessalon perles ] if she wants for coughing but I don't think is much of a treatment plan.    If patient feels this badly it would be my preference to see her. Or go to emergency room as you've reviewed with her already     Steven Abrams MD

## 2019-07-22 ENCOUNTER — OFFICE VISIT (OUTPATIENT)
Dept: FAMILY MEDICINE | Facility: CLINIC | Age: 84
End: 2019-07-22
Payer: COMMERCIAL

## 2019-07-22 VITALS
TEMPERATURE: 98.4 F | SYSTOLIC BLOOD PRESSURE: 134 MMHG | OXYGEN SATURATION: 95 % | RESPIRATION RATE: 18 BRPM | HEART RATE: 88 BPM | DIASTOLIC BLOOD PRESSURE: 84 MMHG

## 2019-07-22 DIAGNOSIS — I27.20 PULMONARY HTN (H): ICD-10-CM

## 2019-07-22 DIAGNOSIS — R09.02 OXYGEN DESATURATION: ICD-10-CM

## 2019-07-22 DIAGNOSIS — I48.19 PERSISTENT ATRIAL FIBRILLATION (H): ICD-10-CM

## 2019-07-22 DIAGNOSIS — Z86.19 HISTORY OF SAN JOAQUIN VALLEY FEVER: ICD-10-CM

## 2019-07-22 DIAGNOSIS — R05.9 COUGH: Primary | ICD-10-CM

## 2019-07-22 PROCEDURE — 99214 OFFICE O/P EST MOD 30 MIN: CPT | Performed by: INTERNAL MEDICINE

## 2019-07-22 RX ORDER — BENZONATATE 200 MG/1
200 CAPSULE ORAL 3 TIMES DAILY PRN
Qty: 20 CAPSULE | Refills: 3 | Status: SHIPPED | OUTPATIENT
Start: 2019-07-22 | End: 2019-12-09

## 2019-07-22 RX ORDER — CODEINE PHOSPHATE AND GUAIFENESIN 10; 100 MG/5ML; MG/5ML
1-2 SOLUTION ORAL EVERY 4 HOURS PRN
Qty: 180 ML | Refills: 0 | Status: SHIPPED | OUTPATIENT
Start: 2019-07-22 | End: 2019-12-09

## 2019-07-22 RX ORDER — PREDNISONE 20 MG/1
TABLET ORAL
Qty: 20 TABLET | Refills: 0 | Status: SHIPPED | OUTPATIENT
Start: 2019-07-22 | End: 2019-12-09

## 2019-07-22 NOTE — PATIENT INSTRUCTIONS
1) We will provide you with prescriptions for some cough suppressant and steroid today. I don't want to start another antibiotic at this time because you don't have green phlegm, fevers or chills. You will have a prescription of prednisone written and you should only get this filled if you develop worsening symptoms or the symptoms listed above.     2) From the perspective of your cardiologist there are no new treatments to use. Nebulizer using once a day might not be enough. You can increase nebulizer treatments to 4 times a day and if phlegm/coughing is really bad then even 5-6 times a day if needed.

## 2019-07-22 NOTE — PROGRESS NOTES
Subjective     Mara Colindres is a 84 year old female who presents to clinic today for the following health issues:       Cough  History of [place] fever  Pulmonary HTN (H)  Persistent atrial fibrillation (H)  Oxygen desaturation     HPI   Coughing, Shortness of breath, Pulmonary Hypertension, atrial fibrillation, oxygen desaturation     Mara Colindres is a chronically ill patient with a multifactorial chronic set of pulmonary symptoms , markedly easy oxygen dependency and recurrence of serious symptoms.      She has avoided direct inpatient hospitalizations for quite some time but I am amazed because her level of fatigue is so severe she needs a motorized scooter type of vehicle to get anyplace and uses oxygen for any significant exercise. At one point I had thought the primary  of her problems was pulmonary hypertension and hoped that evaluation and treatment through cardiology  / Dr. Gokul Thompson, cardiologist with the [facility] would arrive at a better treatment but in all honestly , after carefully reading through the last cardiology  Office visit note with Dr. Thompson he is very clear that for this difficult patient , he sees her as totally optimized meaning we simply don't have reason for optimism that she's going to get much better then we are right now. Patient is actually humble and rational and is accepting it seems of these problems.    She was here one month ago for these same symptoms in a state of exacerbation. One month ago, she was having violent coughing much worse then her baseline. She had a lot of shortness of breath, fluid in her chest, green-yellow phlegm, and itchy and swollen eyes. She says she uses supplemental oxygen but only uses it at home as it is too heavy to carry with her outside the house. I presumed we were seeing a pneumonia regardless of the chest xray findings as clinically she looked absolutely terrible in fact AI argued for a direct  admission to inpatient status which she declined.     She continues with clear phlegm, coughing attacks and shortness of breath symptoms. Currently she denies associated symptoms of fevers and chills as well any appreciation of a racing, jumped or skipped heart beat.  She gets these symptoms sometime a few times a year but they always seem to resolve after weeks to a month. Her coughing episodes frequently last 10-15 minutes and in some cases provoke emesis and paresthesias in her back and chest from the force of coughing.  She had a recent episode where the coughing lasted 13 minutes.  When she sleeps, she lays with her head elevated as lying flat exacerbated her shortness of breath symptoms consistent with a component of paroxsysmal nocturnal dyspnea / orthopnea.     At home, she uses a walker and is very slow due to shortness of breath and as well as musculoskeletal pains with the hips especially.  Patient has a history of pulmonary hypertension, diastolic CHF, atrial fibrillation, and moderate persistent asthma. With the asthma, she notes improvements with nebulizer treatments but typically does this once a day. She does use nocturnal oxygen with benefit and endorsed PND and orthopnea symptoms although not noted at cardiology visit. She had a recent heart catheterization and echocardiogram. She has an appointment with Dr. Coulter in August. Last visit with cardiologist Dr. Thompson she was basically told that there was no new treatment that would help her make significant improvements in her cardiopulmonary health. She notes that lower extremity edema has been stable over the last 3 months.  She is also a lifetime non-smoker.      Patient Active Problem List   Diagnosis     Morbid obesity (H)     Hypertension goal BP (blood pressure) < 140/90     MRSA (methicillin resistant Staphylococcus aureus)     Chronic diarrhea     HYPERLIPIDEMIA LDL GOAL <100     Chronic anticoagulation     CKD (chronic kidney disease)  stage 3, GFR 30-59 ml/min (H)     Septic hip (H)     Lymphedema     Tubular adenoma of colon     Abdominal wall hematoma     Advanced directives, counseling/discussion     Umbilical hernia     History of [place] fever     Moderate persistent asthma     Pseudophakia, ou     Hypovitaminosis D     Bilateral leg pain     Positive YAMINI     Raynaud phenomenon     Pulmonary HTN (H)     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     Scotoma involving central area in visual field, right     Failed total hip arthroplasty, sequela     Major depressive disorder, recurrent episode, mild (H)     Chronic diastolic congestive heart failure (H)     Long-term (current) use of anticoagulants [Z79.01]     Persistent atrial fibrillation (H)     Diabetic polyneuropathy associated with diabetes mellitus due to underlying condition (H)     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, without long-term current use of insulin (H)     Fatigue, unspecified type     Senile osteoporosis     Posterior vitreous detachment of left eye     Background diabetic retinopathy, mild, ou     Chronic deep vein thrombosis (DVT) of femoral vein of right lower extremity (H)     Oxygen desaturation     CKD (chronic kidney disease) stage 4, GFR 15-29 ml/min (H)     Past Surgical History:   Procedure Laterality Date     APPENDECTOMY       CATARACT IOL, RT/LT       COLONOSCOPY  2008     COLONOSCOPY  8/20/2012    Procedure: COLONOSCOPY;  COLONOSCOPY, TUBULAR ADENOMA OF COLON, CHRONIC DIARRHEA;  Surgeon: Yobany Hinojosa MD;  Location: [facility] OR     HC REMOVAL GALLBLADDER       JOINT REPLACEMENT, HIP RT/LT  2004    right     JOINT REPLACEMTN, KNEE RT/LT  2000    bilateral     PHACOEMULSIFICATION WITH STANDARD INTRAOCULAR LENS IMPLANT  8/2013    left eye; right eye     ROTATOR CUFF REPAIR RT/LT  1/93    right     TONSILLECTOMY         Social History     Tobacco Use     Smoking status: Never Smoker     Smokeless tobacco: Never  Used   Substance Use Topics     Alcohol use: No     Family History   Problem Relation Age of Onset     Diabetes Mother      Cancer Mother      Hypertension Mother      Cataracts Mother      Hypertension Father      Glaucoma Father      Cataracts Father      Cancer Son         thyroid cancer     Neurologic Disorder Sister      Alzheimer Disease Sister          BP Readings from Last 3 Encounters:   07/22/19 134/84   06/20/19 108/72   04/29/19 106/78    Wt Readings from Last 3 Encounters:   04/29/19 117.5 kg (259 lb)   11/28/18 110.6 kg (243 lb 14.4 oz)   09/13/18 114.1 kg (251 lb 9.6 oz)           Reviewed and updated as needed this visit by Provider       Review of Systems   ROS COMP: Constitutional, HEENT, cardiovascular, pulmonary, GI, , musculoskeletal, neuro, skin, endocrine and psych systems are negative, except as otherwise noted.    This document serves as a record of the services and decisions personally performed and made by Steven Abrams MD. It was created on his behalf by John Larson, a trained medical scribe. The creation of this document is based on the provider's statements to the medical scribe.  John Larson 3:50 PM July 22, 2019        Objective    /84   Pulse 88   Temp 98.4  F (36.9  C) (Oral)   Resp 18   SpO2 95%   There is no height or weight on file to calculate BMI.  Physical Exam   GENERAL: healthy, alert and no distress, using motorized scooter type of vehicle as is her want. She's easily talking in complete sentences but appears fatigued and easily out of breath.  EYES: Eyes grossly normal to inspection, PERRL and conjunctivae and sclerae normal  RESP: lungs clear to auscultation - no rales, rhonchi or wheezes  CV: irregularly irregular heart rhythm  MS: no gross musculoskeletal defects noted, moderate 1-2+ edema  SKIN: no suspicious lesions or rashes to visible skin   NEURO: Normal strength and tone, mentation intact and speech normal  PSYCH: mentation appears normal, affect  normal/bright but weary    Diagnostic Test Results:  Labs reviewed in Epic  No results found for this or any previous visit (from the past 24 hour(s)).        Assessment & Plan     (R05) Cough  (primary encounter diagnosis)  Comment: she has multifactorial symptoms and I am down to treating symptoms and seeking desperately anything that can help this patient . We agreed to routine cough suppression treatments to limit the severity of her coughing . The prednisone is prescribed more as an emergency stop-gap measure in case of worsening symptoms. I also did strongly recommend, since the nebulizer treatment treatments do provide temporary relief that she can more these up to four times a day on an as needed basis   Plan: benzonatate (TESSALON) 200 MG capsule,         guaiFENesin-codeine (ROBITUSSIN AC) 100-10         MG/5ML solution, predniSONE (DELTASONE) 20 MG         tablet            (Z86.19) History of [facility] fever  Comment: not a clinically relevant issue at this point   Plan: benzonatate (TESSALON) 200 MG capsule,         guaiFENesin-codeine (ROBITUSSIN AC) 100-10         MG/5ML solution, predniSONE (DELTASONE) 20 MG         tablet            (I27.20) Pulmonary HTN (H)  Comment: further follow up planned with Dr. Nielsen  Plan: benzonatate (TESSALON) 200 MG capsule,         guaiFENesin-codeine (ROBITUSSIN AC) 100-10         MG/5ML solution, predniSONE (DELTASONE) 20 MG         tablet            (I48.1) Persistent atrial fibrillation (H)  Comment: chronic atrial fibrillation  , not likely a major contributing factor at this point to her overall clinical prognosis   Plan: benzonatate (TESSALON) 200 MG capsule,         guaiFENesin-codeine (ROBITUSSIN AC) 100-10         MG/5ML solution, predniSONE (DELTASONE) 20 MG         tablet            (R09.02) Oxygen desaturation  Comment: multifactorial   Plan: benzonatate (TESSALON) 200 MG capsule,         guaiFENesin-codeine (ROBITUSSIN AC) 100-10          MG/5ML solution, predniSONE (DELTASONE) 20 MG         tablet               No follow-ups on file.    The information in this document, created by the medical scribe for me, accurately reflects the services I personally performed and the decisions made by me. I have reviewed and approved this document for accuracy prior to leaving the patient care area.  July 22, 2019 3:51 PM    Steven Abrams MD  [facility]

## 2019-07-23 DIAGNOSIS — R05.9 COUGH: ICD-10-CM

## 2019-07-23 DIAGNOSIS — J45.41 MODERATE PERSISTENT ASTHMA WITH EXACERBATION: ICD-10-CM

## 2019-07-23 RX ORDER — CODEINE PHOSPHATE AND GUAIFENESIN 10; 100 MG/5ML; MG/5ML
2 SOLUTION ORAL EVERY 4 HOURS PRN
Qty: 120 ML | Refills: 3 | Status: CANCELLED | OUTPATIENT
Start: 2019-07-23

## 2019-07-23 NOTE — TELEPHONE ENCOUNTER
Controlled Substance Refill Request for virtussin AC syrup   Problem List Complete:  yes    PROVIDER TO CONSIDER COMPLETION OF PROBLEM LIST AND OVERVIEW/CONTROLLED SUBSTANCE AGREEMENT    Last Written Prescription Date:  6/20/19  Last Fill Quantity: 120,   # refills: 0    THE MOST RECENT OFFICE VISIT MUST BE WITHIN THE PAST 3 MONTHS. AT LEAST ONE FACE TO FACE VISIT MUST OCCUR EVERY 6 MONTHS. ADDITIONAL VISITS CAN BE VIRTUAL.  (THIS STATEMENT SHOULD BE DELETED.)    Last Office Visit with [facility] primary care provider: 7/22/19    Future Office visit:     Controlled substance agreement:   Encounter-Level CSA:    There are no encounter-level csa.     Patient-Level CSA:    There are no patient-level csa.         Last Urine Drug Screen: No results found for: CDAUT, No results found for: COMDAT, No results found for: THC13, PCP13, COC13, MAMP13, OPI13, AMP13, BZO13, TCA13, MTD13, BAR13, OXY13, PPX13, BUP13     Processing:  Fax Rx to . pharmacy     [email or web address]       checked in past 3 months?  Yes .

## 2019-07-24 ASSESSMENT — ASTHMA QUESTIONNAIRES: ACT_TOTALSCORE: 9

## 2019-07-27 DIAGNOSIS — F33.0 MAJOR DEPRESSIVE DISORDER, RECURRENT EPISODE, MILD (H): ICD-10-CM

## 2019-07-29 RX ORDER — PAROXETINE 20 MG/1
TABLET, FILM COATED ORAL
Qty: 90 TABLET | Refills: 0 | Status: SHIPPED | OUTPATIENT
Start: 2019-07-29 | End: 2019-10-20

## 2019-08-06 ENCOUNTER — TELEPHONE (OUTPATIENT)
Dept: FAMILY MEDICINE | Facility: CLINIC | Age: 84
End: 2019-08-06

## 2019-08-06 DIAGNOSIS — G47.00 INSOMNIA: Primary | ICD-10-CM

## 2019-08-06 NOTE — TELEPHONE ENCOUNTER
Called pt and pt said that she not sleeping at night , breathing is difficult at night. At the last appointment pt thought you didn't need it but now she feels like it will help.   She is ok to come if needed but prefer not to.   Please advise.  Margareth HANCOCK CMA

## 2019-08-06 NOTE — TELEPHONE ENCOUNTER
Reason for Call:  Other call back    Detailed comments:  Patient was seen a few weeks ago and Dr. Abrams suggested that patient use a cpap machine for sleeping at night. Patient wants to know more information pertaining to this and how she can get the cpap machine.    Phone Number Patient can be reached at: Home number on file [phone] (home)    Best Time: any    Can we leave a detailed message on this number? YES    Call taken on 8/6/2019 at 9:50 AM by Leonardo Ribeiro

## 2019-08-06 NOTE — TELEPHONE ENCOUNTER
Pt was given provider's message as written. Referral placed. Number provided.    Usha Zhong RN  [facility]

## 2019-08-06 NOTE — TELEPHONE ENCOUNTER
I have reviewed the chart and I don't think obstructive sleep apnea is a definite diagnosis here. I think we can pursue this as a possible diagnosis through a sleep evaluation / sleep disorder specialist .    If patient wishes to pursue this we can place referral orders for this. We can't just order CPAP [ continuous positive airway pressure] without a more solid diagnosis     Steven Abrams MD

## 2019-08-21 ENCOUNTER — ANTICOAGULATION THERAPY VISIT (OUTPATIENT)
Dept: NURSING | Facility: CLINIC | Age: 84
End: 2019-08-21
Payer: COMMERCIAL

## 2019-08-21 DIAGNOSIS — I48.19 PERSISTENT ATRIAL FIBRILLATION (H): ICD-10-CM

## 2019-08-21 DIAGNOSIS — Z79.01 LONG TERM CURRENT USE OF ANTICOAGULANT THERAPY: ICD-10-CM

## 2019-08-21 LAB — INR POINT OF CARE: 2 (ref 0.86–1.14)

## 2019-08-21 PROCEDURE — 36416 COLLJ CAPILLARY BLOOD SPEC: CPT

## 2019-08-21 PROCEDURE — 85610 PROTHROMBIN TIME: CPT | Mod: QW

## 2019-08-21 PROCEDURE — 99207 ZZC NO CHARGE NURSE ONLY: CPT

## 2019-08-21 NOTE — PATIENT INSTRUCTIONS
Anticoagulation Clinic:  Please call [phone] with any problems or questions regarding your Warfarin therapy.

## 2019-08-21 NOTE — PROGRESS NOTES
ANTICOAGULATION FOLLOW-UP CLINIC VISIT    Patient Name:  Mara Colindres  Date:  2019  Contact Type:  Face to Face    SUBJECTIVE:  Patient Findings         Clinical Outcomes     Negatives:   Major bleeding event, Thromboembolic event, Anticoagulation-related hospital admission, Anticoagulation-related ED visit, Anticoagulation-related fatality           OBJECTIVE    INR Protime   Date Value Ref Range Status   2019 2.0 (A) 0.86 - 1.14 Final       ASSESSMENT / PLAN  No question data found.  Anticoagulation Summary  As of 2019    INR goal:   2.0-3.0   TTR:   60.4 % (3.2 y)   INR used for dosin.0 (2019)   Warfarin maintenance plan:   5 mg (5 mg x 1) every Tue, Thu, Sat; 2.5 mg (5 mg x 0.5) all other days   Full warfarin instructions:   5 mg every Tue, Thu, Sat; 2.5 mg all other days   Weekly warfarin total:   25 mg   Plan last modified:   Celeste Ruiz RN (2018)   Next INR check:   10/2/2019   Priority:   INR   Target end date:   Indefinite    Indications    Long-term (current) use of anticoagulants [Z79.01] [Z79.01]  Persistent atrial fibrillation (H) [I48.1]             Anticoagulation Episode Summary     INR check location:       Preferred lab:       Send INR reminders to:   KELLY ROJAS    Comments:         Anticoagulation Care Providers     Provider Role Specialty Phone number    Steven Abrams MD [facility] Internal Medicine [phone]            See the Encounter Report to view Anticoagulation Flowsheet and Dosing Calendar (Go to Encounters tab in chart review, and find the Anticoagulation Therapy Visit)        Celeste Ruiz RN

## 2019-09-18 ENCOUNTER — PRE VISIT (OUTPATIENT)
Dept: SLEEP MEDICINE | Facility: CLINIC | Age: 84
End: 2019-09-18

## 2019-09-18 NOTE — TELEPHONE ENCOUNTER
"  1.  Reason for the visit:  Consult to discuss sleep apnea.  Was tested 20 years or more ago but never used cpap.  Would like to discuss difficulty breathing at night  2.  Referring provider and clinic name:  Dr. Aliza Ibrahim  3.  Previous Sleep Doctor or Pulmonlogist (clinic name)?  None  4.  Records, Procedures, Imaging, and Labs (see below)  No records to obtain        All NOTES from previous office visits that pertain to why they are being seen in the Sleep Center    Previous Sleep Studies, Chest CT, Echos and reports that pertain to why they are seeing Sleep Center    All Sleep records that have been done in the last 2 years that pertain to why they are seeing Sleep Center            Are they being seen for continuation of care for Cpap/Bipap/Avap/Trilogy/Dental Device? none    If yes to above Who and Where was Device issued/currently getting supplies from? na    Are you currently on \"Supplemental Oxygen\" during the day or night?   na                                                                                                                                                      Please remind pt to bring Cpap machine and ask to arrive 15 minutes early to appointment due traffic and congestion                                                 5. Pt Sleep Center Packet received Message left asking pt to arrive 30 minutes early to appointment if no packet received.        Yes: \"please make sure that you bring this to your appointment completed, either the doctor will not see you until this completed or you may be asked to reschedule your appointment.\"     No: mail or email to the pt and explain, \"please make sure that you bring this to your appointment completed, either the doctor will not see you until this completed or you may be asked to reschedule your appointment.\"     ~If pt coming early to fill packet out, ask that they come 30 minutes prior to their appointment~     6. Has the pt's medication " "list been updated and preferred pharmacy added?     7. Has the allergy list been reviewed?    \"Thank you for choosing [facility] and we look forward to seeing you at your upcoming appointment\"     "

## 2019-10-02 ENCOUNTER — ANTICOAGULATION THERAPY VISIT (OUTPATIENT)
Dept: NURSING | Facility: CLINIC | Age: 84
End: 2019-10-02
Payer: COMMERCIAL

## 2019-10-02 DIAGNOSIS — I48.19 PERSISTENT ATRIAL FIBRILLATION (H): ICD-10-CM

## 2019-10-02 DIAGNOSIS — Z79.01 LONG TERM CURRENT USE OF ANTICOAGULANT THERAPY: ICD-10-CM

## 2019-10-02 LAB — INR POINT OF CARE: 3.5 (ref 0.86–1.14)

## 2019-10-02 PROCEDURE — 36416 COLLJ CAPILLARY BLOOD SPEC: CPT

## 2019-10-02 PROCEDURE — 99207 ZZC NO CHARGE NURSE ONLY: CPT

## 2019-10-02 PROCEDURE — 85610 PROTHROMBIN TIME: CPT | Mod: QW

## 2019-10-02 NOTE — PATIENT INSTRUCTIONS
Anticoagulation Clinic:  Please call [phone] with any problems or questions regarding your Warfarin therapy.

## 2019-10-02 NOTE — PROGRESS NOTES
ANTICOAGULATION FOLLOW-UP CLINIC VISIT    Patient Name:  Mara Colindres  Date:  10/2/2019  Contact Type:  Face to Face    SUBJECTIVE:  Patient Findings     Positives:   Extra doses    Comments:   Patient had been taking 5 mg 4 days a week instead of 3.         Clinical Outcomes     Negatives:   Major bleeding event, Thromboembolic event, Anticoagulation-related hospital admission, Anticoagulation-related ED visit, Anticoagulation-related fatality    Comments:   Patient had been taking 5 mg 4 days a week instead of 3.            OBJECTIVE    INR Protime   Date Value Ref Range Status   10/02/2019 3.5 (A) 0.86 - 1.14 Final       ASSESSMENT / PLAN  No question data found.  Anticoagulation Summary  As of 10/2/2019    INR goal:   2.0-3.0   TTR:   60.6 % (3.3 y)   INR used for dosing:   3.5! (10/2/2019)   Warfarin maintenance plan:   5 mg (5 mg x 1) every Mon, Wed, Fri; 2.5 mg (5 mg x 0.5) all other days   Full warfarin instructions:   10/2: Hold; Otherwise 5 mg every Mon, Wed, Fri; 2.5 mg all other days   Weekly warfarin total:   25 mg   Plan last modified:   Celeste Ruiz, RN (10/2/2019)   Next INR check:   10/17/2019   Priority:   INR   Target end date:   Indefinite    Indications    Long-term (current) use of anticoagulants [Z79.01] [Z79.01]  Persistent atrial fibrillation [I48.19]             Anticoagulation Episode Summary     INR check location:       Preferred lab:       Send INR reminders to:   KELLY ROJAS    Comments:         Anticoagulation Care Providers     Provider Role Specialty Phone number    Steven Abrams MD [facility] Internal Medicine [phone]            See the Encounter Report to view Anticoagulation Flowsheet and Dosing Calendar (Go to Encounters tab in chart review, and find the Anticoagulation Therapy Visit)        Celeste Ruiz RN

## 2019-10-03 ENCOUNTER — OFFICE VISIT (OUTPATIENT)
Dept: SLEEP MEDICINE | Facility: CLINIC | Age: 84
End: 2019-10-03
Attending: INTERNAL MEDICINE
Payer: COMMERCIAL

## 2019-10-03 VITALS
SYSTOLIC BLOOD PRESSURE: 126 MMHG | BODY MASS INDEX: 45.89 KG/M2 | OXYGEN SATURATION: 94 % | HEIGHT: 63 IN | WEIGHT: 259 LBS | DIASTOLIC BLOOD PRESSURE: 84 MMHG | RESPIRATION RATE: 16 BRPM | HEART RATE: 69 BPM

## 2019-10-03 DIAGNOSIS — G47.21 CIRCADIAN RHYTHM SLEEP DISORDER, DELAYED SLEEP PHASE TYPE: ICD-10-CM

## 2019-10-03 DIAGNOSIS — J31.0 CHRONIC RHINITIS: ICD-10-CM

## 2019-10-03 DIAGNOSIS — R05.9 COUGH: ICD-10-CM

## 2019-10-03 DIAGNOSIS — E66.01 MORBID OBESITY (H): ICD-10-CM

## 2019-10-03 DIAGNOSIS — J98.6 ELEVATED DIAPHRAGM: ICD-10-CM

## 2019-10-03 DIAGNOSIS — R09.02 EXERCISE HYPOXEMIA: ICD-10-CM

## 2019-10-03 DIAGNOSIS — G47.09 OTHER INSOMNIA: Primary | ICD-10-CM

## 2019-10-03 DIAGNOSIS — R06.2 WHEEZING: ICD-10-CM

## 2019-10-03 DIAGNOSIS — R06.09 OTHER FORM OF DYSPNEA: ICD-10-CM

## 2019-10-03 PROCEDURE — 99205 OFFICE O/P NEW HI 60 MIN: CPT | Performed by: INTERNAL MEDICINE

## 2019-10-03 ASSESSMENT — MIFFLIN-ST. JEOR: SCORE: 1593.95

## 2019-10-03 NOTE — PROGRESS NOTES
"Chief complaint: Consultation requested by Steven Abrams for the evaluation of possible sleep disordered breathing    History of Present Illness: 84-year-old female with past medical history notable for type 2 diabetes and chronic kidney disease, chronic diastolic heart failure with moderate pulmonary hypertension, hypertension, chronic A. fib, hypoxemia with ambulation.  She is currently using supplemental oxygen at home with activity and sleep.  She recalls having a sleep evaluation years ago but felt that that night was poor quality sleep if any sleep.  She does not know the results of the study.  She reports ongoing issues with the difficulty with shortness of breath sometimes at night.  In the last year she has been using the oxygen as well as a wedge pillow routinely.  This has been helpful.  She is been told about snoring if she dozes off during the day.  Her sleep is not routinely witnessed by others however.      She also notes cough and shortness of breath during the day.  She is not very ambulatory and she uses a motorized scooter.  She has an albuterol nebulizer and inhaler.  She does not use this routinely.  She also reports having Symbicort inhaler but also does not use it routinely.  Extensive chart review reveals that this medication appears to have been discontinued in January 20 18.  Not sure how old her that current inhaler is.  And she only uses it occasionally.  She is frustrated by nasal congestion.  She wonders if she might have allergies.    Also reports difficulty initiating sleep she will typically get into bed around 10-11 and read in bed.  She may also do crossword puzzles.  She also takes melatonin around 1,1 up to 10 mg.  Her sister and son live in the same home and they go to bed around this time as well.  Patient admits that she formerly was a \"night owl\".  She usually gets out of bed around 10 AM.  She gets up a couple times at night to go to the bathroom.  She is on furosemide daily " sometimes twice daily.    She denies symptoms of restless legs.  She does have pain in her toes on occasion.  No history of sleepwalking, sleep talking, or dream enactment behavior.  She does wake up with a faint headache this is been long-standing.  She rarely takes any medication for this.  Usually persists.  She also has a history of reflux and is on omeprazole.  She also notes dysphasia with food feeling like it gets stuck in her esophagus.      Total score - [place]: 3 (10/3/2019  9:53 AM) (Less than 10 normal)    VAMSHI 15 (normal 0-7, mild 8-14, moderate 15-21, severe 22-28)    STOP-BANG  Loud Snore   1  Excessively Tired/Sleepy   0  Observed apnea   0  Hypertension   1  BMI> 35 kg/m2   1  Age >50   1  Neck >16 in/40cm   0  Male Gender   0  Total =   4  (0-2 low, 3-4 intermediate, 5-8 high risk of EVONNE)      Past Medical History:   Diagnosis Date     A-fib (H)      Bilateral leg edema     fairly severe     Cataract      CHF (congestive heart failure) (H)      Chronic diarrhea     neg colonoscopy abt one year ago, even to the point of having fecal accidents     Chronic venous insufficiency      CKD (chronic kidney disease) stage 3, GFR 30-59 ml/min (H)      Diabetic retinopathy (H)      DVT (deep venous thrombosis) (H)      GERD (gastroesophageal reflux disease)      Hyperlipidemia LDL goal <100 10/31/2010     Hypertension goal BP (blood pressure) < 140/90      Long-term (current) use of anticoagulants      Moderate persistent asthma 8/15/2012     MRSA (methicillin resistant Staphylococcus aureus)     postop hip     Obesity      S/P colonoscopy 2008 ( ?)     Septic hip (H)     sees an ID specialist Dr ANNEL DE LA CRUZ She remains on long-term suppressive antibiotic therapy using Minocin 50 mg twice daily     Stasis dermatitis      Type 2 diabetes, HbA1C goal < 8% (H)      Urinary incontinence, mixed      Vitiligo        Allergies   Allergen Reactions     Atenolol Other (See Comments)     Arms became limp, almost  stroke like symptoms per patient.   Tolerates metoprolol fine     Metformin      Side effects / gastrointestinal symptoms        Current Outpatient Medications   Medication     ACCU-CHEK PARVEZ PLUS test strip     acetaminophen (TYLENOL) 500 MG tablet     albuterol (2.5 MG/3ML) 0.083% neb solution     albuterol (PROAIR HFA, PROVENTIL HFA, VENTOLIN HFA) 108 (90 BASE) MCG/ACT inhaler     amoxicillin-clavulanate (AUGMENTIN) 875-125 MG tablet     ASPIRIN NOT PRESCRIBED, INTENTIONAL,     benzonatate (TESSALON) 200 MG capsule     Calcium Carbonate-Vitamin D (CALCIUM + D PO)     ciprofloxacin (CILOXAN) 0.3 % ophthalmic solution     diphenoxylate-atropine (LOMOTIL) 2.5-0.025 MG tablet     furosemide (LASIX) 40 MG tablet     guaiFENesin-codeine (ROBITUSSIN AC) 100-10 MG/5ML solution     guaiFENesin-codeine (ROBITUSSIN AC) 100-10 MG/5ML solution     HYDROcodone-acetaminophen (NORCO) 5-325 MG tablet     losartan (COZAAR) 25 MG tablet     metoprolol tartrate (LOPRESSOR) 100 MG tablet     OMEPRAZOLE CPCR 20 MG OR     order for DME     ORDER FOR DME     PARoxetine (PAXIL) 20 MG tablet     potassium chloride ER (K-TAB/KLOR-CON) 10 MEQ CR tablet     predniSONE (DELTASONE) 20 MG tablet     Resveratrol 100 MG CAPS     STATIN NOT PRESCRIBED, INTENTIONAL,     TRULICITY 1.5 MG/0.5ML pen     warfarin (COUMADIN) 5 MG tablet     No current facility-administered medications for this visit.        Social History     Socioeconomic History     Marital status:      Spouse name: (Bill)     Number of children: 3     Years of education: Not on file     Highest education level: Not on file   Occupational History     Employer: RETIRED   Social Needs     Financial resource strain: Not on file     Food insecurity:     Worry: Not on file     Inability: Not on file     Transportation needs:     Medical: Not on file     Non-medical: Not on file   Tobacco Use     Smoking status: Never Smoker     Smokeless tobacco: Never Used   Substance and Sexual  "Activity     Alcohol use: No     Drug use: No     Sexual activity: Never   Lifestyle     Physical activity:     Days per week: Not on file     Minutes per session: Not on file     Stress: Not on file   Relationships     Social connections:     Talks on phone: Not on file     Gets together: Not on file     Attends Mu-ism service: Not on file     Active member of club or organization: Not on file     Attends meetings of clubs or organizations: Not on file     Relationship status: Not on file     Intimate partner violence:     Fear of current or ex partner: Not on file     Emotionally abused: Not on file     Physically abused: Not on file     Forced sexual activity: Not on file   Other Topics Concern     Parent/sibling w/ CABG, MI or angioplasty before 65F 55M? No   Social History Narrative    [place] in [place]       Family History   Problem Relation Age of Onset     Diabetes Mother      Cancer Mother      Hypertension Mother      Cataracts Mother      Hypertension Father      Glaucoma Father      Cataracts Father      Cancer Son         thyroid cancer     Neurologic Disorder Sister      Alzheimer Disease Sister        Review of Systems: Positve for diarrhea, muscle and joint pains, depression, and per HPI, otherwise comprehensive review of systems is negative.    EXAM: Pleasant, alert, tearful when discussing the loss of her   /84   Pulse 69   Resp 16   Ht 1.6 m (5' 3\")   Wt 117.5 kg (259 lb)   SpO2 94%   BMI 45.88 kg/m    HEENT: Normocephalic/atraumatic, pupils are equal, reactive to light, no scleral icterus,  Oropharynx: Mallampati 3, tonsillar stage 1  Nasal mucosa erythematous with some crusting  Neck supple no lymphadenopathy, neck circumference 15.5 inches  Chest: Decreased breath sounds with wheezing and rhonchi bilaterally  Cardiac: Irregularly irregular rhythm, S1-S2 somewhat distant  Abdomen: Obese, soft and nontender, bowel sounds present  Extremities: Somewhat cool with bilateral " edema/lymphedema   Psychiatric: Mood and affect appear normal, to tearful  Neurologic: No gross focal deficits    Overnight oximetry performed 12/21/2018 on 2 L supplemental oxygen  Oxygen desaturation index 0.3  98% of the time sats greater than 95 %    Office spirometry in 2017  FEV1 0.87 which is 48% predicted  FVC 1.51 which is 63% predicted  FEV1 to FVC ratio of 54  Spirometry pattern consistent with severe obstructive ventilatory defect.  Given decreased FVC additional restrictive defect versus air trapping present.    Chest x-ray 6/20/2019:  Chronic elevation of the right hemidiaphragm, calcified granuloma, atelectasis or scarring right midlung, no pleural effusions  (Of note I reviewed the chest x-rays going back to 2004 and elevated right hemidiaphragm was present.  Chest x-ray report from 1999 does not mention elevation of the right hemidiaphragm, however patient did have right rotator cuff repair surgery in 1993.)  CT chest 5/20/2017 with findings consistent with pulmonary hypertension-enlarged pulmonary artery and tapering of distant vessels, cardiomegaly, coronary artery disease with calcifications, chronically elevated right hemidiaphragm with associated atelectasis and volume loss      ASSESSMENT: 84-year-old female with obesity, dyspnea with exertion and sleep with history of snoring.  Hypoxemia with ambulation, pulmonary hypertension and bilateral wheezes cough and rhinitis.  She does not consistently taking any inhaled corticosteroid.  Is unclear if she is supposed to be.  Patient also has elevation of the rest right hemidiaphragm which could contribute to hypoventilation as well as shunting with associated atelectasis.  Prior to considering assessing sleep disordered breathing further would recommend optimizing daytime cardiopulmonary disorder.  Patient also complains of insomnia but I suspect has delayed sleep sleep phase.    PLAN:   Regarding insomnia and delayed sleep phase I did recommend that  she stay up until her natural sleep time.  Consider discontinuing melatonin if she is able to fall asleep at that time.  Allow herself to sleep until 10.  Encouraged her to find some activities to do in the evening such as resuming sewing.    Recommended that she start using her albuterol neb at least twice daily.  Ordered comprehensive pulmonary function testing with MIP and MVV.(Consider Sniff Test in fluoro/rad.)  Patient should be provided bronchodilator if significant obstruction identified.  Contacting her with results when they become available.  If indeed she does have significant obstruction would recommend reinitiating inhaled corticosteroid and long-acting beta agonist at the highest dose.  She was instructed that she would need to rinse her mouth out afterwards.      Also recommended optimizing nasal breathing and managing rhinitis.  Recommended initiating nasal saline rinses.  Patient was provided detailed literature regarding the management of rhinitis.  Consider nasal corticosteroid as well.      Patient will follow-up with me in 2 months to assess for improvement.  At that time if she is doing better from a respiratory standpoint could consider overnight oximetry off of supplemental oxygen.  Alternatively consider in lab PSG with TCM.  Please see patient instructions for further details of counseling provided.    Natalie Bruce M.D.  Pulmonary/Critical Care/Sleep Medicine    [facility]   Fl[address] 1, [address]   [address] [place], [place] [address] [address]   Appointments: [phone]    The above note was dictated using voice recognition software and may include typographical errors. Please contact the author for any clarifications.

## 2019-10-03 NOTE — PATIENT INSTRUCTIONS
Let yourself stay up later-go to bed when actually sleepy. (you might not need the melatonin)  Find activities to do in the evening such as crafting as we discussed  Start using albuterol twice a day regularly this week  I will contact you after the breathing testings with recommendations-likely resuming symbicort  Continue to sleep with wedge and O2 for now  Restart nasal saline washes daily  Look into [email or web address] [facility]      Pulmonary Function Test []  10/03/20 10/03/19    Auth. provider:  Natalie Bruce MD   Assoc. diagnoses:  Wheezing, Cough   Comment:  .   Q: Procedure performed at:  A: [facility]    Q: Reason for procedure:  A: wheezing cough, never smoker, pulm HTN, hypoxemia with exertion   Q: Complete PFTs - Includes: flow volume loop, DLCO (diffusing capacity), plethysmography (lung volumes):  A: Yes   Q: Flow volume loops with bronchodilators if needed (Pre and Post Spirometry):  A: Yes   Q: MVV:  A: Yes   Q: MIP (NIF), MEP:  A: Yes       Your BMI is Body mass index is 45.88 kg/m .  Weight management is a personal decision.  If you are interested in exploring weight loss strategies, the following discussion covers the approaches that may be successful. Body mass index (BMI) is one way to tell whether you are at a healthy weight, overweight, or obese. It measures your weight in relation to your height.  A BMI of 18.5 to 24.9 is in the healthy range. A person with a BMI of 25 to 29.9 is considered overweight, and someone with a BMI of 30 or greater is considered obese. More than two-thirds of American adults are considered overweight or obese.  Being overweight or obese increases the risk for further weight gain. Excess weight may lead to heart disease and diabetes.  Creating and following plans for healthy eating and physical activity may help you improve your health.  Weight control is part of healthy lifestyle and includes exercise, emotional health, and healthy eating habits.  Careful eating habits lifelong are the mainstay of weight control. Though there are significant health benefits from weight loss, long-term weight loss with diet alone may be very difficult to achieve- studies show long-term success with dietary management in less than 10% of people. Attaining a healthy weight may be especially difficult to achieve in those with severe obesity. In some cases, medications, devices and surgical management might be considered.  What can you do?  If you are overweight or obese and are interested in methods for weight loss, you should discuss this with your provider.     Consider reducing daily calorie intake by 500 calories.     Keep a food journal.     Avoiding skipping meals, consider cutting portions instead.    Diet combined with exercise helps maintain muscle while optimizing fat loss. Strength training is particularly important for building and maintaining muscle mass. Exercise helps reduce stress, increase energy, and improves fitness. Increasing exercise without diet control, however, may not burn enough calories to loose weight.       Start walking three days a week 10-20 minutes at a time    Work towards walking thirty minutes five days a week     Eventually, increase the speed of your walking for 1-2 minutes at time    In addition, we recommend that you review healthy lifestyles and methods for weight loss available through the National Institutes of Health patient information sites:  http://win.niddk.nih.gov/publications/index.htm    And look into health and wellness programs that may be available through your health insurance provider, employer, local community center, or jostin club.    Weight management plan: Patient was referred to their PCP to discuss a diet and exercise plan.

## 2019-10-05 PROBLEM — J31.0 CHRONIC RHINITIS: Status: ACTIVE | Noted: 2019-10-05

## 2019-10-05 PROBLEM — J98.6 ELEVATED DIAPHRAGM: Status: ACTIVE | Noted: 2019-10-05

## 2019-10-11 ENCOUNTER — TELEPHONE (OUTPATIENT)
Dept: SLEEP MEDICINE | Facility: CLINIC | Age: 84
End: 2019-10-11

## 2019-10-11 NOTE — TELEPHONE ENCOUNTER
Pt called to get more information on the medication request.  Pt states at her appointment with Dr. Bruce she was suppose to get an rx for Symbicort but she did not get rx before she left.     Pt wants to know if Dr. Bruce is still going to start her on symbicort again or if she should continue not to take it as she was prescribed by different doctor in the past.    CHRISTOFER Benavides

## 2019-10-11 NOTE — TELEPHONE ENCOUNTER
Reason for call:  Other   Patient called regarding (reason for call): prescription  Additional comments: Please send new prescription to [facility] [phone]    Phone number to reach patient:  Home number on file [phone] (home)    Best Time:  any    Can we leave a detailed message on this number?  YES

## 2019-10-11 NOTE — TELEPHONE ENCOUNTER
Pt called and informed that PFT testing is needed first.  PT is ok with this plan and writer will schedule appointment for pt.    CHRISTOFER Benavides

## 2019-10-14 NOTE — TELEPHONE ENCOUNTER
PFT lab called to schedule appointment and  states that [facility] will not cover PFT testing.  Suzanne Maldonado from [employer] will call pt to discuss.    CHRISTOEFR Benavides

## 2019-10-17 ENCOUNTER — ANTICOAGULATION THERAPY VISIT (OUTPATIENT)
Dept: NURSING | Facility: CLINIC | Age: 84
End: 2019-10-17
Payer: COMMERCIAL

## 2019-10-17 DIAGNOSIS — I48.19 PERSISTENT ATRIAL FIBRILLATION (H): ICD-10-CM

## 2019-10-17 DIAGNOSIS — Z79.01 LONG TERM CURRENT USE OF ANTICOAGULANT THERAPY: ICD-10-CM

## 2019-10-17 LAB — INR POINT OF CARE: 2.1 (ref 0.86–1.14)

## 2019-10-17 PROCEDURE — 36416 COLLJ CAPILLARY BLOOD SPEC: CPT

## 2019-10-17 PROCEDURE — 85610 PROTHROMBIN TIME: CPT | Mod: QW

## 2019-10-17 PROCEDURE — 99207 ZZC NO CHARGE NURSE ONLY: CPT

## 2019-10-17 NOTE — PROGRESS NOTES
ANTICOAGULATION FOLLOW-UP CLINIC VISIT    Patient Name:  Mara Colindres  Date:  10/17/2019  Contact Type:  Face to Face    SUBJECTIVE:  Patient Findings         Clinical Outcomes     Negatives:   Major bleeding event, Thromboembolic event, Anticoagulation-related hospital admission, Anticoagulation-related ED visit, Anticoagulation-related fatality           OBJECTIVE    INR Protime   Date Value Ref Range Status   10/17/2019 2.1 (A) 0.86 - 1.14 Final       ASSESSMENT / PLAN  No question data found.  Anticoagulation Summary  As of 10/17/2019    INR goal:   2.0-3.0   TTR:   60.7 % (3.3 y)   INR used for dosin.1 (10/17/2019)   Warfarin maintenance plan:   5 mg (5 mg x 1) every Mon, Wed, Fri; 2.5 mg (5 mg x 0.5) all other days   Full warfarin instructions:   5 mg every Mon, Wed, Fri; 2.5 mg all other days   Weekly warfarin total:   25 mg   No change documented:   Celeste Ruzi RN   Plan last modified:   Celeste Ruiz RN (10/2/2019)   Next INR check:   2019   Priority:   INR   Target end date:   Indefinite    Indications    Long-term (current) use of anticoagulants [Z79.01] [Z79.01]  Persistent atrial fibrillation [I48.19]             Anticoagulation Episode Summary     INR check location:       Preferred lab:       Send INR reminders to:   KELLY ROJAS    Comments:         Anticoagulation Care Providers     Provider Role Specialty Phone number    Steven Abrams MD [facility] Internal Medicine [phone]            See the Encounter Report to view Anticoagulation Flowsheet and Dosing Calendar (Go to Encounters tab in chart review, and find the Anticoagulation Therapy Visit)        Celeste Ruiz RN

## 2019-10-17 NOTE — PATIENT INSTRUCTIONS
Anticoagulation Clinic:  Please call [phone] with any problems or questions regarding your Warfarin therapy.

## 2019-10-20 DIAGNOSIS — F33.0 MAJOR DEPRESSIVE DISORDER, RECURRENT EPISODE, MILD (H): ICD-10-CM

## 2019-10-20 DIAGNOSIS — E11.22 CONTROLLED TYPE 2 DIABETES MELLITUS WITH STAGE 3 CHRONIC KIDNEY DISEASE, WITHOUT LONG-TERM CURRENT USE OF INSULIN (H): ICD-10-CM

## 2019-10-20 DIAGNOSIS — E11.9 TYPE 2 DIABETES, HBA1C GOAL < 8% (H): ICD-10-CM

## 2019-10-20 DIAGNOSIS — N18.30 CONTROLLED TYPE 2 DIABETES MELLITUS WITH STAGE 3 CHRONIC KIDNEY DISEASE, WITHOUT LONG-TERM CURRENT USE OF INSULIN (H): ICD-10-CM

## 2019-10-22 RX ORDER — DULAGLUTIDE 1.5 MG/.5ML
INJECTION, SOLUTION SUBCUTANEOUS
Qty: 2 ML | Refills: 4 | Status: SHIPPED | OUTPATIENT
Start: 2019-10-22 | End: 2020-03-16

## 2019-10-22 RX ORDER — PAROXETINE 20 MG/1
TABLET, FILM COATED ORAL
Qty: 90 TABLET | Refills: 0 | Status: SHIPPED | OUTPATIENT
Start: 2019-10-22 | End: 2020-01-15

## 2019-10-22 RX ORDER — BLOOD SUGAR DIAGNOSTIC
STRIP MISCELLANEOUS
Qty: 400 STRIP | Refills: 1 | Status: SHIPPED | OUTPATIENT
Start: 2019-10-22 | End: 2020-01-16

## 2019-10-22 NOTE — TELEPHONE ENCOUNTER
"Routing refill request to provider for review/approval because:  Labs out of range:  Cr  Labs not current:  Microalbumin    Requested Prescriptions   Pending Prescriptions Disp Refills     TRULICITY 1.5 MG/0.5ML pen [Pharmacy Med Name: TRULICITY 1.5MG/0.5ML SDP 4X0.5ML] 2 mL 4     Sig: INJECT 1.5 MG UNDER THE SKIN EVERY 7 DAYS       GLP-1 Agonists Protocol Failed - 10/21/2019  8:11 AM        Failed - Microalbumin on file in past 12 months     Recent Labs   Lab Test 11/07/17  1510   MICROL 45   UMALCR 21.34             Failed - Normal serum creatinine on file in past 12 months     Recent Labs   Lab Test 04/29/19  1034   CR 1.42*             Passed - Blood pressure less than 140/90 in past 6 months     BP Readings from Last 3 Encounters:   10/03/19 126/84   07/22/19 134/84   06/20/19 108/72                 Passed - LDL on file in past 12 months     Recent Labs   Lab Test 04/29/19  1034   LDL 67             Passed - HgbA1C in past 3 or 6 months     If HgbA1C is 8 or greater, it needs to be on file within the past 3 months.  If less than 8, must be on file within the past 6 months.     Recent Labs   Lab Test 04/29/19  1034   A1C 7.0*             Passed - Medication is active on med list        Passed - Patient is age 18 or older        Passed - No active pregnancy on record        Passed - No positive pregnancy test in past 12 months        Passed - Recent (6 mo) or future (30 days) visit within the authorizing provider's specialty     Patient had office visit in the last 6 months or has a visit in the next 30 days with authorizing provider.  See \"Patient Info\" tab in inbasket, or \"Choose Columns\" in Meds & Orders section of the refill encounter.          Signed Prescriptions Disp Refills    PARoxetine (PAXIL) 20 MG tablet 90 tablet 0     Sig: TAKE 1 TABLET(20 MG) BY MOUTH DAILY       SSRIs Protocol Passed - 10/21/2019  8:11 AM        Passed - PHQ-9 score less than 5 in past 6 months     Please review last PHQ-9 score. " "          Passed - Medication is active on med list        Passed - Patient is age 18 or older        Passed - No active pregnancy on record        Passed - No positive pregnancy test in last 12 months        Passed - Recent (6 mo) or future (30 days) visit within the authorizing provider's specialty     Patient had office visit in the last 6 months or has a visit in the next 30 days with authorizing provider or within the authorizing provider's specialty.  See \"Patient Info\" tab in inbasket, or \"Choose Columns\" in Meds & Orders section of the refill encounter.           ACCU-CHEK PARVEZ PLUS test strip 400 strip 1     Sig: USE TO TEST FOUR TIMES DAILY OR AS DIRECTED       Diabetic Supplies Protocol Passed - 10/21/2019  8:11 AM        Passed - Medication is active on med list        Passed - Patient is 18 years of age or older        Passed - Recent (6 mo) or future (30 days) visit within the authorizing provider's specialty     Patient had office visit in the last 6 months or has a visit in the next 30 days with authorizing provider.  See \"Patient Info\" tab in inbasket, or \"Choose Columns\" in Meds & Orders section of the refill encounter.            Maryuri Gale RN  "

## 2019-11-07 DIAGNOSIS — R06.2 WHEEZING: ICD-10-CM

## 2019-11-07 DIAGNOSIS — R05.9 COUGH: ICD-10-CM

## 2019-11-08 LAB
DLCOUNC-%PRED-PRE: 66 %
DLCOUNC-PRE: 12.01 ML/MIN/MMHG
DLCOUNC-PRED: 17.93 ML/MIN/MMHG
ERV-%PRED-PRE: -113 %
ERV-PRE: 0.18 L
ERV-PRED: -0.16 L
EXPTIME-PRE: 6.01 SEC
FEF2575-%PRED-POST: 63 %
FEF2575-%PRED-PRE: 64 %
FEF2575-POST: 0.89 L/SEC
FEF2575-PRE: 0.92 L/SEC
FEF2575-PRED: 1.42 L/SEC
FEFMAX-%PRED-PRE: 87 %
FEFMAX-PRE: 3.7 L/SEC
FEFMAX-PRED: 4.24 L/SEC
FEV1-%PRED-PRE: 64 %
FEV1-PRE: 1.14 L
FEV1FEV6-PRE: 76 %
FEV1FEV6-PRED: 77 %
FEV1FVC-PRE: 76 %
FEV1FVC-PRED: 77 %
FEV1SVC-PRE: 61 %
FEV1SVC-PRED: 67 %
FIFMAX-PRE: 2.85 L/SEC
FRCPLETH-%PRED-PRE: 100 %
FRCPLETH-PRE: 2.69 L
FRCPLETH-PRED: 2.67 L
FVC-%PRED-PRE: 63 %
FVC-PRE: 1.5 L
FVC-PRED: 2.35 L
IC-%PRED-PRE: 59 %
IC-PRE: 1.68 L
IC-PRED: 2.8 L
MEP-PRE: 94 CMH2O
MIP-PRE: -59 CMH2O
MVV-%PRED-PRE: 49 %
MVV-PRE: 38 L/MIN
MVV-PRED: 76 L/MIN
RVPLETH-%PRED-PRE: 111 %
RVPLETH-PRE: 2.5 L
RVPLETH-PRED: 2.24 L
TLCPLETH-%PRED-PRE: 91 %
TLCPLETH-PRE: 4.36 L
TLCPLETH-PRED: 4.77 L
VA-%PRED-PRE: 63 %
VA-PRE: 2.8 L
VC-%PRED-PRE: 70 %
VC-PRE: 1.86 L
VC-PRED: 2.64 L

## 2019-11-14 ENCOUNTER — ANTICOAGULATION THERAPY VISIT (OUTPATIENT)
Dept: NURSING | Facility: CLINIC | Age: 84
End: 2019-11-14
Payer: COMMERCIAL

## 2019-11-14 DIAGNOSIS — Z79.01 LONG TERM CURRENT USE OF ANTICOAGULANT THERAPY: ICD-10-CM

## 2019-11-14 DIAGNOSIS — I48.19 PERSISTENT ATRIAL FIBRILLATION (H): ICD-10-CM

## 2019-11-14 LAB — INR POINT OF CARE: 2.2 (ref 0.86–1.14)

## 2019-11-14 PROCEDURE — 85610 PROTHROMBIN TIME: CPT | Mod: QW

## 2019-11-14 PROCEDURE — 36416 COLLJ CAPILLARY BLOOD SPEC: CPT

## 2019-11-14 PROCEDURE — 99207 ZZC NO CHARGE NURSE ONLY: CPT

## 2019-11-14 NOTE — PROGRESS NOTES
ANTICOAGULATION FOLLOW-UP CLINIC VISIT    Patient Name:  Mara Colindres  Date:  2019  Contact Type:  Face to Face    SUBJECTIVE:  Patient Findings     Positives:   Change in health (more leg swelling, probably more due to lymphedema)    Comments:   Assessed for S/S bleeding, clotting, medication, diet, health, activity and alcohol changes.          Clinical Outcomes     Negatives:   Major bleeding event, Thromboembolic event, Anticoagulation-related hospital admission, Anticoagulation-related ED visit, Anticoagulation-related fatality    Comments:   Assessed for S/S bleeding, clotting, medication, diet, health, activity and alcohol changes.             OBJECTIVE    INR Protime   Date Value Ref Range Status   2019 2.2 (A) 0.86 - 1.14 Final       ASSESSMENT / PLAN  INR assessment THER    Recheck INR In: 6 WEEKS    INR Location Clinic      Anticoagulation Summary  As of 2019    INR goal:   2.0-3.0   TTR:   61.5 % (3.4 y)   INR used for dosin.2 (2019)   Warfarin maintenance plan:   5 mg (5 mg x 1) every Mon, Wed, Fri; 2.5 mg (5 mg x 0.5) all other days   Full warfarin instructions:   5 mg every Mon, Wed, Fri; 2.5 mg all other days   Weekly warfarin total:   25 mg   Plan last modified:   Celeste Ruiz RN (10/2/2019)   Next INR check:   2019   Priority:   INR   Target end date:   Indefinite    Indications    Long-term (current) use of anticoagulants [Z79.01] [Z79.01]  Persistent atrial fibrillation [I48.19]             Anticoagulation Episode Summary     INR check location:       Preferred lab:       Send INR reminders to:   KELLY ROJAS    Comments:         Anticoagulation Care Providers     Provider Role Specialty Phone number    Steven Abrams MD [facility] Internal Medicine [phone]            See the Encounter Report to view Anticoagulation Flowsheet and Dosing Calendar (Go to Encounters tab in chart review, and find the Anticoagulation Therapy Visit)    Will continue  current dose, recheck in 6 weeks.  Discussed trying a pool to help with swelling to give even pressure on legs.    Alis Hartman [facility]

## 2019-12-06 ENCOUNTER — OFFICE VISIT (OUTPATIENT)
Dept: SLEEP MEDICINE | Facility: CLINIC | Age: 84
End: 2019-12-06
Payer: COMMERCIAL

## 2019-12-06 VITALS
OXYGEN SATURATION: 95 % | HEIGHT: 63 IN | SYSTOLIC BLOOD PRESSURE: 139 MMHG | RESPIRATION RATE: 18 BRPM | HEART RATE: 88 BPM | DIASTOLIC BLOOD PRESSURE: 89 MMHG | BODY MASS INDEX: 45.88 KG/M2

## 2019-12-06 DIAGNOSIS — G47.36 NOCTURNAL HYPOXEMIA DUE TO PULMONARY HYPERTENSION (H): ICD-10-CM

## 2019-12-06 DIAGNOSIS — J98.6 ELEVATED DIAPHRAGM: ICD-10-CM

## 2019-12-06 DIAGNOSIS — I50.32 CHRONIC DIASTOLIC CONGESTIVE HEART FAILURE (H): ICD-10-CM

## 2019-12-06 DIAGNOSIS — I27.29 NOCTURNAL HYPOXEMIA DUE TO PULMONARY HYPERTENSION (H): ICD-10-CM

## 2019-12-06 DIAGNOSIS — I27.20 PULMONARY HTN (H): Primary | ICD-10-CM

## 2019-12-06 PROCEDURE — 99213 OFFICE O/P EST LOW 20 MIN: CPT | Performed by: INTERNAL MEDICINE

## 2019-12-06 RX ORDER — ZOLPIDEM TARTRATE 5 MG/1
TABLET ORAL
Qty: 1 TABLET | Refills: 0 | Status: SHIPPED | OUTPATIENT
Start: 2019-12-06 | End: 2019-12-09

## 2019-12-06 NOTE — PATIENT INSTRUCTIONS
"We are going to evaluate the movement of your diaphragm with a \"sniff tet\" in radiology  Sleep study to get a thorough evaluation of your breathing during sleep    "

## 2019-12-06 NOTE — PROGRESS NOTES
Chief complaint: Follow-up PFT testing and possible sleep disordered breathing    History of Present Illness: 85-year-old female with past medical history for notable for diabetes, chronic kidney disease, heart failure and pulmonary hypertension.  She also has atrial fibrillation and hypoxemia with ambulation.      (Appointment time limited due to her arriving approximately 25 minutes late for appointment.  She went to the wrong campus.)    She continues to use supplemental oxygen with sleep and activity.  She is using about 3 L/min with sleep but despite this she continues to have some episodes of waking up with shortness of breath.  She uses a wedge pillow and a few extra pillows.    Pulmonary function testing was reviewed with her in detail today.  11/7/2019  Per my view of the reduced FVC and FEV1 with review reduced diffusion capacity could be consistent with neuromuscular weakness as her MIP and map are reduced as is the MVV.  Also could be consistent with her heart failure and abdominal obesity.    Patient is somewhat reluctant to proceed with sleep study due to her age.  She is wondering if she would get significant clinical benefit from any intervention.    Total score - Pine: 7 (12/6/2019  2:48 PM) (Less than 10 normal)    VAMSHI Total Score: 13 (normal 0-7, mild 8-14, moderate 15-21, severe 22-28)    Past Medical History:   Diagnosis Date     A-fib (H)      Bilateral leg edema     fairly severe     Cataract      CHF (congestive heart failure) (H)      Chronic diarrhea     neg colonoscopy abt one year ago, even to the point of having fecal accidents     Chronic venous insufficiency      CKD (chronic kidney disease) stage 3, GFR 30-59 ml/min (H)      Diabetic retinopathy (H)      DVT (deep venous thrombosis) (H)      GERD (gastroesophageal reflux disease)      Hyperlipidemia LDL goal <100 10/31/2010     Hypertension goal BP (blood pressure) < 140/90      Long-term (current) use of anticoagulants       Moderate persistent asthma 8/15/2012     MRSA (methicillin resistant Staphylococcus aureus)     postop hip     Obesity      S/P colonoscopy 2008 ( ?)     Septic hip (H)     sees an ID specialist Dr ANNEL DE LA CRUZ She remains on long-term suppressive antibiotic therapy using Minocin 50 mg twice daily     Stasis dermatitis      Type 2 diabetes, HbA1C goal < 8% (H)      Urinary incontinence, mixed      Vitiligo        Allergies   Allergen Reactions     Atenolol Other (See Comments)     Arms became limp, almost stroke like symptoms per patient.   Tolerates metoprolol fine     Metformin      Side effects / gastrointestinal symptoms        Current Outpatient Medications   Medication     zolpidem (AMBIEN) 5 MG tablet     ACCU-CHEK PARVEZ PLUS test strip     ACCU-CHEK PARVEZ PLUS test strip     acetaminophen (TYLENOL) 500 MG tablet     albuterol (2.5 MG/3ML) 0.083% neb solution     albuterol (PROAIR HFA, PROVENTIL HFA, VENTOLIN HFA) 108 (90 BASE) MCG/ACT inhaler     amoxicillin-clavulanate (AUGMENTIN) 875-125 MG tablet     ASPIRIN NOT PRESCRIBED, INTENTIONAL,     benzonatate (TESSALON) 200 MG capsule     Calcium Carbonate-Vitamin D (CALCIUM + D PO)     ciprofloxacin (CILOXAN) 0.3 % ophthalmic solution     diphenoxylate-atropine (LOMOTIL) 2.5-0.025 MG tablet     furosemide (LASIX) 40 MG tablet     guaiFENesin-codeine (ROBITUSSIN AC) 100-10 MG/5ML solution     guaiFENesin-codeine (ROBITUSSIN AC) 100-10 MG/5ML solution     HYDROcodone-acetaminophen (NORCO) 5-325 MG tablet     losartan (COZAAR) 25 MG tablet     metoprolol tartrate (LOPRESSOR) 100 MG tablet     OMEPRAZOLE CPCR 20 MG OR     order for DME     ORDER FOR DME     PARoxetine (PAXIL) 20 MG tablet     potassium chloride ER (K-TAB/KLOR-CON) 10 MEQ CR tablet     predniSONE (DELTASONE) 20 MG tablet     Resveratrol 100 MG CAPS     STATIN NOT PRESCRIBED, INTENTIONAL,     TRULICITY 1.5 MG/0.5ML pen     warfarin (COUMADIN) 5 MG tablet     No current facility-administered  "medications for this visit.        Social History     Socioeconomic History     Marital status:      Spouse name: (Gagandeep)     Number of children: 3     Years of education: Not on file     Highest education level: Not on file   Occupational History     Employer: RETIRED   Social Needs     Financial resource strain: Not on file     Food insecurity:     Worry: Not on file     Inability: Not on file     Transportation needs:     Medical: Not on file     Non-medical: Not on file   Tobacco Use     Smoking status: Never Smoker     Smokeless tobacco: Never Used   Substance and Sexual Activity     Alcohol use: No     Drug use: No     Sexual activity: Never   Lifestyle     Physical activity:     Days per week: Not on file     Minutes per session: Not on file     Stress: Not on file   Relationships     Social connections:     Talks on phone: Not on file     Gets together: Not on file     Attends Judaism service: Not on file     Active member of club or organization: Not on file     Attends meetings of clubs or organizations: Not on file     Relationship status: Not on file     Intimate partner violence:     Fear of current or ex partner: Not on file     Emotionally abused: Not on file     Physically abused: Not on file     Forced sexual activity: Not on file   Other Topics Concern     Parent/sibling w/ CABG, MI or angioplasty before 65F 55M? No   Social History Narrative    [place] in [place]       Family History   Problem Relation Age of Onset     Diabetes Mother      Cancer Mother      Hypertension Mother      Cataracts Mother      Hypertension Father      Glaucoma Father      Cataracts Father      Cancer Son         thyroid cancer     Neurologic Disorder Sister      Alzheimer Disease Sister          EXAM: Pleasant alert no distress  /89   Pulse 88   Resp 18   Ht 1.6 m (5' 3\")   SpO2 95%   BMI 45.88 kg/m    Psychiatric: Mood and affect appear normal      ASSESSMENT: 85-year-old female with complex " cardiovascular disease, elevated hemidiaphragm and pulmonary function testing could be consistent with her heart failure and weakness.  This could significantly impact her breathing with sleep and lying flat.    PLAN: Recommended sniff testing to evaluate diaphragm function.  Did recommend in lab polysomnography to assess sleep disordered breathing.  Recommended transcutaneous CO2 monitoring with arterial blood gas testing as well.  I did discuss with her that she could get potential benefit from Pap therapy if indeed she had significant sleep disordered breathing.  She may experience improved sleep quality, less nocturnal awakenings and solution of PND and orthopnea.  Patient is in agreement.  She was provided a prescription for one tablet of a sleep aid to bring to the lab and take at lights out as when she had a sleep study years ago she did not get any sleep at all.    Fifteen minutes spent with patient, >50% spent counseling and coordinating care.      Natalie Bruce M.D.  Pulmonary/Critical Care/Sleep Medicine    [facility]   Floor 1, [address]   [address] [place] [address]. [place] [address]   Appointments: [phone]    The above note was dictated using voice recognition software and may include typographical errors. Please contact the author for any clarifications.

## 2019-12-12 ENCOUNTER — MEDICAL CORRESPONDENCE (OUTPATIENT)
Dept: HEALTH INFORMATION MANAGEMENT | Facility: CLINIC | Age: 84
End: 2019-12-12

## 2019-12-16 ENCOUNTER — MEDICAL CORRESPONDENCE (OUTPATIENT)
Dept: HEALTH INFORMATION MANAGEMENT | Facility: CLINIC | Age: 84
End: 2019-12-16

## 2019-12-16 ENCOUNTER — DOCUMENTATION ONLY (OUTPATIENT)
Dept: CARE COORDINATION | Facility: CLINIC | Age: 84
End: 2019-12-16

## 2019-12-17 ENCOUNTER — TELEPHONE (OUTPATIENT)
Dept: FAMILY MEDICINE | Facility: CLINIC | Age: 84
End: 2019-12-17

## 2019-12-17 ENCOUNTER — ANCILLARY PROCEDURE (OUTPATIENT)
Dept: GENERAL RADIOLOGY | Facility: CLINIC | Age: 84
End: 2019-12-17
Attending: INTERNAL MEDICINE
Payer: COMMERCIAL

## 2019-12-17 DIAGNOSIS — J98.6 ELEVATED DIAPHRAGM: ICD-10-CM

## 2019-12-17 DIAGNOSIS — G47.36 NOCTURNAL HYPOXEMIA DUE TO PULMONARY HYPERTENSION (H): ICD-10-CM

## 2019-12-17 DIAGNOSIS — I27.29 NOCTURNAL HYPOXEMIA DUE TO PULMONARY HYPERTENSION (H): ICD-10-CM

## 2019-12-17 DIAGNOSIS — I50.32 CHRONIC DIASTOLIC CONGESTIVE HEART FAILURE (H): ICD-10-CM

## 2019-12-17 DIAGNOSIS — I27.20 PULMONARY HTN (H): ICD-10-CM

## 2019-12-17 NOTE — TELEPHONE ENCOUNTER
Dr. Abrams prescribed O2 1 year ago and pt was referred to pulmonologist. Has been having tests done with them. Had a long session with the pulmonologist and her diaphragm on the right side doesn't go up and down like it should and doesn't inflate fully. Has been using her O2 more. Is wondering if she can get a portable O2 tank ordered? Does she need to contact her O2 company or does Dr. Abrams just need to place an order?    Usha Zhong RN  [facility]

## 2019-12-17 NOTE — TELEPHONE ENCOUNTER
Reason for call:  Follow up after seeing the lung specialist   Patient called regarding (reason for call): call to discuss adjusting her oxygen  Additional comments: no    Phone number to reach patient:  Home number on file [phone] (home)    Best Time:  All day    Can we leave a detailed message on this number?  YES

## 2019-12-18 NOTE — TELEPHONE ENCOUNTER
Called [facility] medical as pt get's her O2 through them. Spoke with a representative who informed me that all of their portables have been on a waiting list since November, but would have someone from the oxygen team call back to our RN hotline to further discuss.     Usha Zhong RN  [facility]

## 2019-12-18 NOTE — TELEPHONE ENCOUNTER
I don't think she needs another face to face encounter for this.    But which device does she want ? I am writing a prescription for durable medical equipment or supplies for portable oxygen tank but there are a few choices. Perhaps she wants to review these choices with her oxygen vendor.    I started this order but think you need to review some of this with patient and with chart. Reroute if additional input requested from me , otherwise sign prescription for durable medical equipment or supplies     Steven Abrams MD

## 2019-12-18 NOTE — TELEPHONE ENCOUNTER
Spoke with Usha at [facility]. Pt currently has an all in one tank that is like pulling a briefcase and O2 is charged by a battery. She can contact pt to review the options with her. If she just wants to switch to tanks, they don't need a new order. If pt is wanting over the shoulder she would need testing for this and a new rx request would be sent to the provider. Nothing needed from our office at this time. Usha will reach out to pt.    Usha Zhong RN  [facility]

## 2019-12-26 ENCOUNTER — ANTICOAGULATION THERAPY VISIT (OUTPATIENT)
Dept: NURSING | Facility: CLINIC | Age: 84
End: 2019-12-26
Payer: COMMERCIAL

## 2019-12-26 DIAGNOSIS — Z79.01 LONG TERM CURRENT USE OF ANTICOAGULANT THERAPY: ICD-10-CM

## 2019-12-26 DIAGNOSIS — I48.19 PERSISTENT ATRIAL FIBRILLATION (H): ICD-10-CM

## 2019-12-26 LAB — INR POINT OF CARE: 2.1 (ref 0.86–1.14)

## 2019-12-26 PROCEDURE — 85610 PROTHROMBIN TIME: CPT | Mod: QW

## 2019-12-26 PROCEDURE — 36416 COLLJ CAPILLARY BLOOD SPEC: CPT

## 2019-12-26 PROCEDURE — 99207 ZZC NO CHARGE NURSE ONLY: CPT

## 2019-12-26 NOTE — PROGRESS NOTES
ANTICOAGULATION FOLLOW-UP CLINIC VISIT    Patient Name:  Mara Colindres  Date:  2019  Contact Type:  Face to Face    SUBJECTIVE:  Patient Findings     Comments:   Anticoagulation Clinic:  Please call [phone] with any problems or questions regarding your Warfarin therapy.          Clinical Outcomes     Negatives:   Major bleeding event, Thromboembolic event, Anticoagulation-related hospital admission, Anticoagulation-related ED visit, Anticoagulation-related fatality    Comments:   Anticoagulation Clinic:  Please call [phone] with any problems or questions regarding your Warfarin therapy.             OBJECTIVE    INR Protime   Date Value Ref Range Status   2019 2.1 (A) 0.86 - 1.14 Final       ASSESSMENT / PLAN  No question data found.  Anticoagulation Summary  As of 2019    INR goal:   2.0-3.0   TTR:   94.7 % (1 y)   INR used for dosin.1 (2019)   Warfarin maintenance plan:   5 mg (5 mg x 1) every Mon, Wed, Fri; 2.5 mg (5 mg x 0.5) all other days   Full warfarin instructions:   5 mg every Mon, Wed, Fri; 2.5 mg all other days   Weekly warfarin total:   25 mg   No change documented:   Celeste Ruiz RN   Plan last modified:   Celeste Ruiz RN (10/2/2019)   Next INR check:   2020   Priority:   Maintenance   Target end date:   Indefinite    Indications    Long-term (current) use of anticoagulants [Z79.01] [Z79.01]  Persistent atrial fibrillation [I48.19]             Anticoagulation Episode Summary     INR check location:       Preferred lab:       Send INR reminders to:   KELLY ROJAS    Comments:         Anticoagulation Care Providers     Provider Role Specialty Phone number    Steven Abrams MD [facility] [phone]            See the Encounter Report to view Anticoagulation Flowsheet and Dosing Calendar (Go to Encounters tab in chart review, and find the Anticoagulation Therapy Visit)        Celeste Ruiz RN

## 2019-12-26 NOTE — PATIENT INSTRUCTIONS
Anticoagulation Clinic:  Please call [phone] with any problems or questions regarding your Warfarin therapy.

## 2020-01-04 NOTE — PROGRESS NOTES
Akbar Thompson M.D.  Cardiovascular Medicine    I personally saw and examined this patient, discussed care with housestaff and other consultants, reviewed current laboratories and imaging studies, and conveyed impression and diagnostic/therapeutic plan to patient.    Problem List  1. Who Group 2 PAH  2. HFpEF    History  The patient returns for follow-up of heart failure.  There is no interim history of chest pain, tightness, paroxysmal nocturnal dyspnea, orthopnea, peripheral edema controlled with furosemide, palpitation,no pre-syncope, syncope, device discharge.  Exercise tolerance is stable.  The patient is attempting to exercise regularly and following a sodium restricted, calorically appropriate diet.  Medications are reviewed and the patient is taking medications as prescribed.  The patient is generally sleeping well. Patient is now utilizing oxygen to control shortness of breath.  She has infrequent chest pain.  Her blood pressure is well controlled.  She is utilizing lymphedema stockings.  Patient has excessive daytime fatigue.  She utilizes oxygen.  She does not utilize CPAP.      The patient returns for follow-up.       Morbid obesity (H)    Hypertension goal BP (blood pressure) < 140/90    DVT (deep venous thrombosis) (H)    MRSA (methicillin resistant Staphylococcus aureus)    Chronic diarrhea    HYPERLIPIDEMIA LDL GOAL <100    Chronic anticoagulation    CKD (chronic kidney disease) stage 3, GFR 30-59 ml/min    Septic hip (H)    Lymphedema    Tubular adenoma of colon    Abdominal wall hematoma    Advanced directives, counseling/discussion    Umbilical hernia    History of [place] fever    Moderate persistent asthma    Pseudophakia, ou    Hypovitaminosis D    Bilateral leg pain    Positive YAMINI    Raynaud phenomenon    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    Scotoma involving central area in visual field, right    Failed total hip arthroplasty, sequela    Major depressive disorder,  recurrent episode, mild (H)    Chronic diastolic congestive heart failure (H)    Long-term (current) use of anticoagulants [Z79.01]    Persistent atrial fibrillation (H)    Diabetic polyneuropathy associated with diabetes mellitus due to underlying condition (H)    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, without long-term current use of insulin (H)    Senile osteoporosis    Posterior vitreous detachment of left eye    Background diabetic retinopathy, mild, ou    Constitutional: alert, oriented, normal gait and station, normal mentation.  Oral: moist mucous membranes  Lymph: without pathologic adenopathy  Chest: clear to ausculation and percussion save basilar rales  Cor: No evidence of left or right ventricular activity.  Rhythm is irregular.  S1 variablel, S2 split physiologically. Murmurs are not present  Abdomen: without tenderness, rebound, guarding, masses, ascites  Extremities:Massive lymphedema   Neuro: no focal defects, normal mentation  Skin: without open lesions  Psych: oriented, verbal, mental status in tact                R      Imaging   Name: SANIA MAHAN  MRN: [id number]  : 1934  Study Date: 2016 02:18 PM  Age: 81 yrs  Gender: Female  Patient Location: [facility]  Reason For Study: Other secondary pulmonary hypertension  Ordering Physician: OZ WALSH  Referring Physician: OZ WALSH  Performed By: Sugar Freed RDCS     BSA: 2.1 m2  Height: 63 in  Weight: 251 lb  HR: 88  BP: 97/70 mmHg  ______________________________________________________________________________        Procedure  Echocardiogram with two-dimensional, color and spectral Doppler performed.  ______________________________________________________________________________     Interpretation Summary     Left ventricular function, chamber size, wall motion, and wall thickness are  normal.The EF is 60-65%.     Right ventricular function, chamber size, wall motion, and thickness  are  normal.  PA systolic pressure is elevated at 53 mmHg. RA pressure is normal at 3 mmHg.  ______________________________________________________________________________           Left Ventricle  Left ventricular function, chamber size, wall motion, and wall thickness are  normal.The EF is 60-65%.     Right Ventricle  Right ventricular function, chamber size, wall motion, and thickness are  normal.  Atria  Moderate right atrial enlargement is present. Severe left atrial enlargement  is present.     Mitral Valve  The mitral valve is normal. Mild mitral insufficiency is present.     Aortic Valve  The aortic valve is tricuspid. Mild aortic valve sclerosis is present.     Tricuspid Valve  The tricuspid valve is normal. Mild to moderate tricuspid insufficiency is  present. PASP 53/12.     Pulmonic Valve  The pulmonic valve is normal.     Vessels  The aorta root is normal. The inferior vena cava is normal.  ______________________________________________________________________________     MMode/2D Measurements & Calculations  IVSd: 1.0 cm  LVIDd: 4.3 cm  LVIDs: 2.1 cm  LVPWd: 0.81 cm  FS: 50.8 %  EDV(Teich): 83.5 ml  ESV(Teich): 14.8 ml  LV mass(C)d: 129.4 grams  Ao root diam: 2.9 cm  LA dimension: 5.4 cm  asc Aorta Diam: 2.8 cm  LA/Ao: 1.9  LVOT diam: 2.0 cm  LVOT area: 3.1 cm2  LA Volume (BP): 109.0 ml  LA Volume Index (BP): 51.2 ml/m2           Doppler Measurements & Calculations  MV E max mario: 63.3 cm/sec  MV dec time: 0.16 sec  Ao V2 max: 132.3 cm/sec  Ao max P.1 mmHg  OFELIA(V,D): 1.7 cm2  LV V1 max: 70.3 cm/sec  LV V1 VTI: 12.9 cm  CO(LVOT): 3.1 l/min  CI(LVOT): 1.5 l/min/m2  SV(LVOT): 40.6 ml  PA V2 max: 91.8 cm/sec  PA max PG: 3.4 mmHg  PI end-d mario: 146.0 cm/sec  PI max mario: 217.2 cm/sec  PI max P.9 mmHg  TR max mario: 340.5 cm/sec  TR max P.4 mmHg  Pulm Sys Mario: 27.4 cm/sec  Pulm Chavis Mario: 38.3 cm/sec  Pulm S/D: 0.72  Lateral E/e': 6.7  Results for SANIA MAHAN (MRN [id number]) as of 2020  17:04   Ref. Range 1/7/2020 16:36   WBC Latest Ref Range: 4.0 - 11.0 10e9/L 6.7   Hemoglobin Latest Ref Range: 11.7 - 15.7 g/dL 11.1 (L)   Hematocrit Latest Ref Range: 35.0 - 47.0 % 35.9   Platelet Count Latest Ref Range: 150 - 450 10e9/L 224   RBC Count Latest Ref Range: 3.8 - 5.2 10e12/L 3.77 (L)   MCV Latest Ref Range: 78 - 100 fl 95   MCH Latest Ref Range: 26.5 - 33.0 pg 29.4   MCHC Latest Ref Range: 31.5 - 36.5 g/dL 30.9 (L)   RDW Latest Ref Range: 10.0 - 15.0 % 14.6          Assessment/Plan     Patient quite immobile and may profit from physical therapy consult    Sleep study tomorrow

## 2020-01-07 ENCOUNTER — APPOINTMENT (OUTPATIENT)
Dept: LAB | Facility: CLINIC | Age: 85
End: 2020-01-07
Payer: COMMERCIAL

## 2020-01-07 ENCOUNTER — OFFICE VISIT (OUTPATIENT)
Dept: CARDIOLOGY | Facility: CLINIC | Age: 85
End: 2020-01-07
Attending: INTERNAL MEDICINE
Payer: COMMERCIAL

## 2020-01-07 VITALS
HEIGHT: 63 IN | OXYGEN SATURATION: 96 % | WEIGHT: 250 LBS | DIASTOLIC BLOOD PRESSURE: 75 MMHG | BODY MASS INDEX: 44.3 KG/M2 | HEART RATE: 92 BPM | SYSTOLIC BLOOD PRESSURE: 132 MMHG

## 2020-01-07 DIAGNOSIS — D63.1 ANEMIA DUE TO CHRONIC KIDNEY DISEASE, UNSPECIFIED CKD STAGE: Primary | ICD-10-CM

## 2020-01-07 DIAGNOSIS — N18.9 ANEMIA DUE TO CHRONIC KIDNEY DISEASE, UNSPECIFIED CKD STAGE: Primary | ICD-10-CM

## 2020-01-07 LAB
ALBUMIN SERPL-MCNC: 3.7 G/DL (ref 3.4–5)
ALP SERPL-CCNC: 82 U/L (ref 40–150)
ALT SERPL W P-5'-P-CCNC: 18 U/L (ref 0–50)
ANION GAP SERPL CALCULATED.3IONS-SCNC: 4 MMOL/L (ref 3–14)
AST SERPL W P-5'-P-CCNC: 14 U/L (ref 0–45)
BILIRUB SERPL-MCNC: 1.3 MG/DL (ref 0.2–1.3)
BUN SERPL-MCNC: 18 MG/DL (ref 7–30)
CALCIUM SERPL-MCNC: 9.2 MG/DL (ref 8.5–10.1)
CHLORIDE SERPL-SCNC: 101 MMOL/L (ref 94–109)
CO2 SERPL-SCNC: 30 MMOL/L (ref 20–32)
CREAT SERPL-MCNC: 1.35 MG/DL (ref 0.52–1.04)
ERYTHROCYTE [DISTWIDTH] IN BLOOD BY AUTOMATED COUNT: 14.6 % (ref 10–15)
GFR SERPL CREATININE-BSD FRML MDRD: 36 ML/MIN/{1.73_M2}
GLUCOSE SERPL-MCNC: 173 MG/DL (ref 70–99)
HCT VFR BLD AUTO: 35.9 % (ref 35–47)
HGB BLD-MCNC: 11.1 G/DL (ref 11.7–15.7)
IRON SATN MFR SERPL: 16 % (ref 15–46)
IRON SERPL-MCNC: 63 UG/DL (ref 35–180)
MCH RBC QN AUTO: 29.4 PG (ref 26.5–33)
MCHC RBC AUTO-ENTMCNC: 30.9 G/DL (ref 31.5–36.5)
MCV RBC AUTO: 95 FL (ref 78–100)
PLATELET # BLD AUTO: 224 10E9/L (ref 150–450)
POTASSIUM SERPL-SCNC: 4.1 MMOL/L (ref 3.4–5.3)
PROT SERPL-MCNC: 7.3 G/DL (ref 6.8–8.8)
RBC # BLD AUTO: 3.77 10E12/L (ref 3.8–5.2)
SODIUM SERPL-SCNC: 135 MMOL/L (ref 133–144)
TIBC SERPL-MCNC: 387 UG/DL (ref 240–430)
WBC # BLD AUTO: 6.7 10E9/L (ref 4–11)

## 2020-01-07 PROCEDURE — 36415 COLL VENOUS BLD VENIPUNCTURE: CPT | Performed by: INTERNAL MEDICINE

## 2020-01-07 PROCEDURE — 83550 IRON BINDING TEST: CPT | Performed by: INTERNAL MEDICINE

## 2020-01-07 PROCEDURE — 80053 COMPREHEN METABOLIC PANEL: CPT | Performed by: INTERNAL MEDICINE

## 2020-01-07 PROCEDURE — G0463 HOSPITAL OUTPT CLINIC VISIT: HCPCS | Mod: ZF

## 2020-01-07 PROCEDURE — 99214 OFFICE O/P EST MOD 30 MIN: CPT | Mod: ZP | Performed by: INTERNAL MEDICINE

## 2020-01-07 PROCEDURE — 85027 COMPLETE CBC AUTOMATED: CPT | Performed by: INTERNAL MEDICINE

## 2020-01-07 PROCEDURE — 83540 ASSAY OF IRON: CPT | Performed by: INTERNAL MEDICINE

## 2020-01-07 ASSESSMENT — PAIN SCALES - GENERAL: PAINLEVEL: NO PAIN (0)

## 2020-01-07 ASSESSMENT — MIFFLIN-ST. JEOR: SCORE: 1548.12

## 2020-01-07 NOTE — NURSING NOTE
Med Reconcile: Reviewed and verified all current medications with the patient. The updated medication list was printed and given to the patient.    Patient was instructed to return for the next laboratory testing today before leaving.     Diet: Patient instructed regarding a heart healthy diet, including discussion of reduced fat and sodium intake. Patient demonstrated understanding of this information and agreed to call with further questions or concerns.    Return Appointment: Patient given instructions regarding scheduling next clinic visit. Patient demonstrated understanding of this information and agreed to call with further questions or concerns.    Patient stated she understood all health information given and agreed to call with further questions or concerns.    Medication Changes:  No medication changes at this time. Please continue current medication regiment.      Patient Instructions:  1. Continue staying active and eat a heart healthy diet.    2. Please keep current list of medications with you at all times.    3. Remember to weigh yourself daily after voiding and before you consume any food or beverages and log the numbers.  If you have gained 2 pounds overnight or 5 pounds in a week contact us immediately for medication adjustments or further instructions.    4. **Please call us immediately if you have any syncope (fainting or passing out), chest pain, edema (swelling or weight gain), or decline in your functional status (general decline in how you are feeling).    Follow up Appointment Information:  -We will decide on follow up after your labs have resulted.      *sent staff msg to [facility] to look for lab results tomorrow.  **patient does not have [employer] access, so I advised her that someone would call her with the lab results and advise her when her follow up would be.  Patient verbalized understanding, agreed with plan and denied any further questions. Kayla Echeverria RN on 1/7/2020 at 4:24  PM

## 2020-01-07 NOTE — PATIENT INSTRUCTIONS
Medication Changes:  No medication changes at this time. Please continue current medication regiment.      Patient Instructions:  1. Continue staying active and eat a heart healthy diet.    2. Please keep current list of medications with you at all times.    3. Remember to weigh yourself daily after voiding and before you consume any food or beverages and log the numbers.  If you have gained 2 pounds overnight or 5 pounds in a week contact us immediately for medication adjustments or further instructions.    4. **Please call us immediately if you have any syncope (fainting or passing out), chest pain, edema (swelling or weight gain), or decline in your functional status (general decline in how you are feeling).    Follow up Appointment Information:  -We will decide on follow up after your labs have resulted.      We are located on the third floor of [facility] the [facility].  Our address is     [address] [place] [address] on [address]rd [place] [address]    Thank you for allowing us to be a part of your care here at the [facility]    If you have questions or concerns please contact us at:    Sami Marino, RN, BSN   Mercedez Fofana (Schedule,Prior Auth)  Nurse Coordinator     Clinic   Pulmonary Hypertension   Pulmonary Hypertension  [facility]  [facility]  (Phone)[phone]    (Phone) [phone]        (Fax) [phone]    ** Please note that you will NOT receive a reminder call regarding your scheduled testing, reminder calls are for provider appointments only.  If you are scheduled for testing within the [facility] system you may receive a call regarding pre-registration for billing purposes only.**     Remember to weigh yourself daily after voiding and before you consume any food or beverages and log the numbers.  If you have gained/lost 2 pounds overnight or 5  pounds in a week contact us immediately for medication adjustments or further instructions.   **Please call us immediately if you have any syncope, chest pain, edema, or decline in your functional status.    Support Group:  Pulmonary Hypertension Association  Https://www.phassociation.org/  **Look at the Events Tab** They even have Support Groups that you can call into    [facility] Support Group  Second Saturday of the Month from 1-3 PM   Location: [address] [place] [address]  Leader: Wen Westbrook and Malika Pierson  Phone: [phone] or [phone]  Email: [email or web address]

## 2020-01-07 NOTE — LETTER
1/7/2020      RE: Mara Colindres  [address] [facility] [address]       Dear Colleague,    Thank you for the opportunity to participate in the care of your patient, Mara Colindres, at the [facility]. Please see a copy of my visit note below.    Akbar Thompson M.D.  Cardiovascular Medicine    I personally saw and examined this patient, discussed care with housestaff and other consultants, reviewed current laboratories and imaging studies, and conveyed impression and diagnostic/therapeutic plan to patient.    Problem List  1. Who Group 2 PAH  2. HFpEF    History  The patient returns for follow-up of heart failure.  There is no interim history of chest pain, tightness, paroxysmal nocturnal dyspnea, orthopnea, peripheral edema controlled with furosemide, palpitation,no pre-syncope, syncope, device discharge.  Exercise tolerance is stable.  The patient is attempting to exercise regularly and following a sodium restricted, calorically appropriate diet.  Medications are reviewed and the patient is taking medications as prescribed.  The patient is generally sleeping well. Patient is now utilizing oxygen to control shortness of breath.  She has infrequent chest pain.  Her blood pressure is well controlled.  She is utilizing lymphedema stockings.  Patient has excessive daytime fatigue.  She utilizes oxygen.  She does not utilize CPAP.      The patient returns for follow-up.       Morbid obesity (H)    Hypertension goal BP (blood pressure) < 140/90    DVT (deep venous thrombosis) (H)    MRSA (methicillin resistant Staphylococcus aureus)    Chronic diarrhea    HYPERLIPIDEMIA LDL GOAL <100    Chronic anticoagulation    CKD (chronic kidney disease) stage 3, GFR 30-59 ml/min    Septic hip (H)    Lymphedema    Tubular adenoma of colon    Abdominal wall hematoma    Advanced directives, counseling/discussion    Umbilical hernia    History of [place] fever    Moderate persistent asthma    Pseudophakia, ou    Hypovitaminosis D    Bilateral leg pain    Positive YAMINI    Raynaud phenomenon    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    Scotoma involving central area in visual field, right    Failed total hip arthroplasty, sequela    Major depressive disorder, recurrent episode, mild (H)    Chronic diastolic congestive heart failure (H)    Long-term (current) use of anticoagulants [Z79.01]    Persistent atrial fibrillation (H)    Diabetic polyneuropathy associated with diabetes mellitus due to underlying condition (H)    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, without long-term current use of insulin (H)    Senile osteoporosis    Posterior vitreous detachment of left eye    Background diabetic retinopathy, mild, ou    Constitutional: alert, oriented, normal gait and station, normal mentation.  Oral: moist mucous membranes  Lymph: without pathologic adenopathy  Chest: clear to ausculation and percussion save basilar rales  Cor: No evidence of left or right ventricular activity.  Rhythm is irregular.  S1 variablel, S2 split physiologically. Murmurs are not present  Abdomen: without tenderness, rebound, guarding, masses, ascites  Extremities:Massive lymphedema   Neuro: no focal defects, normal mentation  Skin: without open lesions  Psych: oriented, verbal, mental status in tact                R      Imaging   Name: SANIA MAHAN  MRN: [id number]  : 1934  Study Date: 2016 02:18 PM  Age: 81 yrs  Gender: Female  Patient [facility]: [facility]  Reason For Study: Other secondary pulmonary hypertension  Ordering Physician: ZO WALSH  Referring Physician: OZ WALSH  Performed By: Sugar Freed RDCS     BSA: 2.1 m2  Height: 63 in  Weight: 251 lb  HR: 88  BP: 97/70 mmHg  ______________________________________________________________________________        Procedure  Echocardiogram with two-dimensional, color and  spectral Doppler performed.  ______________________________________________________________________________     Interpretation Summary     Left ventricular function, chamber size, wall motion, and wall thickness are  normal.The EF is 60-65%.     Right ventricular function, chamber size, wall motion, and thickness are  normal.  PA systolic pressure is elevated at 53 mmHg. RA pressure is normal at 3 mmHg.  ______________________________________________________________________________           Left Ventricle  Left ventricular function, chamber size, wall motion, and wall thickness are  normal.The EF is 60-65%.     Right Ventricle  Right ventricular function, chamber size, wall motion, and thickness are  normal.  Atria  Moderate right atrial enlargement is present. Severe left atrial enlargement  is present.     Mitral Valve  The mitral valve is normal. Mild mitral insufficiency is present.     Aortic Valve  The aortic valve is tricuspid. Mild aortic valve sclerosis is present.     Tricuspid Valve  The tricuspid valve is normal. Mild to moderate tricuspid insufficiency is  present. PASP 53/12.     Pulmonic Valve  The pulmonic valve is normal.     Vessels  The aorta root is normal. The inferior vena cava is normal.  ______________________________________________________________________________     MMode/2D Measurements & Calculations  IVSd: 1.0 cm  LVIDd: 4.3 cm  LVIDs: 2.1 cm  LVPWd: 0.81 cm  FS: 50.8 %  EDV(Teich): 83.5 ml  ESV(Teich): 14.8 ml  LV mass(C)d: 129.4 grams  Ao root diam: 2.9 cm  LA dimension: 5.4 cm  asc Aorta Diam: 2.8 cm  LA/Ao: 1.9  LVOT diam: 2.0 cm  LVOT area: 3.1 cm2  LA Volume (BP): 109.0 ml  LA Volume Index (BP): 51.2 ml/m2           Doppler Measurements & Calculations  MV E max yandel: 63.3 cm/sec  MV dec time: 0.16 sec  Ao V2 max: 132.3 cm/sec  Ao max P.1 mmHg  OFELIA(V,D): 1.7 cm2  LV V1 max: 70.3 cm/sec  LV V1 VTI: 12.9 cm  CO(LVOT): 3.1 l/min  CI(LVOT): 1.5 l/min/m2  SV(LVOT): 40.6 ml  PA V2  max: 91.8 cm/sec  PA max PG: 3.4 mmHg  PI end-d mario: 146.0 cm/sec  PI max mario: 217.2 cm/sec  PI max P.9 mmHg  TR max mario: 340.5 cm/sec  TR max P.4 mmHg  Pulm Sys Mario: 27.4 cm/sec  Pulm Chavis Mario: 38.3 cm/sec  Pulm S/D: 0.72  Lateral E/e': 6.7  Results for SANIA MAHAN (MRN [id number]) as of 2020 17:04   Ref. Range 2020 16:36   WBC Latest Ref Range: 4.0 - 11.0 10e9/L 6.7   Hemoglobin Latest Ref Range: 11.7 - 15.7 g/dL 11.1 (L)   Hematocrit Latest Ref Range: 35.0 - 47.0 % 35.9   Platelet Count Latest Ref Range: 150 - 450 10e9/L 224   RBC Count Latest Ref Range: 3.8 - 5.2 10e12/L 3.77 (L)   MCV Latest Ref Range: 78 - 100 fl 95   MCH Latest Ref Range: 26.5 - 33.0 pg 29.4   MCHC Latest Ref Range: 31.5 - 36.5 g/dL 30.9 (L)   RDW Latest Ref Range: 10.0 - 15.0 % 14.6          Assessment/Plan     Patient quite immobile and may profit from physical therapy consult    Sleep study tomorrow    Please do not hesitate to contact me if you have any questions/concerns.     Sincerely,     Akbar Thompson MD

## 2020-01-08 ENCOUNTER — TELEPHONE (OUTPATIENT)
Dept: CARDIOLOGY | Facility: CLINIC | Age: 85
End: 2020-01-08

## 2020-01-08 ENCOUNTER — THERAPY VISIT (OUTPATIENT)
Dept: SLEEP MEDICINE | Facility: CLINIC | Age: 85
End: 2020-01-08
Payer: COMMERCIAL

## 2020-01-08 ENCOUNTER — HOSPITAL ENCOUNTER (OUTPATIENT)
Facility: CLINIC | Age: 85
Setting detail: SPECIMEN
Discharge: HOME | End: 2020-01-08
Admitting: INTERNAL MEDICINE
Payer: COMMERCIAL

## 2020-01-08 DIAGNOSIS — I50.32 CHRONIC DIASTOLIC CONGESTIVE HEART FAILURE (H): ICD-10-CM

## 2020-01-08 DIAGNOSIS — G47.33 OSA (OBSTRUCTIVE SLEEP APNEA): ICD-10-CM

## 2020-01-08 DIAGNOSIS — I27.29 NOCTURNAL HYPOXEMIA DUE TO PULMONARY HYPERTENSION (H): ICD-10-CM

## 2020-01-08 DIAGNOSIS — J98.6 ELEVATED DIAPHRAGM: ICD-10-CM

## 2020-01-08 DIAGNOSIS — I27.20 PULMONARY HTN (H): ICD-10-CM

## 2020-01-08 DIAGNOSIS — I27.20 PULMONARY HYPERTENSION (H): Primary | ICD-10-CM

## 2020-01-08 DIAGNOSIS — R06.02 SOB (SHORTNESS OF BREATH): ICD-10-CM

## 2020-01-08 DIAGNOSIS — G47.36 NOCTURNAL HYPOXEMIA DUE TO PULMONARY HYPERTENSION (H): ICD-10-CM

## 2020-01-08 LAB
BASE EXCESS BLDA CALC-SCNC: 0.8 MMOL/L
HCO3 BLD-SCNC: 25 MMOL/L (ref 21–28)
O2/TOTAL GAS SETTING VFR VENT: ABNORMAL %
PCO2 BLD: 39 MM HG (ref 35–45)
PH BLD: 7.42 PH (ref 7.35–7.45)
PO2 BLD: 70 MM HG (ref 80–105)

## 2020-01-08 PROCEDURE — 36600 WITHDRAWAL OF ARTERIAL BLOOD: CPT | Performed by: INTERNAL MEDICINE

## 2020-01-08 PROCEDURE — 95811 POLYSOM 6/>YRS CPAP 4/> PARM: CPT | Performed by: INTERNAL MEDICINE

## 2020-01-08 PROCEDURE — 82803 BLOOD GASES ANY COMBINATION: CPT | Performed by: INTERNAL MEDICINE

## 2020-01-08 NOTE — LETTER
January 8, 2020     TO: Mara Colindres  [address] [facility] [address]     Dear Ms. Colindres,    We are writing to inform you of your test results. As we discussed on the phone, Dr. Thompson would like to see you in 1 year (January 2021) with labs prior. We will be calling you later in the year to schedule it. If you do not hear from us by August, please call [phone] to set up an appointment.     Your test results fall within the expected range(s) or remain unchanged from previous results.    Component      Latest Ref Rng & Units 1/7/2020   Sodium      133 - 144 mmol/L 135   Potassium      3.4 - 5.3 mmol/L 4.1   Chloride      94 - 109 mmol/L 101   Carbon Dioxide      20 - 32 mmol/L 30   Anion Gap      3 - 14 mmol/L 4   Glucose      70 - 99 mg/dL 173 (H)   Urea Nitrogen      7 - 30 mg/dL 18   Creatinine      0.52 - 1.04 mg/dL 1.35 (H)   GFR Estimate      >60 mL/min/1.73:m2 36 (L)   GFR Estimate If Black      >60 mL/min/1.73:m2 41 (L)   Calcium      8.5 - 10.1 mg/dL 9.2   Bilirubin Total      0.2 - 1.3 mg/dL 1.3   Albumin      3.4 - 5.0 g/dL 3.7   Protein Total      6.8 - 8.8 g/dL 7.3   Alkaline Phosphatase      40 - 150 U/L 82   ALT      0 - 50 U/L 18   AST      0 - 45 U/L 14   WBC      4.0 - 11.0 10e9/L 6.7   RBC Count      3.8 - 5.2 10e12/L 3.77 (L)   Hemoglobin      11.7 - 15.7 g/dL 11.1 (L)   Hematocrit      35.0 - 47.0 % 35.9   MCV      78 - 100 fl 95   MCH      26.5 - 33.0 pg 29.4   MCHC      31.5 - 36.5 g/dL 30.9 (L)   RDW      10.0 - 15.0 % 14.6   Platelet Count      150 - 450 10e9/L 224   Iron      35 - 180 ug/dL 63   Iron Binding Cap      240 - 430 ug/dL 387   Iron Saturation Index      15 - 46 % 16     Sami Marino RN Care Coordinator

## 2020-01-08 NOTE — Clinical Note
Severe EVONNE, orders for new CPAP set up-has O2 not sure from which company but doesn't need it into PAP, thanksTC

## 2020-01-08 NOTE — TELEPHONE ENCOUNTER
Reviewed labs and they are WNL; called patient to inform her. Orders placed for a 1 year follow up with labs prior. Patient verbalized understanding and did not have any further questions. Dilcia Marino RN on 1/8/2020 at 9:16 AM    Component      Latest Ref Rng & Units 1/7/2020   Sodium      133 - 144 mmol/L 135   Potassium      3.4 - 5.3 mmol/L 4.1   Chloride      94 - 109 mmol/L 101   Carbon Dioxide      20 - 32 mmol/L 30   Anion Gap      3 - 14 mmol/L 4   Glucose      70 - 99 mg/dL 173 (H)   Urea Nitrogen      7 - 30 mg/dL 18   Creatinine      0.52 - 1.04 mg/dL 1.35 (H)   GFR Estimate      >60 mL/min/1.73:m2 36 (L)   GFR Estimate If Black      >60 mL/min/1.73:m2 41 (L)   Calcium      8.5 - 10.1 mg/dL 9.2   Bilirubin Total      0.2 - 1.3 mg/dL 1.3   Albumin      3.4 - 5.0 g/dL 3.7   Protein Total      6.8 - 8.8 g/dL 7.3   Alkaline Phosphatase      40 - 150 U/L 82   ALT      0 - 50 U/L 18   AST      0 - 45 U/L 14   WBC      4.0 - 11.0 10e9/L 6.7   RBC Count      3.8 - 5.2 10e12/L 3.77 (L)   Hemoglobin      11.7 - 15.7 g/dL 11.1 (L)   Hematocrit      35.0 - 47.0 % 35.9   MCV      78 - 100 fl 95   MCH      26.5 - 33.0 pg 29.4   MCHC      31.5 - 36.5 g/dL 30.9 (L)   RDW      10.0 - 15.0 % 14.6   Platelet Count      150 - 450 10e9/L 224   Iron      35 - 180 ug/dL 63   Iron Binding Cap      240 - 430 ug/dL 387   Iron Saturation Index      15 - 46 % 16

## 2020-01-09 NOTE — PROGRESS NOTES
Completed a split night PSG per provider order.    Preliminary AHI 49.2.  A final therapeutic PAP pressure was achieved.    Supine REM was seen on therapeutic pressure.    Patient reports feeling refreshed in AM.

## 2020-01-10 PROBLEM — G47.33 OSA (OBSTRUCTIVE SLEEP APNEA): Status: ACTIVE | Noted: 2020-01-10

## 2020-01-10 NOTE — PROCEDURES
"SLEEP STUDY INTERPRETATION  SPLIT NIGHT STUDY      Patient: SANIA MAHAN  YOB: 1934  Study Date: 1/8/2020  MRN: [id number]  Referring Provider: LUIZA Abrams MD  Ordering Provider: Natalie Bruce MD    Indications for Polysomnography: The patient is a 85 y old Female who is 5' 3\" and weighs 259.0 lbs. Her BMI is 45.9, Irving sleepiness scale 7.0 and neck circumference is 40.0 cm. Relevant medical history includes heart failure with preserved ejection fraction, atrial fibrillation, diabetes, depression, elevated kecia diaphragm. A diagnostic polysomnogram was performed to evaluate for sleep apnea/CSA/hypoventilation/hypoxemia. After 114.5 minutes of sleep time the patient exhibited sufficient respiratory events qualifying her for a CPAP trial which was then initiated.    Polysomnogram Data: A full night polysomnogram recorded the standard physiologic parameters including EEG, EOG, EMG, ECG, nasal and oral airflow. Respiratory parameters of chest and abdominal movements were recorded with respiratory inductance plethysmography. Oxygen saturation was recorded by pulse oximetry.  Hypopnea scoring rule used: 1B 4%    Diagnostic PSG  Sleep Architecture: Decreased sleep efficiency with frequent sleep stage changes and arousals.  The total recording time of the polysomnogram was 195.1 minutes. The total sleep time was 114.5 minutes. Sleep latency was normal at 12.0 minutes with the use of a sleep aid (zolpidem 5 mg). REM latency was not achieved. Arousal index was increased at 85.9 arousals per hour. Sleep efficiency was decreased at 58.7%. Wake after sleep onset was 35.5 minutes. The patient spent 73.8% of total sleep time in Stage N1, 26.2% in Stage N2, 0.0% in Stage N3, and 0.0% in REM.    Respiration: Severe obstructive sleep apnea.    Events ? The polysomnogram revealed a presence of 53 obstructive, 6 central, and 2 mixed apneas resulting in an apnea index of 32.0 events per hour. There were 29 " obstructive hypopneas and 0 central hypopneas resulting in an obstructive hypopnea index of 15.2 and central hypopnea index of 0 events per hour. The combined apnea/hypopnea index was 47.2 events per hour (central apnea/hypopnea index was 3.1 events per hour).  The supine AHI was 47.2 events per hour. The RERA index was 15.7 events per hour. The RDI was 62.9 events per hour.    Snoring - was reported as mild, also grunting, talking.    Respiratory rate and pattern - was notable for respiratory rate 18 bpm, and normal pattern.    Sustained Sleep Associated Hypoventilation - Transcutaneous carbon dioxide monitoring was used, however significant hypoventilation was not present with a maximum change from 41 to 44 mmHg and 0 minutes at or greater than 55 mmHg. Baseline ABG pH 7.42 pCO2 39 pO2 70    Sleep Associated Hypoxemia - (Greater than 5 minutes O2 sat at or below 88%) was not present. Baseline oxygen saturation was 95.2%. Lowest oxygen saturation was 67.4%. Time spent less than or equal to 88% was 3.0 minutes. Time spent less than or equal to 89% was 4.1 minutes.     Treatment PSG  Sleep Architecture: On PAP therapy sleep efficiency normalized and arousals improved.  At 02:06:31 AM the patient was placed on PAP treatment and was titrated at pressures ranging from CPAP 5 cmH2O up to CPAP 11 cmH2O. The total recording time of the treatment portion of the study was 250.4 minutes. The total sleep time was 216.5 minutes. During the treatment portion of the study the sleep latency was 15.0 minutes. REM latency was 226.0 minutes. Arousal index was increased at 36.6 arousals per hour. Sleep efficiency was normal at 86.4%. Wake after sleep onset was 18.5 minutes. The patient spent 19.6% of total sleep time in Stage N1, 74.1% in Stage N2, 3.9% in Stage N3, and 2.3% in REM. Time in REM supine was 0 minutes.         Respiration: On final CPAP pressure 11 cmH2O most obstructive events were resolved including during REM lateral  sleep.    The final pressure was CPAP 11 cmH2O with an AHI of 15.9 events per hour. (Most residual events were central in nature.)  Time in REM supine on final pressure was 0 minutes.     This titration was considered adequate      Movement Activity: Frequent periodic limb movements including during REM sleep.    Periodic Limb Movements  o During the diagnostic portion of the study, there were 103 PLMs recorded. The PLM index was 54.0 movements per hour. The PLM Arousal Index was 4.7 per hour.  o During the treatment portion of the study, there were 506 PLMs recorded. The PLM index was 140.2 movements per hour. The PLM Arousal Index was 6.7 per hour.    REM EMG Activity - Excessive transient muscle activity was present (PLMs.)    Nocturnal Behavior - Abnormal sleep related behaviors were noted arising out of NREM sleep. The behaviors appeared to be sleep talking.    Bruxism - None apparent.    Cardiac Summary: Atrial fibrillation with rates greater than 90 bpm and early wide QRS complex beats (PVCs.)  During the diagnostic portion of the study, the average pulse rate was 86.8 bpm. The minimum pulse rate was 60.6 bpm while the maximum pulse rate was 141.4 bpm.    During the treatment portion of the study, the average pulse rate was 84.0 bpm. The minimum pulse rate was 64.6 bpm while the maximum pulse rate was 102.9 bpm.         Assessment:     Severe obstructive sleep apnea with AHI 47.2 events per hour.    Decreased sleep efficiency with frequent sleep stage changes and arousals.    On final CPAP pressure 11 cmH2O most obstructive events were resolved including during REM lateral sleep.    On PAP therapy sleep efficiency normalized and arousals improved.    Frequent periodic limb movements including during REM sleep.    Atrial fibrillation with rates greater than 90 bpm and early wide QRS complex beats (PVCs.)    Recommendations:    Treatment of EVONNE with Auto?titrating PAP therapy with a range of 10 cmH2O to 13  cmH2O. Recommend clinical follow up with sleep management team, including review of compliance measures..    Advice regarding the risks of drowsy driving.    Suggest optimizing sleep schedule and avoiding sleep deprivation.    Weight management (BMI > 30).    Pharmacologic therapy should be used for management of restless legs syndrome only if present and clinically indicated and not based on the presence of periodic limb movements alone.    Diagnostic Codes:   Obstructive Sleep Apnea G47.33  Periodic Limb Movement Disorder G47.61  Parasomnia, Unspecified G47.50            _____________________________________   Electronically Signed By: Natalie Bruce MD 1/10/2020

## 2020-01-13 DIAGNOSIS — I27.20 PULMONARY HTN (H): ICD-10-CM

## 2020-01-13 DIAGNOSIS — I48.19 PERSISTENT ATRIAL FIBRILLATION (H): ICD-10-CM

## 2020-01-13 DIAGNOSIS — Z79.01 LONG TERM CURRENT USE OF ANTICOAGULANT THERAPY: ICD-10-CM

## 2020-01-13 DIAGNOSIS — F33.0 MAJOR DEPRESSIVE DISORDER, RECURRENT EPISODE, MILD (H): ICD-10-CM

## 2020-01-13 DIAGNOSIS — Z79.01 CHRONIC ANTICOAGULATION: ICD-10-CM

## 2020-01-13 DIAGNOSIS — K52.9 CHRONIC DIARRHEA: ICD-10-CM

## 2020-01-13 LAB — SLPCOMP: NORMAL

## 2020-01-15 RX ORDER — WARFARIN SODIUM 5 MG/1
TABLET ORAL
Qty: 70 TABLET | Refills: 0 | Status: SHIPPED | OUTPATIENT
Start: 2020-01-15 | End: 2020-06-19

## 2020-01-15 RX ORDER — DIPHENOXYLATE HCL/ATROPINE 2.5-.025MG
TABLET ORAL
Qty: 40 TABLET | Refills: 3 | Status: SHIPPED | OUTPATIENT
Start: 2020-01-15 | End: 2020-06-18

## 2020-01-15 RX ORDER — LOSARTAN POTASSIUM 25 MG/1
TABLET ORAL
Qty: 180 TABLET | Refills: 1 | Status: SHIPPED | OUTPATIENT
Start: 2020-01-15 | End: 2020-06-19

## 2020-01-15 RX ORDER — PAROXETINE 20 MG/1
TABLET, FILM COATED ORAL
Qty: 90 TABLET | Refills: 0 | Status: SHIPPED | OUTPATIENT
Start: 2020-01-15 | End: 2020-04-20

## 2020-01-15 NOTE — TELEPHONE ENCOUNTER
"Routing refill request to provider for review/approval because:  Labs out of range:  Creatinine (cozaar)  PHQ-9 not up to date (paroxetine)  Not on refill protocol (lomotil)      Prescription approved per [facility]. (warfarin 2.1 at last check)    Requested Prescriptions   Pending Prescriptions Disp Refills     losartan (COZAAR) 25 MG tablet [Pharmacy Med Name: LOSARTAN 25MG TABLETS] 180 tablet 1     Sig: TAKE 1 TABLET(25 MG) BY MOUTH TWICE DAILY       Angiotensin-II Receptors Failed - 1/13/2020  6:07 PM        Failed - Normal serum creatinine on file in past 12 months     Recent Labs   Lab Test 01/07/20  1636   CR 1.35*             Passed - Last blood pressure under 140/90 in past 12 months     BP Readings from Last 3 Encounters:   01/07/20 132/75   12/06/19 139/89   10/03/19 126/84                 Passed - Recent (12 mo) or future (30 days) visit within the authorizing provider's specialty     Patient has had an office visit with the authorizing provider or a provider within the authorizing providers department within the previous 12 mos or has a future within next 30 days. See \"Patient Info\" tab in inbasket, or \"Choose Columns\" in Meds & Orders section of the refill encounter.              Passed - Medication is active on med list        Passed - Patient is age 18 or older        Passed - No active pregnancy on record        Passed - Normal serum potassium on file in past 12 months     Recent Labs   Lab Test 01/07/20  1636   POTASSIUM 4.1                    Passed - No positive pregnancy test in past 12 months        PARoxetine (PAXIL) 20 MG tablet [Pharmacy Med Name: PAROXETINE 20MG TABLETS] 90 tablet 0     Sig: TAKE 1 TABLET(20 MG) BY MOUTH DAILY       SSRIs Protocol Failed - 1/13/2020  6:07 PM        Failed - PHQ-9 score less than 5 in past 6 months     Please review last PHQ-9 score.           Passed - Medication is active on med list        Passed - Patient is age 18 or older        Passed - No " "active pregnancy on record        Passed - No positive pregnancy test in last 12 months        Passed - Recent (6 mo) or future (30 days) visit within the authorizing provider's specialty     Patient had office visit in the last 6 months or has a visit in the next 30 days with authorizing provider or within the authorizing provider's specialty.  See \"Patient Info\" tab in inbasket, or \"Choose Columns\" in Meds & Orders section of the refill encounter.            diphenoxylate-atropine (LOMOTIL) 2.5-0.025 MG tablet [Pharmacy Med Name: DIPHENOXYLATE/ATROPINE 2.5MG TABS] 40 tablet      Sig: TAKE 1 TABLET BY MOUTH FOUR TIMES DAILY       There is no refill protocol information for this order        warfarin ANTICOAGULANT (COUMADIN) 5 MG tablet [Pharmacy Med Name: WARFARIN SOD 5MG TABLETS (PEACH)] 70 tablet 0     Sig: TAKE ONE TABLET BY MOUTH TUE/THURS/SAT AND ONE-HALF TABLET BY MOUTH ALL OTHER DAYS       Vitamin K Antagonists Failed - 1/13/2020  6:07 PM        Failed - INR is within goal in the past 6 weeks     Confirm INR is within goal in the past 6 weeks.     Recent Labs   Lab Test 12/26/19   INR 2.1*                       Failed - Medication is active on med list        Passed - Recent (12 mo) or future (30 days) visit within the authorizing provider's specialty     Patient has had an office visit with the authorizing provider or a provider within the authorizing providers department within the previous 12 mos or has a future within next 30 days. See \"Patient Info\" tab in inbasket, or \"Choose Columns\" in Meds & Orders section of the refill encounter.              Passed - Patient is 18 years of age or older        Passed - Patient is not pregnant        Passed - No positive pregnancy on file in past 12 months          "

## 2020-01-16 ENCOUNTER — DOCUMENTATION ONLY (OUTPATIENT)
Dept: SLEEP MEDICINE | Facility: CLINIC | Age: 85
End: 2020-01-16

## 2020-01-16 ENCOUNTER — OFFICE VISIT (OUTPATIENT)
Dept: SLEEP MEDICINE | Facility: CLINIC | Age: 85
End: 2020-01-16
Payer: COMMERCIAL

## 2020-01-16 VITALS
HEART RATE: 86 BPM | HEIGHT: 63 IN | SYSTOLIC BLOOD PRESSURE: 100 MMHG | RESPIRATION RATE: 18 BRPM | BODY MASS INDEX: 44.29 KG/M2 | OXYGEN SATURATION: 96 % | DIASTOLIC BLOOD PRESSURE: 63 MMHG

## 2020-01-16 DIAGNOSIS — J98.6 ELEVATED DIAPHRAGM: ICD-10-CM

## 2020-01-16 DIAGNOSIS — G47.33 OSA (OBSTRUCTIVE SLEEP APNEA): Primary | ICD-10-CM

## 2020-01-16 PROCEDURE — 99214 OFFICE O/P EST MOD 30 MIN: CPT | Performed by: INTERNAL MEDICINE

## 2020-01-16 NOTE — PROGRESS NOTES
Chief complaint: Follow-up sniff testing and sleep study testing    History of Present Illness: 85-year-old female with history of dyspnea and nocturnal dyspnea on supplemental oxygen.  She is major comorbidities please see previous notes for details.  She has had elevated diaphragm on chest x-rays going back to 2004.  She has restrictive pulmonary function pattern with normal lung volumes suggesting weakness.  I did recommend sniff test to assess diaphragm movement.  Those results were reviewed with her in detail.  Does appear that the right diaphragm has decreased movement as it patrick not move well sniff.   Again it has been present since at least 2004 per the x-ray is available in our system.  This patient does not recall any abdominal trauma.    Results of the sleep study were also reviewed with her and they are summarized as follows:    Study date 1/8/2020  Decrease sleep efficiency with frequent sleep stage changes and arousals, severe obstructive sleep apnea with AHI of 47.2.  Snoring was reported as mild, also grunting and talking were noted.  On Pap therapy sleep efficiency normalized and arousals improved.  Final CPAP pressure of 11 resolved most obstructive events including during REM lateral sleep.  Supplemental oxygen was not used during the study.    I did generate a prescription for auto titrating CPAP.  She was [place] this [place] [facility]    [place] Sleepiness Scale: 8/24 (Less than 10 normal)    VAMSHI Total Score: 13 (normal 0-7, mild 8-14, moderate 15-21, severe 22-28)    Past Medical History:   Diagnosis Date     A-fib (H)      Bilateral leg edema     fairly severe     Cataract      CHF (congestive heart failure) (H)      Chronic diarrhea     neg colonoscopy abt one year ago, even to the point of having fecal accidents     Chronic venous insufficiency      CKD (chronic kidney disease) stage 3, GFR 30-59 ml/min (H)      Diabetic retinopathy (H)      DVT (deep venous thrombosis) (H)      GERD  (gastroesophageal reflux disease)      Hyperlipidemia LDL goal <100 10/31/2010     Hypertension goal BP (blood pressure) < 140/90      Long-term (current) use of anticoagulants      Moderate persistent asthma 8/15/2012     MRSA (methicillin resistant Staphylococcus aureus)     postop hip     Obesity      S/P colonoscopy 2008 ( ?)     Septic hip (H)     sees an ID specialist Dr ANNEL DE LA CRUZ She remains on long-term suppressive antibiotic therapy using Minocin 50 mg twice daily     Stasis dermatitis      Type 2 diabetes, HbA1C goal < 8% (H)      Urinary incontinence, mixed      Vitiligo        Allergies   Allergen Reactions     Atenolol Other (See Comments)     Arms became limp, almost stroke like symptoms per patient.   Tolerates metoprolol fine     Metformin      Side effects / gastrointestinal symptoms        Current Outpatient Medications   Medication     ACCU-CHEK PARVEZ PLUS test strip     acetaminophen (TYLENOL) 500 MG tablet     ASPIRIN NOT PRESCRIBED, INTENTIONAL,     Calcium Carbonate-Vitamin D (CALCIUM + D PO)     diphenoxylate-atropine (LOMOTIL) 2.5-0.025 MG tablet     furosemide (LASIX) 40 MG tablet     HYDROcodone-acetaminophen (NORCO) 5-325 MG tablet     losartan (COZAAR) 25 MG tablet     metoprolol tartrate (LOPRESSOR) 100 MG tablet     OMEPRAZOLE CPCR 20 MG OR     order for DME     ORDER FOR DME     PARoxetine (PAXIL) 20 MG tablet     potassium chloride ER (K-TAB/KLOR-CON) 10 MEQ CR tablet     Resveratrol 100 MG CAPS     STATIN NOT PRESCRIBED, INTENTIONAL,     TRULICITY 1.5 MG/0.5ML pen     warfarin ANTICOAGULANT (COUMADIN) 5 MG tablet     No current facility-administered medications for this visit.        Social History     Socioeconomic History     Marital status:      Spouse name: (Bill)     Number of children: 3     Years of education: Not on file     Highest education level: Not on file   Occupational History     Employer: RETIRED   Social Needs     Financial resource strain: Not on file  "    Food insecurity:     Worry: Not on file     Inability: Not on file     Transportation needs:     Medical: Not on file     Non-medical: Not on file   Tobacco Use     Smoking status: Never Smoker     Smokeless tobacco: Never Used   Substance and Sexual Activity     Alcohol use: No     Drug use: No     Sexual activity: Never   Lifestyle     Physical activity:     Days per week: Not on file     Minutes per session: Not on file     Stress: Not on file   Relationships     Social connections:     Talks on phone: Not on file     Gets together: Not on file     Attends Congregational service: Not on file     Active member of club or organization: Not on file     Attends meetings of clubs or organizations: Not on file     Relationship status: Not on file     Intimate partner violence:     Fear of current or ex partner: Not on file     Emotionally abused: Not on file     Physically abused: Not on file     Forced sexual activity: Not on file   Other Topics Concern     Parent/sibling w/ CABG, MI or angioplasty before 65F 55M? No   Social History Narrative    [place] in [place]       Family History   Problem Relation Age of Onset     Diabetes Mother      Cancer Mother      Hypertension Mother      Cataracts Mother      Hypertension Father      Glaucoma Father      Cataracts Father      Cancer Son         thyroid cancer     Neurologic Disorder Sister      Alzheimer Disease Sister          EXAM: Pleasant, alert, sitting in motorized wheelchair.  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  She had a good CPAP titration that included REM lateral sleep.  She was able to tolerate nasal mask with a chinstrap.    PLAN:  Extensive discussion today regarding the importance of trying Pap therapy.  She is going to begin her attempts tonight.  Should be supported by the sleep therapy management team.  She is counseled to contact me or the coaches with any issues as they arise.  We can make adjustments without her needing to bring the machine in to follow-up with me face-to-face in approximately 7 weeks.  Do not recommend any intervention for her elevated and decreased functional diameter diaphragm at this time.  However this does improve her understanding of her restrictive lung function.    Twenty-five minutes spent with patient, >50% spent counseling and coordinating care.      Natalie Bruce M.D.  Pulmonary/Critical Care/Sleep Medicine    [facility]   Floor 1, [address]   [address] [place] [address]. [place] [address]   Appointments: [phone]    The above note was dictated using voice recognition software and may include typographical errors. Please contact the author for any clarifications.

## 2020-01-16 NOTE — PATIENT INSTRUCTIONS
1.  Continue CPAP every night, for all hours that you are sleeping.  If you nap use CPAP.  As always, try to get at least 8 hours of sleep or more each day, and avoid sleep deprivation. Avoid alcohol.    2.  Reasons that you might need a change to your pressure therapy would be weight gain or loss, waking having inadvertently removed your CPAP overnight, having previously felt refreshed by sleep with CPAP use and now waking un-refreshed, and return of daytime sleepiness. Also, the development of new medical problems can sometimes affect breathing at night-heart failure, stroke, medications such as narcotics.    3.  Please bring CPAP with you if you are hospitalized.  If anticipating surgery be sure to discuss with your surgeon that you have sleep apnea and use PAP therapy.      4.  Maintain your equipment as recommended which includes routine cleaning and replacement of supplies.  Call DME for any questions regarding supplies or maintenance.    [facility], [facility] [phone]    5.  Do not drive on engage in potentially dangerous activities if feeling sleepy.    6.  Please see me again in 7 weeks and bring your machine and card to your follow-up visit.                    Tips for your CPAP and BIPAP use-    Mask fitting tips  Mask fitting exercise:    To improve your mask seal and your mobility at night, put mask on and secure in place.  Lie down in bed with full pressure and roll to one side, adjust headgear while in that position to eliminate any leaks. Repeat process rolling to other side.     The mask seal does not have to be perfect:   CPAP machines are designed to make up for small leaks. However, you will not tolerate leaks blowing in your eyes so you will need to adjust.   Any leak should only be near or at the bottom of the mask.  We expect your mask to leak slightly at night.    Do not over-tighten the headgear straps, tighter IS NOT better, we expect  minimal leak.    First try re-positioning the mask or headgear before tightening the headgear straps.  Mask leaks are expected due to changing sleeping positions. Try pulling the mask away from your skin allowing the cushion to re-inflate will minimize the leak.  If you struggle for a good fit, try turning the CPAP off and then readjust the mask by pulling it away from your face and then turning back on the CPAP.        Humidifier tips  Humidifiers can be adjusted to increase or decrease the amount of moisture according to your comfort level. You may need to adjust this frequently at first, but then might only change it with seasonal weather changes.     Try INCREASING the humidity if:  You experience a dry, irritated nasal passage or throat.  You have a runny, drippy nose or sneezing fits after using CPAP.  You experience nasal congestion during or after CPAP use.    Try DECREASING the humidity if:  You have excessive condensation or  rain out  in the tubing or mask.  Otherwise keep the tubing warm during the night by running it underneath the blankets or pillow.      Clinic visit after initial CPAP and BIPAP set-up   Bring your equipment with you to your 4 week follow up clinic visit.  We will be extracting your data from the machine.        Travel  Always take your equipment with you.  If you fly with your equipment bring it on with you as a carry on.  Medical equipment does not count as a carry on.    If you travel international the machines take 110-240.  The only adapter needed is the adapter that will fit into the receptacle (outlet).    You may also want to bring an extension cord as many hotel rooms have limited outlets at the bedside.  Do not travel with water in your humidifier chamber.     Cleaning and Maintenance Guidelines    Equipment Frequency Cleaning Method   Mask First Day    Daily      Weekly Soak mask in hot soapy water for 30 minutes, rinse and air dry.  Wipe nasal cushion with a hot soapy  (Ivory, baby shampoo) cloth and rinse.  Baby wipes may also be used.  Do not use anti-bacterial soaps,Amy  liquid soap, rubbing alcohol, bleach or ammonia.  Wash frame in hot soapy water (Ivory, baby shampoo) rinse and let air dry   Headgear Biweekly Wash in hot soapy water, rinse and air dry   Reusable Gray Filter Weekly Wash in hot soapy water, rinse, put in towel squeeze moisture out, let air dry   Disposable White Filter Check Weekly Replace when brown or gray in color; at least every 2 to 3 months   Humidifier Chamber Daily    Weekly Empty distilled water from humidifier and let air dry    Hand wash in hot soapy water, rinse and air dry   Tubing Weekly Wash in hot soapy water, rinse and let air dry   Mask, Tubing and Humidifier Chamber As needed Disinfect: Soak in 1 part distilled white vinegar to 3 parts hot water for 30 minutes, rinse well and air dry  Not the material headgear        MASK AND SUPPLY REORDERING and EQUIPMENT NEEDS through your DME and per your insurance  Reminder: Most insurance companies will allow for a new mask, headgear, tubing, and reusable gray filter every six months.  Disposable white ultra-fine filters are covered monthly.      HOME AND SAFETY INSTRUCTIONS    Do not use frayed or cracked electrical cords, multi plug adaptors, or switched receptacles    Do not immerse electrical equipment into water    Assure that electrical cords do not become a tripping hazard

## 2020-01-16 NOTE — PROGRESS NOTES
Patient was offered choice of vendor and chose [facility].  Patient Mara Colindres was set up at [facility] on January 16, 2020. Patient received a Resmed AirSense 10 Auto. Pressures were set at 10-13 cm H2O.   Patient s ramp is  cm H2O for 45 min and FLEX/EPR is EPR, 2.  Patient received a Resmed Mask name: [id number]  Nasal mask size Small, heated tubing and heated humidifier.  Patient does need to meet compliance. Patient has a follow up on 3/05/2020 with Dr. Bruce.    Liliana Barragan

## 2020-01-20 ENCOUNTER — DOCUMENTATION ONLY (OUTPATIENT)
Dept: SLEEP MEDICINE | Facility: CLINIC | Age: 85
End: 2020-01-20

## 2020-01-21 NOTE — PROGRESS NOTES
3 DAY STM VISIT    Diagnostic AHI: 47.2 PSG    Patient contacted for 3 day STM visit  Subjective measures:  Pt states things are going pretty well so far. She is getting used to it. She is getting used to breathing through her nose.     Replacement device: No  STM ordered by provider: Yes     Device type: Auto-CPAP  PAP settings from order::  CPAP min 10 cm  H20       CPAP max 13 cm  H20  Mask type:    Nasal Mask     Device settings from machine      Min CPAP 10.0            Max CPAP 13.0      Assessment: Nightly usage over four hours.  Action plan: Patient to have 14 day STM visit. Patient has a follow up visit scheduled:   yes within 31-90 days of set up.    Total time spent on accessing, reviewing and interpreting remote patient PAP therapy data:   10 minutes      Total time spent with direct patient communication :   3 minutes

## 2020-01-22 DIAGNOSIS — E87.6 DIURETIC-INDUCED HYPOKALEMIA: ICD-10-CM

## 2020-01-22 DIAGNOSIS — T50.2X5A DIURETIC-INDUCED HYPOKALEMIA: ICD-10-CM

## 2020-01-23 RX ORDER — POTASSIUM CHLORIDE 750 MG/1
5 TABLET, EXTENDED RELEASE ORAL DAILY
Qty: 45 TABLET | Refills: 3 | Status: SHIPPED | OUTPATIENT
Start: 2020-01-23 | End: 2020-06-19

## 2020-01-27 DIAGNOSIS — I27.20 PULMONARY HTN (H): ICD-10-CM

## 2020-01-27 DIAGNOSIS — I50.32 CHRONIC DIASTOLIC CONGESTIVE HEART FAILURE (H): Primary | ICD-10-CM

## 2020-01-29 DIAGNOSIS — N18.30 CHRONIC KIDNEY DISEASE, STAGE III (MODERATE) (H): ICD-10-CM

## 2020-01-30 DIAGNOSIS — N18.30 CHRONIC KIDNEY DISEASE, STAGE III (MODERATE) (H): ICD-10-CM

## 2020-01-30 RX ORDER — FUROSEMIDE 40 MG
40 TABLET ORAL 2 TIMES DAILY
Qty: 180 TABLET | Refills: 3 | OUTPATIENT
Start: 2020-01-30

## 2020-01-30 RX ORDER — FUROSEMIDE 40 MG
TABLET ORAL
Qty: 180 TABLET | Refills: 3 | Status: SHIPPED | OUTPATIENT
Start: 2020-01-30 | End: 2020-06-18

## 2020-01-31 ENCOUNTER — DOCUMENTATION ONLY (OUTPATIENT)
Dept: SLEEP MEDICINE | Facility: CLINIC | Age: 85
End: 2020-01-31

## 2020-01-31 NOTE — PROGRESS NOTES
Patient returned call.    Subjective measures:   Pt feels she is not getting enough pressure when first starting using therapy.      Assessment: Pt not meeting objective benchmarks for AHI and leak Patient failing following subjective benchmarks: pressure issues    Action plan changed ramp to start at 8.0 cm H20for 45 min     Total time spent with patient contact today:   5  minutes

## 2020-01-31 NOTE — PROGRESS NOTES
14  DAY STM VISIT    Diagnostic AHI: 47.2    PSG    Message left for patient to return call     Assessment: Pt not meeting objective benchmarks for AHI-primarily central events, high mask leak on some nights.      Action plan: waiting for patient to return call.  and pt to have 30 day STM visit.      Device type: Auto-CPAP    PAP settings: CPAP min 10.0 cm  H20       CPAP max 13.0 cm  H20         95th% pressure 12.2 cm  H20     Mask type:  Nasal Mask    Objective measures: 14 day rolling measures      Compliance  78 %      Leak  53.06 lpm  last  upload      AHI 8.76   last  upload      Average number of minutes 446      Objective measure goal  Compliance   Goal >70%  Leak   Goal < 24 lpm  AHI  Goal < 5  Usage  Goal >240      Total time spent on accessing, reviewing and interpreting remote patient PAP therapy data:   12 minutes      Total time spent with direct patient communication :   1 minutes

## 2020-02-01 RX ORDER — METOPROLOL TARTRATE 100 MG
TABLET ORAL
Qty: 90 TABLET | Refills: 1 | Status: SHIPPED | OUTPATIENT
Start: 2020-02-01 | End: 2020-06-19

## 2020-02-03 NOTE — NURSING NOTE
"Chief Complaint   Patient presents with     Kidney Problem     CKD     Consult     Dr Abrams PCP       Initial /84 (BP Location: Right arm, Patient Position: Chair, Cuff Size: Adult Large)  Pulse 92  Wt 115.2 kg (254 lb)  BMI 44.99 kg/m2 Estimated body mass index is 44.99 kg/(m^2) as calculated from the following:    Height as of 10/27/16: 1.6 m (5' 3\").    Weight as of this encounter: 115.2 kg (254 lb).  Medication Reconciliation: complete  " OFFICE NOTE      HISTORY OF PRESENT ILLNESS  Tonny Du is a 59 year old old male that presents for the following complaint(s):    Chief Complaint   Patient presents with   • Follow-up     6 month DM        He presents for repeat evaluation of diabetes.  He was last seen for this about 6 months ago by me.  Present treatment includes lifestyle modifications.  There has been no change in the present treatment regimen.  He is not currently compliant with his treatment plan.  The patient has been noncompliant with lifestyle modifications including weight loss. The patient denies any associated symptoms including chest pain, shortness of breath, headaches, dizziness, or sweats.  Blood sugar monitoring at home is reviewed with the patient and morning fasting blood sugars are running 150-200 on average.  He does not have symptoms of hypoglycemia or hyperglycemia.  Associated symptoms include:  none.    Current Outpatient Medications   Medication Sig   • atorvastatin (LIPITOR) 10 MG tablet Take one-half tablet by mouth daily.   • Blood Glucose Monitoring Suppl (BLOOD GLUCOSE MONITOR SYSTEM) w/Device Kit Dispense any meter covered by patients insurance plan for testing once daily and as needed   • blood glucose test strip Test blood sugars one time daily as directed.   • ONETOUCH DELICA LANCETS 33G Misc Use to test blood sugars once daily and as needed     No current facility-administered medications for this visit.        Known diabetic complications: none  Cardiovascular risk factors:   The 10-year ASCVD risk score ([place] AJYDEN Jr., et al., 2013) is: 10.5%    Values used to calculate the score:      Age: 59 years      Sex: Male      Is Non- : No      Diabetic: Yes      Tobacco smoker: No      Systolic Blood Pressure: 108 mmHg      Is BP treated: No      HDL Cholesterol: 44 mg/dL      Total Cholesterol: 170 mg/dL  Current diabetic medications include none.     Eye exam current (within one year):  YES  Weight trend: stable  Prior visit with dietician: yes  Current diet: in general, an \"unhealthy\" diet  Current exercise: walking    Current monitoring regimen: home blood tests - 2-3 times/week and office lab tests - q 6 months    Is on ACE inhibitor or angiotensin II receptor blocker?   No     SUGAR MANAGEMENT:    Hemoglobin A1C (%)   Date Value   01/29/2020 7.1 (H)   07/25/2019 5.9 (H)   01/17/2019 6.7 (H)         REVIEW OF SYSTEMS  As documented in HPI, otherwise negative. A Complete review of 10 medical systems was performed today.    HISTORIES  I have reviewed the allergies listed in the medical record as well as the nursing notes for this encounter. The following histories were reviewed and updated as appropriate:    ALLERGIES:  No Known Allergies  Past Medical History:   Diagnosis Date   • Controlled type 2 diabetes mellitus with hyperglycemia, without long-term current use of insulin (CMS/[facility]) 1/18/2018   • Hypercholesterolemia    • Hyperglycemia    • Kidney stones      Past Surgical History:   Procedure Laterality Date   • Colonoscopy  02/17/2010   • Colonoscopy  2/25/15    repeat colon in 5 years,   • Cystoscopy,insert ureteral stent  12/27/2017   • Cystoscopy,remv ureteral stone Right 01/02/2018    laser lithotripsy/stent   • Extracorporeal shock wave lithotripsy Right 06/20/2017   • Extracorporeal shock wave lithotripsy Right 12/18/2018   • Therapeutic pneumothorax  08/01/1982   • Ureteral stent placement Right 12/18/2018   • Vasectomy  04/01/2004     Social History     Socioeconomic History   • Marital status: /Civil Union     Spouse name: Not on file   • Number of children: Not on file   • Years of education: Not on file   • Highest education level: Not on file   Occupational History   • Not on file   Social Needs   • Financial resource strain: Not on file   • Food insecurity:     Worry: Not on file     Inability: Not on file   • Transportation needs:     Medical: Not on file      Non-medical: Not on file   Tobacco Use   • Smoking status: Never Smoker   • Smokeless tobacco: Never Used   Substance and Sexual Activity   • Alcohol use: Yes     Alcohol/week: 1.0 standard drinks     Types: 1 Standard drinks or equivalent per week     Frequency: Never     Drinks per session: 1 or 2     Binge frequency: Never     Comment: 1x week   • Drug use: No   • Sexual activity: Not on file   Lifestyle   • Physical activity:     Days per week: Not on file     Minutes per session: Not on file   • Stress: Not on file   Relationships   • Social connections:     Talks on phone: Not on file     Gets together: Not on file     Attends Quaker service: Not on file     Active member of club or organization: Not on file     Attends meetings of clubs or organizations: Not on file     Relationship status: Not on file   • Intimate partner violence:     Fear of current or ex partner: Not on file     Emotionally abused: Not on file     Physically abused: Not on file     Forced sexual activity: Not on file   Other Topics Concern   • Not on file   Social History Narrative   • Not on file     Family History   Problem Relation Age of Onset   • Diabetes Mother    • Diabetes Father    • Heart disease Father    • Diabetes Sister    • Diabetes Brother    • Stroke Maternal Grandfather        PHYSICAL EXAM  Vitals:    02/03/20 0840   BP: 108/70   Pulse: 54   Resp: 16   Temp: 97.8 °F (36.6 °C)   TempSrc: Temporal   SpO2: 97%   Weight: 95.8 kg   Height: 5' 10\" (1.778 m)     Body mass index is 30.29 kg/m².    Constitutional:  Well developed, well nourished, no acute distress, non-toxic appearance   Eyes:  Pupils equal, round, reactive to light, extraocular movements intact and conjunctivae normal   Head: Normocephalic, atraumatic.  ENT:  External ears without erythema or drainage. Ear canals non erythematous bilaterally.  No rhinorrhea noted. Oropharynx moist, no pharyngeal exudates.   Neck: normal range of motion, no tenderness noted,  no overt lymphadenopathy, thyromegaly absent  Respiratory:  No tachypnea noted. Good air entry to bilateral lung bases, no wheezes, rales rhonchi or stridor noted.  Cardiovascular:  Regular rate and rhythm, no murmurs, no gallops, no rubs   Gastrointestinal:  Abdomen appears nondistended with normal bowel sounds, soft to palpation, nontender, no overt organomegaly noted, no masses, no rebound, no guarding noted  Genitourinary:  No costovertebral angle tenderness  Musculoskeletal:  No extremity edema, no joint tenderness noted, no deformities.   Skin: Appropriate skin turgor for age, no rashes noted.  Neurologic:  Alert & oriented x 3, cranial nerves 2-12 grossly symmetric and intact, strength and sensation symmetric and intact, no focal deficits noted. No tremors, tics or ataxia.  Psychiatric:  Speech and behavior appropriate, affect congruent to mood.   Diabetic Foot Exam Documentation     Normal Bilateral Foot Exam:  Skin integrity is normal. Dorsalis pedis and posterior tibial pulses are present.  Pressure sensation using the [place] monofilament is present.      ASSESSMENT AND PLAN:  Tonny was seen today for follow-up.    Diagnoses and all orders for this visit:    Controlled type 2 diabetes mellitus with hyperglycemia, without long-term current use of insulin (CMS/[facility])  -     GLYCOHEMOGLOBIN; Future      Overall course of diabetes is worsened. Goal of treatment is HbA1c <7.0  At this point, the patient elects to observe HbA1c and implement lifestyle modifications for treatment.  I discussed with the patient things he can do to improve his blood sugar.  It is necessary for the patient to start monitoring his blood sugar every morning before breakfast.  I encouraged the patient to utilize the [employer] portal to share readings with me in the next few weeks so that we can titrate up the basal insulin dose prior to our follow up visit.  Reviewed with him  lifestyle modifications to reduce his risk for  complications from diabetes and arteriosclerotic cardiovascular disease. Discussed the role of diet, specifically limiting carbohydrate intake. He should maintain a healthy weight and engage in 30 minutes of cardiovascular exercise 5 times a week.  Additionally, he should avoid tobacco, alcohol, caffeine, and stress.    Instructions provided as documented in the After Visit Summary.    Body mass index is 30.29 kg/m².    BMI ASSESSMENT/PLAN:  Patient is obese.    Journal food intake daily, Join health club, Recommend nutrition support group (i.e. Weight Watchers, etc.)  and 30 minutes of physical activity a day       Health Maintenance Due   Topic Date Due   • Pneumococcal Vaccine 0-64 (1 of 3 - PCV13) 03/02/1966   • Diabetes Eye Exam  03/02/1978   • Diabetes Foot Exam  03/02/1978   • Shingles Vaccine (2 of 2) 01/16/2020   • Colorectal Cancer Screening-Colonoscopy  02/25/2020   • Depression Screening  08/01/2020       Patient is due for topics listed above, he wishes to proceed with Immunization(s) Shingles, Colorectal Cancer Screening: Colonoscopy, Diabetes Eye Exam and Diabetes Foot Exam, but is not proceeding with Immunization(s) Pneumococcal at this time. The following has occured: Orders placed for Colon Cancer Screening: Colonoscopy and Diabetes Foot Exam. Education provided for Immunization(s) Pneumococcal. Outside records requested for Diabetes Eye Exam.    Return in about 3 months (around 5/3/2020) for Diabetes follow up with labs (A1c) in 3 months, (20 min visit).

## 2020-02-04 ENCOUNTER — DOCUMENTATION ONLY (OUTPATIENT)
Dept: SLEEP MEDICINE | Facility: CLINIC | Age: 85
End: 2020-02-04

## 2020-02-04 NOTE — PROGRESS NOTES
STM recheck      Diagnostic AHI: 47.2    PSG    Subjective measures:   Patient called in with questions regarding mask fit.  She feels that she needs a larger size and feels that the mask used during her sleep study was much better and more comfortable.     Assessment: Pt not meeting objective benchmarks for AHI, leak and compliance  Patient failing following subjective benchmarks: mask discomfort     Action plan: pt to have 30 day STM visit.  Pt will reach out to [facility] [place].     Device type: Auto-CPAP    PAP settings: CPAP min 10.0 cm  H20       CPAP max 13.0 cm  H20           95th% pressure 12.1 cm  H20     Mask type:  Nasal Mask    Objective measures: 14 day rolling measures      Compliance  64 %      Leak  53.83 lpm  last  upload      AHI 9.56   last  upload      Average number of minutes 393      Objective measure goal  Compliance   Goal >70%  Leak   Goal < 24 lpm  AHI  Goal < 5  Usage  Goal >240        Total time spent on accessing and interpreting remote patient PAP therapy data  10 minutes    Total time spent counseling, coaching  and reviewing PAP therapy data with patient  0 minutes    [id number] not this call  [id number] not last call

## 2020-02-05 ENCOUNTER — TELEPHONE (OUTPATIENT)
Dept: SLEEP MEDICINE | Facility: CLINIC | Age: 85
End: 2020-02-05

## 2020-02-06 ENCOUNTER — ANTICOAGULATION THERAPY VISIT (OUTPATIENT)
Dept: NURSING | Facility: CLINIC | Age: 85
End: 2020-02-06
Payer: COMMERCIAL

## 2020-02-06 DIAGNOSIS — I48.19 PERSISTENT ATRIAL FIBRILLATION (H): ICD-10-CM

## 2020-02-06 DIAGNOSIS — Z79.01 LONG TERM CURRENT USE OF ANTICOAGULANT THERAPY: ICD-10-CM

## 2020-02-06 LAB — INR POINT OF CARE: 2.1 (ref 0.86–1.14)

## 2020-02-06 PROCEDURE — 99207 ZZC NO CHARGE NURSE ONLY: CPT

## 2020-02-06 PROCEDURE — 85610 PROTHROMBIN TIME: CPT | Mod: QW

## 2020-02-06 PROCEDURE — 36416 COLLJ CAPILLARY BLOOD SPEC: CPT

## 2020-02-06 NOTE — PATIENT INSTRUCTIONS
Anticoagulation Clinic:  Please call [phone] with any problems or questions regarding your Warfarin therapy.

## 2020-02-06 NOTE — PROGRESS NOTES
ANTICOAGULATION FOLLOW-UP CLINIC VISIT    Patient Name:  Mara Colindres  Date:  2020  Contact Type:  Face to Face    SUBJECTIVE:  Patient Findings     Comments:   The patient was assessed for diet, medication, and activity level changes, missed or extra doses, bruising or bleeding, with no problem findings.          Clinical Outcomes     Negatives:   Major bleeding event, Thromboembolic event, Anticoagulation-related hospital admission, Anticoagulation-related ED visit, Anticoagulation-related fatality    Comments:   The patient was assessed for diet, medication, and activity level changes, missed or extra doses, bruising or bleeding, with no problem findings.             OBJECTIVE    INR Protime   Date Value Ref Range Status   2020 2.1 (A) 0.86 - 1.14 Final       ASSESSMENT / PLAN  No question data found.  Anticoagulation Summary  As of 2020    INR goal:   2.0-3.0   TTR:   94.7 % (1 y)   INR used for dosin.1 (2020)   Warfarin maintenance plan:   5 mg (5 mg x 1) every Mon, Wed, Fri; 2.5 mg (5 mg x 0.5) all other days   Full warfarin instructions:   5 mg every Mon, Wed, Fri; 2.5 mg all other days   Weekly warfarin total:   25 mg   No change documented:   Celeste Ruiz RN   Plan last modified:   Celeste Ruiz RN (10/2/2019)   Next INR check:   3/19/2020   Priority:   Maintenance   Target end date:   Indefinite    Indications    Long-term (current) use of anticoagulants [Z79.01] [Z79.01]  Persistent atrial fibrillation [I48.19]             Anticoagulation Episode Summary     INR check location:       Preferred lab:       Send INR reminders to:   KELLY ROJAS    Comments:         Anticoagulation Care Providers     Provider Role Specialty Phone number    Steven Abrams MD [facility] Internal Medicine [phone]            See the Encounter Report to view Anticoagulation Flowsheet and Dosing Calendar (Go to Encounters tab in chart review, and find the Anticoagulation Therapy  Visit)        Celeste Ruiz RN

## 2020-02-18 ENCOUNTER — DOCUMENTATION ONLY (OUTPATIENT)
Dept: SLEEP MEDICINE | Facility: CLINIC | Age: 85
End: 2020-02-18

## 2020-02-18 NOTE — PROGRESS NOTES
30 DAY STM VISIT    Diagnostic AHI: 47.2  PSG    Subjective measures:   Pt states that things are going better.  She feels like her leak is getting better.  She is feeling like she is sleeping better at night.  She thinks it's helping.     Assessment: Pt not meeting objective benchmarks for leak Patient meeting subjective benchmarks.   Action plan: pt to have 6 month STM visit  Patient has scheduled a follow up visit with Dr. Bruce on 3/5/20.   Device type: Auto-CPAP  PAP settings: CPAP min 10.0 cm  H20     CPAP max 13.0 cm  H20    95th% pressure 12.4 cm  H20   Mask type:  Nasal Mask  Objective measures: 14 day rolling measures      Compliance  78 %      Leak  55.12 lpm  last  upload      AHI 12.93   last  Upload (Unknown and CA index is occasionally high)      Average number of minutes 364      Objective measure goal  Compliance   Goal >70%  Leak   Goal < 24 lpm  AHI  Goal < 5  Usage  Goal >240        Total time spent on accessing and interpreting remote patient PAP therapy data  12 minutes    Total time spent counseling, coaching  and reviewing PAP therapy data with patient  4 minutes     [id number] no this call  [id number] no  at 3 or 14 day [facility]

## 2020-03-05 ENCOUNTER — OFFICE VISIT (OUTPATIENT)
Dept: SLEEP MEDICINE | Facility: CLINIC | Age: 85
End: 2020-03-05
Payer: COMMERCIAL

## 2020-03-05 VITALS
RESPIRATION RATE: 16 BRPM | HEIGHT: 63 IN | BODY MASS INDEX: 44.3 KG/M2 | OXYGEN SATURATION: 93 % | DIASTOLIC BLOOD PRESSURE: 78 MMHG | WEIGHT: 250 LBS | SYSTOLIC BLOOD PRESSURE: 120 MMHG | HEART RATE: 100 BPM

## 2020-03-05 DIAGNOSIS — J98.6 ELEVATED DIAPHRAGM: ICD-10-CM

## 2020-03-05 DIAGNOSIS — G47.33 OSA (OBSTRUCTIVE SLEEP APNEA): Primary | ICD-10-CM

## 2020-03-05 DIAGNOSIS — R06.09 CHRONIC DYSPNEA: ICD-10-CM

## 2020-03-05 DIAGNOSIS — E66.01 MORBID OBESITY (H): ICD-10-CM

## 2020-03-05 PROCEDURE — 99214 OFFICE O/P EST MOD 30 MIN: CPT | Performed by: INTERNAL MEDICINE

## 2020-03-05 ASSESSMENT — MIFFLIN-ST. JEOR: SCORE: 1547.99

## 2020-03-05 NOTE — PROGRESS NOTES
Chief complaint: Follow-up sleep disordered breathing and CPAP    History of Present Illness: 85-year-old female with heart failure with preserved ejection fraction, atrial fibrillation, diabetes, depression, elevated right kecia-diaphragm with paralysis.  She was previously on supplemental oxygen with sleep.  She was found to have severe obstructive sleep apnea with AHI of 47.  Treatment with CPAP did not require oxygen supplementation.  She is here today for follow-up of initiating CPAP.  She is currently using a nasal mask.  She states that sometimes she feels this not not quite enough air when she first puts it on.  Otherwise she feels that using it has improved her sleep quality.  She is not aware of a lot of leak leak issues but she does have some dry mouth when she wakes up.  She does have some times when she is tired during the day.  But mostly her issues are shortness of breath with exertion.  She gets short of breath walking from the bathroom to the bedroom.    Denies the development of new sleep concerns.  No sleepwalking, sleep talking or dream enactment behavior.  She takes hydrocodone pain medication sometimes before bed but not nightly.  She is on a diuretic but continues to have lower extremity edema.  She has delayed circadian rhythm and goes to bed somewhere between 2 and 4:30 in the morning.    Total score - [place]: 8 (3/5/2020  1:00 PM) (Less than 10 normal)    VAMSHI Total Score: 10 (normal 0-7, mild 8-14, moderate 15-21, severe 22-28)    Past Medical History:   Diagnosis Date     A-fib (H)      Bilateral leg edema     fairly severe     Cataract      CHF (congestive heart failure) (H)      Chronic diarrhea     neg colonoscopy abt one year ago, even to the point of having fecal accidents     Chronic venous insufficiency      CKD (chronic kidney disease) stage 3, GFR 30-59 ml/min (H)      Diabetic retinopathy (H)      DVT (deep venous thrombosis) (H)      GERD (gastroesophageal reflux disease)       Hyperlipidemia LDL goal <100 10/31/2010     Hypertension goal BP (blood pressure) < 140/90      Long-term (current) use of anticoagulants      Moderate persistent asthma 8/15/2012     MRSA (methicillin resistant Staphylococcus aureus)     postop hip     Obesity      S/P colonoscopy 2008 ( ?)     Septic hip (H)     sees an ID specialist Dr ANNEL DE LA CRUZ She remains on long-term suppressive antibiotic therapy using Minocin 50 mg twice daily     Stasis dermatitis      Type 2 diabetes, HbA1C goal < 8% (H)      Urinary incontinence, mixed      Vitiligo        Allergies   Allergen Reactions     Atenolol Other (See Comments)     Arms became limp, almost stroke like symptoms per patient.   Tolerates metoprolol fine     Metformin      Side effects / gastrointestinal symptoms        Current Outpatient Medications   Medication     ACCU-CHEK PARVEZ PLUS test strip     acetaminophen (TYLENOL) 500 MG tablet     ASPIRIN NOT PRESCRIBED, INTENTIONAL,     Calcium Carbonate-Vitamin D (CALCIUM + D PO)     diphenoxylate-atropine (LOMOTIL) 2.5-0.025 MG tablet     furosemide (LASIX) 40 MG tablet     HYDROcodone-acetaminophen (NORCO) 5-325 MG tablet     losartan (COZAAR) 25 MG tablet     metoprolol tartrate (LOPRESSOR) 100 MG tablet     OMEPRAZOLE CPCR 20 MG OR     order for DME     ORDER FOR DME     PARoxetine (PAXIL) 20 MG tablet     potassium chloride ER (K-TAB/KLOR-CON) 10 MEQ CR tablet     Resveratrol 100 MG CAPS     STATIN NOT PRESCRIBED, INTENTIONAL,     TRULICITY 1.5 MG/0.5ML pen     warfarin ANTICOAGULANT (COUMADIN) 5 MG tablet     No current facility-administered medications for this visit.        Social History     Socioeconomic History     Marital status:      Spouse name: (Bill)     Number of children: 3     Years of education: Not on file     Highest education level: Not on file   Occupational History     Employer: RETIRED   Social Needs     Financial resource strain: Not on file     Food insecurity:     Worry: Not on  "file     Inability: Not on file     Transportation needs:     Medical: Not on file     Non-medical: Not on file   Tobacco Use     Smoking status: Never Smoker     Smokeless tobacco: Never Used   Substance and Sexual Activity     Alcohol use: No     Drug use: No     Sexual activity: Never   Lifestyle     Physical activity:     Days per week: Not on file     Minutes per session: Not on file     Stress: Not on file   Relationships     Social connections:     Talks on phone: Not on file     Gets together: Not on file     Attends Mormonism service: Not on file     Active member of club or organization: Not on file     Attends meetings of clubs or organizations: Not on file     Relationship status: Not on file     Intimate partner violence:     Fear of current or ex partner: Not on file     Emotionally abused: Not on file     Physically abused: Not on file     Forced sexual activity: Not on file   Other Topics Concern     Parent/sibling w/ CABG, MI or angioplasty before 65F 55M? No   Social History Narrative    [place] in [place]       Family History   Problem Relation Age of Onset     Diabetes Mother      Cancer Mother      Hypertension Mother      Cataracts Mother      Hypertension Father      Glaucoma Father      Cataracts Father      Cancer Son         thyroid cancer     Neurologic Disorder Sister      Alzheimer Disease Sister          EXAM: Pleasant, alert, no distress, sitting in motorized chair  /78   Pulse 100   Resp 16   Ht 1.6 m (5' 2.99\")   Wt 113.4 kg (250 lb)   SpO2 93%   BMI 44.30 kg/m    Psychiatric: Mood and affect appear normal    PSG 1/8/2020  Weight 259, BMI 45.9  AHI 47.2, RDI 62.9 No REM on diagnostic  CPAP  11 for REM lateral (no REM supine seen on PAP)    PAP download from 2/3/2020 to 3/3/2020 reviewed:  Per cent of days used greater than 4 Hours 90 % (minimum goal greater than 70%)  Average use on days used: 6 hours 35 min  Settings: Min EPAP 10 cmH2O    Max EPAP 13 cmH2O  Pressures " delivered 90/95th percentile for pressure 12.3 cmH2O  Average AHI 12.2 events per hour (goal less than 5)  Leak excessive    ASSESSMENT:  85-year-old female with severe obstructive sleep apnea, right diaphragm paralysis, heart failure with preserved ejection fraction, atrial fibrillation, decreased mobility, diabetes.  She is experiencing some good clinical benefit with the use of Pap therapy however AHI is not at optimal goal.  It is significantly reduced from baseline however.  It appears that there are both central and obstructive components that are residual.  I suspect excessive leak is contributing to elevated AHI.  Also possible that hydrocodone could be contributing on some nights.  She also may be having excessive oral leak.    PLAN:  Recommended she use the chinstrap in addition to her nasal mask.  Counseled her on the importance of finding optimal mask fit to minimize leak.  Asked her to complete sleep log jotting down narcotic use and mailed back to me in a couple of weeks.  I will then review that in conjunction with a download.  She is encouraged to use the Pap all night every night.  We also discussed the importance of regularizing her schedule.  Encouraged her to stay in her delayed circadian rhythm if that works for her.  Please see patient instructions for further details of counseling provided.    Twenty-five minutes spent with patient, >50% spent counseling and coordinating care.      Natalie Bruce M.D.  Pulmonary/Critical Care/Sleep Medicine    [facility]   [address], [address]   [address] [place] [address]   Appointments: [phone]    The above note was dictated using voice recognition software and may include typographical errors. Please contact the author for any clarifications.

## 2020-03-05 NOTE — PATIENT INSTRUCTIONS
Pay attention to mask fit to resolved the leak.   Try the chin strap again  Jot down the date/time that you take the narcotic pain med, and anything else that may be relevant on the sleep diary.  Mail back in a couple weeks    Your BMI is Body mass index is 44.3 kg/m .  Weight management is a personal decision.  If you are interested in exploring weight loss strategies, the following discussion covers the approaches that may be successful. Body mass index (BMI) is one way to tell whether you are at a healthy weight, overweight, or obese. It measures your weight in relation to your height.  A BMI of 18.5 to 24.9 is in the healthy range. A person with a BMI of 25 to 29.9 is considered overweight, and someone with a BMI of 30 or greater is considered obese. More than two-thirds of American adults are considered overweight or obese.  Being overweight or obese increases the risk for further weight gain. Excess weight may lead to heart disease and diabetes.  Creating and following plans for healthy eating and physical activity may help you improve your health.  Weight control is part of healthy lifestyle and includes exercise, emotional health, and healthy eating habits. Careful eating habits lifelong are the mainstay of weight control. Though there are significant health benefits from weight loss, long-term weight loss with diet alone may be very difficult to achieve- studies show long-term success with dietary management in less than 10% of people. Attaining a healthy weight may be especially difficult to achieve in those with severe obesity. In some cases, medications, devices and surgical management might be considered.  What can you do?  If you are overweight or obese and are interested in methods for weight loss, you should discuss this with your provider.     Consider reducing daily calorie intake by 500 calories.     Keep a food journal.     Avoiding skipping meals, consider cutting portions instead.    Diet  combined with exercise helps maintain muscle while optimizing fat loss. Strength training is particularly important for building and maintaining muscle mass. Exercise helps reduce stress, increase energy, and improves fitness. Increasing exercise without diet control, however, may not burn enough calories to loose weight.       Start walking three days a week 10-20 minutes at a time    Work towards walking thirty minutes five days a week     Eventually, increase the speed of your walking for 1-2 minutes at time    In addition, we recommend that you review healthy lifestyles and methods for weight loss available through the National Institutes of Health patient information sites:  http://win.niddk.nih.gov/publications/index.htm    And look into health and wellness programs that may be available through your health insurance provider, employer, local community center, or jostin club.    Weight management plan: Patient was referred to their PCP to discuss a diet and exercise plan.

## 2020-03-12 DIAGNOSIS — N18.30 CONTROLLED TYPE 2 DIABETES MELLITUS WITH STAGE 3 CHRONIC KIDNEY DISEASE, WITHOUT LONG-TERM CURRENT USE OF INSULIN (H): ICD-10-CM

## 2020-03-12 DIAGNOSIS — E11.22 CONTROLLED TYPE 2 DIABETES MELLITUS WITH STAGE 3 CHRONIC KIDNEY DISEASE, WITHOUT LONG-TERM CURRENT USE OF INSULIN (H): ICD-10-CM

## 2020-03-16 RX ORDER — DULAGLUTIDE 1.5 MG/.5ML
INJECTION, SOLUTION SUBCUTANEOUS
Qty: 2 ML | Refills: 4 | Status: SHIPPED | OUTPATIENT
Start: 2020-03-16 | End: 2020-06-19

## 2020-03-16 NOTE — TELEPHONE ENCOUNTER
"Routing refill request to provider for review/approval because:  Labs not current:  Microalbumin, A1C  Labs out of range: creatinine      Requested Prescriptions   Pending Prescriptions Disp Refills     TRULICITY 1.5 MG/0.5ML pen [Pharmacy Med Name: TRULICITY 1.5MG/0.5ML SDP 4X0.5ML] 2 mL 4     Sig: INJECT 1.5MG UNDER THE SKIN EVERY 7 DAYS       GLP-1 Agonists Protocol Failed - 3/13/2020  7:26 AM        Failed - Microalbumin on file in past 12 months     Recent Labs   Lab Test 11/07/17  1510   MICROL 45   UMALCR 21.34             Failed - HgbA1C in past 3 or 6 months     If HgbA1C is 8 or greater, it needs to be on file within the past 3 months.  If less than 8, must be on file within the past 6 months.     Recent Labs   Lab Test 04/29/19  1034   A1C 7.0*             Failed - Normal serum creatinine on file in past 12 months     Recent Labs   Lab Test 01/07/20  1636   CR 1.35*       Ok to refill medication if creatinine is low          Passed - Blood pressure less than 140/90 in past 6 months     BP Readings from Last 3 Encounters:   03/05/20 120/78   01/16/20 100/63   01/07/20 132/75                 Passed - LDL on file in past 12 months     Recent Labs   Lab Test 04/29/19  1034   LDL 67             Passed - Medication is active on med list        Passed - Patient is age 18 or older        Passed - No active pregnancy on record        Passed - No positive pregnancy test in past 12 months        Passed - Recent (6 mo) or future (30 days) visit within the authorizing provider's specialty     Patient had office visit in the last 6 months or has a visit in the next 30 days with authorizing provider.  See \"Patient Info\" tab in inbasket, or \"Choose Columns\" in Meds & Orders section of the refill encounter.                 Usha Zhong RN  [facility]    "

## 2020-03-20 ENCOUNTER — TELEPHONE (OUTPATIENT)
Dept: FAMILY MEDICINE | Facility: CLINIC | Age: 85
End: 2020-03-20

## 2020-03-20 DIAGNOSIS — Z79.01 CHRONIC ANTICOAGULATION: Primary | ICD-10-CM

## 2020-03-20 NOTE — TELEPHONE ENCOUNTER
ACC appointment changed to lab appointment. Orders placed.    Celeste Ruiz RN - [facility]

## 2020-03-31 ENCOUNTER — TELEPHONE (OUTPATIENT)
Dept: NURSING | Facility: CLINIC | Age: 85
End: 2020-03-31

## 2020-03-31 DIAGNOSIS — I48.19 PERSISTENT ATRIAL FIBRILLATION (H): ICD-10-CM

## 2020-03-31 DIAGNOSIS — Z79.01 LONG TERM CURRENT USE OF ANTICOAGULANT THERAPY: ICD-10-CM

## 2020-03-31 NOTE — TELEPHONE ENCOUNTER
Mara Colindres is overdue for INR check.      Spoke with Ayla and scheduled INR appointment on 5/7/2020     Advised of signs and symptoms to call back for. Patient verbalized understanding and has no further questions or concerns.    ACC calendar updated    Celeste Ruiz RN - [facility]

## 2020-03-31 NOTE — TELEPHONE ENCOUNTER
Anticoagulation    Mararoel Colindres 85 year old female    Chart reviewed for evaluation of next INR follow up date to limit exposure to health care setting during COVID-19 concern.    Lab Results   Component Value Date    INR 2.1 02/06/2020    INR 2.1 12/26/2019    INR 2.2 11/14/2019    INR 2.1 10/17/2019    INR 3.5 10/02/2019    INR 2.0 08/21/2019    INR 2.5 07/10/2019    INR 2.9 05/22/2019    INR 2.8 04/10/2019    INR 2.5 02/28/2019    INR 2.7 01/10/2019    INR 2.4 11/29/2018    INR 3.30 05/15/2017    INR 1.87 05/26/2016    INR 2.02 05/25/2016    INR 2.08 05/24/2016    INR 1.83 05/23/2016    INR 2.72 05/19/2016    INR 3.30 03/12/2013    INR 2.83 05/28/2012    INR 6.46 05/28/2012    INR 6.16 05/27/2012    INR 1.60 08/11/2009    INR 2.10 07/14/2009       Assessment/plan:    Last out of range INR 10/2/2019.  Stable monitoring Q 6 weeks currently. Stable on 25 mg/ week since 7/2018.  TTR: 93.8%    Chest guidelines suggest for patients with consistently stable INRs an INR testing frequency of up to 12 weeks    May extend next INR check to 12 weeks from last INR one time (4/30)    Required patient education:      Importance of notifying clinic for diarrhea, nausea/vomiting, reduced intake, illness, medication changes, and/or s/sx of clotting and bleeding.    Caitlyn Lizarraga, [facility]  Anticoagulation clinic

## 2020-04-02 ENCOUNTER — TELEPHONE (OUTPATIENT)
Dept: SLEEP MEDICINE | Facility: CLINIC | Age: 85
End: 2020-04-02

## 2020-04-02 NOTE — TELEPHONE ENCOUNTER
Sleep diaries have been rev'd from pt via mail and have been given to Elvia to scan into chart.    CHRISTOFER Benavides

## 2020-04-19 DIAGNOSIS — F33.0 MAJOR DEPRESSIVE DISORDER, RECURRENT EPISODE, MILD (H): ICD-10-CM

## 2020-04-20 RX ORDER — PAROXETINE 20 MG/1
TABLET, FILM COATED ORAL
Qty: 90 TABLET | Refills: 0 | Status: SHIPPED | OUTPATIENT
Start: 2020-04-20 | End: 2020-06-19

## 2020-04-20 ASSESSMENT — PATIENT HEALTH QUESTIONNAIRE - PHQ9: SUM OF ALL RESPONSES TO PHQ QUESTIONS 1-9: 12

## 2020-04-20 NOTE — TELEPHONE ENCOUNTER
Routing refill request to provider for review/approval because:  PHQ 9 > 5    Usha Zhong RN  [facility]

## 2020-04-20 NOTE — PROGRESS NOTES
Cpap usage is viewable via synopsis. Sleep diaries have been scanned into Media.     CHRISTOFER Benavides

## 2020-04-20 NOTE — TELEPHONE ENCOUNTER
Spoke to patient and PHQ9 completed. MA offered to set up telephone visit and patient declined at this time. She stated she thinks her medication dose is okay and that she is just getting older.  .  PHQ-9 score:    PHQ 4/20/2020   PHQ-9 Total Score 12   Q9: Thoughts of better off dead/self-harm past 2 weeks Not at all       Usha LINDSEY CMA ([facility])

## 2020-04-22 VITALS — HEIGHT: 63 IN | BODY MASS INDEX: 44.3 KG/M2 | WEIGHT: 250 LBS

## 2020-04-22 ASSESSMENT — MIFFLIN-ST. JEOR: SCORE: 1548.12

## 2020-04-22 ASSESSMENT — PAIN SCALES - GENERAL: PAINLEVEL: NO PAIN (0)

## 2020-04-22 NOTE — PROGRESS NOTES
"Mara Colindres is a 85 year old female who is being evaluated via a billable telephone visit.      The patient has been notified of following:     \"This telephone visit will be conducted via a call between you and your physician/provider. We have found that certain health care needs can be provided without the need for a physical exam.  This service lets us provide the care you need with a short phone conversation.  If a prescription is necessary we can send it directly to your pharmacy.  If lab work is needed we can place an order for that and you can then stop by our lab to have the test done at a later time.    Telephone visits are billed at different rates depending on your insurance coverage. During this emergency period, for some insurers they may be billed the same as an in-person visit.  Please reach out to your insurance provider with any questions.    If during the course of the call the physician/provider feels a telephone visit is not appropriate, you will not be charged for this service.\"    Patient has given verbal consent for Telephone visit?  Yes    How would you like to obtain your AVS? Mail a copy    Chief complaint:Follow up CPAP, elevated AHI    History of Present Illness: 85-year-old female with heart failure with preserved ejection fraction, atrial fibrillation, diabetes, depression, elevated right kecia-diaphragm with paralysis.  She was previously on supplemental oxygen with sleep.  She was found to have severe obstructive sleep apnea with AHI of 47.  Treatment with CPAP did not require oxygen supplementation. At last visit she experiencing benefit from PAP but had elevated AHI and excessive mask leak. She reports today that she ordered a chinstrap but it didn't fit. The machine is still reporting mask leak.     Otherwise, pt saying home and doing well with no new sleep concerns.    Total score - [place]: 6 (4/22/2020  1:41 PM) (Less than 10 normal)    VAMSHI Total Score: 9 (normal 0-7, mild " 8-14, moderate 15-21, severe 22-28)    Past Medical History:   Diagnosis Date     A-fib (H)      Bilateral leg edema     fairly severe     Cataract      CHF (congestive heart failure) (H)      Chronic diarrhea     neg colonoscopy abt one year ago, even to the point of having fecal accidents     Chronic venous insufficiency      CKD (chronic kidney disease) stage 3, GFR 30-59 ml/min (H)      Diabetic retinopathy (H)      DVT (deep venous thrombosis) (H)      GERD (gastroesophageal reflux disease)      Hyperlipidemia LDL goal <100 10/31/2010     Hypertension goal BP (blood pressure) < 140/90      Long-term (current) use of anticoagulants      Moderate persistent asthma 8/15/2012     MRSA (methicillin resistant Staphylococcus aureus)     postop hip     Obesity      S/P colonoscopy 2008 ( ?)     Septic hip (H)     sees an ID specialist Dr ANNEL DE LA CRUZ She remains on long-term suppressive antibiotic therapy using Minocin 50 mg twice daily     Stasis dermatitis      Type 2 diabetes, HbA1C goal < 8% (H)      Urinary incontinence, mixed      Vitiligo        Allergies   Allergen Reactions     Atenolol Other (See Comments)     Arms became limp, almost stroke like symptoms per patient.   Tolerates metoprolol fine     Metformin      Side effects / gastrointestinal symptoms        Current Outpatient Medications   Medication     ACCU-CHEK PARVEZ PLUS test strip     acetaminophen (TYLENOL) 500 MG tablet     ASPIRIN NOT PRESCRIBED, INTENTIONAL,     Calcium Carbonate-Vitamin D (CALCIUM + D PO)     diphenoxylate-atropine (LOMOTIL) 2.5-0.025 MG tablet     furosemide (LASIX) 40 MG tablet     HYDROcodone-acetaminophen (NORCO) 5-325 MG tablet     losartan (COZAAR) 25 MG tablet     metoprolol tartrate (LOPRESSOR) 100 MG tablet     OMEPRAZOLE CPCR 20 MG OR     order for DME     ORDER FOR DME     PARoxetine (PAXIL) 20 MG tablet     potassium chloride ER (K-TAB/KLOR-CON) 10 MEQ CR tablet     Resveratrol 100 MG CAPS     STATIN NOT  "PRESCRIBED, INTENTIONAL,     TRULICITY 1.5 MG/0.5ML pen     warfarin ANTICOAGULANT (COUMADIN) 5 MG tablet     No current facility-administered medications for this visit.        Social History     Socioeconomic History     Marital status:      Spouse name: (Gagandeep)     Number of children: 3     Years of education: Not on file     Highest education level: Not on file   Occupational History     Employer: RETIRED   Social Needs     Financial resource strain: Not on file     Food insecurity     Worry: Not on file     Inability: Not on file     Transportation needs     Medical: Not on file     Non-medical: Not on file   Tobacco Use     Smoking status: Never Smoker     Smokeless tobacco: Never Used   Substance and Sexual Activity     Alcohol use: No     Drug use: No     Sexual activity: Never   Lifestyle     Physical activity     Days per week: Not on file     Minutes per session: Not on file     Stress: Not on file   Relationships     Social connections     Talks on phone: Not on file     Gets together: Not on file     Attends Pentec[employer]alism service: Not on file     Active member of club or organization: Not on file     Attends meetings of clubs or organizations: Not on file     Relationship status: Not on file     Intimate partner violence     Fear of current or ex partner: Not on file     Emotionally abused: Not on file     Physically abused: Not on file     Forced sexual activity: Not on file   Other Topics Concern     Parent/sibling w/ CABG, MI or angioplasty before 65F 55M? No   Social History Narrative    [place] in [place]       Family History   Problem Relation Age of Onset     Diabetes Mother      Cancer Mother      Hypertension Mother      Cataracts Mother      Hypertension Father      Glaucoma Father      Cataracts Father      Cancer Son         thyroid cancer     Neurologic Disorder Sister      Alzheimer Disease Sister          EXAM:Alert, no distress  Ht 1.6 m (5' 3\")   Wt 113.4 kg (250 lb)   BMI 44.29 kg/m   "   Pulmonary: Speaking full sentences  Psychiatric: Mood and affect appear normal    PSG 1/8/2020  Weight 259, BMI 45.9  AHI 47.2, RDI 62.9 No REM on diagnostic  CPAP  11 for REM lateral (no REM supine seen on PAP)    ResMed   Auto-PAP 10.0 - 13.0 cmH2O 30 day usage data:    83% of days with > 4 hours of use. 1/30 days with no use.   Average use 345 minutes per day.   95%ile Leak 54.37 L/min.   CPAP 95% pressure 11.4 cm.   AHI 4.69 events per hour.     ASSESSMENT:  85-year-old female with severe obstructive sleep apnea, right diaphragm paralysis, heart failure with preserved ejection fraction, atrial fibrillation, decreased mobility, diabetes.  She is experiencing some good clinical benefit with the use of Pap therapy and AHI is now at goal of less than 5. Excessive leak persists.     PLAN:  I have reached out to [facility] asking them to help her trouble shoot leat and get her a properly fitting chin strap. Recommended follow up in 6 months, earlier of needed.    Twenty-five minutes spent with patient, >50% spent counseling and coordinating care.      Natalie Bruce M.D.  Pulmonary/Critical Care/Sleep Medicine    [facility]   Floor 1, [address]   [address] [place] [address]. [place] [address]   Appointments: [phone]    The above note was dictated using voice recognition software and may include typographical errors. Please contact the author for any clarifications.          Phone call duration: 9 minutes

## 2020-04-22 NOTE — PATIENT INSTRUCTIONS
Work with [facility] medical to fix mask leak-I have asked that they contact you.  Follow up with me in 6 months-earlier if needed.  1.  Continue CPAP every night, for all hours that you are sleeping.  If you nap use CPAP.  As always, try to get at least 8 hours of sleep or more each day, and avoid sleep deprivation. Avoid alcohol.    2.  Reasons that you might need a change to your pressure therapy would be weight gain or loss, waking having inadvertently removed your CPAP overnight, having previously felt refreshed by sleep with CPAP use and now waking un-refreshed, and return of daytime sleepiness. Also, the development of new medical problems can sometimes affect breathing at night-heart failure, stroke, medications such as narcotics.    3.  Please bring CPAP with you if you are hospitalized.  If anticipating surgery be sure to discuss with your surgeon that you have sleep apnea and use PAP therapy.      4.  Maintain your equipment as recommended which includes routine cleaning and replacement of supplies.  Call DME for any questions regarding supplies or maintenance.    [facility], [facility] [phone]    5.  Do not drive on engage in potentially dangerous activities if feeling sleepy.    6.  Please see me again in 6 months and bring your machine and card to your follow-up visit.          Tips for your CPAP and BIPAP use-    Mask fitting tips  Mask fitting exercise:    To improve your mask seal and your mobility at night, put mask on and secure in place.  Lie down in bed with full pressure and roll to one side, adjust headgear while in that position to eliminate any leaks. Repeat process rolling to other side.     The mask seal does not have to be perfect:   CPAP machines are designed to make up for small leaks. However, you will not tolerate leaks blowing in your eyes so you will need to adjust.   Any leak should only be near or at the bottom of the mask.  We  expect your mask to leak slightly at night.    Do not over-tighten the headgear straps, tighter IS NOT better, we expect minimal leak.    First try re-positioning the mask or headgear before tightening the headgear straps.  Mask leaks are expected due to changing sleeping positions. Try pulling the mask away from your skin allowing the cushion to re-inflate will minimize the leak.  If you struggle for a good fit, try turning the CPAP off and then readjust the mask by pulling it away from your face and then turning back on the CPAP.        Humidifier tips  Humidifiers can be adjusted to increase or decrease the amount of moisture according to your comfort level. You may need to adjust this frequently at first, but then might only change it with seasonal weather changes.     Try INCREASING the humidity if:  You experience a dry, irritated nasal passage or throat.  You have a runny, drippy nose or sneezing fits after using CPAP.  You experience nasal congestion during or after CPAP use.    Try DECREASING the humidity if:  You have excessive condensation or  rain out  in the tubing or mask.  Otherwise keep the tubing warm during the night by running it underneath the blankets or pillow.      Clinic visit after initial CPAP and BIPAP set-up   Bring your equipment with you to your 4 week follow up clinic visit.  We will be extracting your data from the machine.        Travel  Always take your equipment with you.  If you fly with your equipment bring it on with you as a carry on.  Medical equipment does not count as a carry on.    If you travel international the machines take 110-240.  The only adapter needed is the adapter that will fit into the receptacle (outlet).    You may also want to bring an extension cord as many hotel rooms have limited outlets at the bedside.  Do not travel with water in your humidifier chamber.     Cleaning and Maintenance Guidelines    Equipment Frequency Cleaning Method   Mask First  Day    Daily      Weekly Soak mask in hot soapy water for 30 minutes, rinse and air dry.  Wipe nasal cushion with a hot soapy (Ivory, baby shampoo) cloth and rinse.  Baby wipes may also be used.  Do not use anti-bacterial soaps,Amy  liquid soap, rubbing alcohol, bleach or ammonia.  Wash frame in hot soapy water (Ivory, baby shampoo) rinse and let air dry   Headgear Biweekly Wash in hot soapy water, rinse and air dry   Reusable Gray Filter Weekly Wash in hot soapy water, rinse, put in towel squeeze moisture out, let air dry   Disposable White Filter Check Weekly Replace when brown or gray in color; at least every 2 to 3 months   Humidifier Chamber Daily    Weekly Empty distilled water from humidifier and let air dry    Hand wash in hot soapy water, rinse and air dry   Tubing Weekly Wash in hot soapy water, rinse and let air dry   Mask, Tubing and Humidifier Chamber As needed Disinfect: Soak in 1 part distilled white vinegar to 3 parts hot water for 30 minutes, rinse well and air dry  Not the material headgear        MASK AND SUPPLY REORDERING and EQUIPMENT NEEDS through your DME and per your insurance  Reminder: Most insurance companies will allow for a new mask, headgear, tubing, and reusable gray filter every six months.  Disposable white ultra-fine filters are covered monthly.      HOME AND SAFETY INSTRUCTIONS    Do not use frayed or cracked electrical cords, multi plug adaptors, or switched receptacles    Do not immerse electrical equipment into water    Assure that electrical cords do not become a tripping hazard      Your BMI is Body mass index is 44.29 kg/m .  Weight management is a personal decision.  If you are interested in exploring weight loss strategies, the following discussion covers the approaches that may be successful. Body mass index (BMI) is one way to tell whether you are at a healthy weight, overweight, or obese. It measures your weight in relation to your height.  A BMI of 18.5 to 24.9 is in  the healthy range. A person with a BMI of 25 to 29.9 is considered overweight, and someone with a BMI of 30 or greater is considered obese. More than two-thirds of American adults are considered overweight or obese.  Being overweight or obese increases the risk for further weight gain. Excess weight may lead to heart disease and diabetes.  Creating and following plans for healthy eating and physical activity may help you improve your health.  Weight control is part of healthy lifestyle and includes exercise, emotional health, and healthy eating habits. Careful eating habits lifelong are the mainstay of weight control. Though there are significant health benefits from weight loss, long-term weight loss with diet alone may be very difficult to achieve- studies show long-term success with dietary management in less than 10% of people. Attaining a healthy weight may be especially difficult to achieve in those with severe obesity. In some cases, medications, devices and surgical management might be considered.  What can you do?  If you are overweight or obese and are interested in methods for weight loss, you should discuss this with your provider.     Consider reducing daily calorie intake by 500 calories.     Keep a food journal.     Avoiding skipping meals, consider cutting portions instead.    Diet combined with exercise helps maintain muscle while optimizing fat loss. Strength training is particularly important for building and maintaining muscle mass. Exercise helps reduce stress, increase energy, and improves fitness. Increasing exercise without diet control, however, may not burn enough calories to loose weight.       Start walking three days a week 10-20 minutes at a time    Work towards walking thirty minutes five days a week     Eventually, increase the speed of your walking for 1-2 minutes at time    In addition, we recommend that you review healthy lifestyles and methods for weight loss available through the  National Institutes of Health patient information sites:  http://win.niddk.nih.gov/publications/index.htm    And look into health and wellness programs that may be available through your health insurance provider, employer, local community center, or jostin club.    Weight management plan: Patient was referred to their PCP to discuss a diet and exercise plan.

## 2020-04-23 ENCOUNTER — TELEPHONE (OUTPATIENT)
Dept: SLEEP MEDICINE | Facility: CLINIC | Age: 85
End: 2020-04-23

## 2020-04-23 ENCOUNTER — VIRTUAL VISIT (OUTPATIENT)
Dept: SLEEP MEDICINE | Facility: CLINIC | Age: 85
End: 2020-04-23
Payer: COMMERCIAL

## 2020-04-23 DIAGNOSIS — G47.33 OSA (OBSTRUCTIVE SLEEP APNEA): Primary | ICD-10-CM

## 2020-04-23 PROCEDURE — 99213 OFFICE O/P EST LOW 20 MIN: CPT | Mod: 95 | Performed by: INTERNAL MEDICINE

## 2020-04-23 NOTE — TELEPHONE ENCOUNTER
Received message from Dr. Bruce to call patient regarding getting a chinstrap and help with leakage.    I called patient today 04/23/2020 and let her know we are not seeing patients at the moment due to the COVID-19 but I can send her out a universal chinstrap that works pretty well with most of our patients. She was ok with that. I also talked to her about the leakage and she said her son told her at night the cushion is moving around. I instructed her how to adjust the mask to see if that will help because if the cushion is loose it will not give her a good seal. She stated that she will try that and will wait for the chinstrap. I let her know to call us back if she has any issues.

## 2020-05-26 DIAGNOSIS — M15.0 PRIMARY OSTEOARTHRITIS INVOLVING MULTIPLE JOINTS: ICD-10-CM

## 2020-05-26 NOTE — TELEPHONE ENCOUNTER
Controlled Substance Refill Request for HYDROcodone-acetaminophen (NORCO) 5-325 MG tablet   Problem List Complete:  No     PROVIDER TO CONSIDER COMPLETION OF PROBLEM LIST AND OVERVIEW/CONTROLLED SUBSTANCE AGREEMENT    Last Written Prescription Date:  2-  Last Fill Quantity: 90,   # refills: 0    THE MOST RECENT OFFICE VISIT MUST BE WITHIN THE PAST 3 MONTHS. AT LEAST ONE FACE TO FACE VISIT MUST OCCUR EVERY 6 MONTHS. ADDITIONAL VISITS CAN BE VIRTUAL.  (THIS STATEMENT SHOULD BE DELETED.)    [place] [facility] [place] [facility]2019    Future Office visit:     Controlled substance agreement:   Encounter-Level CSA:    There are no encounter-level csa.     Patient-Level CSA:    There are no patient-level csa.         Last Urine Drug Screen: No results found for: CDAUT, No results found for: COMDAT, No results found for: THC13, PCP13, COC13, MAMP13, OPI13, AMP13, BZO13, TCA13, MTD13, BAR13, OXY13, PPX13, BUP13     [email or web address]       checked in past 3 months?  No, route to RN

## 2020-05-26 NOTE — TELEPHONE ENCOUNTER
Reason for call:  Medication   If this is a refill request, has the caller requested the refill from the pharmacy already? Yes  Will the patient be using a [facility]? No  Name of the pharmacy and phone number for the current request: [facility] [phone]    Name of the medication requested: Hydrocodone     Other request: N/a     Phone number to reach patient:  Home number on file [phone] (home)    Best Time:  Any     Can we leave a detailed message on this number?  YES    Travel screening: Not Applicable

## 2020-05-28 RX ORDER — HYDROCODONE BITARTRATE AND ACETAMINOPHEN 5; 325 MG/1; MG/1
TABLET ORAL
Start: 2020-05-28

## 2020-05-28 NOTE — TELEPHONE ENCOUNTER
Routing refill request to provider for review/approval because:  Drug not on the G refill protocol    checked - no dispensed within last year    Requested Prescriptions   Pending Prescriptions Disp Refills     HYDROcodone-acetaminophen (NORCO) 5-325 MG tablet 90 tablet 0     Si tab QID AS NEEDED       There is no refill protocol information for this order        Maryuri Gale RN

## 2020-06-08 ENCOUNTER — TELEPHONE (OUTPATIENT)
Dept: FAMILY MEDICINE | Facility: CLINIC | Age: 85
End: 2020-06-08

## 2020-06-08 NOTE — LETTER
June 8, 2020      Mara Colindres  [address] [facility] [address]      Dear Mara,    We are contacting you because our records show you were due for an INR on 5-7-2020.      There are potentially serious risks when taking warfarin without careful monitoring, and we want to make sure you are safely managed.    Please call the Centralized INR Nurse Line at [phone] and we will be happy to help you schedule an appointment or contact the main clinic scheduling department.  If there has been a change in your care, or other concerns, please let us know so we can help and/or update our records.    Sincerely,        Steven Abrams MD

## 2020-06-10 NOTE — TELEPHONE ENCOUNTER
Mara Colindres is overdue for INR check.      Spoke with Mara and scheduled INR appointment on June 18th       Collin PERRY RN, CACP

## 2020-06-16 NOTE — TELEPHONE ENCOUNTER
Called patient and informed of needing appointment scheduled appointment on 06/19/2020.      Kira MURILLO CMA ([facility])

## 2020-06-18 ENCOUNTER — ANTICOAGULATION THERAPY VISIT (OUTPATIENT)
Dept: NURSING | Facility: CLINIC | Age: 85
End: 2020-06-18

## 2020-06-18 ENCOUNTER — TELEPHONE (OUTPATIENT)
Dept: FAMILY MEDICINE | Facility: CLINIC | Age: 85
End: 2020-06-18

## 2020-06-18 DIAGNOSIS — Z79.01 LONG TERM CURRENT USE OF ANTICOAGULANT THERAPY: ICD-10-CM

## 2020-06-18 DIAGNOSIS — I48.19 PERSISTENT ATRIAL FIBRILLATION (H): ICD-10-CM

## 2020-06-18 DIAGNOSIS — Z79.01 CHRONIC ANTICOAGULATION: ICD-10-CM

## 2020-06-18 DIAGNOSIS — I48.19 PERSISTENT ATRIAL FIBRILLATION (H): Primary | ICD-10-CM

## 2020-06-18 LAB
CAPILLARY BLOOD COLLECTION: NORMAL
INR PPP: 2.1 (ref 0.86–1.14)

## 2020-06-18 PROCEDURE — 85610 PROTHROMBIN TIME: CPT | Performed by: INTERNAL MEDICINE

## 2020-06-18 PROCEDURE — 36415 COLL VENOUS BLD VENIPUNCTURE: CPT | Performed by: INTERNAL MEDICINE

## 2020-06-18 NOTE — PROGRESS NOTES
ANTICOAGULATION MANAGEMENT     Patient Name:  Mara Colindres  Date:  2020    ASSESSMENT /SUBJECTIVE:    Today's INR result of 2.1 is therapeutic. Goal INR of 2.0-3.0      Warfarin dose taken: Warfarin taken as previously instructed    Diet: No new diet changes affecting INR    Medication changes/ interactions: No new medications/supplements affecting INR    Previous INR: Therapeutic     S/S of bleeding or thromboembolism: No    New injury or illness: No    Upcoming surgery, procedure or cardioversion: No    Additional findings: None      PLAN:    Spoke with Mara regarding INR result and instructed:     Warfarin Dosing Instructions: Continue your current warfarin dose 5 mg Mon/Wed/Fri 2.5 mg ROW    Instructed patient to follow up no later than: 6 weeks  Lab visit scheduled    Education provided: Target INR goal and significance of current INR result      Mara verbalizes understanding and agrees to warfarin dosing plan.    Instructed to call the Anticoagulation Clinic for any changes, questions or concerns. (#[phone])        OBJECTIVE:  INR   Date Value Ref Range Status   2020 2.10 (H) 0.86 - 1.14 Final     Comment:     This test is intended for monitoring Coumadin therapy.  Results are not   accurate in patients with prolonged INR due to factor deficiency.           No question data found.  Anticoagulation Summary  As of 2020    INR goal:   2.0-3.0   TTR:   91.7 % (7.7 mo)   INR used for dosin.10 (2020)   Warfarin maintenance plan:   5 mg (5 mg x 1) every Mon, Wed, Fri; 2.5 mg (5 mg x 0.5) all other days   Full warfarin instructions:   5 mg every Mon, Wed, Fri; 2.5 mg all other days   Weekly warfarin total:   25 mg   No change documented:   Celeste Ruiz RN   Plan last modified:   Celeste Ruiz RN (10/2/2019)   Next INR check:   2020   Priority:   Maintenance   Target end date:   Indefinite    Indications    Long-term (current) use of anticoagulants [Z79.01]  [Z79.01]  Persistent atrial fibrillation (H) [I48.19]             Anticoagulation Episode Summary     INR check location:       Preferred lab:       Send INR reminders to:   KELLY ROJAS    Comments:         Anticoagulation Care Providers     Provider Role Specialty Phone number    Steven Abrams MD [facility] Internal Medicine [phone]         Celeste Ruiz RN - [facility]
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
## 2020-06-18 NOTE — TELEPHONE ENCOUNTER
Has the patient previously taken warfarin? yes  If yes, for what indication? A fib    Does the patient have any of the following indications for a higher range of 2.5-3.5:    Mitral position mechanical valve? no    Delgado-Shiley, Ball and Cage or Monoleaflet valve (regardless of position) no    Other (if yes, please explain) no    Annual INR referral needed.  Orders teed up.    Celeste Ruiz RN - [facility]

## 2020-06-19 ENCOUNTER — VIRTUAL VISIT (OUTPATIENT)
Dept: INTERNAL MEDICINE | Facility: CLINIC | Age: 85
End: 2020-06-19
Payer: COMMERCIAL

## 2020-06-19 DIAGNOSIS — E78.5 HYPERLIPIDEMIA LDL GOAL <100: ICD-10-CM

## 2020-06-19 DIAGNOSIS — I48.19 PERSISTENT ATRIAL FIBRILLATION (H): ICD-10-CM

## 2020-06-19 DIAGNOSIS — Z79.01 LONG TERM CURRENT USE OF ANTICOAGULANT THERAPY: ICD-10-CM

## 2020-06-19 DIAGNOSIS — M15.0 PRIMARY OSTEOARTHRITIS INVOLVING MULTIPLE JOINTS: ICD-10-CM

## 2020-06-19 DIAGNOSIS — N18.30 CHRONIC KIDNEY DISEASE, STAGE III (MODERATE) (H): Primary | ICD-10-CM

## 2020-06-19 DIAGNOSIS — G89.29 OTHER CHRONIC PAIN: ICD-10-CM

## 2020-06-19 DIAGNOSIS — I50.32 CHRONIC DIASTOLIC CONGESTIVE HEART FAILURE (H): ICD-10-CM

## 2020-06-19 DIAGNOSIS — R79.89 LOW VITAMIN B12 LEVEL: ICD-10-CM

## 2020-06-19 DIAGNOSIS — R13.10 SWALLOWING DYSFUNCTION: ICD-10-CM

## 2020-06-19 DIAGNOSIS — F33.0 MAJOR DEPRESSIVE DISORDER, RECURRENT EPISODE, MILD (H): ICD-10-CM

## 2020-06-19 DIAGNOSIS — Z79.01 CHRONIC ANTICOAGULATION: ICD-10-CM

## 2020-06-19 DIAGNOSIS — K52.9 CHRONIC DIARRHEA: ICD-10-CM

## 2020-06-19 DIAGNOSIS — E11.22 CONTROLLED TYPE 2 DIABETES MELLITUS WITH STAGE 3 CHRONIC KIDNEY DISEASE, WITHOUT LONG-TERM CURRENT USE OF INSULIN (H): ICD-10-CM

## 2020-06-19 DIAGNOSIS — N18.30 CONTROLLED TYPE 2 DIABETES MELLITUS WITH STAGE 3 CHRONIC KIDNEY DISEASE, WITHOUT LONG-TERM CURRENT USE OF INSULIN (H): ICD-10-CM

## 2020-06-19 DIAGNOSIS — E55.9 HYPOVITAMINOSIS D: ICD-10-CM

## 2020-06-19 DIAGNOSIS — E87.6 DIURETIC-INDUCED HYPOKALEMIA: ICD-10-CM

## 2020-06-19 DIAGNOSIS — I27.20 PULMONARY HTN (H): ICD-10-CM

## 2020-06-19 DIAGNOSIS — T50.2X5A DIURETIC-INDUCED HYPOKALEMIA: ICD-10-CM

## 2020-06-19 PROCEDURE — 99214 OFFICE O/P EST MOD 30 MIN: CPT | Mod: 95 | Performed by: INTERNAL MEDICINE

## 2020-06-19 RX ORDER — FUROSEMIDE 40 MG
TABLET ORAL
Qty: 180 TABLET | Refills: 1 | Status: SHIPPED | OUTPATIENT
Start: 2020-06-19 | End: 2021-02-15

## 2020-06-19 RX ORDER — HYDROCODONE BITARTRATE AND ACETAMINOPHEN 5; 325 MG/1; MG/1
TABLET ORAL
Qty: 90 TABLET | Refills: 0 | Status: SHIPPED | OUTPATIENT
Start: 2020-06-19 | End: 2020-12-17

## 2020-06-19 RX ORDER — DIPHENOXYLATE HCL/ATROPINE 2.5-.025MG
TABLET ORAL
Qty: 40 TABLET | Refills: 3 | Status: SHIPPED | OUTPATIENT
Start: 2020-06-19 | End: 2021-09-01

## 2020-06-19 RX ORDER — POTASSIUM CHLORIDE 750 MG/1
5 TABLET, EXTENDED RELEASE ORAL DAILY
Qty: 45 TABLET | Refills: 3 | Status: SHIPPED | OUTPATIENT
Start: 2020-06-19 | End: 2021-02-15

## 2020-06-19 RX ORDER — PAROXETINE 20 MG/1
20 TABLET, FILM COATED ORAL DAILY
Qty: 90 TABLET | Refills: 1 | Status: SHIPPED | OUTPATIENT
Start: 2020-06-19 | End: 2021-02-15

## 2020-06-19 RX ORDER — WARFARIN SODIUM 5 MG/1
TABLET ORAL
Qty: 70 TABLET | Refills: 0 | Status: SHIPPED | OUTPATIENT
Start: 2020-06-19 | End: 2020-10-02

## 2020-06-19 RX ORDER — LOSARTAN POTASSIUM 25 MG/1
TABLET ORAL
Qty: 180 TABLET | Refills: 1 | Status: SHIPPED | OUTPATIENT
Start: 2020-06-19 | End: 2021-02-17

## 2020-06-19 RX ORDER — DULAGLUTIDE 1.5 MG/.5ML
1.5 INJECTION, SOLUTION SUBCUTANEOUS
Qty: 2 ML | Refills: 5 | Status: SHIPPED | OUTPATIENT
Start: 2020-06-19 | End: 2020-08-07

## 2020-06-19 RX ORDER — METOPROLOL TARTRATE 100 MG
TABLET ORAL
Qty: 90 TABLET | Refills: 1 | Status: SHIPPED | OUTPATIENT
Start: 2020-06-19 | End: 2021-02-15

## 2020-06-22 ENCOUNTER — TELEPHONE (OUTPATIENT)
Dept: INTERNAL MEDICINE | Facility: CLINIC | Age: 85
End: 2020-06-22

## 2020-06-22 NOTE — TELEPHONE ENCOUNTER
Prior Authorization Approval    Authorization Effective Date: 6/22/2020  Authorization Expiration Date: 12/31/2020  Medication: hydrocodone-APPROVED  Approved Dose/Quantity:   Reference #:     Insurance Company: [employer] - Phone [phone] Fax [phone]  Expected CoPay:       CoPay Card Available:      Foundation Assistance Needed:    Which Pharmacy is filling the prescription (Not needed for infusion/clinic administered): [id number] - [place], [place] - [address] [place] [address] NE AT [facility] [address] [facility]  Pharmacy Notified: Yes  Patient Notified: No    Pharmacy will notify patient when medication is ready.

## 2020-06-22 NOTE — TELEPHONE ENCOUNTER
Prior Authorization Retail Medication Request    Medication/Dose: HYDROcodone-acetaminophen (NORCO) 5-325 MG tablet  ICD code (if different than what is on RX):Primary osteoarthritis involving multiple joints [M89.49]    Previously Tried and Failed:    Rationale:      Insurance Name:  [employer]  Insurance ID:  [id number]       Pharmacy Information (if different than what is on RX)  Name:  Estefania cornejo  Phone:  [phone]

## 2020-07-27 ENCOUNTER — MEDICAL CORRESPONDENCE (OUTPATIENT)
Dept: HEALTH INFORMATION MANAGEMENT | Facility: CLINIC | Age: 85
End: 2020-07-27

## 2020-08-07 ENCOUNTER — TELEPHONE (OUTPATIENT)
Dept: FAMILY MEDICINE | Facility: CLINIC | Age: 85
End: 2020-08-07

## 2020-08-07 DIAGNOSIS — E11.22 CONTROLLED TYPE 2 DIABETES MELLITUS WITH STAGE 3 CHRONIC KIDNEY DISEASE, WITHOUT LONG-TERM CURRENT USE OF INSULIN (H): ICD-10-CM

## 2020-08-07 DIAGNOSIS — N18.30 CONTROLLED TYPE 2 DIABETES MELLITUS WITH STAGE 3 CHRONIC KIDNEY DISEASE, WITHOUT LONG-TERM CURRENT USE OF INSULIN (H): ICD-10-CM

## 2020-08-07 RX ORDER — DULAGLUTIDE 1.5 MG/.5ML
INJECTION, SOLUTION SUBCUTANEOUS
Qty: 2 ML | Refills: 0 | Status: SHIPPED | OUTPATIENT
Start: 2020-08-07 | End: 2020-09-30

## 2020-08-07 NOTE — TELEPHONE ENCOUNTER
Pt to complete labs. Has upcoming appt scheduled for 08/11.  Medication is being filled for 1 time refill only due to:  Patient needs labs per Aliza (future orders are already in).

## 2020-08-07 NOTE — TELEPHONE ENCOUNTER
Pt is requesting provider's input on whether pneumatic compression boot would be beneficial for treating her lymphedema. She is also wondering if you have ever heard of a chair specifically for lymphedema that provides elevation only and would this and/or the boot be a good idea?  Pt was going to get the boots, she saw them in a catalog and knows someone who is handicap/paralyzed who uses them and told her that they are helping her. She was told by her friend that if you have congestive heart failure, then you might not be able to use the boots.  Currently pt does not wear compression stockings, they do not fit her. She does have a recliner that elevates at about even salvatore with the rest of her body and she does prop up legs. She does have a wrap for one leg with velcro on the front that she can strap around her leg and she does use when the swelling gets really bad.

## 2020-08-07 NOTE — TELEPHONE ENCOUNTER
Honestly these are fairly complex questions and I am uncertain as to an answer    Lymphedema garments could be made that would fit her. These are Velcro support stockings that are not difficult to put on. Usually these are put on in the am and taken off at night.    Leg elevation through any mechanism is good, and a chair would probably help. There's a more complicated question about, if you are truly in a congestive heart failure state with volume overloaded status , sometimes lymphedema therapy isn't the worlds best idea.    In my opinion the best expert to consult is a lymphedema clinic for a comprehensive evaluation and management.    If patient is interested lets please place orders as requested, or reroute for orders to be placed      Steven Abrams MD

## 2020-08-07 NOTE — TELEPHONE ENCOUNTER
Reason for call:  Other   Patient called regarding (reason for call): electric boots     Additional comments: Patient is calling wondering if she can use the electric boots for her legs so they can help with her lymphedema. Please call to advise.     Phone number to reach patient:  Home number on file [phone] (home)    Best Time:  Any     Can we leave a detailed message on this number?  YES    Travel screening: Not Applicable

## 2020-08-10 NOTE — TELEPHONE ENCOUNTER
Called patient. Notified her of provider's message. She states that she went to the lymphedema clinic a long time ago and was taught how to wrap her legs. She states that she does not want a referral at this time and if she changes her mind she will call back.

## 2020-08-11 ENCOUNTER — ANTICOAGULATION THERAPY VISIT (OUTPATIENT)
Dept: FAMILY MEDICINE | Facility: CLINIC | Age: 85
End: 2020-08-11

## 2020-08-11 DIAGNOSIS — R13.10 SWALLOWING DYSFUNCTION: ICD-10-CM

## 2020-08-11 DIAGNOSIS — E55.9 HYPOVITAMINOSIS D: ICD-10-CM

## 2020-08-11 DIAGNOSIS — I48.19 PERSISTENT ATRIAL FIBRILLATION (H): ICD-10-CM

## 2020-08-11 DIAGNOSIS — N18.30 CONTROLLED TYPE 2 DIABETES MELLITUS WITH STAGE 3 CHRONIC KIDNEY DISEASE, WITHOUT LONG-TERM CURRENT USE OF INSULIN (H): ICD-10-CM

## 2020-08-11 DIAGNOSIS — I27.20 PULMONARY HTN (H): ICD-10-CM

## 2020-08-11 DIAGNOSIS — N18.30 CHRONIC KIDNEY DISEASE, STAGE III (MODERATE) (H): ICD-10-CM

## 2020-08-11 DIAGNOSIS — R79.89 LOW VITAMIN B12 LEVEL: ICD-10-CM

## 2020-08-11 DIAGNOSIS — E78.5 HYPERLIPIDEMIA LDL GOAL <100: ICD-10-CM

## 2020-08-11 DIAGNOSIS — I50.32 CHRONIC DIASTOLIC CONGESTIVE HEART FAILURE (H): ICD-10-CM

## 2020-08-11 DIAGNOSIS — Z79.01 LONG TERM CURRENT USE OF ANTICOAGULANT THERAPY: ICD-10-CM

## 2020-08-11 DIAGNOSIS — Z79.01 CHRONIC ANTICOAGULATION: ICD-10-CM

## 2020-08-11 DIAGNOSIS — K52.9 CHRONIC DIARRHEA: ICD-10-CM

## 2020-08-11 DIAGNOSIS — E11.22 CONTROLLED TYPE 2 DIABETES MELLITUS WITH STAGE 3 CHRONIC KIDNEY DISEASE, WITHOUT LONG-TERM CURRENT USE OF INSULIN (H): ICD-10-CM

## 2020-08-11 LAB
ANION GAP SERPL CALCULATED.3IONS-SCNC: 9 MMOL/L (ref 3–14)
BASOPHILS # BLD AUTO: 0.1 10E9/L (ref 0–0.2)
BASOPHILS NFR BLD AUTO: 1.4 %
BUN SERPL-MCNC: 22 MG/DL (ref 7–30)
CALCIUM SERPL-MCNC: 9 MG/DL (ref 8.5–10.1)
CHLORIDE SERPL-SCNC: 105 MMOL/L (ref 94–109)
CHOLEST SERPL-MCNC: 106 MG/DL
CO2 SERPL-SCNC: 23 MMOL/L (ref 20–32)
CREAT SERPL-MCNC: 1.31 MG/DL (ref 0.52–1.04)
DIFFERENTIAL METHOD BLD: ABNORMAL
EOSINOPHIL # BLD AUTO: 0.3 10E9/L (ref 0–0.7)
EOSINOPHIL NFR BLD AUTO: 4.2 %
ERYTHROCYTE [DISTWIDTH] IN BLOOD BY AUTOMATED COUNT: 15.3 % (ref 10–15)
GFR SERPL CREATININE-BSD FRML MDRD: 37 ML/MIN/{1.73_M2}
GLUCOSE SERPL-MCNC: 206 MG/DL (ref 70–99)
HBA1C MFR BLD: 7.1 % (ref 0–5.6)
HCT VFR BLD AUTO: 36.8 % (ref 35–47)
HDLC SERPL-MCNC: 54 MG/DL
HGB BLD-MCNC: 11.8 G/DL (ref 11.7–15.7)
INR PPP: 2.7 (ref 0.86–1.14)
LDLC SERPL CALC-MCNC: 42 MG/DL
LYMPHOCYTES # BLD AUTO: 0.8 10E9/L (ref 0.8–5.3)
LYMPHOCYTES NFR BLD AUTO: 13.4 %
MCH RBC QN AUTO: 29.9 PG (ref 26.5–33)
MCHC RBC AUTO-ENTMCNC: 32.1 G/DL (ref 31.5–36.5)
MCV RBC AUTO: 93 FL (ref 78–100)
MONOCYTES # BLD AUTO: 0.5 10E9/L (ref 0–1.3)
MONOCYTES NFR BLD AUTO: 8 %
NEUTROPHILS # BLD AUTO: 4.3 10E9/L (ref 1.6–8.3)
NEUTROPHILS NFR BLD AUTO: 73 %
NONHDLC SERPL-MCNC: 52 MG/DL
PLATELET # BLD AUTO: 245 10E9/L (ref 150–450)
POTASSIUM SERPL-SCNC: 4 MMOL/L (ref 3.4–5.3)
RBC # BLD AUTO: 3.94 10E12/L (ref 3.8–5.2)
SODIUM SERPL-SCNC: 137 MMOL/L (ref 133–144)
TRIGL SERPL-MCNC: 48 MG/DL
VIT B12 SERPL-MCNC: 681 PG/ML (ref 193–986)
WBC # BLD AUTO: 5.9 10E9/L (ref 4–11)

## 2020-08-11 PROCEDURE — 80048 BASIC METABOLIC PNL TOTAL CA: CPT | Performed by: INTERNAL MEDICINE

## 2020-08-11 PROCEDURE — 85610 PROTHROMBIN TIME: CPT | Performed by: INTERNAL MEDICINE

## 2020-08-11 PROCEDURE — 80061 LIPID PANEL: CPT | Performed by: INTERNAL MEDICINE

## 2020-08-11 PROCEDURE — 82306 VITAMIN D 25 HYDROXY: CPT | Performed by: INTERNAL MEDICINE

## 2020-08-11 PROCEDURE — 99207 ZZC NO CHARGE NURSE ONLY: CPT | Performed by: INTERNAL MEDICINE

## 2020-08-11 PROCEDURE — 83036 HEMOGLOBIN GLYCOSYLATED A1C: CPT | Performed by: INTERNAL MEDICINE

## 2020-08-11 PROCEDURE — 82607 VITAMIN B-12: CPT | Performed by: INTERNAL MEDICINE

## 2020-08-11 PROCEDURE — 36415 COLL VENOUS BLD VENIPUNCTURE: CPT | Performed by: INTERNAL MEDICINE

## 2020-08-11 PROCEDURE — 85025 COMPLETE CBC W/AUTO DIFF WBC: CPT | Performed by: INTERNAL MEDICINE

## 2020-08-11 NOTE — PROGRESS NOTES
ANTICOAGULATION FOLLOW-UP CLINIC VISIT    Patient Name:  Mara Colindres  Date:  2020  Contact Type:  Telephone    SUBJECTIVE:  Patient Findings     Comments:     Assessed for S/S bleeding, clotting, medication, diet, health, activity and alcohol changes          Clinical Outcomes     Negatives:   Major bleeding event, Thromboembolic event, Anticoagulation-related hospital admission, Anticoagulation-related ED visit, Anticoagulation-related fatality    Comments:     Assessed for S/S bleeding, clotting, medication, diet, health, activity and alcohol changes             OBJECTIVE    Recent labs: (last 7 days)     20  1337   INR 2.70*       ASSESSMENT / PLAN  INR assessment THER    Recheck INR In: 6 WEEKS    INR Location Clinic      Anticoagulation Summary  As of 2020    INR goal:   2.0-3.0   TTR:   91.7 % (7.7 mo)   INR used for dosin.70 (2020)   Warfarin maintenance plan:   5 mg (5 mg x 1) every Mon, Wed, Fri; 2.5 mg (5 mg x 0.5) all other days   Full warfarin instructions:   5 mg every Mon, Wed, Fri; 2.5 mg all other days   Weekly warfarin total:   25 mg   No change documented:   Jeanine Bey RN   Plan last modified:   Celeste Ruiz RN (10/2/2019)   Next INR check:   9/15/2020   Priority:   Maintenance   Target end date:   Indefinite    Indications    Long-term (current) use of anticoagulants [Z79.01] [Z79.01]  Persistent atrial fibrillation (H) [I48.19]             Anticoagulation Episode Summary     INR check location:       Preferred lab:       Send INR reminders to:   KELLY ROJAS    Comments:         Anticoagulation Care Providers     Provider Role Specialty Phone number    Steven Abrams MD Referring Internal Medicine [phone]            See the Encounter Report to view Anticoagulation Flowsheet and Dosing Calendar (Go to Encounters tab in chart review, and find the Anticoagulation Therapy Visit)        Jeanine Bey, AMAYA

## 2020-08-12 LAB — DEPRECATED CALCIDIOL+CALCIFEROL SERPL-MC: 33 UG/L (ref 20–75)

## 2020-08-31 ENCOUNTER — TELEPHONE (OUTPATIENT)
Dept: FAMILY MEDICINE | Facility: CLINIC | Age: 85
End: 2020-08-31

## 2020-08-31 DIAGNOSIS — M79.605 BILATERAL LEG PAIN: ICD-10-CM

## 2020-08-31 DIAGNOSIS — I89.0 LYMPHEDEMA: ICD-10-CM

## 2020-08-31 DIAGNOSIS — M79.604 BILATERAL LEG PAIN: ICD-10-CM

## 2020-08-31 DIAGNOSIS — Z96.649 FAILED TOTAL HIP ARTHROPLASTY, SEQUELA: Primary | ICD-10-CM

## 2020-08-31 DIAGNOSIS — T84.018S FAILED TOTAL HIP ARTHROPLASTY, SEQUELA: Primary | ICD-10-CM

## 2020-08-31 NOTE — TELEPHONE ENCOUNTER
I signed the prescription for durable medical equipment or supplies but suspect a face to face encounter will wind up being necessary     Let me know !    Steven Abrams MD

## 2020-08-31 NOTE — TELEPHONE ENCOUNTER
Reason for Call:  Other prescription    Detailed comments: Patient calling, states she would like a call back from the nurse to discuss a medication that she had lost.    Phone Number Patient can be reached at: Home number on file [phone] (home)    Best Time: any    Can we leave a detailed message on this number? YES    Call taken on 8/31/2020 at 4:09 PM by Luis Fofana

## 2020-08-31 NOTE — TELEPHONE ENCOUNTER
Called patient. She states that a year or two ago her PCP gave her a prescription for a hospital bed (DME order was written 2016). She went to the medical store and looked at them and did not buy one. She ended up buying a step stool instead to help her get into bed. Now she would like to proceed with getting a hospital bed but needs a new order.   Pt states that she went to [facility], but could go through [facility] as well if they have the same thing.   DME order cued. Please review/sign or advise.

## 2020-09-01 NOTE — TELEPHONE ENCOUNTER
Spoke to patient.  She would like to try [facility].  Informed her that she may need an office visit for proper documentation.    DME faxed to [phone].  Halima Bob,

## 2020-09-02 ENCOUNTER — TELEPHONE (OUTPATIENT)
Dept: FAMILY MEDICINE | Facility: CLINIC | Age: 85
End: 2020-09-02

## 2020-09-02 NOTE — TELEPHONE ENCOUNTER
Reason for Call:  FYI    Detailed comments: [facility] is calling stating sending over a fax for additional info for patient for hospital bed. Please advise.Thank you.    Phone Number Patient can be reached at: [phone]    Best Time:     Can we leave a detailed message on this number? YES    Call taken on 9/2/2020 at 3:39 PM by Solange Fofana

## 2020-09-03 NOTE — TELEPHONE ENCOUNTER
"See 8-, telephone encounter.    [facility] [phone], f: [phone]) stating \"we received a semi hospital bed request for Mara Colindres.  I have attached the medical necessity questions that will need to be talked about in e-visit/office visit.    Questions:  One of 1, 2 or 3 must be yes and question 6 to qualify for the semi electric bed.    Qualifying criteria and supporting documentation:    Questions to be answered (NOTE:  ALL \"YES\" QUESTIONS MUST BE DOCUMENTED IN CHART)    1. Does the patient require positioning of the body in ways not feasible with an ordinary bed due to the medical condition that is expected to last at least one month?  Yes or No  2. Does the patient require, for the alleviation of pain, positioning of the body in ways not feasible with an ordinary bed?  Yes or No  3. Does the patient require the head of the bed to be elevated more than 30 degrees most of the time due to CHF, chronic pulmonary disease, or aspiration?  Yes or No  4. Does the patient require traction that can only be attached to a hospital bed?  Yes or No  5. Does the patient require a bed height different than a fixed height hospital bed to permit transfers to chair, wheelchair, or standing position?  Yes or No  6. Does the patient require frequent changes in body position and/or have an immediate need for change in body position?  Yes or No (Please document how many changes per day).    No - to answers 5 & 6 will qualify for a fixed height manual bed.  RNT [id number] -   Yes - to answer 5 will qualify for a variable height manual bed.  RNT [id number] -   Yes - to answer 6 will qualify for a semi-electric bed.  RNT [id number] -     Halima Bob,     "

## 2020-09-03 NOTE — TELEPHONE ENCOUNTER
Patient called and informed will need face to face visit.  Appointment scheduled for tomorrow at 2:30 p.m.  Halima oBb,

## 2020-09-04 ENCOUNTER — OFFICE VISIT (OUTPATIENT)
Dept: INTERNAL MEDICINE | Facility: CLINIC | Age: 85
End: 2020-09-04
Payer: COMMERCIAL

## 2020-09-04 VITALS
SYSTOLIC BLOOD PRESSURE: 124 MMHG | DIASTOLIC BLOOD PRESSURE: 76 MMHG | TEMPERATURE: 98.2 F | WEIGHT: 252 LBS | OXYGEN SATURATION: 98 % | HEART RATE: 100 BPM | BODY MASS INDEX: 44.64 KG/M2 | RESPIRATION RATE: 15 BRPM

## 2020-09-04 DIAGNOSIS — I82.511 CHRONIC DEEP VEIN THROMBOSIS (DVT) OF FEMORAL VEIN OF RIGHT LOWER EXTREMITY (H): ICD-10-CM

## 2020-09-04 DIAGNOSIS — M81.0 AGE-RELATED OSTEOPOROSIS WITHOUT CURRENT PATHOLOGICAL FRACTURE: Primary | ICD-10-CM

## 2020-09-04 DIAGNOSIS — N18.4 CKD (CHRONIC KIDNEY DISEASE) STAGE 4, GFR 15-29 ML/MIN (H): ICD-10-CM

## 2020-09-04 DIAGNOSIS — E08.42 DIABETIC POLYNEUROPATHY ASSOCIATED WITH DIABETES MELLITUS DUE TO UNDERLYING CONDITION (H): ICD-10-CM

## 2020-09-04 DIAGNOSIS — Z96.649 FAILED TOTAL HIP ARTHROPLASTY, SEQUELA: ICD-10-CM

## 2020-09-04 DIAGNOSIS — E55.9 HYPOVITAMINOSIS D: ICD-10-CM

## 2020-09-04 DIAGNOSIS — T84.018S FAILED TOTAL HIP ARTHROPLASTY, SEQUELA: ICD-10-CM

## 2020-09-04 DIAGNOSIS — N18.30 CONTROLLED TYPE 2 DIABETES MELLITUS WITH STAGE 3 CHRONIC KIDNEY DISEASE, WITHOUT LONG-TERM CURRENT USE OF INSULIN (H): ICD-10-CM

## 2020-09-04 DIAGNOSIS — Z13.89 SCREENING FOR DIABETIC PERIPHERAL NEUROPATHY: ICD-10-CM

## 2020-09-04 DIAGNOSIS — Z23 NEED FOR SHINGLES VACCINE: ICD-10-CM

## 2020-09-04 DIAGNOSIS — I89.0 LYMPHEDEMA: ICD-10-CM

## 2020-09-04 DIAGNOSIS — E11.22 CONTROLLED TYPE 2 DIABETES MELLITUS WITH STAGE 3 CHRONIC KIDNEY DISEASE, WITHOUT LONG-TERM CURRENT USE OF INSULIN (H): ICD-10-CM

## 2020-09-04 DIAGNOSIS — I27.20 PULMONARY HTN (H): ICD-10-CM

## 2020-09-04 DIAGNOSIS — M00.9 SEPTIC HIP (H): ICD-10-CM

## 2020-09-04 DIAGNOSIS — J45.40 MODERATE PERSISTENT ASTHMA, UNSPECIFIED WHETHER COMPLICATED: ICD-10-CM

## 2020-09-04 PROCEDURE — 99207 C FOOT EXAM  NO CHARGE: CPT | Performed by: INTERNAL MEDICINE

## 2020-09-04 PROCEDURE — 99214 OFFICE O/P EST MOD 30 MIN: CPT | Performed by: INTERNAL MEDICINE

## 2020-09-04 NOTE — LETTER
My Asthma Action Plan    Name: Mara Colindres   YOB: 1934  Date: 9/4/2020   My doctor: Steven Abrams MD   My clinic: [facility]        My Rescue Medicine:   Albuterol inhaler (Proair/Ventolin/Proventil HFA)  2-4 puffs EVERY 4 HOURS as needed. Use a spacer if recommended by your provider.   My Asthma Severity:   Intermittent / Exercise Induced  Know your asthma triggers: Patient is unaware of triggers             GREEN ZONE   Good Control    I feel good    No cough or wheeze    Can work, sleep and play without asthma symptoms       Take your asthma control medicine every day.     1. If exercise triggers your asthma, take your rescue medication    15 minutes before exercise or sports, and    During exercise if you have asthma symptoms  2. Spacer to use with inhaler: If you have a spacer, make sure to use it with your inhaler             YELLOW ZONE Getting Worse  I have ANY of these:    I do not feel good    Cough or wheeze    Chest feels tight    Wake up at night   1. Keep taking your Green Zone medications  2. Start taking your rescue medicine:    every 20 minutes for up to 1 hour. Then every 4 hours for 24-48 hours.  3. If you stay in the Yellow Zone for more than 12-24 hours, contact your doctor.  4. If you do not return to the Green Zone in 12-24 hours or you get worse, start taking your oral steroid medicine if prescribed by your provider.           RED ZONE Medical Alert - Get Help  I have ANY of these:    I feel awful    Medicine is not helping    Breathing getting harder    Trouble walking or talking    Nose opens wide to breathe       1. Take your rescue medicine NOW  2. If your provider has prescribed an oral steroid medicine, start taking it NOW  3. Call your doctor NOW  4. If you are still in the Red Zone after 20 minutes and you have not reached your doctor:    Take your rescue medicine again and    Call 911 or go to the emergency room right away    See your regular  doctor within 2 weeks of an Emergency Room or Urgent Care visit for follow-up treatment.          Annual Reminders:  Meet with Asthma Educator,  Flu Shot in the Fall, consider Pneumonia Vaccination for patients with asthma (aged 19 and older).    Pharmacy: [id number] - [place], [place] - [address] AT [facility] [address]    Electronically signed by Steven Abrams MD   Date: 09/04/20                    Asthma Triggers  How To Control Things That Make Your Asthma Worse    Triggers are things that make your asthma worse.  Look at the list below to help you find your triggers and   what you can do about them. You can help prevent asthma flare-ups by staying away from your triggers.      Trigger                                                          What you can do   Cigarette Smoke  Tobacco smoke can make asthma worse. Do not allow smoking in your home, car or around you.  Be sure no one smokes at a child s day care or school.  If you smoke, ask your health care provider for ways to help you quit.  Ask family members to quit too.  Ask your health care provider for a referral to Quit Plan to help you quit smoking, or call 2-302-082-PLAN.     Colds, Flu, Bronchitis  These are common triggers of asthma. Wash your hands often.  Don t touch your eyes, nose or mouth.  Get a flu shot every year.     Dust Mites  These are tiny bugs that live in cloth or carpet. They are too small to see. Wash sheets and blankets in hot water every week.   Encase pillows and mattress in dust mite proof covers.  Avoid having carpet if you can. If you have carpet, vacuum weekly.   Use a dust mask and HEPA vacuum.   Pollen and Outdoor Mold  Some people are allergic to trees, grass, or weed pollen, or molds. Try to keep your windows closed.  Limit time out doors when pollen count is high.   Ask you health care provider about taking medicine during allergy season.     Animal Dander  Some people are allergic to  skin flakes, urine or saliva from pets with fur or feathers. Keep pets with fur or feathers out of your home.    If you can t keep the pet outdoors, then keep the pet out of your bedroom.  Keep the bedroom door closed.  Keep pets off cloth furniture and away from stuffed toys.     Mice, Rats, and Cockroaches  Some people are allergic to the waste from these pests.   Cover food and garbage.  Clean up spills and food crumbs.  Store grease in the refrigerator.   Keep food out of the bedroom.   Indoor Mold  This can be a trigger if your home has high moisture. Fix leaking faucets, pipes, or other sources of water.   Clean moldy surfaces.  Dehumidify basement if it is damp and smelly.   Smoke, Strong Odors, and Sprays  These can reduce air quality. Stay away from strong odors and sprays, such as perfume, powder, hair spray, paints, smoke incense, paint, cleaning products, candles and new carpet.   Exercise or Sports  Some people with asthma have this trigger. Be active!  Ask your doctor about taking medicine before sports or exercise to prevent symptoms.    Warm up for 5-10 minutes before and after sports or exercise.     Other Triggers of Asthma  Cold air:  Cover your nose and mouth with a scarf.  Sometimes laughing or crying can be a trigger.  Some medicines and food can trigger asthma.

## 2020-09-04 NOTE — PROGRESS NOTES
Subjective     Mara Colindres is a 85 year old female who presents to clinic today for the following health issues:    HPI       Chief Complaint   Patient presents with     Paperwork     documentation for hospital bed        Age-related osteoporosis without current pathological fracture  Lymphedema  Moderate persistent asthma, unspecified whether complicated  Hypovitaminosis D  Pulmonary HTN (H)  Failed total hip arthroplasty, sequela  Diabetic polyneuropathy associated with diabetes mellitus due to underlying condition (H)  Need for shingles vaccine  Screening for diabetic peripheral neuropathy  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, without long-term current use of insulin (H)  Septic hip (H)  CKD (chronic kidney disease) stage 4, GFR 15-29 ml/min (H)  Chronic deep vein thrombosis (DVT) of femoral vein of right lower extremity (H)     Mara is a frail elderly woman who has difficulties with getting into the clinic and we need to maximize the value of these times she's actually here especially with the Coronavirus (COVID-19) situation.    Ostensibly she's here mainly to meet the medicare requirements for a new hospital bed.    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 She is no longer using her asthma medications since talking oxygen.    Using a motorized scooter type of vehicle due to such difficulties with walking  She's asking for a hospital bed . This need a face to face encounter today.    Reasons are several. The patient does require positioning of the body in ways not feasible with an ordinary bed due to the medical condition that is expected to last at least one month. She has a massive lymphedema condition and needs leg elevations as well as needs to elevate the head of the bed for breathing issues  with her pulmonary hypertension.     The patient also require a bed height different than a fixed hospital bed to permit transfers to wheelchair, as she can't reliably handle the differences between bed height and safe transfers to her wheelchair.      I also feel this patient requires frequent changes in body position to help the complications such as ulcers to the skin. Due to her advanced age, generalized weakness, and limited mobility, a hospital bed would be beneficial to prevent ulcers.    GFR Estimate   Date Value Ref Range Status   08/11/2020 37 (L) >60 mL/min/[1.73_m2] Final     Comment:     Non  GFR Calc  Starting 12/18/2018, serum creatinine based estimated GFR (eGFR) will be   calculated using the Chronic Kidney Disease Epidemiology Collaboration   (CKD-EPI) equation.     01/07/2020 36 (L) >60 mL/min/[1.73_m2] Final     Comment:     Non  GFR Calc  Starting 12/18/2018, serum creatinine based estimated GFR (eGFR) will be   calculated using the Chronic Kidney Disease Epidemiology Collaboration   (CKD-EPI) equation.     04/29/2019 34 (L) >60 mL/min/[1.73_m2] Final     Comment:     Non  GFR Calc  Starting 12/18/2018, serum creatinine based estimated GFR (eGFR) will be   calculated using the Chronic Kidney Disease Epidemiology Collaboration   (CKD-EPI) equation.     11/28/2018 29 (L) >60 mL/min/1.7m2 Final     Comment:     Non  GFR Calc   07/09/2018 29 (L) >60 mL/min/1.7m2 Final     Comment:     Non  GFR Calc       Additional issues reviewed in assessment and plan section      Review of Systems   Constitutional, HEENT, cardiovascular, pulmonary, gi and gu systems are negative, except as otherwise noted.      Objective    /76   Pulse 100   Temp 98.2  F (36.8  C) (Oral)   Resp 15   Wt 114.3 kg (252 lb)   SpO2 98%   BMI 44.64 kg/m    There is no height or weight on file to calculate BMI.  Physical Exam   GENERAL: healthy,  alert and no distress  NECK: no adenopathy, no asymmetry, masses, or scars and thyroid normal to palpation  RESP: lungs clear to auscultation - no rales, rhonchi or wheezes  CV: regular rate and rhythm, normal S1 S2, no S3 or S4, no murmur, click or rub, no peripheral edema and peripheral pulses strong  MS: there's no evidence of active open ulcerations or infections but she's got 3+ edema bilateral     Orders Placed This Encounter   Procedures     FOOT EXAM     DX Hip/Pelvis/Spine     Albumin Random Urine Quantitative with Creat Ratio     Patient completed her pre-visit laboratory studies on 8/11/2020 and these are looking pretty good ! Especially nice to see her hemoglobin a1c  [ diabetes test ] at 7.1%        Assessment & Plan     Age-related osteoporosis without current pathological fracture  We reviewed her history and healthcare maintenance issues and she's a good candidate for the recheck DEXA scan   - DX Hip/Pelvis/Spine; Future    Lymphedema  Needs hospital bed     Moderate persistent asthma, unspecified whether complicated  Well controlled since oxygen and CPAP [ continuous positive airway pressure]     Hypovitaminosis D  Stable, see 8/11/2020 laboratory studies     Pulmonary HTN (H)  Stable phase of chronic illness , see office visit notes with Dr. Gokul Thompson, cardiologist with the [facility]    Failed total hip arthroplasty, sequela  Stable phase of chronic illness     Diabetic polyneuropathy associated with diabetes mellitus due to underlying condition (H)  Continue current plan of care  , Self foot examinations are emphasized      Need for shingles vaccine  Discussed , recommended     Screening for diabetic peripheral neuropathy    - FOOT EXAM    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, without long-term current use of insulin (H)    - Albumin Random Urine Quantitative with Creat Ratio; Future    Septic hip (H)  Stable phase of chronic illness     CKD  (chronic kidney disease) stage 4, GFR 15-29 ml/min (H)  See 8/11 laboratory studies, stable phase of chronic illness     Chronic deep vein thrombosis (DVT) of femoral vein of right lower extremity (H)  Patient continues with coumadin          See patient after-visit summary     Return in about 6 months (around 3/4/2021) for Routine Visit.    Steven Abrams MD  [facility]

## 2020-09-09 ENCOUNTER — TELEPHONE (OUTPATIENT)
Dept: FAMILY MEDICINE | Facility: CLINIC | Age: 85
End: 2020-09-09

## 2020-09-09 ASSESSMENT — ASTHMA QUESTIONNAIRES: ACT_TOTALSCORE: 25

## 2020-09-11 ENCOUNTER — ANCILLARY PROCEDURE (OUTPATIENT)
Dept: BONE DENSITY | Facility: CLINIC | Age: 85
End: 2020-09-11
Attending: INTERNAL MEDICINE
Payer: COMMERCIAL

## 2020-09-11 DIAGNOSIS — Z78.0 ASYMPTOMATIC POSTMENOPAUSAL STATUS: ICD-10-CM

## 2020-09-11 DIAGNOSIS — M81.0 AGE-RELATED OSTEOPOROSIS WITHOUT CURRENT PATHOLOGICAL FRACTURE: ICD-10-CM

## 2020-09-11 PROCEDURE — 77085 DXA BONE DENSITY AXL VRT FX: CPT | Performed by: INTERNAL MEDICINE

## 2020-09-11 PROCEDURE — 77081 DXA BONE DENSITY APPENDICULR: CPT | Mod: 59 | Performed by: INTERNAL MEDICINE

## 2020-09-11 NOTE — TELEPHONE ENCOUNTER
"Per Ethel Palma - question 6 was not addressed which is:    6. Does the patient require frequent changes in body position and/or have an immediate need for change in body position?  Yes or No (Please document how many changes per day).    A sentence needs to be added that says \"the patient does require frequent changes in body position and has an immediate need for change in body position\".    Routing to Dr. Abrams to add addendum to office visit.  Halima Bob,         "
And I didn't answer this in the progress note because I had asked this question of patient and the answer was no. Possibly there are dimensions to this question I am missing ?    We would need to discuss this with patient. According to my understanding a yes answer would be if patient has ulcerations or sores and needs body repositioning or what else is there to this question ?.    Can you review this question with the medical supply house as to was there a dimension to this question I didnt understand fully ? Meaning, when I thought no, should I have thought yes ? Sorry but we are really trying here but may need more information !    Steven Abrams MD    
Completed    See chart note addendum     Steven Abrams MD    
I thought I specifically went through the hospital bed questions with patient and thought I answered correctly.    I am not sure what specific qualifications are necessary to answer these questions. They are going to need to help me more here with this.    I am needing    1. The questions to answer  2. The choice of conditions and answers to select from so I can properly addend the chart note. Currently I don't feel I understand how to complete an addendum with the specified information     Steven Abrams MD    
Office visit faxed to Ethel at [facility] to fax number [phone].  Halima Bob,     
Reason for Call:  Other call back    Detailed comments: [facility] is calling stating received recent requested chart notes for hospital bed, but would like to request frequent change in bedding position and /or immediate need in change of position for semi electric bed. Please call to discuss. Thank you.    Phone Number Patient can be reached at: [phone]    Best Time:     Can we leave a detailed message on this number? YES    Call taken on 9/9/2020 at 9:21 AM by Solange Fofana      
Routing to Dr. Abrams to addend office note if possible.  Halima Bob,     
n/a

## 2020-09-17 ENCOUNTER — VIRTUAL VISIT (OUTPATIENT)
Dept: INTERNAL MEDICINE | Facility: CLINIC | Age: 85
End: 2020-09-17
Payer: COMMERCIAL

## 2020-09-17 DIAGNOSIS — M81.0 AGE-RELATED OSTEOPOROSIS WITHOUT CURRENT PATHOLOGICAL FRACTURE: Primary | ICD-10-CM

## 2020-09-17 PROCEDURE — 99213 OFFICE O/P EST LOW 20 MIN: CPT | Mod: 95 | Performed by: INTERNAL MEDICINE

## 2020-09-17 NOTE — PROGRESS NOTES
"Mara Colindres is a 85 year old female who is being evaluated via a billable telephone visit.      The patient has been notified of following:     \"This telephone visit will be conducted via a call between you and your physician/provider. We have found that certain health care needs can be provided without the need for a physical exam.  This service lets us provide the care you need with a short phone conversation.  If a prescription is necessary we can send it directly to your pharmacy.  If lab work is needed we can place an order for that and you can then stop by our lab to have the test done at a later time.    Telephone visits are billed at different rates depending on your insurance coverage. During this emergency period, for some insurers they may be billed the same as an in-person visit.  Please reach out to your insurance provider with any questions.    If during the course of the call the physician/provider feels a telephone visit is not appropriate, you will not be charged for this service.\"    Patient has given verbal consent for Telephone visit?  Yes    What phone number would you like to be contacted at? [phone]    How would you like to obtain your AVS? Mail a copy    Subjective     Mara Colindres is a 85 year old female who presents via phone visit today for the following health issues:    HPI    Patient presents with:  Osteoporosis: discuss starting medicaiton for oseoporosis-fosamax vs prolia injection    today's telephone MD visit arises out of patient's questions regarding her DEXA scan and diagnosis of and treatment of osteoporosis.    She asks is her osteoporosis worse then 3 years ago ? And we recommended Fosamax (alendronate) or denosumab (Prolia) 60 MG/ML SOLN injection ? Patient doesn't know which to choose. Patient has such chronic shortness of breath, would this affect her breathing ? As it turns out she did take alendronate some years back and it was stopped secondary to greater " then 5 years of therapy [ I could not find the exact details in old chart review ]. Then we got the follow up DEXA scan after being off this medication for all that time, the repeat DEXA scan again confirm the presence of need for a bone building drug like a bisphosphonate.      Review of Systems   Constitutional, HEENT, cardiovascular, pulmonary, gi and gu systems are negative, except as otherwise noted.       Objective          Vitals:  No vitals were obtained today due to virtual visit.    healthy, alert and no distress  PSYCH: Alert and oriented times 3; coherent speech, normal   rate and volume, able to articulate logical thoughts, able   to abstract reason, no tangential thoughts, no hallucinations   or delusions  Her affect is normal  RESP: No cough, no audible wheezing, able to talk in full sentences  Remainder of exam unable to be completed due to telephone visits            Assessment/Plan:    Assessment & Plan     Age-related osteoporosis without current pathological fracture  We reviewed the diagnosis, her recent DEXA scan and the diagnosis of osteoporosis. Recommendations to begin treatment Fosamax (alendronate) , all questions answered.         Return in about 6 months (around 3/17/2021).    Steven Abrams MD  [facility]    Phone call duration:  13 minutes    Telephone MD visit     Call began at 1:52 PM   Call ended at 2:05 PM

## 2020-09-30 DIAGNOSIS — N18.30 CONTROLLED TYPE 2 DIABETES MELLITUS WITH STAGE 3 CHRONIC KIDNEY DISEASE, WITHOUT LONG-TERM CURRENT USE OF INSULIN (H): ICD-10-CM

## 2020-09-30 DIAGNOSIS — E11.22 CONTROLLED TYPE 2 DIABETES MELLITUS WITH STAGE 3 CHRONIC KIDNEY DISEASE, WITHOUT LONG-TERM CURRENT USE OF INSULIN (H): ICD-10-CM

## 2020-10-01 ENCOUNTER — TELEPHONE (OUTPATIENT)
Dept: FAMILY MEDICINE | Facility: CLINIC | Age: 85
End: 2020-10-01

## 2020-10-01 RX ORDER — DULAGLUTIDE 1.5 MG/.5ML
1.5 INJECTION, SOLUTION SUBCUTANEOUS
Qty: 2 ML | Refills: 5 | Status: SHIPPED | OUTPATIENT
Start: 2020-10-01 | End: 2021-07-29

## 2020-10-01 NOTE — TELEPHONE ENCOUNTER
Mara Colindres is overdue for INR check.      Spoke with Ayla and scheduled INR appointment on 10/15     Celeste Ruiz RN - [facility]

## 2020-10-15 ENCOUNTER — OFFICE VISIT (OUTPATIENT)
Dept: OPHTHALMOLOGY | Facility: CLINIC | Age: 85
End: 2020-10-15
Payer: COMMERCIAL

## 2020-10-15 ENCOUNTER — ANTICOAGULATION THERAPY VISIT (OUTPATIENT)
Dept: NURSING | Facility: CLINIC | Age: 85
End: 2020-10-15

## 2020-10-15 DIAGNOSIS — H52.4 PRESBYOPIA: ICD-10-CM

## 2020-10-15 DIAGNOSIS — Z01.01 ENCOUNTER FOR EXAMINATION OF EYES AND VISION WITH ABNORMAL FINDINGS: ICD-10-CM

## 2020-10-15 DIAGNOSIS — E11.3299 BACKGROUND DIABETIC RETINOPATHY (H): ICD-10-CM

## 2020-10-15 DIAGNOSIS — E11.22 CONTROLLED TYPE 2 DIABETES MELLITUS WITH STAGE 3 CHRONIC KIDNEY DISEASE, WITHOUT LONG-TERM CURRENT USE OF INSULIN (H): Primary | ICD-10-CM

## 2020-10-15 DIAGNOSIS — I48.19 PERSISTENT ATRIAL FIBRILLATION (H): ICD-10-CM

## 2020-10-15 DIAGNOSIS — H43.812 POSTERIOR VITREOUS DETACHMENT OF LEFT EYE: ICD-10-CM

## 2020-10-15 DIAGNOSIS — Z79.01 LONG TERM CURRENT USE OF ANTICOAGULANT THERAPY: ICD-10-CM

## 2020-10-15 DIAGNOSIS — N18.30 CONTROLLED TYPE 2 DIABETES MELLITUS WITH STAGE 3 CHRONIC KIDNEY DISEASE, WITHOUT LONG-TERM CURRENT USE OF INSULIN (H): Primary | ICD-10-CM

## 2020-10-15 DIAGNOSIS — H26.491 POSTERIOR CAPSULAR OPACIFICATION VISUALLY SIGNIFICANT OF RIGHT EYE: ICD-10-CM

## 2020-10-15 DIAGNOSIS — Z79.01 CHRONIC ANTICOAGULATION: ICD-10-CM

## 2020-10-15 DIAGNOSIS — Z96.1 PSEUDOPHAKIA: ICD-10-CM

## 2020-10-15 LAB
CAPILLARY BLOOD COLLECTION: NORMAL
INR PPP: 2.8 (ref 0.86–1.14)

## 2020-10-15 PROCEDURE — 92015 DETERMINE REFRACTIVE STATE: CPT | Mod: GY | Performed by: OPHTHALMOLOGY

## 2020-10-15 PROCEDURE — 92014 COMPRE OPH EXAM EST PT 1/>: CPT | Performed by: OPHTHALMOLOGY

## 2020-10-15 PROCEDURE — 36416 COLLJ CAPILLARY BLOOD SPEC: CPT | Performed by: INTERNAL MEDICINE

## 2020-10-15 PROCEDURE — 85610 PROTHROMBIN TIME: CPT | Performed by: INTERNAL MEDICINE

## 2020-10-15 ASSESSMENT — REFRACTION_WEARINGRX
OS_ADD: +2.75
OD_ADD: +2.75
OS_SPHERE: -1.50
OS_AXIS: 162
OS_CYLINDER: +1.00
OD_CYLINDER: +0.25
SPECS_TYPE: BIFOCAL-LINED
OD_AXIS: 035
OD_SPHERE: -1.00

## 2020-10-15 ASSESSMENT — CUP TO DISC RATIO
OD_RATIO: 0.5
OS_RATIO: 0.5

## 2020-10-15 ASSESSMENT — TONOMETRY
OD_IOP_MMHG: 14
IOP_METHOD: APPLANATION
OS_IOP_MMHG: 14

## 2020-10-15 ASSESSMENT — REFRACTION_MANIFEST
OD_AXIS: 045
OS_CYLINDER: +0.50
OD_SPHERE: -1.25
OS_AXIS: 151
OS_ADD: +3.00
OD_CYLINDER: +0.75
OD_ADD: +3.00
OS_SPHERE: -1.50

## 2020-10-15 ASSESSMENT — VISUAL ACUITY
OD_CC+: -1
CORRECTION_TYPE: GLASSES
OD_CC: 20/30
METHOD: SNELLEN - LINEAR
OS_CC: 20/20

## 2020-10-15 ASSESSMENT — CONF VISUAL FIELD
OD_NORMAL: 1
OS_NORMAL: 1

## 2020-10-15 ASSESSMENT — EXTERNAL EXAM - LEFT EYE: OS_EXAM: 1+ BROW PTOSIS

## 2020-10-15 ASSESSMENT — SLIT LAMP EXAM - LIDS
COMMENTS: 1+ DERMATOCHALASIS
COMMENTS: 1+ DERMATOCHALASIS

## 2020-10-15 ASSESSMENT — EXTERNAL EXAM - RIGHT EYE: OD_EXAM: 1+ BROW PTOSIS

## 2020-10-15 NOTE — PROGRESS NOTES
Anticoagulation Management    Unable to reach Mara today.    Today's INR result of 2.8 is therapeutic (goal INR of 2.0-3.0).  Result received from: Clinic Lab    Follow up required to confirm warfarin dose taken and assess for changes    Left message to call [phone]     Plan: continue maintenance dose and recheck INR in 6 weeks.      Anticoagulation clinic to follow up    Celeste Ruiz RN

## 2020-10-15 NOTE — LETTER
10/15/2020         RE: Mara Colindres  [address] [facility] [address]        Dear Colleague,    Thank you for referring your patient, Mara Colindres, to the [facility]. Please see a copy of my visit note below.     Current Eye Medications:  Systane both eyes prn     Subjective:  Here for complete today, DM, last a1c was 7.1 on 8-11-20. Right eye has been a little cloudy off and on pavel when reading. Little floaters have increased lately. Father had glaucoma     Objective:  See Ophthalmology Exam.       Assessment:  Visually significant posterior capsule opacity right eye.  Otherwise stable eye exam.  Stable mild background diabetic retinopathy both eyes.      ICD-10-CM    1. 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, without long-term current use of insulin (H)  E11.22     N18.30    2. Pseudophakia, ou  Z96.1    3. Background diabetic retinopathy, mild, ou  E11.3299    4. Posterior capsular opacification visually significant of right eye  H26.491    5. Posterior vitreous detachment of left eye  H43.812    6. Encounter for examination of eyes and vision with abnormal findings  Z01.01    7. Presbyopia  H52.4         Plan:  Glasses Rx given - optional  Use artificial tears up to 4 times daily both eyes as needed.  (Refresh Tears, Systane Ultra/Balance, or Theratears)   Option for Yag Laser capsulotomy right eye at any time if blurring gets worse.- call to schedule.  Otherwise, Call in June 2021 for an appointment in October 2021 for Complete Exam    Dr. Tavares [phone]           Again, thank you for allowing me to participate in the care of your patient.        Sincerely,        Matty Tavares MD

## 2020-10-15 NOTE — PROGRESS NOTES
ANTICOAGULATION MANAGEMENT     Patient Name:  Mara Colindres  Date:  10/15/2020    ASSESSMENT /SUBJECTIVE:    Today's INR result of 2.8 is therapeutic. Goal INR of 2.0-3.0      Warfarin dose taken: Warfarin taken as instructed    Diet: No new diet changes affecting INR    Medication changes/ interactions: No new medications/supplements affecting INR    Previous INR: Therapeutic     S/S of bleeding or thromboembolism: No    New injury or illness: No    Upcoming surgery, procedure or cardioversion: No    Additional findings: None      PLAN:    Telephone call with Mara regarding INR result and instructed:     Warfarin Dosing Instructions: Continue your current warfarin dose 5 mg Mon/Wed/Fri and 2.5 mg  ROW    Instructed patient to follow up no later than: 6 weeks  Lab visit scheduled    Education provided: Target INR goal and significance of current INR result      Mara verbalizes understanding and agrees to warfarin dosing plan.    Instructed to call the Anticoagulation Clinic for any changes, questions or concerns. (#[phone])        Celeste Ruiz RN      OBJECTIVE:  Recent labs: (last 7 days)     10/15/20  1309   INR 2.80*         No question data found.  Anticoagulation Summary  As of 10/15/2020    INR goal:  2.0-3.0   TTR:  100.0 % (7.7 mo)   INR used for dosin.80 (10/15/2020)   Warfarin maintenance plan:  5 mg (5 mg x 1) every Mon, Wed, Fri; 2.5 mg (5 mg x 0.5) all other days   Full warfarin instructions:  5 mg every Mon, Wed, Fri; 2.5 mg all other days   Weekly warfarin total:  25 mg   No change documented:  Celeste Ruiz RN   Plan last modified:  Celeste Ruiz RN (10/2/2019)   Next INR check:  2020   Priority:  Maintenance   Target end date:  Indefinite    Indications    Long-term (current) use of anticoagulants [Z79.01] [Z79.01]  Persistent atrial fibrillation (H) [I48.19]             Anticoagulation Episode Summary     INR check location:      Preferred lab:      Send INR reminders  to:  KELLY ROJAS    Comments:        Anticoagulation Care Providers     Provider Role Specialty Phone number    Steven Abrams MD Referring Internal Medicine [phone]

## 2020-10-15 NOTE — PROGRESS NOTES
Current Eye Medications:  Systane both eyes prn     Subjective:  Here for complete today, DM, last a1c was 7.1 on 8-11-20. Right eye has been a little cloudy off and on pavel when reading. Little floaters have increased lately. Father had glaucoma     Objective:  See Ophthalmology Exam.       Assessment:  Visually significant posterior capsule opacity right eye.  Otherwise stable eye exam.  Stable mild background diabetic retinopathy both eyes.      ICD-10-CM    1. 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, without long-term current use of insulin (H)  E11.22     N18.30    2. Pseudophakia, ou  Z96.1    3. Background diabetic retinopathy, mild, ou  E11.3299    4. Posterior capsular opacification visually significant of right eye  H26.491    5. Posterior vitreous detachment of left eye  H43.812    6. Encounter for examination of eyes and vision with abnormal findings  Z01.01    7. Presbyopia  H52.4         Plan:  Glasses Rx given - optional  Use artificial tears up to 4 times daily both eyes as needed.  (Refresh Tears, Systane Ultra/Balance, or Theratears)   Option for Yag Laser capsulotomy right eye at any time if blurring gets worse.- call to schedule.  Otherwise, Call in June 2021 for an appointment in October 2021 for Complete Exam    Dr. Tavares [phone]

## 2020-10-15 NOTE — PATIENT INSTRUCTIONS
Glasses Rx given - optional  Use artificial tears up to 4 times daily both eyes as needed.  (Refresh Tears, Systane Ultra/Balance, or Theratears)   Option for Yag Laser capsulotomy right eye at any time if blurring gets worse.- call to schedule.  Otherwise, Call in June 2021 for an appointment in October 2021 for Complete Exam    Dr. Tavares [phone]    Patient Education   Diabetes weakens the blood vessels all over the body, including the eyes. Damage to the blood vessels in the eyes can cause swelling or bleeding into part of the eye (called the retina). This is called diabetic retinopathy (DENISSE-tin-AH-puh-thee). If not treated, this disease can cause vision loss or blindness.   Symptoms may include blurred or distorted vision, but many people have no symptoms. It's important to see your eye doctor regularly to check for problems.   Early treatment and good control can help protect your vision. Here are the things you can do to help prevent vision loss:      1. Keep your blood sugar levels under tight control.      2. Bring high blood pressure under control.      3. No smoking.      4. Have yearly dilated eye exams.

## 2020-10-22 VITALS — HEIGHT: 62 IN | WEIGHT: 252 LBS | BODY MASS INDEX: 46.38 KG/M2

## 2020-10-22 ASSESSMENT — MIFFLIN-ST. JEOR: SCORE: 1541.31

## 2020-10-22 NOTE — PROGRESS NOTES
"Mara Colindres is a 85 year old female who is being evaluated via a billable telephone visit.      The patient has been notified of following:     \"This telephone visit will be conducted via a call between you and your physician/provider. We have found that certain health care needs can be provided without the need for a physical exam.  This service lets us provide the care you need with a short phone conversation.  If a prescription is necessary we can send it directly to your pharmacy.  If lab work is needed we can place an order for that and you can then stop by our lab to have the test done at a later time.    Telephone visits are billed at different rates depending on your insurance coverage. During this emergency period, for some insurers they may be billed the same as an in-person visit.  Please reach out to your insurance provider with any questions.    If during the course of the call the physician/provider feels a telephone visit is not appropriate, you will not be charged for this service.\"    Patient has given verbal consent for Telephone visit?  Yes    What phone number would you like to be contacted at? [phone]    How would you like to obtain your AVS? Mail a copy    Phone call duration: 18 minutes    Natalie Bruce MD    Chief complaint: Follow-up sleep disordered breathing    History of Present Illness: 85-year-old female with history of heart failure with preserved ejection fraction, pulmonary hypertension, obesity, sleep apnea with respiratory muscle weakness, persistent atrial fibrillation.  She continues to go to bed bed around 11 PM typically rests and read maybe watch TV.  She does not think she falls asleep until closer to 4.  She is usually out of bed for the day by 10 AM.  She has been taking pain medication for body pains typically before bed.  Otherwise no sleep aids.  She denies any problems with her Pap machine.  She thinks she thinks some leak issues with she replaced the " mask.  She is not taking any naps.  She drinks 1 caffeinated beverage in the morning.  She feels that the pressure settings are adequate and denies waking up with PND/orthopnea.  She is very short of breath during the day with minimal activity.  She takes diuretics and thinks she is gained a little bit of weight.  She is not sure if it is water weight or other.    Total score - [place]: 5 (10/22/2020 10:00 AM) (Less than 10 normal)    VAMSHI Total Score: 8 (normal 0-7, mild 8-14, moderate 15-21, severe 22-28)    Past Medical History:   Diagnosis Date     A-fib (H)      Bilateral leg edema     fairly severe     Cataract      CHF (congestive heart failure) (H)      Chronic diarrhea     neg colonoscopy abt one year ago, even to the point of having fecal accidents     Chronic venous insufficiency      CKD (chronic kidney disease) stage 3, GFR 30-59 ml/min      Diabetic retinopathy (H)      DVT (deep venous thrombosis) (H)      GERD (gastroesophageal reflux disease)      Hyperlipidemia LDL goal <100 10/31/2010     Hypertension goal BP (blood pressure) < 140/90      Long-term (current) use of anticoagulants      Moderate persistent asthma 8/15/2012     MRSA (methicillin resistant Staphylococcus aureus)     postop hip     Obesity      S/P colonoscopy 2008 ( ?)     Septic hip (H)     sees an ID specialist Dr ANNEL DE LA CRUZ She remains on long-term suppressive antibiotic therapy using Minocin 50 mg twice daily     Stasis dermatitis      Urinary incontinence, mixed      Vitiligo        Allergies   Allergen Reactions     Atenolol Other (See Comments)     Arms became limp, almost stroke like symptoms per patient.   Tolerates metoprolol fine     Metformin      Side effects / gastrointestinal symptoms        Current Outpatient Medications   Medication     ACCU-CHEK PARVEZ PLUS test strip     acetaminophen (TYLENOL) 500 MG tablet     ASPIRIN NOT PRESCRIBED, INTENTIONAL,     Calcium Carbonate-Vitamin D (CALCIUM + D PO)      diphenoxylate-atropine (LOMOTIL) 2.5-0.025 MG tablet     dulaglutide (TRULICITY) 1.5 MG/0.5ML pen     furosemide (LASIX) 40 MG tablet     HYDROcodone-acetaminophen (NORCO) 5-325 MG tablet     losartan (COZAAR) 25 MG tablet     metoprolol tartrate (LOPRESSOR) 100 MG tablet     OMEPRAZOLE CPCR 20 MG OR     order for DME     ORDER FOR DME     PARoxetine (PAXIL) 20 MG tablet     potassium chloride ER (K-TAB/KLOR-CON) 10 MEQ CR tablet     Resveratrol 100 MG CAPS     STATIN NOT PRESCRIBED, INTENTIONAL,     warfarin ANTICOAGULANT (COUMADIN) 5 MG tablet     No current facility-administered medications for this visit.        Social History     Socioeconomic History     Marital status:      Spouse name: (Bill)     Number of children: 3     Years of education: Not on file     Highest education level: Not on file   Occupational History     Employer: RETIRED   Social Needs     Financial resource strain: Not on file     Food insecurity     Worry: Not on file     Inability: Not on file     Transportation needs     Medical: Not on file     Non-medical: Not on file   Tobacco Use     Smoking status: Never Smoker     Smokeless tobacco: Never Used   Substance and Sexual Activity     Alcohol use: No     Drug use: No     Sexual activity: Never   Lifestyle     Physical activity     Days per week: Not on file     Minutes per session: Not on file     Stress: Not on file   Relationships     Social connections     Talks on phone: Not on file     Gets together: Not on file     Attends [employer] service: Not on file     Active member of club or organization: Not on file     Attends meetings of clubs or organizations: Not on file     Relationship status: Not on file     Intimate partner violence     Fear of current or ex partner: Not on file     Emotionally abused: Not on file     Physically abused: Not on file     Forced sexual activity: Not on file   Other Topics Concern     Parent/sibling w/ CABG, MI or angioplasty before 65F 55M? No  "  Social History Narrative    [place] in [place]       Family History   Problem Relation Age of Onset     Diabetes Mother      Cancer Mother      Hypertension Mother      Cataracts Mother      Hypertension Father      Glaucoma Father      Cataracts Father      Cancer Son         thyroid cancer     Neurologic Disorder Sister      Alzheimer Disease Sister          EXAM:  Ht 1.575 m (5' 2\")   Wt 114.3 kg (252 lb)   BMI 46.09 kg/m    Pulmonary: Speaking full sentences no audible cough or wheezing  Psychiatric: Alert, no distress, normal affect    PSG 1/8/2020  Weight 259, BMI 45.9  AHI 47.2, RDI 62.9 No REM on diagnostic  CPAP  11 for REM lateral (no REM supine seen on PAP)       [employer]   Auto-PAP 10.0 - 13.0 cmH2O 30 day usage data:    60% of days with > 4 hours of use. 2/30 days with no use.   Average use 317 minutes per day.   95%ile Leak 48.77 L/min.   CPAP 95% pressure 12 cm.   AHI 9.07 events per hour.     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 fib, obesity, diabetes.  She has chronic dyspnea.  She is doing well on Pap therapy with improved leak with recent mask change AHI elevated as above however is improving.  Patient has no explanation for why AHI was elevated in September.  She did have excessive leak at that time. AHI coming back down.    PLAN:  Prescription generated to keep her supplies up-to-date do not recommend a pressure change today.  Urged her to continue to keep her supplies up-to-date as this seems to help fix the leak.  Urged her to follow-up with cardiology regarding shortness of breath and, heart failure.  We will continue close follow-up with in 6 months.        Natalie Bruce M.D.  Pulmonary/Critical Care/Sleep Medicine    [facility]   [address], [address]   [address]   [place] [address]   Appointments: [phone]    The above note was dictated using voice recognition software and may include typographical errors. Please contact the author for any clarifications.

## 2020-10-22 NOTE — PATIENT INSTRUCTIONS
For general sleep health questions: http://sleepeducation.org    For tips about PAP and COVID -19:  https://www.[facility].org/patients/patient-resources/resources/covid-19-and-home-pap-therapy.pdf          Continue PAP therapy every night, for all hours that you are sleeping.  If you nap use CPAP.  As always, try to get at least 8 hours of sleep or more each day, keep a regular sleep schedule, and avoid sleep deprivation. Avoid alcohol.    Reasons that you might need a change to your pressure therapy would be weight gain or loss, waking having inadvertently removed your PAP overnight, having previously felt refreshed by sleep with CPAP use and now waking un-refreshed, and return of daytime sleepiness. Also, the development of new medical problems  (such as heart failure, stroke, medications such as narcotics) can sometimes affect breathing at night and change your PAP therapy needs.    Please bring PAP with you if you are hospitalized.  If anticipating surgery be sure to discuss with your surgeon that you have sleep apnea and use PAP therapy.      Maintain your equipment as recommended which includes routine cleaning and replacement of supplies.      Call DME for any questions regarding supplies or maintenance.    [facility], [facility] [phone]      Do not drive on engage in potentially dangerous activities if feeling sleepy.    Please follow up in sleep clinic again in 6 months and bring your machine and card to your follow-up visit.          Tips for your PAP use-    Mask fitting tips  Mask fitting exercise:    To improve your mask seal and your mobility at night, put mask on and secure in place.  Lie down in bed with full pressure and roll to one side, adjust headgear while in that position to eliminate any leaks. Repeat process rolling to other side.     The mask seal does not have to be perfect:   CPAP machines are designed to make up for small leaks. However,  you will not tolerate leaks blowing in your eyes so you will need to adjust.   Any leak should only be near or at the bottom of the mask.  We expect your mask to leak slightly at night.    Do not over-tighten the headgear straps, tighter IS NOT better, we expect minimal leak.    First try re-positioning the mask or headgear before tightening the headgear straps.  Mask leaks are expected due to changing sleeping positions. Try pulling the mask away from your skin allowing the cushion to re-inflate will minimize the leak.  If you struggle for a good fit, try turning the CPAP off and then readjust the mask by pulling it away from your face and then turning back on the CPAP.        Humidifier tips  Humidifiers can be adjusted to increase or decrease the amount of moisture according to your comfort level. You may need to adjust this frequently at first, but then might only change it with seasonal weather changes.     Try INCREASING the humidity if:  You experience a dry, irritated nasal passage or throat.  You have a runny, drippy nose or sneezing fits after using CPAP.  You experience nasal congestion during or after CPAP use.    Try DECREASING the humidity if:  You have excessive condensation or  rain out  in the tubing or mask.  Otherwise keep the tubing warm during the night by running it underneath the blankets or pillow.      Clinic visit after initial PAP set-up   Bring your equipment with you to your 4 week follow up clinic visit.  We will be extracting your data from the machine.        Travel  Always take your equipment with you.  If you fly with your equipment bring it on with you as a carry on.  Medical equipment does not count as a carry on.    If you travel international the machines take 110-240.  The only adapter needed is the adapter that will fit into the receptacle (outlet).    You may also want to bring an extension cord as many hotel rooms have limited outlets at the bedside.  Do not travel with  water in your humidifier chamber.     Cleaning and Maintenance Guidelines    Equipment Frequency Cleaning Method   Mask First Day    Daily      Weekly Soak mask in hot soapy water for 30 minutes, rinse and air dry.  Wipe nasal cushion with a hot soapy (Ivory, baby shampoo) cloth and rinse.  Baby wipes may also be used.  Do not use anti-bacterial soaps,Amy  liquid soap, rubbing alcohol, bleach or ammonia.  Wash frame in hot soapy water (Ivory, baby shampoo) rinse and let air dry   Headgear Biweekly Wash in hot soapy water, rinse and air dry   Reusable Gray Filter Weekly Wash in hot soapy water, rinse, put in towel squeeze moisture out, let air dry   Disposable White Filter Check Weekly Replace when brown or gray in color; at least every 2 to 3 months   Humidifier Chamber Daily    Weekly Empty distilled water from humidifier and let air dry    Hand wash in hot soapy water, rinse and air dry   Tubing Weekly Wash in hot soapy water, rinse and let air dry   Mask, Tubing and Humidifier Chamber As needed Disinfect: Soak in 1 part distilled white vinegar to 3 parts hot water for 30 minutes, rinse well and air dry  Not the material headgear        MASK AND SUPPLY REORDERING and EQUIPMENT NEEDS through your DME and per your insurance  Reminder: Most insurance companies will allow for a new mask, headgear, tubing, and reusable gray filter every six months.  Disposable white ultra-fine filters are covered monthly.      HOME AND SAFETY INSTRUCTIONS    Do not use frayed or cracked electrical cords, multi plug adaptors, or switched receptacles    Do not immerse electrical equipment into water    Assure that electrical cords do not become a tripping hazard    Your BMI is Body mass index is 46.09 kg/m .    What is BMI?  Body mass index (BMI) is one way to tell whether you are at a healthy weight, overweight, or obese. It measures your weight in relation to your height.  A BMI of 18.5 to 24.9 is in the healthy range. A person with a  BMI of 25 to 29.9 is considered overweight, and someone with a BMI of 30 or greater is considered obese.  Another way to find out if you are at risk for health problems caused by overweight and obesity is to measure your waist. If you are a woman and your waist is more than 35 inches, or if you are a man and your waist is more than 40 inches, your risk of disease may be higher.  More than two-thirds of American adults are considered overweight or obese. Being overweight or obese increases the risk for further weight gain.  Excess weight may lead to heart disease and diabetes. Creating and following plans for healthy eating and physical activity may help you improve your health.    Methods for maintaining or losing weight.  Weight control is part of healthy lifestyle and includes exercise, emotional health, and healthy eating habits.  Careful eating habits lifelong is the mainstay of weight control.  Though there are significant health benefits from weight loss, long-term weight loss with diet alone may be very difficult to achieve- studies show long-term success with dietary management in less than 10% of people. Attaining a healthy weight may be especially difficult to achieve in those with severe obesity. In some cases, medications, devices and surgical management might be considered.    What can you do?  If you are overweight or obese and are interested in methods for weight loss, you should discuss this with your provider.

## 2020-10-23 ENCOUNTER — VIRTUAL VISIT (OUTPATIENT)
Dept: SLEEP MEDICINE | Facility: CLINIC | Age: 85
End: 2020-10-23
Payer: COMMERCIAL

## 2020-10-23 DIAGNOSIS — G47.33 OSA (OBSTRUCTIVE SLEEP APNEA): Primary | ICD-10-CM

## 2020-10-23 DIAGNOSIS — J98.6 ELEVATED DIAPHRAGM: ICD-10-CM

## 2020-10-23 DIAGNOSIS — R06.09 DOE (DYSPNEA ON EXERTION): ICD-10-CM

## 2020-10-23 DIAGNOSIS — E66.01 MORBID OBESITY (H): ICD-10-CM

## 2020-10-23 PROCEDURE — 99213 OFFICE O/P EST LOW 20 MIN: CPT | Mod: 95 | Performed by: INTERNAL MEDICINE

## 2020-12-09 ENCOUNTER — TELEPHONE (OUTPATIENT)
Dept: FAMILY MEDICINE | Facility: CLINIC | Age: 85
End: 2020-12-09

## 2020-12-09 DIAGNOSIS — Z79.01 LONG TERM CURRENT USE OF ANTICOAGULANT THERAPY: ICD-10-CM

## 2020-12-09 DIAGNOSIS — I48.19 PERSISTENT ATRIAL FIBRILLATION (H): ICD-10-CM

## 2020-12-09 NOTE — TELEPHONE ENCOUNTER
ANTICOAGULATION     Mara L Jens is overdue for INR check.      Spoke with patient and scheduled lab appointment on 12/14    Celeste Ruiz RN

## 2020-12-14 ENCOUNTER — ANTICOAGULATION THERAPY VISIT (OUTPATIENT)
Dept: NURSING | Facility: CLINIC | Age: 85
End: 2020-12-14

## 2020-12-14 DIAGNOSIS — Z79.01 CHRONIC ANTICOAGULATION: ICD-10-CM

## 2020-12-14 DIAGNOSIS — I48.19 PERSISTENT ATRIAL FIBRILLATION (H): ICD-10-CM

## 2020-12-14 DIAGNOSIS — Z79.01 LONG TERM CURRENT USE OF ANTICOAGULANT THERAPY: ICD-10-CM

## 2020-12-14 LAB
CAPILLARY BLOOD COLLECTION: NORMAL
INR PPP: 2 (ref 0.86–1.14)

## 2020-12-14 PROCEDURE — 36416 COLLJ CAPILLARY BLOOD SPEC: CPT | Performed by: INTERNAL MEDICINE

## 2020-12-14 PROCEDURE — 85610 PROTHROMBIN TIME: CPT | Performed by: INTERNAL MEDICINE

## 2020-12-14 NOTE — PROGRESS NOTES
Anticoagulation Management    Unable to reach Mara today.    Today's INR result of 2.00 is therapeutic (goal INR of 2.0-3.0).  Result received from: Clinic Lab    Follow up required to confirm warfarin dose taken and assess for changes    [facility]      Anticoagulation clinic to follow up    Shellie Aguayo, RN  [phone]

## 2020-12-14 NOTE — PROGRESS NOTES
ANTICOAGULATION MANAGEMENT     Patient Name:  Mara Colindres  Date:  2020    ASSESSMENT /SUBJECTIVE:    Today's INR result of 2.00 is therapeutic. Goal INR of 2.0-3.0      Warfarin dose taken: Warfarin taken as instructed    Diet: No new diet changes affecting INR    Medication changes/ interactions: No new medications/supplements affecting INR    Previous INR: Therapeutic     S/S of bleeding or thromboembolism: No    New injury or illness: No    Upcoming surgery, procedure or cardioversion: No    Additional findings: None      PLAN:    Telephone call with Mara regarding INR result and instructed:     Warfarin Dosing Instructions: Continue your current warfarin dose 5 mg --; 2.5 mg all other days.    Instructed patient to follow up no later than: 6 weeks  Lab visit scheduled    Education provided: None required      Mara verbalizes understanding and agrees to warfarin dosing plan.    Instructed to call the Anticoagulation Clinic for any changes, questions or concerns. (#[phone])        Shellie Aguayo RN      OBJECTIVE:  Recent labs: (last 7 days)     20  1416   INR 2.00*         INR assessment THER    Recheck INR In: 6 WEEKS    INR Location Clinic      Anticoagulation Summary  As of 2020    INR goal:  2.0-3.0   TTR:  100.0 % (7.7 mo)   INR used for dosin.00 (2020)   Warfarin maintenance plan:  5 mg (5 mg x 1) every Mon, Wed, Fri; 2.5 mg (5 mg x 0.5) all other days   Full warfarin instructions:  5 mg every Mon, Wed, Fri; 2.5 mg all other days   Weekly warfarin total:  25 mg   No change documented:  Shellie Aguayo RN   Plan last modified:  Celeste Ruiz RN (10/2/2019)   Next INR check:  2021   Priority:  Maintenance   Target end date:  Indefinite    Indications    Long-term (current) use of anticoagulants [Z79.01] [Z79.01]  Persistent atrial fibrillation (H) [I48.19]             Anticoagulation Episode Summary     INR check location:      Preferred lab:       Send INR reminders to:  KELLY ROJAS    Comments:        Anticoagulation Care Providers     Provider Role Specialty Phone number    Steven Abrams MD Referring Internal Medicine [phone]

## 2020-12-17 ENCOUNTER — TELEPHONE (OUTPATIENT)
Dept: FAMILY MEDICINE | Facility: CLINIC | Age: 85
End: 2020-12-17

## 2020-12-17 DIAGNOSIS — M15.0 PRIMARY OSTEOARTHRITIS INVOLVING MULTIPLE JOINTS: ICD-10-CM

## 2020-12-17 RX ORDER — HYDROCODONE BITARTRATE AND ACETAMINOPHEN 5; 325 MG/1; MG/1
TABLET ORAL
Qty: 90 TABLET | Refills: 0 | Status: SHIPPED | OUTPATIENT
Start: 2020-12-17 | End: 2020-12-18

## 2020-12-17 NOTE — TELEPHONE ENCOUNTER
Reason for Call:  Prescription refill  Detailed comments: Customer requesting refill for Hydrocodone.  Estefania Lazo [phone]    Phone Number Patient can be reached at: Home number on file [phone] (home) please call once completed.    Best Time: any    Can we leave a detailed message on this number? YES    Call taken on 12/17/2020 at 2:31 PM by Jeanine Ervin

## 2020-12-18 RX ORDER — HYDROCODONE BITARTRATE AND ACETAMINOPHEN 5; 325 MG/1; MG/1
TABLET ORAL
Qty: 90 TABLET | Refills: 0 | Status: SHIPPED | OUTPATIENT
Start: 2020-12-18 | End: 2021-07-01

## 2020-12-18 NOTE — TELEPHONE ENCOUNTER
Dr. Abrams - can you please eprescribe the [place] to [facility]'s [address] [place] [address], so patient doesn't have to come to clinic to  prescription?  Thank you.  Halima Bob,

## 2020-12-18 NOTE — TELEPHONE ENCOUNTER
Hard copy prescription generated, not completely sure where the prescription was to be sent    Steven Abrams MD

## 2021-01-25 ENCOUNTER — ANTICOAGULATION THERAPY VISIT (OUTPATIENT)
Dept: NURSING | Facility: CLINIC | Age: 86
End: 2021-01-25

## 2021-01-25 DIAGNOSIS — Z79.01 LONG TERM CURRENT USE OF ANTICOAGULANT THERAPY: ICD-10-CM

## 2021-01-25 DIAGNOSIS — R06.02 SOB (SHORTNESS OF BREATH): ICD-10-CM

## 2021-01-25 DIAGNOSIS — I48.19 PERSISTENT ATRIAL FIBRILLATION (H): ICD-10-CM

## 2021-01-25 DIAGNOSIS — Z79.01 CHRONIC ANTICOAGULATION: ICD-10-CM

## 2021-01-25 DIAGNOSIS — I27.20 PULMONARY HYPERTENSION (H): ICD-10-CM

## 2021-01-25 LAB
ANION GAP SERPL CALCULATED.3IONS-SCNC: 9 MMOL/L (ref 3–14)
BUN SERPL-MCNC: 33 MG/DL (ref 7–30)
CALCIUM SERPL-MCNC: 9.4 MG/DL (ref 8.5–10.1)
CAPILLARY BLOOD COLLECTION: NORMAL
CHLORIDE SERPL-SCNC: 100 MMOL/L (ref 94–109)
CO2 SERPL-SCNC: 27 MMOL/L (ref 20–32)
CREAT SERPL-MCNC: 1.54 MG/DL (ref 0.52–1.04)
ERYTHROCYTE [DISTWIDTH] IN BLOOD BY AUTOMATED COUNT: 15.7 % (ref 10–15)
GFR SERPL CREATININE-BSD FRML MDRD: 30 ML/MIN/{1.73_M2}
GLUCOSE SERPL-MCNC: 196 MG/DL (ref 70–99)
HCT VFR BLD AUTO: 39.9 % (ref 35–47)
HGB BLD-MCNC: 12.6 G/DL (ref 11.7–15.7)
INR PPP: 2.9 (ref 0.86–1.14)
MCH RBC QN AUTO: 29.3 PG (ref 26.5–33)
MCHC RBC AUTO-ENTMCNC: 31.6 G/DL (ref 31.5–36.5)
MCV RBC AUTO: 93 FL (ref 78–100)
NT-PROBNP SERPL-MCNC: 2036 PG/ML (ref 0–450)
PLATELET # BLD AUTO: 243 10E9/L (ref 150–450)
POTASSIUM SERPL-SCNC: 3.7 MMOL/L (ref 3.4–5.3)
RBC # BLD AUTO: 4.3 10E12/L (ref 3.8–5.2)
SODIUM SERPL-SCNC: 136 MMOL/L (ref 133–144)
WBC # BLD AUTO: 6.2 10E9/L (ref 4–11)

## 2021-01-25 PROCEDURE — 85610 PROTHROMBIN TIME: CPT | Performed by: INTERNAL MEDICINE

## 2021-01-25 PROCEDURE — 80048 BASIC METABOLIC PNL TOTAL CA: CPT | Performed by: INTERNAL MEDICINE

## 2021-01-25 PROCEDURE — 36416 COLLJ CAPILLARY BLOOD SPEC: CPT | Performed by: INTERNAL MEDICINE

## 2021-01-25 PROCEDURE — 83880 ASSAY OF NATRIURETIC PEPTIDE: CPT | Performed by: INTERNAL MEDICINE

## 2021-01-25 PROCEDURE — 85027 COMPLETE CBC AUTOMATED: CPT | Performed by: INTERNAL MEDICINE

## 2021-01-25 NOTE — PROGRESS NOTES
Anticoagulation Management    Unable to reach Mara today.    Today's INR result of 2.9 is therapeutic (goal INR of 2.0-3.0).  Result received from: Clinic Lab    Follow up required to confirm warfarin dose taken and assess for changes    Left message to call [phone]     Plan: continue maintenance dose and recheck INR in 6 weeks    Anticoagulation clinic to follow up    Celeste Ruiz RN

## 2021-01-25 NOTE — PROGRESS NOTES
ANTICOAGULATION MANAGEMENT     Patient Name:  Mara Colindres  Date:  2021    ASSESSMENT /SUBJECTIVE:    Today's INR result of 2.9 is therapeutic. Goal INR of 2.0-3.0      Warfarin dose taken: Warfarin taken as instructed    Diet: No new diet changes affecting INR    Medication changes/ interactions: No new medications/supplements affecting INR    Previous INR: Therapeutic     S/S of bleeding or thromboembolism: No    New injury or illness: No    Upcoming surgery, procedure or cardioversion: No    Additional findings: None      PLAN:    Telephone call with Mara regarding INR result and instructed:     Warfarin Dosing Instructions: Continue your current warfarin dose 5 mg Mon/Wed/Fri and 2.5 mg ROW    Instructed patient to follow up no later than: 6 weeks  Lab visit scheduled    Education provided: Target INR goal and significance of current INR result      Mara verbalizes understanding and agrees to warfarin dosing plan.    Instructed to call the Anticoagulation Clinic for any changes, questions or concerns. (#[phone])        Celeste Ruiz RN      OBJECTIVE:  Recent labs: (last 7 days)     21  1421   INR 2.90*         INR assessment THER    Recheck INR In: 6 WEEKS    INR Location Clinic      Anticoagulation Summary  As of 2021    INR goal:  2.0-3.0   TTR:  100.0 % (7.7 mo)   INR used for dosin.90 (2021)   Warfarin maintenance plan:  5 mg (5 mg x 1) every Mon, Wed, Fri; 2.5 mg (5 mg x 0.5) all other days   Full warfarin instructions:  5 mg every Mon, Wed, Fri; 2.5 mg all other days   Weekly warfarin total:  25 mg   No change documented:  Celeste Ruiz RN   Plan last modified:  Celeste Ruiz RN (10/2/2019)   Next INR check:  3/8/2021   Priority:  Maintenance   Target end date:  Indefinite    Indications    Long-term (current) use of anticoagulants [Z79.01] [Z79.01]  Persistent atrial fibrillation (H) [I48.19]             Anticoagulation Episode Summary     INR check location:       Preferred lab:      Send INR reminders to:  KELLY ROJAS    Comments:        Anticoagulation Care Providers     Provider Role Specialty Phone number    Steven Abrams MD Referring Internal Medicine [phone]

## 2021-01-27 ENCOUNTER — VIRTUAL VISIT (OUTPATIENT)
Dept: CARDIOLOGY | Facility: CLINIC | Age: 86
End: 2021-01-27
Attending: INTERNAL MEDICINE
Payer: COMMERCIAL

## 2021-01-27 DIAGNOSIS — R06.02 SOB (SHORTNESS OF BREATH): ICD-10-CM

## 2021-01-27 DIAGNOSIS — I27.20 PULMONARY HYPERTENSION (H): ICD-10-CM

## 2021-01-27 NOTE — PROGRESS NOTES
Morbid obesity (H)              Hypertension goal BP (blood pressure) < 140/90              DVT (deep venous thrombosis) (H)              MRSA (methicillin resistant Staphylococcus aureus)              Chronic diarrhea              HYPERLIPIDEMIA LDL GOAL <100              Chronic anticoagulation              CKD (chronic kidney disease) stage 3, GFR 30-59 ml/min              Septic hip (H)              Lymphedema              Tubular adenoma of colon              Abdominal wall hematoma              Advanced directives, counseling/discussion              Umbilical hernia              History of 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              Scotoma involving central area in visual field, right              Failed total hip arthroplasty, sequela              Major depressive disorder, recurrent episode, mild (H)              Chronic diastolic congestive heart failure (H)              Long-term (current) use of anticoagulants [Z79.01]              Persistent atrial fibrillation (H)              Diabetic polyneuropathy associated with diabetes mellitus due to underlying condition (H)              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, without long-term current use of insulin (H)              Senile osteoporosis              Posterior vitreous detachment of left eye              Background diabetic retinopathy, mild, ou      Current Outpatient Medications   Medication     ACCU-CHEK PARVEZ PLUS test strip     acetaminophen (TYLENOL) 500 MG tablet     ASPIRIN NOT PRESCRIBED, INTENTIONAL,     Calcium Carbonate-Vitamin D (CALCIUM + D PO)     diphenoxylate-atropine (LOMOTIL) 2.5-0.025 MG tablet     dulaglutide (TRULICITY) 1.5 MG/0.5ML pen      furosemide (LASIX) 40 MG tablet     HYDROcodone-acetaminophen (NORCO) 5-325 MG tablet     losartan (COZAAR) 25 MG tablet     metoprolol tartrate (LOPRESSOR) 100 MG tablet     OMEPRAZOLE CPCR 20 MG OR     order for DME     ORDER FOR DME     PARoxetine (PAXIL) 20 MG tablet     potassium chloride ER (K-TAB/KLOR-CON) 10 MEQ CR tablet     Resveratrol 100 MG CAPS     STATIN NOT PRESCRIBED, INTENTIONAL,     warfarin ANTICOAGULANT (COUMADIN) 5 MG tablet     No current facility-administered medications for this visit.          Results for SANIA MAHAN (MRN [id number]) as of 1/27/2021 13:51   Ref. Range 9/11/2020 09:30 10/15/2020 13:09 12/14/2020 14:16 1/25/2021 14:21 1/25/2021 14:26   Sodium Latest Ref Range: 133 - 144 mmol/L     136   Potassium Latest Ref Range: 3.4 - 5.3 mmol/L     3.7   Chloride Latest Ref Range: 94 - 109 mmol/L     100   Carbon Dioxide Latest Ref Range: 20 - 32 mmol/L     27   Urea Nitrogen Latest Ref Range: 7 - 30 mg/dL     33 (H)   Creatinine Latest Ref Range: 0.52 - 1.04 mg/dL     1.54 (H)   GFR Estimate Latest Ref Range: >60 mL/min/1.73_m2     30 (L)   GFR Estimate If Black Latest Ref Range: >60 mL/min/1.73_m2     35 (L)   Calcium Latest Ref Range: 8.5 - 10.1 mg/dL     9.4   Anion Gap Latest Ref Range: 3 - 14 mmol/L     9   N-Terminal Pro Bnp Latest Ref Range: 0 - 450 pg/mL     2,036 (H)   Glucose Latest Ref Range: 70 - 99 mg/dL     196 (H)   WBC Latest Ref Range: 4.0 - 11.0 10e9/L     6.2   Hemoglobin Latest Ref Range: 11.7 - 15.7 g/dL     12.6   Hematocrit Latest Ref Range: 35.0 - 47.0 %     39.9   Platelet Count Latest Ref Range: 150 - 450 10e9/L     243   RBC Count Latest Ref Range: 3.8 - 5.2 10e12/L     4.30   MCV Latest Ref Range: 78 - 100 fl     93   MCH Latest Ref Range: 26.5 - 33.0 pg     29.3   MCHC Latest Ref Range: 31.5 - 36.5 g/dL     31.6   RDW Latest Ref Range: 10.0 - 15.0 %     15.7 (H)   INR Latest Ref Range: 0.86 - 1.14   2.80 (H) 2.00 (H) 2.90 (H)    DX HIP/PELVIS/SPINE W LAT  FRACTION ANALYSIS Unknown Rpt       DX WRIST/HEEL/RADIUS Unknown Rpt           Wt Readings from Last 24 Encounters:   10/22/20 114.3 kg (252 lb)   09/04/20 114.3 kg (252 lb)   04/22/20 113.4 kg (250 lb)   03/05/20 113.4 kg (250 lb)   01/07/20 113.4 kg (250 lb)   10/03/19 117.5 kg (259 lb)   04/29/19 117.5 kg (259 lb)   11/28/18 110.6 kg (243 lb 14.4 oz)   09/13/18 114.1 kg (251 lb 9.6 oz)   01/08/18 114.3 kg (252 lb)   11/07/17 113.4 kg (250 lb)   07/27/17 115.2 kg (254 lb)   05/24/17 113.6 kg (250 lb 8 oz)   05/15/17 115.7 kg (255 lb)   04/27/17 113.9 kg (251 lb)   02/20/17 115.2 kg (254 lb)   10/27/16 115.8 kg (255 lb 6.4 oz)   08/09/16 115.1 kg (253 lb 12.8 oz)   07/12/16 111.4 kg (245 lb 11.2 oz)   06/28/16 116.6 kg (257 lb 1.6 oz)   06/27/16 117.5 kg (259 lb)   06/03/16 112.3 kg (247 lb 8 oz)   05/26/16 115.1 kg (253 lb 12 oz)   05/19/16 113.4 kg (250 lb)

## 2021-01-27 NOTE — PROGRESS NOTES
Mara is a 86 year old who is being evaluated via a billable video visit.      How would you like to obtain your AVS? Mail a copy  If the video visit is dropped, the invitation should be resent by: Text to cell phone: [phone]  Will anyone else be joining your video visit? No

## 2021-01-27 NOTE — LETTER
1/27/2021      RE: Mara Colindres  [address] [facility] [address]       Dear Colleague,    Thank you for the opportunity to participate in the care of your patient, Mara Colindres, at the [facility] HEART [facility]. Please see a copy of my visit note below.    Mara is a 86 year old who is being evaluated via a billable video visit.      How would you like to obtain your AVS? Mail a copy  If the video visit is dropped, the invitation should be resent by: Text to cell phone: [phone]  Will anyone else be joining your video visit? No                    Morbid obesity (H)              Hypertension goal BP (blood pressure) < 140/90              DVT (deep venous thrombosis) (H)              MRSA (methicillin resistant Staphylococcus aureus)              Chronic diarrhea              HYPERLIPIDEMIA LDL GOAL <100              Chronic anticoagulation              CKD (chronic kidney disease) stage 3, GFR 30-59 ml/min              Septic hip (H)              Lymphedema              Tubular adenoma of colon              Abdominal wall hematoma              Advanced directives, counseling/discussion              Umbilical hernia              History of [place] fever              Moderate persistent asthma              Pseudophakia, ou              Hypovitaminosis D              Bilateral leg pain              Positive YAMINI              Raynaud phenomenon              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              Scotoma involving central area in visual field, right              Failed total hip arthroplasty, sequela              Major depressive disorder, recurrent episode, mild (H)              Chronic diastolic congestive heart failure (H)              Long-term (current) use of anticoagulants [Z79.01]              Persistent atrial fibrillation (H)              Diabetic polyneuropathy associated with diabetes mellitus  due to underlying condition (H)              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, without long-term current use of insulin (H)              Senile osteoporosis              Posterior vitreous detachment of left eye              Background diabetic retinopathy, mild, ou      Current Outpatient Medications   Medication     ACCU-CHEK PARVEZ PLUS test strip     acetaminophen (TYLENOL) 500 MG tablet     ASPIRIN NOT PRESCRIBED, INTENTIONAL,     Calcium Carbonate-Vitamin D (CALCIUM + D PO)     diphenoxylate-atropine (LOMOTIL) 2.5-0.025 MG tablet     dulaglutide (TRULICITY) 1.5 MG/0.5ML pen     furosemide (LASIX) 40 MG tablet     HYDROcodone-acetaminophen (NORCO) 5-325 MG tablet     losartan (COZAAR) 25 MG tablet     metoprolol tartrate (LOPRESSOR) 100 MG tablet     OMEPRAZOLE CPCR 20 MG OR     order for DME     ORDER FOR DME     PARoxetine (PAXIL) 20 MG tablet     potassium chloride ER (K-TAB/KLOR-CON) 10 MEQ CR tablet     Resveratrol 100 MG CAPS     STATIN NOT PRESCRIBED, INTENTIONAL,     warfarin ANTICOAGULANT (COUMADIN) 5 MG tablet     No current facility-administered medications for this visit.          Results for KALLISANIA (MRN [id number]) as of 1/27/2021 13:51   Ref. Range 9/11/2020 09:30 10/15/2020 13:09 12/14/2020 14:16 1/25/2021 14:21 1/25/2021 14:26   Sodium Latest Ref Range: 133 - 144 mmol/L     136   Potassium Latest Ref Range: 3.4 - 5.3 mmol/L     3.7   Chloride Latest Ref Range: 94 - 109 mmol/L     100   Carbon Dioxide Latest Ref Range: 20 - 32 mmol/L     27   Urea Nitrogen Latest Ref Range: 7 - 30 mg/dL     33 (H)   Creatinine Latest Ref Range: 0.52 - 1.04 mg/dL     1.54 (H)   GFR Estimate Latest Ref Range: >60 mL/min/1.73_m2     30 (L)   GFR Estimate If Black Latest Ref Range: >60 mL/min/1.73_m2     35 (L)   Calcium Latest Ref Range: 8.5 - 10.1 mg/dL     9.4   Anion Gap Latest Ref Range: 3 - 14 mmol/L     9   N-Terminal Pro Bnp Latest Ref Range: 0 - 450 pg/mL      2,036 (H)   Glucose Latest Ref Range: 70 - 99 mg/dL     196 (H)   WBC Latest Ref Range: 4.0 - 11.0 10e9/L     6.2   Hemoglobin Latest Ref Range: 11.7 - 15.7 g/dL     12.6   Hematocrit Latest Ref Range: 35.0 - 47.0 %     39.9   Platelet Count Latest Ref Range: 150 - 450 10e9/L     243   RBC Count Latest Ref Range: 3.8 - 5.2 10e12/L     4.30   MCV Latest Ref Range: 78 - 100 fl     93   MCH Latest Ref Range: 26.5 - 33.0 pg     29.3   MCHC Latest Ref Range: 31.5 - 36.5 g/dL     31.6   RDW Latest Ref Range: 10.0 - 15.0 %     15.7 (H)   INR Latest Ref Range: 0.86 - 1.14   2.80 (H) 2.00 (H) 2.90 (H)    DX HIP/PELVIS/SPINE W LAT FRACTION ANALYSIS Unknown Rpt       DX WRIST/HEEL/RADIUS Unknown Rpt           Wt Readings from Last 24 Encounters:   10/22/20 114.3 kg (252 lb)   09/04/20 114.3 kg (252 lb)   04/22/20 113.4 kg (250 lb)   03/05/20 113.4 kg (250 lb)   01/07/20 113.4 kg (250 lb)   10/03/19 117.5 kg (259 lb)   04/29/19 117.5 kg (259 lb)   11/28/18 110.6 kg (243 lb 14.4 oz)   09/13/18 114.1 kg (251 lb 9.6 oz)   01/08/18 114.3 kg (252 lb)   11/07/17 113.4 kg (250 lb)   07/27/17 115.2 kg (254 lb)   05/24/17 113.6 kg (250 lb 8 oz)   05/15/17 115.7 kg (255 lb)   04/27/17 113.9 kg (251 lb)   02/20/17 115.2 kg (254 lb)   10/27/16 115.8 kg (255 lb 6.4 oz)   08/09/16 115.1 kg (253 lb 12.8 oz)   07/12/16 111.4 kg (245 lb 11.2 oz)   06/28/16 116.6 kg (257 lb 1.6 oz)   06/27/16 117.5 kg (259 lb)   06/03/16 112.3 kg (247 lb 8 oz)   05/26/16 115.1 kg (253 lb 12 oz)   05/19/16 113.4 kg (250 lb)             Please do not hesitate to contact me if you have any questions/concerns.     Sincerely,     Akbar Thompson MD

## 2021-02-12 DIAGNOSIS — N18.32 STAGE 3B CHRONIC KIDNEY DISEASE (H): ICD-10-CM

## 2021-02-12 DIAGNOSIS — E87.6 DIURETIC-INDUCED HYPOKALEMIA: ICD-10-CM

## 2021-02-12 DIAGNOSIS — T50.2X5A DIURETIC-INDUCED HYPOKALEMIA: ICD-10-CM

## 2021-02-12 DIAGNOSIS — F33.0 MAJOR DEPRESSIVE DISORDER, RECURRENT EPISODE, MILD (H): ICD-10-CM

## 2021-02-12 DIAGNOSIS — I50.32 CHRONIC DIASTOLIC CONGESTIVE HEART FAILURE (H): ICD-10-CM

## 2021-02-12 DIAGNOSIS — I27.20 PULMONARY HTN (H): ICD-10-CM

## 2021-02-12 NOTE — TELEPHONE ENCOUNTER
Reason for Call:  Other prescription    Detailed comments: patient states is almost out of medications, Potassium, paroxetine, metoprolo, and furosimide needs refills    Phone Number Patient can be reached at: Home number on file [phone] (home)    Best Time: anytime    Can we leave a detailed message on this number? YES    Call taken on 2/12/2021 at 4:28 PM by Opal Carrera

## 2021-02-15 RX ORDER — FUROSEMIDE 40 MG
TABLET ORAL
Qty: 180 TABLET | Refills: 1 | Status: SHIPPED | OUTPATIENT
Start: 2021-02-15 | End: 2021-10-20

## 2021-02-15 RX ORDER — PAROXETINE 20 MG/1
20 TABLET, FILM COATED ORAL DAILY
Qty: 90 TABLET | Refills: 1 | Status: SHIPPED | OUTPATIENT
Start: 2021-02-15 | End: 2021-07-30

## 2021-02-15 RX ORDER — METOPROLOL TARTRATE 100 MG
TABLET ORAL
Qty: 90 TABLET | Refills: 1 | Status: SHIPPED | OUTPATIENT
Start: 2021-02-15 | End: 2021-07-30

## 2021-02-15 RX ORDER — POTASSIUM CHLORIDE 750 MG/1
5 TABLET, EXTENDED RELEASE ORAL DAILY
Qty: 45 TABLET | Refills: 3 | Status: SHIPPED | OUTPATIENT
Start: 2021-02-15 | End: 2022-04-27

## 2021-02-17 DIAGNOSIS — I27.20 PULMONARY HTN (H): ICD-10-CM

## 2021-02-17 RX ORDER — LOSARTAN POTASSIUM 25 MG/1
TABLET ORAL
Qty: 180 TABLET | Refills: 1 | Status: SHIPPED | OUTPATIENT
Start: 2021-02-17 | End: 2021-07-30

## 2021-02-24 NOTE — NURSING NOTE
"Orders placed and patient marked \"ready for checkout.\" Dilcia Marino RN on 2/24/2021 at 3:43 PM    "

## 2021-03-09 ENCOUNTER — ANTICOAGULATION THERAPY VISIT (OUTPATIENT)
Dept: FAMILY MEDICINE | Facility: CLINIC | Age: 86
End: 2021-03-09

## 2021-03-09 ENCOUNTER — IMMUNIZATION (OUTPATIENT)
Dept: NURSING | Facility: CLINIC | Age: 86
End: 2021-03-09
Payer: COMMERCIAL

## 2021-03-09 DIAGNOSIS — I48.19 PERSISTENT ATRIAL FIBRILLATION (H): ICD-10-CM

## 2021-03-09 DIAGNOSIS — Z79.01 CHRONIC ANTICOAGULATION: ICD-10-CM

## 2021-03-09 DIAGNOSIS — Z79.01 LONG TERM CURRENT USE OF ANTICOAGULANT THERAPY: ICD-10-CM

## 2021-03-09 LAB — INR PPP: 3.9 (ref 0.86–1.14)

## 2021-03-09 PROCEDURE — 85610 PROTHROMBIN TIME: CPT | Performed by: INTERNAL MEDICINE

## 2021-03-09 PROCEDURE — 91300 PR COVID VAC PFIZER DIL RECON 30 MCG/0.3 ML IM: CPT

## 2021-03-09 PROCEDURE — 36416 COLLJ CAPILLARY BLOOD SPEC: CPT | Performed by: INTERNAL MEDICINE

## 2021-03-09 PROCEDURE — 0001A PR COVID VAC PFIZER DIL RECON 30 MCG/0.3 ML IM: CPT

## 2021-03-09 NOTE — PROGRESS NOTES
ANTICOAGULATION MANAGEMENT     Patient Name:  Mara Colindres  Date:  3/9/2021    ASSESSMENT /SUBJECTIVE:    Today's INR result of 3.90 is supratherapeutic. Goal INR of 2.0-3.0      Warfarin dose taken: Warfarin taken as instructed    Diet: Decreased greens/vitamin K in diet; plans to resume previous intake    Medication changes/ interactions: No new medications/supplements affecting INR    Previous INR: Therapeutic     S/S of bleeding or thromboembolism: No    New injury or illness: No    Upcoming surgery, procedure or cardioversion: No    Additional findings: Received first dose of COVID 19 vaccine      PLAN:    Telephone call with Mara regarding INR result and instructed:     Warfarin Dosing Instructions: Hold warfarin tonight then continue your current warfarin dose of 5mg every Mon, Wed, & Fri; 2.5mg all other days of the week.     Instructed patient to follow up no later than: 2 weeks- pt requested 3 weeks due to transportation.   Lab visit scheduled    Education provided: Importance of consistent vitamin K intake, Impact of vitamin K foods on INR, Target INR goal and significance of current INR result and Importance of therapeutic range      Mara verbalizes understanding and agrees to warfarin dosing plan.    Instructed to call the Anticoagulation Clinic for any changes, questions or concerns. (#[phone])        Marcos Jauregui RN      OBJECTIVE:  Recent labs: (last 7 days)     03/09/21  1545   INR 3.90*         No question data found.  Anticoagulation Summary  As of 3/9/2021    INR goal:  2.0-3.0   TTR:  85.3 % (8.8 mo)   INR used for dosing:     Warfarin maintenance plan:  5 mg (5 mg x 1) every Mon, Wed, Fri; 2.5 mg (5 mg x 0.5) all other days   Full warfarin instructions:  5 mg every Mon, Wed, Fri; 2.5 mg all other days   Weekly warfarin total:  25 mg   Plan last modified:  Celeste Ruiz RN (10/2/2019)   Next INR check:     Target end date:  Indefinite    Indications    Long-term (current) use of  anticoagulants [Z79.01] [Z79.01]  Persistent atrial fibrillation (H) [I48.19]             Anticoagulation Episode Summary     INR check location:      Preferred lab:      Send INR reminders to:  KELLY ROJAS    Comments:        Anticoagulation Care Providers     Provider Role Specialty Phone number    Steven Abrams MD Referring Internal Medicine [phone]

## 2021-03-15 DIAGNOSIS — I48.19 PERSISTENT ATRIAL FIBRILLATION (H): ICD-10-CM

## 2021-03-15 DIAGNOSIS — Z79.01 LONG TERM CURRENT USE OF ANTICOAGULANT THERAPY: ICD-10-CM

## 2021-03-15 DIAGNOSIS — Z79.01 CHRONIC ANTICOAGULATION: ICD-10-CM

## 2021-03-16 RX ORDER — WARFARIN SODIUM 5 MG/1
TABLET ORAL
Qty: 30 TABLET | Refills: 1 | Status: SHIPPED | OUTPATIENT
Start: 2021-03-16 | End: 2021-03-18

## 2021-03-16 NOTE — TELEPHONE ENCOUNTER
Prescription approved per [facility] Refill Protocol.  Dionicio Coulter Rn  [facility]

## 2021-03-30 ENCOUNTER — IMMUNIZATION (OUTPATIENT)
Dept: NURSING | Facility: CLINIC | Age: 86
End: 2021-03-30
Attending: FAMILY MEDICINE
Payer: COMMERCIAL

## 2021-03-30 ENCOUNTER — ANTICOAGULATION THERAPY VISIT (OUTPATIENT)
Dept: FAMILY MEDICINE | Facility: CLINIC | Age: 86
End: 2021-03-30

## 2021-03-30 DIAGNOSIS — Z79.01 LONG TERM CURRENT USE OF ANTICOAGULANT THERAPY: ICD-10-CM

## 2021-03-30 DIAGNOSIS — I48.19 PERSISTENT ATRIAL FIBRILLATION (H): ICD-10-CM

## 2021-03-30 DIAGNOSIS — Z79.01 CHRONIC ANTICOAGULATION: ICD-10-CM

## 2021-03-30 LAB
CAPILLARY BLOOD COLLECTION: NORMAL
INR PPP: 3.4 (ref 0.86–1.14)

## 2021-03-30 PROCEDURE — 0002A PR COVID VAC PFIZER DIL RECON 30 MCG/0.3 ML IM: CPT

## 2021-03-30 PROCEDURE — 91300 PR COVID VAC PFIZER DIL RECON 30 MCG/0.3 ML IM: CPT

## 2021-03-30 PROCEDURE — 85610 PROTHROMBIN TIME: CPT | Performed by: INTERNAL MEDICINE

## 2021-03-30 PROCEDURE — 36416 COLLJ CAPILLARY BLOOD SPEC: CPT | Performed by: INTERNAL MEDICINE

## 2021-03-30 NOTE — PROGRESS NOTES
ANTICOAGULATION MANAGEMENT     Patient Name:  Mara Colindres  Date:  3/30/2021    ASSESSMENT /SUBJECTIVE:    Today's INR result of 3.40 is supratherapeutic. Goal INR of 2.0-3.0      Warfarin dose taken: Warfarin taken as instructed    Diet: Increased greens/vitamin K in diet; ongoing change    Medication changes/ interactions: No new medications/supplements affecting INR    Previous INR: Supratherapeutic     S/S of bleeding or thromboembolism: No    New injury or illness: No    Upcoming surgery, procedure or cardioversion: No    Additional findings: None      PLAN:    Telephone call with Mara regarding INR result and instructed:     Warfarin Dosing Instructions: Hold tonight then continue your current warfarin dose of 5mg every Mon, Wed, & Fri; 2.5mg all other days of the week.     Instructed patient to follow up no later than: 2 weeks  Lab visit scheduled    Education provided: Importance of consistent vitamin K intake, Impact of vitamin K foods on INR, Vitamin K content of foods, Target INR goal and significance of current INR result and Importance of therapeutic range      Mara verbalizes understanding and agrees to warfarin dosing plan.    Instructed to call the Anticoagulation Clinic for any changes, questions or concerns. (#[phone])        Marcos Jauregui RN      OBJECTIVE:  Recent labs: (last 7 days)     03/30/21  1551   INR 3.40*         No question data found.  Anticoagulation Summary  As of 3/30/2021    INR goal:  2.0-3.0   TTR:  79.0 % (9.5 mo)   INR used for dosing:     Warfarin maintenance plan:  5 mg (5 mg x 1) every Mon, Wed, Fri; 2.5 mg (5 mg x 0.5) all other days   Full warfarin instructions:  5 mg every Mon, Wed, Fri; 2.5 mg all other days   Weekly warfarin total:  25 mg   Plan last modified:  Celeste Ruiz RN (10/2/2019)   Next INR check:     Target end date:  Indefinite    Indications    Long-term (current) use of anticoagulants [Z79.01] [Z79.01]  Persistent atrial fibrillation (H)  [I48.19]             Anticoagulation Episode Summary     INR check location:      Preferred lab:      Send INR reminders to:  KELLY ROJAS    Comments:        Anticoagulation Care Providers     Provider Role Specialty Phone number    Steven Abrams MD Referring Internal Medicine [phone]

## 2021-04-16 DIAGNOSIS — I48.19 PERSISTENT ATRIAL FIBRILLATION (H): Primary | ICD-10-CM

## 2021-04-29 ENCOUNTER — ANTICOAGULATION THERAPY VISIT (OUTPATIENT)
Dept: FAMILY MEDICINE | Facility: CLINIC | Age: 86
End: 2021-04-29

## 2021-04-29 DIAGNOSIS — I48.19 PERSISTENT ATRIAL FIBRILLATION (H): ICD-10-CM

## 2021-04-29 DIAGNOSIS — Z79.01 LONG TERM CURRENT USE OF ANTICOAGULANT THERAPY: ICD-10-CM

## 2021-04-29 LAB
CAPILLARY BLOOD COLLECTION: NORMAL
INR PPP: 3.5 (ref 0.86–1.14)

## 2021-04-29 PROCEDURE — 36416 COLLJ CAPILLARY BLOOD SPEC: CPT | Performed by: INTERNAL MEDICINE

## 2021-04-29 PROCEDURE — 85610 PROTHROMBIN TIME: CPT | Performed by: INTERNAL MEDICINE

## 2021-04-29 NOTE — PROGRESS NOTES
ANTICOAGULATION MANAGEMENT     Patient Name:  Mara Colindres  Date:  4/29/2021    ASSESSMENT /SUBJECTIVE:    Today's INR result of 3.5 is supratherapeutic. Goal INR of 2.0-3.0      Warfarin dose taken: Warfarin taken as instructed    Diet: No new diet changes affecting INR    Medication changes/ interactions: No new medications/supplements affecting INR    Previous INR: Supratherapeutic     S/S of bleeding or thromboembolism: No    New injury or illness: No    Upcoming surgery, procedure or cardioversion: No    Additional findings: None      PLAN:    Telephone call with Mara regarding INR result and instructed:     Warfarin Dosing Instructions: Change your warfarin dose to 5 mg Mon/Fri and 2.5 mg ROW  . (10 % change)    Instructed patient to follow up no later than: 2 weeks  Lab visit scheduled    Education provided: Target INR goal and significance of current INR result      Mara verbalizes understanding and agrees to warfarin dosing plan.    Instructed to call the Anticoagulation Clinic for any changes, questions or concerns. (#[phone])        Celeste Ruiz RN      OBJECTIVE:  Recent labs: (last 7 days)     04/29/21  1137   INR 3.50*         No question data found.  Anticoagulation Summary  As of 4/29/2021    INR goal:  2.0-3.0   TTR:  71.6 % (10.5 mo)   INR used for dosing:  3.50 (4/29/2021)   Warfarin maintenance plan:  5 mg (5 mg x 1) every Mon, Fri; 2.5 mg (5 mg x 0.5) all other days   Full warfarin instructions:  5 mg every Mon, Fri; 2.5 mg all other days   Weekly warfarin total:  22.5 mg   Plan last modified:  Celeste Ruiz RN (4/29/2021)   Next INR check:  5/13/2021   Priority:  Maintenance   Target end date:  Indefinite    Indications    Long-term (current) use of anticoagulants [Z79.01] [Z79.01]  Persistent atrial fibrillation (H) [I48.19]             Anticoagulation Episode Summary     INR check location:      Preferred lab:      Send INR reminders to:  KELLY ROJAS    Comments:         Anticoagulation Care Providers     Provider Role Specialty Phone number    Steven Abrams MD Referring Internal Medicine [phone]

## 2021-05-21 ENCOUNTER — TELEPHONE (OUTPATIENT)
Dept: FAMILY MEDICINE | Facility: CLINIC | Age: 86
End: 2021-05-21

## 2021-05-21 NOTE — TELEPHONE ENCOUNTER
ANTICOAGULATION     Mara L Jens is overdue for INR check.      Left message for patient to call and schedule lab appointment as soon as possible. If returning call, please schedule.     Celeste Ruiz RN

## 2021-06-03 ENCOUNTER — TELEPHONE (OUTPATIENT)
Dept: FAMILY MEDICINE | Facility: CLINIC | Age: 86
End: 2021-06-03

## 2021-06-03 NOTE — TELEPHONE ENCOUNTER
ANTICOAGULATION     Mara Colindres is overdue for INR check.      Spoke with Ayla and scheduled lab appointment on 6/8/21    Celeste Ruiz RN

## 2021-06-08 ENCOUNTER — ANTICOAGULATION THERAPY VISIT (OUTPATIENT)
Dept: FAMILY MEDICINE | Facility: CLINIC | Age: 86
End: 2021-06-08

## 2021-06-08 DIAGNOSIS — I48.19 PERSISTENT ATRIAL FIBRILLATION (H): ICD-10-CM

## 2021-06-08 DIAGNOSIS — Z79.01 LONG TERM CURRENT USE OF ANTICOAGULANT THERAPY: ICD-10-CM

## 2021-06-08 LAB
CAPILLARY BLOOD COLLECTION: NORMAL
INR PPP: 2.6 (ref 0.86–1.14)

## 2021-06-08 PROCEDURE — 85610 PROTHROMBIN TIME: CPT | Performed by: INTERNAL MEDICINE

## 2021-06-08 PROCEDURE — 36416 COLLJ CAPILLARY BLOOD SPEC: CPT | Performed by: INTERNAL MEDICINE

## 2021-06-08 NOTE — PROGRESS NOTES
ANTICOAGULATION MANAGEMENT     Patient Name:  Mara Colindres  Date:  2021    ASSESSMENT /SUBJECTIVE:    Today's INR result of 2.60 is therapeutic. Goal INR of 2.0-3.0      Warfarin dose taken: Warfarin taken as instructed    Diet: No new diet changes affecting INR    Medication changes/ interactions: No new medications/supplements affecting INR    Previous INR: Supratherapeutic     S/S of bleeding or thromboembolism: No    New injury or illness: No    Upcoming surgery, procedure or cardioversion: No    Additional findings: None      PLAN:    Warfarin Dosing Instructions: Continue your current warfarin dose 5mg every Mon & Fri; 2.5mg all other days of the week.     Instructed patient to follow up no later than: 4 weeks  Patient offered & declined to schedule next visit    Education provided: Target INR goal and significance of current INR result and Importance of therapeutic range    Telephone call with Mara whom verbalizes understanding and agrees to plan    Instructed to call the Anticoagulation Clinic for any changes, questions or concerns. (#[phone])        Marcos Jauregui RN      OBJECTIVE:  Recent labs: (last 7 days)     21  1300   INR 2.60*         No question data found.  Anticoagulation Summary  As of 2021    INR goal:  2.0-3.0   TTR:  68.5 % (11.8 mo)   INR used for dosin.60 (2021)   Warfarin maintenance plan:  5 mg (5 mg x 1) every Mon, Fri; 2.5 mg (5 mg x 0.5) all other days   Full warfarin instructions:  5 mg every Mon, Fri; 2.5 mg all other days   Weekly warfarin total:  22.5 mg   Plan last modified:  Celeste Ruiz RN (2021)   Next INR check:  2021   Priority:  Maintenance   Target end date:  Indefinite    Indications    Long-term (current) use of anticoagulants [Z79.01] [Z79.01]  Persistent atrial fibrillation (H) [I48.19]             Anticoagulation Episode Summary     INR check location:      Preferred lab:      Send INR reminders to:  KELLY ROJAS     Comments:        Anticoagulation Care Providers     Provider Role Specialty Phone number    Steven Abrams MD Referring Internal Medicine [phone]

## 2021-07-01 ENCOUNTER — TELEPHONE (OUTPATIENT)
Dept: INTERNAL MEDICINE | Facility: CLINIC | Age: 86
End: 2021-07-01

## 2021-07-01 DIAGNOSIS — M15.0 PRIMARY OSTEOARTHRITIS INVOLVING MULTIPLE JOINTS: ICD-10-CM

## 2021-07-01 RX ORDER — HYDROCODONE BITARTRATE AND ACETAMINOPHEN 5; 325 MG/1; MG/1
TABLET ORAL
Qty: 90 TABLET | Refills: 0 | Status: SHIPPED | OUTPATIENT
Start: 2021-07-01 | End: 2022-06-03

## 2021-07-01 NOTE — TELEPHONE ENCOUNTER
Made appointment in September, and informed of meds being refilled, nothing else needed.       Kira MURILLO CMA ([facility])

## 2021-07-01 NOTE — TELEPHONE ENCOUNTER
I refilled this medication as requested but I want you to talk with the patient and ask her to begin making preparations for a face to face encounter with me in September for a follow up appointment . Please help see to it that appointment is generated     Steven Abrams MD

## 2021-07-01 NOTE — TELEPHONE ENCOUNTER
Reason for Call:  Medication or medication refill:    Do you use a [facility]?  Name of the pharmacy and phone number for the current request: [facility] in [place]    Name of the medication requested: HYDROcodone-acetaminophen (NORCO) 5-325 MG tablet    Can we leave a detailed message on this number? YES    Phone number patient can be reached at: Home number on file [phone] (home)    Best Time: anytime    Call taken on 7/1/2021 at 11:43 AM by Halima Bob

## 2021-07-02 ENCOUNTER — TELEPHONE (OUTPATIENT)
Dept: FAMILY MEDICINE | Facility: CLINIC | Age: 86
End: 2021-07-02

## 2021-07-02 NOTE — TELEPHONE ENCOUNTER
Prior Authorization Retail Medication Request    Medication/Dose: HYDROcodone-acetaminophen (NORCO) 5-325 MG tablet  ICD code (if different than what is on RX):  unknown  Previously Tried and Failed:  unknown  Rationale:  unknown    Insurance Name:  unknown  Insurance ID:  [id number]      Pharmacy Information (if different than what is on RX)  Name:  [facility]  Phone:  [phone]

## 2021-07-05 NOTE — TELEPHONE ENCOUNTER
Central Prior Authorization Team   Phone: [phone]      PA Initiation    Medication: HYDROcodone-acetaminophen (NORCO) 5-325 MG tablet  Insurance Company: [employer] - Phone [phone] Fax [phone]  Pharmacy Filling the Rx: [id number] - [place], [place] - [address] AT [facility] [address]  Filling Pharmacy Phone: [phone]  Filling Pharmacy Fax:    Start Date: 7/5/2021

## 2021-07-06 ENCOUNTER — ANTICOAGULATION THERAPY VISIT (OUTPATIENT)
Dept: ANTICOAGULATION | Facility: CLINIC | Age: 86
End: 2021-07-06

## 2021-07-06 DIAGNOSIS — Z79.01 LONG TERM CURRENT USE OF ANTICOAGULANT THERAPY: ICD-10-CM

## 2021-07-06 DIAGNOSIS — I48.19 PERSISTENT ATRIAL FIBRILLATION (H): ICD-10-CM

## 2021-07-06 LAB
CAPILLARY BLOOD COLLECTION: NORMAL
INR PPP: 3.2 (ref 0.86–1.14)

## 2021-07-06 PROCEDURE — 36416 COLLJ CAPILLARY BLOOD SPEC: CPT | Performed by: INTERNAL MEDICINE

## 2021-07-06 PROCEDURE — 85610 PROTHROMBIN TIME: CPT | Performed by: INTERNAL MEDICINE

## 2021-07-06 NOTE — PROGRESS NOTES
ANTICOAGULATION MANAGEMENT     Mara Colindres 86 year old female is on warfarin with supratherapeutic INR result. (Goal INR 2.0-3.0)    Recent labs: (last 7 days)     07/06/21  1344   INR 3.20*       ASSESSMENT     Source(s): Patient/Caregiver Call       Warfarin doses taken: Warfarin taken as instructed    Diet: Decreased greens/vitamin K in diet; plans to resume previous intake    New illness, injury, or hospitalization: No    Medication/supplement changes: None noted    Signs or symptoms of bleeding or clotting: No    Previous INR: Therapeutic last visit; previously outside of goal range    Additional findings: None     PLAN     Recommended plan for no diet, medication or health factor changes affecting INR     Dosing Instructions: Continue your current warfarin dose with next INR in 2 weeks       Summary  As of 7/6/2021    Full warfarin instructions:  5 mg every Mon, Fri; 2.5 mg all other days   Next INR check:  7/20/2021             Telephone call with Mara who verbalizes understanding and agrees to plan    Patient offered & declined to schedule next visit    Education provided: Please call back if any changes to your diet, medications or how you've been taking warfarin, Importance of consistent vitamin K intake, Impact of vitamin K foods on INR, Monitoring for bleeding signs and symptoms, When to seek medical attention/emergency care and Contact [phone]  with any changes, questions or concerns.     Plan made per [facility] anticoagulation protocol    Dina Hernandez RN  Anticoagulation Clinic  7/6/2021    _______________________________________________________________________     Anticoagulation Episode Summary     Current INR goal:  2.0-3.0   TTR:  66.9 % (1 y)   Target end date:  Indefinite   Send INR reminders to:  KELLY ROJAS    Indications    Long-term (current) use of anticoagulants [Z79.01] [Z79.01]  Persistent atrial fibrillation (H) [I48.19]           Comments:           Anticoagulation  Care Providers     Provider Role Specialty Phone number    Steven Abrams MD Referring Internal Medicine [phone]

## 2021-07-07 NOTE — TELEPHONE ENCOUNTER
Prior Authorization Not Needed per Insurance    Medication: HYDROcodone-acetaminophen (NORCO) 5-325 MG tablet  Insurance Company: [employer] - Phone [phone] Fax [phone]  Expected CoPay:      Pharmacy Filling the Rx: [id number] - [place], [place] - [address] AT [facility] [address] & [facility]  Pharmacy Notified: Yes  Patient Notified: Yes  **Instructed pharmacy to notify patient when script is ready to /ship.**

## 2021-07-28 DIAGNOSIS — E11.22 CONTROLLED TYPE 2 DIABETES MELLITUS WITH STAGE 3 CHRONIC KIDNEY DISEASE, WITHOUT LONG-TERM CURRENT USE OF INSULIN (H): ICD-10-CM

## 2021-07-28 DIAGNOSIS — N18.30 CONTROLLED TYPE 2 DIABETES MELLITUS WITH STAGE 3 CHRONIC KIDNEY DISEASE, WITHOUT LONG-TERM CURRENT USE OF INSULIN (H): ICD-10-CM

## 2021-07-29 DIAGNOSIS — I27.20 PULMONARY HTN (H): ICD-10-CM

## 2021-07-29 DIAGNOSIS — I50.32 CHRONIC DIASTOLIC CONGESTIVE HEART FAILURE (H): ICD-10-CM

## 2021-07-29 RX ORDER — DULAGLUTIDE 1.5 MG/.5ML
1.5 INJECTION, SOLUTION SUBCUTANEOUS
Qty: 2 ML | Refills: 0 | Status: SHIPPED | OUTPATIENT
Start: 2021-07-29 | End: 2021-09-01

## 2021-07-29 NOTE — TELEPHONE ENCOUNTER
"Routing refill request to provider for review/approval because:  Labs not current:  A1C, Cr      Requested Prescriptions   Pending Prescriptions Disp Refills     TRULICITY 1.5 MG/0.5ML pen [Pharmacy Med Name: TRULICITY 1.5MG/0.5ML SDP 0.5ML] 2 mL 5     Sig: INJECT 1.5MG SUBCUTANEOUS EVERY 7 DAYS       GLP-1 Agonists Protocol Failed - 7/28/2021  3:41 AM        Failed - HgbA1C in past 3 or 6 months     If HgbA1C is 8 or greater, it needs to be on file within the past 3 months.  If less than 8, must be on file within the past 6 months.     Recent Labs   Lab Test 08/11/20  1337   A1C 7.1*             Failed - Normal serum creatinine on file in past 12 months     Recent Labs   Lab Test 01/25/21  1426   CR 1.54*       Ok to refill medication if creatinine is low          Failed - Recent (6 mo) or future (30 days) visit within the authorizing provider's specialty     Patient had office visit in the last 6 months or has a visit in the next 30 days with authorizing provider.  See \"Patient Info\" tab in inbasket, or \"Choose Columns\" in Meds & Orders section of the refill encounter.            Passed - Medication is active on med list        Passed - Patient is age 18 or older        Passed - No active pregnancy on record        Passed - No positive pregnancy test in past 12 months           MALCOM Kelly RN  [facility] [place]  "

## 2021-07-30 DIAGNOSIS — I27.20 PULMONARY HTN (H): ICD-10-CM

## 2021-07-30 DIAGNOSIS — F33.0 MAJOR DEPRESSIVE DISORDER, RECURRENT EPISODE, MILD (H): ICD-10-CM

## 2021-07-30 DIAGNOSIS — Z79.01 CHRONIC ANTICOAGULATION: ICD-10-CM

## 2021-07-30 DIAGNOSIS — I48.19 PERSISTENT ATRIAL FIBRILLATION (H): ICD-10-CM

## 2021-07-30 DIAGNOSIS — Z79.01 LONG TERM CURRENT USE OF ANTICOAGULANT THERAPY: ICD-10-CM

## 2021-07-30 RX ORDER — METOPROLOL TARTRATE 100 MG
TABLET ORAL
Qty: 90 TABLET | Refills: 1 | Status: SHIPPED | OUTPATIENT
Start: 2021-07-30 | End: 2022-01-28

## 2021-07-30 RX ORDER — LOSARTAN POTASSIUM 25 MG/1
TABLET ORAL
Qty: 60 TABLET | Refills: 0 | Status: SHIPPED | OUTPATIENT
Start: 2021-07-30 | End: 2021-08-01

## 2021-07-30 RX ORDER — WARFARIN SODIUM 5 MG/1
TABLET ORAL
Qty: 90 TABLET | Refills: 0 | Status: SHIPPED | OUTPATIENT
Start: 2021-07-30 | End: 2022-04-17

## 2021-07-30 RX ORDER — PAROXETINE 20 MG/1
TABLET, FILM COATED ORAL
Qty: 30 TABLET | Refills: 0 | Status: SHIPPED | OUTPATIENT
Start: 2021-07-30 | End: 2021-08-31

## 2021-07-30 NOTE — TELEPHONE ENCOUNTER
Routing refill request to provider for review/approval because:  Labs not current:  Cr  PHQ-9 not up to date    Creatinine   Date Value Ref Range Status   01/25/2021 1.54 (H) 0.52 - 1.04 mg/dL Final       MA/Team Pink-- Please reach out to patient and update PHQ-9 score if able to.    PHQ 9/13/2018 4/29/2019 4/20/2020   PHQ-9 Total Score 3 2 12   Q9: Thoughts of better off dead/self-harm past 2 weeks Not at all Not at all Not at all       INR last checked: 07/06/21  Last INR: 3.20  Most Recent INR dosing instructions: 07/06/21; Dosing Instructions: Continue your current warfarin dose with next INR in 2 weeks.   Full warfarin instructions:  5 mg every Mon, Fri; 2.5 mg all other days

## 2021-08-01 RX ORDER — LOSARTAN POTASSIUM 25 MG/1
TABLET ORAL
Qty: 180 TABLET | Refills: 0 | Status: SHIPPED | OUTPATIENT
Start: 2021-08-01 | End: 2021-12-28

## 2021-08-09 ENCOUNTER — ANTICOAGULATION THERAPY VISIT (OUTPATIENT)
Dept: ANTICOAGULATION | Facility: CLINIC | Age: 86
End: 2021-08-09

## 2021-08-09 ENCOUNTER — DOCUMENTATION ONLY (OUTPATIENT)
Dept: FAMILY MEDICINE | Facility: CLINIC | Age: 86
End: 2021-08-09

## 2021-08-09 ENCOUNTER — LAB (OUTPATIENT)
Dept: LAB | Facility: CLINIC | Age: 86
End: 2021-08-09
Payer: COMMERCIAL

## 2021-08-09 DIAGNOSIS — Z79.01 LONG TERM CURRENT USE OF ANTICOAGULANT THERAPY: ICD-10-CM

## 2021-08-09 DIAGNOSIS — Z79.01 LONG TERM CURRENT USE OF ANTICOAGULANT THERAPY: Primary | ICD-10-CM

## 2021-08-09 DIAGNOSIS — I48.19 PERSISTENT ATRIAL FIBRILLATION (H): ICD-10-CM

## 2021-08-09 DIAGNOSIS — Z79.01 CHRONIC ANTICOAGULATION: Primary | ICD-10-CM

## 2021-08-09 LAB — INR BLD: 2 (ref 0.9–1.1)

## 2021-08-09 PROCEDURE — 85610 PROTHROMBIN TIME: CPT

## 2021-08-09 PROCEDURE — 36416 COLLJ CAPILLARY BLOOD SPEC: CPT

## 2021-08-09 NOTE — PROGRESS NOTES
ANTICOAGULATION MANAGEMENT      Mara Colindres due for annual renewal of referral to anticoagulation monitoring. Order pended for your review and signature.      ANTICOAGULATION SUMMARY      Warfarin indication(s)     Atrial fibrillation    Heart valve present?  NO       Current goal range   INR: 2.0-3.0     Goal appropriate for indication? Yes, INR 2-3 appropriate for hx of DVT, PE, hypercoagulable state, Afib, LVAD, or bileaflet AVR without risk factors     Current duration of therapy Indefinite/long term therapy   Time in Therapeutic Range (TTR)  (Goal > 60%) 65.3%       Office visit with referring provider's group within last year yes on 9/17/20 Has pending apt on 9/13/21       Araceli Wing RN

## 2021-08-09 NOTE — PROGRESS NOTES
ANTICOAGULATION MANAGEMENT     Mara Colindres 86 year old female is on warfarin with therapeutic INR result. (Goal INR 2.0-3.0)    Recent labs: (last 7 days)     08/09/21  1150   INR 2.0*       ASSESSMENT     Source(s): Chart Review and Patient/Caregiver Call       Warfarin doses taken: Warfarin taken as instructed    Diet: No new diet changes identified    New illness, injury, or hospitalization: No    Medication/supplement changes: None noted    Signs or symptoms of bleeding or clotting: No    Previous INR: Supratherapeutic    Additional findings: None     PLAN     Recommended plan for no diet, medication or health factor changes affecting INR     Dosing Instructions: Continue your current warfarin dose with next INR in 4 weeks       Summary  As of 8/9/2021    Full warfarin instructions:  5 mg every Mon, Fri; 2.5 mg all other days   Next INR check:  9/13/2021             Telephone call with Mara who verbalizes understanding and agrees to plan    Check at provider office visit scheduled in 5 weeks and requests to do INR same day    Education provided: Please call back if any changes to your diet, medications or how you've been taking warfarin    Plan made per [facility] anticoagulation protocol    Araceli Wing, RN  Anticoagulation Clinic  8/9/2021    _______________________________________________________________________     Anticoagulation Episode Summary     Current INR goal:  2.0-3.0   TTR:  65.3 % (1 y)   Target end date:  Indefinite   Send INR reminders to:  KELLY ROJAS    Indications    Long-term (current) use of anticoagulants [Z79.01] [Z79.01]  Persistent atrial fibrillation (H) [I48.19]           Comments:           Anticoagulation Care Providers     Provider Role Specialty Phone number    Steven Abrams MD Referring Internal Medicine [phone]

## 2021-08-29 DIAGNOSIS — F33.0 MAJOR DEPRESSIVE DISORDER, RECURRENT EPISODE, MILD (H): ICD-10-CM

## 2021-08-30 DIAGNOSIS — K52.9 CHRONIC DIARRHEA: ICD-10-CM

## 2021-08-30 DIAGNOSIS — N18.30 CONTROLLED TYPE 2 DIABETES MELLITUS WITH STAGE 3 CHRONIC KIDNEY DISEASE, WITHOUT LONG-TERM CURRENT USE OF INSULIN (H): ICD-10-CM

## 2021-08-30 DIAGNOSIS — E11.22 CONTROLLED TYPE 2 DIABETES MELLITUS WITH STAGE 3 CHRONIC KIDNEY DISEASE, WITHOUT LONG-TERM CURRENT USE OF INSULIN (H): ICD-10-CM

## 2021-08-31 DIAGNOSIS — N18.30 CONTROLLED TYPE 2 DIABETES MELLITUS WITH STAGE 3 CHRONIC KIDNEY DISEASE, WITHOUT LONG-TERM CURRENT USE OF INSULIN (H): ICD-10-CM

## 2021-08-31 DIAGNOSIS — E11.22 CONTROLLED TYPE 2 DIABETES MELLITUS WITH STAGE 3 CHRONIC KIDNEY DISEASE, WITHOUT LONG-TERM CURRENT USE OF INSULIN (H): ICD-10-CM

## 2021-08-31 RX ORDER — PAROXETINE 20 MG/1
TABLET, FILM COATED ORAL
Qty: 30 TABLET | Refills: 0 | Status: SHIPPED | OUTPATIENT
Start: 2021-08-31 | End: 2021-09-30

## 2021-08-31 NOTE — TELEPHONE ENCOUNTER
Norah already given but appointment is scheduled   Signed Prescriptions:                        Disp   Refills    PARoxetine (PAXIL) 20 MG tablet            30 tab*0        Sig: TAKE 1 TABLET(20 MG) BY MOUTH DAILY  Authorizing Provider: ROGELIO NORTH

## 2021-08-31 NOTE — TELEPHONE ENCOUNTER
Routing refill request to provider for review/approval because:    PHQ-9 score:    PHQ 4/20/2020   PHQ-9 Total Score 12   Q9: Thoughts of better off dead/self-harm past 2 weeks Not at all

## 2021-09-01 RX ORDER — DULAGLUTIDE 1.5 MG/.5ML
INJECTION, SOLUTION SUBCUTANEOUS
Qty: 2 ML | Refills: 3 | Status: SHIPPED | OUTPATIENT
Start: 2021-09-01 | End: 2021-12-24

## 2021-09-01 RX ORDER — DIPHENOXYLATE HCL/ATROPINE 2.5-.025MG
TABLET ORAL
Qty: 40 TABLET | Refills: 1 | Status: SHIPPED | OUTPATIENT
Start: 2021-09-01 | End: 2022-06-02

## 2021-09-01 RX ORDER — DULAGLUTIDE 1.5 MG/.5ML
INJECTION, SOLUTION SUBCUTANEOUS
Qty: 2 ML | Refills: 0 | Status: SHIPPED | OUTPATIENT
Start: 2021-09-01 | End: 2022-06-02

## 2021-09-13 ENCOUNTER — ANCILLARY PROCEDURE (OUTPATIENT)
Dept: GENERAL RADIOLOGY | Facility: CLINIC | Age: 86
End: 2021-09-13
Attending: INTERNAL MEDICINE
Payer: COMMERCIAL

## 2021-09-13 ENCOUNTER — OFFICE VISIT (OUTPATIENT)
Dept: INTERNAL MEDICINE | Facility: CLINIC | Age: 86
End: 2021-09-13
Payer: COMMERCIAL

## 2021-09-13 ENCOUNTER — ANTICOAGULATION THERAPY VISIT (OUTPATIENT)
Dept: FAMILY MEDICINE | Facility: CLINIC | Age: 86
End: 2021-09-13

## 2021-09-13 VITALS
HEART RATE: 93 BPM | BODY MASS INDEX: 43.71 KG/M2 | OXYGEN SATURATION: 97 % | WEIGHT: 239 LBS | TEMPERATURE: 98.1 F | DIASTOLIC BLOOD PRESSURE: 60 MMHG | SYSTOLIC BLOOD PRESSURE: 93 MMHG

## 2021-09-13 DIAGNOSIS — Z79.01 LONG TERM CURRENT USE OF ANTICOAGULANT THERAPY: Primary | ICD-10-CM

## 2021-09-13 DIAGNOSIS — E66.01 MORBID OBESITY (H): ICD-10-CM

## 2021-09-13 DIAGNOSIS — Z79.01 CHRONIC ANTICOAGULATION: ICD-10-CM

## 2021-09-13 DIAGNOSIS — F33.0 MAJOR DEPRESSIVE DISORDER, RECURRENT EPISODE, MILD (H): ICD-10-CM

## 2021-09-13 DIAGNOSIS — M81.0 AGE-RELATED OSTEOPOROSIS WITHOUT CURRENT PATHOLOGICAL FRACTURE: ICD-10-CM

## 2021-09-13 DIAGNOSIS — I27.20 PULMONARY HTN (H): ICD-10-CM

## 2021-09-13 DIAGNOSIS — N18.30 CONTROLLED TYPE 2 DIABETES MELLITUS WITH STAGE 3 CHRONIC KIDNEY DISEASE, WITHOUT LONG-TERM CURRENT USE OF INSULIN (H): Primary | ICD-10-CM

## 2021-09-13 DIAGNOSIS — I50.32 CHRONIC DIASTOLIC CONGESTIVE HEART FAILURE (H): ICD-10-CM

## 2021-09-13 DIAGNOSIS — E55.9 HYPOVITAMINOSIS D: ICD-10-CM

## 2021-09-13 DIAGNOSIS — E11.22 CONTROLLED TYPE 2 DIABETES MELLITUS WITH STAGE 3 CHRONIC KIDNEY DISEASE, WITHOUT LONG-TERM CURRENT USE OF INSULIN (H): Primary | ICD-10-CM

## 2021-09-13 DIAGNOSIS — M00.9 SEPTIC HIP (H): ICD-10-CM

## 2021-09-13 DIAGNOSIS — I48.19 PERSISTENT ATRIAL FIBRILLATION (H): ICD-10-CM

## 2021-09-13 DIAGNOSIS — E78.5 HYPERLIPIDEMIA LDL GOAL <100: ICD-10-CM

## 2021-09-13 DIAGNOSIS — I82.511 CHRONIC DEEP VEIN THROMBOSIS (DVT) OF FEMORAL VEIN OF RIGHT LOWER EXTREMITY (H): ICD-10-CM

## 2021-09-13 DIAGNOSIS — N18.4 CKD (CHRONIC KIDNEY DISEASE) STAGE 4, GFR 15-29 ML/MIN (H): ICD-10-CM

## 2021-09-13 DIAGNOSIS — R15.9 FULL INCONTINENCE OF FECES: ICD-10-CM

## 2021-09-13 LAB
ALT SERPL W P-5'-P-CCNC: 19 U/L (ref 0–50)
ANION GAP SERPL CALCULATED.3IONS-SCNC: 6 MMOL/L (ref 3–14)
BASOPHILS # BLD AUTO: 0.1 10E3/UL (ref 0–0.2)
BASOPHILS NFR BLD AUTO: 1 %
BUN SERPL-MCNC: 28 MG/DL (ref 7–30)
CALCIUM SERPL-MCNC: 9.5 MG/DL (ref 8.5–10.1)
CHLORIDE BLD-SCNC: 105 MMOL/L (ref 94–109)
CHOLEST SERPL-MCNC: 123 MG/DL
CO2 SERPL-SCNC: 28 MMOL/L (ref 20–32)
CREAT SERPL-MCNC: 1.71 MG/DL (ref 0.52–1.04)
EOSINOPHIL # BLD AUTO: 0.4 10E3/UL (ref 0–0.7)
EOSINOPHIL NFR BLD AUTO: 5 %
ERYTHROCYTE [DISTWIDTH] IN BLOOD BY AUTOMATED COUNT: 15.2 % (ref 10–15)
FASTING STATUS PATIENT QL REPORTED: NORMAL
GFR SERPL CREATININE-BSD FRML MDRD: 27 ML/MIN/1.73M2
GLUCOSE BLD-MCNC: 172 MG/DL (ref 70–99)
HBA1C MFR BLD: 7.3 % (ref 0–5.6)
HCT VFR BLD AUTO: 37.8 % (ref 35–47)
HDLC SERPL-MCNC: 55 MG/DL
HGB BLD-MCNC: 12.1 G/DL (ref 11.7–15.7)
INR BLD: 3.2 (ref 0.9–1.1)
LDLC SERPL CALC-MCNC: 56 MG/DL
LYMPHOCYTES # BLD AUTO: 0.9 10E3/UL (ref 0.8–5.3)
LYMPHOCYTES NFR BLD AUTO: 14 %
MCH RBC QN AUTO: 30.2 PG (ref 26.5–33)
MCHC RBC AUTO-ENTMCNC: 32 G/DL (ref 31.5–36.5)
MCV RBC AUTO: 94 FL (ref 78–100)
MONOCYTES # BLD AUTO: 0.5 10E3/UL (ref 0–1.3)
MONOCYTES NFR BLD AUTO: 7 %
NEUTROPHILS # BLD AUTO: 4.9 10E3/UL (ref 1.6–8.3)
NEUTROPHILS NFR BLD AUTO: 74 %
NONHDLC SERPL-MCNC: 68 MG/DL
PLATELET # BLD AUTO: 227 10E3/UL (ref 150–450)
POTASSIUM BLD-SCNC: 3.9 MMOL/L (ref 3.4–5.3)
RBC # BLD AUTO: 4.01 10E6/UL (ref 3.8–5.2)
SODIUM SERPL-SCNC: 139 MMOL/L (ref 133–144)
TRIGL SERPL-MCNC: 58 MG/DL
WBC # BLD AUTO: 6.7 10E3/UL (ref 4–11)

## 2021-09-13 PROCEDURE — 36415 COLL VENOUS BLD VENIPUNCTURE: CPT | Performed by: INTERNAL MEDICINE

## 2021-09-13 PROCEDURE — 82306 VITAMIN D 25 HYDROXY: CPT | Performed by: INTERNAL MEDICINE

## 2021-09-13 PROCEDURE — 83036 HEMOGLOBIN GLYCOSYLATED A1C: CPT | Performed by: INTERNAL MEDICINE

## 2021-09-13 PROCEDURE — 85610 PROTHROMBIN TIME: CPT | Performed by: INTERNAL MEDICINE

## 2021-09-13 PROCEDURE — 80048 BASIC METABOLIC PNL TOTAL CA: CPT | Performed by: INTERNAL MEDICINE

## 2021-09-13 PROCEDURE — 99215 OFFICE O/P EST HI 40 MIN: CPT | Performed by: INTERNAL MEDICINE

## 2021-09-13 PROCEDURE — 84460 ALANINE AMINO (ALT) (SGPT): CPT | Performed by: INTERNAL MEDICINE

## 2021-09-13 PROCEDURE — 71046 X-RAY EXAM CHEST 2 VIEWS: CPT | Performed by: RADIOLOGY

## 2021-09-13 PROCEDURE — 80061 LIPID PANEL: CPT | Performed by: INTERNAL MEDICINE

## 2021-09-13 PROCEDURE — 85025 COMPLETE CBC W/AUTO DIFF WBC: CPT | Performed by: INTERNAL MEDICINE

## 2021-09-13 RX ORDER — ALENDRONATE SODIUM 70 MG/1
70 TABLET ORAL
Qty: 4 TABLET | Refills: 3 | Status: SHIPPED | OUTPATIENT
Start: 2021-09-13 | End: 2022-06-02

## 2021-09-13 ASSESSMENT — PATIENT HEALTH QUESTIONNAIRE - PHQ9: SUM OF ALL RESPONSES TO PHQ QUESTIONS 1-9: 3

## 2021-09-13 NOTE — LETTER
September 16, 2021    Mara Colindres  [address] [facility] [address]          Dear ,    We are writing to inform you of your test results.    All of these tests are within acceptable limits , things look reasonable. Please let me know if you have any questions for me about all of these lab tests.       Resulted Orders   HEMOGLOBIN A1C   Result Value Ref Range    Hemoglobin A1C 7.3 (H) 0.0 - 5.6 %      Comment:      Normal <5.7%   Prediabetes 5.7-6.4%    Diabetes 6.5% or higher     Note: Adopted from ADA consensus guidelines.   Vitamin D Deficiency   Result Value Ref Range    Vitamin D, Total (25-Hydroxy) 57 20 - 75 ug/L    Narrative    Season, race, dietary intake, and treatment affect the concentration of 25-hydroxy-Vitamin D. Values may decrease during winter months and increase during summer months. Values 20-29 ug/L may indicate Vitamin D insufficiency and values <20 ug/L may indicate Vitamin D deficiency.    Vitamin D determination is routinely performed by an immunoassay specific for 25 hydroxyvitamin D3.  If an individual is on vitamin D2(ergocalciferol) supplementation, please specify 25 OH vitamin D2 and D3 level determination by LCMSMS test VITD23.     Basic metabolic panel  (Ca, Cl, CO2, Creat, Gluc, K, Na, BUN)   Result Value Ref Range    Sodium 139 133 - 144 mmol/L    Potassium 3.9 3.4 - 5.3 mmol/L    Chloride 105 94 - 109 mmol/L    Carbon Dioxide (CO2) 28 20 - 32 mmol/L    Anion Gap 6 3 - 14 mmol/L    Urea Nitrogen 28 7 - 30 mg/dL    Creatinine 1.71 (H) 0.52 - 1.04 mg/dL    Calcium 9.5 8.5 - 10.1 mg/dL    Glucose 172 (H) 70 - 99 mg/dL    GFR Estimate 27 (L) >60 mL/min/1.73m2      Comment:      As of July 11, 2021, eGFR is calculated by the CKD-EPI creatinine equation, without race adjustment. eGFR can be influenced by muscle mass, exercise, and diet. The reported eGFR is an estimation only and is only applicable if the renal function is stable.   Lipid panel reflex to direct  LDL Fasting   Result Value Ref Range    Cholesterol 123 <200 mg/dL      Comment:      Age 0-19 years  Desirable: <170 mg/dL  Borderline high:  170-199 mg/dl  High:            >199 mg/dl    Age 20 years and older  Desirable: <200 mg/dL    Triglycerides 58 <150 mg/dL      Comment:      0-9 years:  Normal:    Less than 75 mg/dL  Borderline high:  75-99 mg/dL  High:             Greater than or equal to 100 mg/dL    0-19 years:  Normal:    Less than 90 mg/dL  Borderline high:   mg/dL  High:             Greater than or equal to 130 mg/dL    20 years and older:  Normal:    Less than 150 mg/dL  Borderline high:  150-199 mg/dL  High:             200-499 mg/dL  Very high:   Greater than or equal to 500 mg/dL    Direct Measure HDL 55 >=50 mg/dL      Comment:      0-19 years:       Greater than or equal to 45 mg/dL   Low: Less than 40 mg/dL   Borderline low: 40-44 mg/dL     20 years and older:   Female: Greater than or equal to 50 mg/dL   Male:   Greater than or equal to 40 mg/dL         LDL Cholesterol Calculated 56 <=100 mg/dL      Comment:      Age 0-19 years:  Desirable: 0-110 mg/dL   Borderline high: 110-129 mg/dL   High: >= 130 mg/dL    Age 20 years and older:  Desirable: <100mg/dL  Above desirable: 100-129 mg/dL   Borderline high: 130-159 mg/dL   High: 160-189 mg/dL   Very high: >= 190 mg/dL    Non HDL Cholesterol 68 <130 mg/dL      Comment:      0-19 years:  Desirable:          Less than 120 mg/dL  Borderline high:   120-144 mg/dL  High:                   Greater than or equal to 145 mg/dL    20 years and older:  Desirable:          130 mg/dL  Above Desirable: 130-159 mg/dL  Borderline high:   160-189 mg/dL  High:               190-219 mg/dL  Very high:     Greater than or equal to 220 mg/dL    Patient Fasting > 8hrs? Unknown    ALT   Result Value Ref Range    ALT 19 0 - 50 U/L   CBC with platelets and differential   Result Value Ref Range    WBC Count 6.7 4.0 - 11.0 10e3/uL    RBC Count 4.01 3.80 - 5.20 10e6/uL     Hemoglobin 12.1 11.7 - 15.7 g/dL    Hematocrit 37.8 35.0 - 47.0 %    MCV 94 78 - 100 fL    MCH 30.2 26.5 - 33.0 pg    MCHC 32.0 31.5 - 36.5 g/dL    RDW 15.2 (H) 10.0 - 15.0 %    Platelet Count 227 150 - 450 10e3/uL    % Neutrophils 74 %    % Lymphocytes 14 %    % Monocytes 7 %    % Eosinophils 5 %    % Basophils 1 %    Absolute Neutrophils 4.9 1.6 - 8.3 10e3/uL    Absolute Lymphocytes 0.9 0.8 - 5.3 10e3/uL    Absolute Monocytes 0.5 0.0 - 1.3 10e3/uL    Absolute Eosinophils 0.4 0.0 - 0.7 10e3/uL    Absolute Basophils 0.1 0.0 - 0.2 10e3/uL       If you have any questions or concerns, please call the clinic at the number listed above.       Sincerely,      Steven Abrams MD

## 2021-09-13 NOTE — PROGRESS NOTES
Anticoagulation Management    Unable to reach Mara today.    Today's INR result of 3.2 is supratherapeutic (goal INR of 2.0-3.0).  Result received from: Clinic Lab    Follow up required to confirm warfarin dose taken and assess for changes    left message to take 2.5mg warfarin tonight.      Anticoagulation clinic to follow up    Jeanine Bey RN

## 2021-09-13 NOTE — PROGRESS NOTES
Assessment & Plan     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, without long-term current use of insulin (H)  I am seeing Mara Colindres as a further follow up office visit for routine cares. She uses a motorized scooter type of vehicle and has markedly advancing chronic medical problems and decreased mobility issues . I am seeing her generally 1-2 times per year with sometimes virtual office visits in-between . As above she has a plethora of unrelated matters to discuss   - REVIEW OF HEALTH MAINTENANCE PROTOCOL ORDERS  - Albumin Random Urine Quantitative with Creat Ratio; Future  - HEMOGLOBIN A1C; Future  - HEMOGLOBIN A1C  - Basic metabolic panel  (Ca, Cl, CO2, Creat, Gluc, K, Na, BUN); Future  - Basic metabolic panel  (Ca, Cl, CO2, Creat, Gluc, K, Na, BUN)    Persistent atrial fibrillation (H)  An INR was drawn today at the patient request and it is 3.2 today. I will rout chart to INR / coumadin monitoring nurse   - REVIEW OF HEALTH MAINTENANCE PROTOCOL ORDERS  - INR  - INR point of care, Interfaced Result  - INR    Chronic diastolic congestive heart failure (H)  Patient has some mild persistent shortness of breath symptoms. This is not new and is hardly surprising. She  Qualifies for home oxygen and uses this on an as needed basis. We agreed to check chest xray to assess her perception that she has some rattling cough / rattling secretions but there's no paroxsysmal nocturnal dyspnea or orthopnea  And I did not find her lung exam abnormal today   - XR Chest 2 Views; Future    Pulmonary HTN (H)  Problem is stable and ongoing monitoring    - CBC with platelets and differential; Future  - CBC with platelets and differential    Septic hip (H)  Long since stopped taking chronic antibiotic suppression treatment , problem is stable and ongoing monitoring    - CBC with platelets and differential; Future  - CBC with platelets and differential    Major depressive disorder, recurrent  episode, mild (H)  Problem is stable and ongoing monitoring    - CBC with platelets and differential; Future  - CBC with platelets and differential    CKD (chronic kidney disease) stage 4, GFR 15-29 ml/min (H)    Problem is stable and ongoing monitoring    - CBC with platelets and differential; Future  - CBC with platelets and differential    Chronic deep vein thrombosis (DVT) of femoral vein of right lower extremity (H)    Problem is stable and ongoing monitoring    - CBC with platelets and differential; Future  - CBC with platelets and differential    Morbid obesity (H)  Problem is stable and ongoing monitoring    - CBC with platelets and differential; Future  - CBC with platelets and differential    Hypovitaminosis D  Problem is stable and ongoing monitoring    - Vitamin D Deficiency; Future  - CBC with platelets and differential; Future  - CBC with platelets and differential  - Vitamin D Deficiency    Age-related osteoporosis without current pathological fracture  As it turns out she has severe osteoporosis and was to be taking treatment with a bone building drug such as Fosamax (alendronate) and for some reason during Coronavirus (COVID-19) this was dropped from her problems. We reviewed this and started Fosamax (alendronate). I recommended she review questions about this medication with the pharmacist   - CBC with platelets and differential; Future  - alendronate (FOSAMAX) 70 MG tablet; Take 1 tablet (70 mg) by mouth every 7 days  - CBC with platelets and differential    Hyperlipidemia LDL goal <100  Problem is stable and ongoing monitoring    - ALT; Future  - Lipid panel reflex to direct LDL Fasting; Future  - Lipid panel reflex to direct LDL Fasting  - ALT    Full incontinence of feces  A separate matter we reviewed today is full on unremitting symptoms of fecal incontinence and urinary incontinence. The urinary incontinence isn't new and when I outlined the care necessary including bladder studies and  "probably a referral to urologist she balked. But with fecal incontinence she is interested and I am interested to be certain there's nothing more here then what I suspect which is the development of a complete loss of anal sphincter tone. This can be a very challenging problem to treat but I also want to make especially sure this patient is not hiding any more serious diagnosis such as rectal cancer so an evaluation with general surgery is the next appropriate step.  - Adult General Surg Referral    Review of the result(s) of each unique test - today's labs  Prescription drug management  44 minutes spent on the date of the encounter doing chart review, history and exam, documentation and further activities per the note  {     BMI:   Estimated body mass index is 43.71 kg/m  as calculated from the following:    Height as of 10/22/20: 1.575 m (5' 2\").    Weight as of this encounter: 108.4 kg (239 lb).   Weight management plan: Discussed healthy diet and exercise guidelines    Return in about 6 months (around 3/13/2022).    Steven Abrams MD  [facility] CRYSTAL Terry is a 86 year old who presents for the following health issues   Encounter Diagnoses   Name Primary?     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, without long-term current use of insulin (H) Yes     Persistent atrial fibrillation (H)      Chronic diastolic congestive heart failure (H)      Pulmonary HTN (H)      Septic hip (H)      Major depressive disorder, recurrent episode, mild (H)      CKD (chronic kidney disease) stage 4, GFR 15-29 ml/min (H)      Chronic deep vein thrombosis (DVT) of femoral vein of right lower extremity (H)      Morbid obesity (H)      Hypovitaminosis D      Age-related osteoporosis without current pathological fracture      Hyperlipidemia LDL goal <100      Full incontinence of feces        HPI     Diabetes Follow-up    How often are you checking your blood sugar? One " time daily  What time of day are you checking your blood sugars (select all that apply)?  Before meals  Have you had any blood sugars above 200?  Yes 239  Have you had any blood sugars below 70?  No    What symptoms do you notice when your blood sugar is low?  None    What concerns do you have today about your diabetes? None     Do you have any of these symptoms? (Select all that apply)  Numbness in feet    Same problems   Terrible urinary incontinence and fecal incontinence , occurs without warning, getting up from sitting and it will start coming out. This is virtually every single day. This has causes her to skip her diuretic therapy morning of days she's going to go out. And she will take 2 of the diphenoxylate-atropine (LOMOTIL) tabs and these actions do seem helpful.    Taking Trulicity (dulaglutide) as her only diabetes mellitus treatment   Blood glucose readings are always usually high. She's talking about numbers of 239 highest over the last  Few months , 236 but the average is 120-140.    Lab Results   Component Value Date    A1C 7.1 08/11/2020    A1C 7.0 04/29/2019    A1C 7.3 07/09/2018    A1C 7.8 11/07/2017    A1C 8.2 07/27/2017     New is 1 year ago and has happened about 3 times this year, with eating she develops an odynophagia or dysphagia problem with a 5 minutes later episode of vomiting up what she had eaten. She did have substantial amount of emesis at these points. Generally only having one meal per day.     Wt Readings from Last 5 Encounters:   09/13/21 108.4 kg (239 lb)   10/22/20 114.3 kg (252 lb)   09/04/20 114.3 kg (252 lb)   04/22/20 113.4 kg (250 lb)   03/05/20 113.4 kg (250 lb)      chronic left lower quadrant soreness, comes and goes  , she wonders if she has a kink in her colon.    INR / coumadin monitoring is in the room 3.2 today.     Took chronic antibiotic suppression treatment for around 10 years due to a hip infection and she took herself off of these antibiotics around 5 years  ago.    I discouraged the fecal occult blood testing     BP Readings from Last 2 Encounters:   09/13/21 93/60   09/04/20 124/76     Hemoglobin A1C (%)   Date Value   09/13/2021 7.3 (H)   08/11/2020 7.1 (H)   04/29/2019 7.0 (H)     LDL Cholesterol Calculated (mg/dL)   Date Value   08/11/2020 42   04/29/2019 67                 How many servings of fruits and vegetables do you eat daily?  0-1    On average, how many sweetened beverages do you drink each day (Examples: soda, juice, sweet tea, etc.  Do NOT count diet or artificially sweetened beverages)?   1    How many days per week do you exercise enough to make your heart beat faster? 3 or less    How many minutes a day do you exercise enough to make your heart beat faster? 9 or less    How many days per week do you miss taking your medication? 0        Review of Systems   Constitutional, HEENT, cardiovascular, pulmonary, gi and gu systems are negative, except as otherwise noted.      Objective    BP 93/60 (BP Location: Right arm, Patient Position: Sitting, Cuff Size: Adult Large)   Pulse 93   Temp 98.1  F (36.7  C) (Oral)   Wt 108.4 kg (239 lb)   SpO2 97%   BMI 43.71 kg/m    Body mass index is 43.71 kg/m .  Physical Exam   GENERAL: healthy, alert and no distress  NECK: no adenopathy, no asymmetry, masses, or scars and thyroid normal to palpation  RESP: lungs clear to auscultation - no rales, rhonchi or wheezes  CV: regular rate and rhythm, normal S1 S2, no S3 or S4, no murmur, click or rub, no peripheral edema and peripheral pulses strong  ABDOMEN: soft, nontender, no hepatosplenomegaly, no masses and bowel sounds normal  MS: fairly dramatically swollen bilateral lower extremity edema consistent with a diagnosis of lymphedema     Orders Placed This Encounter   Procedures     REVIEW OF HEALTH MAINTENANCE PROTOCOL ORDERS     XR Chest 2 Views     Albumin Random Urine Quantitative with Creat Ratio     ALT     HEMOGLOBIN A1C     Lipid panel reflex to direct LDL  Fasting     INR point of care, Interfaced Result     Basic metabolic panel  (Ca, Cl, CO2, Creat, Gluc, K, Na, BUN)     Vitamin D Deficiency     CBC with platelets and differential     CBC with platelets and differential     Adult General Surg Referral

## 2021-09-14 LAB — DEPRECATED CALCIDIOL+CALCIFEROL SERPL-MC: 57 UG/L (ref 20–75)

## 2021-09-14 ASSESSMENT — ASTHMA QUESTIONNAIRES: ACT_TOTALSCORE: 18

## 2021-09-14 NOTE — PROGRESS NOTES
ANTICOAGULATION MANAGEMENT     Mara Colindres 86 year old female is on warfarin with supratherapeutic INR result. (Goal INR 2.0-3.0)    Recent labs: (last 7 days)     09/13/21  1237   INR 3.2*       ASSESSMENT     Source(s): Chart Review and Patient/Caregiver Call     Warfarin doses taken: Warfarin taken as instructed  Diet: Decreased greens/vitamin K in diet; plans to resume previous intake  New illness, injury, or hospitalization: No  Medication/supplement changes: None noted  Signs or symptoms of bleeding or clotting: No  Previous INR: Therapeutic last visit; previously outside of goal range  Additional findings: None     PLAN     Recommended plan for no diet, medication or health factor changes affecting INR     Dosing Instructions: Continue your current warfarin dose with next INR in 2 weeks       Patient did not get VM to take 2.5mg yesterday and instead took her regular dose of 5mg.   Summary  As of 9/13/2021    Full warfarin instructions:  5 mg every Mon, Fri; 2.5 mg all other days   Next INR check:  9/27/2021             Telephone call with Mara who verbalizes understanding and agrees to plan    Contact [phone]  to schedule and with any changes, questions or concerns.     Education provided: Importance of consistent vitamin K intake, Impact of vitamin K foods on INR, Goal range and significance of current result and Importance of therapeutic range    Plan made per [facility] anticoagulation protocol    Marcos Jauregui RN  Anticoagulation Clinic  9/14/2021    _______________________________________________________________________     Anticoagulation Episode Summary     Current INR goal:  2.0-3.0   TTR:  63.6 % (1 y)   Target end date:  Indefinite   Send INR reminders to:  KELLY ROJAS    Indications    Long-term (current) use of anticoagulants [Z79.01] [Z79.01]  Persistent atrial fibrillation (H) [I48.19]  Chronic anticoagulation [Z79.01]           Comments:           Anticoagulation Care Providers      Provider Role Specialty Phone number    Steven Abrams MD Referring Internal Medicine [phone]

## 2021-09-16 RX ORDER — ALENDRONATE SODIUM 70 MG/1
TABLET ORAL
Qty: 12 TABLET | OUTPATIENT
Start: 2021-09-16

## 2021-09-28 DIAGNOSIS — F33.0 MAJOR DEPRESSIVE DISORDER, RECURRENT EPISODE, MILD (H): ICD-10-CM

## 2021-09-30 ENCOUNTER — TELEPHONE (OUTPATIENT)
Dept: ANTICOAGULATION | Facility: CLINIC | Age: 86
End: 2021-09-30

## 2021-09-30 RX ORDER — PAROXETINE 20 MG/1
TABLET, FILM COATED ORAL
Qty: 30 TABLET | Refills: 0 | Status: SHIPPED | OUTPATIENT
Start: 2021-09-30 | End: 2021-10-30

## 2021-09-30 NOTE — TELEPHONE ENCOUNTER
Prescription approved per [facility] Refill Protocol.    NICANOR KellyN RN  [facility]

## 2021-09-30 NOTE — TELEPHONE ENCOUNTER
ANTICOAGULATION     Mara Colindres is overdue for INR check.      Spoke with Mara and scheduled lab appointment on 10/5/21    Marcos Jauregui RN

## 2021-10-05 ENCOUNTER — ANTICOAGULATION THERAPY VISIT (OUTPATIENT)
Dept: ANTICOAGULATION | Facility: CLINIC | Age: 86
End: 2021-10-05

## 2021-10-05 ENCOUNTER — LAB (OUTPATIENT)
Dept: LAB | Facility: CLINIC | Age: 86
End: 2021-10-05
Payer: COMMERCIAL

## 2021-10-05 DIAGNOSIS — I48.19 PERSISTENT ATRIAL FIBRILLATION (H): ICD-10-CM

## 2021-10-05 DIAGNOSIS — Z79.01 LONG TERM CURRENT USE OF ANTICOAGULANT THERAPY: Primary | ICD-10-CM

## 2021-10-05 DIAGNOSIS — Z79.01 CHRONIC ANTICOAGULATION: ICD-10-CM

## 2021-10-05 LAB — INR BLD: 3.1 (ref 0.9–1.1)

## 2021-10-05 PROCEDURE — 85610 PROTHROMBIN TIME: CPT

## 2021-10-05 PROCEDURE — 36416 COLLJ CAPILLARY BLOOD SPEC: CPT

## 2021-10-05 NOTE — PROGRESS NOTES
ANTICOAGULATION MANAGEMENT     Mara Colindres 86 year old female is on warfarin with supratherapeutic INR result. (Goal INR 2.0-3.0)    Recent labs: (last 7 days)     10/05/21  1216   INR 3.1*       ASSESSMENT     Source(s): Chart Review and Patient/Caregiver Call       Warfarin doses taken: Warfarin taken as instructed    Diet: No new diet changes identified    New illness, injury, or hospitalization: Fell a couple weeks ago    Medication/supplement changes: Started fosamax - no interactions    Signs or symptoms of bleeding or clotting: Yes: bruised from a fall    Previous INR: Supratherapeutic    Additional findings: None     PLAN     Recommended plan for no diet, medication or health factor changes affecting INR     Dosing Instructions:  Decrease your warfarin dose (11.1% change) with next INR in 2 weeks       Summary  As of 10/5/2021    Full warfarin instructions:  5 mg every Mon; 2.5 mg all other days   Next INR check:  10/19/2021             Telephone call with Mara who verbalizes understanding and agrees to plan    Lab visit scheduled    Education provided: Goal range and significance of current result    Plan made per [facility] anticoagulation protocol    Celeste Ruiz RN  Anticoagulation Clinic  10/5/2021    _______________________________________________________________________     Anticoagulation Episode Summary     Current INR goal:  2.0-3.0   TTR:  57.7 % (1 y)   Target end date:  Indefinite   Send INR reminders to:  KELLY ROJAS    Indications    Long-term (current) use of anticoagulants [Z79.01] [Z79.01]  Persistent atrial fibrillation (H) [I48.19]  Chronic anticoagulation [Z79.01]           Comments:           Anticoagulation Care Providers     Provider Role Specialty Phone number    Steven Abrams MD Referring Internal Medicine [phone]

## 2021-10-08 ENCOUNTER — TELEPHONE (OUTPATIENT)
Dept: INTERNAL MEDICINE | Facility: CLINIC | Age: 86
End: 2021-10-08

## 2021-10-08 NOTE — TELEPHONE ENCOUNTER
Reason for Call:  Form, our goal is to have forms completed with 72 hours, however, some forms may require a visit or additional information.    Type of letter, form or note:  medical    Who is the form from?: [employer] (if other please explain)    Where did the form come from: form was faxed in    What clinic location was the form placed at?: [facility]    Where the form was placed: Given to physician    What number is listed as a contact on the form?: [phone]       Call taken on 10/8/2021 at 2:00 PM by Halima Bob

## 2021-10-08 NOTE — TELEPHONE ENCOUNTER
Certificate of Medical Necessity for oxygen received and given to Dr. Abrams to complete and sign. Halima Bob,

## 2021-10-11 ENCOUNTER — MEDICAL CORRESPONDENCE (OUTPATIENT)
Dept: HEALTH INFORMATION MANAGEMENT | Facility: CLINIC | Age: 86
End: 2021-10-11

## 2021-10-17 DIAGNOSIS — N18.32 STAGE 3B CHRONIC KIDNEY DISEASE (H): ICD-10-CM

## 2021-10-20 ENCOUNTER — ANTICOAGULATION THERAPY VISIT (OUTPATIENT)
Dept: ANTICOAGULATION | Facility: CLINIC | Age: 86
End: 2021-10-20

## 2021-10-20 ENCOUNTER — LAB (OUTPATIENT)
Dept: LAB | Facility: CLINIC | Age: 86
End: 2021-10-20
Payer: COMMERCIAL

## 2021-10-20 DIAGNOSIS — I48.19 PERSISTENT ATRIAL FIBRILLATION (H): ICD-10-CM

## 2021-10-20 DIAGNOSIS — Z79.01 CHRONIC ANTICOAGULATION: ICD-10-CM

## 2021-10-20 DIAGNOSIS — Z79.01 LONG TERM CURRENT USE OF ANTICOAGULANT THERAPY: Primary | ICD-10-CM

## 2021-10-20 LAB — INR BLD: 2.6 (ref 0.9–1.1)

## 2021-10-20 PROCEDURE — 85610 PROTHROMBIN TIME: CPT

## 2021-10-20 PROCEDURE — 36415 COLL VENOUS BLD VENIPUNCTURE: CPT

## 2021-10-20 RX ORDER — FUROSEMIDE 40 MG
TABLET ORAL
Qty: 180 TABLET | Refills: 1 | Status: SHIPPED | OUTPATIENT
Start: 2021-10-20 | End: 2022-05-10

## 2021-10-20 NOTE — PROGRESS NOTES
ANTICOAGULATION MANAGEMENT     Mara Colindres 86 year old female is on warfarin with therapeutic INR result. (Goal INR 2.0-3.0)    Recent labs: (last 7 days)     10/20/21  1341   INR 2.6*       ASSESSMENT     Source(s): Chart Review and Patient/Caregiver Call       Warfarin doses taken: Warfarin taken as instructed    Diet: No new diet changes identified    New illness, injury, or hospitalization: No    Medication/supplement changes: None noted    Signs or symptoms of bleeding or clotting: No    Previous INR: Supratherapeutic    Additional findings: None     PLAN     Recommended plan for no diet, medication or health factor changes affecting INR     Dosing Instructions: Continue your current warfarin dose with next INR in 3 weeks       Summary  As of 10/20/2021    Full warfarin instructions:  5 mg every Mon; 2.5 mg all other days   Next INR check:  11/10/2021             Telephone call with Mara who verbalizes understanding and agrees to plan    Patient offered & declined to schedule next visit    Education provided: Goal range and significance of current result, Importance of therapeutic range and Contact [phone]  with any changes, questions or concerns.     Plan made per [facility] anticoagulation protocol    Marcos Jauregui RN  Anticoagulation Clinic  10/20/2021    _______________________________________________________________________     Anticoagulation Episode Summary     Current INR goal:  2.0-3.0   TTR:  56.9 % (1 y)   Target end date:  Indefinite   Send INR reminders to:  KELLY ROJAS    Indications    Long-term (current) use of anticoagulants [Z79.01] [Z79.01]  Persistent atrial fibrillation (H) [I48.19]  Chronic anticoagulation [Z79.01]           Comments:           Anticoagulation Care Providers     Provider Role Specialty Phone number    Steven Abrams MD Referring Internal Medicine [phone]

## 2021-10-20 NOTE — TELEPHONE ENCOUNTER
"Routing refill request to provider for review/approval because:  Labs out of range:  Cr.       Requested Prescriptions   Pending Prescriptions Disp Refills     furosemide (LASIX) 40 MG tablet [Pharmacy Med Name: FUROSEMIDE 40MG TABLETS] 180 tablet 1     Sig: TAKE 1 TABLET(40 MG) BY MOUTH TWICE DAILY       Diuretics (Including Combos) Protocol Failed - 10/17/2021  5:14 PM        Failed - Normal serum creatinine on file in past 12 months     Recent Labs   Lab Test 09/13/21  1309   CR 1.71*              Passed - Blood pressure under 140/90 in past 12 months     BP Readings from Last 3 Encounters:   09/13/21 93/60   09/04/20 124/76   03/05/20 120/78                 Passed - Recent (12 mo) or future (30 days) visit within the authorizing provider's specialty     Patient has had an office visit with the authorizing provider or a provider within the authorizing providers department within the previous 12 mos or has a future within next 30 days. See \"Patient Info\" tab in inbasket, or \"Choose Columns\" in Meds & Orders section of the refill encounter.              Passed - Medication is active on med list        Passed - Patient is age 18 or older        Passed - No active pregancy on record        Passed - Normal serum potassium on file in past 12 months     Recent Labs   Lab Test 09/13/21  1309   POTASSIUM 3.9                    Passed - Normal serum sodium on file in past 12 months     Recent Labs   Lab Test 09/13/21  1309                 Passed - No positive pregnancy test in past 12 months           Aaliyah Singer RN    "

## 2021-10-21 DIAGNOSIS — G47.33 OBSTRUCTIVE SLEEP APNEA (ADULT) (PEDIATRIC): Primary | ICD-10-CM

## 2021-10-28 DIAGNOSIS — F33.0 MAJOR DEPRESSIVE DISORDER, RECURRENT EPISODE, MILD (H): ICD-10-CM

## 2021-10-30 RX ORDER — PAROXETINE 20 MG/1
TABLET, FILM COATED ORAL
Qty: 90 TABLET | Refills: 0 | Status: SHIPPED | OUTPATIENT
Start: 2021-10-30 | End: 2022-01-28

## 2021-10-30 NOTE — TELEPHONE ENCOUNTER
Prescription approved per [facility] Refill Protocol.    PHQ-9 score:    PHQ 9/13/2021   PHQ-9 Total Score 3   Q9: Thoughts of better off dead/self-harm past 2 weeks Not at all

## 2021-11-16 ENCOUNTER — LAB (OUTPATIENT)
Dept: LAB | Facility: CLINIC | Age: 86
End: 2021-11-16
Payer: COMMERCIAL

## 2021-11-16 ENCOUNTER — ANTICOAGULATION THERAPY VISIT (OUTPATIENT)
Dept: ANTICOAGULATION | Facility: CLINIC | Age: 86
End: 2021-11-16

## 2021-11-16 DIAGNOSIS — I48.19 PERSISTENT ATRIAL FIBRILLATION (H): ICD-10-CM

## 2021-11-16 DIAGNOSIS — Z79.01 LONG TERM CURRENT USE OF ANTICOAGULANT THERAPY: Primary | ICD-10-CM

## 2021-11-16 DIAGNOSIS — Z79.01 CHRONIC ANTICOAGULATION: ICD-10-CM

## 2021-11-16 LAB — INR BLD: 2 (ref 0.9–1.1)

## 2021-11-16 PROCEDURE — 36415 COLL VENOUS BLD VENIPUNCTURE: CPT

## 2021-11-16 PROCEDURE — 85610 PROTHROMBIN TIME: CPT

## 2021-11-16 NOTE — PROGRESS NOTES
ANTICOAGULATION MANAGEMENT     Mara Colindres 86 year old female is on warfarin with therapeutic INR result. (Goal INR 2.0-3.0)    Recent labs: (last 7 days)     11/16/21  1405   INR 2.0*       ASSESSMENT     Source(s): Chart Review and Patient/Caregiver Call       Warfarin doses taken: Warfarin taken as instructed    Diet: No new diet changes identified    New illness, injury, or hospitalization: No    Medication/supplement changes: None noted    Signs or symptoms of bleeding or clotting: No    Previous INR: Therapeutic last visit; previously outside of goal range    Additional findings: None     PLAN     Recommended plan for no diet, medication or health factor changes affecting INR     Dosing Instructions: Continue your current warfarin dose with next INR in 5 weeks       Summary  As of 11/16/2021    Full warfarin instructions:  5 mg every Mon; 2.5 mg all other days   Next INR check:  12/21/2021             Telephone call with Mara who verbalizes understanding and agrees to plan    Lab visit scheduled    Education provided: Goal range and significance of current result and Importance of therapeutic range    Plan made per [facility] anticoagulation protocol    Marcos Jauregui RN  Anticoagulation Clinic  11/16/2021    _______________________________________________________________________     Anticoagulation Episode Summary     Current INR goal:  2.0-3.0   TTR:  56.9 % (1 y)   Target end date:  Indefinite   Send INR reminders to:  KELLY ROJAS    Indications    Long-term (current) use of anticoagulants [Z79.01] [Z79.01]  Persistent atrial fibrillation (H) [I48.19]  Chronic anticoagulation [Z79.01]           Comments:           Anticoagulation Care Providers     Provider Role Specialty Phone number    Steven Abrams MD Referring Internal Medicine [phone]

## 2021-12-21 ENCOUNTER — ANTICOAGULATION THERAPY VISIT (OUTPATIENT)
Dept: ANTICOAGULATION | Facility: CLINIC | Age: 86
End: 2021-12-21

## 2021-12-21 ENCOUNTER — LAB (OUTPATIENT)
Dept: LAB | Facility: CLINIC | Age: 86
End: 2021-12-21
Payer: COMMERCIAL

## 2021-12-21 DIAGNOSIS — I48.19 PERSISTENT ATRIAL FIBRILLATION (H): ICD-10-CM

## 2021-12-21 DIAGNOSIS — Z79.01 CHRONIC ANTICOAGULATION: ICD-10-CM

## 2021-12-21 DIAGNOSIS — N18.9 CHRONIC KIDNEY DISEASE: Primary | ICD-10-CM

## 2021-12-21 DIAGNOSIS — Z79.01 LONG TERM CURRENT USE OF ANTICOAGULANT THERAPY: Primary | ICD-10-CM

## 2021-12-21 LAB
HGB BLD-MCNC: 12.2 G/DL (ref 11.7–15.7)
INR BLD: 2.6 (ref 0.9–1.1)

## 2021-12-21 PROCEDURE — 85018 HEMOGLOBIN: CPT

## 2021-12-21 PROCEDURE — 36416 COLLJ CAPILLARY BLOOD SPEC: CPT

## 2021-12-21 PROCEDURE — 85610 PROTHROMBIN TIME: CPT

## 2021-12-21 PROCEDURE — 36415 COLL VENOUS BLD VENIPUNCTURE: CPT

## 2021-12-21 NOTE — PROGRESS NOTES
ANTICOAGULATION MANAGEMENT     Mara Colindres 87 year old female is on warfarin with therapeutic INR result. (Goal INR 2.0-3.0)    Recent labs: (last 7 days)     12/21/21  1432   INR 2.6*       ASSESSMENT     Source(s): Chart Review       Warfarin doses taken: Warfarin taken as instructed    Diet: No new diet changes identified    New illness, injury, or hospitalization: No    Medication/supplement changes: None noted    Signs or symptoms of bleeding or clotting: No    Previous INR: Therapeutic last 2(+) visits    Additional findings: None     PLAN     Recommended plan for no diet, medication or health factor changes affecting INR     Dosing Instructions: Continue your current warfarin dose with next INR in 4 weeks       Summary  As of 12/21/2021    Full warfarin instructions:  5 mg every Mon; 2.5 mg all other days   Next INR check:  1/18/2022             Telephone call with Mara who verbalizes understanding and agrees to plan    Lab visit scheduled    Education provided: Please call back if any changes to your diet, medications or how you've been taking warfarin    Plan made per [facility] anticoagulation protocol    Jeanine Bey RN  Anticoagulation Clinic  12/21/2021    _______________________________________________________________________     Anticoagulation Episode Summary     Current INR goal:  2.0-3.0   TTR:  56.9 % (1 y)   Target end date:  Indefinite   Send INR reminders to:  KELLY ROJAS    Indications    Long-term (current) use of anticoagulants [Z79.01] [Z79.01]  Persistent atrial fibrillation (H) [I48.19]  Chronic anticoagulation [Z79.01]           Comments:           Anticoagulation Care Providers     Provider Role Specialty Phone number    Steven Abrams MD Referring Internal Medicine [phone]

## 2021-12-23 DIAGNOSIS — E11.22 CONTROLLED TYPE 2 DIABETES MELLITUS WITH STAGE 3 CHRONIC KIDNEY DISEASE, WITHOUT LONG-TERM CURRENT USE OF INSULIN (H): ICD-10-CM

## 2021-12-23 DIAGNOSIS — N18.30 CONTROLLED TYPE 2 DIABETES MELLITUS WITH STAGE 3 CHRONIC KIDNEY DISEASE, WITHOUT LONG-TERM CURRENT USE OF INSULIN (H): ICD-10-CM

## 2021-12-24 RX ORDER — DULAGLUTIDE 1.5 MG/.5ML
INJECTION, SOLUTION SUBCUTANEOUS
Qty: 2 ML | Refills: 3 | Status: SHIPPED | OUTPATIENT
Start: 2021-12-24 | End: 2022-05-08

## 2021-12-24 NOTE — TELEPHONE ENCOUNTER
"Routing refill request to provider for review/approval because:  Labs out of range:  Cr      Requested Prescriptions   Pending Prescriptions Disp Refills     TRULICITY 1.5 MG/0.5ML pen [Pharmacy Med Name: TRULICITY 1.5MG/0.5ML SDP 0.5ML] 2 mL 3     Sig: ADMINISTER 1.5 MG UNDER THE SKIN EVERY 7 DAYS       GLP-1 Agonists Protocol Failed - 12/23/2021  3:46 AM        Failed - Normal serum creatinine on file in past 12 months     Recent Labs   Lab Test 09/13/21  1309   CR 1.71*       Ok to refill medication if creatinine is low          Failed - Recent (6 mo) or future (30 days) visit within the authorizing provider's specialty     Patient had office visit in the last 6 months or has a visit in the next 30 days with authorizing provider.  See \"Patient Info\" tab in inbasket, or \"Choose Columns\" in Meds & Orders section of the refill encounter.            Passed - HgbA1C in past 3 or 6 months     If HgbA1C is 8 or greater, it needs to be on file within the past 3 months.  If less than 8, must be on file within the past 6 months.     Recent Labs   Lab Test 09/13/21  1227   A1C 7.3*             Passed - Medication is active on med list        Passed - Patient is age 18 or older        Passed - No active pregnancy on record        Passed - No positive pregnancy test in past 12 months           Soraida Polanco RN  [facility]    "

## 2021-12-27 DIAGNOSIS — I27.20 PULMONARY HTN (H): ICD-10-CM

## 2021-12-28 RX ORDER — LOSARTAN POTASSIUM 25 MG/1
TABLET ORAL
Qty: 180 TABLET | Refills: 0 | Status: SHIPPED | OUTPATIENT
Start: 2021-12-28 | End: 2022-03-28

## 2021-12-28 NOTE — TELEPHONE ENCOUNTER
"Requested Prescriptions   Pending Prescriptions Disp Refills     losartan (COZAAR) 25 MG tablet [Pharmacy Med Name: LOSARTAN 25MG TABLETS] 180 tablet 0     Sig: TAKE 1 TABLET(25 MG) BY MOUTH TWICE DAILY       Angiotensin-II Receptors Failed - 12/27/2021 10:12 AM        Failed - Normal serum creatinine on file in past 12 months     Recent Labs   Lab Test 09/13/21  1309   CR 1.71*     Ok to refill medication if creatinine is low          Passed - Last blood pressure under 140/90 in past 12 months     BP Readings from Last 3 Encounters:   09/13/21 93/60   09/04/20 124/76   03/05/20 120/78           Passed - Recent (12 mo) or future (30 days) visit within the authorizing provider's specialty     Patient has had an office visit with the authorizing provider or a provider within the authorizing providers department within the previous 12 mos or has a future within next 30 days. See \"Patient Info\" tab in inbasket, or \"Choose Columns\" in Meds & Orders section of the refill encounter.            Passed - Medication is active on med list        Passed - Patient is age 18 or older        Passed - No active pregnancy on record        Passed - Normal serum potassium on file in past 12 months     Recent Labs   Lab Test 09/13/21  1309   POTASSIUM 3.9            Passed - No positive pregnancy test in past 12 months           Routing refill request to provider for review/approval because:  Labs out of range:    Angiotensin-II Receptors Failed - 12/27/2021 10:12 AM       Failed - Normal serum creatinine on file in past 12 months    Recent Labs   Lab Test 09/13/21  1309   CR 1.71*     Ok to refill medication if creatinine is low             "

## 2022-01-18 ENCOUNTER — ANTICOAGULATION THERAPY VISIT (OUTPATIENT)
Dept: ANTICOAGULATION | Facility: CLINIC | Age: 87
End: 2022-01-18

## 2022-01-18 ENCOUNTER — LAB (OUTPATIENT)
Dept: LAB | Facility: CLINIC | Age: 87
End: 2022-01-18
Payer: COMMERCIAL

## 2022-01-18 DIAGNOSIS — I48.19 PERSISTENT ATRIAL FIBRILLATION (H): ICD-10-CM

## 2022-01-18 DIAGNOSIS — Z79.01 LONG TERM CURRENT USE OF ANTICOAGULANT THERAPY: Primary | ICD-10-CM

## 2022-01-18 DIAGNOSIS — Z79.01 CHRONIC ANTICOAGULATION: ICD-10-CM

## 2022-01-18 LAB — INR BLD: 2.2 (ref 0.9–1.1)

## 2022-01-18 PROCEDURE — 85610 PROTHROMBIN TIME: CPT

## 2022-01-18 PROCEDURE — 36415 COLL VENOUS BLD VENIPUNCTURE: CPT

## 2022-01-18 NOTE — PROGRESS NOTES
ANTICOAGULATION MANAGEMENT     Mara Colindres 87 year old female is on warfarin with therapeutic INR result. (Goal INR 2.0-3.0)    Recent labs: (last 7 days)     01/18/22  1414   INR 2.2*       ASSESSMENT     Source(s): Chart Review and Patient/Caregiver Call     Warfarin doses taken: Warfarin taken as instructed  Diet: No new diet changes identified  New illness, injury, or hospitalization: No  Medication/supplement changes: None noted  Signs or symptoms of bleeding or clotting: No  Previous INR: Therapeutic last 2(+) visits  Additional findings: None     PLAN     Recommended plan for no diet, medication or health factor changes affecting INR     Dosing Instructions: Continue your current warfarin dose with next INR in 4 weeks       Summary  As of 1/18/2022    Full warfarin instructions:  5 mg every Mon; 2.5 mg all other days   Next INR check:  2/15/2022             Telephone call with Mara who verbalizes understanding and agrees to plan    Lab visit scheduled    Education provided: Monitoring for bleeding signs and symptoms, Monitoring for clotting signs and symptoms and Contact [phone]  with any changes, questions or concerns.     Plan made per [facility] anticoagulation protocol    Estela Washington RN  Anticoagulation Clinic  1/18/2022    _______________________________________________________________________     Anticoagulation Episode Summary     Current INR goal:  2.0-3.0   TTR:  56.9 % (1 y)   Target end date:  Indefinite   Send INR reminders to:  KELLY ROJAS    Indications    Long-term (current) use of anticoagulants [Z79.01] [Z79.01]  Persistent atrial fibrillation (H) [I48.19]  Chronic anticoagulation [Z79.01]           Comments:           Anticoagulation Care Providers     Provider Role Specialty Phone number    Steven Abrams MD Referring Internal Medicine [phone]

## 2022-01-26 DIAGNOSIS — I50.32 CHRONIC DIASTOLIC CONGESTIVE HEART FAILURE (H): ICD-10-CM

## 2022-01-26 DIAGNOSIS — I27.20 PULMONARY HTN (H): ICD-10-CM

## 2022-01-26 DIAGNOSIS — F33.0 MAJOR DEPRESSIVE DISORDER, RECURRENT EPISODE, MILD (H): ICD-10-CM

## 2022-01-28 RX ORDER — PAROXETINE 20 MG/1
TABLET, FILM COATED ORAL
Qty: 90 TABLET | Refills: 0 | Status: SHIPPED | OUTPATIENT
Start: 2022-01-28 | End: 2022-04-28

## 2022-01-28 RX ORDER — METOPROLOL TARTRATE 100 MG
TABLET ORAL
Qty: 90 TABLET | Refills: 1 | Status: SHIPPED | OUTPATIENT
Start: 2022-01-28 | End: 2022-06-02

## 2022-03-01 ENCOUNTER — LAB (OUTPATIENT)
Dept: LAB | Facility: CLINIC | Age: 87
End: 2022-03-01
Payer: COMMERCIAL

## 2022-03-01 ENCOUNTER — ANTICOAGULATION THERAPY VISIT (OUTPATIENT)
Dept: ANTICOAGULATION | Facility: CLINIC | Age: 87
End: 2022-03-01

## 2022-03-01 DIAGNOSIS — Z79.01 CHRONIC ANTICOAGULATION: ICD-10-CM

## 2022-03-01 DIAGNOSIS — I10 HYPERTENSION GOAL BP (BLOOD PRESSURE) < 140/90: Primary | ICD-10-CM

## 2022-03-01 DIAGNOSIS — I48.19 PERSISTENT ATRIAL FIBRILLATION (H): ICD-10-CM

## 2022-03-01 DIAGNOSIS — Z79.01 LONG TERM CURRENT USE OF ANTICOAGULANT THERAPY: Primary | ICD-10-CM

## 2022-03-01 LAB — INR BLD: 1.8 (ref 0.9–1.1)

## 2022-03-01 PROCEDURE — 85610 PROTHROMBIN TIME: CPT

## 2022-03-01 PROCEDURE — 36415 COLL VENOUS BLD VENIPUNCTURE: CPT

## 2022-03-01 NOTE — PROGRESS NOTES
ANTICOAGULATION MANAGEMENT     Mara Colindres 87 year old female is on warfarin with subtherapeutic INR result. (Goal INR 2.0-3.0)    Recent labs: (last 7 days)     03/01/22  1311   INR 1.8*       ASSESSMENT     Source(s): Chart Review and Patient/Caregiver Call     Warfarin doses taken: Warfarin taken as instructed  Diet: Increased greens/vitamin K in diet; plans to resume previous intake  New illness, injury, or hospitalization: Yes: had covid last month  Medication/supplement changes: None noted  Signs or symptoms of bleeding or clotting: No  Previous INR: Therapeutic last 2(+) visits  Additional findings: None       PLAN     Recommended plan for no diet, medication or health factor changes affecting INR     Dosing Instructions: Booster dose then continue your current warfarin dose with next INR in 2 weeks       Summary  As of 3/1/2022    Full warfarin instructions:  3/1: 5 mg; Otherwise 5 mg every Mon; 2.5 mg all other days   Next INR check:  3/15/2022             Telephone call with Mara who verbalizes understanding and agrees to plan    Lab visit scheduled    Education provided: Please call back if any changes to your diet, medications or how you've been taking warfarin    Plan made per [facility] anticoagulation protocol    Jeanine Bey, RN  Anticoagulation Clinic  3/1/2022    _______________________________________________________________________     Anticoagulation Episode Summary     Current INR goal:  2.0-3.0   TTR:  59.4 % (1 y)   Target end date:  Indefinite   Send INR reminders to:  KELLY ROJAS    Indications    Long-term (current) use of anticoagulants [Z79.01] [Z79.01]  Persistent atrial fibrillation (H) [I48.19]  Chronic anticoagulation [Z79.01]           Comments:           Anticoagulation Care Providers     Provider Role Specialty Phone number    Steven Abrams MD Referring Internal Medicine [phone]

## 2022-03-15 ENCOUNTER — LAB (OUTPATIENT)
Dept: LAB | Facility: CLINIC | Age: 87
End: 2022-03-15
Payer: COMMERCIAL

## 2022-03-15 ENCOUNTER — ANTICOAGULATION THERAPY VISIT (OUTPATIENT)
Dept: ANTICOAGULATION | Facility: CLINIC | Age: 87
End: 2022-03-15

## 2022-03-15 DIAGNOSIS — I48.19 PERSISTENT ATRIAL FIBRILLATION (H): ICD-10-CM

## 2022-03-15 DIAGNOSIS — Z79.01 LONG TERM CURRENT USE OF ANTICOAGULANT THERAPY: Primary | ICD-10-CM

## 2022-03-15 DIAGNOSIS — Z79.01 CHRONIC ANTICOAGULATION: ICD-10-CM

## 2022-03-15 DIAGNOSIS — Z00.00 HEALTHCARE MAINTENANCE: Primary | ICD-10-CM

## 2022-03-15 LAB — INR BLD: 1.9 (ref 0.9–1.1)

## 2022-03-15 PROCEDURE — 36415 COLL VENOUS BLD VENIPUNCTURE: CPT

## 2022-03-15 PROCEDURE — 85610 PROTHROMBIN TIME: CPT

## 2022-03-15 NOTE — PROGRESS NOTES
ANTICOAGULATION MANAGEMENT     Mara Colindres 87 year old female is on warfarin with subtherapeutic INR result. (Goal INR 2.0-3.0)    Recent labs: (last 7 days)     03/15/22  1347   INR 1.9*       ASSESSMENT     Source(s): Chart Review  Previous INR was Subtherapeutic  Medication, diet, health changes since last INR chart reviewed; none identified           PLAN     Unable to reach patient today.    Left message to continue current dose of warfarin 2.5 mg tonight. Request call back for assessment.    Follow up required to confirm warfarin dose taken and assess for changes and discuss out of range result     Celeste Ruiz RN  Anticoagulation Clinic  3/15/2022

## 2022-03-16 NOTE — PROGRESS NOTES
ANTICOAGULATION MANAGEMENT     Mara Colindres 87 year old female is on warfarin with subtherapeutic INR result. (Goal INR 2.0-3.0)    Recent labs: (last 7 days)     03/15/22  1347   INR 1.9*       ASSESSMENT     Source(s): Chart Review and Patient/Caregiver Call     Warfarin doses taken: Warfarin taken as instructed  Diet: No new diet changes identified  New illness, injury, or hospitalization: No  Medication/supplement changes: None noted  Signs or symptoms of bleeding or clotting: No  Previous INR: Subtherapeutic  Additional findings: None       PLAN     Recommended plan for no diet, medication or health factor changes affecting INR     Dosing Instructions:  Increase your warfarin dose (12.5% change) with next INR in 2 weeks       Summary  As of 3/15/2022    Full warfarin instructions:  5 mg every Mon, Fri; 2.5 mg all other days   Next INR check:  3/29/2022             Telephone call with Mara who verbalizes understanding and agrees to plan    Lab visit scheduled    Education provided: Goal range and significance of current result    Plan made per [facility] anticoagulation protocol    Celeste Ruiz RN  Anticoagulation Clinic  3/16/2022    _______________________________________________________________________     Anticoagulation Episode Summary     Current INR goal:  2.0-3.0   TTR:  59.5 % (1 y)   Target end date:  Indefinite   Send INR reminders to:  KELLY ROJAS    Indications    Long-term (current) use of anticoagulants [Z79.01] [Z79.01]  Persistent atrial fibrillation (H) [I48.19]  Chronic anticoagulation [Z79.01]           Comments:           Anticoagulation Care Providers     Provider Role Specialty Phone number    Steven Abrams MD Referring Internal Medicine [phone]

## 2022-03-29 ENCOUNTER — LAB (OUTPATIENT)
Dept: LAB | Facility: CLINIC | Age: 87
End: 2022-03-29
Payer: COMMERCIAL

## 2022-03-29 ENCOUNTER — ANTICOAGULATION THERAPY VISIT (OUTPATIENT)
Dept: ANTICOAGULATION | Facility: CLINIC | Age: 87
End: 2022-03-29

## 2022-03-29 DIAGNOSIS — Z79.01 LONG TERM CURRENT USE OF ANTICOAGULANT THERAPY: Primary | ICD-10-CM

## 2022-03-29 DIAGNOSIS — Z79.01 CHRONIC ANTICOAGULATION: ICD-10-CM

## 2022-03-29 DIAGNOSIS — I48.19 PERSISTENT ATRIAL FIBRILLATION (H): ICD-10-CM

## 2022-03-29 LAB — INR BLD: 2.3 (ref 0.9–1.1)

## 2022-03-29 PROCEDURE — 85610 PROTHROMBIN TIME: CPT

## 2022-03-29 PROCEDURE — 36416 COLLJ CAPILLARY BLOOD SPEC: CPT

## 2022-03-29 NOTE — PROGRESS NOTES
ANTICOAGULATION MANAGEMENT     Mara Colindres 87 year old female is on warfarin with therapeutic INR result. (Goal INR 2.0-3.0)    Recent labs: (last 7 days)     03/29/22  1308   INR 2.3*       ASSESSMENT     Source(s): Chart Review  Previous INR was Subtherapeutic  Medication, diet, health changes since last INR chart reviewed; none identified           PLAN     Unable to reach Ayla today.    Left message to continue current dose of warfarin 2.5 mg tonight. Request call back for assessment.    Follow up required to confirm warfarin dose taken and assess for changes    Celeste Ruiz RN  Anticoagulation Clinic  3/29/2022

## 2022-03-29 NOTE — PROGRESS NOTES
ANTICOAGULATION MANAGEMENT     Mara Colindres 87 year old female is on warfarin with therapeutic INR result. (Goal INR 2.0-3.0)    Recent labs: (last 7 days)     03/29/22  1308   INR 2.3*       ASSESSMENT     Source(s): Chart Review and Patient/Caregiver Call     Warfarin doses taken: Warfarin taken as instructed  Diet: No new diet changes identified  New illness, injury, or hospitalization: No  Medication/supplement changes: None noted  Signs or symptoms of bleeding or clotting: No  Previous INR: Subtherapeutic  Additional findings: None       PLAN     Recommended plan for no diet, medication or health factor changes affecting INR     Dosing Instructions: Continue your current warfarin dose with next INR in 4 weeks       Summary  As of 3/29/2022    Full warfarin instructions:  5 mg every Mon, Fri; 2.5 mg all other days   Next INR check:  4/26/2022             Telephone call with Mara who verbalizes understanding and agrees to plan    Lab visit scheduled    Education provided: Goal range and significance of current result    Plan made per [facility] anticoagulation protocol    Celeste Ruiz RN  Anticoagulation Clinic  3/29/2022    _______________________________________________________________________     Anticoagulation Episode Summary     Current INR goal:  2.0-3.0   TTR:  62.2 % (1 y)   Target end date:  Indefinite   Send INR reminders to:  KELLY ROJAS    Indications    Long-term (current) use of anticoagulants [Z79.01] [Z79.01]  Persistent atrial fibrillation (H) [I48.19]  Chronic anticoagulation [Z79.01]           Comments:           Anticoagulation Care Providers     Provider Role Specialty Phone number    Steven Abrams MD Referring Internal Medicine [phone]

## 2022-04-04 ENCOUNTER — NURSE TRIAGE (OUTPATIENT)
Dept: NURSING | Facility: CLINIC | Age: 87
End: 2022-04-04

## 2022-04-04 ENCOUNTER — VIRTUAL VISIT (OUTPATIENT)
Dept: FAMILY MEDICINE | Facility: CLINIC | Age: 87
End: 2022-04-04
Payer: COMMERCIAL

## 2022-04-04 DIAGNOSIS — R05.9 COUGH: Primary | ICD-10-CM

## 2022-04-04 PROCEDURE — 99443 PR PHYSICIAN TELEPHONE EVALUATION 21-30 MIN: CPT | Performed by: FAMILY MEDICINE

## 2022-04-04 RX ORDER — BENZONATATE 100 MG/1
100 CAPSULE ORAL 3 TIMES DAILY PRN
Qty: 30 CAPSULE | Refills: 0 | Status: SHIPPED | OUTPATIENT
Start: 2022-04-04 | End: 2023-01-16

## 2022-04-04 RX ORDER — PREDNISONE 20 MG/1
TABLET ORAL
Qty: 13 TABLET | Refills: 0 | Status: SHIPPED | OUTPATIENT
Start: 2022-04-04 | End: 2022-06-02

## 2022-04-04 NOTE — TELEPHONE ENCOUNTER
Triage call:     Patient is calling with a cough. She states symptoms started a week ago.   Green and yellow sputum   Runny nose   She is taking nebulizer treatments  On oxygen.   SOB is baseline.   Denies fever   No chest pain.   She has been coughing so hard that she vomited yesterday x2 and is having difficulty sleeping from a cough.     She is requesting a prescription medication. She does not want to come in to the office. Advised virtual appointment today. She verbalizes understanding and agreement with this plan. Transferred to scheduling.     Hazel Cabrera RN   04/04/22 11:24 AM  [facility] Nurse Advisor    Reason for Disposition    SEVERE coughing spells (e.g., whooping sound after coughing, vomiting after coughing)    Additional Information    Negative: Bluish (or gray) lips or face    Negative: Severe difficulty breathing (e.g., struggling for each breath, speaks in single words)    Negative: Rapid onset of cough and has hives    Negative: Coughing started suddenly after medicine, an allergic food or bee sting    Negative: Difficulty breathing after exposure to flames, smoke, or fumes    Negative: Sounds like a life-threatening emergency to the triager    Negative: Previous asthma attacks and this feels like asthma attack    Negative: Chest pain present when not coughing    Negative: Difficulty breathing    Negative: Passed out (i.e., fainted, collapsed and was not responding)    Negative: Patient sounds very sick or weak to the triager    Negative: Coughed up > 1 tablespoon (15 ml) blood (Exception: blood-tinged sputum)    Negative: Fever > 103 F (39.4 C)    Negative: Fever > 100.0 F (37.8 C) and bedridden (e.g., nursing home patient, stroke, chronic illness, recovering from surgery)    Negative: Fever > 100.0 F (37.8 C) and has diabetes mellitus or a weak immune system (e.g., HIV positive, cancer chemotherapy, organ transplant, splenectomy, chronic steroids)    Negative: Fever > 101 F (38.3 C)  and over 60 years of age    Negative: Wheezing is present    Negative: Increasing ankle swelling    Protocols used: COUGH-A-OH

## 2022-04-16 DIAGNOSIS — I48.19 PERSISTENT ATRIAL FIBRILLATION (H): ICD-10-CM

## 2022-04-16 DIAGNOSIS — Z79.01 LONG TERM CURRENT USE OF ANTICOAGULANT THERAPY: ICD-10-CM

## 2022-04-16 DIAGNOSIS — Z79.01 CHRONIC ANTICOAGULATION: ICD-10-CM

## 2022-04-17 RX ORDER — WARFARIN SODIUM 5 MG/1
TABLET ORAL
Qty: 90 TABLET | Refills: 0 | Status: SHIPPED | OUTPATIENT
Start: 2022-04-17 | End: 2022-04-19

## 2022-04-18 DIAGNOSIS — Z79.01 LONG TERM CURRENT USE OF ANTICOAGULANT THERAPY: ICD-10-CM

## 2022-04-18 DIAGNOSIS — I48.19 PERSISTENT ATRIAL FIBRILLATION (H): ICD-10-CM

## 2022-04-18 DIAGNOSIS — Z79.01 CHRONIC ANTICOAGULATION: ICD-10-CM

## 2022-04-18 NOTE — TELEPHONE ENCOUNTER
Prescription approved per [facility] Refill Protocol.    Aaliyah Singer RN

## 2022-04-19 RX ORDER — WARFARIN SODIUM 5 MG/1
TABLET ORAL
Qty: 57 TABLET | Refills: 0 | Status: SHIPPED | OUTPATIENT
Start: 2022-04-19 | End: 2022-06-03

## 2022-04-19 NOTE — TELEPHONE ENCOUNTER
ANTICOAGULATION MANAGEMENT:  Medication Refill    Anticoagulation Summary  As of 3/29/2022    Warfarin maintenance plan:  5 mg (5 mg x 1) every Mon, Fri; 2.5 mg (5 mg x 0.5) all other days   Next INR check:  4/26/2022   Target end date:  Indefinite    Indications    Long-term (current) use of anticoagulants [Z79.01] [Z79.01]  Persistent atrial fibrillation (H) [I48.19]  Chronic anticoagulation [Z79.01]             Anticoagulation Care Providers     Provider Role Specialty Phone number    Steven Abrams MD Referring Internal Medicine [phone]          Visit with referring provider/group within last year: No, last visit date: 2020    [facility] referral signed within last year: Yes    Mara does NOT meet all criteria for refill: Office visit with referring provider's group was >= 1 year ago. 30 day jas fill approved; patient notified to schedule per [facility] protocol    Jeanine Bey RN  Anticoagulation Clinic

## 2022-05-05 DIAGNOSIS — N18.30 CONTROLLED TYPE 2 DIABETES MELLITUS WITH STAGE 3 CHRONIC KIDNEY DISEASE, WITHOUT LONG-TERM CURRENT USE OF INSULIN (H): ICD-10-CM

## 2022-05-05 DIAGNOSIS — E11.22 CONTROLLED TYPE 2 DIABETES MELLITUS WITH STAGE 3 CHRONIC KIDNEY DISEASE, WITHOUT LONG-TERM CURRENT USE OF INSULIN (H): ICD-10-CM

## 2022-05-07 NOTE — TELEPHONE ENCOUNTER
Routing refill request to provider for review/approval because:  Labs out of range:    Creatinine   Date Value Ref Range Status   09/13/2021 1.71 (H) 0.52 - 1.04 mg/dL Final   01/25/2021 1.54 (H) 0.52 - 1.04 mg/dL Final     Lab Results   Component Value Date    A1C 7.3 09/13/2021    A1C 7.1 08/11/2020    A1C 7.0 04/29/2019    A1C 7.3 07/09/2018    A1C 7.8 11/07/2017    A1C 8.2 07/27/2017

## 2022-05-08 DIAGNOSIS — N18.32 STAGE 3B CHRONIC KIDNEY DISEASE (H): ICD-10-CM

## 2022-05-08 RX ORDER — DULAGLUTIDE 1.5 MG/.5ML
1.5 INJECTION, SOLUTION SUBCUTANEOUS
Qty: 2 ML | Refills: 0 | Status: SHIPPED | OUTPATIENT
Start: 2022-05-08 | End: 2022-06-02

## 2022-05-10 ENCOUNTER — LAB (OUTPATIENT)
Dept: LAB | Facility: CLINIC | Age: 87
End: 2022-05-10
Payer: COMMERCIAL

## 2022-05-10 ENCOUNTER — ANTICOAGULATION THERAPY VISIT (OUTPATIENT)
Dept: ANTICOAGULATION | Facility: CLINIC | Age: 87
End: 2022-05-10

## 2022-05-10 DIAGNOSIS — N18.32 STAGE 3B CHRONIC KIDNEY DISEASE (H): ICD-10-CM

## 2022-05-10 DIAGNOSIS — Z79.01 LONG TERM CURRENT USE OF ANTICOAGULANT THERAPY: Primary | ICD-10-CM

## 2022-05-10 DIAGNOSIS — Z79.01 CHRONIC ANTICOAGULATION: ICD-10-CM

## 2022-05-10 DIAGNOSIS — I48.19 PERSISTENT ATRIAL FIBRILLATION (H): ICD-10-CM

## 2022-05-10 LAB — INR BLD: 4.1 (ref 0.9–1.1)

## 2022-05-10 PROCEDURE — 36416 COLLJ CAPILLARY BLOOD SPEC: CPT

## 2022-05-10 PROCEDURE — 85610 PROTHROMBIN TIME: CPT

## 2022-05-10 RX ORDER — FUROSEMIDE 40 MG
TABLET ORAL
Qty: 120 TABLET | Refills: 0 | Status: SHIPPED | OUTPATIENT
Start: 2022-05-10 | End: 2022-05-11

## 2022-05-10 NOTE — TELEPHONE ENCOUNTER
Routing refill request to provider for review/approval because:  Labs out of range:    Creatinine   Date Value Ref Range Status   09/13/2021 1.71 (H) 0.52 - 1.04 mg/dL Final   01/25/2021 1.54 (H) 0.52 - 1.04 mg/dL Final     Pt was due in March for recheck

## 2022-05-10 NOTE — PROGRESS NOTES
ANTICOAGULATION MANAGEMENT     Mara Colindres 87 year old female is on warfarin with supratherapeutic INR result. (Goal INR 2.0-3.0)    Recent labs: (last 7 days)     05/10/22  1326   INR 4.1*       ASSESSMENT     Source(s): Chart Review and Patient/Caregiver Call     Warfarin doses taken: Warfarin taken as instructed  Diet: No new diet changes identified  New illness, injury, or hospitalization: Yes: cough 4/4  Medication/supplement changes: Augmentin started on 4/4 which has potential for interaction; increasing INR  Prednisone 8 day course (dates: 4/4-4/12) No interaction anticipated  Signs or symptoms of bleeding or clotting: No  Previous INR: Therapeutic last visit; previously outside of goal range  Additional findings: None       PLAN     Recommended plan for temporary change(s) affecting INR     Dosing Instructions: hold dose then continue your current warfarin dose with next INR in 1 week       Summary  As of 5/10/2022    Full warfarin instructions:  5/10: Hold; Otherwise 5 mg every Mon, Fri; 2.5 mg all other days   Next INR check:  5/18/2022             Telephone call with Mara who verbalizes understanding and agrees to plan    Lab visit scheduled    Education provided: Goal range and significance of current result    Plan made per [facility] anticoagulation protocol    Celeste Ruiz RN  Anticoagulation Clinic  5/10/2022    _______________________________________________________________________     Anticoagulation Episode Summary     Current INR goal:  2.0-3.0   TTR:  66.7 % (1 y)   Target end date:  Indefinite   Send INR reminders to:  KELLY ROJAS    Indications    Long-term (current) use of anticoagulants [Z79.01] [Z79.01]  Persistent atrial fibrillation (H) [I48.19]  Chronic anticoagulation [Z79.01]           Comments:           Anticoagulation Care Providers     Provider Role Specialty Phone number    Steven Abrams MD Referring Internal Medicine [phone]

## 2022-05-11 RX ORDER — FUROSEMIDE 40 MG
TABLET ORAL
Qty: 180 TABLET | Refills: 0 | Status: SHIPPED | OUTPATIENT
Start: 2022-05-11 | End: 2022-06-02

## 2022-05-11 NOTE — TELEPHONE ENCOUNTER
60 day supply was sent, pharmacy requesting 90 day supply of Furosemide. Prescription sent.   Aaliyah Singer RN

## 2022-05-19 ENCOUNTER — ANTICOAGULATION THERAPY VISIT (OUTPATIENT)
Dept: ANTICOAGULATION | Facility: CLINIC | Age: 87
End: 2022-05-19

## 2022-05-19 ENCOUNTER — LAB (OUTPATIENT)
Dept: LAB | Facility: CLINIC | Age: 87
End: 2022-05-19
Payer: COMMERCIAL

## 2022-05-19 DIAGNOSIS — I48.19 PERSISTENT ATRIAL FIBRILLATION (H): ICD-10-CM

## 2022-05-19 DIAGNOSIS — Z79.01 LONG TERM CURRENT USE OF ANTICOAGULANT THERAPY: Primary | ICD-10-CM

## 2022-05-19 DIAGNOSIS — Z79.01 CHRONIC ANTICOAGULATION: ICD-10-CM

## 2022-05-19 LAB — INR BLD: 3.1 (ref 0.9–1.1)

## 2022-05-19 PROCEDURE — 36416 COLLJ CAPILLARY BLOOD SPEC: CPT

## 2022-05-19 PROCEDURE — 85610 PROTHROMBIN TIME: CPT

## 2022-05-19 NOTE — PROGRESS NOTES
ANTICOAGULATION MANAGEMENT     Mara Colindres 87 year old female is on warfarin with supratherapeutic INR result. (Goal INR 2.0-3.0)    Recent labs: (last 7 days)     05/19/22  1303   INR 3.1*       ASSESSMENT     Source(s): Chart Review and Patient/Caregiver Call     Warfarin doses taken: Warfarin taken as instructed  Diet: No new diet changes identified  New illness, injury, or hospitalization: No  Medication/supplement changes: None noted  Signs or symptoms of bleeding or clotting: No  Previous INR: Supratherapeutic  Additional findings: None       PLAN     Recommended plan for no diet, medication or health factor changes affecting INR     Dosing Instructions: continue your current warfarin dose with next INR in 2 weeks       Summary  As of 5/19/2022    Full warfarin instructions:  5 mg every Mon, Fri; 2.5 mg all other days   Next INR check:  6/2/2022             Telephone call with Mara who verbalizes understanding and agrees to plan    Lab visit scheduled    Education provided: Goal range and significance of current result    Plan made per [facility] anticoagulation protocol    Celeste Ruiz RN  Anticoagulation Clinic  5/19/2022    _______________________________________________________________________     Anticoagulation Episode Summary     Current INR goal:  2.0-3.0   TTR:  66.7 % (1 y)   Target end date:  Indefinite   Send INR reminders to:  KELLY ROJAS    Indications    Long-term (current) use of anticoagulants [Z79.01] [Z79.01]  Persistent atrial fibrillation (H) [I48.19]  Chronic anticoagulation [Z79.01]           Comments:           Anticoagulation Care Providers     Provider Role Specialty Phone number    Steven Abrams MD Referring Internal Medicine [phone]

## 2022-05-24 ENCOUNTER — OFFICE VISIT (OUTPATIENT)
Dept: OPHTHALMOLOGY | Facility: CLINIC | Age: 87
End: 2022-05-24
Payer: COMMERCIAL

## 2022-05-24 DIAGNOSIS — Z01.01 ENCOUNTER FOR EXAMINATION OF EYES AND VISION WITH ABNORMAL FINDINGS: ICD-10-CM

## 2022-05-24 DIAGNOSIS — E11.3299 BACKGROUND DIABETIC RETINOPATHY (H): ICD-10-CM

## 2022-05-24 DIAGNOSIS — Z96.1 PSEUDOPHAKIA: ICD-10-CM

## 2022-05-24 DIAGNOSIS — E11.22 CONTROLLED TYPE 2 DIABETES MELLITUS WITH STAGE 3 CHRONIC KIDNEY DISEASE, WITHOUT LONG-TERM CURRENT USE OF INSULIN (H): Primary | ICD-10-CM

## 2022-05-24 DIAGNOSIS — H43.812 POSTERIOR VITREOUS DETACHMENT OF LEFT EYE: ICD-10-CM

## 2022-05-24 DIAGNOSIS — H26.491 POSTERIOR CAPSULAR OPACIFICATION VISUALLY SIGNIFICANT OF RIGHT EYE: ICD-10-CM

## 2022-05-24 DIAGNOSIS — N18.30 CONTROLLED TYPE 2 DIABETES MELLITUS WITH STAGE 3 CHRONIC KIDNEY DISEASE, WITHOUT LONG-TERM CURRENT USE OF INSULIN (H): Primary | ICD-10-CM

## 2022-05-24 DIAGNOSIS — H52.4 PRESBYOPIA: ICD-10-CM

## 2022-05-24 PROCEDURE — 92014 COMPRE OPH EXAM EST PT 1/>: CPT | Mod: 57 | Performed by: OPHTHALMOLOGY

## 2022-05-24 PROCEDURE — 66821 AFTER CATARACT LASER SURGERY: CPT | Mod: RT | Performed by: OPHTHALMOLOGY

## 2022-05-24 PROCEDURE — 92015 DETERMINE REFRACTIVE STATE: CPT | Performed by: OPHTHALMOLOGY

## 2022-05-24 ASSESSMENT — REFRACTION_WEARINGRX
OD_CYLINDER: +0.25
OD_ADD: +2.75
SPECS_TYPE: BIFOCAL-LINED
OD_SPHERE: -1.00
OS_ADD: +2.75
OS_CYLINDER: +1.00
OS_SPHERE: -1.50
OS_AXIS: 162
OD_AXIS: 035

## 2022-05-24 ASSESSMENT — VISUAL ACUITY
OS_CC: 20/25
OD_CC: 20/30
CORRECTION_TYPE: GLASSES
METHOD: SNELLEN - LINEAR
OS_CC+: -1

## 2022-05-24 ASSESSMENT — SLIT LAMP EXAM - LIDS
COMMENTS: 1+ DERMATOCHALASIS
COMMENTS: 1+ DERMATOCHALASIS

## 2022-05-24 ASSESSMENT — REFRACTION_MANIFEST
OS_AXIS: 160
OD_SPHERE: -1.00
OD_AXIS: 045
OS_SPHERE: -1.75
OD_CYLINDER: +0.75
OD_ADD: +2.75
OS_ADD: +2.75
OS_CYLINDER: +0.75

## 2022-05-24 ASSESSMENT — TONOMETRY
OD_IOP_MMHG: 15
OS_IOP_MMHG: 15
IOP_METHOD: APPLANATION

## 2022-05-24 ASSESSMENT — CONF VISUAL FIELD
OD_NORMAL: 1
METHOD: COUNTING FINGERS
OS_NORMAL: 1

## 2022-05-24 ASSESSMENT — CUP TO DISC RATIO
OS_RATIO: 0.5
OD_RATIO: 0.5

## 2022-05-24 ASSESSMENT — EXTERNAL EXAM - LEFT EYE: OS_EXAM: 1+ BROW PTOSIS

## 2022-05-24 ASSESSMENT — EXTERNAL EXAM - RIGHT EYE: OD_EXAM: 1+ BROW PTOSIS

## 2022-05-24 NOTE — LETTER
5/24/2022         RE: Mara Colindres  [address] [facility] [address]        Dear Colleague,    Thank you for referring your patient, Mara Colindres, to the [facility]. Please see a copy of my visit note below.     Current Eye Medications:  Systane as needed both eyes.     Subjective:  Diabetic eye exam. Vision is down distance and near right eye. Vision is doing well left eye. No eye pain or discomfort in either eye.   type 2 diabetes mellitus with stage 3 chronic kidney disease. Diabetic for long time.     Lab Results   Component Value Date    A1C 7.3 09/13/2021    A1C 7.1 08/11/2020    A1C 7.0 04/29/2019    A1C 7.3 07/09/2018    A1C 7.8 11/07/2017    A1C 8.2 07/27/2017        Objective:  See Ophthalmology Exam.       Assessment:  Visually significant posterior capsule opacity right eye; otherwise stable eye exam.  Stable mild background diabetic retinopathy both eyes.      ICD-10-CM    1. 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, without long-term current use of insulin (H)  E11.22     N18.30    2. Pseudophakia, ou  Z96.1    3. Posterior capsular opacification visually significant of right eye  H26.491    4. Background diabetic retinopathy, mild, ou  E11.3299    5. Posterior vitreous detachment of left eye  H43.812    6. Encounter for examination of eyes and vision with abnormal findings  Z01.01    7. Presbyopia  H52.4         Plan: Yag Capsulotomy performed without problems right eye under Proparacaine anesthetic. Well tolerated.  Number of applications: 34  Power of applications: 1.3 mJ  Iopidine given.    Matty Tavares M.D.                   Again, thank you for allowing me to participate in the care of your patient.        Sincerely,        Matty Tavares MD

## 2022-05-24 NOTE — PATIENT INSTRUCTIONS
Your eye may be slightly red, sore, and blurry for a few days.  You may notice some floaters for a few days.  Keep scheduled return visit for a intraocular pressure check and refraction in about one week.    Matty Tavares M.D.  [phone]

## 2022-05-24 NOTE — PROGRESS NOTES
Current Eye Medications:  Systane as needed both eyes.     Subjective:  Diabetic eye exam. Vision is down distance and near right eye. Vision is doing well left eye. No eye pain or discomfort in either eye.   type 2 diabetes mellitus with stage 3 chronic kidney disease. Diabetic for long time.     Lab Results   Component Value Date    A1C 7.3 09/13/2021    A1C 7.1 08/11/2020    A1C 7.0 04/29/2019    A1C 7.3 07/09/2018    A1C 7.8 11/07/2017    A1C 8.2 07/27/2017        Objective:  See Ophthalmology Exam.       Assessment:  Visually significant posterior capsule opacity right eye; otherwise stable eye exam.  Stable mild background diabetic retinopathy both eyes.      ICD-10-CM    1. 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, without long-term current use of insulin (H)  E11.22     N18.30    2. Pseudophakia, ou  Z96.1    3. Posterior capsular opacification visually significant of right eye  H26.491    4. Background diabetic retinopathy, mild, ou  E11.3299    5. Posterior vitreous detachment of left eye  H43.812    6. Encounter for examination of eyes and vision with abnormal findings  Z01.01    7. Presbyopia  H52.4         Plan: Yag Capsulotomy performed without problems right eye under Proparacaine anesthetic. Well tolerated.  Number of applications: 34  Power of applications: 1.3 mJ  Iopidine given.    Matty Tavares M.D.

## 2022-05-26 ENCOUNTER — MEDICAL CORRESPONDENCE (OUTPATIENT)
Dept: HEALTH INFORMATION MANAGEMENT | Facility: CLINIC | Age: 87
End: 2022-05-26
Payer: COMMERCIAL

## 2022-05-31 ENCOUNTER — OFFICE VISIT (OUTPATIENT)
Dept: OPHTHALMOLOGY | Facility: CLINIC | Age: 87
End: 2022-05-31
Payer: COMMERCIAL

## 2022-05-31 DIAGNOSIS — Z96.1 PSEUDOPHAKIA: Primary | ICD-10-CM

## 2022-05-31 PROCEDURE — 99024 POSTOP FOLLOW-UP VISIT: CPT | Performed by: OPHTHALMOLOGY

## 2022-05-31 ASSESSMENT — REFRACTION_WEARINGRX
OS_SPHERE: -1.50
OS_ADD: +2.75
OS_CYLINDER: +1.00
OD_ADD: +2.75
SPECS_TYPE: BIFOCAL-LINED
OS_AXIS: 162
OD_CYLINDER: +0.25
OD_AXIS: 035
OD_SPHERE: -1.00

## 2022-05-31 ASSESSMENT — VISUAL ACUITY
OD_CC+: -1
METHOD: SNELLEN - LINEAR
OD_CC: 20/25
OS_CC+: -2
OS_CC: 20/25
CORRECTION_TYPE: GLASSES

## 2022-05-31 ASSESSMENT — EXTERNAL EXAM - LEFT EYE: OS_EXAM: 1+ BROW PTOSIS

## 2022-05-31 ASSESSMENT — TONOMETRY
OD_IOP_MMHG: 14
IOP_METHOD: APPLANATION

## 2022-05-31 ASSESSMENT — REFRACTION_MANIFEST
OD_CYLINDER: +1.00
OD_SPHERE: -1.00
OD_ADD: +2.75
OD_AXIS: 020

## 2022-05-31 ASSESSMENT — EXTERNAL EXAM - RIGHT EYE: OD_EXAM: 1+ BROW PTOSIS

## 2022-05-31 ASSESSMENT — SLIT LAMP EXAM - LIDS
COMMENTS: 1+ DERMATOCHALASIS
COMMENTS: 1+ DERMATOCHALASIS

## 2022-05-31 NOTE — LETTER
5/31/2022         RE: Mara Colindres  [address] [facility] [address]        Dear Colleague,    Thank you for referring your patient, Mara Colindres, to the [facility]. Please see a copy of my visit note below.     Current Eye Medications:  None.     Subjective:  Status/Post Yag Capsulotomy right eye:  5-24-22.  Vision has improved since the laser.  No concerns or problems.      Objective:  See Ophthalmology Exam.       Assessment:  Doing well s/p Yag Caps right eye.      Plan:  Glasses Rx given - optional   Possible clouding of posterior capsule left eye discussed.   Possible posterior vitreous detachment (sudden onset large floater and/or flashing lights) right eye discussed.   Call in January 2023 for an appointment in May 2023 for Complete Exam    Dr. Tavares [phone]          Again, thank you for allowing me to participate in the care of your patient.        Sincerely,        Matty Tavares MD

## 2022-05-31 NOTE — PATIENT INSTRUCTIONS
Glasses Rx given - optional   Possible clouding of posterior capsule left eye discussed.   Possible posterior vitreous detachment (sudden onset large floater and/or flashing lights) right eye discussed.   Call in January 2023 for an appointment in May 2023 for Complete Exam    Dr. Tavares [phone]

## 2022-05-31 NOTE — PROGRESS NOTES
Current Eye Medications:  None.     Subjective:  Status/Post Yag Capsulotomy right eye:  5-24-22.  Vision has improved since the laser.  No concerns or problems.      Objective:  See Ophthalmology Exam.       Assessment:  Doing well s/p Yag Caps right eye.      Plan:  Glasses Rx given - optional   Possible clouding of posterior capsule left eye discussed.   Possible posterior vitreous detachment (sudden onset large floater and/or flashing lights) right eye discussed.   Call in January 2023 for an appointment in May 2023 for Complete Exam    Dr. Tavares [phone]

## 2022-06-02 RX ORDER — BENZONATATE 100 MG/1
100 CAPSULE ORAL 3 TIMES DAILY PRN
Qty: 30 CAPSULE | Refills: 0 | Status: CANCELLED | OUTPATIENT
Start: 2022-06-02

## 2022-06-03 ENCOUNTER — OFFICE VISIT (OUTPATIENT)
Dept: INTERNAL MEDICINE | Facility: CLINIC | Age: 87
End: 2022-06-03
Payer: COMMERCIAL

## 2022-06-03 ENCOUNTER — ANTICOAGULATION THERAPY VISIT (OUTPATIENT)
Dept: ANTICOAGULATION | Facility: CLINIC | Age: 87
End: 2022-06-03

## 2022-06-03 VITALS
OXYGEN SATURATION: 94 % | RESPIRATION RATE: 14 BRPM | HEART RATE: 93 BPM | DIASTOLIC BLOOD PRESSURE: 74 MMHG | TEMPERATURE: 98.2 F | SYSTOLIC BLOOD PRESSURE: 126 MMHG

## 2022-06-03 DIAGNOSIS — I82.511 CHRONIC DEEP VEIN THROMBOSIS (DVT) OF FEMORAL VEIN OF RIGHT LOWER EXTREMITY (H): ICD-10-CM

## 2022-06-03 DIAGNOSIS — F33.0 MAJOR DEPRESSIVE DISORDER, RECURRENT EPISODE, MILD (H): ICD-10-CM

## 2022-06-03 DIAGNOSIS — Z79.01 LONG TERM CURRENT USE OF ANTICOAGULANT THERAPY: Primary | ICD-10-CM

## 2022-06-03 DIAGNOSIS — K52.9 CHRONIC DIARRHEA: ICD-10-CM

## 2022-06-03 DIAGNOSIS — Z79.01 LONG TERM CURRENT USE OF ANTICOAGULANT THERAPY: ICD-10-CM

## 2022-06-03 DIAGNOSIS — M15.0 PRIMARY OSTEOARTHRITIS INVOLVING MULTIPLE JOINTS: ICD-10-CM

## 2022-06-03 DIAGNOSIS — N18.4 CKD (CHRONIC KIDNEY DISEASE) STAGE 4, GFR 15-29 ML/MIN (H): ICD-10-CM

## 2022-06-03 DIAGNOSIS — I50.32 CHRONIC DIASTOLIC CONGESTIVE HEART FAILURE (H): ICD-10-CM

## 2022-06-03 DIAGNOSIS — M81.0 AGE-RELATED OSTEOPOROSIS WITHOUT CURRENT PATHOLOGICAL FRACTURE: ICD-10-CM

## 2022-06-03 DIAGNOSIS — M00.9 SEPTIC HIP (H): ICD-10-CM

## 2022-06-03 DIAGNOSIS — G47.33 OSA (OBSTRUCTIVE SLEEP APNEA): ICD-10-CM

## 2022-06-03 DIAGNOSIS — E55.9 HYPOVITAMINOSIS D: ICD-10-CM

## 2022-06-03 DIAGNOSIS — T50.2X5A DIURETIC-INDUCED HYPOKALEMIA: ICD-10-CM

## 2022-06-03 DIAGNOSIS — I48.19 PERSISTENT ATRIAL FIBRILLATION (H): ICD-10-CM

## 2022-06-03 DIAGNOSIS — E11.22 CONTROLLED TYPE 2 DIABETES MELLITUS WITH STAGE 3 CHRONIC KIDNEY DISEASE, WITHOUT LONG-TERM CURRENT USE OF INSULIN (H): Primary | ICD-10-CM

## 2022-06-03 DIAGNOSIS — Z23 NEED FOR DIPHTHERIA-TETANUS-PERTUSSIS (TDAP) VACCINE: ICD-10-CM

## 2022-06-03 DIAGNOSIS — E87.6 DIURETIC-INDUCED HYPOKALEMIA: ICD-10-CM

## 2022-06-03 DIAGNOSIS — E66.01 MORBID OBESITY (H): ICD-10-CM

## 2022-06-03 DIAGNOSIS — C44.622 SQUAMOUS CELL CARCINOMA OF RIGHT WRIST: ICD-10-CM

## 2022-06-03 DIAGNOSIS — N18.30 CONTROLLED TYPE 2 DIABETES MELLITUS WITH STAGE 3 CHRONIC KIDNEY DISEASE, WITHOUT LONG-TERM CURRENT USE OF INSULIN (H): Primary | ICD-10-CM

## 2022-06-03 DIAGNOSIS — E08.42 DIABETIC POLYNEUROPATHY ASSOCIATED WITH DIABETES MELLITUS DUE TO UNDERLYING CONDITION (H): ICD-10-CM

## 2022-06-03 DIAGNOSIS — Z23 HIGH PRIORITY FOR 2019-NCOV VACCINE: ICD-10-CM

## 2022-06-03 DIAGNOSIS — R05.9 COUGH: ICD-10-CM

## 2022-06-03 DIAGNOSIS — Z23 NEED FOR SHINGLES VACCINE: ICD-10-CM

## 2022-06-03 DIAGNOSIS — Z79.01 CHRONIC ANTICOAGULATION: ICD-10-CM

## 2022-06-03 DIAGNOSIS — I27.20 PULMONARY HTN (H): ICD-10-CM

## 2022-06-03 DIAGNOSIS — R13.10 ABNORMAL SWALLOWING: ICD-10-CM

## 2022-06-03 LAB
ANION GAP SERPL CALCULATED.3IONS-SCNC: 6 MMOL/L (ref 3–14)
BUN SERPL-MCNC: 24 MG/DL (ref 7–30)
CALCIUM SERPL-MCNC: 9.4 MG/DL (ref 8.5–10.1)
CHLORIDE BLD-SCNC: 106 MMOL/L (ref 94–109)
CO2 SERPL-SCNC: 28 MMOL/L (ref 20–32)
CREAT SERPL-MCNC: 1.58 MG/DL (ref 0.52–1.04)
CREAT UR-MCNC: 216 MG/DL
DEPRECATED CALCIDIOL+CALCIFEROL SERPL-MC: 37 UG/L (ref 20–75)
GFR SERPL CREATININE-BSD FRML MDRD: 31 ML/MIN/1.73M2
GLUCOSE BLD-MCNC: 161 MG/DL (ref 70–99)
HBA1C MFR BLD: 7.7 % (ref 0–5.6)
HGB BLD-MCNC: 11.3 G/DL (ref 11.7–15.7)
INR BLD: 2.2 (ref 0.9–1.1)
MICROALBUMIN UR-MCNC: 266 MG/L
MICROALBUMIN/CREAT UR: 123.15 MG/G CR (ref 0–25)
POTASSIUM BLD-SCNC: 3.6 MMOL/L (ref 3.4–5.3)
SODIUM SERPL-SCNC: 140 MMOL/L (ref 133–144)

## 2022-06-03 PROCEDURE — 80048 BASIC METABOLIC PNL TOTAL CA: CPT | Performed by: INTERNAL MEDICINE

## 2022-06-03 PROCEDURE — 99214 OFFICE O/P EST MOD 30 MIN: CPT | Mod: 25 | Performed by: INTERNAL MEDICINE

## 2022-06-03 PROCEDURE — 83036 HEMOGLOBIN GLYCOSYLATED A1C: CPT | Performed by: INTERNAL MEDICINE

## 2022-06-03 PROCEDURE — 85610 PROTHROMBIN TIME: CPT | Performed by: INTERNAL MEDICINE

## 2022-06-03 PROCEDURE — 99207 PR FOOT EXAM NO CHARGE: CPT | Performed by: INTERNAL MEDICINE

## 2022-06-03 PROCEDURE — 85018 HEMOGLOBIN: CPT | Performed by: INTERNAL MEDICINE

## 2022-06-03 PROCEDURE — 82306 VITAMIN D 25 HYDROXY: CPT | Performed by: INTERNAL MEDICINE

## 2022-06-03 PROCEDURE — 0054A COVID-19,PF,PFIZER (12+ YRS): CPT | Performed by: INTERNAL MEDICINE

## 2022-06-03 PROCEDURE — 82043 UR ALBUMIN QUANTITATIVE: CPT | Performed by: INTERNAL MEDICINE

## 2022-06-03 PROCEDURE — 91305 COVID-19,PF,PFIZER (12+ YRS): CPT | Performed by: INTERNAL MEDICINE

## 2022-06-03 PROCEDURE — 36416 COLLJ CAPILLARY BLOOD SPEC: CPT | Performed by: INTERNAL MEDICINE

## 2022-06-03 PROCEDURE — 36415 COLL VENOUS BLD VENIPUNCTURE: CPT | Performed by: INTERNAL MEDICINE

## 2022-06-03 RX ORDER — LOSARTAN POTASSIUM 25 MG/1
TABLET ORAL
Qty: 180 TABLET | Refills: 2 | Status: SHIPPED | OUTPATIENT
Start: 2022-06-03 | End: 2023-01-16

## 2022-06-03 RX ORDER — METOPROLOL TARTRATE 100 MG
TABLET ORAL
Qty: 90 TABLET | Refills: 2 | Status: SHIPPED | OUTPATIENT
Start: 2022-06-03 | End: 2023-01-16

## 2022-06-03 RX ORDER — DULAGLUTIDE 1.5 MG/.5ML
1.5 INJECTION, SOLUTION SUBCUTANEOUS
Qty: 2 ML | Refills: 8 | Status: SHIPPED | OUTPATIENT
Start: 2022-06-03 | End: 2023-01-16

## 2022-06-03 RX ORDER — HYDROCODONE BITARTRATE AND ACETAMINOPHEN 5; 325 MG/1; MG/1
TABLET ORAL
Qty: 90 TABLET | Refills: 0 | Status: SHIPPED | OUTPATIENT
Start: 2022-06-03 | End: 2022-07-01

## 2022-06-03 RX ORDER — ALENDRONATE SODIUM 70 MG/1
70 TABLET ORAL
Qty: 12 TABLET | Refills: 2 | Status: SHIPPED | OUTPATIENT
Start: 2022-06-03 | End: 2023-01-16

## 2022-06-03 RX ORDER — PAROXETINE 20 MG/1
20 TABLET, FILM COATED ORAL DAILY
Qty: 90 TABLET | Refills: 2 | Status: SHIPPED | OUTPATIENT
Start: 2022-06-03 | End: 2023-01-16

## 2022-06-03 RX ORDER — FUROSEMIDE 40 MG
TABLET ORAL
Qty: 180 TABLET | Refills: 2 | Status: SHIPPED | OUTPATIENT
Start: 2022-06-03 | End: 2023-01-16

## 2022-06-03 RX ORDER — DIPHENOXYLATE HCL/ATROPINE 2.5-.025MG
TABLET ORAL
Qty: 40 TABLET | Refills: 2 | Status: SHIPPED | OUTPATIENT
Start: 2022-06-03 | End: 2022-11-08

## 2022-06-03 RX ORDER — POTASSIUM CHLORIDE 750 MG/1
TABLET, EXTENDED RELEASE ORAL
Qty: 45 TABLET | Refills: 2 | Status: SHIPPED | OUTPATIENT
Start: 2022-06-03 | End: 2023-01-16

## 2022-06-03 RX ORDER — WARFARIN SODIUM 5 MG/1
TABLET ORAL
Qty: 57 TABLET | Refills: 0 | Status: SHIPPED | OUTPATIENT
Start: 2022-06-03 | End: 2022-07-01

## 2022-06-03 ASSESSMENT — ASTHMA QUESTIONNAIRES: ACT_TOTALSCORE: 21

## 2022-06-03 NOTE — LETTER
June 6, 2022    Mara Colindres  [address] [facility] [address]          Dear ,    We are writing to inform you of your test results.    All of these tests are within acceptable limits. Things look OK ! . It was nice to see you Mara and you are hanging in there just fine ! It's true that the hemoglobin a1c  [ diabetes test ] is creeping up higher and higher and if it hits 8% or higher we will need to make some medication adjustments with your diabetes mellitus treatment plan . This test ideally should be rechecked in 3 months and I don't think you'll need a formal appointment with me to go over the results, most likely .       Resulted Orders   Basic metabolic panel   Result Value Ref Range    Sodium 140 133 - 144 mmol/L    Potassium 3.6 3.4 - 5.3 mmol/L    Chloride 106 94 - 109 mmol/L    Carbon Dioxide (CO2) 28 20 - 32 mmol/L    Anion Gap 6 3 - 14 mmol/L    Urea Nitrogen 24 7 - 30 mg/dL    Creatinine 1.58 (H) 0.52 - 1.04 mg/dL    Calcium 9.4 8.5 - 10.1 mg/dL    Glucose 161 (H) 70 - 99 mg/dL    GFR Estimate 31 (L) >60 mL/min/1.73m2      Comment:      Effective December 21, 2021 eGFRcr in adults is calculated using the 2021 CKD-EPI creatinine equation which includes age and gender (Mari et al., NE, DOI: 10.1056/DCRCqo2702550)   Hemoglobin A1c   Result Value Ref Range    Hemoglobin A1C 7.7 (H) 0.0 - 5.6 %      Comment:      Normal <5.7%   Prediabetes 5.7-6.4%    Diabetes 6.5% or higher     Note: Adopted from ADA consensus guidelines.   Albumin Random Urine Quantitative with Creat Ratio   Result Value Ref Range    Creatinine Urine mg/dL 216 mg/dL    Albumin Urine mg/L 266 mg/L    Albumin Urine mg/g Cr 123.15 (H) 0.00 - 25.00 mg/g Cr   Hemoglobin   Result Value Ref Range    Hemoglobin 11.3 (L) 11.7 - 15.7 g/dL   Vitamin D Deficiency   Result Value Ref Range    Vitamin D, Total (25-Hydroxy) 37 20 - 75 ug/L    Narrative    Season, race, dietary intake, and treatment affect the concentration  of 25-hydroxy-Vitamin D. Values may decrease during winter months and increase during summer months. Values 20-29 ug/L may indicate Vitamin D insufficiency and values <20 ug/L may indicate Vitamin D deficiency.    Vitamin D determination is routinely performed by an immunoassay specific for 25 hydroxyvitamin D3.  If an individual is on vitamin D2(ergocalciferol) supplementation, please specify 25 OH vitamin D2 and D3 level determination by LCMSMS test [id number].         If you have any questions or concerns, please call the clinic at the number listed above.       Sincerely,      Steven Abrams MD

## 2022-06-03 NOTE — PROGRESS NOTES
ANTICOAGULATION MANAGEMENT     Mara Colindres 87 year old female is on warfarin with therapeutic INR result. (Goal INR 2.0-3.0)    Recent labs: (last 7 days)     06/03/22  1111   INR 2.2*       ASSESSMENT     Source(s): Chart Review and Patient/Caregiver Call     Warfarin doses taken: Warfarin taken as instructed  Diet: No new diet changes identified  New illness, injury, or hospitalization: No  Medication/supplement changes: None noted  Signs or symptoms of bleeding or clotting: No  Previous INR: Supratherapeutic  Additional findings: None       PLAN     Recommended plan for no diet, medication or health factor changes affecting INR     Dosing Instructions: continue your current warfarin dose with next INR in 4 weeks       Summary  As of 6/3/2022    Full warfarin instructions:  5 mg every Mon, Fri; 2.5 mg all other days   Next INR check:  7/1/2022             Telephone call with Mara who verbalizes understanding and agrees to plan    Lab visit scheduled    Education provided: Importance of therapeutic range    Plan made per [facility] anticoagulation protocol    Celeste Ruiz RN  Anticoagulation Clinic  6/3/2022    _______________________________________________________________________     Anticoagulation Episode Summary     Current INR goal:  2.0-3.0   TTR:  67.0 % (1 y)   Target end date:  Indefinite   Send INR reminders to:  KELLY ROJAS    Indications    Long-term (current) use of anticoagulants [Z79.01] [Z79.01]  Persistent atrial fibrillation (H) [I48.19]  Chronic anticoagulation [Z79.01]           Comments:           Anticoagulation Care Providers     Provider Role Specialty Phone number    Steven Abrams MD Referring Internal Medicine [phone]

## 2022-06-03 NOTE — PROGRESS NOTES
Assessment & Plan     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, without long-term current use of insulin (H)  This patient has had chronic diabetes mellitus generally well controlled ever since the addition of Trulicity (dulaglutide).  Lab Results   Component Value Date    A1C 7.7 06/03/2022    A1C 7.3 09/13/2021    A1C 7.1 08/11/2020    A1C 7.0 04/29/2019    A1C 7.3 07/09/2018    A1C 7.8 11/07/2017    A1C 8.2 07/27/2017       - Basic metabolic panel  - Hemoglobin A1c  - Albumin Random Urine Quantitative with Creat Ratio  - dulaglutide (TRULICITY) 1.5 MG/0.5ML pen; Inject 1.5 mg Subcutaneous every 7 days No further refills until appointment    Diabetic polyneuropathy associated with diabetes mellitus due to underlying condition (H)  Lives with a chronic peripheral neuropathy, Self foot examinations are emphasized    - FOOT EXAM    Diuretic-induced hypokalemia  Due for recheck  Follow up as indicated on results   - Basic metabolic panel  - potassium chloride ER (K-TAB/KLOR-CON) 10 MEQ CR tablet; TAKE 1/2 TABLET(5 MEQ) BY MOUTH DAILY    Long-term (current) use of anticoagulants [Z79.01]  Noted as a point of historical importance , continue current plan of care   With coumadin . I approve [employer] [facility] INRatio  2 PT/INR Monitor  And will forward this note to the inr clinic RN for taking this to the next step  - warfarin ANTICOAGULANT (COUMADIN) 5 MG tablet; TAKE 1 TABLET BY MOUTH ON MONDAYS AND FRIDAYS AND 1/2 TABLET ALL OTHER DAYS OF THE WEEK AS DIRECTED needs apt with pcp for further refills    CKD (chronic kidney disease) stage 4, GFR 15-29 ml/min (H)  Problem is stable and ongoing monitoring  . Further follow up depending on the test results   - Basic metabolic panel  - furosemide (LASIX) 40 MG tablet; TAKE 1 TABLET(40 MG) BY MOUTH TWICE DAILY    Persistent atrial fibrillation (H)  Stable phase of chronic illness   - Basic metabolic panel  - Hemoglobin  - warfarin ANTICOAGULANT  (COUMADIN) 5 MG tablet; TAKE 1 TABLET BY MOUTH ON MONDAYS AND FRIDAYS AND 1/2 TABLET ALL OTHER DAYS OF THE WEEK AS DIRECTED needs apt with pcp for further refills  - INR point of care, Interfaced Result    Hypovitaminosis D  Due for recheck, follow up as indicated on results   - Basic metabolic panel  - Hemoglobin  - Vitamin D Deficiency    Chronic diastolic congestive heart failure (H)  Problem is stable and ongoing monitoring    - Basic metabolic panel  - Hemoglobin  - metoprolol tartrate (LOPRESSOR) 100 MG tablet; TAKE 1/2 TABLET(50 MG) BY MOUTH TWICE DAILY    EVONNE (obstructive sleep apnea)  Uses cpap machine and mask with good benefit     Need for diphtheria-tetanus-pertussis (Tdap) vaccine  Declines for today     Need for shingles vaccine  Declines     Age-related osteoporosis without current pathological fracture  Refills provided. Not due for DEXA scan   - alendronate (FOSAMAX) 70 MG tablet; Take 1 tablet (70 mg) by mouth every 7 days    Cough  Noted as a point of historical importance , occasional has flare-ups that might need Benzonatate [ tessalon perles ], course of prednisone in tapering doses and antibiotics     Chronic diarrhea  Absolutely refractory chronic condition and not due to lactose intolerance or fat malabsorption or microscopic colitis . She declines further workup and we refill diphenoxylate-atropine (LOMOTIL) which he virtually lives on  - diphenoxylate-atropine (LOMOTIL) 2.5-0.025 MG tablet; TAKE 1 TABLET BY MOUTH FOUR TIMES DAILY    Pulmonary HTN (H)  Problem is stable and ongoing monitoring  , not actively following with cardiology  At this point   - losartan (COZAAR) 25 MG tablet; TAKE 1 TABLET(25 MG) BY MOUTH TWICE DAILY  - metoprolol tartrate (LOPRESSOR) 100 MG tablet; TAKE 1/2 TABLET(50 MG) BY MOUTH TWICE DAILY    Major depressive disorder, recurrent episode, mild (H)  Maintainence dose at this point, continue current plan of care    - PARoxetine (PAXIL) 20 MG tablet; Take 1 tablet  "(20 mg) by mouth daily    Primary osteoarthritis involving multiple joints  She receives generally one prescription a year . Never more then 2  - HYDROcodone-acetaminophen (NORCO) 5-325 MG tablet; 1 tab QID AS NEEDED    Septic hip (H)  This was a chronic diagnosis for many many years but she eventually successfully got off of this medication some years back with no evidence of hip infection recurrence     Chronic deep vein thrombosis (DVT) of femoral vein of right lower extremity (H)  Problem is stable and ongoing monitoring      Morbid obesity (H)  Problem is stable and ongoing monitoring      Squamous cell carcinoma of right wrist  Her prior diagnosis here was listed as \" malignancy\" but the correct diagnosis was simply a squamous cell skin cancer which was removed a few years back and has never reoccurred     Abnormal swallowing  Towards the end of the office visit today we reviewed lowgrade swallowing suggestive of presbyesophagus  No red flag symptoms . We discussed possibly an XR Esophagram w upper GI but opted instead for a wait and see approach , she adds stoically that if it were to turn out as a cancer she would not pursue active treatment       Review of the result(s) of each unique test - todays tests  Prescription drug management  38 minutes spent on the date of the encounter doing chart review, history and exam, documentation and further activities per the note      Return in about 9 months (around 3/3/2023).    Steven Abrams MD  [facility] CRYSTAL Terry is a 87 year old who presents for the following health issues   Encounter Diagnoses   Name Primary?     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, without long-term current use of insulin (H) Yes     Diabetic polyneuropathy associated with diabetes mellitus due to underlying condition (H)      Diuretic-induced hypokalemia      Long-term (current) use of anticoagulants [Z79.01]      CKD " (chronic kidney disease) stage 4, GFR 15-29 ml/min (H)      Persistent atrial fibrillation (H)      Hypovitaminosis D      Chronic diastolic congestive heart failure (H)      EVONNE (obstructive sleep apnea)      Need for diphtheria-tetanus-pertussis (Tdap) vaccine      Need for shingles vaccine      Age-related osteoporosis without current pathological fracture      Cough      Chronic diarrhea      Pulmonary HTN (H)      Major depressive disorder, recurrent episode, mild (H)      Primary osteoarthritis involving multiple joints      Septic hip (H)      Chronic deep vein thrombosis (DVT) of femoral vein of right lower extremity (H)      Morbid obesity (H)      Squamous cell carcinoma of right wrist      Abnormal swallowing        HPI   Last appointment with me was 9-13-21  Health Maintenance Due   Topic Date Due     URINE DRUG SCREEN  Never done     ZOSTER IMMUNIZATION (2 of 3) 05/07/2013     MEDICARE ANNUAL WELLNESS VISIT  10/02/2015     MICROALBUMIN  02/07/2018     ASTHMA ACTION PLAN  04/29/2020     DTAP/TDAP/TD IMMUNIZATION (2 - Td or Tdap) 08/16/2021     DIABETIC FOOT EXAM  09/04/2021     FALL RISK ASSESSMENT  09/04/2021     COVID-19 Vaccine (4 - Booster for Pfizer series) 03/10/2022     A1C  03/13/2022     BMP  03/13/2022     ASTHMA CONTROL TEST  03/13/2022     HF ACTION PLAN  04/29/2022     HEMOGLOBIN  06/21/2022     Lab Results   Component Value Date    A1C 7.3 09/13/2021    A1C 7.1 08/11/2020    A1C 7.0 04/29/2019    A1C 7.3 07/09/2018    A1C 7.8 11/07/2017    A1C 8.2 07/27/2017          Patient has a few concerns     1. Has a problem with eating, tending towards slow eating and more frequent meals. Still sometimes she has an issue, it's sporadic and not specific to foods, she gets a epigastrium pain and has had maybe 5 episodes of vomiting over last 9 months. Even without the vomiting she feels like something gets stuck in my throat. We discussed the potential for XR Esophagram w upper GI  But for now it's a  "wait and see approach , she adds if it were cancer she just wouldn't pursue this \" I am too old\".    2. She is concerned with her legs, they are massive with lymphedema and have a lot of pain  3. During the skin exam she has a [id number] type of thing  Also with other family members  4. Significant shortness of breath and has oxygen at home  5. Numbness with toes, this isn't new  6. Has really bad urinary incontinence and so for example she skipped her  7. Chronic diarrhea always bad 3-4 times per day and has this virtually every single day   8. INR - we reviewed the [employer] [facility] INRatio  2 PT/INR Monitor so she can have this done at home, so I will send on this approval       Review of Systems   Constitutional, HEENT, cardiovascular, pulmonary, gi and gu systems are negative, except as otherwise noted.      Objective    /74   Pulse 93   Temp 98.2  F (36.8  C) (Oral)   Resp 14   SpO2 94%   There is no height or weight on file to calculate BMI.  Physical Exam   GENERAL: healthy, alert and no distress  EYES: Eyes grossly normal to inspection, PERRL and conjunctivae and sclerae normal  RESP: lungs clear to auscultation - no rales, rhonchi or wheezes  CV: irregularly irregular rhythm, normal S1 S2, no S3 or S4, no murmur, click or rub and peripheral pulses strong  ABDOMEN: soft, nontender, no hepatosplenomegaly, no masses and bowel sounds normal  MS: this is a patient with terrible morbid obesity and coexisting condition of lymphedema for years and years and she has massive swollen legs. This is absolutely not new and to her credit she has maintained remarkably good skin integrity with frequently apply lotion and does have stasis dermatitis but zero evidence of venous stasis ulcerations or active infections  SKIN: no suspicious lesions or rashes    Orders Placed This Encounter   Procedures     FOOT EXAM     Basic metabolic panel     Hemoglobin A1c     Albumin Random Urine Quantitative with Creat Ratio     " Hemoglobin     Vitamin D Deficiency

## 2022-06-06 ENCOUNTER — TELEPHONE (OUTPATIENT)
Dept: FAMILY MEDICINE | Facility: CLINIC | Age: 87
End: 2022-06-06
Payer: COMMERCIAL

## 2022-06-06 NOTE — TELEPHONE ENCOUNTER
Prior Authorization Retail Medication Request    Medication/Dose: diphenoxylate-atropine (LOMOTIL) 2.5-0.025 MG tablet  ICD code (if different than what is on RX):  Chronic diarrhea [K52.9]  Previously Tried and Failed:    Rationale:      Insurance Name:  [employer]   Insurance ID:  [id number]       Pharmacy Information (if different than what is on RX)  Name:  [facility]ef[facility]  Phone:  [phone]

## 2022-06-07 ENCOUNTER — TRANSFERRED RECORDS (OUTPATIENT)
Dept: INTERNAL MEDICINE | Facility: CLINIC | Age: 87
End: 2022-06-07
Payer: COMMERCIAL

## 2022-06-07 LAB — INR (EXTERNAL): 2.3 (ref 0.9–1.1)

## 2022-06-09 NOTE — TELEPHONE ENCOUNTER
Prior Authorization Approval    Authorization Effective Date: 6/9/2022  Authorization Expiration Date: 12/31/2022  Medication:   Approved Dose/Quantity:    Reference #:     Insurance Company: [employer] ([facility]) - Phone [phone] Fax [phone]  Expected CoPay:       CoPay Card Available:      Foundation Assistance Needed:    Which Pharmacy is filling the prescription (Not needed for infusion/clinic administered): [id number] - [place], [place] - [address] AT [facility] OF [address] [facility]  Pharmacy Notified: Yes  Patient Notified: Yes  **Instructed pharmacy to notify patient when script is ready to /ship.**

## 2022-06-09 NOTE — TELEPHONE ENCOUNTER
Central Prior Authorization Team   Phone: [phone]    PA Initiation    Medication:   Insurance Company: [employer] ([facility]) - Phone [phone] Fax [phone]  Pharmacy Filling the Rx: [facility] #[id number] - [place], [place] - [address] AT [facility] OF [address]  Filling Pharmacy Phone: [phone]  Filling Pharmacy Fax:    Start Date: 6/9/2022

## 2022-06-14 ENCOUNTER — ANTICOAGULATION THERAPY VISIT (OUTPATIENT)
Dept: ANTICOAGULATION | Facility: CLINIC | Age: 87
End: 2022-06-14
Payer: COMMERCIAL

## 2022-06-14 ENCOUNTER — TRANSFERRED RECORDS (OUTPATIENT)
Dept: HEALTH INFORMATION MANAGEMENT | Facility: CLINIC | Age: 87
End: 2022-06-14

## 2022-06-14 DIAGNOSIS — I48.19 PERSISTENT ATRIAL FIBRILLATION (H): ICD-10-CM

## 2022-06-14 DIAGNOSIS — Z79.01 LONG TERM CURRENT USE OF ANTICOAGULANT THERAPY: Primary | ICD-10-CM

## 2022-06-14 DIAGNOSIS — Z79.01 CHRONIC ANTICOAGULATION: ICD-10-CM

## 2022-06-14 LAB — INR (EXTERNAL): 2 (ref 0.9–1.1)

## 2022-06-14 NOTE — PROGRESS NOTES
Just received a result for Patient from [facility] that is dated 6/7/22  Belkis Nunez RN  Anticoagulation Nurse - [facility], [place]      Patient states she forgot to do her INR today and will do it later today or tomorrow and call the result to [facility].    Belkis Nunez RN  Anticoagulation Nurse - [facility], [place]

## 2022-06-21 LAB — INR (EXTERNAL): 2 (ref 2–3)

## 2022-06-22 ENCOUNTER — DOCUMENTATION ONLY (OUTPATIENT)
Dept: ANTICOAGULATION | Facility: CLINIC | Age: 87
End: 2022-06-22

## 2022-06-22 NOTE — PROGRESS NOTES
ANTICOAGULATION     Mara Colindres is overdue for INR check.      Left message reminding patient to check INR with their home meter and call results to the home monitoring company as soon as possible.     Laxmi Huang RN

## 2022-06-23 ENCOUNTER — ANTICOAGULATION THERAPY VISIT (OUTPATIENT)
Dept: ANTICOAGULATION | Facility: CLINIC | Age: 87
End: 2022-06-23

## 2022-06-23 DIAGNOSIS — Z79.01 CHRONIC ANTICOAGULATION: ICD-10-CM

## 2022-06-23 DIAGNOSIS — Z79.01 LONG TERM CURRENT USE OF ANTICOAGULANT THERAPY: Primary | ICD-10-CM

## 2022-06-23 DIAGNOSIS — I48.19 PERSISTENT ATRIAL FIBRILLATION (H): ICD-10-CM

## 2022-06-23 NOTE — PROGRESS NOTES
ANTICOAGULATION MANAGEMENT     Mara Colindres 87 year old female is on warfarin with therapeutic INR result. (Goal INR 2.0-3.0)    Recent labs: (last 7 days)     06/23/22  1723   INR 2       ASSESSMENT     Source(s): Chart Review and Patient/Caregiver Call     Warfarin doses taken: Warfarin taken as instructed  Diet: No new diet changes identified  New illness, injury, or hospitalization: No  Medication/supplement changes: None noted  Signs or symptoms of bleeding or clotting: No  Previous INR: Therapeutic last 2(+) visits  Additional findings: None       PLAN     Recommended plan for no diet, medication or health factor changes affecting INR     Dosing Instructions: continue your current warfarin dose with next INR in 2 weeks       Summary  As of 6/23/2022    Full warfarin instructions:  5 mg every Mon, Fri; 2.5 mg all other days   Next INR check:  7/5/2022             Telephone call with Mara who verbalizes understanding and agrees to plan and who agrees to plan and repeated back plan correctly    Patient to recheck with home meter    Education provided: Please call back if any changes to your diet, medications or how you've been taking warfarin    Plan made per [facility] anticoagulation protocol    Quynh Pizarro, RN  Anticoagulation Clinic  6/23/2022    _______________________________________________________________________     Anticoagulation Episode Summary     Current INR goal:  2.0-3.0   TTR:  66.8 % (1 y)   Target end date:  Indefinite   Send INR reminders to:  KELLY PEACOCK    Indications    Long-term (current) use of anticoagulants [Z79.01] [Z79.01]  Persistent atrial fibrillation (H) [I48.19]  Chronic anticoagulation [Z79.01]           Comments:  Home Meter         Anticoagulation Care Providers     Provider Role Specialty Phone number    Steven Abrams MD Referring Internal Medicine [phone]

## 2022-07-01 DIAGNOSIS — I48.19 PERSISTENT ATRIAL FIBRILLATION (H): ICD-10-CM

## 2022-07-01 DIAGNOSIS — M15.0 PRIMARY OSTEOARTHRITIS INVOLVING MULTIPLE JOINTS: ICD-10-CM

## 2022-07-01 DIAGNOSIS — Z79.01 LONG TERM CURRENT USE OF ANTICOAGULANT THERAPY: ICD-10-CM

## 2022-07-01 RX ORDER — WARFARIN SODIUM 5 MG/1
TABLET ORAL
Qty: 57 TABLET | Refills: 4 | Status: SHIPPED | OUTPATIENT
Start: 2022-07-01 | End: 2022-09-26

## 2022-07-01 RX ORDER — HYDROCODONE BITARTRATE AND ACETAMINOPHEN 5; 325 MG/1; MG/1
TABLET ORAL
Qty: 90 TABLET | Refills: 0 | Status: SHIPPED | OUTPATIENT
Start: 2022-07-01 | End: 2022-11-08

## 2022-07-01 NOTE — TELEPHONE ENCOUNTER
"Patient calling, states that she needs refills of Norco and Warfarin. Advised patient that Norco was already filled today. Request for Warfarin routed to Dr. Abrams.     Routing refill request to provider for review/approval because:  Labs out of range:  INR    Requested Prescriptions   Pending Prescriptions Disp Refills    warfarin ANTICOAGULANT (COUMADIN) 5 MG tablet 57 tablet 0     Sig: TAKE 1 TABLET BY MOUTH ON  AND  AND 1/2 TABLET ALL OTHER DAYS OF THE WEEK AS DIRECTED needs apt with pcp for further refills        Vitamin K Antagonists Failed - 2022  2:41 PM        Failed - INR is within goal in the past 6 weeks     Confirm INR is within goal in the past 6 weeks.     Recent Labs   Lab Test 22  1723   INR 2                       Passed - Recent (12 mo) or future (30 days) visit within the authorizing provider's specialty     Patient has had an office visit with the authorizing provider or a provider within the authorizing providers department within the previous 12 mos or has a future within next 30 days. See \"Patient Info\" tab in inbasket, or \"Choose Columns\" in Meds & Orders section of the refill encounter.              Passed - Medication is active on med list        Passed - Patient is 18 years of age or older        Passed - Patient is not pregnant        Passed - No positive pregnancy on file in past 12 months          Signed Prescriptions Disp Refills    HYDROcodone-acetaminophen (NORCO) 5-325 MG tablet 90 tablet 0     Si tab QID AS NEEDED        There is no refill protocol information for this order           Yulia Zuñiga RN   [facility]     "

## 2022-07-01 NOTE — TELEPHONE ENCOUNTER
Controlled Substance Refill Request for Hydrocodone    Last refill: 06/03/2022    Last clinic visit: 06/03/2022     Clinic visit frequency required: Q 3 months  Next appt: No Appointment Scheduled At This Time    Controlled substance agreement on file: Yes:  Date 06/03/2022.    Documentation in problem list reviewed:  Yes    Processing:  Rx to be electronically transmitted to pharmacy by provider      Pharmacy    [facility] [address] [place] [address]  Phone:[phone]  Fax:[phone]    Gisel Rodriguez CMA  [facility]

## 2022-07-05 ENCOUNTER — TRANSFERRED RECORDS (OUTPATIENT)
Dept: INTERNAL MEDICINE | Facility: CLINIC | Age: 87
End: 2022-07-05

## 2022-07-05 ENCOUNTER — ANTICOAGULATION THERAPY VISIT (OUTPATIENT)
Dept: ANTICOAGULATION | Facility: CLINIC | Age: 87
End: 2022-07-05

## 2022-07-05 DIAGNOSIS — Z79.01 LONG TERM CURRENT USE OF ANTICOAGULANT THERAPY: Primary | ICD-10-CM

## 2022-07-05 DIAGNOSIS — I48.19 PERSISTENT ATRIAL FIBRILLATION (H): ICD-10-CM

## 2022-07-05 DIAGNOSIS — Z79.01 CHRONIC ANTICOAGULATION: ICD-10-CM

## 2022-07-05 LAB — INR (EXTERNAL): 2.1 (ref 0.9–1.1)

## 2022-07-05 NOTE — PROGRESS NOTES
ANTICOAGULATION MANAGEMENT     Mara Colindres 87 year old female is on warfarin with therapeutic INR result. (Goal INR 2.0-3.0)    Recent labs: (last 7 days)     07/05/22  1619   INR 2.1*       ASSESSMENT     Source(s): Chart Review     LVM to call back [facility] if any there have been any changes to health, diet, medication, or if any issues with bleeding, or if any upcoming surgeries or procedures.         PLAN     Recommended plan for no diet, medication or health factor changes affecting INR     Dosing Instructions: continue your current warfarin dose with next INR in 1 week       Summary  As of 7/5/2022    Full warfarin instructions:  5 mg every Mon, Fri; 2.5 mg all other days   Next INR check:  7/12/2022             Detailed voice message left for Mara with dosing instructions and follow up date.     Patient to recheck with home meter    Education provided: Goal range and significance of current result    Plan made per [facility] anticoagulation protocol    Escobar Pereira RN  Anticoagulation Clinic  7/5/2022    _______________________________________________________________________     Anticoagulation Episode Summary     Current INR goal:  2.0-3.0   TTR:  69.1 % (1 y)   Target end date:  Indefinite   Send INR reminders to:  KELLY PEACOCK    Indications    Long-term (current) use of anticoagulants [Z79.01] [Z79.01]  Persistent atrial fibrillation (H) [I48.19]  Chronic anticoagulation [Z79.01]           Comments:  Home Meter         Anticoagulation Care Providers     Provider Role Specialty Phone number    Steven Abrams MD Referring Internal Medicine [phone]

## 2022-07-07 ENCOUNTER — MEDICAL CORRESPONDENCE (OUTPATIENT)
Dept: INTERNAL MEDICINE | Facility: CLINIC | Age: 87
End: 2022-07-07

## 2022-07-14 ENCOUNTER — TRANSFERRED RECORDS (OUTPATIENT)
Dept: INTERNAL MEDICINE | Facility: CLINIC | Age: 87
End: 2022-07-14

## 2022-07-14 LAB — INR (EXTERNAL): 2.2 (ref 2–3)

## 2022-07-19 ENCOUNTER — TELEPHONE (OUTPATIENT)
Dept: ANTICOAGULATION | Facility: CLINIC | Age: 87
End: 2022-07-19

## 2022-07-19 NOTE — TELEPHONE ENCOUNTER
ANTICOAGULATION     Mara Colindres is overdue for INR check.      Spoke with Ayla instructed to test INR with home meter and call results to home monitoring company as soon as possible.     Patient states she called her INR in from last week and it was 2.2 but she did not call the result in because it was in range.      Usha Riley RN

## 2022-07-20 ENCOUNTER — TELEPHONE (OUTPATIENT)
Dept: FAMILY MEDICINE | Facility: CLINIC | Age: 87
End: 2022-07-20

## 2022-07-20 DIAGNOSIS — Z79.01 LONG TERM CURRENT USE OF ANTICOAGULANT THERAPY: Primary | ICD-10-CM

## 2022-07-20 DIAGNOSIS — Z79.01 CHRONIC ANTICOAGULATION: ICD-10-CM

## 2022-07-20 DIAGNOSIS — I48.19 PERSISTENT ATRIAL FIBRILLATION (H): ICD-10-CM

## 2022-07-20 LAB — INR (EXTERNAL): 2.3 (ref 0.9–1.1)

## 2022-07-20 NOTE — TELEPHONE ENCOUNTER
Anticoagulation Team,     Patient calling in results of home INR check. INR 2.3 using fingerstick reader.    [phone], best call number.    Thanks,  AMAYA Granados  [facility]

## 2022-07-20 NOTE — TELEPHONE ENCOUNTER
ANTICOAGULATION MANAGEMENT     Mara Colindres 87 year old female is on warfarin with therapeutic INR result. (Goal INR )    Recent labs: (last 7 days)     07/20/22  0000   INR 2.3*       ASSESSMENT       Source(s): Chart Review and Patient/Caregiver Call       Warfarin doses taken: Warfarin taken as instructed    Diet: No new diet changes identified    New illness, injury, or hospitalization: No    Medication/supplement changes: None noted    Signs or symptoms of bleeding or clotting: No    Previous INR: Therapeutic last 2(+) visits    Additional findings: None       PLAN     Recommended plan for no diet, medication or health factor changes affecting INR     Dosing Instructions: continue your current warfarin dose with next INR in 1 week       Summary  As of 7/20/2022    Full warfarin instructions:  5 mg every Mon, Fri; 2.5 mg all other days   Next INR check:  7/27/2022             Telephone call with Mara who verbalizes understanding and agrees to plan    Patient to recheck with home meter    Education provided: Goal range and significance of current result, Importance of therapeutic range and Went over the importance of reporting INR result to home meter company vs contacting [facility] to report result.     Plan made per [facility] anticoagulation protocol    Marcos Jauregui RN  Anticoagulation Clinic  7/20/2022    _______________________________________________________________________     Anticoagulation Episode Summary     Current INR goal:  2.0-3.0   TTR:  71.1 % (1 y)   Target end date:  Indefinite   Send INR reminders to:  KELLY PEACOCK    Indications    Long-term (current) use of anticoagulants [Z79.01] [Z79.01]  Persistent atrial fibrillation (H) [I48.19]  Chronic anticoagulation [Z79.01]           Comments:  [employer] home meter         Anticoagulation Care Providers     Provider Role Specialty Phone number    Steven Abrams MD Referring Internal Medicine [phone]

## 2022-08-03 ENCOUNTER — TELEPHONE (OUTPATIENT)
Dept: ANTICOAGULATION | Facility: CLINIC | Age: 87
End: 2022-08-03

## 2022-08-03 NOTE — TELEPHONE ENCOUNTER
ANTICOAGULATION     Mara Colindres is overdue for INR check.      Spoke with Mara instructed to test INR with home meter and call results to home monitoring company as soon as possible.   Pt had ran out of test strips. She received more in the mail yesterday. Will test today.    Escobar Pereira RN

## 2022-08-04 ENCOUNTER — TRANSFERRED RECORDS (OUTPATIENT)
Dept: INTERNAL MEDICINE | Facility: CLINIC | Age: 87
End: 2022-08-04

## 2022-08-04 ENCOUNTER — TELEPHONE (OUTPATIENT)
Dept: FAMILY MEDICINE | Facility: CLINIC | Age: 87
End: 2022-08-04

## 2022-08-04 DIAGNOSIS — Z79.01 CHRONIC ANTICOAGULATION: ICD-10-CM

## 2022-08-04 DIAGNOSIS — I48.19 PERSISTENT ATRIAL FIBRILLATION (H): ICD-10-CM

## 2022-08-04 DIAGNOSIS — Z79.01 LONG TERM CURRENT USE OF ANTICOAGULANT THERAPY: Primary | ICD-10-CM

## 2022-08-04 LAB — INR (EXTERNAL): 1.8 (ref 0.9–1.1)

## 2022-08-04 NOTE — TELEPHONE ENCOUNTER
ANTICOAGULATION MANAGEMENT     Mara Colindres 87 year old female is on warfarin with subtherapeutic INR result. (Goal INR )    Recent labs: (last 7 days)     08/04/22  1624   INR 1.8*       ASSESSMENT       Source(s): Chart Review and Patient/Caregiver Call       Warfarin doses taken: Warfarin taken as instructed    Diet: No new diet changes identified    New illness, injury, or hospitalization: No    Medication/supplement changes: None noted    Signs or symptoms of bleeding or clotting: No    Previous INR: Therapeutic last 2(+) visits    Additional findings: None       PLAN     Recommended plan for no diet, medication or health factor changes affecting INR     Dosing Instructions: Continue your current warfarin dose with next INR in 1 week       Summary  As of 8/4/2022    Full warfarin instructions:  5 mg every Mon, Fri; 2.5 mg all other days   Next INR check:  8/11/2022             Telephone call with Mara who verbalizes understanding and agrees to plan    Patient to recheck with home meter    Education provided: Please call back if any changes to your diet, medications or how you've been taking warfarin    Plan made per [facility] anticoagulation protocol    Laxmi Huang RN  Anticoagulation Clinic  8/4/2022    _______________________________________________________________________     Anticoagulation Episode Summary     Current INR goal:  2.0-3.0   TTR:  69.4 % (1 y)   Target end date:  Indefinite   Send INR reminders to:  KELLY PEACOCK    Indications    Long-term (current) use of anticoagulants [Z79.01] [Z79.01]  Persistent atrial fibrillation (H) [I48.19]  Chronic anticoagulation [Z79.01]           Comments:  [facility] home meter         Anticoagulation Care Providers     Provider Role Specialty Phone number    Steven Abrams MD Referring Internal Medicine [phone]

## 2022-08-04 NOTE — TELEPHONE ENCOUNTER
Reason for Call:  Other call back    Detailed comments: INR questions    Phone Number Patient can be reached at: Home number on file [phone] (home)    Best Time: anytime    Can we leave a detailed message on this number? YES    Call taken on 8/4/2022 at 3:49 PM by Dilcia Stanton

## 2022-08-09 ENCOUNTER — TRANSFERRED RECORDS (OUTPATIENT)
Dept: HEALTH INFORMATION MANAGEMENT | Facility: CLINIC | Age: 87
End: 2022-08-09

## 2022-08-10 ENCOUNTER — DOCUMENTATION ONLY (OUTPATIENT)
Dept: ANTICOAGULATION | Facility: CLINIC | Age: 87
End: 2022-08-10

## 2022-08-10 DIAGNOSIS — I48.19 PERSISTENT ATRIAL FIBRILLATION (H): Primary | ICD-10-CM

## 2022-08-10 NOTE — PROGRESS NOTES
ANTICOAGULATION CLINIC REFERRAL RENEWAL REQUEST       An annual renewal order is required for all patients referred to [facility].?  Please review and sign the pended referral order for Mara L Jens.       ANTICOAGULATION SUMMARY      Warfarin indication(s)   Atrial Fibrillation    Mechanical heart valve present?  NO       Current goal range   INR: 2.0-3.0     Goal appropriate for indication? Goal INR 2-3, standard for indication(s) above     Time in Therapeutic Range (TTR)  (Goal > 60%) 71.1%       Office visit with referring provider's group within last year yes on 6/3/22       Laxmi Huang RN  [facility]

## 2022-08-11 ENCOUNTER — TRANSFERRED RECORDS (OUTPATIENT)
Dept: INTERNAL MEDICINE | Facility: CLINIC | Age: 87
End: 2022-08-11

## 2022-08-19 ENCOUNTER — TRANSFERRED RECORDS (OUTPATIENT)
Dept: HEALTH INFORMATION MANAGEMENT | Facility: CLINIC | Age: 87
End: 2022-08-19

## 2022-08-24 ENCOUNTER — DOCUMENTATION ONLY (OUTPATIENT)
Dept: ANTICOAGULATION | Facility: CLINIC | Age: 87
End: 2022-08-24

## 2022-08-24 DIAGNOSIS — I48.19 PERSISTENT ATRIAL FIBRILLATION (H): ICD-10-CM

## 2022-08-24 DIAGNOSIS — Z79.01 CHRONIC ANTICOAGULATION: ICD-10-CM

## 2022-08-24 DIAGNOSIS — Z79.01 LONG TERM CURRENT USE OF ANTICOAGULANT THERAPY: Primary | ICD-10-CM

## 2022-08-24 LAB — INR (EXTERNAL): 2.1 (ref 0.9–1.1)

## 2022-08-24 NOTE — PROGRESS NOTES
ANTICOAGULATION MANAGEMENT     Mara Colindres 87 year old female is on warfarin with therapeutic INR result. (Goal INR 2.0-3.0)    No results for input(s): INR in the last 168 hours.    ASSESSMENT       Source(s): Chart Review and Patient/Caregiver Call       Warfarin doses taken: Warfarin taken as instructed    Diet: No new diet changes identified    New illness, injury, or hospitalization: No    Medication/supplement changes: None noted    Signs or symptoms of bleeding or clotting: No    Previous INR: Subtherapeutic    Additional findings: None       PLAN     Recommended plan for no diet, medication or health factor changes affecting INR     Dosing Instructions: Continue your current warfarin dose with next INR in 1 week       Summary  As of 8/24/2022    Full warfarin instructions:  5 mg every Mon, Fri; 2.5 mg all other days   Next INR check:  8/25/2022             Telephone call with Mara who verbalizes understanding and agrees to plan    Patient to recheck with home meter    Education provided: Please call back if any changes to your diet, medications or how you've been taking warfarin    Plan made per [facility] anticoagulation protocol    Laxmi Huang RN  Anticoagulation Clinic  8/24/2022    _______________________________________________________________________     Anticoagulation Episode Summary     Current INR goal:  2.0-3.0   TTR:  66.4 % (12 mo)   Target end date:  Indefinite   Send INR reminders to:  KELLY PEACOCK    Indications    Long-term (current) use of anticoagulants [Z79.01] [Z79.01]  Persistent atrial fibrillation (H) [I48.19]  Chronic anticoagulation [Z79.01]           Comments:  [employer] home meter         Anticoagulation Care Providers     Provider Role Specialty Phone number    Steven Abrams MD Referring Internal Medicine [phone]

## 2022-08-25 ENCOUNTER — TRANSFERRED RECORDS (OUTPATIENT)
Dept: HEALTH INFORMATION MANAGEMENT | Facility: CLINIC | Age: 87
End: 2022-08-25

## 2022-09-01 ENCOUNTER — TRANSFERRED RECORDS (OUTPATIENT)
Dept: INTERNAL MEDICINE | Facility: CLINIC | Age: 87
End: 2022-09-01

## 2022-09-01 ENCOUNTER — DOCUMENTATION ONLY (OUTPATIENT)
Dept: FAMILY MEDICINE | Facility: CLINIC | Age: 87
End: 2022-09-01

## 2022-09-01 NOTE — PROGRESS NOTES
ANTICOAGULATION     Mara Colindres is overdue for INR check.      Spoke with Ayla instructed to test INR with home meter and call results to home monitoring company as soon as possible.    Belkis Nunez RN

## 2022-09-08 ENCOUNTER — DOCUMENTATION ONLY (OUTPATIENT)
Dept: ANTICOAGULATION | Facility: CLINIC | Age: 87
End: 2022-09-08

## 2022-09-08 ENCOUNTER — TRANSFERRED RECORDS (OUTPATIENT)
Dept: INTERNAL MEDICINE | Facility: CLINIC | Age: 87
End: 2022-09-08

## 2022-09-08 NOTE — PROGRESS NOTES
ANTICOAGULATION     Mara Colindres is overdue for INR check.      Spoke with Mara instructed to test INR with home meter and call results to home monitoring company as soon as possible.    Laxmi Huang RN

## 2022-09-15 ENCOUNTER — TRANSFERRED RECORDS (OUTPATIENT)
Dept: HEALTH INFORMATION MANAGEMENT | Facility: CLINIC | Age: 87
End: 2022-09-15

## 2022-09-15 ENCOUNTER — TELEPHONE (OUTPATIENT)
Dept: ANTICOAGULATION | Facility: CLINIC | Age: 87
End: 2022-09-15

## 2022-09-15 NOTE — LETTER
September 15, 2022        Mara Colindres  [address] [facility] [address]            Dear Mara,    You are currently under the care of [facility] for your warfarin (Coumadin , Jantoven ) therapy.  We are contacting you because our records show you were due for an INR on 09/01/2022.    There are potentially serious risks when taking warfarin without careful monitoring and we want to make sure you are safely managed.  Routine lab monitoring is required for warfarin refills.     Please call [phone]  as soon as possible to schedule an appointment.  If there has been a change in your care or other concerns, please let us know so we can help and or update our records.     Sincerely,       [facility]

## 2022-09-15 NOTE — TELEPHONE ENCOUNTER
ANTICOAGULATION     Mara Colindres is overdue for INR check.      Reminder letter sent    Shellie Aguayo RN

## 2022-09-22 ENCOUNTER — TRANSFERRED RECORDS (OUTPATIENT)
Dept: HEALTH INFORMATION MANAGEMENT | Facility: CLINIC | Age: 87
End: 2022-09-22

## 2022-09-26 ENCOUNTER — TELEPHONE (OUTPATIENT)
Dept: FAMILY MEDICINE | Facility: CLINIC | Age: 87
End: 2022-09-26

## 2022-09-26 DIAGNOSIS — Z79.01 LONG TERM CURRENT USE OF ANTICOAGULANT THERAPY: ICD-10-CM

## 2022-09-26 DIAGNOSIS — Z79.01 CHRONIC ANTICOAGULATION: ICD-10-CM

## 2022-09-26 DIAGNOSIS — Z79.01 LONG TERM CURRENT USE OF ANTICOAGULANT THERAPY: Primary | ICD-10-CM

## 2022-09-26 DIAGNOSIS — I48.19 PERSISTENT ATRIAL FIBRILLATION (H): ICD-10-CM

## 2022-09-26 LAB
INR (EXTERNAL): 2 (ref 0.9–1.1)
INR (EXTERNAL): 2.1 (ref 0.9–1.1)

## 2022-09-26 RX ORDER — WARFARIN SODIUM 5 MG/1
TABLET ORAL
Qty: 65 TABLET | Refills: 1 | Status: SHIPPED | OUTPATIENT
Start: 2022-09-26 | End: 2023-01-13

## 2022-09-26 NOTE — LETTER
[facility] [place]  [address] [place] [address]  [place] [address]  Phone: [phone]       To Whom it may concern:    Regarding Mara Colindres (: 1934).  Her INR results should be fax to [phone] after every result taken with her [facility] home machine.  She is followed by [facility] referred by Dr. Steven Abrams.    If you have any questions or concerns, we can be reach at [phone].    Thank you,    [facility] Anticoagulation Team

## 2022-09-26 NOTE — TELEPHONE ENCOUNTER
ANTICOAGULATION MANAGEMENT     Mara Colindres 87 year old female is on warfarin with therapeutic INR result. (Goal INR )    Recent labs: (last 7 days)     22  0000   INR 2.0*       ASSESSMENT       Source(s): Chart Review and Patient/Caregiver Call       Warfarin doses taken: Warfarin taken as instructed    Diet: No new diet changes identified    New illness, injury, or hospitalization: No    Medication/supplement changes: None noted    Signs or symptoms of bleeding or clotting: No    Previous INR: Therapeutic last 2(+) visits    Additional findings: None       PLAN     Recommended plan for no diet, medication or health factor changes affecting INR     Dosing Instructions: Continue your current warfarin dose with next INR in 1 week       Summary  As of 2022    Full warfarin instructions:  5 mg every Mon, Fri; 2.5 mg all other days   Next INR check:  2022             Telephone call with Mara who verbalizes understanding and agrees to plan    Patient to recheck with home meter     Called [employer] to confirm fax number, the number they have on file is incorrect. Sending a letterhead with patient name, , Correct fax number sent to [employer] [phone] at request of .    Education provided: Please call back if any changes to your diet, medications or how you've been taking warfarin, patient will call us with each result if hasn't heard back by end of day of test.    Plan made per [facility] anticoagulation protocol    Laxmi Huang, RN  Anticoagulation Clinic  2022    _______________________________________________________________________     Anticoagulation Episode Summary     Current INR goal:  2.0-3.0   TTR:  71.6 % (1 y)   Target end date:  Indefinite   Send INR reminders to:  KELLY PEACOCK    Indications    Long-term (current) use of anticoagulants [Z79.01] [Z79.01]  Persistent atrial fibrillation (H) [I48.19]  Chronic anticoagulation [Z79.01]           Comments:   [facility] home meter         Anticoagulation Care Providers     Provider Role Specialty Phone number    Steven Abrams MD Referring Internal Medicine [phone]

## 2022-09-26 NOTE — TELEPHONE ENCOUNTER
Reason for Call:  Other      Detailed comments: states she has her own INR machine and her INR numbers should be sent over from [facility]. She states that she has not received a call regarding her INR results for three to four weeks. She is asking for a call as soon as possible.     Phone Number Patient can be reached at: Home number on file [phone] (home)    Best Time: any     Can we leave a detailed message on this number? YES    Call taken on 9/26/2022 at 3:55 PM by Karina Hartman

## 2022-09-29 ENCOUNTER — TRANSFERRED RECORDS (OUTPATIENT)
Dept: INTERNAL MEDICINE | Facility: CLINIC | Age: 87
End: 2022-09-29

## 2022-09-30 DIAGNOSIS — I27.20 PULMONARY HTN (H): ICD-10-CM

## 2022-09-30 DIAGNOSIS — I50.32 CHRONIC DIASTOLIC CONGESTIVE HEART FAILURE (H): ICD-10-CM

## 2022-10-02 RX ORDER — METOPROLOL TARTRATE 100 MG
TABLET ORAL
Qty: 90 TABLET | Refills: 2 | OUTPATIENT
Start: 2022-10-02

## 2022-10-06 ENCOUNTER — TELEPHONE (OUTPATIENT)
Dept: ANTICOAGULATION | Facility: CLINIC | Age: 87
End: 2022-10-06

## 2022-10-06 NOTE — TELEPHONE ENCOUNTER
ANTICOAGULATION     Mara Colindres is overdue for INR check.      Left message for patient to check INR with home meter    Escobar Pereira RN

## 2022-10-07 ENCOUNTER — ANTICOAGULATION THERAPY VISIT (OUTPATIENT)
Dept: ANTICOAGULATION | Facility: CLINIC | Age: 87
End: 2022-10-07

## 2022-10-07 ENCOUNTER — TRANSFERRED RECORDS (OUTPATIENT)
Dept: HEALTH INFORMATION MANAGEMENT | Facility: CLINIC | Age: 87
End: 2022-10-07

## 2022-10-07 DIAGNOSIS — I48.19 PERSISTENT ATRIAL FIBRILLATION (H): ICD-10-CM

## 2022-10-07 DIAGNOSIS — Z79.01 CHRONIC ANTICOAGULATION: ICD-10-CM

## 2022-10-07 DIAGNOSIS — Z79.01 LONG TERM CURRENT USE OF ANTICOAGULANT THERAPY: Primary | ICD-10-CM

## 2022-10-07 LAB — INR (EXTERNAL): 2.6 (ref 0.9–1.1)

## 2022-10-07 NOTE — PROGRESS NOTES
ANTICOAGULATION MANAGEMENT     Mara Colindres 87 year old female is on warfarin with therapeutic INR result. (Goal INR 2.0-3.0)    Recent labs: (last 7 days)     10/07/22  0000   INR 2.6*       ASSESSMENT     Source(s): Chart Review and Patient/Caregiver Call     Warfarin doses taken: Warfarin taken as instructed  Diet: No new diet changes identified  New illness, injury, or hospitalization: No  Medication/supplement changes: None noted  Signs or symptoms of bleeding or clotting: No  Previous INR: Therapeutic last 2(+) visits  Additional findings: None       PLAN     Recommended plan for no diet, medication or health factor changes affecting INR     Dosing Instructions: Continue your current warfarin dose with next INR in 1 week       Summary  As of 10/7/2022    Full warfarin instructions:  5 mg every Mon, Fri; 2.5 mg all other days   Next INR check:  10/14/2022             Telephone call with Mara who verbalizes understanding and agrees to plan    Patient to recheck with home meter    Education provided: Please call back if any changes to your diet, medications or how you've been taking warfarin    Plan made per [facility] anticoagulation protocol    Usha Riley RN  Anticoagulation Clinic  10/7/2022    _______________________________________________________________________     Anticoagulation Episode Summary     Current INR goal:  2.0-3.0   TTR:  74.9 % (1 y)   Target end date:  Indefinite   Send INR reminders to:  [employer]    Indications    Long-term (current) use of anticoagulants [Z79.01] [Z79.01]  Persistent atrial fibrillation (H) [I48.19]  Chronic anticoagulation [Z79.01]           Comments:  [employer] home meter         Anticoagulation Care Providers     Provider Role Specialty Phone number    Steven Abrams MD Referring Internal Medicine [phone]

## 2022-10-13 ENCOUNTER — ANTICOAGULATION THERAPY VISIT (OUTPATIENT)
Dept: ANTICOAGULATION | Facility: CLINIC | Age: 87
End: 2022-10-13

## 2022-10-13 DIAGNOSIS — I48.19 PERSISTENT ATRIAL FIBRILLATION (H): ICD-10-CM

## 2022-10-13 DIAGNOSIS — Z79.01 CHRONIC ANTICOAGULATION: ICD-10-CM

## 2022-10-13 DIAGNOSIS — Z79.01 LONG TERM CURRENT USE OF ANTICOAGULANT THERAPY: Primary | ICD-10-CM

## 2022-10-13 LAB — INR (EXTERNAL): 3.1 (ref 0.9–1.1)

## 2022-10-13 NOTE — PROGRESS NOTES
ANTICOAGULATION MANAGEMENT     Mara Colindres 87 year old female is on warfarin with supratherapeutic INR result. (Goal INR 2.0-3.0)    Recent labs: (last 7 days)     10/13/22  1334   INR 3.1*       ASSESSMENT     Source(s): Chart Review and Patient/Caregiver Call     Warfarin doses taken: Warfarin taken as instructed  Diet: Decreased greens/vitamin K in diet; plans to resume previous intake  New illness, injury, or hospitalization: No  Medication/supplement changes: None noted  Signs or symptoms of bleeding or clotting: No  Previous INR: Therapeutic last 2(+) visits  Additional findings: None       PLAN     Recommended plan for temporary change(s) affecting INR     Dosing Instructions: Continue your current warfarin dose with next INR in 1 week       Summary  As of 10/13/2022    Full warfarin instructions:  5 mg every Mon, Fri; 2.5 mg all other days   Next INR check:  10/20/2022             Telephone call with Mara who verbalizes understanding and agrees to plan    Patient to recheck with home meter    Education provided: Please call back if any changes to your diet, medications or how you've been taking warfarin    Plan made per [facility] anticoagulation protocol    Laxmi Huang RN  Anticoagulation Clinic  10/13/2022    _______________________________________________________________________     Anticoagulation Episode Summary     Current INR goal:  2.0-3.0   TTR:  75.0 % (1 y)   Target end date:  Indefinite   Send INR reminders to:  [facility]    Indications    Long-term (current) use of anticoagulants [Z79.01] [Z79.01]  Persistent atrial fibrillation (H) [I48.19]  Chronic anticoagulation [Z79.01]           Comments:  [employer] home meter         Anticoagulation Care Providers     Provider Role Specialty Phone number    Steven Abrams MD Referring Internal Medicine [phone]

## 2022-10-20 ENCOUNTER — ANTICOAGULATION THERAPY VISIT (OUTPATIENT)
Dept: ANTICOAGULATION | Facility: CLINIC | Age: 87
End: 2022-10-20

## 2022-10-20 ENCOUNTER — TRANSFERRED RECORDS (OUTPATIENT)
Dept: HEALTH INFORMATION MANAGEMENT | Facility: CLINIC | Age: 87
End: 2022-10-20

## 2022-10-20 DIAGNOSIS — I48.19 PERSISTENT ATRIAL FIBRILLATION (H): ICD-10-CM

## 2022-10-20 DIAGNOSIS — Z79.01 CHRONIC ANTICOAGULATION: ICD-10-CM

## 2022-10-20 DIAGNOSIS — Z79.01 LONG TERM CURRENT USE OF ANTICOAGULANT THERAPY: Primary | ICD-10-CM

## 2022-10-20 LAB — INR (EXTERNAL): 2.2 (ref 0.9–1.1)

## 2022-10-20 NOTE — PROGRESS NOTES
ANTICOAGULATION MANAGEMENT     Mara Colindres 87 year old female is on warfarin with therapeutic INR result. (Goal INR 2.0-3.0)    Recent labs: (last 7 days)     10/20/22  0000   INR 2.2*       ASSESSMENT     Source(s): Chart Review and Patient/Caregiver Call     Warfarin doses taken: Warfarin taken as instructed  Diet: No new diet changes identified  New illness, injury, or hospitalization: No  Medication/supplement changes: None noted  Signs or symptoms of bleeding or clotting: No  Previous INR: Supratherapeutic  Additional findings: None       PLAN     Recommended plan for no diet, medication or health factor changes affecting INR     Dosing Instructions: Continue your current warfarin dose with next INR in 1 week       Summary  As of 10/20/2022    Full warfarin instructions:  5 mg every Mon, Fri; 2.5 mg all other days; Starting 10/20/2022   Next INR check:  10/27/2022             Telephone call with Mara who verbalizes understanding and agrees to plan    Patient to recheck with home meter    Education provided:   Goal range and lab monitoring: goal range and significance of current result  Dietary considerations: importance of consistent vitamin K intake    Plan made per [facility] anticoagulation protocol    Lupe Aguila RN  Anticoagulation Clinic  10/20/2022    _______________________________________________________________________     Anticoagulation Episode Summary     Current INR goal:  2.0-3.0   TTR:  74.8 % (1 y)   Target end date:  Indefinite   Send INR reminders to:  [facility]    Indications    Long-term (current) use of anticoagulants [Z79.01] [Z79.01]  Persistent atrial fibrillation (H) [I48.19]  Chronic anticoagulation [Z79.01]           Comments:  [employer] home meter         Anticoagulation Care Providers     Provider Role Specialty Phone number    Steven Abrams MD Referring Internal Medicine [phone]

## 2022-10-27 ENCOUNTER — TRANSFERRED RECORDS (OUTPATIENT)
Dept: HEALTH INFORMATION MANAGEMENT | Facility: CLINIC | Age: 87
End: 2022-10-27

## 2022-10-27 ENCOUNTER — ANTICOAGULATION THERAPY VISIT (OUTPATIENT)
Dept: ANTICOAGULATION | Facility: CLINIC | Age: 87
End: 2022-10-27

## 2022-10-27 DIAGNOSIS — Z79.01 CHRONIC ANTICOAGULATION: ICD-10-CM

## 2022-10-27 DIAGNOSIS — Z79.01 LONG TERM CURRENT USE OF ANTICOAGULANT THERAPY: Primary | ICD-10-CM

## 2022-10-27 DIAGNOSIS — I48.19 PERSISTENT ATRIAL FIBRILLATION (H): ICD-10-CM

## 2022-10-27 LAB — INR (EXTERNAL): 2.5 (ref 0.9–1.1)

## 2022-10-27 NOTE — PROGRESS NOTES
ANTICOAGULATION MANAGEMENT     Mara Colindres 87 year old female is on warfarin with therapeutic INR result. (Goal INR 2.0-3.0)    Recent labs: (last 7 days)     10/27/22  1638   INR 2.5*       ASSESSMENT     Source(s): Chart Review and Patient/Caregiver Call     Warfarin doses taken: Warfarin taken as instructed  Diet: No new diet changes identified  New illness, injury, or hospitalization: No  Medication/supplement changes: None noted  Signs or symptoms of bleeding or clotting: No  Previous INR: Therapeutic last visit; previously outside of goal range  Additional findings: None       PLAN     Recommended plan for no diet, medication or health factor changes affecting INR     Dosing Instructions: Continue your current warfarin dose with next INR in 1 week       Summary  As of 10/27/2022    Full warfarin instructions:  5 mg every Mon, Fri; 2.5 mg all other days; Starting 10/27/2022   Next INR check:  11/3/2022             Telephone call with Mara who verbalizes understanding and agrees to plan    Patient to recheck with home meter    Education provided:   Goal range and lab monitoring: goal range and significance of current result  Start manage by exception with home monitor. Continue to submit INR results to home monitor company.You will only be called when your result is out of range. Please call and notify [facility] if new medication started, dose missed, signs or symptoms of bleeding or clotting, or a surgery/procedure is scheduled.    Plan made per [facility] anticoagulation protocol    Escobar Pereira RN  Anticoagulation Clinic  10/27/2022    _______________________________________________________________________     Anticoagulation Episode Summary     Current INR goal:  2.0-3.0   TTR:  74.9 % (1 y)   Target end date:  Indefinite   Send INR reminders to:  [facility]    Indications    Long-term (current) use of anticoagulants [Z79.01] [Z79.01]  Persistent atrial fibrillation (H) [I48.19]  Chronic  anticoagulation [Z79.01]           Comments:  [facility] home meter. Manage by exception.         Anticoagulation Care Providers     Provider Role Specialty Phone number    Steven Abrams MD Referring Internal Medicine [phone]

## 2022-11-03 ENCOUNTER — DOCUMENTATION ONLY (OUTPATIENT)
Dept: ANTICOAGULATION | Facility: CLINIC | Age: 87
End: 2022-11-03

## 2022-11-03 ENCOUNTER — TRANSFERRED RECORDS (OUTPATIENT)
Dept: HEALTH INFORMATION MANAGEMENT | Facility: CLINIC | Age: 87
End: 2022-11-03

## 2022-11-03 DIAGNOSIS — Z79.01 LONG TERM CURRENT USE OF ANTICOAGULANT THERAPY: Primary | ICD-10-CM

## 2022-11-03 DIAGNOSIS — I48.19 PERSISTENT ATRIAL FIBRILLATION (H): ICD-10-CM

## 2022-11-03 DIAGNOSIS — Z79.01 CHRONIC ANTICOAGULATION: ICD-10-CM

## 2022-11-03 LAB — INR (EXTERNAL): 2.3 (ref 0.9–1.1)

## 2022-11-03 NOTE — PROGRESS NOTES
ANTICOAGULATION  MANAGEMENT-Home Monitor Managed by Exception    Mara CHARLES Jens 87 year old female is on warfarin with therapeutic INR result. (Goal INR 2.0-3.0)    Recent labs: (last 7 days)     10/27/22  1638   INR 2.5*         Previous INR was Therapeutic    Medication, diet, health changes since last INR:chart reviewed; none identified    Contacted within the last 12 weeks by phone on 10/27/22      ISABEL     Mara was NOT contacted regarding therapeutic result today per home monitoring policy manage by exception agreement.   Current warfarin dose is to be continued:     Summary  As of 11/3/2022    Full warfarin instructions:  5 mg every Mon, Fri; 2.5 mg all other days; Starting 11/3/2022   Next INR check:  11/10/2022           ?   Laxmi Huang RN  Anticoagulation Clinic  11/3/2022    _______________________________________________________________________     Anticoagulation Episode Summary     Current INR goal:  2.0-3.0   TTR:  74.9 % (1 y)   Target end date:  Indefinite   Send INR reminders to:  [facility]    Indications    Long-term (current) use of anticoagulants [Z79.01] [Z79.01]  Persistent atrial fibrillation (H) [I48.19]  Chronic anticoagulation [Z79.01]           Comments:  [employer] home meter. Manage by exception.         Anticoagulation Care Providers     Provider Role Specialty Phone number    Steven Abrams MD Referring Internal Medicine [phone]

## 2022-11-08 ENCOUNTER — TELEPHONE (OUTPATIENT)
Dept: INTERNAL MEDICINE | Facility: CLINIC | Age: 87
End: 2022-11-08

## 2022-11-08 DIAGNOSIS — M15.0 PRIMARY OSTEOARTHRITIS INVOLVING MULTIPLE JOINTS: ICD-10-CM

## 2022-11-08 DIAGNOSIS — K52.9 CHRONIC DIARRHEA: ICD-10-CM

## 2022-11-08 RX ORDER — HYDROCODONE BITARTRATE AND ACETAMINOPHEN 5; 325 MG/1; MG/1
1 TABLET ORAL EVERY 6 HOURS PRN
Qty: 30 TABLET | Refills: 0 | Status: SHIPPED | OUTPATIENT
Start: 2022-11-08 | End: 2023-01-16

## 2022-11-08 RX ORDER — DIPHENOXYLATE HCL/ATROPINE 2.5-.025MG
1 TABLET ORAL 4 TIMES DAILY PRN
Qty: 40 TABLET | Refills: 0 | Status: SHIPPED | OUTPATIENT
Start: 2022-11-08 | End: 2023-01-16

## 2022-11-08 NOTE — TELEPHONE ENCOUNTER
Reason for call:  Medication   If this is a refill request, has the caller requested the refill from the pharmacy already? Yes  Will the patient be using a [facility]? No  Name of the pharmacy and phone number for the current request: [id number]  [place], [place] - [address]     Name of the medication requested: HYDROcodone-acetaminophen (NORCO) 5-325 MG tablet & diphenoxylate-atropine (LOMOTIL) 2.5-0.025 MG tablet    Other request: patient is currently out of medications at this time, she said the pharmacy is having strange hours and are unable to reach out for her behalf.     She is scheduled for the recommend 9 month follow up on 3/2/2023     Phone number to reach patient:  [phone]    Best Time:      Can we leave a detailed message on this number?  YES    Travel screening: Not Applicable

## 2022-11-08 NOTE — TELEPHONE ENCOUNTER
I have reviewed her chart and can give refill but with #30 of pain medication - she should follow-up before next refill with PCP   Dinorah Hermosillo MD

## 2022-11-09 ENCOUNTER — LAB (OUTPATIENT)
Dept: LAB | Facility: CLINIC | Age: 87
End: 2022-11-09
Payer: COMMERCIAL

## 2022-11-09 DIAGNOSIS — Z00.00 HEALTHCARE MAINTENANCE: ICD-10-CM

## 2022-11-09 LAB — HBA1C MFR BLD: 7.4 % (ref 0–5.6)

## 2022-11-09 PROCEDURE — 83036 HEMOGLOBIN GLYCOSYLATED A1C: CPT

## 2022-11-09 PROCEDURE — 36415 COLL VENOUS BLD VENIPUNCTURE: CPT

## 2022-11-10 ENCOUNTER — TRANSFERRED RECORDS (OUTPATIENT)
Dept: HEALTH INFORMATION MANAGEMENT | Facility: CLINIC | Age: 87
End: 2022-11-10

## 2022-11-10 ENCOUNTER — ANTICOAGULATION THERAPY VISIT (OUTPATIENT)
Dept: ANTICOAGULATION | Facility: CLINIC | Age: 87
End: 2022-11-10

## 2022-11-10 DIAGNOSIS — Z79.01 LONG TERM CURRENT USE OF ANTICOAGULANT THERAPY: Primary | ICD-10-CM

## 2022-11-10 DIAGNOSIS — Z79.01 CHRONIC ANTICOAGULATION: ICD-10-CM

## 2022-11-10 DIAGNOSIS — I48.19 PERSISTENT ATRIAL FIBRILLATION (H): ICD-10-CM

## 2022-11-10 LAB — INR (EXTERNAL): 2.9 (ref 0.9–1.1)

## 2022-11-10 NOTE — PROGRESS NOTES
ANTICOAGULATION  MANAGEMENT-Home Monitor Managed by Exception    Mara CHARLES Jens 87 year old female is on warfarin with therapeutic INR result. (Goal INR 2.0-3.0)    Recent labs: (last 7 days)     11/10/22  1449   INR 2.9*       Previous INR was Therapeutic  Medication, diet, health changes since last INR:chart reviewed; none identified  Contacted within the last 12 weeks by phone on 10/27/2022      ISABEL     Mara was NOT contacted regarding therapeutic result today per home monitoring policy manage by exception agreement.   Current warfarin dose is to be continued:     Summary  As of 11/10/2022    Full warfarin instructions:  5 mg every Mon, Fri; 2.5 mg all other days; Starting 11/10/2022   Next INR check:  11/17/2022               Escobar Peerira RN  Anticoagulation Clinic  11/10/2022    _______________________________________________________________________     Anticoagulation Episode Summary     Current INR goal:  2.0-3.0   TTR:  74.9 % (1 y)   Target end date:  Indefinite   Send INR reminders to:  [facility]    Indications    Long-term (current) use of anticoagulants [Z79.01] [Z79.01]  Persistent atrial fibrillation (H) [I48.19]  Chronic anticoagulation [Z79.01]           Comments:  [employer] home meter. Manage by exception.         Anticoagulation Care Providers     Provider Role Specialty Phone number    Steven Abrams MD Referring Internal Medicine [phone]

## 2022-11-17 ENCOUNTER — DOCUMENTATION ONLY (OUTPATIENT)
Dept: ANTICOAGULATION | Facility: CLINIC | Age: 87
End: 2022-11-17

## 2022-11-17 ENCOUNTER — TRANSFERRED RECORDS (OUTPATIENT)
Dept: HEALTH INFORMATION MANAGEMENT | Facility: CLINIC | Age: 87
End: 2022-11-17

## 2022-11-17 DIAGNOSIS — Z79.01 CHRONIC ANTICOAGULATION: ICD-10-CM

## 2022-11-17 DIAGNOSIS — Z79.01 LONG TERM CURRENT USE OF ANTICOAGULANT THERAPY: Primary | ICD-10-CM

## 2022-11-17 DIAGNOSIS — I48.19 PERSISTENT ATRIAL FIBRILLATION (H): ICD-10-CM

## 2022-11-17 LAB — INR (EXTERNAL): 2.6 (ref 0.9–1.1)

## 2022-11-17 NOTE — PROGRESS NOTES
ANTICOAGULATION  MANAGEMENT-Home Monitor Managed by Exception    Mara CHARLES Jens 87 year old female is on warfarin with therapeutic INR result. (Goal INR 2.0-3.0)    Recent labs: (last 7 days)     11/17/22  1415   INR 2.6*         Previous INR was Therapeutic    Medication, diet, health changes since last INR:chart reviewed; none identified    Contacted within the last 12 weeks by phone on 10/27/2022      ISABEL     Mara was NOT contacted regarding therapeutic result today per home monitoring policy manage by exception agreement.   Current warfarin dose is to be continued:     Summary  As of 11/17/2022    Full warfarin instructions:  5 mg every Mon, Fri; 2.5 mg all other days; Starting 11/17/2022   Next INR check:  11/24/2022           ?   Escobar Pereira RN  Anticoagulation Clinic  11/17/2022    _______________________________________________________________________     Anticoagulation Episode Summary     Current INR goal:  2.0-3.0   TTR:  74.9 % (1 y)   Target end date:  Indefinite   Send INR reminders to:  [facility]    Indications    Long-term (current) use of anticoagulants [Z79.01] [Z79.01]  Persistent atrial fibrillation (H) [I48.19]  Chronic anticoagulation [Z79.01]           Comments:  [employer] home meter. Manage by exception.         Anticoagulation Care Providers     Provider Role Specialty Phone number    Steven Abrams MD Referring Internal Medicine [phone]

## 2022-11-26 ENCOUNTER — TRANSFERRED RECORDS (OUTPATIENT)
Dept: HEALTH INFORMATION MANAGEMENT | Facility: CLINIC | Age: 87
End: 2022-11-26

## 2022-11-26 LAB — INR (EXTERNAL): 3.6 (ref 0.9–1.1)

## 2022-11-28 ENCOUNTER — ANTICOAGULATION THERAPY VISIT (OUTPATIENT)
Dept: ANTICOAGULATION | Facility: CLINIC | Age: 87
End: 2022-11-28

## 2022-11-28 DIAGNOSIS — Z79.01 LONG TERM CURRENT USE OF ANTICOAGULANT THERAPY: Primary | ICD-10-CM

## 2022-11-28 DIAGNOSIS — I48.19 PERSISTENT ATRIAL FIBRILLATION (H): ICD-10-CM

## 2022-11-28 DIAGNOSIS — Z79.01 CHRONIC ANTICOAGULATION: ICD-10-CM

## 2022-11-28 LAB — INR (EXTERNAL): 3.4 (ref 0.9–1.1)

## 2022-11-28 NOTE — PROGRESS NOTES
"ANTICOAGULATION MANAGEMENT     Mara Colindres 87 year old female is on warfarin with supratherapeutic INR result. (Goal INR 2.0-3.0)    Recent labs: (last 7 days)     11/26/22  0000   INR 3.6*       ASSESSMENT     Source(s): Chart Review and Patient/Caregiver Call     Warfarin doses taken: Warfarin taken as instructed  Diet: No new diet changes identified  New illness, injury, or hospitalization: Yes: patient had a fall on 11/23 and went to the ED.  CT of head was negative  Medication/supplement changes: None noted  Signs or symptoms of bleeding or clotting: Yes: patients notes bruising on the left side of her body from her fall.  Notes \"black\" bruising from the ankle up the calf on the left side.  Previous INR: Therapeutic last 2(+) visits  Additional findings: None   Will have patient check a INR today based on her bruising.       PLAN     Recommended plan for temporary change(s) affecting INR     Dosing Instructions: partial hold then continue your current warfarin dose with next INR in 4 days       Summary  As of 11/28/2022    Full warfarin instructions:  11/28: 2.5 mg; Otherwise 5 mg every Mon, Fri; 2.5 mg all other days; Starting 11/28/2022   Next INR check:  12/2/2022             Telephone call with Mara who verbalizes understanding and agrees to plan    Patient to recheck with home meter    Education provided:   Please call back if any changes to your diet, medications or how you've been taking warfarin    Plan made with [facility] Pharmacist Alis Riley RN  Anticoagulation Clinic  11/28/2022    _______________________________________________________________________     Anticoagulation Episode Summary     Current INR goal:  2.0-3.0   TTR:  72.8 % (1 y)   Target end date:  Indefinite   Send INR reminders to:  [facility]    Indications    Long-term (current) use of anticoagulants [Z79.01] [Z79.01]  Persistent atrial fibrillation (H) [I48.19]  Chronic anticoagulation " [Z79.01]           Comments:  [facility] home meter. Manage by exception.         Anticoagulation Care Providers     Provider Role Specialty Phone number    Steven Abrams MD Referring Internal Medicine [phone]

## 2022-12-01 ENCOUNTER — ANTICOAGULATION THERAPY VISIT (OUTPATIENT)
Dept: ANTICOAGULATION | Facility: CLINIC | Age: 87
End: 2022-12-01

## 2022-12-01 ENCOUNTER — TRANSFERRED RECORDS (OUTPATIENT)
Dept: HEALTH INFORMATION MANAGEMENT | Facility: CLINIC | Age: 87
End: 2022-12-01

## 2022-12-01 DIAGNOSIS — Z79.01 CHRONIC ANTICOAGULATION: ICD-10-CM

## 2022-12-01 DIAGNOSIS — I48.19 PERSISTENT ATRIAL FIBRILLATION (H): ICD-10-CM

## 2022-12-01 DIAGNOSIS — Z79.01 LONG TERM CURRENT USE OF ANTICOAGULANT THERAPY: Primary | ICD-10-CM

## 2022-12-01 LAB — INR (EXTERNAL): 4.1 (ref 0.9–1.1)

## 2022-12-01 NOTE — PROGRESS NOTES
"ANTICOAGULATION MANAGEMENT     Mara Colindres 88 year old female is on warfarin with supratherapeutic INR result. (Goal INR 2.0-3.0)    Recent labs: (last 7 days)     12/01/22  1443   INR 4.1*       ASSESSMENT     Source(s): Chart Review and Patient/Caregiver Call     Warfarin doses taken:May have taken 5 mg on Monday instedad o 2.5 mg, unsure.  Diet: Patient reports she is having help with meals while she recovers from her fall.  New illness, injury, or hospitalization: Yes: Patient had a fall and was seen in ED, CT showed no signs of complications, increased pain.  Medication/supplement changes: patient is taking tylenol  Signs or symptoms of bleeding or clotting: Yes: Patient reports bruise on one side of body, from ankle to knee, whole calf is \"black\"   Previous INR: Supratherapeutic  Additional findings: None       PLAN     Recommended plan for temporary change(s) and ongoing change(s) affecting INR     Dosing Instructions: hold 1.5 doses then decrease your warfarin dose (11.1% change) with next INR in 5 days       Summary  As of 12/1/2022    Full warfarin instructions:  12/1: Hold; 12/2: 2.5 mg; Otherwise 5 mg every Fri; 2.5 mg all other days; Starting 12/1/2022   Next INR check:  12/6/2022             Telephone call with Mara who verbalizes understanding and agrees to plan    Patient to recheck with home meter    Education provided:   Please call back if any changes to your diet, medications or how you've been taking warfarin    Plan made per [facility] anticoagulation protocol    Laxmi Huang RN  Anticoagulation Clinic  12/1/2022    _______________________________________________________________________     Anticoagulation Episode Summary     Current INR goal:  2.0-3.0   TTR:  71.9 % (1 y)   Target end date:  Indefinite   Send INR reminders to:  [facility]    Indications    Long-term (current) use of anticoagulants [Z79.01] [Z79.01]  Persistent atrial fibrillation (H) [I48.19]  Chronic " anticoagulation [Z79.01]           Comments:  [facility] home meter. Manage by exception.         Anticoagulation Care Providers     Provider Role Specialty Phone number    Steven Abrams MD Referring Internal Medicine [phone]

## 2022-12-07 ENCOUNTER — DOCUMENTATION ONLY (OUTPATIENT)
Dept: ANTICOAGULATION | Facility: CLINIC | Age: 87
End: 2022-12-07

## 2022-12-07 NOTE — PROGRESS NOTES
ANTICOAGULATION     Mara Colindres is overdue for INR check.     Patient will recheck when she received her testing supplies. Daughter will recheck as soon as possible.      Laxmi Huang RN

## 2022-12-14 ENCOUNTER — TELEPHONE (OUTPATIENT)
Dept: ANTICOAGULATION | Facility: CLINIC | Age: 87
End: 2022-12-14

## 2022-12-14 NOTE — TELEPHONE ENCOUNTER
Patient is in the hospital ([facility]) and has been for the last 4-5 days. Daughter will recheck INR with home meter upon discharge, she will call [facility] to update that patient is inpatient.    Laxmi Huang RN

## 2023-01-01 NOTE — TELEPHONE ENCOUNTER
Annual INR referral needed. Orders teed up.    Celeste Ruiz, RN - BC      
Message reviewed , orders signed     Steven Abrams MD    
Diagnostic testing not indicated for today's encounter

## 2023-01-03 LAB — INR (EXTERNAL): 1.3 (ref 0.9–1.1)

## 2023-01-06 ENCOUNTER — TELEPHONE (OUTPATIENT)
Dept: FAMILY MEDICINE | Facility: CLINIC | Age: 88
End: 2023-01-06

## 2023-01-06 NOTE — TELEPHONE ENCOUNTER
Patient calling reporting she discharged from TCU last night after being hospitalized for sepsis in December.     Patient needing ED/Hosp follow up appointment.     Patient scheduled with first available on 1/16/23 and instructed to take her medications as prescribed at her TCU discharge until then/    Patient verbalized understanding.     Shellie Bello RN  [facility]

## 2023-01-09 ENCOUNTER — TELEPHONE (OUTPATIENT)
Dept: FAMILY MEDICINE | Facility: CLINIC | Age: 88
End: 2023-01-09

## 2023-01-09 ENCOUNTER — MEDICAL CORRESPONDENCE (OUTPATIENT)
Dept: HEALTH INFORMATION MANAGEMENT | Facility: CLINIC | Age: 88
End: 2023-01-09

## 2023-01-09 NOTE — TELEPHONE ENCOUNTER
Spoke with home care nurse, calling to request verbal orders for:    Ongoing PT  2x a week for 2 weeks  1x a week for 4 weeks  For strength and endurance, fall prevention    Nurse evaluation to help with INRs and medications    Home health aide  1x a week for 4 weeks to help with bathing     Gave verbal ok for orders requested per RN protocol.    NICANOR AlejandraN RN  [facility]

## 2023-01-10 ENCOUNTER — ANTICOAGULATION THERAPY VISIT (OUTPATIENT)
Dept: ANTICOAGULATION | Facility: CLINIC | Age: 88
End: 2023-01-10

## 2023-01-10 ENCOUNTER — TRANSFERRED RECORDS (OUTPATIENT)
Dept: HEALTH INFORMATION MANAGEMENT | Facility: CLINIC | Age: 88
End: 2023-01-10

## 2023-01-10 ENCOUNTER — TELEPHONE (OUTPATIENT)
Dept: INTERNAL MEDICINE | Facility: CLINIC | Age: 88
End: 2023-01-10

## 2023-01-10 DIAGNOSIS — Z79.01 CHRONIC ANTICOAGULATION: ICD-10-CM

## 2023-01-10 DIAGNOSIS — I48.19 PERSISTENT ATRIAL FIBRILLATION (H): ICD-10-CM

## 2023-01-10 DIAGNOSIS — Z79.01 LONG TERM CURRENT USE OF ANTICOAGULANT THERAPY: Primary | ICD-10-CM

## 2023-01-10 LAB — INR (EXTERNAL): 1.6 (ref 0.9–1.1)

## 2023-01-10 NOTE — PROGRESS NOTES
ANTICOAGULATION MANAGEMENT     Mara Colindres 88 year old female is on warfarin with subtherapeutic INR result. (Goal INR 2.0-3.0)    Recent labs: (last 7 days)     01/10/23  1612   INR 1.6*       ASSESSMENT       Source(s): Chart Review and Home Care/Facility Nurse       Warfarin doses taken: HC RN had dosing from 12/29/22-01/04/23 from TCU. She states patient has not taken any warfarin since coming home.    Diet: No new diet changes identified    New illness, injury, or hospitalization: Yes: [facility] [place]-12/22/22 for streptococcal bacteremia and sepsis. Pt was treated with antibiotics and discharged to TCU. She came home from TCU on 01/05/2023.    Medication/supplement changes: Stopped Paxil. Paxil can increase INR/risk of bleeding. Stopping Paxil may cause INR to come down.    Signs or symptoms of bleeding or clotting: No    Previous INR: Subtherapeutic    Additional findings: Pt has mainly 5mg tabs on hand. Has a few 1 and 2mg. May need to order 1 or 2mg tabs on Friday with next INR check pending INR result.    Pt has home meter but is out of test strips so home care will be calling in INRs for now.       PLAN     Recommended plan for temporary change(s) affecting INR     Dosing Instructions: Take 2mg Tues, Fri; 1mg Wed with next INR in 3 days.    Summary  As of 1/10/2023    Full warfarin instructions:  1/10: 2 mg; 1/11: 1 mg; 1/12: 2 mg; Otherwise 5 mg every Fri; 2.5 mg all other days   Next INR check:  1/13/2023             Telephone call with [place] home care nurse who verbalizes understanding and agrees to plan    Orders given to  Homecare nurse/facility to recheck    Education provided:     Goal range and lab monitoring: goal range and significance of current result    Plan made per [facility] anticoagulation protocol    Escobar Pereira RN  Anticoagulation Clinic  1/10/2023    _______________________________________________________________________     Anticoagulation Episode Summary     Current INR goal:   2.0-3.0   TTR:  64.2 % (1 y)   Target end date:  Indefinite   Send INR reminders to:  [facility]    Indications    Long-term (current) use of anticoagulants [Z79.01] [Z79.01]  Persistent atrial fibrillation (H) [I48.19]  Chronic anticoagulation [Z79.01]           Comments:  [employer] home meter. Manage by exception.         Anticoagulation Care Providers     Provider Role Specialty Phone number    Steven Abrams MD Referring Internal Medicine [phone]

## 2023-01-10 NOTE — TELEPHONE ENCOUNTER
The Home Care/Assisted Living/Nursing Facility is calling regarding an established patient.  Has the patient seen Home Care in the past or is currently residing in Assisted Living or Nursing Facility? Yes.     HO Oliva calling from [facility] requesting the following orders that are within the Home Care, Assisted Living or Nursing Home Eval and Treatment standing order and can be signed as standing order signature required by RN.    Preferred Call Back Number: [phone]    PT/OT/Speech Therapy   OT 1 time a week x 5 weeks    Gave verbal ok for orders requested.    Any additional Orders:  Are there any orders requested, not stated above, that are outside of the standing order and must be routed to a licensed practitioner for approval?    No    Writer has verified [employer] will send fax to have orders signed.      Usha Zhong RN  [facility]

## 2023-01-11 ENCOUNTER — TELEPHONE (OUTPATIENT)
Dept: FAMILY MEDICINE | Facility: CLINIC | Age: 88
End: 2023-01-11

## 2023-01-11 NOTE — TELEPHONE ENCOUNTER
[facility] well completed SN eval.     SN 2x/wk for 1 wk and 1x/wk 7wks    Verbal given as patient already competed SOC.         Thanks,  Roberta RN  [facility]

## 2023-01-13 ENCOUNTER — ANTICOAGULATION THERAPY VISIT (OUTPATIENT)
Dept: ANTICOAGULATION | Facility: CLINIC | Age: 88
End: 2023-01-13

## 2023-01-13 ENCOUNTER — TELEPHONE (OUTPATIENT)
Dept: INTERNAL MEDICINE | Facility: CLINIC | Age: 88
End: 2023-01-13

## 2023-01-13 DIAGNOSIS — Z79.01 LONG TERM CURRENT USE OF ANTICOAGULANT THERAPY: ICD-10-CM

## 2023-01-13 DIAGNOSIS — I48.19 PERSISTENT ATRIAL FIBRILLATION (H): ICD-10-CM

## 2023-01-13 DIAGNOSIS — Z79.01 CHRONIC ANTICOAGULATION: ICD-10-CM

## 2023-01-13 DIAGNOSIS — Z53.9 DIAGNOSIS NOT YET DEFINED: Primary | ICD-10-CM

## 2023-01-13 DIAGNOSIS — Z79.01 LONG TERM CURRENT USE OF ANTICOAGULANT THERAPY: Primary | ICD-10-CM

## 2023-01-13 LAB — INR (EXTERNAL): 1.6 (ref 0.9–1.1)

## 2023-01-13 PROCEDURE — G0180 MD CERTIFICATION HHA PATIENT: HCPCS | Performed by: INTERNAL MEDICINE

## 2023-01-13 RX ORDER — WARFARIN SODIUM 2 MG/1
TABLET ORAL
Qty: 100 TABLET | Refills: 1 | Status: SHIPPED | OUTPATIENT
Start: 2023-01-13 | End: 2023-02-22

## 2023-01-13 NOTE — TELEPHONE ENCOUNTER
Reason for Call:  Form, our goal is to have forms completed with 72 hours, however, some forms may require a visit or additional information.    Type of letter, form or note:  Home Health Certification    Who is the form from?: Home care    Where did the form come from: form was faxed in    What clinic location was the form placed at?: [facility]    Where the form was placed: Given to physician    What number is listed as a contact on the form?: [phone]       Call taken on 1/13/2023 at 9:37 AM by Halima Bob

## 2023-01-13 NOTE — PROGRESS NOTES
ANTICOAGULATION MANAGEMENT     Mara Colindres 88 year old female is on warfarin with subtherapeutic INR result. (Goal INR 2.0-3.0)    Recent labs: (last 7 days)     01/13/23  0815   INR 1.6*       ASSESSMENT       Source(s): Chart Review and Home Care/Facility Nurse       Warfarin doses taken: Warfarin taken as instructed    Diet: No new diet changes identified    New illness, injury, or hospitalization: No    Medication/supplement changes: None noted    Signs or symptoms of bleeding or clotting: No    Previous INR: Subtherapeutic    Additional findings: None  Home care to see patient on 1/19/23.    [facility] to consult due to dosing. Sent refill with 2 mg tablet size to pharmacy- [facility] preferred.      PLAN     Recommended plan for temporary change(s) affecting INR     Dosing Instructions: Boost today with 5 mg, then 2 mg daily going forward, this is not a decrease.    Summary  As of 1/13/2023    Full warfarin instructions:  1/13: 5 mg; Otherwise 2 mg every day   Next INR check:  1/19/2023             Telephone call with [place] home care nurse who verbalizes understanding and agrees to plan    Orders given to  Homecare nurse/facility to recheck    Education provided:     Please call back if any changes to your diet, medications or how you've been taking warfarin    Plan made with [facility] Pharmacist Alis Huang RN  Anticoagulation Clinic  1/13/2023    _______________________________________________________________________     Anticoagulation Episode Summary     Current INR goal:  2.0-3.0   TTR:  63.4 % (1 y)   Target end date:  Indefinite   Send INR reminders to:  [facility]    Indications    Long-term (current) use of anticoagulants [Z79.01] [Z79.01]  Persistent atrial fibrillation (H) [I48.19]  Chronic anticoagulation [Z79.01]           Comments:  [employer] home meter. Manage by exception.         Anticoagulation Care Providers     Provider Role Specialty Phone number    Aliza  MD Steven [facility] Internal Medicine [phone]

## 2023-01-13 NOTE — PROGRESS NOTES
Chart reviewed with [facility] RN at time of encounter.    Due to poor renal function and expected resistance with warfarin restart, advise boost today to 5mg and dose reduction to 2mg daily, with next INR recheck Monday 01/16/2023 with PCP office visit.    Carlos SaenzD BCACP  Anticoagulation Clinical Pharmacist

## 2023-01-16 ENCOUNTER — OFFICE VISIT (OUTPATIENT)
Dept: INTERNAL MEDICINE | Facility: CLINIC | Age: 88
End: 2023-01-16
Payer: COMMERCIAL

## 2023-01-16 VITALS
DIASTOLIC BLOOD PRESSURE: 80 MMHG | HEART RATE: 98 BPM | SYSTOLIC BLOOD PRESSURE: 124 MMHG | TEMPERATURE: 98.4 F | OXYGEN SATURATION: 99 %

## 2023-01-16 DIAGNOSIS — Z79.899 ENCOUNTER FOR LONG-TERM (CURRENT) USE OF MEDICATIONS: ICD-10-CM

## 2023-01-16 DIAGNOSIS — N18.30 CONTROLLED TYPE 2 DIABETES MELLITUS WITH STAGE 3 CHRONIC KIDNEY DISEASE, WITHOUT LONG-TERM CURRENT USE OF INSULIN (H): ICD-10-CM

## 2023-01-16 DIAGNOSIS — K52.9 CHRONIC DIARRHEA: ICD-10-CM

## 2023-01-16 DIAGNOSIS — M15.0 PRIMARY OSTEOARTHRITIS INVOLVING MULTIPLE JOINTS: ICD-10-CM

## 2023-01-16 DIAGNOSIS — I27.20 PULMONARY HTN (H): Primary | ICD-10-CM

## 2023-01-16 DIAGNOSIS — R06.02 SOB (SHORTNESS OF BREATH): ICD-10-CM

## 2023-01-16 DIAGNOSIS — I48.19 PERSISTENT ATRIAL FIBRILLATION (H): ICD-10-CM

## 2023-01-16 DIAGNOSIS — I89.0 LYMPHEDEMA: ICD-10-CM

## 2023-01-16 DIAGNOSIS — Z79.01 CHRONIC ANTICOAGULATION: ICD-10-CM

## 2023-01-16 DIAGNOSIS — M81.0 AGE-RELATED OSTEOPOROSIS WITHOUT CURRENT PATHOLOGICAL FRACTURE: ICD-10-CM

## 2023-01-16 DIAGNOSIS — E11.22 CONTROLLED TYPE 2 DIABETES MELLITUS WITH STAGE 3 CHRONIC KIDNEY DISEASE, WITHOUT LONG-TERM CURRENT USE OF INSULIN (H): ICD-10-CM

## 2023-01-16 DIAGNOSIS — F33.0 MAJOR DEPRESSIVE DISORDER, RECURRENT EPISODE, MILD (H): ICD-10-CM

## 2023-01-16 DIAGNOSIS — E11.3299 BACKGROUND DIABETIC RETINOPATHY (H): ICD-10-CM

## 2023-01-16 DIAGNOSIS — I27.20 PULMONARY HYPERTENSION (H): ICD-10-CM

## 2023-01-16 DIAGNOSIS — N18.4 CKD (CHRONIC KIDNEY DISEASE) STAGE 4, GFR 15-29 ML/MIN (H): ICD-10-CM

## 2023-01-16 DIAGNOSIS — I50.32 CHRONIC DIASTOLIC CONGESTIVE HEART FAILURE (H): ICD-10-CM

## 2023-01-16 DIAGNOSIS — E78.5 HYPERLIPIDEMIA LDL GOAL <100: ICD-10-CM

## 2023-01-16 LAB
ERYTHROCYTE [DISTWIDTH] IN BLOOD BY AUTOMATED COUNT: 16.7 % (ref 10–15)
HCT VFR BLD AUTO: 30.9 % (ref 35–47)
HGB BLD-MCNC: 9.5 G/DL (ref 11.7–15.7)
MCH RBC QN AUTO: 29 PG (ref 26.5–33)
MCHC RBC AUTO-ENTMCNC: 30.7 G/DL (ref 31.5–36.5)
MCV RBC AUTO: 94 FL (ref 78–100)
PLATELET # BLD AUTO: 269 10E3/UL (ref 150–450)
RBC # BLD AUTO: 3.28 10E6/UL (ref 3.8–5.2)
WBC # BLD AUTO: 6.8 10E3/UL (ref 4–11)

## 2023-01-16 PROCEDURE — 36415 COLL VENOUS BLD VENIPUNCTURE: CPT | Performed by: INTERNAL MEDICINE

## 2023-01-16 PROCEDURE — 80061 LIPID PANEL: CPT | Performed by: INTERNAL MEDICINE

## 2023-01-16 PROCEDURE — 80048 BASIC METABOLIC PNL TOTAL CA: CPT | Performed by: INTERNAL MEDICINE

## 2023-01-16 PROCEDURE — 82570 ASSAY OF URINE CREATININE: CPT | Performed by: INTERNAL MEDICINE

## 2023-01-16 PROCEDURE — 83880 ASSAY OF NATRIURETIC PEPTIDE: CPT | Performed by: INTERNAL MEDICINE

## 2023-01-16 PROCEDURE — 80162 ASSAY OF DIGOXIN TOTAL: CPT | Performed by: INTERNAL MEDICINE

## 2023-01-16 PROCEDURE — 84460 ALANINE AMINO (ALT) (SGPT): CPT | Performed by: INTERNAL MEDICINE

## 2023-01-16 PROCEDURE — 82043 UR ALBUMIN QUANTITATIVE: CPT | Performed by: INTERNAL MEDICINE

## 2023-01-16 PROCEDURE — 99495 TRANSJ CARE MGMT MOD F2F 14D: CPT | Performed by: INTERNAL MEDICINE

## 2023-01-16 PROCEDURE — 96127 BRIEF EMOTIONAL/BEHAV ASSMT: CPT | Mod: 59 | Performed by: INTERNAL MEDICINE

## 2023-01-16 PROCEDURE — 85027 COMPLETE CBC AUTOMATED: CPT | Performed by: INTERNAL MEDICINE

## 2023-01-16 RX ORDER — ALENDRONATE SODIUM 70 MG/1
70 TABLET ORAL
Qty: 12 TABLET | Refills: 1 | Status: SHIPPED | OUTPATIENT
Start: 2023-01-16 | End: 2023-03-27

## 2023-01-16 RX ORDER — PAROXETINE 20 MG/1
10 TABLET, FILM COATED ORAL
Qty: 35 TABLET | Refills: 1 | Status: SHIPPED | OUTPATIENT
Start: 2023-01-17 | End: 2024-03-15

## 2023-01-16 RX ORDER — FUROSEMIDE 40 MG
TABLET ORAL
Qty: 180 TABLET | Refills: 1 | Status: SHIPPED | OUTPATIENT
Start: 2023-01-16 | End: 2023-01-18

## 2023-01-16 RX ORDER — DULAGLUTIDE 1.5 MG/.5ML
1.5 INJECTION, SOLUTION SUBCUTANEOUS
Qty: 2 ML | Refills: 5 | Status: SHIPPED | OUTPATIENT
Start: 2023-01-16 | End: 2023-07-14

## 2023-01-16 RX ORDER — LOSARTAN POTASSIUM 25 MG/1
TABLET ORAL
Qty: 90 TABLET | Refills: 1 | Status: SHIPPED | OUTPATIENT
Start: 2023-01-16 | End: 2023-02-20

## 2023-01-16 RX ORDER — METOPROLOL TARTRATE 100 MG
TABLET ORAL
Qty: 90 TABLET | Refills: 1 | Status: SHIPPED | OUTPATIENT
Start: 2023-01-16 | End: 2023-07-20

## 2023-01-16 RX ORDER — HYDROCODONE BITARTRATE AND ACETAMINOPHEN 5; 325 MG/1; MG/1
1 TABLET ORAL DAILY PRN
Qty: 30 TABLET | Refills: 0 | Status: SHIPPED | OUTPATIENT
Start: 2023-01-16 | End: 2023-11-15

## 2023-01-16 RX ORDER — DIPHENOXYLATE HCL/ATROPINE 2.5-.025MG
1 TABLET ORAL 4 TIMES DAILY PRN
Qty: 40 TABLET | Refills: 1 | Status: SHIPPED | OUTPATIENT
Start: 2023-01-16 | End: 2024-09-05

## 2023-01-16 ASSESSMENT — ASTHMA QUESTIONNAIRES
QUESTION_1 LAST FOUR WEEKS HOW MUCH OF THE TIME DID YOUR ASTHMA KEEP YOU FROM GETTING AS MUCH DONE AT WORK, SCHOOL OR AT HOME: SOME OF THE TIME
QUESTION_3 LAST FOUR WEEKS HOW OFTEN DID YOUR ASTHMA SYMPTOMS (WHEEZING, COUGHING, SHORTNESS OF BREATH, CHEST TIGHTNESS OR PAIN) WAKE YOU UP AT NIGHT OR EARLIER THAN USUAL IN THE MORNING: TWO OR THREE NIGHTS A WEEK
QUESTION_5 LAST FOUR WEEKS HOW WOULD YOU RATE YOUR ASTHMA CONTROL: NOT CONTROLLED AT ALL
QUESTION_2 LAST FOUR WEEKS HOW OFTEN HAVE YOU HAD SHORTNESS OF BREATH: MORE THAN ONCE A DAY
ACT_TOTALSCORE: 9
ACT_TOTALSCORE: 9
QUESTION_4 LAST FOUR WEEKS HOW OFTEN HAVE YOU USED YOUR RESCUE INHALER OR NEBULIZER MEDICATION (SUCH AS ALBUTEROL): ONE OR TWO TIMES PER DAY

## 2023-01-16 ASSESSMENT — PATIENT HEALTH QUESTIONNAIRE - PHQ9
10. IF YOU CHECKED OFF ANY PROBLEMS, HOW DIFFICULT HAVE THESE PROBLEMS MADE IT FOR YOU TO DO YOUR WORK, TAKE CARE OF THINGS AT HOME, OR GET ALONG WITH OTHER PEOPLE: VERY DIFFICULT
SUM OF ALL RESPONSES TO PHQ QUESTIONS 1-9: 18
SUM OF ALL RESPONSES TO PHQ QUESTIONS 1-9: 18

## 2023-01-16 NOTE — PATIENT INSTRUCTIONS

## 2023-01-16 NOTE — PROGRESS NOTES
Assessment & Plan     Pulmonary HTN (H)  Mara Colindres is a very pleasant elderly woman who I have followed for primary care for quite a long time. She has gradually progressed with a series of increasingly serious health problems. Most recently she was hospitalized by ambulance due to a syncopal episode at home and this remains somewhat uncertain . It is noted she has increasingly significant symptoms of pulmonary hypertension and she is oxygen dependency now with increasing needs, now at 2 liters per nasal cannula . She's here with her son today. Her hospitalization was at [facility] and we reviewed the discharge summary here today with son Hong who also attended this appointment  . This is a very difficult to diagnose  And treat condition and she has a further follow up appointment tomorrow with Dr. Gokul Thompson, cardiologist with the [facility]. The discharge summary says she should be considered for a right heart catheterization but according to my understanding this is already completed in the not too distant past. This is a difficult diagnosis and I am not sure if she is a candidate for a phosphodiesterase type 5 (PDE5) inhibitor, but she is feeling ok today. See history as detailed below   - REVIEW OF HEALTH MAINTENANCE PROTOCOL ORDERS  - losartan (COZAAR) 25 MG tablet; TAKE 1 TABLET(25 MG) BY MOUTH DAILY  - metoprolol tartrate (LOPRESSOR) 100 MG tablet; TAKE 1/2 TABLET(50 MG) BY MOUTH TWICE DAILY    Encounter for long-term (current) use of medications  Problem is stable and ongoing monitoring      CKD (chronic kidney disease) stage 4, GFR 15-29 ml/min (H)  Due for recheck , she has a concerning overall picture with multiple serious issues including advanced renal disease   - Albumin Random Urine Quantitative with Creat Ratio; Future  - BASIC METABOLIC PANEL; Future  - Hemoglobin; Future  - furosemide (LASIX) 40 MG tablet; TAKE 1 TABLET(40 MG) BY MOUTH TWICE DAILY  -  Albumin Random Urine Quantitative with Creat Ratio  - BASIC METABOLIC PANEL    Hyperlipidemia LDL goal <100  Lab Results   Component Value Date    CHOL 123 09/13/2021    CHOL 106 08/11/2020     Lab Results   Component Value Date    HDL 55 09/13/2021    HDL 54 08/11/2020     Lab Results   Component Value Date    LDL 56 09/13/2021    LDL 42 08/11/2020     Lab Results   Component Value Date    TRIG 58 09/13/2021    TRIG 48 08/11/2020     Lab Results   Component Value Date    CHOLHDLRATIO 2.5 07/11/2014     Recheck fasting lipid panel today   - ALT; Future  - ALT    Background diabetic retinopathy (H)  As above   - Lipid panel reflex to direct LDL Non-fasting; Future  - Lipid panel reflex to direct LDL Non-fasting    Chronic diastolic congestive heart failure (H)  Follow up with cardiologist . Difficult to see how we can further maximize the treatment of her congestive heart failure   - CBC with Platelets; Future  - metoprolol tartrate (LOPRESSOR) 100 MG tablet; TAKE 1/2 TABLET(50 MG) BY MOUTH TWICE DAILY  - CBC with Platelets    Major depressive disorder, recurrent episode, mild (H)  This medication was stopped and this is not apparently what was intended ? She has developed some increased weepiness and symptoms of selective serotonin reuptake inhibitor (SSRI)  Withdrawal . We restarted her PAXIL (paroxetine hydrochloride) today as she has been taking it  - PARoxetine (PAXIL) 20 MG tablet; Take 0.5 tablets (10 mg) by mouth four times a week for 364 days    Primary osteoarthritis involving multiple joints  She uses this medication quite sparingly and I am not concerned with refilling this today   - HYDROcodone-acetaminophen (NORCO) 5-325 MG tablet; Take 1 tablet by mouth daily as needed for severe pain (7-10)    Lymphedema  This patient has relatively severe chronic lymphedema with amazingly large legs that store liquids. We reviewed this just a little bit and she declines further referral to lymphedema clinic and will  continue with diuretic therapy   - REVIEW OF HEALTH MAINTENANCE PROTOCOL ORDERS    Chronic anticoagulation  Continues secondary to atrial fibrillation     Persistent atrial fibrillation (H)  Digoxin (LANOXIN)  Was added during her hospitalization with [facility] and I wanted to .ji, do a digoxin level today with further follow up depending on the test results   - Digoxin level; Future  - Digoxin level    Age-related osteoporosis without current pathological fracture  Refills provided   - alendronate (FOSAMAX) 70 MG tablet; Take 1 tablet (70 mg) by mouth every 7 days    Chronic diarrhea  Refills provided   - diphenoxylate-atropine (LOMOTIL) 2.5-0.025 MG tablet; Take 1 tablet by mouth 4 times daily as needed for diarrhea    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, without long-term current use of insulin (H)  Refills provided , is current a hemoglobin a1c  [ diabetes test ]   Lab Results   Component Value Date    A1C 7.4 11/09/2022    A1C 7.7 06/03/2022    A1C 7.3 09/13/2021    A1C 7.1 08/11/2020    A1C 7.0 04/29/2019    A1C 7.3 07/09/2018    A1C 7.8 11/07/2017    A1C 8.2 07/27/2017       - dulaglutide (TRULICITY) 1.5 MG/0.5ML pen; Inject 1.5 mg Subcutaneous every 7 days    Review of the result(s) of each unique test - todays tests  Prescription drug management  55 minutes spent on the date of the encounter doing chart review, history and exam, documentation and further activities per the note    Return in about 6 months (around 7/16/2023), or if symptoms worsen or fail to improve.    Steven Abrams MD  [facility] CYRSTAL Terry is a 88 year old accompanied by her son, presenting for the following health issues:  Hospital F/U      HPI     Had a fall, and a emergency room evaluation and checked out ok and then 5 days later a syncopal episode , brought to the hospital , unknown etiologies , this was per son Hong , a 5-10 minutes period of loss of  "consciousness   Diagnosed with sepsis and pneumonia and other things including urinary tract infection     Now she has home health care with [facility] . Losartan ( Cozaar ) and metoprolol and warfarin was stopped. Also paroxetine [ paxil ] and furosemide [ Lasix ] and potassium     Patient feels way \"out of it\" and can't remember even her own speech patterns ? She wonders if this is related to her major medication manipulations or did she hurt her head ? She is using oxygen at that time now, just since she got out of the hospital . She is brutally shortness of breath , she has pulmonary hypertension as well as atrial fibrillation     She was really stocked today with a lot of hand-scribbled notes that were  Essentially unintelligible   Patient is having a lot of low back pain but it is getting better      Friday 8 days ago she was given a half dose of losartan ( Cozaar )    Hospital Follow-up Visit:    Hospital/Nursing Home/IP Rehab Facility: [facility]  Date of Admission: 12/23/2022  Date of Discharge: 01/05/2023  Reason(s) for Admission: Sepsis    Was your hospitalization related to COVID-19? No   Problems taking medications regularly:  None  Medication changes since discharge: None  Problems adhering to non-medication therapy:  None    Summary of hospitalization:  CareEverywhere information obtained and reviewed  Diagnostic Tests/Treatments reviewed.  Follow up needed: cardiology    Other Healthcare Providers Involved in Patient s Care:         None  Update since discharge: improved.     Plan of care communicated with patient and family         Hospital Follow-up Visit:    Hospital/Nursing Home/IP Rehab Facility: [facility]   Date of Admission: 12/9/2022  Date of Discharge: 12/22/2022  Reason(s) for Admission: syncopal episode, sepsis , other issues     Was your hospitalization related to COVID-19? No   Problems taking medications regularly:  None  Medication changes since discharge: many  Problems " adhering to non-medication therapy:  None        Plan of care communicated with patient and family         Review of Systems   Constitutional, HEENT, cardiovascular, pulmonary, gi and gu systems are negative, except as otherwise noted.      Objective    /80 (BP Location: Left arm, Patient Position: Chair, Cuff Size: Adult Regular)   Pulse 98   Temp 98.4  F (36.9  C) (Oral)   SpO2 99%   There is no height or weight on file to calculate BMI.  Physical Exam   GENERAL: healthy, alert and no distress, appears her stated age , looks tired and sickly with a mild sense of being confused at this point   EYES: Eyes grossly normal to inspection, PERRL and conjunctivae and sclerae normal  RESP: lungs clear to auscultation - no rales, rhonchi or wheezes  CV: regular rate and rhythm, normal S1 S2, no S3 or S4, no murmur, click or rub, no peripheral edema and peripheral pulses strong  MS: see as detailed above   NEURO: Normal strength and tone, mentation intact and speech normal  PSYCH: mentation appears normal, affect normal/bright    Orders Placed This Encounter   Procedures     REVIEW OF HEALTH MAINTENANCE PROTOCOL ORDERS     Albumin Random Urine Quantitative with Creat Ratio     ALT     Lipid panel reflex to direct LDL Non-fasting     CBC with Platelets     BASIC METABOLIC PANEL     Hemoglobin     Digoxin level     Lipid panel reflex to direct LDL Non-fasting                 Answers for HPI/ROS submitted by the patient on 1/16/2023  If you checked off any problems, how difficult have these problems made it for you to do your work, take care of things at home, or get along with other people?: Very difficult  PHQ9 TOTAL SCORE: 18

## 2023-01-17 ENCOUNTER — OFFICE VISIT (OUTPATIENT)
Dept: CARDIOLOGY | Facility: CLINIC | Age: 88
End: 2023-01-17
Attending: PHYSICIAN ASSISTANT
Payer: COMMERCIAL

## 2023-01-17 VITALS — SYSTOLIC BLOOD PRESSURE: 97 MMHG | HEART RATE: 94 BPM | DIASTOLIC BLOOD PRESSURE: 57 MMHG | OXYGEN SATURATION: 98 %

## 2023-01-17 DIAGNOSIS — R06.02 SOB (SHORTNESS OF BREATH): ICD-10-CM

## 2023-01-17 DIAGNOSIS — I27.20 PULMONARY HYPERTENSION (H): Primary | ICD-10-CM

## 2023-01-17 LAB
ALT SERPL W P-5'-P-CCNC: 17 U/L (ref 0–50)
ANION GAP SERPL CALCULATED.3IONS-SCNC: 12 MMOL/L (ref 3–14)
BUN SERPL-MCNC: 18 MG/DL (ref 7–30)
CALCIUM SERPL-MCNC: 9.8 MG/DL (ref 8.5–10.1)
CHLORIDE BLD-SCNC: 97 MMOL/L (ref 94–109)
CHOLEST SERPL-MCNC: 108 MG/DL
CO2 SERPL-SCNC: 29 MMOL/L (ref 20–32)
CREAT SERPL-MCNC: 1.47 MG/DL (ref 0.52–1.04)
DIGOXIN SERPL-MCNC: <0.4 NG/ML (ref 0.6–2)
FASTING STATUS PATIENT QL REPORTED: NO
GFR SERPL CREATININE-BSD FRML MDRD: 34 ML/MIN/1.73M2
GLUCOSE BLD-MCNC: 167 MG/DL (ref 70–99)
HDLC SERPL-MCNC: 65 MG/DL
LDLC SERPL CALC-MCNC: 31 MG/DL
NONHDLC SERPL-MCNC: 43 MG/DL
POTASSIUM BLD-SCNC: 3.2 MMOL/L (ref 3.4–5.3)
SODIUM SERPL-SCNC: 138 MMOL/L (ref 133–144)
TRIGL SERPL-MCNC: 62 MG/DL

## 2023-01-17 PROCEDURE — 99215 OFFICE O/P EST HI 40 MIN: CPT | Performed by: PHYSICIAN ASSISTANT

## 2023-01-17 PROCEDURE — G0463 HOSPITAL OUTPT CLINIC VISIT: HCPCS

## 2023-01-17 PROCEDURE — G0463 HOSPITAL OUTPT CLINIC VISIT: HCPCS | Performed by: PHYSICIAN ASSISTANT

## 2023-01-17 RX ORDER — DIGOXIN 0.06 MG/1
0.06 TABLET ORAL
COMMUNITY
Start: 2022-12-23 | End: 2023-01-25

## 2023-01-17 RX ORDER — HYDROXYZINE HYDROCHLORIDE 50 MG/1
1 TABLET, FILM COATED ORAL EVERY 6 HOURS PRN
COMMUNITY
Start: 2022-12-22 | End: 2023-02-15

## 2023-01-17 RX ORDER — CYCLOBENZAPRINE HCL 5 MG
5 TABLET ORAL
COMMUNITY
Start: 2022-12-22 | End: 2023-02-20

## 2023-01-17 ASSESSMENT — PAIN SCALES - GENERAL: PAINLEVEL: NO PAIN (0)

## 2023-01-17 NOTE — NURSING NOTE
Chief Complaint   Patient presents with     Follow Up     Return for 1 year PH F/U      Vitals were taken and medications reconciled.    Stepan Villarreal, EMT  2:01 PM

## 2023-01-17 NOTE — PATIENT INSTRUCTIONS
Thank you for visiting the Pul[facility]pertension [facility] today.      Today we discussed:   We will await the labs drawn yesterday.  I will then call you with results and plans for medication changes going forward.     I recommend that you start wrapping your legs again.    Continue to weigh yourself at home daily as we go forward, and keep a log.       Follow up Appointment Information:  Follow up appt TBD once we have our plan in place.       Additional Instructions:    1. Continue staying active and eat a heart healthy, low sodium diet.     2. Please keep current list of medications with you at all times.     3. Remember to weigh yourself daily after voiding and write it down on a log. If you have gained/lost 2 pounds overnight or 5 pounds in a week contact us for medication adjustments or further instructions.    4. Please call us immediately if you have syncope (fainting or passing out), chest pain, worsening edema (swelling or weight gain), or general worsening in how you are feeling.     --------------------------------------------------------------------------------------------------------------    If you have questions or concerns please contact us at:    Sami Marino RN, BSN     Nurse Coordinator       Pulmonary Hypertension     [facility]    (Phone)[phone]      JOVON Mcmahan   (Prior Authorizations)    ()  Clinic   Clinic   Pulmonary Hypertension   Pulmonary Hypertension  [facility]             [facility]  (P)[phone]    (P) [phone]  (F) [phone]                ** Please note that you will NOT receive a reminder call regarding your scheduled testing, reminder calls are for provider appointments only.  If you are scheduled for testing within the [employer] system you may receive a call regarding pre-registration for billing purposes  only.**     --------------------------------------------------------------------------------------------------------------    Interested in joining a support group?    Pulmonary Hypertension Association  Https://www.phassociation.org/  **Look at the Events Tab** They even have Support Groups that you can call into    [facility] Support Group  Second Saturday of the Month from 1-3 PM   Location: [address] [place] [id number] (Currently Virtual)  Leader: Wen Westbrook   Phone: [phone]   Email: [email or web address]     Great Videos about Pulmonary Hypertension!!  Scan ME!    Website: [email or web address]

## 2023-01-17 NOTE — LETTER
1/17/2023      RE: Mara Colindres  [address] [facility] [id number]       Dear Colleague,    Thank you for the opportunity to participate in the care of your patient, Mara Colindres, at the [facility] [place] [facility]. Please see a copy of my visit note below.        Date of Visit:  01/17/23      [facility]      Primary PH cardiologist: Dr. Thompson       HPI:  Ms. Mara Colindres is a pleasant 88 year old female with a past medical history significant for hypertension, permanent atrial fibrillation on AC, type 2 diabetes, suspected COPD, chronic kidney disease, EVONNE on CPAP, and history of DVT. She was seen initially in our clinic in 2016 due to shortness of breath, at which time echocardiogram suggested severe pulmonary hypertension. She was admitted at that time and had a RHC performed (swan jose while hospitalized) which confirmed group 2 PH felt to be related to HFpEF (documented PCWP of 20, PAP 44/22, CO/CI 4.7/2.1). She was given Nipride, meds adjusted for further afterload reduction, and aggressively diuresed. She was seen most recently by Dr. Sterling in Jan of 2021 at which time it appears no changes were made, and ongoing fluid management/afterload reduction was recommended.    More recently, in December, she was admitted via the [facility] system for worsening shortness of breath and syncope. She was found to have streptococcus bacteremia, complicated by symptomatic rapid atrial fibrillation and concern for cardiogenic shock. She was again aggressively diuresed and rate controlled with metoprolol. During her stay she had a repeat echo (notably during afib with HR of 130s), again showing severe pulmonary hypertension with estimated RVSP of 85mmHg + RAP. Her RV is severely enlarged with moderately decreased RV function. RA pressure was also suspected to be elevated. LV function and  "size were reported as normal. She was discharged on 40mg of Lasix BID which was apparently unchanged from prior. She was then asked to follow back with us due to her pulmonary hypertension.    Today, I am meeting Mara in clinic. She tells me that over the last few years, she has slowly worsened, in regard to KEMP and LE edema. She says her legs are now like \"tree trunks.\" She used to wrap them but admits she hasn't been doing this much lately. She tells me she weighs at home and today was 224#, though did not weigh for us in clinic today. She denies any new chest pain, and only feels palpitations periodically if her heart seems to be going fast. Since her hospital stay, no significant dizziness or recurrence of syncope. Her BP is noted to be a bit lower today, which her son attributes possibly to her new medication for Flexeril which she takes for back issues. She carries a diagnosis of COPD but tells me she is a never smoker. On PFTs in 2019 she had potential airflow obstruction with only a mild reduction in diffusion capacity. She has known EVONNE but reports being compliant with CPAP a night.     She visited with her PCP yesterday, and had some labs performed, but these were still pending at the time of our visit. I reviewed these when they became available, as below.     CURRENT PULMONARY HYPERTENSION REGIMEN:    PAH Rx: None    Diuretics: Lasix 40mg BID     Oxygen: NC 1.5-2L PRN    Anticoagulation: warfarin   Indication: afib       ASSESSMENT/PLAN:      1. Pulmonary hypertension:   --Ms. Colindres is what appears to be severe group 2 pulmonary hypertension. She had her initial diagnosis in 2016 based on invasive hemodynamic testing, and efforts were put toward diuresis and afterload reduction. No pulmonary vasodilators were felt warranted at that time. We then lost her to follow up in early 2021.    --I am seeing her back after a recent hospital stay for decompensated heart failure, but also in the setting of " sepsis and rapid atrial fibrillation. Echo at that time showed EF low normal at 53% with normal LV size. However, RV was severely enlarged with moderate RV dysfunction. However, as mentioned, she sounded to be decompensated at that time.    --Clinically, this sounds to still be likely group 2. We did discuss possible repeat RHC, but given her advanced age, and likelihood that this may not be very revealing, we decided jointly to continue to treat symptomatically and work on medication optimization. Her recent labs show continued CRI but overall stable. She may benefit from an SGLT2 inhibitor but of course renal function is a concern. Will reach out to PCP to get his thoughts in this regard.   --She could likely also benefit from more afterload reduction, however her BP is already low, so I don't think we have room to increase losartan further at this point.    --I am still awaiting NT-proBNP but we could consider change from Lasix to bumex to potentially escalate diuresis further. Will await discussions regarding SGLT2 first before making adjustments. In the interim, I highly recommended she resume daily leg wraps. We could also potentially have lymphedema clinic see her if possible. Once we optimize her further in this regard, will plan repeat echo to look back at RV function. [Addendum: NT-proBNP remains elevated, and she is also hypokalemic. Will supplement K, and escalate Lasix slightly while awaiting decisions about SGLT2.]    --Recommended continued compliance with oxygen and CPAP at night.     2. Atrial fibrillation.   --She has chronic atrial fibrillation which was rate controlled previously though some recent issues with RVR while in the hospital as outlined above. Currently she is on metoprolol 50mg BID; I am hesitant to go up on this and possibly would like to come down if possible due to poor RV function. Will check a Ziopatch to look at current rate control.   --Continue digoxin as is, as this may also  help support RV.    --Anticoagulated with warfarin, follows with INR clinic locally.     Follow up plan:  Return in 1 month, or sooner if needed. She would like to follow up virtually if possible and get labs locally in [place]. We are happy to see the patient back sooner with any new concerns.       Orders this Visit:  Orders Placed This Encounter   Procedures     N terminal pro BNP outpatient     Orders Placed This Encounter   Medications     digoxin (LANOXIN) 62.5 MCG tablet     Sig: Take 0.0625 mg by mouth     cyclobenzaprine (FLEXERIL) 5 MG tablet     Sig: Take 5 mg by mouth     hydrOXYzine (ATARAX) 50 MG tablet     Sig: Take 1 tablet by mouth every 6 hours as needed     There are no discontinued medications.        CURRENT MEDICATIONS:  Current Outpatient Medications   Medication Sig Dispense Refill     ACCU-CHEK PARVEZ PLUS test strip USE TO TEST FOUR TIMES DAILY OR AS DIRECTED 350 strip 5     acetaminophen (TYLENOL) 500 MG tablet Take 2 tablets by mouth 3 times daily as needed.       alendronate (FOSAMAX) 70 MG tablet Take 1 tablet (70 mg) by mouth every 7 days 12 tablet 1     Calcium Carbonate-Vitamin D (CALCIUM + D PO) Take 1 tablet by mouth 2 times daily.       cyclobenzaprine (FLEXERIL) 5 MG tablet Take 5 mg by mouth       digoxin (LANOXIN) 62.5 MCG tablet Take 0.0625 mg by mouth       diphenoxylate-atropine (LOMOTIL) 2.5-0.025 MG tablet Take 1 tablet by mouth 4 times daily as needed for diarrhea 40 tablet 1     dulaglutide (TRULICITY) 1.5 MG/0.5ML pen Inject 1.5 mg Subcutaneous every 7 days 2 mL 5     furosemide (LASIX) 40 MG tablet TAKE 1 TABLET(40 MG) BY MOUTH TWICE DAILY 180 tablet 1     HYDROcodone-acetaminophen (NORCO) 5-325 MG tablet Take 1 tablet by mouth daily as needed for severe pain (7-10) 30 tablet 0     hydrOXYzine (ATARAX) 50 MG tablet Take 1 tablet by mouth every 6 hours as needed       losartan (COZAAR) 25 MG tablet TAKE 1 TABLET(25 MG) BY MOUTH DAILY 90 tablet 1     metoprolol tartrate  (LOPRESSOR) 100 MG tablet TAKE 1/2 TABLET(50 MG) BY MOUTH TWICE DAILY 90 tablet 1     OMEPRAZOLE CPCR 20 MG OR 1 CAPSULE DAILY       order for DME Equipment being ordered: Oxygen.  Oxygen resting at room air was 94%.   Oxygen on room air walking about 50 feet was 87% then after returning to room dropped lower to 86%.   With oxygen at 2 liters resting was 97%.   With oxygen at 2 liters walking about 50 feet was only at 91%. 1 Device 0     Polyethyl Glycol-Propyl Glycol (SYSTANE OP) Apply 1 drop to eye as needed Both eyes.       warfarin ANTICOAGULANT (COUMADIN) 2 MG tablet Take 2 mg daily or as directed by INR clinic 100 tablet 1     ASPIRIN NOT PRESCRIBED, INTENTIONAL, Antiplatelet medication not prescribed intentionally due to Current anticoagulant therapy (warfarin/enoxaparin) (Patient not taking: Reported on 1/17/2023) 0 each 3     PARoxetine (PAXIL) 20 MG tablet Take 0.5 tablets (10 mg) by mouth four times a week for 364 days (Patient not taking: Reported on 1/17/2023) 35 tablet 1     Resveratrol 100 MG CAPS Take 1 capsule by mouth daily  (Patient not taking: Reported on 1/17/2023)       STATIN NOT PRESCRIBED, INTENTIONAL, 1 each continuous prn. Statin not prescribed intentionally due to Other: patient has normal LDL within recommended guidelines on no medications (Patient not taking: Reported on 1/17/2023) 0 each 0       ALLERGIES     Allergies   Allergen Reactions     Atenolol Other (See Comments)     Arms became limp, almost stroke like symptoms per patient.   Tolerates metoprolol fine     Metformin      Side effects / gastrointestinal symptoms        PAST MEDICAL HISTORY:  Past Medical History:   Diagnosis Date     A-fib (H)      Bilateral leg edema     fairly severe     Cataract      CHF (congestive heart failure) (H)      Chronic diarrhea     neg colonoscopy abt one year ago, even to the point of having fecal accidents     Chronic venous insufficiency      Diabetic retinopathy (H)      DVT (deep venous  thrombosis) (H)      GERD (gastroesophageal reflux disease)      Hyperlipidemia LDL goal <100 10/31/2010     Hypertension goal BP (blood pressure) < 140/90      Long-term (current) use of anticoagulants      Moderate persistent asthma 8/15/2012     MRSA (methicillin resistant Staphylococcus aureus)     postop hip     Obesity      EVONNE (obstructive sleep apnea)      S/P colonoscopy 2008 ( ?)     Septic hip (H)     sees an ID specialist Dr ANNEL DE LA CRUZ She remains on long-term suppressive antibiotic therapy using Minocin 50 mg twice daily     Stasis dermatitis      Urinary incontinence, mixed      Vitiligo          Review of Systems:  POS ROS ARE BOLDED, all other negative.    Cardiovascular: Chest pain, palpitations, orthopnea, LE edema  Constitutional: New chills, sweats, fevers   Resp: Dyspnea at rest, dyspnea on exertion, cough, known chronic lung disease  HEENT: New visual changes, frequent headaches  Gastrointestinal: Abdominal pain, diarrhea, nausea, vomiting  Hematologic/lymphatic: Current systemic anticoagulation, hx of blood clots, new abnormal bleeding.  Neurological: New focal weakness, LOC, seizures, syncope/presyncope, mild dizziness.      Physical Exam:  Vitals: BP 97/57 (BP Location: Left arm, Patient Position: Chair, Cuff Size: Adult Large)   Pulse 94   SpO2 98%    Wt Readings from Last 4 Encounters:   09/13/21 108.4 kg (239 lb)   10/22/20 114.3 kg (252 lb)   09/04/20 114.3 kg (252 lb)   04/22/20 113.4 kg (250 lb)       GEN:  In general, this is a well nourished female in no acute distress. She has oxygen with her not but wearing currently. In a wheelchair, accompanied by her son.   HEENT:  Pupils grossly equal, sclerae nonicteric.   NECK: Supple, trachea midline. Thick neck with mild JVD while upright.   C/V:  Irregular rhythm, rate currently in the 90s. No significant murmur, rub or gallop. No S3 or RV heave.   RESP: Respirations are unlabored. No use of accessory muscles. Clear to auscultation  bilaterally without wheezing, rales, or rhonchi.  EXTREM: Chronic moderate edema, lymphedema.  No cyanosis or clubbing.  NEURO: Alert and oriented, cooperative. Gait not formally assessed. No obvious focal deficits.   SKIN: Warm and dry.       Recent Lab Results:  LIVER ENZYME RESULTS:  Lab Results   Component Value Date    AST 14 01/07/2020    ALT 17 01/16/2023    ALT 18 01/07/2020       CBC RESULTS:  Lab Results   Component Value Date    WBC 6.8 01/16/2023    WBC 6.2 01/25/2021    RBC 3.28 (L) 01/16/2023    RBC 4.30 01/25/2021    HGB 9.5 (L) 01/16/2023    HGB 12.6 01/25/2021    HCT 30.9 (L) 01/16/2023    HCT 39.9 01/25/2021    MCV 94 01/16/2023    MCV 93 01/25/2021    MCH 29.0 01/16/2023    MCH 29.3 01/25/2021    MCHC 30.7 (L) 01/16/2023    MCHC 31.6 01/25/2021    RDW 16.7 (H) 01/16/2023    RDW 15.7 (H) 01/25/2021     01/16/2023     01/25/2021       BMP RESULTS:  Lab Results   Component Value Date     01/16/2023     01/25/2021    POTASSIUM 3.2 (L) 01/16/2023    POTASSIUM 3.7 01/25/2021    CHLORIDE 97 01/16/2023    CHLORIDE 100 01/25/2021    CO2 29 01/16/2023    CO2 27 01/25/2021    ANIONGAP 12 01/16/2023    ANIONGAP 9 01/25/2021     (H) 01/16/2023     (H) 01/25/2021    BUN 18 01/16/2023    BUN 33 (H) 01/25/2021    CR 1.47 (H) 01/16/2023    CR 1.54 (H) 01/25/2021    GFRESTIMATED 34 (L) 01/16/2023    GFRESTIMATED 30 (L) 01/25/2021    GFRESTBLACK 35 (L) 01/25/2021    SHANTANU 9.8 01/16/2023    SHANTANU 9.4 01/25/2021        INR RESULTS:  Lab Results   Component Value Date    INR 1.6 (A) 01/13/2023    INR 1.6 (A) 01/10/2023    INR 3.20 (H) 07/06/2021    INR 2.60 (H) 06/08/2021       Recent Labs   Lab Test 01/25/21  1426 11/28/18  1039 05/15/17  1615   NTBNP 2,036* 3,238* 2,903*       Most recent testing:      Echocardiogram  12/12/22 (outisde)  SUMMARY     Rhythm is atrial fibrillation with rapid ventricular response (130's).     Severe pulmonary hypertension with estimated pulmonary  artery systolic   pressure of 85 mmHg + RA pressure.   Severely enlarged right ventricular size with diastolic flattening of the   interventricular septum consistent with Cor pulmonale.   Moderately decreased right ventricular systolic performance.   Tricuspid valve insufficiency moderate.   Biatrial enlargement severe.   Based on the appearance of the IVC, the RA pressure is elevated.   Normal left ventricular cavity size, borderline increased wall thickness   and normal LV systolic function.   The estimated left ventricular ejection fraction is 53 %.   No regional left ventricular wall motion abnormality.   Nonspecific aortic valve abnormality .   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      NYHA Functional Class:  3    A total of 60 minutes was spent today performing chart and history review, gathering HPI, physical exam, pre and post visit documentation, and care coordination.      Zayda Tomlinson PA-C  [facility]  Pager [phone]

## 2023-01-17 NOTE — PROGRESS NOTES
Date of Visit:  01/17/23      [facility]      Primary PH cardiologist: Dr. Thompson       HPI:  Ms. Mara Colindres is a pleasant 88 year old female with a past medical history significant for hypertension, permanent atrial fibrillation on AC, type 2 diabetes, suspected COPD, chronic kidney disease, EVONNE on CPAP, and history of DVT. She was seen initially in our clinic in 2016 due to shortness of breath, at which time echocardiogram suggested severe pulmonary hypertension. She was admitted at that time and had a RHC performed (swan jose while hospitalized) which confirmed group 2 PH felt to be related to HFpEF (documented PCWP of 20, PAP 44/22, CO/CI 4.7/2.1). She was given Nipride, meds adjusted for further afterload reduction, and aggressively diuresed. She was seen most recently by Dr. Sterling in Jan of 2021 at which time it appears no changes were made, and ongoing fluid management/afterload reduction was recommended.    More recently, in December, she was admitted via the [facility] system for worsening shortness of breath and syncope. She was found to have streptococcus bacteremia, complicated by symptomatic rapid atrial fibrillation and concern for cardiogenic shock. She was again aggressively diuresed and rate controlled with metoprolol. During her stay she had a repeat echo (notably during afib with HR of 130s), again showing severe pulmonary hypertension with estimated RVSP of 85mmHg + RAP. Her RV is severely enlarged with moderately decreased RV function. RA pressure was also suspected to be elevated. LV function and size were reported as normal. She was discharged on 40mg of Lasix BID which was apparently unchanged from prior. She was then asked to follow back with us due to her pulmonary hypertension.    Today, I am meeting Mara in clinic. She tells me that over the last few years, she has slowly worsened, in regard to KEMP and LE edema. She says her legs  "are now like \"tree trunks.\" She used to wrap them but admits she hasn't been doing this much lately. She tells me she weighs at home and today was 224#, though did not weigh for us in clinic today. She denies any new chest pain, and only feels palpitations periodically if her heart seems to be going fast. Since her hospital stay, no significant dizziness or recurrence of syncope. Her BP is noted to be a bit lower today, which her son attributes possibly to her new medication for Flexeril which she takes for back issues. She carries a diagnosis of COPD but tells me she is a never smoker. On PFTs in 2019 she had potential airflow obstruction with only a mild reduction in diffusion capacity. She has known EVONNE but reports being compliant with CPAP a night.     She visited with her PCP yesterday, and had some labs performed, but these were still pending at the time of our visit. I reviewed these when they became available, as below.     CURRENT PULMONARY HYPERTENSION REGIMEN:    PAH Rx: None    Diuretics: Lasix 40mg BID     Oxygen: NC 1.5-2L PRN    Anticoagulation: warfarin   Indication: afib       ASSESSMENT/PLAN:      1. Pulmonary hypertension:   --Ms. Colindres is what appears to be severe group 2 pulmonary hypertension. She had her initial diagnosis in 2016 based on invasive hemodynamic testing, and efforts were put toward diuresis and afterload reduction. No pulmonary vasodilators were felt warranted at that time. We then lost her to follow up in early 2021.    --I am seeing her back after a recent hospital stay for decompensated heart failure, but also in the setting of sepsis and rapid atrial fibrillation. Echo at that time showed EF low normal at 53% with normal LV size. However, RV was severely enlarged with moderate RV dysfunction. However, as mentioned, she sounded to be decompensated at that time.    --Clinically, this sounds to still be likely group 2. We did discuss possible repeat RHC, but given her advanced " age, and likelihood that this may not be very revealing, we decided jointly to continue to treat symptomatically and work on medication optimization. Her recent labs show continued CRI but overall stable. She may benefit from an SGLT2 inhibitor but of course renal function is a concern. Will reach out to PCP to get his thoughts in this regard.   --She could likely also benefit from more afterload reduction, however her BP is already low, so I don't think we have room to increase losartan further at this point.    --I am still awaiting NT-proBNP but we could consider change from Lasix to bumex to potentially escalate diuresis further. Will await discussions regarding SGLT2 first before making adjustments. In the interim, I highly recommended she resume daily leg wraps. We could also potentially have lymphedema clinic see her if possible. Once we optimize her further in this regard, will plan repeat echo to look back at RV function. [Addendum: NT-proBNP remains elevated, and she is also hypokalemic. Will supplement K, and escalate Lasix slightly while awaiting decisions about SGLT2.]    --Recommended continued compliance with oxygen and CPAP at night.     2. Atrial fibrillation.   --She has chronic atrial fibrillation which was rate controlled previously though some recent issues with RVR while in the hospital as outlined above. Currently she is on metoprolol 50mg BID; I am hesitant to go up on this and possibly would like to come down if possible due to poor RV function. Will check a Ziopatch to look at current rate control.   --Continue digoxin as is, as this may also help support RV.    --Anticoagulated with warfarin, follows with INR clinic locally.     Follow up plan:  Return in 1 month, or sooner if needed. She would like to follow up virtually if possible and get labs locally in [place]. We are happy to see the patient back sooner with any new concerns.       Orders this Visit:  Orders Placed This Encounter    Procedures     N terminal pro BNP outpatient     Orders Placed This Encounter   Medications     digoxin (LANOXIN) 62.5 MCG tablet     Sig: Take 0.0625 mg by mouth     cyclobenzaprine (FLEXERIL) 5 MG tablet     Sig: Take 5 mg by mouth     hydrOXYzine (ATARAX) 50 MG tablet     Sig: Take 1 tablet by mouth every 6 hours as needed     There are no discontinued medications.        CURRENT MEDICATIONS:  Current Outpatient Medications   Medication Sig Dispense Refill     ACCU-CHEK PARVEZ PLUS test strip USE TO TEST FOUR TIMES DAILY OR AS DIRECTED 350 strip 5     acetaminophen (TYLENOL) 500 MG tablet Take 2 tablets by mouth 3 times daily as needed.       alendronate (FOSAMAX) 70 MG tablet Take 1 tablet (70 mg) by mouth every 7 days 12 tablet 1     Calcium Carbonate-Vitamin D (CALCIUM + D PO) Take 1 tablet by mouth 2 times daily.       cyclobenzaprine (FLEXERIL) 5 MG tablet Take 5 mg by mouth       digoxin (LANOXIN) 62.5 MCG tablet Take 0.0625 mg by mouth       diphenoxylate-atropine (LOMOTIL) 2.5-0.025 MG tablet Take 1 tablet by mouth 4 times daily as needed for diarrhea 40 tablet 1     dulaglutide (TRULICITY) 1.5 MG/0.5ML pen Inject 1.5 mg Subcutaneous every 7 days 2 mL 5     furosemide (LASIX) 40 MG tablet TAKE 1 TABLET(40 MG) BY MOUTH TWICE DAILY 180 tablet 1     HYDROcodone-acetaminophen (NORCO) 5-325 MG tablet Take 1 tablet by mouth daily as needed for severe pain (7-10) 30 tablet 0     hydrOXYzine (ATARAX) 50 MG tablet Take 1 tablet by mouth every 6 hours as needed       losartan (COZAAR) 25 MG tablet TAKE 1 TABLET(25 MG) BY MOUTH DAILY 90 tablet 1     metoprolol tartrate (LOPRESSOR) 100 MG tablet TAKE 1/2 TABLET(50 MG) BY MOUTH TWICE DAILY 90 tablet 1     OMEPRAZOLE CPCR 20 MG OR 1 CAPSULE DAILY       order for DME Equipment being ordered: Oxygen.  Oxygen resting at room air was 94%.   Oxygen on room air walking about 50 feet was 87% then after returning to room dropped lower to 86%.   With oxygen at 2 liters resting  was 97%.   With oxygen at 2 liters walking about 50 feet was only at 91%. 1 Device 0     Polyethyl Glycol-Propyl Glycol (SYSTANE OP) Apply 1 drop to eye as needed Both eyes.       warfarin ANTICOAGULANT (COUMADIN) 2 MG tablet Take 2 mg daily or as directed by INR clinic 100 tablet 1     ASPIRIN NOT PRESCRIBED, INTENTIONAL, Antiplatelet medication not prescribed intentionally due to Current anticoagulant therapy (warfarin/enoxaparin) (Patient not taking: Reported on 1/17/2023) 0 each 3     PARoxetine (PAXIL) 20 MG tablet Take 0.5 tablets (10 mg) by mouth four times a week for 364 days (Patient not taking: Reported on 1/17/2023) 35 tablet 1     Resveratrol 100 MG CAPS Take 1 capsule by mouth daily  (Patient not taking: Reported on 1/17/2023)       STATIN NOT PRESCRIBED, INTENTIONAL, 1 each continuous prn. Statin not prescribed intentionally due to Other: patient has normal LDL within recommended guidelines on no medications (Patient not taking: Reported on 1/17/2023) 0 each 0       ALLERGIES     Allergies   Allergen Reactions     Atenolol Other (See Comments)     Arms became limp, almost stroke like symptoms per patient.   Tolerates metoprolol fine     Metformin      Side effects / gastrointestinal symptoms        PAST MEDICAL HISTORY:  Past Medical History:   Diagnosis Date     A-fib (H)      Bilateral leg edema     fairly severe     Cataract      CHF (congestive heart failure) (H)      Chronic diarrhea     neg colonoscopy abt one year ago, even to the point of having fecal accidents     Chronic venous insufficiency      Diabetic retinopathy (H)      DVT (deep venous thrombosis) (H)      GERD (gastroesophageal reflux disease)      Hyperlipidemia LDL goal <100 10/31/2010     Hypertension goal BP (blood pressure) < 140/90      Long-term (current) use of anticoagulants      Moderate persistent asthma 8/15/2012     MRSA (methicillin resistant Staphylococcus aureus)     postop hip     Obesity      EVONNE (obstructive sleep  apnea)      S/P colonoscopy 2008 ( ?)     Septic hip (H)     sees an ID specialist Dr ANNEL DE LA CRUZ She remains on long-term suppressive antibiotic therapy using Minocin 50 mg twice daily     Stasis dermatitis      Urinary incontinence, mixed      Vitiligo          Review of Systems:  POS ROS ARE BOLDED, all other negative.    Cardiovascular: Chest pain, palpitations, orthopnea, LE edema  Constitutional: New chills, sweats, fevers   Resp: Dyspnea at rest, dyspnea on exertion, cough, known chronic lung disease  HEENT: New visual changes, frequent headaches  Gastrointestinal: Abdominal pain, diarrhea, nausea, vomiting  Hematologic/lymphatic: Current systemic anticoagulation, hx of blood clots, new abnormal bleeding.  Neurological: New focal weakness, LOC, seizures, syncope/presyncope, mild dizziness.      Physical Exam:  Vitals: BP 97/57 (BP Location: Left arm, Patient Position: Chair, Cuff Size: Adult Large)   Pulse 94   SpO2 98%    Wt Readings from Last 4 Encounters:   09/13/21 108.4 kg (239 lb)   10/22/20 114.3 kg (252 lb)   09/04/20 114.3 kg (252 lb)   04/22/20 113.4 kg (250 lb)       GEN:  In general, this is a well nourished female in no acute distress. She has oxygen with her not but wearing currently. In a wheelchair, accompanied by her son.   HEENT:  Pupils grossly equal, sclerae nonicteric.   NECK: Supple, trachea midline. Thick neck with mild JVD while upright.   C/V:  Irregular rhythm, rate currently in the 90s. No significant murmur, rub or gallop. No S3 or RV heave.   RESP: Respirations are unlabored. No use of accessory muscles. Clear to auscultation bilaterally without wheezing, rales, or rhonchi.  EXTREM: Chronic moderate edema, lymphedema.  No cyanosis or clubbing.  NEURO: Alert and oriented, cooperative. Gait not formally assessed. No obvious focal deficits.   SKIN: Warm and dry.       Recent Lab Results:  LIVER ENZYME RESULTS:  Lab Results   Component Value Date    AST 14 01/07/2020    ALT 17  01/16/2023    ALT 18 01/07/2020       CBC RESULTS:  Lab Results   Component Value Date    WBC 6.8 01/16/2023    WBC 6.2 01/25/2021    RBC 3.28 (L) 01/16/2023    RBC 4.30 01/25/2021    HGB 9.5 (L) 01/16/2023    HGB 12.6 01/25/2021    HCT 30.9 (L) 01/16/2023    HCT 39.9 01/25/2021    MCV 94 01/16/2023    MCV 93 01/25/2021    MCH 29.0 01/16/2023    MCH 29.3 01/25/2021    MCHC 30.7 (L) 01/16/2023    MCHC 31.6 01/25/2021    RDW 16.7 (H) 01/16/2023    RDW 15.7 (H) 01/25/2021     01/16/2023     01/25/2021       BMP RESULTS:  Lab Results   Component Value Date     01/16/2023     01/25/2021    POTASSIUM 3.2 (L) 01/16/2023    POTASSIUM 3.7 01/25/2021    CHLORIDE 97 01/16/2023    CHLORIDE 100 01/25/2021    CO2 29 01/16/2023    CO2 27 01/25/2021    ANIONGAP 12 01/16/2023    ANIONGAP 9 01/25/2021     (H) 01/16/2023     (H) 01/25/2021    BUN 18 01/16/2023    BUN 33 (H) 01/25/2021    CR 1.47 (H) 01/16/2023    CR 1.54 (H) 01/25/2021    GFRESTIMATED 34 (L) 01/16/2023    GFRESTIMATED 30 (L) 01/25/2021    GFRESTBLACK 35 (L) 01/25/2021    SHANTANU 9.8 01/16/2023    SHANTANU 9.4 01/25/2021        INR RESULTS:  Lab Results   Component Value Date    INR 1.6 (A) 01/13/2023    INR 1.6 (A) 01/10/2023    INR 3.20 (H) 07/06/2021    INR 2.60 (H) 06/08/2021       Recent Labs   Lab Test 01/25/21  1426 11/28/18  1039 05/15/17  1615   NTBNP 2,036* 3,238* 2,903*       Most recent testing:      Echocardiogram  12/12/22 (outisde)  SUMMARY     Rhythm is atrial fibrillation with rapid ventricular response (130's).     Severe pulmonary hypertension with estimated pulmonary artery systolic   pressure of 85 mmHg + RA pressure.   Severely enlarged right ventricular size with diastolic flattening of the   interventricular septum consistent with Cor pulmonale.   Moderately decreased right ventricular systolic performance.   Tricuspid valve insufficiency moderate.   Biatrial enlargement severe.   Based on the appearance of the  IVC, the RA pressure is elevated.   Normal left ventricular cavity size, borderline increased wall thickness   and normal LV systolic function.   The estimated left ventricular ejection fraction is 53 %.   No regional left ventricular wall motion abnormality.   Nonspecific aortic valve abnormality .   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      NYHA Functional Class:  3    A total of 60 minutes was spent today performing chart and history review, gathering HPI, physical exam, pre and post visit documentation, and care coordination.      Zayda Tomlinson PA-C  [facility]  Pager [phone]

## 2023-01-18 ENCOUNTER — TELEPHONE (OUTPATIENT)
Dept: CARDIOLOGY | Facility: CLINIC | Age: 88
End: 2023-01-18
Payer: COMMERCIAL

## 2023-01-18 DIAGNOSIS — R06.02 SOB (SHORTNESS OF BREATH): ICD-10-CM

## 2023-01-18 DIAGNOSIS — I48.91 A-FIB (H): ICD-10-CM

## 2023-01-18 DIAGNOSIS — N18.4 CKD (CHRONIC KIDNEY DISEASE) STAGE 4, GFR 15-29 ML/MIN (H): ICD-10-CM

## 2023-01-18 DIAGNOSIS — I27.20 PULMONARY HYPERTENSION (H): Primary | ICD-10-CM

## 2023-01-18 LAB
CHOLEST SERPL-MCNC: 121 MG/DL
CREAT UR-MCNC: 86 MG/DL
HDLC SERPL-MCNC: 64 MG/DL
LDLC SERPL CALC-MCNC: 45 MG/DL
MICROALBUMIN UR-MCNC: 68 MG/L
MICROALBUMIN/CREAT UR: 79.07 MG/G CR (ref 0–25)
NONHDLC SERPL-MCNC: 57 MG/DL
NT-PROBNP SERPL-MCNC: 2523 PG/ML (ref 0–1800)
TRIGL SERPL-MCNC: 59 MG/DL

## 2023-01-18 RX ORDER — FUROSEMIDE 20 MG
TABLET ORAL
Qty: 150 TABLET | Refills: 3 | Status: SHIPPED | OUTPATIENT
Start: 2023-01-18 | End: 2023-02-09

## 2023-01-18 RX ORDER — POTASSIUM CHLORIDE 1500 MG/1
40 TABLET, EXTENDED RELEASE ORAL 2 TIMES DAILY
Qty: 180 TABLET | Refills: 3 | Status: SHIPPED | OUTPATIENT
Start: 2023-01-18 | End: 2023-01-18

## 2023-01-18 RX ORDER — POTASSIUM CHLORIDE 1500 MG/1
40 TABLET, EXTENDED RELEASE ORAL DAILY
Qty: 180 TABLET | Refills: 3 | Status: SHIPPED | OUTPATIENT
Start: 2023-01-18 | End: 2024-02-21

## 2023-01-18 RX ORDER — FUROSEMIDE 20 MG
TABLET ORAL
Qty: 150 TABLET | Refills: 3 | Status: SHIPPED | OUTPATIENT
Start: 2023-01-18 | End: 2023-01-18

## 2023-01-18 NOTE — TELEPHONE ENCOUNTER
Spoke with patient and daughter regarding medications changes; daughter will help  Rx today and start patient on increased dose of Lasix. Patient would like labs drawn and [facility]; will have coordinators reach out to patient tomorrow to coordinate labs, F/U appt, and Zio shipment. Dilcia Marino RN on 1/18/2023 at 5:03 PM    ----- Message from Dilcia Marino RN sent at 1/18/2023  4:50 PM CST -----  Regarding: FW: labs, plans    ----- Message -----  From: Zayda Tomlinson PA  Sent: 1/18/2023   2:20 PM CST  To: Cardiology Ph Nurse-  Subject: labs, plans                                      Ok, labs are back and here is my plan, so please call pt/son to let them know:    1. I would like to add Jardiance, but need the PCP on board, so I have a message out to him. So IF he agrees I am planning on that, but renal function is marginal for this, so need to discuss.    2. BNP still very elevated. Increase Lasix slightly to 60/40, weigh at home, and remind her to start wrapping legs again.    3. K is low--not on potassium that I see. Start Kdur 40meq daily please. Plan repeat labs in 1-2 weeks near home.    4.  I want to make sure afib rate control is ok. Have a 3 day ziopatch placed, this can also be done near home.    Put her back in with me virtually in 2-3 weeks to check back in.    Thanks  Zayda

## 2023-01-19 ENCOUNTER — TELEPHONE (OUTPATIENT)
Dept: INTERNAL MEDICINE | Facility: CLINIC | Age: 88
End: 2023-01-19
Payer: COMMERCIAL

## 2023-01-19 ENCOUNTER — TELEPHONE (OUTPATIENT)
Dept: FAMILY MEDICINE | Facility: CLINIC | Age: 88
End: 2023-01-19
Payer: COMMERCIAL

## 2023-01-19 ENCOUNTER — TRANSFERRED RECORDS (OUTPATIENT)
Dept: HEALTH INFORMATION MANAGEMENT | Facility: CLINIC | Age: 88
End: 2023-01-19
Payer: COMMERCIAL

## 2023-01-19 ENCOUNTER — ANTICOAGULATION THERAPY VISIT (OUTPATIENT)
Dept: ANTICOAGULATION | Facility: CLINIC | Age: 88
End: 2023-01-19
Payer: COMMERCIAL

## 2023-01-19 DIAGNOSIS — Z79.01 CHRONIC ANTICOAGULATION: ICD-10-CM

## 2023-01-19 DIAGNOSIS — Z79.01 LONG TERM CURRENT USE OF ANTICOAGULANT THERAPY: Primary | ICD-10-CM

## 2023-01-19 DIAGNOSIS — I48.19 PERSISTENT ATRIAL FIBRILLATION (H): ICD-10-CM

## 2023-01-19 LAB — INR (EXTERNAL): 1.3 (ref 0.9–1.1)

## 2023-01-19 NOTE — TELEPHONE ENCOUNTER
INR already noted and addressed. See Anticoagulation encounter dated same date.   Belkis Nunez, RN  Anticoagulation Nurse - [facility] [place]

## 2023-01-19 NOTE — TELEPHONE ENCOUNTER
Rosalinda with [facility] calling and can be reached at [phone]. Rosalinda is calling to report an out of range INR of 1.3 as of today.      Usha Zhong RN  [facility]

## 2023-01-19 NOTE — TELEPHONE ENCOUNTER
General Call    Contacts       Type Contact Phone/Fax    01/19/2023 04:23 PM CST Phone (Incoming) [facility] [phone]        Reason for Call:  Medication verification    What are your questions or concerns:  Dosage    Date of last appointment with provider: 1/16/2023    Okay to leave a detailed message?: Yes at Other phone number: [phone]

## 2023-01-19 NOTE — PROGRESS NOTES
ANTICOAGULATION MANAGEMENT     Mara Colindres 88 year old female is on warfarin with subtherapeutic INR result. (Goal INR 2.0-3.0)    Recent labs: (last 7 days)     01/19/23  1323   INR 1.3*       ASSESSMENT       Source(s): Chart Review and Home Care/Facility Nurse       Warfarin doses taken: Warfarin taken as instructed    Diet: No new diet changes identified    New illness, injury, or hospitalization: Pt was recently hospitalized for 12/09/2022-12/22/2022 at [facility] for syncope, afib, bacteremia. Developed acute respiratory failure while inpatient. Was then in TCU until 01/05/2023.    Medication/supplement changes: Furosemide was increased, should not effect INR.    Signs or symptoms of bleeding or clotting: No    Previous INR: Subtherapeutic    Additional findings: PTA dose of warfarin was 20mg.       PLAN     Recommended plan for ongoing change(s) affecting INR     Dosing Instructions: booster dose then Increase your warfarin dose (50% change) with next INR in 4 days       Summary  As of 1/19/2023    Full warfarin instructions:  1/19: 6 mg; Otherwise 3 mg every day   Next INR check:  1/23/2023             Telephone call with [facility] care nurse who verbalizes understanding and agrees to plan    Orders given to  Homecare nurse/facility to recheck    Education provided:     Goal range and lab monitoring: goal range and significance of current result    Plan made with [facility] Pharmacist Alis Pereira, RN  Anticoagulation Clinic  1/19/2023    _______________________________________________________________________     Anticoagulation Episode Summary     Current INR goal:  2.0-3.0   TTR:  61.7 % (1 y)   Target end date:  Indefinite   Send INR reminders to:  [facility]    Indications    Long-term (current) use of anticoagulants [Z79.01] [Z79.01]  Persistent atrial fibrillation (H) [I48.19]  Chronic anticoagulation [Z79.01]           Comments:  [facility]R home meter. Manage by exception.          Anticoagulation Care Providers     Provider Role Specialty Phone number    Steven Abrams MD Referring Internal Medicine [phone]

## 2023-01-20 NOTE — TELEPHONE ENCOUNTER
Medication clarification re 1. Potassium Chloride 10 MEQ Daily? 2. Losartan 25 mg Daily? Discont? 3. Metoprolol 100 mg Dosage? Pt confused

## 2023-01-20 NOTE — TELEPHONE ENCOUNTER
Spoke with [facility]. Reviewed medication doses for Potassium, Losartan, Metoprolol.  Pt has Potassium 10MEQ daily in the home. Advised that cardiology increased dose of potassium to 20MEQ 2 tablets daily (40MEQ daily). Can contact cardiology if any further questions with dosing.  Losartan 25mg daily on med list prescribed on 1/16/23 by Dr. Abrams  Metoprolol 100mg 1/2 tab bid on med list. Prescribed on 1/16/23 by Dr. Abrams.  [facility] will confirm medication doses with pt's Son.    Usha Zhong RN  [facility]

## 2023-01-23 ENCOUNTER — ANTICOAGULATION THERAPY VISIT (OUTPATIENT)
Dept: ANTICOAGULATION | Facility: CLINIC | Age: 88
End: 2023-01-23
Payer: COMMERCIAL

## 2023-01-23 ENCOUNTER — TRANSFERRED RECORDS (OUTPATIENT)
Dept: HEALTH INFORMATION MANAGEMENT | Facility: CLINIC | Age: 88
End: 2023-01-23

## 2023-01-23 ENCOUNTER — TELEPHONE (OUTPATIENT)
Dept: INTERNAL MEDICINE | Facility: CLINIC | Age: 88
End: 2023-01-23
Payer: COMMERCIAL

## 2023-01-23 DIAGNOSIS — I48.19 PERSISTENT ATRIAL FIBRILLATION (H): ICD-10-CM

## 2023-01-23 DIAGNOSIS — Z79.01 CHRONIC ANTICOAGULATION: ICD-10-CM

## 2023-01-23 DIAGNOSIS — Z79.01 LONG TERM CURRENT USE OF ANTICOAGULANT THERAPY: Primary | ICD-10-CM

## 2023-01-23 LAB — INR (EXTERNAL): 1.4 (ref 0.9–1.1)

## 2023-01-23 NOTE — PROGRESS NOTES
ANTICOAGULATION MANAGEMENT     Mara Colinrdes 88 year old female is on warfarin with subtherapeutic INR result. (Goal INR 2.0-3.0)    Recent labs: (last 7 days)     01/23/23  1259   INR 1.4*       ASSESSMENT       Source(s): Chart Review and Home Care/Facility Nurse       Warfarin doses taken: Less warfarin taken than planned which may be affecting INR. Pt was supposed to take a booster dose of 6mg, then 3mg daily. Instead, pt took 2mg daily as this is the dose Mara' son thought she was supposed to take.    Diet: No new diet changes identified    New illness, injury, or hospitalization: No    Medication/supplement changes: None noted    Signs or symptoms of bleeding or clotting: No    Previous INR: Subtherapeutic    Additional findings: None       PLAN     Recommended plan for temporary change(s) affecting INR     Dosing Instructions: booster dose then continue your current warfarin dose with next INR in 4 days       Summary  As of 1/23/2023    Full warfarin instructions:  1/23: 6 mg; Otherwise 3 mg every day   Next INR check:  1/27/2023             Telephone call with [facility] care nurse who verbalizes understanding and agrees to plan    Orders given to  Homecare nurse/facility to recheck    Education provided:     Goal range and lab monitoring: goal range and significance of current result    Plan made per [facility] anticoagulation protocol    Escobar Pereira RN  Anticoagulation Clinic  1/23/2023    _______________________________________________________________________     Anticoagulation Episode Summary     Current INR goal:  2.0-3.0   TTR:  60.6 % (1 y)   Target end date:  Indefinite   Send INR reminders to:  [facility]    Indications    Long-term (current) use of anticoagulants [Z79.01] [Z79.01]  Persistent atrial fibrillation (H) [I48.19]  Chronic anticoagulation [Z79.01]           Comments:  mdINR home meter. Manage by exception.         Anticoagulation Care Providers     Provider Role  Specialty Phone number    Steven Abrams MD Referring Internal Medicine [phone]

## 2023-01-23 NOTE — TELEPHONE ENCOUNTER
"Please advise on when a phone visit would work. Checked your schedule and there are no openings. Pt states that she would be available anytime this week for a telephone visit. She does have home care coming out, but that is not a set or scheduled time.     Pt was given lab result message.     \"All of these tests are within acceptable limits except for a very concerning finding of a dropping hemoglobin. While the drop from a hemoglobin of 11.3 7 months ago to the current 9.5 is not so steep that we need to go to the emergency room, it is concerning because a] we simply don't know why and b] because she is so shortness of breath and just not doing all that well, it is highly plausible that this hemoglobin drop is causing some worsened symptoms. I think we should schedule a quick telephone MD visit to review option. Lets schedule this for tomorrow.      The potassium is also just a little bit low. Most likely this is secondary to diarrhea . I don't think , at this point, specific treatment of this is necessary beyond recommending a piece of citrus fruit or a banana every day      Steven Abrams MD.\"    Dilcia Gil RN   [facility]  "

## 2023-01-24 ENCOUNTER — TELEPHONE (OUTPATIENT)
Dept: FAMILY MEDICINE | Facility: CLINIC | Age: 88
End: 2023-01-24
Payer: COMMERCIAL

## 2023-01-24 NOTE — TELEPHONE ENCOUNTER
Spoke with [facility]. Relayed provider's message as written. [facility] verbalized understanding and has no further questions at this time.    NICANOR AlejandraN RN  [facility]

## 2023-01-24 NOTE — TELEPHONE ENCOUNTER
By the way the appointment can be telephone MD visit or a virtual office visit if patient desires    This Friday at 1:10 pm    Steven Abrams MD

## 2023-01-24 NOTE — TELEPHONE ENCOUNTER
Called [facility] back.  She stated that they have metoprolol 25 mg once daily listed.  However, we have 100 mg, take 1/2 tab (50 mg) BID listed.  [facility] is not sure what patient has been taking but stated that patient does not have the 25 mg tablets, she has the 100 mg tablets    Please clarify what patient should be on    Soraida Polanco RN  [facility]

## 2023-01-24 NOTE — TELEPHONE ENCOUNTER
Patient called and informed.    Telephone visit scheduled for Friday, January 27th at 1:10 p.m.    Halima Bob,

## 2023-01-24 NOTE — TELEPHONE ENCOUNTER
General Call      Reason for Call:  Question from [facility]    What are your questions or concerns:  1) dosage of Metropolol/ discrepancy on bottle with her notes 2) pt wants to know if she needs to continue taking her muscle relaxant; making pt sleepy    Date of last appointment with provider: n/a    Okay to leave a detailed message?: ok to leave message [phone]

## 2023-01-24 NOTE — TELEPHONE ENCOUNTER
Any reference to a 25 milligram tab should be removed from chart  Dose of 50 milligrams 2 times a day is correct    OZ WALSH MD

## 2023-01-25 ENCOUNTER — TELEPHONE (OUTPATIENT)
Dept: CARDIOLOGY | Facility: CLINIC | Age: 88
End: 2023-01-25
Payer: COMMERCIAL

## 2023-01-25 DIAGNOSIS — I50.32 CHRONIC DIASTOLIC CONGESTIVE HEART FAILURE (H): Primary | ICD-10-CM

## 2023-01-25 NOTE — TELEPHONE ENCOUNTER
[facility] Call Center    Phone Message    May a detailed message be left on voicemail: yes     Reason for Call: Medication Refill Request    Has the patient contacted the pharmacy for the refill? Yes   Name of medication being requested: digoxin (LANOXIN) 62.5 MCG tablet [[id number]    Provider who prescribed the medication: [facility]   Pharmacy: [id number] - [place], [place] - [address] AT [facility] [address]    Date medication is needed: She is almost out this was a medication that was given at the hospital and are unsure that she still needs this if so please refill and send to Pharm Please follow up with son prior to sending over       Action Taken: Other: cardio    Travel Screening: Not Applicable     Thank you!  [facility]

## 2023-01-25 NOTE — TELEPHONE ENCOUNTER
"    digoxin (LANOXIN) 62.5 MCG tablet    Last Written Prescription Date:  12/23/22 historical     Last Office Visit : 1/17/2023  Future Office visit:  2/6/23    Routing refill request to provider for review/approval because:  Medication is reported/historical, began while in hospital [facility] 12/9-22/2022- LUIZA Tomlinson note 1/17/23\"--Continue digoxin as is, as this may also help support RV. \" Please follow up with son prior to sending over          Provider who prescribed the medication: [facility] ( [facility])      [id number] - [place], [place] - [address] AT [facility] [address]     Date medication is needed: She is almost out this was a medication that was given at the hospital and are unsure that she still needs this if so please refill and send to Pharm Please follow up with son prior to sending over        "

## 2023-01-26 RX ORDER — DIGOXIN 0.06 MG/1
62.5 TABLET ORAL EVERY OTHER DAY
Qty: 45 TABLET | Refills: 3 | Status: SHIPPED | OUTPATIENT
Start: 2023-01-26 | End: 2023-11-15

## 2023-01-26 NOTE — TELEPHONE ENCOUNTER
Unable to reach son; number is not in service. Called Mara, who put her son on the phone. Confirmed discharge dosing with them as 62.5 mcg every other day. Refill sent to local pharmacy per patient's request. Dilcia Marino RN on 1/26/2023 at 12:26 PM

## 2023-01-27 ENCOUNTER — VIRTUAL VISIT (OUTPATIENT)
Dept: INTERNAL MEDICINE | Facility: CLINIC | Age: 88
End: 2023-01-27
Payer: COMMERCIAL

## 2023-01-27 ENCOUNTER — ANTICOAGULATION THERAPY VISIT (OUTPATIENT)
Dept: ANTICOAGULATION | Facility: CLINIC | Age: 88
End: 2023-01-27

## 2023-01-27 DIAGNOSIS — D64.9 ANEMIA, UNSPECIFIED TYPE: Primary | ICD-10-CM

## 2023-01-27 DIAGNOSIS — Z79.01 CHRONIC ANTICOAGULATION: ICD-10-CM

## 2023-01-27 DIAGNOSIS — Z91.89 AT RISK FOR POLYPHARMACY: ICD-10-CM

## 2023-01-27 DIAGNOSIS — I48.19 PERSISTENT ATRIAL FIBRILLATION (H): ICD-10-CM

## 2023-01-27 DIAGNOSIS — Z79.01 LONG TERM CURRENT USE OF ANTICOAGULANT THERAPY: Primary | ICD-10-CM

## 2023-01-27 LAB — INR (EXTERNAL): 1.5 (ref 2–3)

## 2023-01-27 PROCEDURE — 99443 PR PHYSICIAN TELEPHONE EVALUATION 21-30 MIN: CPT | Performed by: INTERNAL MEDICINE

## 2023-01-27 RX ORDER — CYCLOBENZAPRINE HCL 5 MG
5 TABLET ORAL
Status: CANCELLED | OUTPATIENT
Start: 2023-01-27

## 2023-01-27 RX ORDER — HYDROXYZINE HYDROCHLORIDE 50 MG/1
50 TABLET, FILM COATED ORAL EVERY 6 HOURS PRN
Status: CANCELLED | OUTPATIENT
Start: 2023-01-27

## 2023-01-27 NOTE — PROGRESS NOTES
Mara is a 88 year old who is being evaluated via a billable telephone visit.      What phone number would you like to be contacted at? [phone]   How would you like to obtain your AVS? Mail a copy    Distant Location (provider location):  On-site    Assessment & Plan     Anemia, unspecified type  I met with patient today via a telephone MD visit as traveling for her is difficult . We saw her for the biannual office visit a week ago or so and her laboratory studies reveal a new area of concern. Her hemoglobin is found to be the lowest it has been for at the very least 3-5 years or more. The biggest problem here is that this patient has no apparent explanation for this and while the drop is only quite modest coming down to 9.5 from the prior 11 range, the concern is a] why is this happening and b] given her frailty even this little of a drop may be a contributing factor to why she feels weaker. She is absolutely not a good candidate for anything aggressive. I am aware she has a chronic refractory diarrhea and this is well documented history of . But there is no history of associated hematochezia or black and tarry colored stools , no source of bleeding. We reviewed her options for both evaluation and management . Considered our options. For the immediate near short term future , we are going to simply recheck her complete blood count and iron studies in approximately 10 days from today, depending on how things go if her hemoglobin is the same or worse I will be ordering an iron transfusion , and if it is indeed worsened then we have to discuss the possibilities of a more aggressive posture. Patient is a frail elderly woman but is not on the hospice care    - Ferritin; Future  - Iron and iron binding capacity; Future  - CBC with platelets and differential; Future  - Med Therapy Management Referral    At risk for polypharmacy  During this appointment I found patients comprehension of her medications to be  concerning, her son is quite involved in her care but also did not have a reply for me as to why was she taking hydrOXYzine (ATARAX) and why was she taking flexeril [ cyclobenzaprine ] ? We agreed not to refill these prescriptions and instead medication therapy management consult    - Ferritin; Future  - Iron and iron binding capacity; Future  - CBC with platelets and differential; Future  - Med Therapy Management Referral    Review of the result(s) of each unique test - as detailed above  Prescription drug management  26 minutes spent on the date of the encounter doing chart review, history and exam, documentation and further activities per the note        Return in about 10 days (around 2/6/2023), or for lab recheck.    Steven Abrams MD  [facility] CRYSTAL Terry is a 88 year old accompanied by her son, presenting for the following health issues:  discuss labs      HPI       Patient's son takes care of patient's medications. Discuss INR levels.   Son is wondering if medicare would cover the cost of a purewick because patient goes to the bathroom frequently especially at night.      1:41 PM   2:07 PM          Review of Systems   Constitutional, HEENT, cardiovascular, pulmonary, gi and gu systems are negative, except as otherwise noted.      Objective           Vitals:  No vitals were obtained today due to virtual visit.    Physical Exam   healthy, alert and no distress  PSYCH: Alert and oriented times 3; coherent speech, normal   rate and volume, able to articulate logical thoughts, able   to abstract reason, no tangential thoughts, no hallucinations   or delusions  Her affect is normal  RESP: No cough, no audible wheezing, able to talk in full sentences  Remainder of exam unable to be completed due to telephone visits    Orders Placed This Encounter   Procedures     Ferritin     Iron and iron binding capacity     Med Therapy Management Referral     CBC with platelets and differential                Phone call duration: 26 minutes

## 2023-01-27 NOTE — PROGRESS NOTES
ANTICOAGULATION MANAGEMENT     Mara Colindres 88 year old female is on warfarin with subtherapeutic INR result. (Goal INR 2.0-3.0)    Recent labs: (last 7 days)     01/27/23  1031   INR 1.5*       ASSESSMENT       Source(s): Chart Review and Home Care/Facility Nurse       Warfarin doses taken: Warfarin taken as instructed    Diet: No new diet changes identified    New illness, injury, or hospitalization: No    Medication/supplement changes: None noted    Signs or symptoms of bleeding or clotting: No    Previous INR: Subtherapeutic    Additional findings: No reason found for continued subtherapeutic INR       PLAN     Recommended plan for no diet, medication or health factor changes affecting INR     Dosing Instructions: booster dose then Increase your warfarin dose (9.5% change) with next INR in 1 week       Summary  As of 1/27/2023    Full warfarin instructions:  1/27: 6 mg; Otherwise 4 mg every Sun, Wed; 3 mg all other days   Next INR check:  2/3/2023             Telephone call with [facility] home care nurse who verbalizes understanding and agrees to plan and who agrees to plan and repeated back plan correctly    Patient to recheck with home meter    Education provided:     Please call back if any changes to your diet, medications or how you've been taking warfarin    Plan made per [facility] anticoagulation protocol    Quynh Pizarro, RN  Anticoagulation Clinic  1/27/2023    _______________________________________________________________________     Anticoagulation Episode Summary     Current INR goal:  2.0-3.0   TTR:  59.6 % (1 y)   Target end date:  Indefinite   Send INR reminders to:  ANTICOAG HOME MONITORING    Indications    Long-term (current) use of anticoagulants [Z79.01] [Z79.01]  Persistent atrial fibrillation (H) [I48.19]  Chronic anticoagulation [Z79.01]           Comments:  [facility] home meter. Manage by exception.         Anticoagulation Care Providers     Provider Role Specialty Phone number     Steven Abrams MD [facility] Internal Medicine [phone]

## 2023-01-31 ENCOUNTER — TRANSFERRED RECORDS (OUTPATIENT)
Dept: HEALTH INFORMATION MANAGEMENT | Facility: CLINIC | Age: 88
End: 2023-01-31
Payer: COMMERCIAL

## 2023-01-31 ENCOUNTER — ANTICOAGULATION THERAPY VISIT (OUTPATIENT)
Dept: ANTICOAGULATION | Facility: CLINIC | Age: 88
End: 2023-01-31
Payer: COMMERCIAL

## 2023-01-31 DIAGNOSIS — Z79.01 CHRONIC ANTICOAGULATION: ICD-10-CM

## 2023-01-31 DIAGNOSIS — Z79.01 LONG TERM CURRENT USE OF ANTICOAGULANT THERAPY: Primary | ICD-10-CM

## 2023-01-31 DIAGNOSIS — I48.19 PERSISTENT ATRIAL FIBRILLATION (H): ICD-10-CM

## 2023-01-31 LAB — INR (EXTERNAL): 1.3 (ref 0.9–1.1)

## 2023-02-01 ENCOUNTER — TELEPHONE (OUTPATIENT)
Dept: FAMILY MEDICINE | Facility: CLINIC | Age: 88
End: 2023-02-01
Payer: COMMERCIAL

## 2023-02-01 NOTE — PROGRESS NOTES
ANTICOAGULATION MANAGEMENT     Mara Colindres 88 year old female is on warfarin with subtherapeutic INR result. (Goal INR 2.0-3.0)    Recent labs: (last 7 days)     01/31/23  1755   INR 1.3*       ASSESSMENT       Source(s): Chart Review and Patient/Caregiver Call       Warfarin doses taken: Spoke with patient son, he seemed to be confused about pt warfarin dosing. He states that he doesn't think she missed any warfarin but he has not been setting up the warfarin, home care nurse has.    Diet: No new diet changes identified    New illness, injury, or hospitalization: No    Medication/supplement changes: None noted    Signs or symptoms of bleeding or clotting: No    Previous INR: Subtherapeutic    Additional findings: Educated both pt and son on the importance of having home care nurse teach them how to set up warfarin and understand what the pt should be taking for dosing. Pt took INR with home meter today because [facility] kept calling her.      Gave pt a booster dose today but did not change MD because son states that doesn't know how to set up more than 1 day of warfarin. Will have [facility] speak with home care nurse at next visit on 2/3/23 to work with family on managing warfarin so they feel confident when home care discharges.        PLAN     Recommended plan for temporary change(s) affecting INR     Dosing Instructions: booster dose then continue your current warfarin dose with next INR in 3 days       Summary  As of 1/31/2023    Full warfarin instructions:  1/31: 5 mg; Otherwise 4 mg every Sun, Wed; 3 mg all other days   Next INR check:  2/3/2023             Telephone call with  Mara & son Hong who verbalizes understanding and agrees to plan    Home Care scheduled to recheck INR on 2/3/23    Education provided:     Taking warfarin: Importance of taking warfarin as instructed    Goal range and lab monitoring: goal range and significance of current result and Importance of therapeutic range    The importance of  having Home Care teach the pt and son how to manage warfarin     Plan made per [facility] anticoagulation protocol    Marcos Jauregui RN  Anticoagulation Clinic  1/31/2023    _______________________________________________________________________     Anticoagulation Episode Summary     Current INR goal:  2.0-3.0   TTR:  58.4 % (1 y)   Target end date:  Indefinite   Send INR reminders to:  [facility]AG HOME MONITORING    Indications    Long-term (current) use of anticoagulants [Z79.01] [Z79.01]  Persistent atrial fibrillation (H) [I48.19]  Chronic anticoagulation [Z79.01]           Comments:  [facility] home meter. Manage by exception.         Anticoagulation Care Providers     Provider Role Specialty Phone number    Steven Abrams MD Referring Internal Medicine [phone]

## 2023-02-01 NOTE — TELEPHONE ENCOUNTER
H[facility] ([facility]) calling with FYI for PCP. States that patient had a fall yesterday morning, unwitnessed. When patient fell, she was able to grab onto table, and landed on buttocks. RN assessed and no bruising or pain noted. RN continuing to monitor.    H[facility]el #: [phone], ok for dvm    Routing to PCP as FYI, please reroute if you would like any follow-up completed    MALCOM Rosas RN  [facility]

## 2023-02-03 ENCOUNTER — TELEPHONE (OUTPATIENT)
Dept: FAMILY MEDICINE | Facility: CLINIC | Age: 88
End: 2023-02-03

## 2023-02-03 ENCOUNTER — ANTICOAGULATION THERAPY VISIT (OUTPATIENT)
Dept: ANTICOAGULATION | Facility: CLINIC | Age: 88
End: 2023-02-03

## 2023-02-03 DIAGNOSIS — Z79.01 CHRONIC ANTICOAGULATION: ICD-10-CM

## 2023-02-03 DIAGNOSIS — Z79.01 LONG TERM CURRENT USE OF ANTICOAGULANT THERAPY: Primary | ICD-10-CM

## 2023-02-03 DIAGNOSIS — I48.19 PERSISTENT ATRIAL FIBRILLATION (H): ICD-10-CM

## 2023-02-03 LAB — INR (EXTERNAL): 2.1 (ref 0.9–1.1)

## 2023-02-03 NOTE — PROGRESS NOTES
ANTICOAGULATION MANAGEMENT     Mara Colindres 88 year old female is on warfarin with therapeutic INR result. (Goal INR 2.0-3.0)    Recent labs: (last 7 days)     02/03/23  1034   INR 2.1*       ASSESSMENT       Source(s): Chart Review and Home Care/Facility Nurse       Warfarin doses taken: Warfarin taken as instructed    Diet: No new diet changes identified    New illness, injury, or hospitalization: No    Medication/supplement changes: None noted    Signs or symptoms of bleeding or clotting: No    Previous INR: Subtherapeutic    Additional findings: None       PLAN     Recommended plan for no diet, medication or health factor changes affecting INR     Dosing Instructions: Continue your current warfarin dose with next INR in 4 days       Summary  As of 2/3/2023    Full warfarin instructions:  4 mg every Sun, Wed; 3 mg all other days   Next INR check:  2/7/2023             Detailed voice message left for [facility] home care nurse with dosing instructions and follow up date.     Orders given to  Homecare nurse/facility to recheck    Education provided:     Please call back if any changes to your diet, medications or how you've been taking warfarin    Plan made per [facility] anticoagulation protocol    Belkis Nunez, RN  Anticoagulation Clinic  2/3/2023    _______________________________________________________________________     Anticoagulation Episode Summary     Current INR goal:  2.0-3.0   TTR:  57.7 % (1 y)   Target end date:  Indefinite   Send INR reminders to:  ANTICOAG HOME MONITORING    Indications    Long-term (current) use of anticoagulants [Z79.01] [Z79.01]  Persistent atrial fibrillation (H) [I48.19]  Chronic anticoagulation [Z79.01]           Comments:  [facility] home meter. Manage by exception.         Anticoagulation Care Providers     Provider Role Specialty Phone number    Steven Abrams MD Referring Internal Medicine [phone]

## 2023-02-03 NOTE — TELEPHONE ENCOUNTER
Reason for Call:  INR    Who is calling?  Home Care: [facility]    Phone number:  [phone]    Fax number:  n/a    Name of caller: n/a    INR Value:  2.1    Are there any other concerns:  No    Can we leave a detailed message on this number? YES    Phone number patient can be reached at: Home number on file [phone] (home)      Call taken on 2/3/2023 at 10:40 AM by Larissa Beckham

## 2023-02-03 NOTE — TELEPHONE ENCOUNTER
See [facility] encounter for details. Result addressed.  Belkis Nunez, RN  Anticoagulation Nurse - [facility] [place]

## 2023-02-05 NOTE — PROGRESS NOTES
CARDIOLOGY  CLINIC TELEHONE VISIT    Date of virtual visit: 02/06/23      Mara Colindres is a 88 year old female who is being evaluated via a billable telephone visit.        I have reviewed and updated the patient's Past Medical History, Social History, Family History and Medication List.    MEDICATIONS:  Current Outpatient Medications   Medication Sig Dispense Refill     ACCU-CHEK PARVEZ PLUS test strip USE TO TEST FOUR TIMES DAILY OR AS DIRECTED 350 strip 5     acetaminophen (TYLENOL) 500 MG tablet Take 2 tablets by mouth 3 times daily as needed.       alendronate (FOSAMAX) 70 MG tablet Take 1 tablet (70 mg) by mouth every 7 days 12 tablet 1     ASPIRIN NOT PRESCRIBED, INTENTIONAL, Antiplatelet medication not prescribed intentionally due to Current anticoagulant therapy (warfarin/enoxaparin) 0 each 3     Calcium Carbonate-Vitamin D (CALCIUM + D PO) Take 1 tablet by mouth 2 times daily.       cyclobenzaprine (FLEXERIL) 5 MG tablet Take 5 mg by mouth       digoxin (LANOXIN) 62.5 MCG tablet Take 1 tablet (62.5 mcg) by mouth every other day 45 tablet 3     diphenoxylate-atropine (LOMOTIL) 2.5-0.025 MG tablet Take 1 tablet by mouth 4 times daily as needed for diarrhea 40 tablet 1     dulaglutide (TRULICITY) 1.5 MG/0.5ML pen Inject 1.5 mg Subcutaneous every 7 days 2 mL 5     furosemide (LASIX) 20 MG tablet TAKE 3 TABLET(60 MG) IN AM and 2 TABLETS (40 MG) BY MOUTH IN  tablet 3     HYDROcodone-acetaminophen (NORCO) 5-325 MG tablet Take 1 tablet by mouth daily as needed for severe pain (7-10) 30 tablet 0     hydrOXYzine (ATARAX) 50 MG tablet Take 1 tablet by mouth every 6 hours as needed       losartan (COZAAR) 25 MG tablet TAKE 1 TABLET(25 MG) BY MOUTH DAILY (Patient not taking: Reported on 1/27/2023) 90 tablet 1     metoprolol tartrate (LOPRESSOR) 100 MG tablet TAKE 1/2 TABLET(50 MG) BY MOUTH TWICE DAILY 90 tablet 1     OMEPRAZOLE CPCR 20 MG OR 1 CAPSULE DAILY       order for DME Equipment being ordered:  Oxygen.  Oxygen resting at room air was 94%.   Oxygen on room air walking about 50 feet was 87% then after returning to room dropped lower to 86%.   With oxygen at 2 liters resting was 97%.   With oxygen at 2 liters walking about 50 feet was only at 91%. 1 Device 0     PARoxetine (PAXIL) 20 MG tablet Take 0.5 tablets (10 mg) by mouth four times a week for 364 days 35 tablet 1     Polyethyl Glycol-Propyl Glycol (SYSTANE OP) Apply 1 drop to eye as needed Both eyes.       potassium chloride ER (KLOR-CON M) 20 MEQ CR tablet Take 2 tablets (40 mEq) by mouth daily 180 tablet 3     Resveratrol 100 MG CAPS Take 1 capsule by mouth daily       STATIN NOT PRESCRIBED, INTENTIONAL, 1 each continuous prn. Statin not prescribed intentionally due to Other: patient has normal LDL within recommended guidelines on no medications 0 each 0     warfarin ANTICOAGULANT (COUMADIN) 2 MG tablet Take 2 mg daily or as directed by INR clinic 100 tablet 1       ALLERGIES  Atenolol and Metformin    Self reported vitals:  Weight: 202# (accuracy? As last reported weight is 224#)  BP N/A  HR N/A      Primary PH cardiologist: Dr. Thompson    HPI:  Ms. Mara Colindres is a pleasant 88 year old female with a past medical history significant for hypertension, permanent atrial fibrillation on AC, type 2 diabetes, COPD (though never smoker), chronic kidney disease, EVONNE on CPAP, and history of DVT. She was seen initially in our clinic in 2016 due to shortness of breath, at which time echocardiogram suggested severe pulmonary hypertension. She was admitted at that time and had a RHC performed (swan jose while hospitalized) which confirmed group 2 PH felt to be related to HFpEF (documented PCWP of 20, PAP 44/22, CO/CI 4.7/2.1). She was given Nipride, meds adjusted for further afterload reduction, and aggressively diuresed.      More recently, in December of 2022, she was admitted via the [place] system for worsening shortness of breath and syncope. She was  "found to have streptococcus bacteremia, complicated by symptomatic rapid atrial fibrillation and concern for cardiogenic shock. She was aggressively diuresed and rate controlled with metoprolol. During her stay she had a repeat echo (notably during afib with HR of 130s), again showing severe pulmonary hypertension with estimated RVSP of 85mmHg + RAP. Her RV is severely enlarged with moderately decreased RV function. RA pressure was also suspected to be elevated. LV function and size were reported as normal. She was discharged on 40mg of Lasix BID which was apparently unchanged from prior. She was then asked to follow back with us due to her pulmonary hypertension.    When I saw her in clinic in mid January, she reported slowly worsening KEMP and LE edema. She relayed that her legs were like \"tree trunks.\" She used to wrap them but admitted not doing this as much lately. Reported home weight was 224# as she declined a clinic weight. She had not had recurrence of dizziness or syncope, though BP was a bit lower than her baseline. We discussed possible repeat RHC, but given her advanced age and likelihood of little new information, we decided jointly to continue to treat symptomatically and work on medication optimization. We discussed initiation of an SGLT2 inhibitor, but I told her I would like to reach out to her PCP first. Labs revealed hypokalemia and continued elevated BNP, I increased her Lasix slightly to 60mg Am, 40mg PM, and added potassium supplements. I encouraged her to start wrapping her legs again as well. To ensure afib rate control, I also recommended a Ziopatch. Shortly after I heard back from her PCP who was ok to proceed with Jardiance.     Today, we are following up virtually to check back in. She tells me that she is urinating more with the increased diuretic and thinks maybe her breathing is slightly better. She reports a home weight of 202# though last visit only a few weeks ago she reported 224# " so accuracy of this is certainly in question. She has not yet started wrapping her legs. Since she was here last she met with her PCP who was concerned that her hemoglobin had dropped and iron studies were planned. Additionally, he sent her for an MTM visit due to polypharmacy as she was not entirely clear on all her medications.     Unfortunately, it does not appear that she had repeat labs done locally as requested.         CURRENT PULMONARY HYPERTENSION REGIMEN:     PAH Rx: None     Diuretics: Lasix 60mg Am, 40mg Pm     Oxygen: NC 2L around the house      Anticoagulation: warfarin   Indication: afib         ASSESSMENT/PLAN:        1. Pulmonary hypertension:              --Ms. Colindres is what appears to be severe group 2 pulmonary hypertension. She had her initial diagnosis in 2016 based on invasive hemodynamic testing, and efforts were put toward diuresis and afterload reduction. No pulmonary vasodilators were felt warranted at that time. We then lost her to follow up in early 2021.    --I saw her back after a recent hospital stay for decompensated heart failure, but also in the setting of sepsis and rapid atrial fibrillation. Echo at that time showed EF low normal at 53% with normal LV size. However, RV was severely enlarged with moderate RV dysfunction. However, as mentioned, she sounded to be decompensated at that time.  We did discuss possible repeat RHC, but given her advanced age, and likelihood that this may not be very revealing, we decided jointly to continue to treat symptomatically and work on medication optimization.   --Last visit I increased her Lasix slightly which sounds to have been beneficial for her. Her home weight is reported is down significantly, so the accuracy is unclear to me. I recommended that she start wrapping her legs again. I also asked her to make an appt for repeat BMP after addition of potassium and diuretic adjustments.    --She was found to be more anemic recently and iron  studies are planned; she is following with her PCP for this.    --We discussed potential addition of SGLT2 inhibitors; I reached out to her PCP who was initially in favor of Jardiance; however with her recent visit he was also concerned regarding polypharmacy. Thus, I opted not to add this today; she has an MTM visit coming up to discuss further, and we can reconsider this in the future. She could likely also benefit from more afterload reduction, however her BP is already low, so I don't think we have room to increase losartan further at this point.                  --Recommended continued compliance with oxygen and CPAP at night. She carries a diagnosis of COPD but tells me she is a never smoker. On PFTs in 2019 she had potential airflow obstruction with only a mild reduction in diffusion capacity.      2. Atrial fibrillation.              --She has chronic atrial fibrillation which was rate controlled previously though some recent issues with RVR while in the hospital as outlined above. Currently she is on metoprolol 50mg BID; I am hesitant to go up on this and possibly would like to come down if possible due to poor RV function. We did a ziopatch last visit, but results are still pending. Will reach out to her with results once available.              --Continue digoxin as is, as this may also help support RV.               --Anticoagulated with warfarin, follows with INR clinic locally.        Follow up plan: Follow up already in place with Dr Sterling in March, which we will keep.       Testing/labs:    Most recent labs:    Latest Reference Range & Units 01/16/23 17:19   Sodium 133 - 144 mmol/L 138   Potassium 3.4 - 5.3 mmol/L 3.2 (L)   Chloride 94 - 109 mmol/L 97   Carbon Dioxide 20 - 32 mmol/L 29   Urea Nitrogen 7 - 30 mg/dL 18   Creatinine 0.52 - 1.04 mg/dL 1.47 (H)   GFR Estimate >60 mL/min/1.73m2 34 (L)   Calcium 8.5 - 10.1 mg/dL 9.8   Anion Gap 3 - 14 mmol/L 12   ALT 0 - 50 U/L 17   Cholesterol <200  mg/dL  <200 mg/dL 121  108   Patient Fasting > 8hrs?  No   HDL Cholesterol >=50 mg/dL  >=50 mg/dL 64  65   LDL Cholesterol Calculated <=100 mg/dL  <=100 mg/dL 45  31   Non HDL Cholesterol <130 mg/dL  <130 mg/dL 57  43   N-Terminal Pro Bnp 0 - 1,800 pg/mL 2,523 (H)   Triglycerides <150 mg/dL  <150 mg/dL 59  62   (L): Data is abnormally low  (H): Data is abnormally high   Latest Reference Range & Units 01/16/23 17:19   WBC 4.0 - 11.0 10e3/uL 6.8   Hemoglobin 11.7 - 15.7 g/dL 9.5 (L)   Hematocrit 35.0 - 47.0 % 30.9 (L)   Platelet Count 150 - 450 10e3/uL 269   RBC Count 3.80 - 5.20 10e6/uL 3.28 (L)   MCV 78 - 100 fL 94   MCH 26.5 - 33.0 pg 29.0   MCHC 31.5 - 36.5 g/dL 30.7 (L)   RDW 10.0 - 15.0 % 16.7 (H)   (L): Data is abnormally low  (H): Data is abnormally high    Other recent pertinent testing:    Echocardiogram  12/12/22 (outisde)  SUMMARY     Rhythm is atrial fibrillation with rapid ventricular response (130's).     Severe pulmonary hypertension with estimated pulmonary artery systolic   pressure of 85 mmHg + RA pressure.   Severely enlarged right ventricular size with diastolic flattening of the   interventricular septum consistent with Cor pulmonale.   Moderately decreased right ventricular systolic performance.   Tricuspid valve insufficiency moderate.   Biatrial enlargement severe.   Based on the appearance of the IVC, the RA pressure is elevated.   Normal left ventricular cavity size, borderline increased wall thickness   and normal LV systolic function.   The estimated left ventricular ejection fraction is 53 %.   No regional left ventricular wall motion abnormality.   Nonspecific aortic valve abnormality .   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        NYHA Functional Class:  3    Phone-Visit Details    Type of service:  Video Visit    Start Time: 1104  End Time: 1115    An additional 15 minutes was spent today performing chart and history review, pre and post  visit documentation, and care coordination.    Zayda Tomlinson PA-C  [facility]  Pager [phone]

## 2023-02-06 ENCOUNTER — VIRTUAL VISIT (OUTPATIENT)
Dept: CARDIOLOGY | Facility: CLINIC | Age: 88
End: 2023-02-06
Attending: PHYSICIAN ASSISTANT
Payer: COMMERCIAL

## 2023-02-06 DIAGNOSIS — R06.02 SOB (SHORTNESS OF BREATH): ICD-10-CM

## 2023-02-06 DIAGNOSIS — I27.20 PULMONARY HYPERTENSION (H): ICD-10-CM

## 2023-02-06 PROCEDURE — 99212 OFFICE O/P EST SF 10 MIN: CPT | Mod: TEL | Performed by: PHYSICIAN ASSISTANT

## 2023-02-06 NOTE — LETTER
2/6/2023    Steven Abrams MD  [address] [facility] [place] [address]    RE: Mara Colindres       Dear Colleague,     I had the pleasure of seeing Mara Colindres in the [facility].        CARDIOLOGY  CLINIC TELEHONE VISIT    Date of virtual visit: 02/06/23      Mara Colindres is a 88 year old female who is being evaluated via a billable telephone visit.        I have reviewed and updated the patient's Past Medical History, Social History, Family History and Medication List.    MEDICATIONS:  Current Outpatient Medications   Medication Sig Dispense Refill     ACCU-CHEK PARVEZ PLUS test strip USE TO TEST FOUR TIMES DAILY OR AS DIRECTED 350 strip 5     acetaminophen (TYLENOL) 500 MG tablet Take 2 tablets by mouth 3 times daily as needed.       alendronate (FOSAMAX) 70 MG tablet Take 1 tablet (70 mg) by mouth every 7 days 12 tablet 1     ASPIRIN NOT PRESCRIBED, INTENTIONAL, Antiplatelet medication not prescribed intentionally due to Current anticoagulant therapy (warfarin/enoxaparin) 0 each 3     Calcium Carbonate-Vitamin D (CALCIUM + D PO) Take 1 tablet by mouth 2 times daily.       cyclobenzaprine (FLEXERIL) 5 MG tablet Take 5 mg by mouth       digoxin (LANOXIN) 62.5 MCG tablet Take 1 tablet (62.5 mcg) by mouth every other day 45 tablet 3     diphenoxylate-atropine (LOMOTIL) 2.5-0.025 MG tablet Take 1 tablet by mouth 4 times daily as needed for diarrhea 40 tablet 1     dulaglutide (TRULICITY) 1.5 MG/0.5ML pen Inject 1.5 mg Subcutaneous every 7 days 2 mL 5     furosemide (LASIX) 20 MG tablet TAKE 3 TABLET(60 MG) IN AM and 2 TABLETS (40 MG) BY MOUTH IN  tablet 3     HYDROcodone-acetaminophen (NORCO) 5-325 MG tablet Take 1 tablet by mouth daily as needed for severe pain (7-10) 30 tablet 0     hydrOXYzine (ATARAX) 50 MG tablet Take 1 tablet by mouth every 6 hours as needed       losartan (COZAAR) 25 MG tablet TAKE 1 TABLET(25 MG) BY MOUTH DAILY (Patient not taking: Reported on 1/27/2023) 90  tablet 1     metoprolol tartrate (LOPRESSOR) 100 MG tablet TAKE 1/2 TABLET(50 MG) BY MOUTH TWICE DAILY 90 tablet 1     OMEPRAZOLE CPCR 20 MG OR 1 CAPSULE DAILY       order for DME Equipment being ordered: Oxygen.  Oxygen resting at room air was 94%.   Oxygen on room air walking about 50 feet was 87% then after returning to room dropped lower to 86%.   With oxygen at 2 liters resting was 97%.   With oxygen at 2 liters walking about 50 feet was only at 91%. 1 Device 0     PARoxetine (PAXIL) 20 MG tablet Take 0.5 tablets (10 mg) by mouth four times a week for 364 days 35 tablet 1     Polyethyl Glycol-Propyl Glycol (SYSTANE OP) Apply 1 drop to eye as needed Both eyes.       potassium chloride ER (KLOR-CON M) 20 MEQ CR tablet Take 2 tablets (40 mEq) by mouth daily 180 tablet 3     Resveratrol 100 MG CAPS Take 1 capsule by mouth daily       STATIN NOT PRESCRIBED, INTENTIONAL, 1 each continuous prn. Statin not prescribed intentionally due to Other: patient has normal LDL within recommended guidelines on no medications 0 each 0     warfarin ANTICOAGULANT (COUMADIN) 2 MG tablet Take 2 mg daily or as directed by INR clinic 100 tablet 1       ALLERGIES  Atenolol and Metformin    Self reported vitals:  Weight: 202# (accuracy? As last reported weight is 224#)  BP N/A  HR N/A      Primary PH cardiologist: Dr. Thompson    HPI:  Ms. Mara Colindres is a pleasant 88 year old female with a past medical history significant for hypertension, permanent atrial fibrillation on AC, type 2 diabetes, COPD (though never smoker), chronic kidney disease, EVONNE on CPAP, and history of DVT. She was seen initially in our clinic in 2016 due to shortness of breath, at which time echocardiogram suggested severe pulmonary hypertension. She was admitted at that time and had a RHC performed (crow garcia while hospitalized) which confirmed group 2 PH felt to be related to HFpEF (documented PCWP of 20, PAP 44/22, CO/CI 4.7/2.1). She was given Nipride, meds  "adjusted for further afterload reduction, and aggressively diuresed.      More recently, in December of 2022, she was admitted via the [facility] system for worsening shortness of breath and syncope. She was found to have streptococcus bacteremia, complicated by symptomatic rapid atrial fibrillation and concern for cardiogenic shock. She was aggressively diuresed and rate controlled with metoprolol. During her stay she had a repeat echo (notably during afib with HR of 130s), again showing severe pulmonary hypertension with estimated RVSP of 85mmHg + RAP. Her RV is severely enlarged with moderately decreased RV function. RA pressure was also suspected to be elevated. LV function and size were reported as normal. She was discharged on 40mg of Lasix BID which was apparently unchanged from prior. She was then asked to follow back with us due to her pulmonary hypertension.    When I saw her in clinic in mid January, she reported slowly worsening KEMP and LE edema. She relayed that her legs were like \"tree trunks.\" She used to wrap them but admitted not doing this as much lately. Reported home weight was 224# as she declined a clinic weight. She had not had recurrence of dizziness or syncope, though BP was a bit lower than her baseline. We discussed possible repeat RHC, but given her advanced age and likelihood of little new information, we decided jointly to continue to treat symptomatically and work on medication optimization. We discussed initiation of an SGLT2 inhibitor, but I told her I would like to reach out to her PCP first. Labs revealed hypokalemia and continued elevated BNP, I increased her Lasix slightly to 60mg Am, 40mg PM, and added potassium supplements. I encouraged her to start wrapping her legs again as well. To ensure afib rate control, I also recommended a Ziopatch. Shortly after I heard back from her PCP who was ok to proceed with Jardiance.     Today, we are following up virtually to check back in. She " tells me that she is urinating more with the increased diuretic and thinks maybe her breathing is slightly better. She reports a home weight of 202# though last visit only a few weeks ago she reported 224# so accuracy of this is certainly in question. She has not yet started wrapping her legs. Since she was here last she met with her PCP who was concerned that her hemoglobin had dropped and iron studies were planned. Additionally, he sent her for an MTM visit due to polypharmacy as she was not entirely clear on all her medications.     Unfortunately, it does not appear that she had repeat labs done locally as requested.         CURRENT PULMONARY HYPERTENSION REGIMEN:     PAH Rx: None     Diuretics: Lasix 60mg Am, 40mg Pm     Oxygen: NC 2L around the house      Anticoagulation: warfarin   Indication: afib         ASSESSMENT/PLAN:        1. Pulmonary hypertension:              --Ms. Colindres is what appears to be severe group 2 pulmonary hypertension. She had her initial diagnosis in 2016 based on invasive hemodynamic testing, and efforts were put toward diuresis and afterload reduction. No pulmonary vasodilators were felt warranted at that time. We then lost her to follow up in early 2021.    --I saw her back after a recent hospital stay for decompensated heart failure, but also in the setting of sepsis and rapid atrial fibrillation. Echo at that time showed EF low normal at 53% with normal LV size. However, RV was severely enlarged with moderate RV dysfunction. However, as mentioned, she sounded to be decompensated at that time.  We did discuss possible repeat RHC, but given her advanced age, and likelihood that this may not be very revealing, we decided jointly to continue to treat symptomatically and work on medication optimization.   --Last visit I increased her Lasix slightly which sounds to have been beneficial for her. Her home weight is reported is down significantly, so the accuracy is unclear to me. I  recommended that she start wrapping her legs again. I also asked her to make an appt for repeat BMP after addition of potassium and diuretic adjustments.    --She was found to be more anemic recently and iron studies are planned; she is following with her PCP for this.    --We discussed potential addition of SGLT2 inhibitors; I reached out to her PCP who was initially in favor of Jardiance; however with her recent visit he was also concerned regarding polypharmacy. Thus, I opted not to add this today; she has an MTM visit coming up to discuss further, and we can reconsider this in the future. She could likely also benefit from more afterload reduction, however her BP is already low, so I don't think we have room to increase losartan further at this point.                  --Recommended continued compliance with oxygen and CPAP at night. She carries a diagnosis of COPD but tells me she is a never smoker. On PFTs in 2019 she had potential airflow obstruction with only a mild reduction in diffusion capacity.      2. Atrial fibrillation.              --She has chronic atrial fibrillation which was rate controlled previously though some recent issues with RVR while in the hospital as outlined above. Currently she is on metoprolol 50mg BID; I am hesitant to go up on this and possibly would like to come down if possible due to poor RV function. We did a ziopatch last visit, but results are still pending. Will reach out to her with results once available.              --Continue digoxin as is, as this may also help support RV.               --Anticoagulated with warfarin, follows with INR clinic locally.        Follow up plan: Follow up already in place with Dr Sterling in March, which we will keep.       Testing/labs:    Most recent labs:    Latest Reference Range & Units 01/16/23 17:19   Sodium 133 - 144 mmol/L 138   Potassium 3.4 - 5.3 mmol/L 3.2 (L)   Chloride 94 - 109 mmol/L 97   Carbon Dioxide 20 - 32 mmol/L 29   Urea  Nitrogen 7 - 30 mg/dL 18   Creatinine 0.52 - 1.04 mg/dL 1.47 (H)   GFR Estimate >60 mL/min/1.73m2 34 (L)   Calcium 8.5 - 10.1 mg/dL 9.8   Anion Gap 3 - 14 mmol/L 12   ALT 0 - 50 U/L 17   Cholesterol <200 mg/dL  <200 mg/dL 121  108   Patient Fasting > 8hrs?  No   HDL Cholesterol >=50 mg/dL  >=50 mg/dL 64  65   LDL Cholesterol Calculated <=100 mg/dL  <=100 mg/dL 45  31   Non HDL Cholesterol <130 mg/dL  <130 mg/dL 57  43   N-Terminal Pro Bnp 0 - 1,800 pg/mL 2,523 (H)   Triglycerides <150 mg/dL  <150 mg/dL 59  62   (L): Data is abnormally low  (H): Data is abnormally high   Latest Reference Range & Units 01/16/23 17:19   WBC 4.0 - 11.0 10e3/uL 6.8   Hemoglobin 11.7 - 15.7 g/dL 9.5 (L)   Hematocrit 35.0 - 47.0 % 30.9 (L)   Platelet Count 150 - 450 10e3/uL 269   RBC Count 3.80 - 5.20 10e6/uL 3.28 (L)   MCV 78 - 100 fL 94   MCH 26.5 - 33.0 pg 29.0   MCHC 31.5 - 36.5 g/dL 30.7 (L)   RDW 10.0 - 15.0 % 16.7 (H)   (L): Data is abnormally low  (H): Data is abnormally high    Other recent pertinent testing:    Echocardiogram  12/12/22 (outisde)  SUMMARY     Rhythm is atrial fibrillation with rapid ventricular response (130's).     Severe pulmonary hypertension with estimated pulmonary artery systolic   pressure of 85 mmHg + RA pressure.   Severely enlarged right ventricular size with diastolic flattening of the   interventricular septum consistent with Cor pulmonale.   Moderately decreased right ventricular systolic performance.   Tricuspid valve insufficiency moderate.   Biatrial enlargement severe.   Based on the appearance of the IVC, the RA pressure is elevated.   Normal left ventricular cavity size, borderline increased wall thickness   and normal LV systolic function.   The estimated left ventricular ejection fraction is 53 %.   No regional left ventricular wall motion abnormality.   Nonspecific aortic valve abnormality .   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        NYHA  Functional Class:  3    Phone-Visit Details    Type of service:  Video Visit    Start Time: 1104  End Time: 1115    An additional 15 minutes was spent today performing chart and history review, pre and post visit documentation, and care coordination.    Zayda Tomlinson PA-C  [facility]  Pager [phone]      Thank you for allowing me to participate in the care of your patient.      Sincerely,     FARHANA Owens     [facility]  cc:   FARHANA Owens  [facility]  [address] [place] [id number]

## 2023-02-07 ENCOUNTER — TELEPHONE (OUTPATIENT)
Dept: INTERNAL MEDICINE | Facility: CLINIC | Age: 88
End: 2023-02-07
Payer: COMMERCIAL

## 2023-02-07 ENCOUNTER — TRANSFERRED RECORDS (OUTPATIENT)
Dept: HEALTH INFORMATION MANAGEMENT | Facility: CLINIC | Age: 88
End: 2023-02-07
Payer: COMMERCIAL

## 2023-02-07 ENCOUNTER — ANTICOAGULATION THERAPY VISIT (OUTPATIENT)
Dept: ANTICOAGULATION | Facility: CLINIC | Age: 88
End: 2023-02-07
Payer: COMMERCIAL

## 2023-02-07 DIAGNOSIS — I48.19 PERSISTENT ATRIAL FIBRILLATION (H): ICD-10-CM

## 2023-02-07 DIAGNOSIS — Z79.01 CHRONIC ANTICOAGULATION: ICD-10-CM

## 2023-02-07 DIAGNOSIS — Z79.01 LONG TERM CURRENT USE OF ANTICOAGULANT THERAPY: Primary | ICD-10-CM

## 2023-02-07 LAB — INR (EXTERNAL): 1.9 (ref 0.9–1.1)

## 2023-02-07 NOTE — TELEPHONE ENCOUNTER
The Home Care/Assisted Living/Nursing Facility is calling regarding an established patient.  Has the patient seen Home Care in the past or is currently residing in Assisted Living or Nursing Facility? Yes.     HO Oliva calling from [facility] requesting the following orders that are within the Home Care, Assisted Living or Nursing Home Eval and Treatment standing order and can be signed as standing order signature required by RN.    Preferred Call Back Number: [phone]    PT/OT/Speech Therapy    Any additional Orders:  Are there any orders requested, not stated above, that are outside of the standing order and must be routed to a licensed practitioner for approval?    No    Writer has verified Requestor will send fax to have orders signed.    OT - 1x/week for 3 weeks    MALCOM Kelly RN  [facility]

## 2023-02-07 NOTE — PROGRESS NOTES
ANTICOAGULATION MANAGEMENT     Mara Colindres 88 year old female is on warfarin with subtherapeutic INR result. (Goal INR 2.0-3.0)    Recent labs: (last 7 days)     02/07/23  1244   INR 1.9*       ASSESSMENT       Source(s): Chart Review and Home Care/Facility Nurse       Warfarin doses taken: Warfarin taken as instructed    Diet: No new diet changes identified    New illness, injury, or hospitalization: No    Medication/supplement changes: None noted    Signs or symptoms of bleeding or clotting: No    Previous INR: Therapeutic last visit; previously outside of goal range    Additional findings: None       PLAN     Recommended plan for no diet, medication or health factor changes affecting INR     Dosing Instructions: Increase your warfarin dose (8.7% change) with next INR in 1 week       Summary  As of 2/7/2023    Full warfarin instructions:  3 mg every Mon, Wed, Fri; 4 mg all other days   Next INR check:  2/14/2023             Telephone call with [facility] home care nurse who agrees to plan and repeated back plan correctly    Orders given to  Homecare nurse/facility to recheck    Education provided:     None required    Plan made per [facility] anticoagulation protocol    Shellie Aguayo, RN  Anticoagulation Clinic  2/7/2023    _______________________________________________________________________     Anticoagulation Episode Summary     Current INR goal:  2.0-3.0   TTR:  57.2 % (1 y)   Target end date:  Indefinite   Send INR reminders to:  [facility]    Indications    Long-term (current) use of anticoagulants [Z79.01] [Z79.01]  Persistent atrial fibrillation (H) [I48.19]  Chronic anticoagulation [Z79.01]           Comments:  [facility] home meter. Manage by exception.         Anticoagulation Care Providers     Provider Role Specialty Phone number    Steven Abrams MD Referring Internal Medicine [phone]

## 2023-02-09 ENCOUNTER — ANTICOAGULATION THERAPY VISIT (OUTPATIENT)
Dept: ANTICOAGULATION | Facility: CLINIC | Age: 88
End: 2023-02-09

## 2023-02-09 ENCOUNTER — LAB (OUTPATIENT)
Dept: LAB | Facility: CLINIC | Age: 88
End: 2023-02-09
Payer: COMMERCIAL

## 2023-02-09 ENCOUNTER — TELEPHONE (OUTPATIENT)
Dept: CARDIOLOGY | Facility: CLINIC | Age: 88
End: 2023-02-09

## 2023-02-09 ENCOUNTER — TELEPHONE (OUTPATIENT)
Dept: INTERNAL MEDICINE | Facility: CLINIC | Age: 88
End: 2023-02-09

## 2023-02-09 DIAGNOSIS — Z91.89 AT RISK FOR POLYPHARMACY: ICD-10-CM

## 2023-02-09 DIAGNOSIS — D64.9 ANEMIA, UNSPECIFIED TYPE: ICD-10-CM

## 2023-02-09 DIAGNOSIS — N18.4 CKD (CHRONIC KIDNEY DISEASE) STAGE 4, GFR 15-29 ML/MIN (H): ICD-10-CM

## 2023-02-09 DIAGNOSIS — Z79.01 CHRONIC ANTICOAGULATION: ICD-10-CM

## 2023-02-09 DIAGNOSIS — E11.3299 BACKGROUND DIABETIC RETINOPATHY (H): ICD-10-CM

## 2023-02-09 DIAGNOSIS — I48.19 PERSISTENT ATRIAL FIBRILLATION (H): ICD-10-CM

## 2023-02-09 DIAGNOSIS — I27.20 PULMONARY HYPERTENSION (H): ICD-10-CM

## 2023-02-09 DIAGNOSIS — Z79.01 LONG TERM CURRENT USE OF ANTICOAGULANT THERAPY: Primary | ICD-10-CM

## 2023-02-09 DIAGNOSIS — R06.02 SOB (SHORTNESS OF BREATH): ICD-10-CM

## 2023-02-09 LAB
ANION GAP SERPL CALCULATED.3IONS-SCNC: 4 MMOL/L (ref 3–14)
BASOPHILS # BLD AUTO: 0.1 10E3/UL (ref 0–0.2)
BASOPHILS NFR BLD AUTO: 2 %
BUN SERPL-MCNC: 30 MG/DL (ref 7–30)
CALCIUM SERPL-MCNC: 9.3 MG/DL (ref 8.5–10.1)
CHLORIDE BLD-SCNC: 104 MMOL/L (ref 94–109)
CO2 SERPL-SCNC: 32 MMOL/L (ref 20–32)
CREAT SERPL-MCNC: 1.79 MG/DL (ref 0.52–1.04)
EOSINOPHIL # BLD AUTO: 0.2 10E3/UL (ref 0–0.7)
EOSINOPHIL NFR BLD AUTO: 3 %
ERYTHROCYTE [DISTWIDTH] IN BLOOD BY AUTOMATED COUNT: 16.6 % (ref 10–15)
FERRITIN SERPL-MCNC: 74 NG/ML (ref 8–252)
GFR SERPL CREATININE-BSD FRML MDRD: 27 ML/MIN/1.73M2
GLUCOSE BLD-MCNC: 210 MG/DL (ref 70–99)
HCT VFR BLD AUTO: 34.3 % (ref 35–47)
HGB BLD-MCNC: 10.5 G/DL (ref 11.7–15.7)
INR BLD: 1.9 (ref 0.9–1.1)
IRON SATN MFR SERPL: 10 % (ref 15–46)
IRON SERPL-MCNC: 42 UG/DL (ref 35–180)
LYMPHOCYTES # BLD AUTO: 0.9 10E3/UL (ref 0.8–5.3)
LYMPHOCYTES NFR BLD AUTO: 15 %
MCH RBC QN AUTO: 27.7 PG (ref 26.5–33)
MCHC RBC AUTO-ENTMCNC: 30.6 G/DL (ref 31.5–36.5)
MCV RBC AUTO: 91 FL (ref 78–100)
MONOCYTES # BLD AUTO: 0.6 10E3/UL (ref 0–1.3)
MONOCYTES NFR BLD AUTO: 10 %
NEUTROPHILS # BLD AUTO: 4.4 10E3/UL (ref 1.6–8.3)
NEUTROPHILS NFR BLD AUTO: 71 %
NT-PROBNP SERPL-MCNC: 2885 PG/ML (ref 0–1800)
PLATELET # BLD AUTO: 235 10E3/UL (ref 150–450)
POTASSIUM BLD-SCNC: 4.3 MMOL/L (ref 3.4–5.3)
RBC # BLD AUTO: 3.79 10E6/UL (ref 3.8–5.2)
SODIUM SERPL-SCNC: 140 MMOL/L (ref 133–144)
TIBC SERPL-MCNC: 419 UG/DL (ref 240–430)
TSH SERPL DL<=0.005 MIU/L-ACNC: 2.59 MU/L (ref 0.4–4)
WBC # BLD AUTO: 6.2 10E3/UL (ref 4–11)

## 2023-02-09 PROCEDURE — 84443 ASSAY THYROID STIM HORMONE: CPT

## 2023-02-09 PROCEDURE — 83880 ASSAY OF NATRIURETIC PEPTIDE: CPT

## 2023-02-09 PROCEDURE — 83550 IRON BINDING TEST: CPT

## 2023-02-09 PROCEDURE — 82728 ASSAY OF FERRITIN: CPT

## 2023-02-09 PROCEDURE — 85610 PROTHROMBIN TIME: CPT

## 2023-02-09 PROCEDURE — 36415 COLL VENOUS BLD VENIPUNCTURE: CPT

## 2023-02-09 PROCEDURE — 80048 BASIC METABOLIC PNL TOTAL CA: CPT

## 2023-02-09 PROCEDURE — 83540 ASSAY OF IRON: CPT

## 2023-02-09 PROCEDURE — 85025 COMPLETE CBC W/AUTO DIFF WBC: CPT

## 2023-02-09 RX ORDER — FUROSEMIDE 20 MG
TABLET ORAL
Qty: 150 TABLET | Refills: 3 | COMMUNITY
Start: 2023-02-09 | End: 2023-09-05

## 2023-02-09 NOTE — TELEPHONE ENCOUNTER
Spoke with patient regarding recent labs; asked patient to decrease Lasix dose to 40 mg BID. Patient verbalized understanding; provided my direct number for contact with any questions or concerns. Dilcia Marino RN on 2/9/2023 at 3:25 PM    ----- Message from FARHANA Owens sent at 2/9/2023  3:14 PM CST -----  Regarding: lab results  Pls call pt and let her know that I saw her labs.  Renal function is a bit worse, so I'd like her to come back from from 60/40 to 40mg BID on her Lasix.  Remind her to weigh and call if her weight goes back up.    Potassium is better on supplements so continue that as is.    Thanks  Zayda

## 2023-02-09 NOTE — PROGRESS NOTES
ANTICOAGULATION MANAGEMENT     Mara Colindres 88 year old female is on warfarin with therapeutic INR result. (Goal INR 2.0-3.0)    Recent labs: (last 7 days)     02/09/23  1139   INR 1.9*       ASSESSMENT       Source(s): Chart Review and Patient/Caregiver Call       Warfarin doses taken: Warfarin taken as instructed    Diet: No new diet changes identified    New illness, injury, or hospitalization: No    Medication/supplement changes: None noted    Signs or symptoms of bleeding or clotting: No    Previous INR: Subtherapeutic    Additional findings: dosing was just increased on Tueday 2/7--needs more tad eto see changes       PLAN     Recommended plan for temporary change(s) affecting INR     Dosing Instructions: booster dose then continue your current warfarin dose with next INR in 1 week       Summary  As of 2/9/2023    Full warfarin instructions:  2/9: 5 mg; Otherwise 3 mg every Mon, Wed, Fri; 4 mg all other days   Next INR check:  2/16/2023             Telephone call with  Hong (pt son) who verbalizes understanding and agrees to plan and who agrees to plan and repeated back plan correctly    Patient to recheck with home meter    Education provided:     Please call back if any changes to your diet, medications or how you've been taking warfarin    Plan made per [facility] anticoagulation protocol    Quynh Pizarro, RN  Anticoagulation Clinic  2/9/2023    _______________________________________________________________________     Anticoagulation Episode Summary     Current INR goal:  2.0-3.0   TTR:  56.7 % (1 y)   Target end date:  Indefinite   Send INR reminders to:  [facility] HOME [facility]IT[facility]    Indications    Long-term (current) use of anticoagulants [Z79.01] [Z79.01]  Persistent atrial fibrillation (H) [I48.19]  Chronic anticoagulation [Z79.01]           Comments:  [facility] home meter. Manage by exception.         Anticoagulation Care Providers     Provider Role Specialty Phone number    Steven Abrams MD Referring  Internal Medicine [phone]

## 2023-02-09 NOTE — TELEPHONE ENCOUNTER
The Home Care/Assisted Living/Nursing Facility is calling regarding an established patient.  Has the patient seen Home Care in the past or is currently residing in Assisted Living or Nursing Facility? Yes.     Tang PT calling from [facility] requesting the following orders that are within the Home Care, Assisted Living or Nursing Home Eval and Treatment standing order and can be signed as standing order signature required by RN.    Preferred Call Back Number: [phone]    PT/OT/Speech Therapy     Add a PT visit week of the 20th   Gave verbal ok for orders requested    Any additional Orders:  Are there any orders requested, not stated above, that are outside of the standing order and must be routed to a licensed practitioner for approval?    No    Writer has verified Requestor will send fax to have orders signed.      Usha Zhong RN  [facility]

## 2023-02-13 ENCOUNTER — ANTICOAGULATION THERAPY VISIT (OUTPATIENT)
Dept: ANTICOAGULATION | Facility: CLINIC | Age: 88
End: 2023-02-13
Payer: COMMERCIAL

## 2023-02-13 DIAGNOSIS — I48.19 PERSISTENT ATRIAL FIBRILLATION (H): ICD-10-CM

## 2023-02-13 DIAGNOSIS — Z79.01 CHRONIC ANTICOAGULATION: ICD-10-CM

## 2023-02-13 DIAGNOSIS — Z79.01 LONG TERM CURRENT USE OF ANTICOAGULANT THERAPY: Primary | ICD-10-CM

## 2023-02-13 NOTE — PROGRESS NOTES
Incoming fax from [facility]    Result date: 02/07/2023 1141 EST  INR: 1.9    Duplicate result. Already addressed in 02/07/2023 encounter.

## 2023-02-14 ENCOUNTER — TELEPHONE (OUTPATIENT)
Dept: INTERNAL MEDICINE | Facility: CLINIC | Age: 88
End: 2023-02-14
Payer: COMMERCIAL

## 2023-02-14 ENCOUNTER — ANTICOAGULATION THERAPY VISIT (OUTPATIENT)
Dept: ANTICOAGULATION | Facility: CLINIC | Age: 88
End: 2023-02-14
Payer: COMMERCIAL

## 2023-02-14 DIAGNOSIS — F41.9 ANXIETY: Primary | ICD-10-CM

## 2023-02-14 DIAGNOSIS — Z79.01 CHRONIC ANTICOAGULATION: ICD-10-CM

## 2023-02-14 DIAGNOSIS — Z79.01 LONG TERM CURRENT USE OF ANTICOAGULANT THERAPY: Primary | ICD-10-CM

## 2023-02-14 DIAGNOSIS — I48.19 PERSISTENT ATRIAL FIBRILLATION (H): ICD-10-CM

## 2023-02-14 LAB — INR (EXTERNAL): 2.1 (ref 0.9–1.1)

## 2023-02-14 NOTE — TELEPHONE ENCOUNTER
Received call from Kinsey with [facility].    She is calling regarding hydrOXYzine (ATARAX) 50 MG tablet. It is listed as 'patient reported' in patient's medication list. She states that this was prescribed in the TCU, however patient is now out of medication. Should patient continue this medication? If so, will need new rx sent to: [id number] - [place], [place] - [address] AT [facility] [address]    NICANOR KellyN RN  [facility]

## 2023-02-14 NOTE — PROGRESS NOTES
ANTICOAGULATION MANAGEMENT     Mara Colindres 88 year old female is on warfarin with therapeutic INR result. (Goal INR 2.0-3.0)    Recent labs: (last 7 days)     02/14/23  1122   INR 2.1*       ASSESSMENT       Source(s): Chart Review and Home Care/Facility Nurse       Warfarin doses taken: Warfarin taken as instructed    Diet: No new diet changes identified    New illness, injury, or hospitalization: No    Medication/supplement changes: None noted    Signs or symptoms of bleeding or clotting: No    Previous INR: Subtherapeutic    Additional findings: None       PLAN     Recommended plan for no diet, medication or health factor changes affecting INR     Dosing Instructions: Continue your current warfarin dose with next INR in 8 days       Summary  As of 2/14/2023    Full warfarin instructions:  3 mg every Mon, Wed, Fri; 4 mg all other days   Next INR check:  2/22/2023             Telephone call with [facility] home care nurse who verbalizes understanding and agrees to plan    Orders given to  Homecare nurse/facility to recheck    Education provided:     Goal range and lab monitoring: goal range and significance of current result    Plan made per [facility] anticoagulation protocol    Escobar Pereira RN  Anticoagulation Clinic  2/14/2023    _______________________________________________________________________     Anticoagulation Episode Summary     Current INR goal:  2.0-3.0   TTR:  57.4 % (1 y)   Target end date:  Indefinite   Send INR reminders to:  [facility]    Indications    Long-term (current) use of anticoagulants [Z79.01] [Z79.01]  Persistent atrial fibrillation (H) [I48.19]  Chronic anticoagulation [Z79.01]           Comments:  [facility] home meter. Manage by exception.         Anticoagulation Care Providers     Provider Role Specialty Phone number    Steven Abrams MD Referring Internal Medicine [phone]

## 2023-02-14 NOTE — PROGRESS NOTES
VM Message from [facility]. INR today is 2.1. [phone]  Belkis Nunez, AMAYA  Anticoagulation Nurse - [place] [facility], [place]

## 2023-02-15 ENCOUNTER — TRANSFERRED RECORDS (OUTPATIENT)
Dept: HEALTH INFORMATION MANAGEMENT | Facility: CLINIC | Age: 88
End: 2023-02-15
Payer: COMMERCIAL

## 2023-02-15 RX ORDER — HYDROXYZINE HYDROCHLORIDE 50 MG/1
50 TABLET, FILM COATED ORAL EVERY 6 HOURS PRN
Qty: 100 TABLET | Refills: 11 | Status: SHIPPED | OUTPATIENT
Start: 2023-02-15 | End: 2023-02-20

## 2023-02-16 NOTE — PROGRESS NOTES
Incoming fax from [facility]    Result date: 02/15/2023 1948 EST  INR: 2.1    Called Mara who states that she did not test on 02/15. This was the result from the home care nurse 02/14. Advised that Mara only report results to [facility] in the future if she tests with her own meter. She verbalized understanding.    Called [facility] and told them that this result was not taken on Mara' home meter. Requested they suspend sending her reminders ~1 month until home care is finished.

## 2023-02-20 ENCOUNTER — VIRTUAL VISIT (OUTPATIENT)
Dept: PHARMACY | Facility: CLINIC | Age: 88
End: 2023-02-20
Attending: INTERNAL MEDICINE
Payer: COMMERCIAL

## 2023-02-20 DIAGNOSIS — E11.22 CONTROLLED TYPE 2 DIABETES MELLITUS WITH STAGE 3 CHRONIC KIDNEY DISEASE, WITHOUT LONG-TERM CURRENT USE OF INSULIN (H): ICD-10-CM

## 2023-02-20 DIAGNOSIS — N18.30 CONTROLLED TYPE 2 DIABETES MELLITUS WITH STAGE 3 CHRONIC KIDNEY DISEASE, WITHOUT LONG-TERM CURRENT USE OF INSULIN (H): ICD-10-CM

## 2023-02-20 DIAGNOSIS — M15.0 PRIMARY OSTEOARTHRITIS INVOLVING MULTIPLE JOINTS: ICD-10-CM

## 2023-02-20 DIAGNOSIS — N18.4 CKD (CHRONIC KIDNEY DISEASE) STAGE 4, GFR 15-29 ML/MIN (H): ICD-10-CM

## 2023-02-20 DIAGNOSIS — E55.9 HYPOVITAMINOSIS D: ICD-10-CM

## 2023-02-20 DIAGNOSIS — R19.7 DIARRHEA: ICD-10-CM

## 2023-02-20 DIAGNOSIS — M81.0 AGE-RELATED OSTEOPOROSIS WITHOUT CURRENT PATHOLOGICAL FRACTURE: ICD-10-CM

## 2023-02-20 DIAGNOSIS — F33.0 MAJOR DEPRESSIVE DISORDER, RECURRENT EPISODE, MILD (H): ICD-10-CM

## 2023-02-20 DIAGNOSIS — I48.19 PERSISTENT ATRIAL FIBRILLATION (H): ICD-10-CM

## 2023-02-20 DIAGNOSIS — I50.32 CHRONIC DIASTOLIC CONGESTIVE HEART FAILURE (H): Primary | ICD-10-CM

## 2023-02-20 DIAGNOSIS — Z79.01 LONG TERM CURRENT USE OF ANTICOAGULANT THERAPY: ICD-10-CM

## 2023-02-20 DIAGNOSIS — G89.29 OTHER CHRONIC PAIN: ICD-10-CM

## 2023-02-20 PROCEDURE — 99207 PR NO CHARGE LOS: CPT | Performed by: PHARMACIST

## 2023-02-20 RX ORDER — GINGER ROOT/GINGER ROOT EXT 262.5 MG
600 CAPSULE ORAL 2 TIMES DAILY
COMMUNITY

## 2023-02-20 RX ORDER — MULTIVIT-MIN/IRON/FOLIC ACID/K 18-600-40
1 CAPSULE ORAL DAILY
COMMUNITY
End: 2024-02-21

## 2023-02-20 NOTE — PATIENT INSTRUCTIONS
"Recommendations from today's MTM visit:                                                    MTM (medication therapy management) is a service provided by a clinical pharmacist designed to help you get the most of out of your medicines.   Today we reviewed what your medicines are for, how to know if they are working, that your medicines are safe and how to make your medicine regimen as easy as possible.      1. Stop hydroxyzine 50 mg at bedtime (stopping this might also help with the dry eyes and dry mouth). This can also increase risk for falls at night.    2. Increase calcium to twice daily.    3. Start Vitamin D 1000 international unit(s) daily.      4. Stop losartan. Check blood pressure a few times a week.     Follow-up: Return in about 3 weeks (around 3/13/2023) for Medication Therapy Management.    It was great speaking with you today.  I value your experience and would be very thankful for your time in providing feedback in our clinic survey. In the next few days, you may receive an email or text message from [employer] with a link to a survey related to your  clinical pharmacist.\"     To schedule another MTM appointment, please call the clinic directly or you may call the MTM scheduling line at [phone] or toll-free at [phone].     My Clinical Pharmacist's contact information:                                                      Please feel free to contact me with any questions or concerns you have.      Arlene Ham, PharmD  Medication Therapy Management Pharmacist  [phone]   "

## 2023-02-20 NOTE — PROGRESS NOTES
Medication Therapy Management (MTM) Encounter    ASSESSMENT:                            Medication Adherence/Access: No issues identified    Hypertension/HFpEF/Atrial Fibrillation/History of DVT/CKD Stage 4: Concern for hypotension, last clinic reading was low as well. May benefit from trial off of losartan, goal to maintain blood pressure < 150/90 due to age.     Osteoporosis: Patient is not meeting RDI of calcium 1200mg/day. Patient is not meeting RDI of Vitamin D 1000 IU/day.    GERD: May benefit from continuing omeprazole, due to some nausea with Trulicity, and lower Hg.     Depression: patient prefers to maintain paroxetine. May benefit from stopping hydroxyzine due to risk of falls. Sertraline may be less drying the paroxetine and can consider in future if desired.     Type 2 Diabetes:  A1C at goal < 8%. Discussed could explore alternatives or lower dose of Trulicity, however Mara prefers to maintain current therapy.     Pain/osteoarthritis: Stable.     Diarrhea: Stable.     PLAN:                            1. Stop hydroxyzine 50 mg at bedtime (stopping this might also help with the dry eyes and dry mouth). This can also increase risk for falls at night.    2. Increase calcium to twice daily.    3. Start Vitamin D 1000 international unit(s) daily.      4. Stop losartan. Check blood pressure a few times a week.     Follow-up: Return in about 3 weeks (around 3/13/2023) for Medication Therapy Management.    SUBJECTIVE/OBJECTIVE:                          Mara Colindres is a 88 year old female called for an initial visit. She was referred to me from Steven Abrams for potential for polypharmacy. Patient was accompanied by her son, Hong..     Reason for visit: med review.    Allergies/ADRs: Reviewed in chart  Past Medical History: Reviewed in chart  Tobacco: She reports that she has never smoked. She has never used smokeless tobacco.  Alcohol: none  Caffeine: 1 cup coffee, 1 pespi on occassion    Medication  Adherence/Access:   Patient uses pill box(es).  Patient takes medications 4 time(s) per day.   Per patient, misses medication 0 times per week.   The patient fills medications at [facility]: NO, fills medications at [facility].    Hypertension/HFpEF/Atrial Fibrillation/History of DVT/CKD Stage 4:   Furosemide 40 mg every morning and 40 mg at lunch.   Metoprolol tartrate 50 mg twice daily   Digoxin 62.5 mcg every other day  Losartan 25 mg at lunch  Potassium 20 mEq at dinner and at bedtime   Warfarin per INR clinic    Furosemide dose recently reduced.   Patient does not self-monitor blood pressure (but does have a cuff).  Patient reports the following medication side effects: dizziness - but she attributes it to her oxygen, she's been told she can use this in the shower and she hasn't done this yet. She does have a lot of dizziness overnight and worries about falling.   Home blood pressure today: 102/67, 86  BP Readings from Last 3 Encounters:   01/17/23 97/57   01/16/23 124/80   06/03/22 126/74     INR   Date Value Ref Range Status   07/06/2021 3.20 (H) 0.86 - 1.14 Final     Comment:     This test is intended for monitoring Coumadin therapy.  Results are not   accurate in patients with prolonged INR due to factor deficiency.       INR (External)   Date Value Ref Range Status   02/14/2023 2.1 (A) 0.9 - 1.1 Final      GFR Estimate   Date Value Ref Range Status   02/09/2023 27 (L) >60 mL/min/1.73m2 Final     Comment:     eGFR calculated using 2021 CKD-EPI equation.   01/16/2023 34 (L) >60 mL/min/1.73m2 Final     Comment:     Effective December 21, 2021 eGFRcr in adults is calculated using the 2021 CKD-EPI creatinine equation which includes age and gender (Mari pacheco al., NEJM, DOI: 10.1056/UNZVtc5903962)   06/03/2022 31 (L) >60 mL/min/1.73m2 Final     Comment:     Effective December 21, 2021 eGFRcr in adults is calculated using the 2021 CKD-EPI creatinine equation which includes age and gender (Mari et al.,  NEJM, DOI: 10.1056/FSOJyf8056634)   01/25/2021 30 (L) >60 mL/min/[1.73_m2] Final     Comment:     Non  GFR Calc  Starting 12/18/2018, serum creatinine based estimated GFR (eGFR) will be   calculated using the Chronic Kidney Disease Epidemiology Collaboration   (CKD-EPI) equation.     08/11/2020 37 (L) >60 mL/min/[1.73_m2] Final     Comment:     Non  GFR Calc  Starting 12/18/2018, serum creatinine based estimated GFR (eGFR) will be   calculated using the Chronic Kidney Disease Epidemiology Collaboration   (CKD-EPI) equation.     01/07/2020 36 (L) >60 mL/min/[1.73_m2] Final     Comment:     Non  GFR Calc  Starting 12/18/2018, serum creatinine based estimated GFR (eGFR) will be   calculated using the Chronic Kidney Disease Epidemiology Collaboration   (CKD-EPI) equation.       Osteoporosis:  calcium 600/Vitamin D 20 mcg at lunch  alendronate (Fosamax) 70mg weekly on Thursday (has been on current therapy for 2 years)    History of Prolia.  Patient is not experiencing side effects.  DEXA History: 9/11/20, -3.8  Patient is getting the following sources of dietary calcium: cheese, yogurt at least 1-2x/week, doesn't drink a lot of milk.  Risk factors: post-menopausal, Height loss of 2.5 inches, Parent history of osteoporosis, follow up osteoporosis  Last vitamin D level:  Lab Results   Component Value Date    VITDT 37 06/03/2022    VITDT 57 09/13/2021    VITDT 33 08/11/2020    VITDT 44 04/29/2019    VITDT 34 11/07/2017     GERD:   Prilosec (omeprazole) 20 once daily    Patient reports no current symptoms.   The patient does not have a history of GI bleed, although current concern for lower hemoglobin.  Patient has not tried a trial off of therapy and is interested in doing so. Patient feels that current regimen is effective.    Depression:    Paroxetine 10 mg every other day (helps her stop crying all the time)    Hydroxyzine 50 mg was for shaking/jitteriness at night, but  they only have a week or two left, Dr. Abrams did not refill it, they'll try without it for a while.  She has dry mouth, dry eyes.  She has a full bottle of paroxetine.     Type 2 Diabetes:    Trulicity 1.5 mg weekly.     Sometimes, only a few times, when she eats she throws up just a little, happens once a month. She's been working on eating slower, smaller bites, drinking water.   History of diarrhea with metformin.   Patient is experiencing the following side effects: as above  Blood sugar monitorin time(s) daily  Ranges (patient reported): 135, 157, 175, highest she's had is 200-something.  Symptoms of low blood sugar? none  Symptoms of high blood sugar? none  Eye exam: up to date  Foot exam: up to date  Diet/Exercise: did not discuss  Aspirin: Not taking due to warfarin  Statin: No   ACEi/ARB: Yes: losartan.   Urine Albumin:   Lab Results   Component Value Date    UMALCR 79.07 (H) 2023      Lab Results   Component Value Date    A1C 7.4 2022    A1C 7.7 2022    A1C 7.3 2021    A1C 7.1 2020    A1C 7.0 2019    A1C 7.3 2018    A1C 7.8 2017    A1C 8.2 2017     Pain/osteoarthritis:    Norco 5-325 mg daily as needed for arm/shoulder pain  Acetaminophen 1000 mg as needed (a few times a week)    She takes the Norco only when she really needs it, ~2-3/month.    Diarrhea:    Has had long standing diarrhea, she'll take Lomotil when she's out for a hockey game or something, but otherwise doesn't regularly use. States this/these are effective. Denies side effects.     No longer taking cyclobenzaprine.     Today's Vitals: There were no vitals taken for this visit.  ----------------      I spent 51 minutes with this patient today. All changes were made via collaborative practice agreement with Steven Abrams MD. A copy of the visit note was provided to the patient's provider(s).    A summary of these recommendations was mailed to the patient.    Arlene Ham,  PharmD  Medication Therapy Management Pharmacist  [phone]    Telemedicine Visit Details  Type of service:  Telephone visit  Start Time: 10:34 AM  End Time: 11:25 AM     Medication Therapy Recommendations  Age-related osteoporosis without current pathological fracture    Current Medication: Calcium Carb-Cholecalciferol (CALCIUM 600+D) 600-20 MG-MCG TABS   Rationale: Dose too low - Dosage too low - Effectiveness   Recommendation: Increase Dose   Status: Accepted per CPA          Current Medication: Calcium Carb-Cholecalciferol (CALCIUM 600+D) 600-20 MG-MCG TABS   Rationale: Synergistic therapy - Needs additional medication therapy - Indication   Recommendation: Start Medication - Vitamin D3 25 mcg (1000 units) tablet   Status: Accepted per CPA         Chronic diastolic congestive heart failure (H)    Current Medication: losartan (COZAAR) 25 MG tablet (Discontinued)   Rationale: Unsafe medication for the patient - Adverse medication event - Safety   Recommendation: Discontinue Medication   Status: Accepted per CPA         Major depressive disorder, recurrent episode, mild (H)    Current Medication: hydrOXYzine (ATARAX) 50 MG tablet (Discontinued)   Rationale: Unsafe medication for the patient - Adverse medication event - Safety   Recommendation: Discontinue Medication   Status: Accepted per CPA

## 2023-02-20 NOTE — Clinical Note
JORDEN MORALES note, thanks!  Arlene Ham, PharmD Medication Therapy Management Pharmacist [phone]

## 2023-02-22 ENCOUNTER — TELEPHONE (OUTPATIENT)
Dept: FAMILY MEDICINE | Facility: CLINIC | Age: 88
End: 2023-02-22
Payer: COMMERCIAL

## 2023-02-22 ENCOUNTER — ANTICOAGULATION THERAPY VISIT (OUTPATIENT)
Dept: ANTICOAGULATION | Facility: CLINIC | Age: 88
End: 2023-02-22
Payer: COMMERCIAL

## 2023-02-22 DIAGNOSIS — Z79.01 CHRONIC ANTICOAGULATION: ICD-10-CM

## 2023-02-22 DIAGNOSIS — Z79.01 LONG TERM CURRENT USE OF ANTICOAGULANT THERAPY: ICD-10-CM

## 2023-02-22 DIAGNOSIS — I48.19 PERSISTENT ATRIAL FIBRILLATION (H): ICD-10-CM

## 2023-02-22 DIAGNOSIS — Z79.01 LONG TERM CURRENT USE OF ANTICOAGULANT THERAPY: Primary | ICD-10-CM

## 2023-02-22 LAB — INR (EXTERNAL): 2.4 (ref 0.9–1.1)

## 2023-02-22 RX ORDER — WARFARIN SODIUM 2 MG/1
TABLET ORAL
Qty: 180 TABLET | Refills: 1 | Status: SHIPPED | OUTPATIENT
Start: 2023-02-22 | End: 2023-03-01

## 2023-02-22 NOTE — PROGRESS NOTES
ANTICOAGULATION MANAGEMENT     Mara Colindres 88 year old female is on warfarin with therapeutic INR result. (Goal INR 2.0-3.0)    Recent labs: (last 7 days)     02/22/23  1049   INR 2.4*       ASSESSMENT       Source(s): Chart Review and Home Care/Facility Nurse       Warfarin doses taken: Warfarin taken as instructed    Diet: No new diet changes identified    New illness, injury, or hospitalization: No    Medication/supplement changes: stopped hydroxyzine, increased calcium, start vitamin D, stop losartan at 2/20/2023 MTM appointment.    Signs or symptoms of bleeding or clotting: No    Previous INR: Therapeutic last 2(+) visits    Additional findings: None     Will be discharge from home care next week and patient will continue with home monitor.      ANTICOAGULATION MANAGEMENT:  Medication Refill    Anticoagulation Summary  As of 2/22/2023    Warfarin maintenance plan:  3 mg (2 mg x 1.5) every Mon, Wed, Fri; 4 mg (2 mg x 2) all other days   Next INR check:  3/1/2023   Target end date:  Indefinite    Indications    Long-term (current) use of anticoagulants [Z79.01] [Z79.01]  Persistent atrial fibrillation (H) [I48.19]  Chronic anticoagulation [Z79.01]             Anticoagulation Care Providers     Provider Role Specialty Phone number    Steven Abrams MD Referring Internal Medicine [phone]          Visit with referring provider/group within last year: Yes    [facility] referral signed within last year: Yes    Mara meets all criteria for refill (current [facility] referral, office visit with referring provider/group in last year, lab monitoring up to date or not exceeding 2 weeks overdue). Rx instructions and quantity supplied updated to match patient's current dosing plan. Warfarin 90 day supply with 1 refill granted per [facility] protocol          PLAN     Recommended plan for no diet, medication or health factor changes affecting INR     Dosing Instructions: Continue your current warfarin dose with next INR in 1 week        Summary  As of 2/22/2023    Full warfarin instructions:  3 mg every Mon, Wed, Fri; 4 mg all other days   Next INR check:  3/1/2023             Telephone call with Ashley Euceda home care nurse who agrees to plan and repeated back plan correctly    Orders given to  Homecare nurse/facility to recheck    Education provided:     That new Rx will be sent into pharmacy.    Plan made per [facility] anticoagulation protocol    Shellie Aguayo, RN  Anticoagulation Clinic  2/22/2023    _______________________________________________________________________     Anticoagulation Episode Summary     Current INR goal:  2.0-3.0   TTR:  59.6 % (1 y)   Target end date:  Indefinite   Send INR reminders to:  ANTICOAG HOME MONITORING    Indications    Long-term (current) use of anticoagulants [Z79.01] [Z79.01]  Persistent atrial fibrillation (H) [I48.19]  Chronic anticoagulation [Z79.01]           Comments:  [employer] home meter. Manage by exception.         Anticoagulation Care Providers     Provider Role Specialty Phone number    Steven Abrams MD Referring Internal Medicine [phone]

## 2023-02-22 NOTE — TELEPHONE ENCOUNTER
Called Kathie ([facility]) and provided verbal ok per Dr. Steven Abrams for patient to be seen by [facility] virtually tomorrow. Kathie stated that patient is aware. Kathie will reach out with any further communication.    NICANOR RosasN RN  [facility]
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
## 2023-02-22 NOTE — TELEPHONE ENCOUNTER
Conor calling wanting to let provider know that patient is not feeling good. She has fatigue, she has periods of hypotension and  dizziness. Ashley ariza needs a order for a phone visit to check on patient tommorrow.        Please call Kathie clinical manager of [facility] with any questions.     [phone] this is a confidential Voicemail

## 2023-02-23 ENCOUNTER — TELEPHONE (OUTPATIENT)
Dept: FAMILY MEDICINE | Facility: CLINIC | Age: 88
End: 2023-02-23
Payer: COMMERCIAL

## 2023-02-23 NOTE — TELEPHONE ENCOUNTER
The Home Care/Assisted Living/Nursing Facility is calling regarding an established patient.  Has the patient seen Home Care in the past or is currently residing in Assisted Living or Nursing Facility? Yes.     Shawn calling from [facility] requesting the following orders that are within the Home Care, Assisted Living or Nursing Home Eval and Treatment standing order and can be signed as standing order signature required by RN.    Preferred Call Back Number: [phone]      Add PT visit week of 3/6/2023    Any additional Orders:  Are there any orders requested, not stated above, that are outside of the standing order and must be routed to a licensed practitioner for approval?    No    Writer has verified Requestor will send fax to have orders signed.    Soraida Polanco RN  [facility]

## 2023-02-24 ENCOUNTER — TELEPHONE (OUTPATIENT)
Dept: FAMILY MEDICINE | Facility: CLINIC | Age: 88
End: 2023-02-24
Payer: COMMERCIAL

## 2023-02-24 NOTE — TELEPHONE ENCOUNTER
Mónica, OT with [facility]    Missed visit this week due to pt illness     Would like to add visit week of march 6th     Verbal ok given.     [phone], if any questions.     Thanks,  AMAYA Granados  [facility]

## 2023-03-01 ENCOUNTER — ANTICOAGULATION THERAPY VISIT (OUTPATIENT)
Dept: ANTICOAGULATION | Facility: CLINIC | Age: 88
End: 2023-03-01
Payer: COMMERCIAL

## 2023-03-01 ENCOUNTER — TRANSFERRED RECORDS (OUTPATIENT)
Dept: HEALTH INFORMATION MANAGEMENT | Facility: CLINIC | Age: 88
End: 2023-03-01
Payer: COMMERCIAL

## 2023-03-01 DIAGNOSIS — Z79.01 LONG TERM CURRENT USE OF ANTICOAGULANT THERAPY: ICD-10-CM

## 2023-03-01 DIAGNOSIS — Z79.01 CHRONIC ANTICOAGULATION: ICD-10-CM

## 2023-03-01 DIAGNOSIS — I48.19 PERSISTENT ATRIAL FIBRILLATION (H): ICD-10-CM

## 2023-03-01 DIAGNOSIS — Z79.01 LONG TERM CURRENT USE OF ANTICOAGULANT THERAPY: Primary | ICD-10-CM

## 2023-03-01 LAB — INR (EXTERNAL): 2.4 (ref 0.9–1.1)

## 2023-03-01 RX ORDER — WARFARIN SODIUM 2 MG/1
TABLET ORAL
Qty: 180 TABLET | Refills: 1 | Status: SHIPPED | OUTPATIENT
Start: 2023-03-01 | End: 2023-10-26

## 2023-03-01 NOTE — PROGRESS NOTES
Incoming call from Astrid from [facility]* for Mara Colindres    Requesting for:dosing for INR 2.4  Warfarin doses: 3 mg Mon, Wed, Fri and 4 mg all other days    Call back number: [phone]

## 2023-03-01 NOTE — PROGRESS NOTES
ANTICOAGULATION MANAGEMENT     Mara Colindres 88 year old female is on warfarin with therapeutic INR result. (Goal INR 2.0-3.0)    Recent labs: (last 7 days)     03/01/23  0000   INR 2.4*       ASSESSMENT       Source(s): Chart Review and Patient/Caregiver Call       Warfarin doses taken: Warfarin taken as instructed    Diet: No new diet changes identified    New illness, injury, or hospitalization: No    Medication/supplement changes: None noted    Signs or symptoms of bleeding or clotting: No    Previous INR: Therapeutic last 2(+) visits    Additional findings: Per Astrid patient might be dicharged from Home Care after PT evaluation next week. Patient has Home    Needs refill of warfarin 2 mg made aware that refill was sent on 2/22 but per Astrid the patients son stated that there was a problem with the rx- made aware that refill be resent today         PLAN     Recommended plan for no diet, medication or health factor changes affecting INR     Dosing Instructions: Continue your current warfarin dose with next INR in 1 week       Summary  As of 3/1/2023    Full warfarin instructions:  3 mg every Mon, Wed, Fri; 4 mg all other days   Next INR check:  3/8/2023             Telephone call with [place]  home care nurse who verbalizes understanding and agrees to plan    Orders given to  Homecare nurse/facility to recheck    Education provided:     None required    Plan made per [facility] anticoagulation protocol    Coral Carlisle RN  Anticoagulation Clinic  3/1/2023    _______________________________________________________________________     Anticoagulation Episode Summary     Current INR goal:  2.0-3.0   TTR:  61.6 % (1 y)   Target end date:  Indefinite   Send INR reminders to:  ANTICOAG HOME MONITORING    Indications    Long-term (current) use of anticoagulants [Z79.01] [Z79.01]  Persistent atrial fibrillation (H) [I48.19]  Chronic anticoagulation [Z79.01]           Comments:  [employer] home meter. Manage by exception.          Anticoagulation Care Providers     Provider Role Specialty Phone number    Steven Abrams MD Referring Internal Medicine [phone]

## 2023-03-02 ENCOUNTER — OFFICE VISIT (OUTPATIENT)
Dept: INTERNAL MEDICINE | Facility: CLINIC | Age: 88
End: 2023-03-02
Payer: COMMERCIAL

## 2023-03-02 VITALS
DIASTOLIC BLOOD PRESSURE: 67 MMHG | OXYGEN SATURATION: 100 % | TEMPERATURE: 98.2 F | WEIGHT: 195 LBS | BODY MASS INDEX: 35.67 KG/M2 | HEART RATE: 89 BPM | SYSTOLIC BLOOD PRESSURE: 100 MMHG

## 2023-03-02 DIAGNOSIS — Z96.649 FAILED TOTAL HIP ARTHROPLASTY, SEQUELA: ICD-10-CM

## 2023-03-02 DIAGNOSIS — M00.9 SEPTIC HIP (H): ICD-10-CM

## 2023-03-02 DIAGNOSIS — F33.0 MAJOR DEPRESSIVE DISORDER, RECURRENT EPISODE, MILD (H): ICD-10-CM

## 2023-03-02 DIAGNOSIS — E66.01 MORBID OBESITY (H): ICD-10-CM

## 2023-03-02 DIAGNOSIS — T84.018S FAILED TOTAL HIP ARTHROPLASTY, SEQUELA: ICD-10-CM

## 2023-03-02 DIAGNOSIS — Z79.899 ENCOUNTER FOR LONG-TERM (CURRENT) USE OF MEDICATIONS: Primary | ICD-10-CM

## 2023-03-02 DIAGNOSIS — I82.511 CHRONIC DEEP VEIN THROMBOSIS (DVT) OF FEMORAL VEIN OF RIGHT LOWER EXTREMITY (H): ICD-10-CM

## 2023-03-02 PROCEDURE — G0439 PPPS, SUBSEQ VISIT: HCPCS | Performed by: INTERNAL MEDICINE

## 2023-03-02 ASSESSMENT — ACTIVITIES OF DAILY LIVING (ADL)
CURRENT_FUNCTION: SHOPPING REQUIRES ASSISTANCE
CURRENT_FUNCTION: PREPARING MEALS REQUIRES ASSISTANCE
CURRENT_FUNCTION: HOUSEWORK REQUIRES ASSISTANCE
CURRENT_FUNCTION: LAUNDRY REQUIRES ASSISTANCE
CURRENT_FUNCTION: TRANSPORTATION REQUIRES ASSISTANCE

## 2023-03-02 ASSESSMENT — ENCOUNTER SYMPTOMS
NERVOUS/ANXIOUS: 1
DIZZINESS: 1
EYE PAIN: 0
DIARRHEA: 1
HEMATURIA: 0
ABDOMINAL PAIN: 0
ARTHRALGIAS: 1
HEMATOCHEZIA: 0
SHORTNESS OF BREATH: 1
COUGH: 1
CHILLS: 1
FEVER: 0

## 2023-03-02 NOTE — PROGRESS NOTES
"SUBJECTIVE:   Mara is a 88 year old who presents for Preventive Visit.    Are you in the first 12 months of your Medicare coverage?  No    Healthy Habits:     In general, how would you rate your overall health?  Good    Frequency of exercise:  2-3 days/week    Duration of exercise:  15-30 minutes    Do you usually eat at least 4 servings of fruit and vegetables a day, include whole grains    & fiber and avoid regularly eating high fat or \"junk\" foods?  No    Taking medications regularly:  Yes    Medication side effects:  Muscle aches and Lightheadedness    Ability to successfully perform activities of daily living:  Transportation requires assistance, shopping requires assistance, preparing meals requires assistance, housework requires assistance and laundry requires assistance    Home Safety:  Throw rugs in the hallway    Hearing Impairment:  Difficulty following a conversation in a noisy restaurant or crowded room, feel that people are mumbling or not speaking clearly, need to ask people to speak up or repeat themselves, difficulty understanding soft or whispered speech and difficulty understanding speech on the telephone    In the past 6 months, have you been bothered by leaking of urine? Yes    In general, how would you rate your overall mental or emotional health?  Good      PHQ-2 Total Score: 2    Additional concerns today:  No    For exercise she is doing physical therapy at home - she has been lifting her feet which is amazingly secondary to her hip problems and otherwise not able to walk at this point     Her weight is now down to 195 , this is unintentional weight loss , and having dizzy speels especially in the mornings . She has to sit down and breathe for awhile. I totally agree and understand that this weight loss is concerning and yet her exam and history has been non-focal and she had exhaustive laboratory studies done recently and these were entirely unrevealing. I reviewed with son and patient " today that we could easily make a case for some more imaging including chest, abdominal and pelvic cat scans and or things lie bone scans but really uncertain how this would [play out and it is entirely possible we are dealing with multifactorial picture including age related issues . Ultimately we decided on a posture of observation  At this point with further follow up depending on how things go     She walks to a really limited extent with a walker, mostly just for transfers from bed to commode, etc.  Wt Readings from Last 5 Encounters:   03/02/23 88.5 kg (195 lb)   09/13/21 108.4 kg (239 lb)   10/22/20 114.3 kg (252 lb)   09/04/20 114.3 kg (252 lb)   04/22/20 113.4 kg (250 lb)         Have you ever done Advance Care Planning? (For example, a Health Directive, POLST, or a discussion with a medical provider or your loved ones about your wishes): Yes, patient states has an Advance Care Planning document and will bring a copy to the clinic.       Fall risk  Fallen 2 or more times in the past year?: Yes  Any fall with injury in the past year?: Yes    Cognitive Screening   1) Repeat 3 items (Leader, Season, Table)    2) Clock draw: NORMAL  3) 3 item recall: Recalls 3 objects  Results: 3 items recalled: COGNITIVE IMPAIRMENT LESS LIKELY    Mini-CogTM Copyright MARCK Clark. Licensed by the author for use in [facility]; reprinted with permission (arden@.[facility]). All rights reserved.      Do you have sleep apnea, excessive snoring or daytime drowsiness?: yes    She hasn't used her cpap machine and mask anymore     Reviewed and updated as needed this visit by clinical staff                  Reviewed and updated as needed this visit by Provider                 Social History     Tobacco Use     Smoking status: Never     Smokeless tobacco: Never   Substance Use Topics     Alcohol use: No         Alcohol Use 3/2/2023   Prescreen: >3 drinks/day or >7 drinks/week? No               Current providers sharing in care for  this patient include:   Patient Care Team:  Steven Abrams MD as PCP - General (Internal Medicine)  Karley Demarco MD as MD (Dermatology)  Steven Abrams MD as Assigned PCP  Emilie Ren MD as MD (Nephrology)  Kaylan Montaño, RN as Registered Nurse (Cardiology)  Kayla Echeverria, RN as Registered Nurse  Kayla Echeverria, RN as Specialty Care Coordinator (Cardiology)  Kaylan Montaño, AMAYA as Specialty Care Coordinator (Cardiology)  Dilcia Marino, AMAYA as Specialty Care Coordinator (Cardiology)  Luciana Crum, RN as Specialty Care Coordinator (Cardiology)  Luciana Crum RN as Clinic Care Coordinator (Cardiology)  Matty Tavares MD as Assigned Surgical Provider  Akbar Thompson MD as MD (Cardiovascular Disease)  Steven Abrams MD as Assigned Pain Medication Provider  Zayda Tomlinson PA as Assigned Heart and Vascular Provider  Radha Ham RPH as Pharmacist (Pharmacist Ambulatory Care)  Radha Ham RPH as Assigned MTM Pharmacist    The following health maintenance items are reviewed in Epic and correct as of today:  Health Maintenance   Topic Date Due     URINE DRUG SCREEN  Never done     COPD ACTION PLAN  Never done     ZOSTER IMMUNIZATION (2 of 3) 05/07/2013     MEDICARE ANNUAL WELLNESS VISIT  10/02/2015     DTAP/TDAP/TD IMMUNIZATION (2 - Td or Tdap) 08/16/2021     HF ACTION PLAN  04/29/2022     MICROALBUMIN  04/16/2023     A1C  05/09/2023     EYE EXAM  05/24/2023     DIABETIC FOOT EXAM  06/03/2023     PHQ-9  07/16/2023     BMP  08/09/2023     HEMOGLOBIN  08/09/2023     DEXA  09/11/2023     ALT  01/16/2024     LIPID  01/16/2024     DIGOXIN  01/16/2024     ANNUAL REVIEW OF HM ORDERS  01/16/2024     TSH W/FREE T4 REFLEX  02/09/2024     CBC  02/09/2024     FALL RISK ASSESSMENT  03/02/2024     ADVANCE CARE PLANNING  04/29/2024     PARATHYROID  Completed     PHOSPHORUS  Completed     SPIROMETRY  Completed     DEPRESSION ACTION PLAN  Completed     INFLUENZA VACCINE  Completed      Pneumococcal Vaccine: 65+ Years  Completed     URINALYSIS  Completed     ALK PHOS  Completed     COVID-19 Vaccine  Completed     IPV IMMUNIZATION  Aged Out     MENINGITIS IMMUNIZATION  Aged Out     MAMMO SCREENING  Discontinued     COLORECTAL CANCER SCREENING  Discontinued     Health Maintenance Due   Topic Date Due     URINE DRUG SCREEN  Never done     COPD ACTION PLAN  Never done     ZOSTER IMMUNIZATION (2 of 3) 05/07/2013     MEDICARE ANNUAL WELLNESS VISIT  10/02/2015     DTAP/TDAP/TD IMMUNIZATION (2 - Td or Tdap) 08/16/2021     HF ACTION PLAN  04/29/2022        Labs reviewed in EPIC  BP Readings from Last 3 Encounters:   03/02/23 100/67   01/17/23 97/57   01/16/23 124/80    Wt Readings from Last 3 Encounters:   03/02/23 88.5 kg (195 lb)   09/13/21 108.4 kg (239 lb)   10/22/20 114.3 kg (252 lb)                  Patient Active Problem List   Diagnosis     Morbid obesity (H)     Hypertension goal BP (blood pressure) < 140/90     MRSA (methicillin resistant Staphylococcus aureus)     Chronic diarrhea     HYPERLIPIDEMIA LDL GOAL <100     Chronic anticoagulation     Septic hip (H)     Lymphedema     Tubular adenoma of colon     Abdominal wall hematoma     Advanced directives, counseling/discussion     Umbilical hernia     History of [place] fever     Moderate persistent asthma     Pseudophakia, ou; Yag Caps, od     Hypovitaminosis D     Bilateral leg pain     Positive YAMINI     Raynaud phenomenon     Pulmonary HTN (H)     Scotoma involving central area in visual field, right     Failed total hip arthroplasty, sequela     Major depressive disorder, recurrent episode, mild (H)     Chronic diastolic congestive heart failure (H)     Long-term (current) use of anticoagulants [Z79.01]     Persistent atrial fibrillation (H)     Diabetic polyneuropathy associated with diabetes mellitus due to underlying condition (H)     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, without long-term  current use of insulin (H)     Fatigue, unspecified type     Age-related osteoporosis without current pathological fracture     Posterior vitreous detachment of left eye     Background diabetic retinopathy, mild, ou     Chronic deep vein thrombosis (DVT) of femoral vein of right lower extremity (H)     Oxygen desaturation     CKD (chronic kidney disease) stage 4, GFR 15-29 ml/min (H)     Elevated diaphragm     Chronic rhinitis     EVONNE (obstructive sleep apnea)     Primary osteoarthritis involving multiple joints     Diuretic-induced hypokalemia     Other chronic pain     Low vitamin B12 level     Past Surgical History:   Procedure Laterality Date     APPENDECTOMY       CATARACT IOL, RT/LT       COLONOSCOPY  2008     COLONOSCOPY  08/20/2012    Procedure: COLONOSCOPY;  COLONOSCOPY, TUBULAR ADENOMA OF COLON, CHRONIC DIARRHEA;  Surgeon: Yobany Hinojosa MD;  Location: [facility] OR     HC REMOVAL GALLBLADDER       JOINT REPLACEMENT, HIP RT/LT  2004    right     JOINT REPLACEMTN, KNEE RT/LT  2000    bilateral     LASER YAG CAPSULOTOMY Right 05/24/2022     PHACOEMULSIFICATION WITH STANDARD INTRAOCULAR LENS IMPLANT  08/2013    left eye; right eye     ROTATOR CUFF REPAIR RT/LT  01/1993    right     TONSILLECTOMY         Social History     Tobacco Use     Smoking status: Never     Smokeless tobacco: Never   Substance Use Topics     Alcohol use: No     Family History   Problem Relation Age of Onset     Diabetes Mother      Cancer Mother      Hypertension Mother      Cataracts Mother      Hypertension Father      Glaucoma Father      Cataracts Father      Cancer Son         thyroid cancer     Neurologic Disorder Sister      Alzheimer Disease Sister          Current Outpatient Medications   Medication Sig Dispense Refill     ACCU-CHEK PARVEZ PLUS test strip USE TO TEST FOUR TIMES DAILY OR AS DIRECTED 350 strip 5     acetaminophen (TYLENOL) 500 MG tablet Take 2 tablets by mouth 3 times daily as needed.       alendronate (FOSAMAX)  70 MG tablet Take 1 tablet (70 mg) by mouth every 7 days 12 tablet 1     ASPIRIN NOT PRESCRIBED, INTENTIONAL, Antiplatelet medication not prescribed intentionally due to Current anticoagulant therapy (warfarin/enoxaparin) 0 each 3     Calcium Carb-Cholecalciferol 600-20 MG-MCG TABS Take 1 tablet by mouth daily (with lunch)       digoxin (LANOXIN) 62.5 MCG tablet Take 1 tablet (62.5 mcg) by mouth every other day 45 tablet 3     diphenoxylate-atropine (LOMOTIL) 2.5-0.025 MG tablet Take 1 tablet by mouth 4 times daily as needed for diarrhea 40 tablet 1     dulaglutide (TRULICITY) 1.5 MG/0.5ML pen Inject 1.5 mg Subcutaneous every 7 days 2 mL 5     furosemide (LASIX) 20 MG tablet TAKE 2 TABLET(40 MG) IN AM and 2 TABLETS (40 MG) BY MOUTH IN  tablet 3     HYDROcodone-acetaminophen (NORCO) 5-325 MG tablet Take 1 tablet by mouth daily as needed for severe pain (7-10) 30 tablet 0     metoprolol tartrate (LOPRESSOR) 100 MG tablet TAKE 1/2 TABLET(50 MG) BY MOUTH TWICE DAILY 90 tablet 1     OMEPRAZOLE CPCR 20 MG OR 1 CAPSULE DAILY       order for DME Equipment being ordered: Oxygen.  Oxygen resting at room air was 94%.   Oxygen on room air walking about 50 feet was 87% then after returning to room dropped lower to 86%.   With oxygen at 2 liters resting was 97%.   With oxygen at 2 liters walking about 50 feet was only at 91%. 1 Device 0     PARoxetine (PAXIL) 20 MG tablet Take 0.5 tablets (10 mg) by mouth four times a week for 364 days 35 tablet 1     Polyethyl Glycol-Propyl Glycol (SYSTANE OP) Apply 1 drop to eye as needed Both eyes.       potassium chloride ER (KLOR-CON M) 20 MEQ CR tablet Take 2 tablets (40 mEq) by mouth daily (Patient taking differently: Take 20 mEq by mouth 2 times daily) 180 tablet 3     STATIN NOT PRESCRIBED, INTENTIONAL, 1 each continuous prn. Statin not prescribed intentionally due to Other: patient has normal LDL within recommended guidelines on no medications 0 each 0     Vitamin D  (Cholecalciferol) 25 MCG (1000 UT) TABS Take 1 tablet by mouth daily       warfarin ANTICOAGULANT (COUMADIN) 2 MG tablet Take 3 mg (2 mg x 1.5) every Mon, Wed, Fri; 4 mg (2 mg x 2) all other days or as directed by INR clinic 180 tablet 1     Allergies   Allergen Reactions     Atenolol Other (See Comments)     Arms became limp, almost stroke like symptoms per patient.   Tolerates metoprolol fine     Metformin      Side effects / gastrointestinal symptoms      Recent Labs   Lab Test 02/09/23  1139 01/16/23  1719 11/09/22  0906 06/03/22  1112 09/13/21  1309 09/13/21  1309 09/13/21  1227 01/25/21  1426 08/11/20  1337 01/07/20  1636 04/29/19  1034   A1C  --   --  7.4* 7.7*  --   --  7.3*  --  7.1*  --  7.0*   LDL  --  45  31  --   --   --  56  --   --  42  --  67   HDL  --  64  65  --   --   --  55  --   --  54  --  60   TRIG  --  59  62  --   --   --  58  --   --  48  --  69   ALT  --  17  --   --   --  19  --   --   --  18 19   CR 1.79* 1.47*  --  1.58*   < > 1.71*  --  1.54* 1.31* 1.35* 1.42*   GFRESTIMATED 27* 34*  --  31*   < > 27*  --  30* 37* 36* 34*   GFRESTBLACK  --   --   --   --   --   --   --  35* 43* 41* 39*   POTASSIUM 4.3 3.2*  --  3.6   < > 3.9  --  3.7 4.0 4.1 3.7   TSH 2.59  --   --   --   --   --   --   --   --   --  1.33    < > = values in this interval not displayed.          Review of Systems   Constitutional: Positive for chills. Negative for fever.   HENT: Negative for ear pain.    Eyes: Negative for pain.   Respiratory: Positive for cough and shortness of breath.    Cardiovascular: Positive for peripheral edema. Negative for chest pain.   Gastrointestinal: Positive for diarrhea. Negative for abdominal pain and hematochezia.   Genitourinary: Negative for hematuria.   Musculoskeletal: Positive for arthralgias.   Neurological: Positive for dizziness.   Psychiatric/Behavioral: The patient is nervous/anxious.      Constitutional, HEENT, cardiovascular, pulmonary, gi and gu systems are negative,  "except as otherwise noted.    OBJECTIVE:   There were no vitals taken for this visit. Estimated body mass index is 43.71 kg/m  as calculated from the following:    Height as of 10/22/20: 1.575 m (5' 2\").    Weight as of 9/13/21: 108.4 kg (239 lb).  Physical Exam  GENERAL: healthy, alert and no distress  EYES: Eyes grossly normal to inspection, PERRL and conjunctivae and sclerae normal  HENT: ear canals and TM's normal, nose and mouth without ulcers or lesions  NECK: no adenopathy, no asymmetry, masses, or scars and thyroid normal to palpation  RESP: lungs clear to auscultation - no rales, rhonchi or wheezes  BREAST: normal without masses, tenderness or nipple discharge and no palpable axillary masses or adenopathy  CV: regular rate and rhythm, normal S1 S2, no S3 or S4, no murmur, click or rub, no peripheral edema and peripheral pulses strong  ABDOMEN: soft, nontender, no hepatosplenomegaly, no masses and bowel sounds normal  MS: no gross musculoskeletal defects noted, no edema  SKIN: no suspicious lesions or rashes  NEURO: Normal strength and tone, mentation intact and speech normal  PSYCH: mentation appears normal, affect normal/bright    Diagnostic Test Results:  Labs reviewed in Epic  Orders Placed This Encounter   Procedures     Miscellaneous Order for DME - ONLY FOR DME         ASSESSMENT / PLAN:   (Z79.899) Encounter for long-term (current) use of medications  (primary encounter diagnosis)  Comment: routine screening issues   Plan: see orders section of this encounter     (M00.9) Septic hip (H)  Comment: this is a past tense diagnosis and she did take antibiotics for years and years but was allowed to stop these long ago  Plan: problem is stable and onmo     (I82.511) Chronic deep vein thrombosis (DVT) of femoral vein of right lower extremity (H)  Comment: she continues with coumadin   Plan: she had concerns today about some smallish lumps in her legs, were these blood clots ? I think this is not the case at " all, I think a] more definition of her     (E66.01) Morbid obesity (H)  Comment: weight loss measures reviewed and not appropriate at this point   Plan:     (F33.0) Major depressive disorder, recurrent episode, mild (H)  Comment: problem is stable and ongoing monitoring    Plan:     (T84.018S,  Z96.649) Failed total hip arthroplasty, sequela  Comment: we reviewed in significant detail  What her need was for a mobility assist device and the options being a manual wheelchair versus a transfer chair and they requested a transfer chair   Plan: Miscellaneous Order for DME - ONLY FOR DME              Patient has been advised of split billing requirements and indicates understanding: Yes      COUNSELING:  Reviewed preventive health counseling, as reflected in patient instructions        She reports that she has never smoked. She has never used smokeless tobacco.      Appropriate preventive services were discussed with this patient, including applicable screening as appropriate for cardiovascular disease, diabetes, osteopenia/osteoporosis, and glaucoma.  As appropriate for age/gender, discussed screening for colorectal cancer, prostate cancer, breast cancer, and cervical cancer. Checklist reviewing preventive services available has been given to the patient.    Reviewed patients plan of care and provided an AVS. The Basic Care Plan (routine screening as documented in Health Maintenance) for Mara meets the Care Plan requirement. This Care Plan has been established and reviewed with the Patient.          Steven Abrams MD  [facility]    Identified Health Risks:

## 2023-03-07 ENCOUNTER — TELEPHONE (OUTPATIENT)
Dept: INTERNAL MEDICINE | Facility: CLINIC | Age: 88
End: 2023-03-07
Payer: COMMERCIAL

## 2023-03-07 NOTE — TELEPHONE ENCOUNTER
The Home Care/Assisted Living/Nursing Facility is calling regarding an established patient.  Has the patient seen Home Care in the past or is currently residing in Assisted Living or Nursing Facility? Yes.     HO Oliva calling from [facility] requesting the following orders that are within the Home Care, Assisted Living or Nursing Home Eval and Treatment standing order and can be signed as standing order signature required by RN.    Preferred Call Back Number: [phone]    PT/OT/Speech Therapy    Any additional Orders:  Are there any orders requested, not stated above, that are outside of the standing order and must be routed to a licensed practitioner for approval?    No    Writer has verified Requestor will send fax to have orders signed.    OT - 1x/week for 3 weeks    MALCOM Kelly RN  [facility]

## 2023-03-08 ENCOUNTER — TRANSFERRED RECORDS (OUTPATIENT)
Dept: HEALTH INFORMATION MANAGEMENT | Facility: CLINIC | Age: 88
End: 2023-03-08
Payer: COMMERCIAL

## 2023-03-08 ENCOUNTER — ANTICOAGULATION THERAPY VISIT (OUTPATIENT)
Dept: ANTICOAGULATION | Facility: CLINIC | Age: 88
End: 2023-03-08
Payer: COMMERCIAL

## 2023-03-08 ENCOUNTER — TELEPHONE (OUTPATIENT)
Dept: FAMILY MEDICINE | Facility: CLINIC | Age: 88
End: 2023-03-08
Payer: COMMERCIAL

## 2023-03-08 DIAGNOSIS — I48.19 PERSISTENT ATRIAL FIBRILLATION (H): ICD-10-CM

## 2023-03-08 DIAGNOSIS — Z79.01 CHRONIC ANTICOAGULATION: ICD-10-CM

## 2023-03-08 DIAGNOSIS — Z79.01 LONG TERM CURRENT USE OF ANTICOAGULANT THERAPY: Primary | ICD-10-CM

## 2023-03-08 LAB — INR (EXTERNAL): 2.4 (ref 0.9–1.1)

## 2023-03-08 NOTE — TELEPHONE ENCOUNTER
The Home Care/Assisted Living/Nursing Facility is calling regarding an established patient.  Has the patient seen Home Care in the past or is currently residing in Assisted Living or Nursing Facility? Yes.     Tang calling from [facility] requesting the following orders that are within the Home Care, Assisted Living or Nursing Home Eval and Treatment standing order and can be signed as standing order signature required by RN.    Home Care Visits Continuation and PT/OT/Speech Therapy    Any additional Orders:  Are there any orders requested, not stated above, that are outside of the standing order and must be routed to a licensed practitioner for approval?    No    Writer has verified Requestor will send fax to have orders signed.    Continue PT  Once a week for 1 week  2x a month for 1 month    Approved of requested home care orders per RN protocol.    NICANOR AlejandraN RN  [facility]

## 2023-03-08 NOTE — PROGRESS NOTES
ANTICOAGULATION MANAGEMENT     Mara Colindres 88 year old female is on warfarin with therapeutic INR result. (Goal INR 2.0-3.0)    Recent labs: (last 7 days)     03/08/23  1254   INR 2.4*       ASSESSMENT       Source(s): Chart Review and Patient/Caregiver Call       Warfarin doses taken: Warfarin taken as instructed    Diet: No new diet changes identified    New illness, injury, or hospitalization: No    Medication/supplement changes: None noted    Signs or symptoms of bleeding or clotting: No    Previous INR: Therapeutic last 2(+) visits    Additional findings: home care will be seeing patient for another 4+ weeks           PLAN     Recommended plan for no diet, medication or health factor changes affecting INR     Dosing Instructions: Continue your current warfarin dose with next INR in 1 week       Summary  As of 3/8/2023    Full warfarin instructions:  3 mg every Mon, Wed, Fri; 4 mg all other days   Next INR check:  3/15/2023             Telephone call with [facility] care nurse who verbalizes understanding and agrees to plan and who agrees to plan and repeated back plan correctly    Orders given to  Homecare nurse/facility to recheck    Education provided:     Contact [phone]  with any changes, questions or concerns.     Plan made per [facility] anticoagulation protocol    Swetha Alcocer RN  Anticoagulation Clinic  3/8/2023    _______________________________________________________________________     Anticoagulation Episode Summary     Current INR goal:  2.0-3.0   TTR:  63.5 % (1 y)   Target end date:  Indefinite   Send INR reminders to:  ANTICOAG HOME MONITORING    Indications    Long-term (current) use of anticoagulants [Z79.01] [Z79.01]  Persistent atrial fibrillation (H) [I48.19]  Chronic anticoagulation [Z79.01]           Comments:  mdINANABELLA home meter. Manage by exception.         Anticoagulation Care Providers     Provider Role Specialty Phone number    Steven Abrams MD Referring Internal Medicine  [phone]

## 2023-03-10 ENCOUNTER — VIRTUAL VISIT (OUTPATIENT)
Dept: PHARMACY | Facility: CLINIC | Age: 88
End: 2023-03-10
Payer: COMMERCIAL

## 2023-03-10 ENCOUNTER — TELEPHONE (OUTPATIENT)
Dept: INTERNAL MEDICINE | Facility: CLINIC | Age: 88
End: 2023-03-10
Payer: COMMERCIAL

## 2023-03-10 DIAGNOSIS — I48.19 PERSISTENT ATRIAL FIBRILLATION (H): ICD-10-CM

## 2023-03-10 DIAGNOSIS — G89.29 OTHER CHRONIC PAIN: ICD-10-CM

## 2023-03-10 DIAGNOSIS — R19.7 DIARRHEA: ICD-10-CM

## 2023-03-10 DIAGNOSIS — F33.0 MAJOR DEPRESSIVE DISORDER, RECURRENT EPISODE, MILD (H): ICD-10-CM

## 2023-03-10 DIAGNOSIS — M15.0 PRIMARY OSTEOARTHRITIS INVOLVING MULTIPLE JOINTS: ICD-10-CM

## 2023-03-10 DIAGNOSIS — Z79.01 LONG TERM CURRENT USE OF ANTICOAGULANT THERAPY: ICD-10-CM

## 2023-03-10 DIAGNOSIS — E55.9 HYPOVITAMINOSIS D: ICD-10-CM

## 2023-03-10 DIAGNOSIS — I50.32 CHRONIC DIASTOLIC CONGESTIVE HEART FAILURE (H): Primary | ICD-10-CM

## 2023-03-10 DIAGNOSIS — E11.22 CONTROLLED TYPE 2 DIABETES MELLITUS WITH STAGE 3 CHRONIC KIDNEY DISEASE, WITHOUT LONG-TERM CURRENT USE OF INSULIN (H): ICD-10-CM

## 2023-03-10 DIAGNOSIS — N18.4 CKD (CHRONIC KIDNEY DISEASE) STAGE 4, GFR 15-29 ML/MIN (H): ICD-10-CM

## 2023-03-10 DIAGNOSIS — Z53.9 DIAGNOSIS NOT YET DEFINED: Primary | ICD-10-CM

## 2023-03-10 DIAGNOSIS — M81.0 AGE-RELATED OSTEOPOROSIS WITHOUT CURRENT PATHOLOGICAL FRACTURE: ICD-10-CM

## 2023-03-10 DIAGNOSIS — N18.30 CONTROLLED TYPE 2 DIABETES MELLITUS WITH STAGE 3 CHRONIC KIDNEY DISEASE, WITHOUT LONG-TERM CURRENT USE OF INSULIN (H): ICD-10-CM

## 2023-03-10 PROCEDURE — G0179 MD RECERTIFICATION HHA PT: HCPCS | Performed by: INTERNAL MEDICINE

## 2023-03-10 PROCEDURE — 99207 PR NO CHARGE LOS: CPT | Performed by: PHARMACIST

## 2023-03-10 NOTE — PATIENT INSTRUCTIONS
"Recommendations from today's MTM visit:                                                         1. Continue current medications.    Follow-up: Return in about 6 months (around 9/10/2023) for Medication Therapy Management.    It was great speaking with you today.  I value your experience and would be very thankful for your time in providing feedback in our clinic survey. In the next few days, you may receive an email or text message from [employer] with a link to a survey related to your  clinical pharmacist.\"     To schedule another MTM appointment, please call the clinic directly or you may call the MTM scheduling line at [phone] or toll-free at [phone].     My Clinical Pharmacist's contact information:                                                      Please feel free to contact me with any questions or concerns you have.      Arlene Ham, PharmD  Medication Therapy Management Pharmacist  [phone]     "

## 2023-03-10 NOTE — TELEPHONE ENCOUNTER
Reason for Call:  Form, our goal is to have forms completed with 72 hours, however, some forms may require a visit or additional information.    Type of letter, form or note:  Home Health Certification    Who is the form from?: Home care    Where did the form come from: form was faxed in    What clinic location was the form placed at?: [facility]    Where the form was placed: Given to physician    What number is listed as a contact on the form?: [phone]       Call taken on 3/10/2023 at 3:35 PM by Hailma Bob

## 2023-03-10 NOTE — Clinical Note
Please print and mail AVS, thanks!  Arlene Ham, PharmD Medication Therapy Management Pharmacist [phone]

## 2023-03-10 NOTE — Clinical Note
JORDEN MORALES note, thanks!  Arlene Ham, PharmD Medication Therapy Management Pharmacist [phone]

## 2023-03-10 NOTE — PROGRESS NOTES
Medication Therapy Management (MTM) Encounter    ASSESSMENT:                            Medication Adherence/Access: No issues identified    Hypertension/HFpEF/Atrial Fibrillation/History of DVT/CKD Stage 4: Dizziness improved, unable to confirm blood pressure remaining at goal < 150/90, however patient has appt with cardiology coming up.    Osteoporosis: Patient is meeting RDI of calcium 1200mg/day. Patient is meeting RDI of Vitamin D 1000 IU/day.    GERD: May benefit from continuing omeprazole, due to some nausea with Trulicity, and lower Hg.     Depression: patient prefers to maintain paroxetine. Sertraline may be less drying the paroxetine and can consider in future if desired.     Type 2 Diabetes:  A1C at goal < 8%. Discussed could explore alternatives or lower dose of Trulicity, however Mara prefers to maintain current therapy.     Pain/osteoarthritis: Stable.     Diarrhea: Stable.     PLAN:                            1. Continue current medications.    Follow-up: Return in about 6 months (around 9/10/2023) for Medication Therapy Management.    SUBJECTIVE/OBJECTIVE:                          Mara Colindres is a 88 year old female called for a follow-up visit.  Today's visit is a follow-up MTM visit from 2/20/23. Patient was accompanied by her son, Hong.     Reason for visit: follow-up on dizziness.    Allergies/ADRs: Reviewed in chart  Past Medical History: Reviewed in chart  Tobacco: She reports that she has never smoked. She has never used smokeless tobacco.  Alcohol: none  Caffeine: 1 cup coffee, 1 pespi on occassion    Medication Adherence/Access:   Patient uses pill box(es).  Patient takes medications 4 time(s) per day.   Per patient, misses medication 0 times per week.   The patient fills medications at [place]: NO, fills medications at [facility].    Hypertension/HFpEF/Atrial Fibrillation/History of DVT/CKD Stage 4:   Furosemide 40 mg every morning and 40 mg at lunch.   Metoprolol  tartrate 50 mg twice daily   Digoxin 62.5 mcg every other day  Potassium 20 mEq at dinner and at bedtime   Warfarin per INR clinic    Stopped losartan 3 weeks ago. Dizziness has resolved.  Furosemide dose recently reduced.   Patient does not self-monitor blood pressure (but does have a cuff), and home health has been checking and they've been reporting it's good.  Patient reports the following medication side effects: none.   BP Readings from Last 3 Encounters:   03/02/23 100/67   01/17/23 97/57   01/16/23 124/80     INR   Date Value Ref Range Status   07/06/2021 3.20 (H) 0.86 - 1.14 Final     Comment:     This test is intended for monitoring Coumadin therapy.  Results are not   accurate in patients with prolonged INR due to factor deficiency.       INR (External)   Date Value Ref Range Status   03/08/2023 2.4 (A) 0.9 - 1.1 Final        GFR Estimate   Date Value Ref Range Status   02/09/2023 27 (L) >60 mL/min/1.73m2 Final     Comment:     eGFR calculated using 2021 CKD-EPI equation.   01/16/2023 34 (L) >60 mL/min/1.73m2 Final     Comment:     Effective December 21, 2021 eGFRcr in adults is calculated using the 2021 CKD-EPI creatinine equation which includes age and gender ( et al., NEJ, DOI: 10.1056/JRTIiv7048723)   06/03/2022 31 (L) >60 mL/min/1.73m2 Final     Comment:     Effective December 21, 2021 eGFRcr in adults is calculated using the 2021 CKD-EPI creatinine equation which includes age and gender ( et al., NEJ, DOI: 10.1056/AXPSli5593652)   01/25/2021 30 (L) >60 mL/min/[1.73_m2] Final     Comment:     Non  GFR Calc  Starting 12/18/2018, serum creatinine based estimated GFR (eGFR) will be   calculated using the Chronic Kidney Disease Epidemiology Collaboration   (CKD-EPI) equation.     08/11/2020 37 (L) >60 mL/min/[1.73_m2] Final     Comment:     Non  GFR Calc  Starting 12/18/2018, serum creatinine based estimated GFR (eGFR) will be   calculated using the Chronic  Kidney Disease Epidemiology Collaboration   (CKD-EPI) equation.     01/07/2020 36 (L) >60 mL/min/[1.73_m2] Final     Comment:     Non  GFR Calc  Starting 12/18/2018, serum creatinine based estimated GFR (eGFR) will be   calculated using the Chronic Kidney Disease Epidemiology Collaboration   (CKD-EPI) equation.       Osteoporosis:  calcium 600/Vitamin D 20 mcg twice daily  Vitamin D 1000 international unit(s) daily   alendronate (Fosamax) 70mg weekly on Thursday (has been on current therapy for 2 years)    History of Prolia.  Patient is not experiencing side effects.  DEXA History: 9/11/20, -3.8  Patient is getting the following sources of dietary calcium: cheese, yogurt at least 1-2x/week, doesn't drink a lot of milk.  Risk factors: post-menopausal, Height loss of 2.5 inches, Parent history of osteoporosis, follow up osteoporosis  Last vitamin D level:  Vitamin D Deficiency Screening Results:  Lab Results   Component Value Date    VITDT 37 06/03/2022    VITDT 57 09/13/2021    VITDT 33 08/11/2020    VITDT 44 04/29/2019    VITDT 34 11/07/2017     GERD:   Prilosec (omeprazole) 20 once daily    Patient reports no current symptoms.   The patient does not have a history of GI bleed, although current concern for lower hemoglobin.  Patient has not tried a trial off of therapy and is interested in doing so. Patient feels that current regimen is effective.    Depression:    Paroxetine 10 mg every other day (helps her stop crying all the time)    She stopped hydroxyzine at bedtime and is doing well without this.  She has dry mouth, dry eyes.  She has a full bottle of paroxetine and it has really helped her, so she's not interested in changing this at all.    Type 2 Diabetes:    Trulicity 1.5 mg weekly.     Sometimes, only a few times, when she eats she throws up just a little, happens once a month. She's been working on eating slower, smaller bites, drinking water.   History of diarrhea with metformin.    Patient is experiencing the following side effects: as above  Blood sugar monitorin time(s) daily  Ranges (patient reported): 135, 157, 175, highest she's had is 200-something.  Symptoms of low blood sugar? none  Symptoms of high blood sugar? none  Eye exam: up to date  Foot exam: up to date  Diet/Exercise: did not discuss  Aspirin: Not taking due to warfarin  Statin: No   ACEi/ARB: Yes: losartan.   Urine Albumin:   Lab Results   Component Value Date    UMALCR 79.07 (H) 2023     Lab Results   Component Value Date    A1C 7.4 2022    A1C 7.7 2022    A1C 7.3 2021    A1C 7.1 2020    A1C 7.0 2019    A1C 7.3 2018    A1C 7.8 2017    A1C 8.2 2017     Pain/osteoarthritis:    Norco 5-325 mg daily as needed for arm/shoulder pain  Acetaminophen 1000 mg as needed (a few times a week)    She takes the Norco only when she really needs it, ~2-3/month.    Diarrhea:    Has had long standing diarrhea, she'll take Lomotil when she's out for a hockey game or something, but otherwise doesn't regularly use. States this/these are effective. Denies side effects.     Today's Vitals: There were no vitals taken for this visit.  ----------------      I spent 14 minutes with this patient today. All changes were made via collaborative practice agreement with Steven Abrams MD. A copy of the visit note was provided to the patient's provider(s).     A summary of these recommendations was mailed to the patient.    Arlene Ham, CarlosD  Medication Therapy Management Pharmacist  [phone]    Telemedicine Visit Details  Type of service:  Telephone visit  Start Time: 2:04 PM  End Time: 2:18 pm     Medication Therapy Recommendations  No medication therapy recommendations to display

## 2023-03-15 ENCOUNTER — TRANSFERRED RECORDS (OUTPATIENT)
Dept: HEALTH INFORMATION MANAGEMENT | Facility: CLINIC | Age: 88
End: 2023-03-15
Payer: COMMERCIAL

## 2023-03-15 ENCOUNTER — ANTICOAGULATION THERAPY VISIT (OUTPATIENT)
Dept: ANTICOAGULATION | Facility: CLINIC | Age: 88
End: 2023-03-15
Payer: COMMERCIAL

## 2023-03-15 DIAGNOSIS — I48.19 PERSISTENT ATRIAL FIBRILLATION (H): ICD-10-CM

## 2023-03-15 DIAGNOSIS — Z79.01 LONG TERM CURRENT USE OF ANTICOAGULANT THERAPY: Primary | ICD-10-CM

## 2023-03-15 DIAGNOSIS — Z79.01 CHRONIC ANTICOAGULATION: ICD-10-CM

## 2023-03-15 LAB — INR (EXTERNAL): 2.8 (ref 0.9–1.1)

## 2023-03-15 NOTE — PROGRESS NOTES
ANTICOAGULATION MANAGEMENT     Mara Colindres 88 year old female is on warfarin with therapeutic INR result. (Goal INR 2.0-3.0)    Recent labs: (last 7 days)     03/15/23  1155   INR 2.8*       ASSESSMENT       Source(s): Chart Review and Home Care/Facility Nurse       Warfarin doses taken: Warfarin taken as instructed    Diet: No new diet changes identified    New illness, injury, or hospitalization: No    Medication/supplement changes: None noted    Signs or symptoms of bleeding or clotting: No    Previous INR: Therapeutic last 2(+) visits    Additional findings: None         PLAN     Recommended plan for no diet, medication or health factor changes affecting INR     Dosing Instructions: Continue your current warfarin dose with next INR in 1 week       Summary  As of 3/15/2023    Full warfarin instructions:  3 mg every Mon, Wed, Fri; 4 mg all other days   Next INR check:  3/22/2023             Telephone call with Astr[facility] home care nurse who verbalizes understanding and agrees to plan    Orders given to  Homecare nurse/facility to recheck    Education provided:     Please call back if any changes to your diet, medications or how you've been taking warfarin    Plan made per [facility] anticoagulation protocol    Usha Riley RN  Anticoagulation Clinic  3/15/2023    _______________________________________________________________________     Anticoagulation Episode Summary     Current INR goal:  2.0-3.0   TTR:  65.5 % (1 y)   Target end date:  Indefinite   Send INR reminders to:  [facility]    Indications    Long-term (current) use of anticoagulants [Z79.01] [Z79.01]  Persistent atrial fibrillation (H) [I48.19]  Chronic anticoagulation [Z79.01]           Comments:  [employer] home meter. Manage by exception.         Anticoagulation Care Providers     Provider Role Specialty Phone number    Steven Abrams MD Referring Internal Medicine [phone]

## 2023-03-22 ENCOUNTER — ANTICOAGULATION THERAPY VISIT (OUTPATIENT)
Dept: ANTICOAGULATION | Facility: CLINIC | Age: 88
End: 2023-03-22
Payer: COMMERCIAL

## 2023-03-22 ENCOUNTER — TRANSFERRED RECORDS (OUTPATIENT)
Dept: HEALTH INFORMATION MANAGEMENT | Facility: CLINIC | Age: 88
End: 2023-03-22
Payer: COMMERCIAL

## 2023-03-22 DIAGNOSIS — I48.19 PERSISTENT ATRIAL FIBRILLATION (H): ICD-10-CM

## 2023-03-22 DIAGNOSIS — Z79.01 CHRONIC ANTICOAGULATION: ICD-10-CM

## 2023-03-22 DIAGNOSIS — Z79.01 LONG TERM CURRENT USE OF ANTICOAGULANT THERAPY: Primary | ICD-10-CM

## 2023-03-22 LAB — INR (EXTERNAL): 3.2 (ref 0.9–1.1)

## 2023-03-22 NOTE — PROGRESS NOTES
ANTICOAGULATION MANAGEMENT     Mara Colindres 88 year old female is on warfarin with supratherapeutic INR result. (Goal INR 2.0-3.0)    Recent labs: (last 7 days)     03/22/23  1133   INR 3.2*       ASSESSMENT       Source(s): Chart Review and Home Care/Facility Nurse       Warfarin doses taken: Warfarin taken as instructed    Diet: Decreased greens/vitamin K in diet; plans to resume previous intake, usually eats 2/week, none this week    New illness, injury, or hospitalization: No    Medication/supplement changes: None noted    Signs or symptoms of bleeding or clotting: No    Previous INR: Therapeutic last 2(+) visits    Additional findings: None         PLAN     Recommended plan for temporary change(s) affecting INR     Dosing Instructions: Continue your current warfarin dose with next INR in 1 week       Summary  As of 3/22/2023    Full warfarin instructions:  3 mg every Mon, Wed, Fri; 4 mg all other days   Next INR check:  3/29/2023             Telephone call with [facility] home care nurse who verbalizes understanding and agrees to plan    Orders given to  Homecare nurse/facility to recheck    Education provided:     Please call back if any changes to your diet, medications or how you've been taking warfarin    Dietary considerations: importance of consistent vitamin K intake    Plan made per [facility] anticoagulation protocol    Laxmi Huang RN  Anticoagulation Clinic  3/22/2023    _______________________________________________________________________     Anticoagulation Episode Summary     Current INR goal:  2.0-3.0   TTR:  65.5 % (1 y)   Target end date:  Indefinite   Send INR reminders to:  [facility]    Indications    Long-term (current) use of anticoagulants [Z79.01] [Z79.01]  Persistent atrial fibrillation (H) [I48.19]  Chronic anticoagulation [Z79.01]           Comments:  [employer] home meter. Manage by exception.         Anticoagulation Care Providers     Provider Role Specialty Phone number     Steven Abrams MD [facility] Internal Medicine [phone]

## 2023-03-25 DIAGNOSIS — M81.0 AGE-RELATED OSTEOPOROSIS WITHOUT CURRENT PATHOLOGICAL FRACTURE: ICD-10-CM

## 2023-03-27 RX ORDER — ALENDRONATE SODIUM 70 MG/1
TABLET ORAL
Qty: 12 TABLET | Refills: 1 | Status: SHIPPED | OUTPATIENT
Start: 2023-03-27 | End: 2024-01-04

## 2023-03-29 ENCOUNTER — ANTICOAGULATION THERAPY VISIT (OUTPATIENT)
Dept: ANTICOAGULATION | Facility: CLINIC | Age: 88
End: 2023-03-29
Payer: COMMERCIAL

## 2023-03-29 ENCOUNTER — TRANSFERRED RECORDS (OUTPATIENT)
Dept: HEALTH INFORMATION MANAGEMENT | Facility: CLINIC | Age: 88
End: 2023-03-29
Payer: COMMERCIAL

## 2023-03-29 DIAGNOSIS — I48.19 PERSISTENT ATRIAL FIBRILLATION (H): ICD-10-CM

## 2023-03-29 DIAGNOSIS — Z79.01 LONG TERM CURRENT USE OF ANTICOAGULANT THERAPY: Primary | ICD-10-CM

## 2023-03-29 DIAGNOSIS — Z79.01 CHRONIC ANTICOAGULATION: ICD-10-CM

## 2023-03-29 LAB — INR (EXTERNAL): 3.4 (ref 0.9–1.1)

## 2023-03-29 NOTE — PROGRESS NOTES
ANTICOAGULATION MANAGEMENT     Mara Colindres 88 year old female is on warfarin with supratherapeutic INR result. (Goal INR 2.0-3.0)    Recent labs: (last 7 days)     03/29/23  0000   INR 3.4*       ASSESSMENT       Source(s): Chart Review and Home Care/Facility Nurse       Warfarin doses taken: Warfarin taken as instructed    Diet: No new diet changes identified    New illness, injury, or hospitalization: No    Medication/supplement changes: None noted    Signs or symptoms of bleeding or clotting: No    Previous INR: Supratherapeutic    Additional findings: None         PLAN     Recommended plan for no diet, medication or health factor changes affecting INR     Dosing Instructions: decrease your warfarin dose (8% change) with next INR in 1 week       Summary  As of 3/29/2023    Full warfarin instructions:  4 mg every Sun, Thu; 3 mg all other days   Next INR check:  4/5/2023             Telephone call with Ashley Haley [facility] home care nurse who agrees to plan and repeated back plan correctly    Orders given to  Homecare nurse/facility to recheck    Education provided:     None required    Plan made per [facility] anticoagulation protocol    Shellie Aguayo RN  Anticoagulation Clinic  3/29/2023    _______________________________________________________________________     Anticoagulation Episode Summary     Current INR goal:  2.0-3.0   TTR:  63.6 % (1 y)   Target end date:  Indefinite   Send INR reminders to:  [facility]    Indications    Long-term (current) use of anticoagulants [Z79.01] [Z79.01]  Persistent atrial fibrillation (H) [I48.19]  Chronic anticoagulation [Z79.01]           Comments:  [employer] home meter. Manage by exception.         Anticoagulation Care Providers     Provider Role Specialty Phone number    Steven Abrams MD Referring Internal Medicine [phone]

## 2023-04-05 ENCOUNTER — TRANSFERRED RECORDS (OUTPATIENT)
Dept: HEALTH INFORMATION MANAGEMENT | Facility: CLINIC | Age: 88
End: 2023-04-05
Payer: COMMERCIAL

## 2023-04-05 ENCOUNTER — ANTICOAGULATION THERAPY VISIT (OUTPATIENT)
Dept: ANTICOAGULATION | Facility: CLINIC | Age: 88
End: 2023-04-05
Payer: COMMERCIAL

## 2023-04-05 DIAGNOSIS — Z79.01 CHRONIC ANTICOAGULATION: ICD-10-CM

## 2023-04-05 DIAGNOSIS — I48.19 PERSISTENT ATRIAL FIBRILLATION (H): ICD-10-CM

## 2023-04-05 DIAGNOSIS — Z79.01 LONG TERM CURRENT USE OF ANTICOAGULANT THERAPY: Primary | ICD-10-CM

## 2023-04-05 LAB
INR (EXTERNAL): 2.8 (ref 0.9–1.1)
INR HOME MONITORING: 2.8 RATIO (ref 2–3)

## 2023-04-05 NOTE — PROGRESS NOTES
ANTICOAGULATION MANAGEMENT     Mara Colindres 88 year old female is on warfarin with therapeutic INR result. (Goal INR 2.0-3.0)    Recent labs: (last 7 days)     04/05/23  1144   INR 2.8*       ASSESSMENT       Source(s): Chart Review and Patient/Caregiver Call       Warfarin doses taken: Warfarin taken as instructed    Diet: No new diet changes identified    New illness, injury, or hospitalization: No    Medication/supplement changes: None noted    Signs or symptoms of bleeding or clotting: No    Previous INR: Supratherapeutic    Additional findings: Discharged from Home care today. Has Home meter         PLAN     Recommended plan for no diet, medication or health factor changes affecting INR     Dosing Instructions: Continue your current warfarin dose with next INR in 1 week       Summary  As of 4/5/2023    Full warfarin instructions:  4 mg every Sun, Thu; 3 mg all other days   Next INR check:  4/12/2023             Telephone call with Mara who agrees to plan and repeated back plan correctly / Patient's son helps her set up medications in her pill box.   Also left message for Alis home care nurse informing her we received her message regarding patient's discharge today.    Patient to recheck with home meter    Education provided:     Please call back if any changes to your diet, medications or how you've been taking warfarin    Plan made per [facility] anticoagulation protocol    Belkis Nunez, RN  Anticoagulation Clinic  4/5/2023    _______________________________________________________________________     Anticoagulation Episode Summary     Current INR goal:  2.0-3.0   TTR:  62.4 % (1 y)   Target end date:  Indefinite   Send INR reminders to:  [facility]    Indications    Long-term (current) use of anticoagulants [Z79.01] [Z79.01]  Persistent atrial fibrillation (H) [I48.19]  Chronic anticoagulation [Z79.01]           Comments:  [employer] home meter. Manage by exception.         Anticoagulation  Care Providers     Provider Role Specialty Phone number    Steven Abrams MD Referring Internal Medicine [phone]

## 2023-04-12 ENCOUNTER — TRANSFERRED RECORDS (OUTPATIENT)
Dept: HEALTH INFORMATION MANAGEMENT | Facility: CLINIC | Age: 88
End: 2023-04-12
Payer: COMMERCIAL

## 2023-04-12 ENCOUNTER — ANTICOAGULATION THERAPY VISIT (OUTPATIENT)
Dept: ANTICOAGULATION | Facility: CLINIC | Age: 88
End: 2023-04-12
Payer: COMMERCIAL

## 2023-04-12 DIAGNOSIS — Z79.01 CHRONIC ANTICOAGULATION: ICD-10-CM

## 2023-04-12 DIAGNOSIS — Z79.01 LONG TERM CURRENT USE OF ANTICOAGULANT THERAPY: Primary | ICD-10-CM

## 2023-04-12 DIAGNOSIS — I48.19 PERSISTENT ATRIAL FIBRILLATION (H): ICD-10-CM

## 2023-04-12 LAB
INR (EXTERNAL): 3.8 (ref 0.9–1.1)
INR HOME MONITORING: 3.8 RATIO (ref 2–3)

## 2023-04-12 NOTE — PROGRESS NOTES
ANTICOAGULATION MANAGEMENT     Mara Colindres 88 year old female is on warfarin with supratherapeutic INR result. (Goal INR 2.0-3.0)    Recent labs: (last 7 days)     04/12/23  0000   INR 3.8*       ASSESSMENT       Source(s): Chart Review and Patient/Caregiver Call       Warfarin doses taken: Warfarin taken as instructed    Diet: Decreased greens/vitamin K in diet; ongoing change    New illness, injury, or hospitalization: No    Medication/supplement changes: Tylenol 1000 mg started on past few days which may be increasing INR today    Signs or symptoms of bleeding or clotting: No    Previous INR: Therapeutic last visit; previously outside of goal range    Additional findings: None     Since patient has been using Tylenol for shoulder, hip, elbow pain, will decrease dose.  Advised son if patient stops using the Tylenol on a regular basis to notify [facility].         PLAN     Recommended plan for ongoing change(s) affecting INR     Dosing Instructions: partial hold then decrease your warfarin dose (8.7% change) with next INR in 1 week       Summary  As of 4/12/2023    Full warfarin instructions:  4/12: 2 mg; Otherwise 3 mg every day   Next INR check:  4/19/2023             Telephone call with  maxi Pitts who agrees to plan and repeated back plan correctly    Patient to recheck with home meter    Education provided:     Please call back if any changes to your diet, medications or how you've been taking warfarin    Plan made per [facility] anticoagulation protocol    Usha Riley, RN  Anticoagulation Clinic  4/12/2023    _______________________________________________________________________     Anticoagulation Episode Summary     Current INR goal:  2.0-3.0   TTR:  60.9 % (1 y)   Target end date:  Indefinite   Send INR reminders to:  [facility]    Indications    Long-term (current) use of anticoagulants [Z79.01] [Z79.01]  Persistent atrial fibrillation (H) [I48.19]  Chronic anticoagulation [Z79.01]            Comments:  [employer] home meter. Manage by exception.         Anticoagulation Care Providers     Provider Role Specialty Phone number    Steven Abrams MD Referring Internal Medicine [phone]

## 2023-04-20 ENCOUNTER — TELEPHONE (OUTPATIENT)
Dept: ANTICOAGULATION | Facility: CLINIC | Age: 88
End: 2023-04-20
Payer: COMMERCIAL

## 2023-04-20 NOTE — TELEPHONE ENCOUNTER
ANTICOAGULATION     Mara Colindres is overdue for INR check.      Left message reminding patient to check INR with their home meter and call results to the home monitoring company as soon as possible.     Escobar Pereira RN

## 2023-04-27 ENCOUNTER — ANTICOAGULATION THERAPY VISIT (OUTPATIENT)
Dept: ANTICOAGULATION | Facility: CLINIC | Age: 88
End: 2023-04-27
Payer: COMMERCIAL

## 2023-04-27 ENCOUNTER — TELEPHONE (OUTPATIENT)
Dept: ANTICOAGULATION | Facility: CLINIC | Age: 88
End: 2023-04-27
Payer: COMMERCIAL

## 2023-04-27 ENCOUNTER — TRANSFERRED RECORDS (OUTPATIENT)
Dept: HEALTH INFORMATION MANAGEMENT | Facility: CLINIC | Age: 88
End: 2023-04-27
Payer: COMMERCIAL

## 2023-04-27 DIAGNOSIS — I48.19 PERSISTENT ATRIAL FIBRILLATION (H): ICD-10-CM

## 2023-04-27 DIAGNOSIS — Z79.01 LONG TERM CURRENT USE OF ANTICOAGULANT THERAPY: Primary | ICD-10-CM

## 2023-04-27 DIAGNOSIS — Z79.01 CHRONIC ANTICOAGULATION: ICD-10-CM

## 2023-04-27 LAB
INR (EXTERNAL): 2.6 (ref 0.9–1.1)
INR HOME MONITORING: 2.6 RATIO (ref 2–3)

## 2023-04-27 NOTE — PROGRESS NOTES
Received a faxed INR result for Mara Colindres    From: MD INR     Result 2.6     Date and time of collection: 4/27/2023

## 2023-04-27 NOTE — PROGRESS NOTES
ANTICOAGULATION MANAGEMENT     Mara Colindres 88 year old female is on warfarin with therapeutic INR result. (Goal INR 2.0-3.0)    Recent labs: (last 7 days)     04/27/23  0000   INR 2.6*       ASSESSMENT       Source(s): Chart Review and Patient/Caregiver Call       Warfarin doses taken: Warfarin taken as instructed    Diet: No new diet changes identified    Medication/supplement changes: None noted    New illness, injury, or hospitalization: No    Signs or symptoms of bleeding or clotting: No    Previous result: Supratherapeutic    Additional findings: None         PLAN     Recommended plan for no diet, medication or health factor changes affecting INR     Dosing Instructions: Continue your current warfarin dose with next INR in 1 week  Summary  As of 4/27/2023    Full warfarin instructions:  3 mg every day   Next INR check:  5/4/2023             Telephone call with Mara who agrees to plan and repeated back plan correctly    Patient to recheck with home meter    Education provided:     Please call back if any changes to your diet, medications or how you've been taking warfarin    Goal range and lab monitoring: goal range and significance of current result and Importance of therapeutic range    Contact [phone]  with any changes, questions or concerns.     Plan made per [facility] anticoagulation protocol    Alejandrina Wise RN  Anticoagulation Clinic  4/27/2023    _______________________________________________________________________     Anticoagulation Episode Summary     Current INR goal:  2.0-3.0   TTR:  61.5 % (1 y)   Target end date:  Indefinite   Send INR reminders to:  [facility]    Indications    Long-term (current) use of anticoagulants [Z79.01] [Z79.01]  Persistent atrial fibrillation (H) [I48.19]  Chronic anticoagulation [Z79.01]           Comments:  [employer] home meter. Manage by exception.         Anticoagulation Care Providers     Provider Role Specialty Phone number    Steven Abrams  MD [facility] Internal Medicine [phone]

## 2023-04-27 NOTE — PROGRESS NOTES
Duplicate encounter     [Chaperone Present] : A chaperone was present in the examining room during all aspects of the physical examination [Appropriately responsive] : appropriately responsive [Alert] : alert [No Acute Distress] : no acute distress [No Lymphadenopathy] : no lymphadenopathy [Regular Rate Rhythm] : regular rate rhythm [No Murmurs] : no murmurs [Clear to Auscultation B/L] : clear to auscultation bilaterally [Soft] : soft [Non-tender] : non-tender [Non-distended] : non-distended [No HSM] : No HSM [No Lesions] : no lesions [No Mass] : no mass [Oriented x3] : oriented x3 [Examination Of The Breasts] : a normal appearance [No Masses] : no breast masses were palpable [Labia Majora] : normal [Labia Minora] : normal [Atrophy] : atrophy [No Bleeding] : There was no active vaginal bleeding [Normal] : normal [Tenderness] : nontender [Anteversion] : anteverted [Uterine Adnexae] : normal

## 2023-05-04 ENCOUNTER — MEDICAL CORRESPONDENCE (OUTPATIENT)
Dept: HEALTH INFORMATION MANAGEMENT | Facility: CLINIC | Age: 88
End: 2023-05-04
Payer: COMMERCIAL

## 2023-05-04 ENCOUNTER — ANTICOAGULATION THERAPY VISIT (OUTPATIENT)
Dept: ANTICOAGULATION | Facility: CLINIC | Age: 88
End: 2023-05-04
Payer: COMMERCIAL

## 2023-05-04 ENCOUNTER — TRANSFERRED RECORDS (OUTPATIENT)
Dept: HEALTH INFORMATION MANAGEMENT | Facility: CLINIC | Age: 88
End: 2023-05-04
Payer: COMMERCIAL

## 2023-05-04 DIAGNOSIS — Z79.01 CHRONIC ANTICOAGULATION: ICD-10-CM

## 2023-05-04 DIAGNOSIS — Z79.01 LONG TERM CURRENT USE OF ANTICOAGULANT THERAPY: Primary | ICD-10-CM

## 2023-05-04 DIAGNOSIS — I48.19 PERSISTENT ATRIAL FIBRILLATION (H): ICD-10-CM

## 2023-05-04 LAB
INR (EXTERNAL): 2.9 (ref 0.9–1.1)
INR HOME MONITORING: 2.9 RATIO (ref 2–3)

## 2023-05-04 NOTE — PROGRESS NOTES
ANTICOAGULATION  MANAGEMENT-Home Monitor Managed by Exception    Mara Colindres 88 year old female is on warfarin with therapeutic INR result. (Goal INR 2.0-3.0)    Recent labs: (last 7 days)     05/04/23  1512   INR 2.9*         Previous INR was Therapeutic    Medication, diet, health changes since last INR:chart reviewed; none identified    Contacted within the last 12 weeks by phone on 4/27/23      ISABEL     Mara was NOT contacted regarding therapeutic result today per home monitoring policy manage by exception agreement.   Current warfarin dose is to be continued:     Summary  As of 5/4/2023    Full warfarin instructions:  3 mg every day   Next INR check:  5/11/2023           ?   Coral Oliver RN  Anticoagulation Clinic  5/4/2023    _______________________________________________________________________     Anticoagulation Episode Summary     Current INR goal:  2.0-3.0   TTR:  63.4 % (1 y)   Target end date:  Indefinite   Send INR reminders to:  [facility]    Indications    Long-term (current) use of anticoagulants [Z79.01] [Z79.01]  Persistent atrial fibrillation (H) [I48.19]  Chronic anticoagulation [Z79.01]           Comments:  [employer] home meter. Manage by exception.         Anticoagulation Care Providers     Provider Role Specialty Phone number    Steven Abrams MD Referring Internal Medicine [phone]

## 2023-05-11 ENCOUNTER — ANTICOAGULATION THERAPY VISIT (OUTPATIENT)
Dept: ANTICOAGULATION | Facility: CLINIC | Age: 88
End: 2023-05-11
Payer: COMMERCIAL

## 2023-05-11 ENCOUNTER — TRANSFERRED RECORDS (OUTPATIENT)
Dept: HEALTH INFORMATION MANAGEMENT | Facility: CLINIC | Age: 88
End: 2023-05-11
Payer: COMMERCIAL

## 2023-05-11 DIAGNOSIS — Z79.01 LONG TERM CURRENT USE OF ANTICOAGULANT THERAPY: Primary | ICD-10-CM

## 2023-05-11 DIAGNOSIS — I48.19 PERSISTENT ATRIAL FIBRILLATION (H): ICD-10-CM

## 2023-05-11 DIAGNOSIS — Z79.01 CHRONIC ANTICOAGULATION: ICD-10-CM

## 2023-05-11 LAB
INR (EXTERNAL): 3.8 (ref 0.9–1.1)
INR HOME MONITORING: 3.8 RATIO (ref 2–3)

## 2023-05-11 NOTE — PROGRESS NOTES
ANTICOAGULATION MANAGEMENT     Mara Colindres 88 year old female is on warfarin with supratherapeutic INR result. (Goal INR 2.0-3.0)    Recent labs: (last 7 days)     05/11/23  1519   INR 3.8*       ASSESSMENT       Source(s): Chart Review and Patient/Caregiver Call       Warfarin doses taken: Warfarin taken as instructed    Diet: No new diet changes identified    Medication/supplement changes: None noted    New illness, injury, or hospitalization: No    Signs or symptoms of bleeding or clotting: No    Previous result: Therapeutic last 2(+) visits    Additional findings: None         PLAN     Recommended plan for no diet, medication or health factor changes affecting INR     Dosing Instructions: decrease your warfarin dose (5% change) with next INR in 1 week       Summary  As of 5/11/2023    Full warfarin instructions:  2 mg every Thu; 3 mg all other days   Next INR check:  5/18/2023             Telephone call with Mara who verbalizes understanding and agrees to plan    Patient to recheck with home meter    Education provided:     Contact [phone]  with any changes, questions or concerns.     Plan made per [facility] anticoagulation protocol    Juana Castrejon RN  Anticoagulation Clinic  5/11/2023    _______________________________________________________________________     Anticoagulation Episode Summary     Current INR goal:  2.0-3.0   TTR:  63.6 % (1 y)   Target end date:  Indefinite   Send INR reminders to:  [facility]    Indications    Long-term (current) use of anticoagulants [Z79.01] [Z79.01]  Persistent atrial fibrillation (H) [I48.19]  Chronic anticoagulation [Z79.01]           Comments:  [employer] home meter. Manage by exception.         Anticoagulation Care Providers     Provider Role Specialty Phone number    Steven Abrams MD Referring Internal Medicine [phone]

## 2023-05-19 ENCOUNTER — ANTICOAGULATION THERAPY VISIT (OUTPATIENT)
Dept: ANTICOAGULATION | Facility: CLINIC | Age: 88
End: 2023-05-19
Payer: COMMERCIAL

## 2023-05-19 ENCOUNTER — TELEPHONE (OUTPATIENT)
Dept: ANTICOAGULATION | Facility: CLINIC | Age: 88
End: 2023-05-19
Payer: COMMERCIAL

## 2023-05-19 ENCOUNTER — TRANSFERRED RECORDS (OUTPATIENT)
Dept: HEALTH INFORMATION MANAGEMENT | Facility: CLINIC | Age: 88
End: 2023-05-19
Payer: COMMERCIAL

## 2023-05-19 DIAGNOSIS — Z79.01 LONG TERM CURRENT USE OF ANTICOAGULANT THERAPY: Primary | ICD-10-CM

## 2023-05-19 DIAGNOSIS — Z79.01 CHRONIC ANTICOAGULATION: ICD-10-CM

## 2023-05-19 DIAGNOSIS — I48.19 PERSISTENT ATRIAL FIBRILLATION (H): ICD-10-CM

## 2023-05-19 LAB
INR (EXTERNAL): 1.9 (ref 2–3)
INR HOME MONITORING: 1.9 RATIO (ref 2–3)

## 2023-05-19 NOTE — PROGRESS NOTES
ANTICOAGULATION MANAGEMENT     Mara Colindres 88 year old female is on warfarin with subtherapeutic INR result. (Goal INR 2.0-3.0)    Recent labs: (last 7 days)     05/19/23  1722   INR 1.9*       ASSESSMENT       Source(s): Chart Review and Patient/Caregiver Call       Warfarin doses taken: Warfarin taken as instructed    Diet: No new diet changes identified    Medication/supplement changes: None noted    New illness, injury, or hospitalization: No    Signs or symptoms of bleeding or clotting: No    Previous result: Supratherapeutic    Additional findings: None         PLAN     Recommended plan for no diet, medication or health factor changes affecting INR     Dosing Instructions: Continue your current warfarin dose with next INR in 1 week       Summary  As of 5/19/2023    Full warfarin instructions:  2 mg every Thu; 3 mg all other days   Next INR check:  5/26/2023             Telephone call with Mara who verbalizes understanding and agrees to plan and who agrees to plan and repeated back plan correctly    Patient to recheck with home meter    Education provided:     Please call back if any changes to your diet, medications or how you've been taking warfarin    Plan made per [facility] anticoagulation protocol    Quynh Pizarro, RN  Anticoagulation Clinic  5/19/2023    _______________________________________________________________________     Anticoagulation Episode Summary     Current INR goal:  2.0-3.0   TTR:  64.7 % (1 y)   Target end date:  Indefinite   Send INR reminders to:  [facility]    Indications    Long-term (current) use of anticoagulants [Z79.01] [Z79.01]  Persistent atrial fibrillation (H) [I48.19]  Chronic anticoagulation [Z79.01]           Comments:  [employer] home meter. Manage by exception.         Anticoagulation Care Providers     Provider Role Specialty Phone number    Steven Abrams MD Referring Internal Medicine [phone]

## 2023-05-25 ENCOUNTER — TRANSFERRED RECORDS (OUTPATIENT)
Dept: HEALTH INFORMATION MANAGEMENT | Facility: CLINIC | Age: 88
End: 2023-05-25
Payer: COMMERCIAL

## 2023-05-25 ENCOUNTER — ANTICOAGULATION THERAPY VISIT (OUTPATIENT)
Dept: ANTICOAGULATION | Facility: CLINIC | Age: 88
End: 2023-05-25
Payer: COMMERCIAL

## 2023-05-25 DIAGNOSIS — Z79.01 LONG TERM CURRENT USE OF ANTICOAGULANT THERAPY: Primary | ICD-10-CM

## 2023-05-25 DIAGNOSIS — Z79.01 CHRONIC ANTICOAGULATION: ICD-10-CM

## 2023-05-25 DIAGNOSIS — I48.19 PERSISTENT ATRIAL FIBRILLATION (H): ICD-10-CM

## 2023-05-25 LAB
INR (EXTERNAL): 2.5 (ref 2–3)
INR HOME MONITORING: 2.5 RATIO (ref 2–3)

## 2023-05-25 NOTE — PROGRESS NOTES
ANTICOAGULATION MANAGEMENT     Mara Colindres 88 year old female is on warfarin with therapeutic INR result. (Goal INR 2.0-3.0)    Recent labs: (last 7 days)     05/25/23  1340   INR 2.5       ASSESSMENT       Source(s): Chart Review and Patient/Caregiver Call       Warfarin doses taken: Warfarin taken as instructed    Diet: No new diet changes identified    Medication/supplement changes: None noted    New illness, injury, or hospitalization: No    Signs or symptoms of bleeding or clotting: No    Previous result: Subtherapeutic    Additional findings: None         PLAN     Recommended plan for no diet, medication or health factor changes affecting INR     Dosing Instructions: Continue your current warfarin dose with next INR in 1 week       Summary  As of 5/25/2023    Full warfarin instructions:  2 mg every Thu; 3 mg all other days   Next INR check:               Telephone call with Mara who verbalizes understanding and agrees to plan and who agrees to plan and repeated back plan correctly    Patient to recheck with home meter    Education provided:     Please call back if any changes to your diet, medications or how you've been taking warfarin    Resume manage by exception with home monitor. Continue to submit INR results to home monitor company.You will only be called when your result is out of range. Please call and notify [facility] if new medication started, dose missed, signs or symptoms of bleeding or clotting, or a surgery/procedure is scheduled.    Plan made per [facility] anticoagulation protocol    Quynh Pizarro, RN  Anticoagulation Clinic  5/25/2023    _______________________________________________________________________     Anticoagulation Episode Summary     Current INR goal:  2.0-3.0   TTR:  65.1 % (1 y)   Target end date:  Indefinite   Send INR reminders to:  [facility] MONITOR[facility]    Indications    Long-term (current) use of anticoagulants [Z79.01] [Z79.01]  Persistent atrial fibrillation (H)  [I48.19]  Chronic anticoagulation [Z79.01]           Comments:  [employer] home meter. Manage by exception.         Anticoagulation Care Providers     Provider Role Specialty Phone number    Steven Abrams MD Referring Internal Medicine [phone]

## 2023-06-03 ENCOUNTER — TRANSFERRED RECORDS (OUTPATIENT)
Dept: HEALTH INFORMATION MANAGEMENT | Facility: CLINIC | Age: 88
End: 2023-06-03
Payer: COMMERCIAL

## 2023-06-03 LAB
INR (EXTERNAL): 2.1 (ref 0.9–1.1)
INR HOME MONITORING: 2.1 RATIO (ref 2–3)

## 2023-06-05 ENCOUNTER — ANTICOAGULATION THERAPY VISIT (OUTPATIENT)
Dept: ANTICOAGULATION | Facility: CLINIC | Age: 88
End: 2023-06-05
Payer: COMMERCIAL

## 2023-06-05 DIAGNOSIS — Z79.01 CHRONIC ANTICOAGULATION: ICD-10-CM

## 2023-06-05 DIAGNOSIS — Z79.01 LONG TERM CURRENT USE OF ANTICOAGULANT THERAPY: Primary | ICD-10-CM

## 2023-06-05 DIAGNOSIS — I48.19 PERSISTENT ATRIAL FIBRILLATION (H): ICD-10-CM

## 2023-06-05 NOTE — PROGRESS NOTES
ANTICOAGULATION  MANAGEMENT-Home Monitor Managed by Exception    Mara CHARLES Jens 88 year old female is on warfarin with therapeutic INR result. (Goal INR 2.0-3.0)    Recent labs: (last 7 days)     06/03/23  1222   INR 2.1*         Previous INR was Therapeutic    Medication, diet, health changes since last INR:chart reviewed; none identified    Contacted within the last 12 weeks by phone on 05/25/2023      ISABEL     Mara was NOT contacted regarding therapeutic result today per home monitoring policy manage by exception agreement.   Current warfarin dose is to be continued:     Summary  As of 6/5/2023    Full warfarin instructions:  2 mg every Thu; 3 mg all other days   Next INR check:  6/9/2023           ?   Escobar Pereira RN  Anticoagulation Clinic  6/5/2023    _______________________________________________________________________     Anticoagulation Episode Summary     Current INR goal:  2.0-3.0   TTR:  64.9 % (1 y)   Target end date:  Indefinite   Send INR reminders to:  [facility]    Indications    Long-term (current) use of anticoagulants [Z79.01] [Z79.01]  Persistent atrial fibrillation (H) [I48.19]  Chronic anticoagulation [Z79.01]           Comments:  [employer] home meter. Manage by exception.         Anticoagulation Care Providers     Provider Role Specialty Phone number    Steven Abrams MD Referring Internal Medicine [phone]

## 2023-06-07 ENCOUNTER — TELEPHONE (OUTPATIENT)
Dept: OPHTHALMOLOGY | Facility: CLINIC | Age: 88
End: 2023-06-07
Payer: COMMERCIAL

## 2023-06-07 NOTE — TELEPHONE ENCOUNTER
[facility] Call [facility]    Phone Message    May a detailed message be left on voicemail: yes     Reason for Call: Symptoms or Concerns     If patient has red-flag symptoms, warm transfer to triage line    Current symptom or concern: Patient states she has a white pinhead type lesion on her right eyelid, that is itchy and very sore.  She would like to know what she should do.  Please call.  Thank you.  Symptoms have been present for: this morning    Has patient previously been seen for this? No    By :     Date:     Are there any new or worsening symptoms? No      Action Taken: Message routed to:  [facility] Ophthalmology    Travel Screening: Not Applicable

## 2023-06-08 ENCOUNTER — TRANSFERRED RECORDS (OUTPATIENT)
Dept: HEALTH INFORMATION MANAGEMENT | Facility: CLINIC | Age: 88
End: 2023-06-08
Payer: COMMERCIAL

## 2023-06-08 ENCOUNTER — DOCUMENTATION ONLY (OUTPATIENT)
Dept: ANTICOAGULATION | Facility: CLINIC | Age: 88
End: 2023-06-08
Payer: COMMERCIAL

## 2023-06-08 DIAGNOSIS — I48.19 PERSISTENT ATRIAL FIBRILLATION (H): ICD-10-CM

## 2023-06-08 DIAGNOSIS — Z79.01 CHRONIC ANTICOAGULATION: ICD-10-CM

## 2023-06-08 DIAGNOSIS — Z79.01 LONG TERM CURRENT USE OF ANTICOAGULANT THERAPY: Primary | ICD-10-CM

## 2023-06-08 LAB
INR (EXTERNAL): 2.2 (ref 0.9–1.1)
INR HOME MONITORING: 2.2 RATIO (ref 2–3)

## 2023-06-08 NOTE — PROGRESS NOTES
ANTICOAGULATION  MANAGEMENT-Home Monitor Managed by Exception    Mara CHARLES Jens 88 year old female is on warfarin with therapeutic INR result. (Goal INR 2.0-3.0)    Recent labs: (last 7 days)     06/08/23  1618   INR 2.2*         Previous INR was Therapeutic    Medication, diet, health changes since last INR:chart reviewed; none identified    Contacted within the last 12 weeks by phone on 05/25/2023      ISABEL     Mara was NOT contacted regarding therapeutic result today per home monitoring policy manage by exception agreement.   Current warfarin dose is to be continued:     Summary  As of 6/8/2023    Full warfarin instructions:  2 mg every Thu; 3 mg all other days   Next INR check:  6/15/2023           ?   Escobar Pereira RN  Anticoagulation Clinic  6/8/2023    _______________________________________________________________________     Anticoagulation Episode Summary     Current INR goal:  2.0-3.0   TTR:  65.1 % (1 y)   Target end date:  Indefinite   Send INR reminders to:  [facility]    Indications    Long-term (current) use of anticoagulants [Z79.01] [Z79.01]  Persistent atrial fibrillation (H) [I48.19]  Chronic anticoagulation [Z79.01]           Comments:  [employer] home meter. Manage by exception.         Anticoagulation Care Providers     Provider Role Specialty Phone number    Steven Abrams MD Referring Internal Medicine [phone]

## 2023-06-12 ENCOUNTER — TELEPHONE (OUTPATIENT)
Dept: OPHTHALMOLOGY | Facility: CLINIC | Age: 88
End: 2023-06-12
Payer: COMMERCIAL

## 2023-06-12 NOTE — TELEPHONE ENCOUNTER
[facility] Call Center    Phone Message    May a detailed message be left on voicemail: yes     Reason for Call: Symptoms or Concerns     If patient has red-flag symptoms, warm transfer to triage line    Current symptom or concern: problem with right eye  was advised to use hot compacts on her eye, it's gotten worse, she has 2 little pin heads. now has 6 of them right eye  one is a big stye hanging on her eye  hurt and itch redness and swelling   Please contact pt to discuss Thank you!     Symptoms have been present for:  6 day(s)    Has patient previously been seen for this? No    By : Dr Tavares    Date: N/A    Are there any new or worsening symptoms? Yes:       Action Taken: Message routed to:  [facility] eye    Travel Screening: Not Applicable

## 2023-06-12 NOTE — CONFIDENTIAL NOTE
Called patient back. Left voicemail to call back to shedule appointment. Okay to use 4:00 procedure spot today for this.

## 2023-06-15 ENCOUNTER — DOCUMENTATION ONLY (OUTPATIENT)
Dept: ANTICOAGULATION | Facility: CLINIC | Age: 88
End: 2023-06-15
Payer: COMMERCIAL

## 2023-06-15 ENCOUNTER — TRANSFERRED RECORDS (OUTPATIENT)
Dept: HEALTH INFORMATION MANAGEMENT | Facility: CLINIC | Age: 88
End: 2023-06-15
Payer: COMMERCIAL

## 2023-06-15 DIAGNOSIS — I48.19 PERSISTENT ATRIAL FIBRILLATION (H): ICD-10-CM

## 2023-06-15 DIAGNOSIS — Z79.01 CHRONIC ANTICOAGULATION: ICD-10-CM

## 2023-06-15 DIAGNOSIS — Z79.01 LONG TERM CURRENT USE OF ANTICOAGULANT THERAPY: Primary | ICD-10-CM

## 2023-06-15 LAB
INR (EXTERNAL): 2.5 (ref 0.9–1.1)
INR HOME MONITORING: 2.5 RATIO (ref 2–3)

## 2023-06-15 NOTE — PROGRESS NOTES
ANTICOAGULATION  MANAGEMENT-Home Monitor Managed by Exception    Mara CHARLES Jens 88 year old female is on warfarin with therapeutic INR result. (Goal INR 2.0-3.0)    Recent labs: (last 7 days)     06/15/23  1422   INR 2.5*         Previous INR was Therapeutic    Medication, diet, health changes since last INR:chart reviewed; none identified    Contacted within the last 12 weeks by phone on 5/25/23      ISABEL     Mara was NOT contacted regarding therapeutic result today per home monitoring policy manage by exception agreement.   Current warfarin dose is to be continued:     Summary  As of 6/15/2023    Full warfarin instructions:  2 mg every Thu; 3 mg all other days   Next INR check:  6/22/2023           ?   Coral Oliver RN  Anticoagulation Clinic  6/15/2023    _______________________________________________________________________     Anticoagulation Episode Summary     Current INR goal:  2.0-3.0   TTR:  65.1 % (1 y)   Target end date:  Indefinite   Send INR reminders to:  [facility]    Indications    Long-term (current) use of anticoagulants [Z79.01] [Z79.01]  Persistent atrial fibrillation (H) [I48.19]  Chronic anticoagulation [Z79.01]           Comments:  [employer] home meter. Manage by exception.         Anticoagulation Care Providers     Provider Role Specialty Phone number    Steven Abrams MD Referring Internal Medicine [phone]

## 2023-06-29 ENCOUNTER — TRANSFERRED RECORDS (OUTPATIENT)
Dept: HEALTH INFORMATION MANAGEMENT | Facility: CLINIC | Age: 88
End: 2023-06-29
Payer: COMMERCIAL

## 2023-06-29 ENCOUNTER — DOCUMENTATION ONLY (OUTPATIENT)
Dept: ANTICOAGULATION | Facility: CLINIC | Age: 88
End: 2023-06-29
Payer: COMMERCIAL

## 2023-06-29 DIAGNOSIS — Z79.01 LONG TERM CURRENT USE OF ANTICOAGULANT THERAPY: Primary | ICD-10-CM

## 2023-06-29 DIAGNOSIS — Z79.01 CHRONIC ANTICOAGULATION: ICD-10-CM

## 2023-06-29 DIAGNOSIS — I48.19 PERSISTENT ATRIAL FIBRILLATION (H): ICD-10-CM

## 2023-06-29 LAB — INR HOME MONITORING: 2 RATIO (ref 2–3)

## 2023-06-29 NOTE — PROGRESS NOTES
ANTICOAGULATION  MANAGEMENT-Home Monitor Managed by Exception    Mara CHARLES Jens 88 year old female is on warfarin with therapeutic INR result. (Goal INR 2.0-3.0)    Recent labs: (last 7 days)     06/29/23  1547   INR 2         Previous INR was Therapeutic    Medication, diet, health changes since last INR:chart reviewed; none identified    Contacted within the last 12 weeks by phone on 5/25/23      ISABEL     Mara was NOT contacted regarding therapeutic result today per home monitoring policy manage by exception agreement.   Current warfarin dose is to be continued:     Summary  As of 6/29/2023    Full warfarin instructions:  2 mg every Thu; 3 mg all other days   Next INR check:  7/13/2023           ?   Juana Castrejon RN  Anticoagulation Clinic  6/29/2023    _______________________________________________________________________     Anticoagulation Episode Summary     Current INR goal:  2.0-3.0   TTR:  65.2 % (1 y)   Target end date:  Indefinite   Send INR reminders to:  [facility]    Indications    Long-term (current) use of anticoagulants [Z79.01] [Z79.01]  Persistent atrial fibrillation (H) [I48.19]  Chronic anticoagulation [Z79.01]           Comments:  [employer] home meter. Manage by exception.         Anticoagulation Care Providers     Provider Role Specialty Phone number    Steven Abrams MD Referring Internal Medicine [phone]

## 2023-07-06 ENCOUNTER — TRANSFERRED RECORDS (OUTPATIENT)
Dept: HEALTH INFORMATION MANAGEMENT | Facility: CLINIC | Age: 88
End: 2023-07-06
Payer: COMMERCIAL

## 2023-07-06 ENCOUNTER — DOCUMENTATION ONLY (OUTPATIENT)
Dept: ANTICOAGULATION | Facility: CLINIC | Age: 88
End: 2023-07-06
Payer: COMMERCIAL

## 2023-07-06 DIAGNOSIS — Z79.01 CHRONIC ANTICOAGULATION: ICD-10-CM

## 2023-07-06 DIAGNOSIS — Z79.01 LONG TERM CURRENT USE OF ANTICOAGULANT THERAPY: Primary | ICD-10-CM

## 2023-07-06 DIAGNOSIS — I48.19 PERSISTENT ATRIAL FIBRILLATION (H): ICD-10-CM

## 2023-07-06 LAB — INR HOME MONITORING: 2.1 RATIO (ref 2–3)

## 2023-07-06 NOTE — PROGRESS NOTES
ANTICOAGULATION  MANAGEMENT-Home Monitor Managed by Exception    Mara Colindres 88 year old female is on warfarin with therapeutic INR result. (Goal INR 2.0-3.0)    Recent labs: (last 7 days)     07/06/23  1538   INR 2.1         Previous INR was Therapeutic    Medication, diet, health changes since last INR:chart reviewed; none identified    Contacted within the last 12 weeks by phone on 5/25/23      ISABEL     Mara was NOT contacted regarding therapeutic result today per home monitoring policy manage by exception agreement.   Current warfarin dose is to be continued:     Summary  As of 7/6/2023    Full warfarin instructions:  2 mg every Thu; 3 mg all other days   Next INR check:  7/13/2023           ?   Belkis Nunez, RN  Anticoagulation Clinic  7/6/2023    _______________________________________________________________________     Anticoagulation Episode Summary     Current INR goal:  2.0-3.0   TTR:  65.2 % (1 y)   Target end date:  Indefinite   Send INR reminders to:  [facility]    Indications    Long-term (current) use of anticoagulants [Z79.01] [Z79.01]  Persistent atrial fibrillation (H) [I48.19]  Chronic anticoagulation [Z79.01]           Comments:  [employer] home meter. Manage by exception.         Anticoagulation Care Providers     Provider Role Specialty Phone number    Steven Abrams MD Referring Internal Medicine [phone]

## 2023-07-12 DIAGNOSIS — I48.19 PERSISTENT ATRIAL FIBRILLATION (H): ICD-10-CM

## 2023-07-12 DIAGNOSIS — I27.20 PULMONARY HTN (H): Primary | ICD-10-CM

## 2023-07-14 ENCOUNTER — TRANSFERRED RECORDS (OUTPATIENT)
Dept: HEALTH INFORMATION MANAGEMENT | Facility: CLINIC | Age: 88
End: 2023-07-14
Payer: COMMERCIAL

## 2023-07-14 ENCOUNTER — DOCUMENTATION ONLY (OUTPATIENT)
Dept: ANTICOAGULATION | Facility: CLINIC | Age: 88
End: 2023-07-14
Payer: COMMERCIAL

## 2023-07-14 ENCOUNTER — ANTICOAGULATION THERAPY VISIT (OUTPATIENT)
Dept: ANTICOAGULATION | Facility: CLINIC | Age: 88
End: 2023-07-14
Payer: COMMERCIAL

## 2023-07-14 DIAGNOSIS — I48.19 PERSISTENT ATRIAL FIBRILLATION (H): ICD-10-CM

## 2023-07-14 DIAGNOSIS — N18.30 CONTROLLED TYPE 2 DIABETES MELLITUS WITH STAGE 3 CHRONIC KIDNEY DISEASE, WITHOUT LONG-TERM CURRENT USE OF INSULIN (H): ICD-10-CM

## 2023-07-14 DIAGNOSIS — Z79.01 CHRONIC ANTICOAGULATION: ICD-10-CM

## 2023-07-14 DIAGNOSIS — Z79.01 LONG TERM CURRENT USE OF ANTICOAGULANT THERAPY: Primary | ICD-10-CM

## 2023-07-14 DIAGNOSIS — E11.22 CONTROLLED TYPE 2 DIABETES MELLITUS WITH STAGE 3 CHRONIC KIDNEY DISEASE, WITHOUT LONG-TERM CURRENT USE OF INSULIN (H): ICD-10-CM

## 2023-07-14 LAB — INR HOME MONITORING: 2.3 RATIO (ref 2–3)

## 2023-07-14 RX ORDER — DULAGLUTIDE 1.5 MG/.5ML
INJECTION, SOLUTION SUBCUTANEOUS
Qty: 2 ML | Refills: 5 | Status: SHIPPED | OUTPATIENT
Start: 2023-07-14 | End: 2023-12-21

## 2023-07-14 NOTE — PROGRESS NOTES
ANTICOAGULATION  MANAGEMENT-Home Monitor Managed by Exception    Mara CHARLES Jens 88 year old female is on warfarin with therapeutic INR result. (Goal INR 2.0-3.0)    Recent labs: (last 7 days)     07/14/23  1358   INR 2.3         Previous INR was Therapeutic    Medication, diet, health changes since last INR:chart reviewed; none identified    Contacted within the last 12 weeks by phone on: 5/25/23 (~ 12 weeks: 8/17/23)      ISABEL     Mara was NOT contacted regarding therapeutic result today per home monitoring policy manage by exception agreement.   Current warfarin dose is to be continued:     Summary  As of 7/14/2023    Full warfarin instructions:  2 mg every Thu; 3 mg all other days   Next INR check:  7/28/2023           ?   Annette Briscoe RN  Anticoagulation Clinic  7/14/2023    _______________________________________________________________________     Anticoagulation Episode Summary     Current INR goal:  2.0-3.0   TTR:  65.2 % (1 y)   Target end date:  Indefinite   Send INR reminders to:  [facility]    Indications    Long-term (current) use of anticoagulants [Z79.01] [Z79.01]  Persistent atrial fibrillation (H) [I48.19]  Chronic anticoagulation [Z79.01]           Comments:  [employer] home meter. Manage by exception.         Anticoagulation Care Providers     Provider Role Specialty Phone number    Steven Abrams MD Referring Internal Medicine [phone]

## 2023-07-20 ENCOUNTER — TRANSFERRED RECORDS (OUTPATIENT)
Dept: HEALTH INFORMATION MANAGEMENT | Facility: CLINIC | Age: 88
End: 2023-07-20
Payer: COMMERCIAL

## 2023-07-20 ENCOUNTER — ANTICOAGULATION THERAPY VISIT (OUTPATIENT)
Dept: ANTICOAGULATION | Facility: CLINIC | Age: 88
End: 2023-07-20
Payer: COMMERCIAL

## 2023-07-20 DIAGNOSIS — Z79.01 LONG TERM CURRENT USE OF ANTICOAGULANT THERAPY: Primary | ICD-10-CM

## 2023-07-20 DIAGNOSIS — I50.32 CHRONIC DIASTOLIC CONGESTIVE HEART FAILURE (H): ICD-10-CM

## 2023-07-20 DIAGNOSIS — I48.19 PERSISTENT ATRIAL FIBRILLATION (H): ICD-10-CM

## 2023-07-20 DIAGNOSIS — Z79.01 CHRONIC ANTICOAGULATION: ICD-10-CM

## 2023-07-20 DIAGNOSIS — I27.20 PULMONARY HTN (H): ICD-10-CM

## 2023-07-20 LAB — INR HOME MONITORING: 2.5 RATIO (ref 2–3)

## 2023-07-20 RX ORDER — METOPROLOL TARTRATE 100 MG
TABLET ORAL
Qty: 90 TABLET | Refills: 1 | Status: SHIPPED | OUTPATIENT
Start: 2023-07-20 | End: 2024-01-15

## 2023-07-20 NOTE — PROGRESS NOTES
ANTICOAGULATION  MANAGEMENT-Home Monitor Managed by Exception    Mara Colindres 88 year old female is on warfarin with therapeutic INR result. (Goal INR 2.0-3.0)    Recent labs: (last 7 days)     07/20/23  1213   INR 2.5         Previous INR was Therapeutic    Medication, diet, health changes since last INR:chart reviewed; none identified    Contacted within the last 12 weeks by phone on 5/25/23      ISABEL     Mara was NOT contacted regarding therapeutic result today per home monitoring policy manage by exception agreement.   Current warfarin dose is to be continued:     Summary  As of 7/20/2023    Full warfarin instructions:  2 mg every Thu; 3 mg all other days   Next INR check:  7/27/2023           ?   Belkis Nunez, RN  Anticoagulation Clinic  7/20/2023    _______________________________________________________________________     Anticoagulation Episode Summary     Current INR goal:  2.0-3.0   TTR:  65.2 % (1 y)   Target end date:  Indefinite   Send INR reminders to:  [facility]    Indications    Long-term (current) use of anticoagulants [Z79.01] [Z79.01]  Persistent atrial fibrillation (H) [I48.19]  Chronic anticoagulation [Z79.01]           Comments:  [employer] home meter. Manage by exception.         Anticoagulation Care Providers     Provider Role Specialty Phone number    Steven Abrams MD Referring Internal Medicine [phone]

## 2023-07-25 ENCOUNTER — OFFICE VISIT (OUTPATIENT)
Dept: OPHTHALMOLOGY | Facility: CLINIC | Age: 88
End: 2023-07-25
Payer: COMMERCIAL

## 2023-07-25 DIAGNOSIS — Z01.01 ENCOUNTER FOR EXAMINATION OF EYES AND VISION WITH ABNORMAL FINDINGS: ICD-10-CM

## 2023-07-25 DIAGNOSIS — E11.22 CONTROLLED TYPE 2 DIABETES MELLITUS WITH STAGE 3 CHRONIC KIDNEY DISEASE, WITHOUT LONG-TERM CURRENT USE OF INSULIN (H): Primary | ICD-10-CM

## 2023-07-25 DIAGNOSIS — Z96.1 PSEUDOPHAKIA: ICD-10-CM

## 2023-07-25 DIAGNOSIS — E11.3299 BACKGROUND DIABETIC RETINOPATHY (H): ICD-10-CM

## 2023-07-25 DIAGNOSIS — N18.30 CONTROLLED TYPE 2 DIABETES MELLITUS WITH STAGE 3 CHRONIC KIDNEY DISEASE, WITHOUT LONG-TERM CURRENT USE OF INSULIN (H): Primary | ICD-10-CM

## 2023-07-25 DIAGNOSIS — H52.4 PRESBYOPIA: ICD-10-CM

## 2023-07-25 DIAGNOSIS — H43.812 POSTERIOR VITREOUS DETACHMENT OF LEFT EYE: ICD-10-CM

## 2023-07-25 PROCEDURE — 92014 COMPRE OPH EXAM EST PT 1/>: CPT | Performed by: OPHTHALMOLOGY

## 2023-07-25 PROCEDURE — 92015 DETERMINE REFRACTIVE STATE: CPT | Performed by: OPHTHALMOLOGY

## 2023-07-25 ASSESSMENT — REFRACTION_WEARINGRX
OD_ADD: +2.75
SPECS_TYPE: PAL
OS_CYLINDER: SPHERE
SPECS_TYPE: OTC
OD_CYLINDER: +0.25
OD_CYLINDER: +0.50
OD_ADD: +2.25
OD_SPHERE: +3.00
OD_SPHERE: -1.00
OS_AXIS: 158
OS_CYLINDER: SPHERE
OD_AXIS: 032
OS_SPHERE: +3.00
OS_CYLINDER: +0.75
OD_SPHERE: +1.25
SPECS_TYPE: BIFOCAL
OS_ADD: +2.50
OD_AXIS: 050
OS_SPHERE: -1.50
OS_ADD: +2.75
OD_CYLINDER: SPHERE
OS_SPHERE: +1.25

## 2023-07-25 ASSESSMENT — CUP TO DISC RATIO
OS_RATIO: 0.5
OD_RATIO: 0.5

## 2023-07-25 ASSESSMENT — REFRACTION_MANIFEST
OS_AXIS: 153
OS_CYLINDER: +0.50
OS_SPHERE: -1.25
OS_ADD: +3.00
OD_AXIS: 013
OD_SPHERE: -1.25
OD_ADD: +3.00
OD_CYLINDER: +1.50

## 2023-07-25 ASSESSMENT — VISUAL ACUITY
CORRECTION_TYPE: GLASSES
OS_CC: 20/30
OS_CC: 20/200
METHOD: SNELLEN - LINEAR
OD_CC: 20/100
OD_CC: J1
METHOD: SNELLEN - LINEAR
OS_CC+: -2
CORRECTION_TYPE: GLASSES
OS_PH_CC: 20/60
OD_PH_CC: 20/40
OS_CC: J1+
OD_CC: 20/40

## 2023-07-25 ASSESSMENT — EXTERNAL EXAM - LEFT EYE: OS_EXAM: 1+ BROW PTOSIS

## 2023-07-25 ASSESSMENT — TONOMETRY
IOP_METHOD: APPLANATION
OS_IOP_MMHG: 15
OD_IOP_MMHG: 15

## 2023-07-25 ASSESSMENT — SLIT LAMP EXAM - LIDS
COMMENTS: 1+ DERMATOCHALASIS
COMMENTS: 1+ DERMATOCHALASIS

## 2023-07-25 ASSESSMENT — CONF VISUAL FIELD
OD_SUPERIOR_NASAL_RESTRICTION: 0
OS_SUPERIOR_NASAL_RESTRICTION: 0
OS_SUPERIOR_TEMPORAL_RESTRICTION: 0
OD_INFERIOR_TEMPORAL_RESTRICTION: 0
OD_NORMAL: 1
OS_NORMAL: 1
OD_INFERIOR_NASAL_RESTRICTION: 0
OD_SUPERIOR_TEMPORAL_RESTRICTION: 0
OS_INFERIOR_TEMPORAL_RESTRICTION: 0
OS_INFERIOR_NASAL_RESTRICTION: 0

## 2023-07-25 ASSESSMENT — EXTERNAL EXAM - RIGHT EYE: OD_EXAM: 1+ BROW PTOSIS

## 2023-07-25 NOTE — PROGRESS NOTES
" Current Eye Medications:  systane both eyes as needed.      Subjective:  Patient is here for a Diabetic Eye Exam.   Occasionally, after reading for a period of time, she is aware of a gray spot in the center of her vision.  She has been having recurrent styes or white spots on the right lower eyelid and occasionally on the right upper eyelid.  She is wondering about the fat pads on her upper eyelids.  Overall, she feels her vision is fading in each eye.  She primarily wears +3.00 over-the-counter readers.      Lab Results   Component Value Date    A1C 7.4 11/09/2022    A1C 7.7 06/03/2022    A1C 7.3 09/13/2021    A1C 7.1 08/11/2020    A1C 7.0 04/29/2019    A1C 7.3 07/09/2018    A1C 7.8 11/07/2017    A1C 8.2 07/27/2017     Feels memory somewhat worse after recent stroke.     Objective:  See Ophthalmology Exam.       Assessment:  Stable eye exam.  Stable background diabetic retinopathy both eyes.      ICD-10-CM    1. 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 Pseudophakia, ou; Yag Caps, od  Z96.1       3. Background diabetic retinopathy, mild, ou  E11.3299       4. Posterior vitreous detachment of left eye  H43.812       5. Encounter for examination of eyes and vision with abnormal findings  Z01.01       6. Presbyopia  H52.4            Plan:  Glasses prescription given - optional  May use artificial tears up to four times a day (like Refresh Optive, Systane Balance, TheraTears, or generic artificial tears are ok. Avoid \"get the red out\" drops).   Call in March 2024 for an appointment in July 2024 for Complete Exam    Dr. Tavares [phone]       "

## 2023-07-25 NOTE — LETTER
"    7/25/2023         RE: Maar Colindres  [address] [facility] [address]        Dear Colleague,    Thank you for referring your patient, Mara Colindres, to the [facility]. Please see a copy of my visit note below.     Current Eye Medications:  systane both eyes as needed.      Subjective:  Patient is here for a Diabetic Eye Exam.   Occasionally, after reading for a period of time, she is aware of a gray spot in the center of her vision.  She has been having recurrent styes or white spots on the right lower eyelid and occasionally on the right upper eyelid.  She is wondering about the fat pads on her upper eyelids.  Overall, she feels her vision is fading in each eye.  She primarily wears +3.00 over-the-counter readers.      Lab Results   Component Value Date    A1C 7.4 11/09/2022    A1C 7.7 06/03/2022    A1C 7.3 09/13/2021    A1C 7.1 08/11/2020    A1C 7.0 04/29/2019    A1C 7.3 07/09/2018    A1C 7.8 11/07/2017    A1C 8.2 07/27/2017     Feels memory somewhat worse after recent stroke.     Objective:  See Ophthalmology Exam.       Assessment:  Stable eye exam.  Stable background diabetic retinopathy both eyes.      ICD-10-CM    1. 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 Pseudophakia, ou; Yag Caps, od  Z96.1       3. Background diabetic retinopathy, mild, ou  E11.3299       4. Posterior vitreous detachment of left eye  H43.812       5. Encounter for examination of eyes and vision with abnormal findings  Z01.01       6. Presbyopia  H52.4            Plan:  Glasses prescription given - optional  May use artificial tears up to four times a day (like Refresh Optive, Systane Balance, TheraTears, or generic artificial tears are ok. Avoid \"get the red out\" drops).   Call in March 2024 for an appointment in July 2024 for Complete Exam    Dr. Tavares [phone]           Again, " thank you for allowing me to participate in the care of your patient.        Sincerely,        Matty Tavares MD

## 2023-07-25 NOTE — PATIENT INSTRUCTIONS
"Glasses prescription given - optional  May use artificial tears up to four times a day (like Refresh Optive, Systane Balance, TheraTears, or generic artificial tears are ok. Avoid \"get the red out\" drops).   Call in March 2024 for an appointment in July 2024 for Complete Exam    Dr. Tavares [phone]      Patient Education   Diabetes weakens the blood vessels all over the body, including the eyes. Damage to the blood vessels in the eyes can cause swelling or bleeding into part of the eye (called the retina). This is called diabetic retinopathy (DENISSE-tin--puh-thee). If not treated, this disease can cause vision loss or blindness.   Symptoms may include blurred or distorted vision, but many people have no symptoms. It's important to see your eye doctor regularly to check for problems.   Early treatment and good control can help protect your vision. Here are the things you can do to help prevent vision loss:      1. Keep your blood sugar levels under tight control.      2. Bring high blood pressure under control.      3. No smoking.      4. Have yearly dilated eye exams.       "

## 2023-07-27 ENCOUNTER — DOCUMENTATION ONLY (OUTPATIENT)
Dept: ANTICOAGULATION | Facility: CLINIC | Age: 88
End: 2023-07-27
Payer: COMMERCIAL

## 2023-07-27 DIAGNOSIS — I48.19 PERSISTENT ATRIAL FIBRILLATION (H): ICD-10-CM

## 2023-07-27 DIAGNOSIS — Z79.01 LONG TERM CURRENT USE OF ANTICOAGULANT THERAPY: Primary | ICD-10-CM

## 2023-07-27 DIAGNOSIS — Z79.01 CHRONIC ANTICOAGULATION: ICD-10-CM

## 2023-07-27 LAB — INR HOME MONITORING: 2.3 RATIO (ref 2–3)

## 2023-07-27 NOTE — PROGRESS NOTES
ANTICOAGULATION  MANAGEMENT-Home Monitor Managed by Exception    Mara CHARLES Jens 88 year old female is on warfarin with therapeutic INR result. (Goal INR 2.0-3.0)    Recent labs: (last 7 days)     07/27/23  1734   INR 2.3         Previous INR was Therapeutic    Medication, diet, health changes since last INR:chart reviewed; none identified    Contacted within the last 12 weeks by phone on 5/25/23      ISABEL     Mara was NOT contacted regarding therapeutic result today per home monitoring policy manage by exception agreement.   Current warfarin dose is to be continued:     Summary  As of 7/27/2023    Full warfarin instructions:  2 mg every Thu; 3 mg all other days   Next INR check:  8/10/2023           ?   Laxmi Huang RN  Anticoagulation Clinic  7/27/2023    _______________________________________________________________________     Anticoagulation Episode Summary     Current INR goal:  2.0-3.0   TTR:  65.2 % (1 y)   Target end date:  Indefinite   Send INR reminders to:  [facility]    Indications    Long-term (current) use of anticoagulants [Z79.01] [Z79.01]  Persistent atrial fibrillation (H) [I48.19]  Chronic anticoagulation [Z79.01]           Comments:  [employer] home meter. Manage by exception.         Anticoagulation Care Providers     Provider Role Specialty Phone number    Steven Abrams MD Referring Internal Medicine [phone]

## 2023-08-03 ENCOUNTER — TRANSFERRED RECORDS (OUTPATIENT)
Dept: HEALTH INFORMATION MANAGEMENT | Facility: CLINIC | Age: 88
End: 2023-08-03
Payer: COMMERCIAL

## 2023-08-03 ENCOUNTER — DOCUMENTATION ONLY (OUTPATIENT)
Dept: ANTICOAGULATION | Facility: CLINIC | Age: 88
End: 2023-08-03
Payer: COMMERCIAL

## 2023-08-03 DIAGNOSIS — Z79.01 LONG TERM CURRENT USE OF ANTICOAGULANT THERAPY: Primary | ICD-10-CM

## 2023-08-03 DIAGNOSIS — I48.19 PERSISTENT ATRIAL FIBRILLATION (H): ICD-10-CM

## 2023-08-03 DIAGNOSIS — Z79.01 CHRONIC ANTICOAGULATION: ICD-10-CM

## 2023-08-03 LAB — INR HOME MONITORING: 2.5 RATIO (ref 2–3)

## 2023-08-03 NOTE — PROGRESS NOTES
ANTICOAGULATION  MANAGEMENT-Home Monitor Managed by Exception    Mara Colindres 88 year old female is on warfarin with therapeutic INR result. (Goal INR 2.0-3.0)    Recent labs: (last 7 days)     08/03/23  1232   INR 2.5       Previous INR was Therapeutic  Medication, diet, health changes since last INR:chart reviewed; none identified  Contacted within the last 12 weeks by phone on 5/25/23      ISABEL     Mara was NOT contacted regarding therapeutic result today per home monitoring policy manage by exception agreement.   Current warfarin dose is to be continued:     Summary  As of 8/3/2023      Full warfarin instructions:  2 mg every Thu; 3 mg all other days   Next INR check:  8/10/2023             ?   Belkis Nunez, RN  Anticoagulation Clinic  8/3/2023    _______________________________________________________________________     Anticoagulation Episode Summary       Current INR goal:  2.0-3.0   TTR:  66.4 % (1 y)   Target end date:  Indefinite   Send INR reminders to:  [facility]    Indications    Long-term (current) use of anticoagulants [Z79.01] [Z79.01]  Persistent atrial fibrillation (H) [I48.19]  Chronic anticoagulation [Z79.01]             Comments:  [employer] home meter. Manage by exception.             Anticoagulation Care Providers       Provider Role Specialty Phone number    Steven Abrams MD Referring Internal Medicine [phone]

## 2023-08-10 ENCOUNTER — DOCUMENTATION ONLY (OUTPATIENT)
Dept: ANTICOAGULATION | Facility: CLINIC | Age: 88
End: 2023-08-10
Payer: COMMERCIAL

## 2023-08-10 DIAGNOSIS — Z79.01 LONG TERM CURRENT USE OF ANTICOAGULANT THERAPY: Primary | ICD-10-CM

## 2023-08-10 DIAGNOSIS — I48.19 PERSISTENT ATRIAL FIBRILLATION (H): ICD-10-CM

## 2023-08-10 DIAGNOSIS — Z79.01 CHRONIC ANTICOAGULATION: ICD-10-CM

## 2023-08-10 LAB
INR (EXTERNAL): 2.1 (ref 0.9–1.1)
INR HOME MONITORING: 2.1 RATIO (ref 2–3)

## 2023-08-10 NOTE — PROGRESS NOTES
ANTICOAGULATION  MANAGEMENT-Home Monitor Managed by Exception    Mara CHARLES Jens 88 year old female is on warfarin with therapeutic INR result. (Goal INR 2.0-3.0)    Recent labs: (last 7 days)     08/10/23  0946   INR 2.1*       Previous INR was Therapeutic  Medication, diet, health changes since last INR:chart reviewed; none identified  Contacted within the last 12 weeks by phone on 05/25/2023      ISABEL     Mara was NOT contacted regarding therapeutic result today per home monitoring policy manage by exception agreement.   Current warfarin dose is to be continued:     Summary  As of 8/10/2023      Full warfarin instructions:  2 mg every Thu; 3 mg all other days   Next INR check:  8/17/2023             ?   Escobar Pereira RN  Anticoagulation Clinic  8/10/2023    _______________________________________________________________________     Anticoagulation Episode Summary       Current INR goal:  2.0-3.0   TTR:  68.3 % (1 y)   Target end date:  Indefinite   Send INR reminders to:  [facility]    Indications    Long-term (current) use of anticoagulants [Z79.01] [Z79.01]  Persistent atrial fibrillation (H) [I48.19]  Chronic anticoagulation [Z79.01]             Comments:  [employer] home meter. Manage by exception.             Anticoagulation Care Providers       Provider Role Specialty Phone number    Steven Abrams MD Referring Internal Medicine [phone]

## 2023-08-11 ENCOUNTER — DOCUMENTATION ONLY (OUTPATIENT)
Dept: ANTICOAGULATION | Facility: CLINIC | Age: 88
End: 2023-08-11
Payer: COMMERCIAL

## 2023-08-11 DIAGNOSIS — I48.19 PERSISTENT ATRIAL FIBRILLATION (H): ICD-10-CM

## 2023-08-11 DIAGNOSIS — Z79.01 CHRONIC ANTICOAGULATION: Primary | ICD-10-CM

## 2023-08-11 NOTE — PROGRESS NOTES
ANTICOAGULATION CLINIC REFERRAL RENEWAL REQUEST       An annual renewal order is required for all patients referred to [facility].?  Please review and sign the pended referral order for Mara L Jens.       ANTICOAGULATION SUMMARY      Warfarin indication(s)   Atrial Fibrillation    Mechanical heart valve present?  NO       Current goal range   INR: 2.0-3.0     Goal appropriate for indication? Goal INR 2-3, standard for indication(s) above     Time in Therapeutic Range (TTR)  (Goal > 60%) 68.3%       Office visit with referring provider's group within last year yes on 3/2/23       Laxmi Huang RN  [facility]

## 2023-08-17 ENCOUNTER — ANTICOAGULATION THERAPY VISIT (OUTPATIENT)
Dept: ANTICOAGULATION | Facility: CLINIC | Age: 88
End: 2023-08-17
Payer: COMMERCIAL

## 2023-08-17 ENCOUNTER — TRANSFERRED RECORDS (OUTPATIENT)
Dept: HEALTH INFORMATION MANAGEMENT | Facility: CLINIC | Age: 88
End: 2023-08-17
Payer: COMMERCIAL

## 2023-08-17 LAB — INR HOME MONITORING: 2 RATIO (ref 2–3)

## 2023-08-17 NOTE — PROGRESS NOTES
ANTICOAGULATION MANAGEMENT     Mara Colindres 88 year old female is on warfarin with therapeutic INR result. (Goal INR 2.0-3.0)    Recent labs: (last 7 days)     08/17/23  1633   INR 2       ASSESSMENT     Source(s): Chart Review and Patient/Caregiver Call     Warfarin doses taken: Warfarin taken as instructed  Diet: No new diet changes identified  Medication/supplement changes: None noted  New illness, injury, or hospitalization: No  Signs or symptoms of bleeding or clotting: No  Previous result: Therapeutic last 2(+) visits  Additional findings: None       PLAN     Recommended plan for no diet, medication or health factor changes affecting INR     Dosing Instructions: Continue your current warfarin dose with next INR in 1 week       Summary  As of 8/17/2023      Full warfarin instructions:  2 mg every Thu; 3 mg all other days   Next INR check:  8/24/2023               Telephone call with Mara who verbalizes understanding and agrees to plan    Patient to recheck with home meter    Education provided:   None required  Goal range and lab monitoring: goal range and significance of current result  Resume manage by exception with home monitor. Continue to submit INR results to home monitor company.You will only be called when your result is out of range. Please call and notify [facility] if new medication started, dose missed, signs or symptoms of bleeding or clotting, or a surgery/procedure is scheduled.    Plan made per [facility] anticoagulation protocol    Alejandrina Wise RN  Anticoagulation Clinic  8/17/2023    _______________________________________________________________________     Anticoagulation Episode Summary       Current INR goal:  2.0-3.0   TTR:  69.3 % (1 y)   Target end date:  Indefinite   Send INR reminders to:  [facility]    Indications    Long-term (current) use of anticoagulants [Z79.01] [Z79.01]  Persistent atrial fibrillation (H) [I48.19]  Chronic anticoagulation [Z79.01]              Comments:  [employer] home meter. Manage by exception.             Anticoagulation Care Providers       Provider Role Specialty Phone number    Steven Abrams MD Referring Internal Medicine [phone]

## 2023-08-22 ENCOUNTER — DOCUMENTATION ONLY (OUTPATIENT)
Dept: FAMILY MEDICINE | Facility: CLINIC | Age: 88
End: 2023-08-22
Payer: COMMERCIAL

## 2023-08-22 DIAGNOSIS — I27.20 PULMONARY HYPERTENSION (H): Primary | ICD-10-CM

## 2023-08-22 NOTE — PROGRESS NOTES
Message reviewed , orders signed     Rerouted to Team [place] [facility] Team to arrange appointment for oxygen assessment     Steven Abrams MD

## 2023-08-24 ENCOUNTER — TRANSFERRED RECORDS (OUTPATIENT)
Dept: HEALTH INFORMATION MANAGEMENT | Facility: CLINIC | Age: 88
End: 2023-08-24
Payer: COMMERCIAL

## 2023-08-24 ENCOUNTER — DOCUMENTATION ONLY (OUTPATIENT)
Dept: ANTICOAGULATION | Facility: CLINIC | Age: 88
End: 2023-08-24
Payer: COMMERCIAL

## 2023-08-24 DIAGNOSIS — I48.19 PERSISTENT ATRIAL FIBRILLATION (H): ICD-10-CM

## 2023-08-24 DIAGNOSIS — Z79.01 CHRONIC ANTICOAGULATION: ICD-10-CM

## 2023-08-24 DIAGNOSIS — Z79.01 LONG TERM CURRENT USE OF ANTICOAGULANT THERAPY: Primary | ICD-10-CM

## 2023-08-24 LAB — INR HOME MONITORING: 2 RATIO (ref 2–3)

## 2023-08-24 NOTE — PROGRESS NOTES
Does patient need to be seen by provider or just an MA visit?      Kira MURILLO CMA ([facility])

## 2023-08-24 NOTE — PROGRESS NOTES
ANTICOAGULATION  MANAGEMENT-Home Monitor Managed by Exception    Mara CHARLES Jens 88 year old female is on warfarin with therapeutic INR result. (Goal INR 2.0-3.0)    Recent labs: (last 7 days)     08/24/23  1347   INR 2       Previous INR was Therapeutic  Medication, diet, health changes since last INR:chart reviewed; none identified  Contacted within the last 12 weeks by phone on 8/17/23      ISABEL     Mara was NOT contacted regarding therapeutic result today per home monitoring policy manage by exception agreement.   Current warfarin dose is to be continued:     Summary  As of 8/24/2023      Full warfarin instructions:  2 mg every Thu; 3 mg all other days   Next INR check:  8/31/2023             ?   Coral Carlisle RN  Anticoagulation Clinic  8/24/2023    _______________________________________________________________________     Anticoagulation Episode Summary       Current INR goal:  2.0-3.0   TTR:  69.3 % (1 y)   Target end date:  Indefinite   Send INR reminders to:  [facility]    Indications    Long-term (current) use of anticoagulants [Z79.01] [Z79.01]  Persistent atrial fibrillation (H) [I48.19]  Chronic anticoagulation [Z79.01]             Comments:  [employer] home meter. Manage by exception.             Anticoagulation Care Providers       Provider Role Specialty Phone number    Steven Abrams MD Referring Internal Medicine [phone]

## 2023-08-24 NOTE — PROGRESS NOTES
According to the message just a nurse or medical assistant seems adequate but I am not positive on this point    OZ WALSH MD

## 2023-08-24 NOTE — PROGRESS NOTES
Called patient and made appointment for Monday, nothing else needed.       Kira MURILLO CMA ([facility])

## 2023-08-28 ENCOUNTER — OFFICE VISIT (OUTPATIENT)
Dept: INTERNAL MEDICINE | Facility: CLINIC | Age: 88
End: 2023-08-28
Payer: COMMERCIAL

## 2023-08-28 VITALS — HEART RATE: 87 BPM | OXYGEN SATURATION: 97 % | RESPIRATION RATE: 20 BRPM

## 2023-08-28 DIAGNOSIS — N18.4 CKD (CHRONIC KIDNEY DISEASE) STAGE 4, GFR 15-29 ML/MIN (H): Primary | ICD-10-CM

## 2023-08-28 DIAGNOSIS — R63.4 UNINTENDED WEIGHT LOSS: ICD-10-CM

## 2023-08-28 DIAGNOSIS — E11.22 CONTROLLED TYPE 2 DIABETES MELLITUS WITH STAGE 3 CHRONIC KIDNEY DISEASE, WITHOUT LONG-TERM CURRENT USE OF INSULIN (H): ICD-10-CM

## 2023-08-28 DIAGNOSIS — R30.0 DYSURIA: ICD-10-CM

## 2023-08-28 DIAGNOSIS — E78.5 HYPERLIPIDEMIA LDL GOAL <100: ICD-10-CM

## 2023-08-28 DIAGNOSIS — F50.89 PICA: ICD-10-CM

## 2023-08-28 DIAGNOSIS — N18.30 CONTROLLED TYPE 2 DIABETES MELLITUS WITH STAGE 3 CHRONIC KIDNEY DISEASE, WITHOUT LONG-TERM CURRENT USE OF INSULIN (H): ICD-10-CM

## 2023-08-28 DIAGNOSIS — G89.29 OTHER CHRONIC PAIN: ICD-10-CM

## 2023-08-28 DIAGNOSIS — Z23 NEED FOR SHINGLES VACCINE: ICD-10-CM

## 2023-08-28 DIAGNOSIS — R79.89 LOW VITAMIN B12 LEVEL: ICD-10-CM

## 2023-08-28 DIAGNOSIS — M81.0 AGE-RELATED OSTEOPOROSIS WITHOUT CURRENT PATHOLOGICAL FRACTURE: ICD-10-CM

## 2023-08-28 DIAGNOSIS — E11.3299 BACKGROUND DIABETIC RETINOPATHY (H): ICD-10-CM

## 2023-08-28 DIAGNOSIS — R09.02 OXYGEN DESATURATION: ICD-10-CM

## 2023-08-28 LAB
ALBUMIN UR-MCNC: ABNORMAL MG/DL
AMPHETAMINES UR QL: NOT DETECTED
ANION GAP SERPL CALCULATED.3IONS-SCNC: 12 MMOL/L (ref 7–15)
APPEARANCE UR: ABNORMAL
BACTERIA #/AREA URNS HPF: ABNORMAL /HPF
BARBITURATES UR QL SCN: NOT DETECTED
BASOPHILS # BLD AUTO: 0.1 10E3/UL (ref 0–0.2)
BASOPHILS NFR BLD AUTO: 1 %
BENZODIAZ UR QL SCN: NOT DETECTED
BILIRUB UR QL STRIP: NEGATIVE
BUN SERPL-MCNC: 32.2 MG/DL (ref 8–23)
BUPRENORPHINE UR QL: NOT DETECTED
CALCIUM SERPL-MCNC: 9.3 MG/DL (ref 8.8–10.2)
CANNABINOIDS UR QL: NOT DETECTED
CHLORIDE SERPL-SCNC: 100 MMOL/L (ref 98–107)
CHOLEST SERPL-MCNC: 121 MG/DL
COCAINE UR QL SCN: NOT DETECTED
COLOR UR AUTO: YELLOW
CREAT SERPL-MCNC: 1.74 MG/DL (ref 0.51–0.95)
CREAT UR-MCNC: 79.9 MG/DL
D-METHAMPHET UR QL: NOT DETECTED
DEPRECATED HCO3 PLAS-SCNC: 27 MMOL/L (ref 22–29)
EOSINOPHIL # BLD AUTO: 0.2 10E3/UL (ref 0–0.7)
EOSINOPHIL NFR BLD AUTO: 3 %
ERYTHROCYTE [DISTWIDTH] IN BLOOD BY AUTOMATED COUNT: 14.5 % (ref 10–15)
GFR SERPL CREATININE-BSD FRML MDRD: 28 ML/MIN/1.73M2
GLUCOSE SERPL-MCNC: 214 MG/DL (ref 70–99)
GLUCOSE UR STRIP-MCNC: NEGATIVE MG/DL
HBA1C MFR BLD: 8.7 % (ref 0–5.6)
HCT VFR BLD AUTO: 31.8 % (ref 35–47)
HDLC SERPL-MCNC: 54 MG/DL
HGB BLD-MCNC: 9.9 G/DL (ref 11.7–15.7)
HGB UR QL STRIP: ABNORMAL
IMM GRANULOCYTES # BLD: 0 10E3/UL
IMM GRANULOCYTES NFR BLD: 0 %
KETONES UR STRIP-MCNC: NEGATIVE MG/DL
LDLC SERPL CALC-MCNC: 55 MG/DL
LEUKOCYTE ESTERASE UR QL STRIP: ABNORMAL
LYMPHOCYTES # BLD AUTO: 0.9 10E3/UL (ref 0.8–5.3)
LYMPHOCYTES NFR BLD AUTO: 13 %
MCH RBC QN AUTO: 28.4 PG (ref 26.5–33)
MCHC RBC AUTO-ENTMCNC: 31.1 G/DL (ref 31.5–36.5)
MCV RBC AUTO: 91 FL (ref 78–100)
METHADONE UR QL SCN: NOT DETECTED
MICROALBUMIN UR-MCNC: 108 MG/L
MICROALBUMIN/CREAT UR: 135.17 MG/G CR (ref 0–25)
MONOCYTES # BLD AUTO: 0.5 10E3/UL (ref 0–1.3)
MONOCYTES NFR BLD AUTO: 8 %
NEUTROPHILS # BLD AUTO: 5.4 10E3/UL (ref 1.6–8.3)
NEUTROPHILS NFR BLD AUTO: 76 %
NITRATE UR QL: NEGATIVE
NONHDLC SERPL-MCNC: 67 MG/DL
OPIATES UR QL SCN: DETECTED
OXYCODONE UR QL SCN: NOT DETECTED
PCP UR QL SCN: NOT DETECTED
PH UR STRIP: 5.5 [PH] (ref 5–7)
PLATELET # BLD AUTO: 238 10E3/UL (ref 150–450)
POTASSIUM SERPL-SCNC: 4.1 MMOL/L (ref 3.4–5.3)
PROPOXYPH UR QL: NOT DETECTED
RBC # BLD AUTO: 3.49 10E6/UL (ref 3.8–5.2)
RBC #/AREA URNS AUTO: ABNORMAL /HPF
SODIUM SERPL-SCNC: 139 MMOL/L (ref 136–145)
SP GR UR STRIP: 1.01 (ref 1–1.03)
SQUAMOUS #/AREA URNS AUTO: ABNORMAL /LPF
TRICYCLICS UR QL SCN: NOT DETECTED
TRIGL SERPL-MCNC: 60 MG/DL
UROBILINOGEN UR STRIP-ACNC: 0.2 E.U./DL
VIT B12 SERPL-MCNC: 578 PG/ML (ref 232–1245)
WBC # BLD AUTO: 7.1 10E3/UL (ref 4–11)
WBC #/AREA URNS AUTO: >100 /HPF

## 2023-08-28 PROCEDURE — 85025 COMPLETE CBC W/AUTO DIFF WBC: CPT | Performed by: INTERNAL MEDICINE

## 2023-08-28 PROCEDURE — 87086 URINE CULTURE/COLONY COUNT: CPT | Performed by: INTERNAL MEDICINE

## 2023-08-28 PROCEDURE — 83036 HEMOGLOBIN GLYCOSYLATED A1C: CPT | Performed by: INTERNAL MEDICINE

## 2023-08-28 PROCEDURE — 82607 VITAMIN B-12: CPT | Performed by: INTERNAL MEDICINE

## 2023-08-28 PROCEDURE — 80306 DRUG TEST PRSMV INSTRMNT: CPT | Performed by: INTERNAL MEDICINE

## 2023-08-28 PROCEDURE — 81001 URINALYSIS AUTO W/SCOPE: CPT | Mod: 59 | Performed by: INTERNAL MEDICINE

## 2023-08-28 PROCEDURE — 36415 COLL VENOUS BLD VENIPUNCTURE: CPT | Performed by: INTERNAL MEDICINE

## 2023-08-28 PROCEDURE — 99214 OFFICE O/P EST MOD 30 MIN: CPT | Performed by: INTERNAL MEDICINE

## 2023-08-28 PROCEDURE — 82570 ASSAY OF URINE CREATININE: CPT | Mod: 59 | Performed by: INTERNAL MEDICINE

## 2023-08-28 PROCEDURE — 82043 UR ALBUMIN QUANTITATIVE: CPT | Performed by: INTERNAL MEDICINE

## 2023-08-28 PROCEDURE — 80048 BASIC METABOLIC PNL TOTAL CA: CPT | Performed by: INTERNAL MEDICINE

## 2023-08-28 PROCEDURE — 80061 LIPID PANEL: CPT | Performed by: INTERNAL MEDICINE

## 2023-08-28 NOTE — NURSING NOTE
Oxygen resting at room air 96%  Oxygen with exertion on room air 84%  Oxygen at 3L resting 97%  Oxygen on 3L with exertion 90%

## 2023-08-28 NOTE — PROGRESS NOTES
Assessment & Plan     CKD (chronic kidney disease) stage 4, GFR 15-29 ml/min (H)  Mara Chaparro s a longterm patient with me. She's in today initially for the 5 year oxygen certification but we rapidly devolved into review of multiple issues and concerns. This elderly woman just shy of reaching 89 is fairly stable even with all of her serious issues . Chronic kidney disease stage 4 needs recheck and evaluation and management   - Albumin Random Urine Quantitative with Creat Ratio; Future  - BASIC METABOLIC PANEL; Future  - Albumin Random Urine Quantitative with Creat Ratio  - BASIC METABOLIC PANEL    Background diabetic retinopathy (H)  Recommended to continue with eye exams  - HEMOGLOBIN A1C; Future  - HEMOGLOBIN A1C    Age-related osteoporosis without current pathological fracture  She's taking Fosamax (alendronate) and is due for the DEXA scan recheck [ it's been 3 years now]  - DEXA HIP/PELVIS/SPINE - Future; Future    Need for shingles vaccine  Administered today in the pharmacy   - zoster vaccine recombinant adjuvanted (SHINGRIX) injection; Inject 0.5 mLs into the muscle once for 1 dose Pharmacist administered    Unintended weight loss  We reviewed her interesting history, she was in the hospital and for infectious disease issues , she was successfully treated and discharged and just began to lose weight. It's stabilized now since around march 2023 but in total she probably dropped 60 pounds. Ultimately we have no evidence for current auto-immune, neoplastic, or infectious active issues . We agreed to a wider set of laboratory studies today with further follow up  depending on the test results      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, without long-term current use of insulin (H)  Due for recheck of hemoglobin a1c  [ diabetes test ] with further follow up depending on the test results today     HYPERLIPIDEMIA LDL GOAL <100  Problem is stable and ongoing monitoring    -  Lipid panel reflex to direct LDL Fasting; Future  - Lipid panel reflex to direct LDL Fasting    Oxygen desaturation  Oxygen certification completed today . She qualifies for continued home oxygen     Other chronic pain  Does take some HYDROcodone (VICODIN) and so a urine drug screen is appropriate   - Drug Abuse Screen Panel 13, Urine (Pain Care Map) - lab collect; Future  - Drug Abuse Screen Panel 13, Urine (Pain Care Map) - lab collect    Low vitamin B12 level  Problem is stable and ongoing monitoring    - Vitamin B12; Future  - Vitamin B12    Dysuria  She is asking for a urine analysis to be checked today.  - UA with Microscopic reflex to Culture - lab collect; Future  - UA with Microscopic reflex to Culture - lab collect  - UA Microscopic with Reflex to Culture  - Urine Culture    Pica  I note this as patient states she's been compulsively chewing ice for around 2 years. The most common pathological process causing pica [pagophagia] is iron deficiency   - CBC with platelets and differential; Future  - CBC with platelets and differential    Review of the result(s) of each unique test - todays tests  Ordering of each unique test  28 minutes spent by me on the date of the encounter doing chart review, history and exam, documentation and further activities per the note    Steven Abrams MD  [facility] CRYSTAL Terry is a 88 year old, presenting for the following health issues:  oxygen orders          No data to display                Last appointment with me was 3-2-2023  We need today to certify patients need for oxygen   Patient asks - can she get a urine analysis and urine culture ? She feels some symptoms ? Has not had a formal diagnosis of a urinary tract infection for decades, last one was back when she was menstrually active    Increased frequency of urination , also a couple of weeks, it is waking her at night and wears a pad,   Has had chronic urinary incontinence and this is  "not new  Just a little bit of discomfort with voiding at the end of the urinary stream  Few weeks of symptoms, not changing   Patient thinks the urine looks grossly normal    We did review diabetes mellitus type 2  and discussed her home blood glucose monitoring , she concedes it's \" high\". Lowest is 135, high as 215 the average blood glucose reading is right around 180's    Lab Results   Component Value Date    A1C 7.4 11/09/2022    A1C 7.7 06/03/2022    A1C 7.3 09/13/2021    A1C 7.1 08/11/2020    A1C 7.0 04/29/2019    A1C 7.3 07/09/2018    A1C 7.8 11/07/2017    A1C 8.2 07/27/2017       Patient says she has lost 50 pounds over the last year or 2.   Wt Readings from Last 5 Encounters:   03/02/23 88.5 kg (195 lb)   09/13/21 108.4 kg (239 lb)   10/22/20 114.3 kg (252 lb)   09/04/20 114.3 kg (252 lb)   04/22/20 113.4 kg (250 lb)        Patient does not know exactly what to attribute this weight loss to.   Unintentional weight loss   We don't know why. However this weight loss has been all in all not concerning , patient seems to be well.  We had quite a discussion , patient is on Trulicity (dulaglutide)     HPI     Review of Systems   Constitutional, HEENT, cardiovascular, pulmonary, GI, , musculoskeletal, neuro, skin, endocrine and psych systems are negative, except as otherwise noted.      Objective    Pulse 87   Resp 20   SpO2 97%   There is no height or weight on file to calculate BMI.  Physical Exam   GENERAL: healthy, alert and no distress  EYES: Eyes grossly normal to inspection, PERRL and conjunctivae and sclerae normal  NECK: no adenopathy, no asymmetry, masses, or scars and thyroid normal to palpation  RESP: lungs clear to auscultation - no rales, rhonchi or wheezes  CV: regular rate and rhythm, normal S1 S2, no S3 or S4, no murmur, click or rub, no peripheral edema and peripheral pulses strong  MS: massive bilateral legs with a lot of edema , most consistent with lymphedema , she is not using any regular " support stockings though she attempts regularly to practice leg elevation through the day     Orders Placed This Encounter   Procedures    DEXA HIP/PELVIS/SPINE - Future    Albumin Random Urine Quantitative with Creat Ratio    HEMOGLOBIN A1C    BASIC METABOLIC PANEL    UA with Microscopic reflex to Culture - lab collect    Drug Abuse Screen Panel 13, Urine (Pain Care Map) - lab collect    Lipid panel reflex to direct LDL Fasting    Vitamin B12    CBC with platelets and differential    UA Microscopic with Reflex to Culture    CBC with platelets and differential

## 2023-08-29 LAB — BACTERIA UR CULT: NORMAL

## 2023-08-30 ENCOUNTER — TELEPHONE (OUTPATIENT)
Dept: FAMILY MEDICINE | Facility: CLINIC | Age: 88
End: 2023-08-30
Payer: COMMERCIAL

## 2023-08-30 DIAGNOSIS — E11.22 CONTROLLED TYPE 2 DIABETES MELLITUS WITH STAGE 3 CHRONIC KIDNEY DISEASE, WITHOUT LONG-TERM CURRENT USE OF INSULIN (H): Primary | ICD-10-CM

## 2023-08-30 DIAGNOSIS — N18.30 CONTROLLED TYPE 2 DIABETES MELLITUS WITH STAGE 3 CHRONIC KIDNEY DISEASE, WITHOUT LONG-TERM CURRENT USE OF INSULIN (H): Primary | ICD-10-CM

## 2023-08-30 NOTE — TELEPHONE ENCOUNTER
Patient notified of Provider's message as written.  Patient verbalized understanding.  She stated that she has not been watching her diet as she figured she is 88 years old and will not be around much longer.   However, she will start eating a diabetic friendly diet.  She stated that she will try AZO and monitor urinary/vaginal symptoms for now.    Patient declined DM ed referral for CGM as her son is leaving tomorrow and will be gone for a few weeks so she would not have a ride.    Patient interested in adding oral med for diabetes. She asked that Steven Castillo select the med as she is not familiar     2. Patient would like to wean off Paroxetine. Currently taking 20 mg every other day.   How should she wean off?      Soraida Polanco RN  [facility]

## 2023-08-30 NOTE — TELEPHONE ENCOUNTER
Steven Abrams MD  8/30/2023  1:39 PM CDT       These labs show us the following things ;     1. The urine drug screen is done annually per [facility] chronic pain care package rules. It's only showing what we expect to see  2. Basic metabolic panel is just about where it has been before with no new findings of concern  3. Albumin random urine quantitative is within acceptable limits  4. The urine analysis and urine dipstick findings are suspicious for what we might see with a urinary tract infection however the urine culture does not confirm the presence of  any pathogenic bacteria  and is negative. So for right now just suggest symptoms of vaginal atrophy and treatment with estrogen cream or suppositories 2 times per week may prove helpful. Also can take azo [ phenazopyridine ] which is an over the counter nonprescription medication. If the symptoms do persist for more then 3-5 days or longer we should try a second urine analysis and urine culture    5. The hemoglobin a1c  [ diabetes test ] is not good at all and at 8.7% this is the highest your number has been in years suggesting the diabetes mellitus treatment is getting away from us right now. Couple of options     Can patient wear a diagnostic continuous glucose monitor through certified diabetes educator ? If she is interested I will place some orders for this     Her current prescriptions for diabetes mellitus are Trulicity (dulaglutide) 1.5 milligrams subcutaneously weekly, and nothing else. We could try adding additional medications such as possibly glimepiride [ Amaryl ] or Jardiance  (empagliflozin) , I would like to avoid insulin if at all possible. See what she'd most prefer and reroute for orders to be placed       6. Complete blood count is stable and cholesterol panel numbers are great and Vitamin B 12 levels are normal.     Steven Abrams MD

## 2023-08-31 ENCOUNTER — TRANSFERRED RECORDS (OUTPATIENT)
Dept: HEALTH INFORMATION MANAGEMENT | Facility: CLINIC | Age: 88
End: 2023-08-31
Payer: COMMERCIAL

## 2023-08-31 LAB — INR HOME MONITORING: 2.1 RATIO (ref 2–3)

## 2023-08-31 NOTE — TELEPHONE ENCOUNTER
Called patient and relayed message from Dr. Abrams. She verbalized understanding and had no further questions. She has a pill cutter at home, so is able to cut paroxetine in half.     Dilcia Gil RN   [facility]

## 2023-08-31 NOTE — TELEPHONE ENCOUNTER
For the diabetes mellitus I added Jardiance  (empagliflozin) 10 milligrams and it's taken 1 time per day     We should do a recheck hemoglobin a1c  [ diabetes test ] in roughly 3 months and may need to make a further increase to Jardiance  (empagliflozin) by going up to the 25 milligram dose    With the PAXIL (paroxetine hydrochloride) I recommend breaking her pills in half and take a half dose every single day for a month then take every other day for a month then discontinue     Steven Abrams MD

## 2023-09-01 ENCOUNTER — DOCUMENTATION ONLY (OUTPATIENT)
Dept: ANTICOAGULATION | Facility: CLINIC | Age: 88
End: 2023-09-01
Payer: COMMERCIAL

## 2023-09-01 DIAGNOSIS — I48.19 PERSISTENT ATRIAL FIBRILLATION (H): ICD-10-CM

## 2023-09-01 DIAGNOSIS — Z79.01 CHRONIC ANTICOAGULATION: ICD-10-CM

## 2023-09-01 DIAGNOSIS — Z79.01 LONG TERM CURRENT USE OF ANTICOAGULANT THERAPY: Primary | ICD-10-CM

## 2023-09-01 NOTE — PROGRESS NOTES
ANTICOAGULATION  MANAGEMENT-Home Monitor Managed by Exception    Mara CHARLES Jens 88 year old female is on warfarin with therapeutic INR result. (Goal INR 2.0-3.0)    Recent labs: (last 7 days)     08/31/23  2107   INR 2.1       Previous INR was Therapeutic  Medication, diet, health changes since last INR:chart reviewed; none identified  Contacted within the last 12 weeks by phone on 8/17/23       ISABEL     Mara was NOT contacted regarding therapeutic result today per home monitoring policy manage by exception agreement.   Current warfarin dose is to be continued:     Summary  As of 9/1/2023      Full warfarin instructions:  2 mg every Thu; 3 mg all other days   Next INR check:  9/15/2023             ?   Laxmi Huang RN  Anticoagulation Clinic  9/1/2023    _______________________________________________________________________     Anticoagulation Episode Summary       Current INR goal:  2.0-3.0   TTR:  69.2 % (1 y)   Target end date:  Indefinite   Send INR reminders to:  [facility]    Indications    Long-term (current) use of anticoagulants [Z79.01] [Z79.01]  Persistent atrial fibrillation (H) [I48.19]  Chronic anticoagulation [Z79.01]             Comments:  [employer] home meter. Manage by exception.             Anticoagulation Care Providers       Provider Role Specialty Phone number    Steven Abrams MD Referring Internal Medicine [phone]

## 2023-09-05 ENCOUNTER — TELEPHONE (OUTPATIENT)
Dept: FAMILY MEDICINE | Facility: CLINIC | Age: 88
End: 2023-09-05
Payer: COMMERCIAL

## 2023-09-05 DIAGNOSIS — N18.4 CKD (CHRONIC KIDNEY DISEASE) STAGE 4, GFR 15-29 ML/MIN (H): ICD-10-CM

## 2023-09-05 NOTE — TELEPHONE ENCOUNTER
Patient requesting refill on Lasix.     Noted patient last prescribed this medication with Cardiology, advised patient to follow-up with cardiology for further refills. Patient explains cardiology informed her they did not need follow-up appointment, therefore she is requesting PCP refill medication.    Patient last seen in clinic on 8/28. Medication and pharmacy pended.     Team - Patient requesting call back for updates on medication refill.    MALCOM Mayen RN  [facility]

## 2023-09-06 RX ORDER — FUROSEMIDE 20 MG
TABLET ORAL
Qty: 150 TABLET | Refills: 3 | Status: SHIPPED | OUTPATIENT
Start: 2023-09-06 | End: 2024-03-15

## 2023-09-07 ENCOUNTER — TRANSFERRED RECORDS (OUTPATIENT)
Dept: HEALTH INFORMATION MANAGEMENT | Facility: CLINIC | Age: 88
End: 2023-09-07
Payer: COMMERCIAL

## 2023-09-07 ENCOUNTER — ANTICOAGULATION THERAPY VISIT (OUTPATIENT)
Dept: ANTICOAGULATION | Facility: CLINIC | Age: 88
End: 2023-09-07
Payer: COMMERCIAL

## 2023-09-07 DIAGNOSIS — Z79.01 CHRONIC ANTICOAGULATION: ICD-10-CM

## 2023-09-07 DIAGNOSIS — I48.19 PERSISTENT ATRIAL FIBRILLATION (H): ICD-10-CM

## 2023-09-07 DIAGNOSIS — Z79.01 LONG TERM CURRENT USE OF ANTICOAGULANT THERAPY: Primary | ICD-10-CM

## 2023-09-07 LAB — INR HOME MONITORING: 1.9 RATIO (ref 2–3)

## 2023-09-07 NOTE — PROGRESS NOTES
ANTICOAGULATION MANAGEMENT     Mara Colindres 88 year old female is on warfarin with subtherapeutic INR result. (Goal INR 2.0-3.0)    Recent labs: (last 7 days)     09/07/23  1835   INR 1.9*       ASSESSMENT     Source(s): Chart Review and Patient/Caregiver Call     Warfarin doses taken: Warfarin taken as instructed  Diet: No new diet changes identified  Medication/supplement changes:  started Jardiance, no interaction expected with warfarin  New illness, injury, or hospitalization: No  Signs or symptoms of bleeding or clotting: No  Previous result: Therapeutic last 2(+) visits  Additional findings: None       PLAN     Recommended plan for no diet, medication or health factor changes affecting INR     Dosing Instructions: Continue your current warfarin dose with next INR in 1 week       Summary  As of 9/7/2023      Full warfarin instructions:  2 mg every Thu; 3 mg all other days   Next INR check:  9/14/2023               Telephone call with Mara who verbalizes understanding and agrees to plan    Patient to recheck with home meter    Education provided:   Contact [phone]  with any changes, questions or concerns.     Plan made per [facility] anticoagulation protocol    Juana Castrejon RN  Anticoagulation Clinic  9/7/2023    _______________________________________________________________________     Anticoagulation Episode Summary       Current INR goal:  2.0-3.0   TTR:  68.4 % (1 y)   Target end date:  Indefinite   Send INR reminders to:  [facility]    Indications    Long-term (current) use of anticoagulants [Z79.01] [Z79.01]  Persistent atrial fibrillation (H) [I48.19]  Chronic anticoagulation [Z79.01]             Comments:  [employer] home meter. Manage by exception.             Anticoagulation Care Providers       Provider Role Specialty Phone number    Steven Abrams MD Referring Internal Medicine [phone]

## 2023-09-14 ENCOUNTER — TRANSFERRED RECORDS (OUTPATIENT)
Dept: HEALTH INFORMATION MANAGEMENT | Facility: CLINIC | Age: 88
End: 2023-09-14
Payer: COMMERCIAL

## 2023-09-14 ENCOUNTER — ANTICOAGULATION THERAPY VISIT (OUTPATIENT)
Dept: ANTICOAGULATION | Facility: CLINIC | Age: 88
End: 2023-09-14
Payer: COMMERCIAL

## 2023-09-14 DIAGNOSIS — Z79.01 LONG TERM CURRENT USE OF ANTICOAGULANT THERAPY: Primary | ICD-10-CM

## 2023-09-14 DIAGNOSIS — I48.19 PERSISTENT ATRIAL FIBRILLATION (H): ICD-10-CM

## 2023-09-14 DIAGNOSIS — Z79.01 CHRONIC ANTICOAGULATION: ICD-10-CM

## 2023-09-14 LAB — INR HOME MONITORING: 2 RATIO (ref 2–3)

## 2023-09-14 NOTE — PROGRESS NOTES
ANTICOAGULATION MANAGEMENT     Mara Colindres 88 year old female is on warfarin with therapeutic INR result. (Goal INR 2.0-3.0)    Recent labs: (last 7 days)     09/14/23  1423   INR 2       ASSESSMENT     Source(s): Chart Review and Patient/Caregiver Call     Warfarin doses taken: Warfarin taken as instructed  Diet: No new diet changes identified  Medication/supplement changes: None noted  New illness, injury, or hospitalization: No  Signs or symptoms of bleeding or clotting: No  Previous result: Subtherapeutic  Additional findings: None       PLAN     Recommended plan for no diet, medication or health factor changes affecting INR     Dosing Instructions: Continue your current warfarin dose with next INR in 1 week       Summary  As of 9/14/2023      Full warfarin instructions:  2 mg every Thu; 3 mg all other days   Next INR check:  9/21/2023               Telephone call with Mara who verbalizes understanding and agrees to plan    Patient to recheck with home meter    Education provided:   Goal range and lab monitoring: goal range and significance of current result and Importance of therapeutic range  Resume manage by exception with home monitor. Continue to submit INR results to home monitor company.You will only be called when your result is out of range. Please call and notify [facility] if new medication started, dose missed, signs or symptoms of bleeding or clotting, or a surgery/procedure is scheduled.    Plan made per [facility] anticoagulation protocol    Marcos Jauregui RN  Anticoagulation Clinic  9/14/2023    _______________________________________________________________________     Anticoagulation Episode Summary       Current INR goal:  2.0-3.0   TTR:  66.5 % (1 y)   Target end date:  Indefinite   Send INR reminders to:  [facility]    Indications    Long-term (current) use of anticoagulants [Z79.01] [Z79.01]  Persistent atrial fibrillation (H) [I48.19]  Chronic anticoagulation [Z79.01]              Comments:  [employer] home meter. Manage by exception.             Anticoagulation Care Providers       Provider Role Specialty Phone number    Steven Abrams MD Referring Internal Medicine [phone]

## 2023-09-21 ENCOUNTER — ANTICOAGULATION THERAPY VISIT (OUTPATIENT)
Dept: ANTICOAGULATION | Facility: CLINIC | Age: 88
End: 2023-09-21
Payer: COMMERCIAL

## 2023-09-21 DIAGNOSIS — I48.19 PERSISTENT ATRIAL FIBRILLATION (H): ICD-10-CM

## 2023-09-21 DIAGNOSIS — Z79.01 CHRONIC ANTICOAGULATION: ICD-10-CM

## 2023-09-21 DIAGNOSIS — Z79.01 LONG TERM CURRENT USE OF ANTICOAGULANT THERAPY: Primary | ICD-10-CM

## 2023-09-21 LAB — INR HOME MONITORING: 1.9 RATIO (ref 2–3)

## 2023-09-21 NOTE — PROGRESS NOTES
ANTICOAGULATION MANAGEMENT     Mara Colindres 88 year old female is on warfarin with subtherapeutic INR result. (Goal INR 2.0-3.0)    Recent labs: (last 7 days)     09/21/23  1414   INR 1.9*       ASSESSMENT     Source(s): Chart Review and Patient/Caregiver Call     Warfarin doses taken: Warfarin taken as instructed  Diet: Protein supplement/shake started which maybe affecting INR-added this week.  Medication/supplement changes: None noted  New illness, injury, or hospitalization: No  Signs or symptoms of bleeding or clotting: No  Previous result: Therapeutic last visit; previously outside of goal range  Additional findings: None       PLAN     Recommended plan for no diet, medication or health factor changes affecting INR     Dosing Instructions: Increase your warfarin dose (5% change) with next INR in 1 week       Summary  As of 9/21/2023      Full warfarin instructions:  3 mg every day   Next INR check:  9/28/2023               Telephone call with Mara who verbalizes understanding and agrees to plan    Patient to recheck with home meter    Education provided:   Dietary considerations: Impact of protein intake on INR     Plan made per [facility] anticoagulation protocol    Usha Riley, RN  Anticoagulation Clinic  9/21/2023    _______________________________________________________________________     Anticoagulation Episode Summary       Current INR goal:  2.0-3.0   TTR:  64.6 % (1 y)   Target end date:  Indefinite   Send INR reminders to:  [facility]    Indications    Long-term (current) use of anticoagulants [Z79.01] [Z79.01]  Persistent atrial fibrillation (H) [I48.19]  Chronic anticoagulation [Z79.01]             Comments:  [employer] home meter. Manage by exception.             Anticoagulation Care Providers       Provider Role Specialty Phone number    Steven Abrams MD Referring Internal Medicine [phone]

## 2023-09-28 ENCOUNTER — TRANSFERRED RECORDS (OUTPATIENT)
Dept: HEALTH INFORMATION MANAGEMENT | Facility: CLINIC | Age: 88
End: 2023-09-28
Payer: COMMERCIAL

## 2023-09-28 ENCOUNTER — ANTICOAGULATION THERAPY VISIT (OUTPATIENT)
Dept: ANTICOAGULATION | Facility: CLINIC | Age: 88
End: 2023-09-28
Payer: COMMERCIAL

## 2023-09-28 DIAGNOSIS — I48.19 PERSISTENT ATRIAL FIBRILLATION (H): ICD-10-CM

## 2023-09-28 DIAGNOSIS — Z79.01 CHRONIC ANTICOAGULATION: ICD-10-CM

## 2023-09-28 DIAGNOSIS — Z79.01 LONG TERM CURRENT USE OF ANTICOAGULANT THERAPY: Primary | ICD-10-CM

## 2023-09-28 LAB — INR HOME MONITORING: 1.5 RATIO (ref 2–3)

## 2023-09-28 NOTE — PROGRESS NOTES
ANTICOAGULATION MANAGEMENT     Mara Colindres 88 year old female is on warfarin with subtherapeutic INR result. (Goal INR 2.0-3.0)    Recent labs: (last 7 days)     09/28/23  1548   INR 1.5*       ASSESSMENT     Source(s): Chart Review and Patient/Caregiver Call     Warfarin doses taken: Warfarin taken as instructed  Diet:  Doesn't eat very much during the day; intermittent datary supplement.   Medication/supplement changes: None noted  New illness, injury, or hospitalization: No  Signs or symptoms of bleeding or clotting: No  Previous result: Subtherapeutic  Additional findings: None       PLAN     Recommended plan for ongoing change(s) affecting INR     Dosing Instructions: Increase your warfarin dose (14% change) with next INR in 1 week       Summary  As of 9/28/2023      Full warfarin instructions:  4 mg every Tue, Thu, Sat; 3 mg all other days   Next INR check:  10/5/2023               Telephone call with Mara who agrees to plan and repeated back plan correctly    Patient to recheck with home meter    Education provided:   Please call back if any changes to your diet, medications or how you've been taking warfarin    Plan made per [facility] anticoagulation protocol    Belkis Nunez, RN  Anticoagulation Clinic  9/28/2023    _______________________________________________________________________     Anticoagulation Episode Summary       Current INR goal:  2.0-3.0   TTR:  62.7 % (1 y)   Target end date:  Indefinite   Send INR reminders to:  [facility]    Indications    Long-term (current) use of anticoagulants [Z79.01] [Z79.01]  Persistent atrial fibrillation (H) [I48.19]  Chronic anticoagulation [Z79.01]             Comments:  [employer] home meter. Manage by exception.             Anticoagulation Care Providers       Provider Role Specialty Phone number    Steven Abrams MD Referring Internal Medicine [phone]

## 2023-10-05 ENCOUNTER — ANTICOAGULATION THERAPY VISIT (OUTPATIENT)
Dept: ANTICOAGULATION | Facility: CLINIC | Age: 88
End: 2023-10-05
Payer: COMMERCIAL

## 2023-10-05 ENCOUNTER — TRANSFERRED RECORDS (OUTPATIENT)
Dept: HEALTH INFORMATION MANAGEMENT | Facility: CLINIC | Age: 88
End: 2023-10-05
Payer: COMMERCIAL

## 2023-10-05 DIAGNOSIS — I48.19 PERSISTENT ATRIAL FIBRILLATION (H): ICD-10-CM

## 2023-10-05 DIAGNOSIS — Z79.01 CHRONIC ANTICOAGULATION: ICD-10-CM

## 2023-10-05 DIAGNOSIS — Z79.01 LONG TERM CURRENT USE OF ANTICOAGULANT THERAPY: Primary | ICD-10-CM

## 2023-10-05 LAB — INR HOME MONITORING: 1.2 RATIO (ref 2–3)

## 2023-10-05 NOTE — PROGRESS NOTES
ANTICOAGULATION MANAGEMENT     Mara Colindres 88 year old female is on warfarin with subtherapeutic INR result. (Goal INR 2.0-3.0)    Recent labs: (last 7 days)     10/05/23  1605   INR 1.2*       ASSESSMENT     Source(s): Chart Review and Patient/Caregiver Call     Warfarin doses taken: Warfarin taken as instructed and notes she lost the paper that she wrote it on. Notes 1 tablets four days a week and 1.5 three days.  Diet: Increased greens/vitamin K in diet; plans to resume previous intake, notes her sister does the cooking.  Medication/supplement changes: None noted  New illness, injury, or hospitalization: No  Signs or symptoms of bleeding or clotting: No  Previous result: Subtherapeutic  Additional findings: None       PLAN     Recommended plan for temporary change(s) affecting INR     Dosing Instructions: booster dose then continue your current warfarin dose with next INR in 1 week       Summary  As of 10/5/2023      Full warfarin instructions:  10/5: 6 mg; Otherwise 4 mg every Tue, Thu, Sat; 3 mg all other days   Next INR check:  10/12/2023               Telephone call with Mara for assessment. Dosing to Rick her son who sets up medication. who agrees to plan and repeated back plan correctly      Patient to recheck with home meter    Education provided:   None required  Taking warfarin: Importance of taking warfarin as instructed    Plan made per [facility] anticoagulation protocol    Shellie Aguayo RN  Anticoagulation Clinic  10/5/2023    _______________________________________________________________________     Anticoagulation Episode Summary       Current INR goal:  2.0-3.0   TTR:  60.7 % (1 y)   Target end date:  Indefinite   Send INR reminders to:  [facility]    Indications    Long-term (current) use of anticoagulants [Z79.01] [Z79.01]  Persistent atrial fibrillation (H) [I48.19]  Chronic anticoagulation [Z79.01]             Comments:  [employer] home meter. Manage by exception.              Anticoagulation Care Providers       Provider Role Specialty Phone number    Steven Abrams MD Referring Internal Medicine [phone]

## 2023-10-06 ENCOUNTER — TELEPHONE (OUTPATIENT)
Dept: FAMILY MEDICINE | Facility: CLINIC | Age: 88
End: 2023-10-06

## 2023-10-06 NOTE — TELEPHONE ENCOUNTER
Usha with [employer] calling to report out of range INR result for patient. States that INR was drawn yesterday 10/5/23 with result of 1.2    Writer notes that anticoagulation team has been in contact with patient regarding dose, please see Anticoag note 10/5/23.    Routing to PCP as ANDREW.    MALCOM Rosas RN  [facility]

## 2023-10-12 ENCOUNTER — ANTICOAGULATION THERAPY VISIT (OUTPATIENT)
Dept: ANTICOAGULATION | Facility: CLINIC | Age: 88
End: 2023-10-12
Payer: COMMERCIAL

## 2023-10-12 ENCOUNTER — TRANSFERRED RECORDS (OUTPATIENT)
Dept: HEALTH INFORMATION MANAGEMENT | Facility: CLINIC | Age: 88
End: 2023-10-12
Payer: COMMERCIAL

## 2023-10-12 DIAGNOSIS — Z79.01 LONG TERM CURRENT USE OF ANTICOAGULANT THERAPY: Primary | ICD-10-CM

## 2023-10-12 DIAGNOSIS — I48.19 PERSISTENT ATRIAL FIBRILLATION (H): ICD-10-CM

## 2023-10-12 DIAGNOSIS — Z79.01 CHRONIC ANTICOAGULATION: ICD-10-CM

## 2023-10-12 LAB — INR HOME MONITORING: 1.6 RATIO (ref 2–3)

## 2023-10-12 NOTE — PROGRESS NOTES
ANTICOAGULATION MANAGEMENT     Mara Colindres 88 year old female is on warfarin with subtherapeutic INR result. (Goal INR 2.0-3.0)    Recent labs: (last 7 days)     10/12/23  1509   INR 1.6*       ASSESSMENT     Source(s): Chart Review and Patient/Caregiver Call pt and sonRick     Warfarin doses taken: Warfarin taken as instructed  Diet: No new diet changes identified  Medication/supplement changes: None noted  New illness, injury, or hospitalization: No  Signs or symptoms of bleeding or clotting: No  Previous result: Subtherapeutic  Additional findings: None  Rick sets up meds so I let him know about the new dosing      PLAN     Recommended plan for no diet, medication or health factor changes affecting INR     Dosing Instructions: booster dose then Increase your warfarin dose (12.5% change) with next INR in 1 week       Summary  As of 10/12/2023      Full warfarin instructions:  10/12: 6 mg; Otherwise 3 mg every Wed; 4 mg all other days   Next INR check:  10/19/2023               Telephone call with pt and maxi Cabrera  who verbalizes understanding and agrees to plan    Patient to recheck with home meter    Education provided:   Please call back if any changes to your diet, medications or how you've been taking warfarin  Goal range and lab monitoring: goal range and significance of current result, Importance of therapeutic range, and Importance of following up at instructed interval  Symptom monitoring: monitoring for clotting signs and symptoms and monitoring for stroke signs and symptoms    Plan made per [facility] anticoagulation protocol    Annette Briscoe RN  Anticoagulation Clinic  10/12/2023    _______________________________________________________________________     Anticoagulation Episode Summary       Current INR goal:  2.0-3.0   TTR:  58.8% (1 y)   Target end date:  Indefinite   Send INR reminders to:  [facility]    Indications    Long-term (current) use of anticoagulants [Z79.01]  [Z79.01]  Persistent atrial fibrillation (H) [I48.19]  Chronic anticoagulation [Z79.01]             Comments:  [employer] home meter. Manage by exception.             Anticoagulation Care Providers       Provider Role Specialty Phone number    Steven Abrams MD Referring Internal Medicine [phone]

## 2023-10-19 ENCOUNTER — ANTICOAGULATION THERAPY VISIT (OUTPATIENT)
Dept: ANTICOAGULATION | Facility: CLINIC | Age: 88
End: 2023-10-19
Payer: COMMERCIAL

## 2023-10-19 DIAGNOSIS — Z79.01 CHRONIC ANTICOAGULATION: ICD-10-CM

## 2023-10-19 DIAGNOSIS — I48.19 PERSISTENT ATRIAL FIBRILLATION (H): ICD-10-CM

## 2023-10-19 DIAGNOSIS — Z79.01 LONG TERM CURRENT USE OF ANTICOAGULANT THERAPY: Primary | ICD-10-CM

## 2023-10-19 LAB — INR HOME MONITORING: 1.7 RATIO (ref 2–3)

## 2023-10-19 NOTE — PROGRESS NOTES
ANTICOAGULATION MANAGEMENT     Mara Colindres 88 year old female is on warfarin with subtherapeutic INR result. (Goal INR 2.0-3.0)    Recent labs: (last 7 days)     10/19/23  1426   INR 1.7*       ASSESSMENT     Source(s): Chart Review and Patient/Caregiver Call     Warfarin doses taken:  Unable to verify with son. He did not call back. His phone notes unable to go through when calling.  Diet: Protein supplement/shake had 2 this week which maybe affecting INR  Medication/supplement changes:  glucosamine chondroitin started took for 2 days and then forgot the rest of the week   New illness, injury, or hospitalization: No  Signs or symptoms of bleeding or clotting: No  Previous result: Subtherapeutic  Additional findings: None  Patient was given dosing for tonight and number for son to call us back.       PLAN     Recommended plan for temporary change(s) affecting INR     Dosing Instructions: booster dose then continue your current warfarin dose with next INR in 1 week       Summary  As of 10/19/2023      Full warfarin instructions:  10/19: 6 mg; Otherwise 3 mg every Wed; 4 mg all other days   Next INR check:  10/26/2023               Detailed voice message left for Mara with dosing instructions and follow up date.     Patient to recheck with home meter    Education provided:   Please call back if any changes to your diet, medications or how you've been taking warfarin  Contact [phone]  with any changes, questions or concerns.   Consistent intake of vitamin K    Plan made per [facility] anticoagulation protocol    Shellie Aguayo, RN  Anticoagulation Clinic  10/20/2023    _______________________________________________________________________     Anticoagulation Episode Summary       Current INR goal:  2.0-3.0   TTR:  57.4% (1 y)   Target end date:  Indefinite   Send INR reminders to:  [facility]    Indications    Long-term (current) use of anticoagulants [Z79.01] [Z79.01]  Persistent atrial  fibrillation (H) [I48.19]  Chronic anticoagulation [Z79.01]             Comments:  [employer] home meter. Manage by exception.             Anticoagulation Care Providers       Provider Role Specialty Phone number    Steven Abrams MD Referring Internal Medicine [phone]

## 2023-10-24 ENCOUNTER — TELEPHONE (OUTPATIENT)
Dept: FAMILY MEDICINE | Facility: CLINIC | Age: 88
End: 2023-10-24
Payer: COMMERCIAL

## 2023-10-24 NOTE — PROGRESS NOTES
Spoke with patient and her son. Reviewed assessment and dosing. Patient confirmed taking the boost of 6 mgs and will continue current dosing and recheck INR 10/26 as planned. No additional questions or concerns at this time.   [Alert] : alert [No Acute Distress] : no acute distress [Playful] : playful [Normocephalic] : normocephalic [Conjunctivae with no discharge] : conjunctivae with no discharge [EOMI Bilateral] : EOMI bilateral [Auricles Well Formed] : auricles well formed [Clear Tympanic membranes with present light reflex and bony landmarks] : clear tympanic membranes with present light reflex and bony landmarks [No Discharge] : no discharge [Nares Patent] : nares patent [Palate Intact] : palate intact [Nonerythematous Oropharynx] : nonerythematous oropharynx [Supple, full passive range of motion] : supple, full passive range of motion [Symmetric Chest Rise] : symmetric chest rise [Clear to Auscultation Bilaterally] : clear to auscultation bilaterally [Normoactive Precordium] : normoactive precordium [Regular Rate and Rhythm] : regular rate and rhythm [Normal S1, S2 present] : normal S1, S2 present [No Murmurs] : no murmurs [Soft] : soft [NonTender] : non tender [Non Distended] : non distended [Eduardo 1] : Eduardo 1 [Testicles Descended Bilaterally] : testicles descended bilaterally [No Abnormal Lymph Nodes Palpated] : no abnormal lymph nodes palpated [No Gait Asymmetry] : no gait asymmetry [No pain or deformities with palpation of bone, muscles, joints] : no pain or deformities with palpation of bone, muscles, joints [Normal Muscle Tone] : normal muscle tone [+2 Patella DTR] : +2 patella DTR [Cranial Nerves Grossly Intact] : cranial nerves grossly intact [No Rash or Lesions] : no rash or lesions [FreeTextEntry1] : difficult exam

## 2023-10-24 NOTE — TELEPHONE ENCOUNTER
General Call      Reason for Call:  returning call from inr nurse    What are your questions or concerns:  Patient got a call on Friday 10/20/2023 but wasn't home and would like to speak to inr nurse regarding dosing    Date of last appointment with provider: n/a    Okay to leave a detailed message?: No at Cell number on file:    [phone]

## 2023-10-26 ENCOUNTER — ANTICOAGULATION THERAPY VISIT (OUTPATIENT)
Dept: ANTICOAGULATION | Facility: CLINIC | Age: 88
End: 2023-10-26
Payer: COMMERCIAL

## 2023-10-26 ENCOUNTER — TRANSFERRED RECORDS (OUTPATIENT)
Dept: HEALTH INFORMATION MANAGEMENT | Facility: CLINIC | Age: 88
End: 2023-10-26
Payer: COMMERCIAL

## 2023-10-26 DIAGNOSIS — Z79.01 LONG TERM CURRENT USE OF ANTICOAGULANT THERAPY: Primary | ICD-10-CM

## 2023-10-26 DIAGNOSIS — I48.19 PERSISTENT ATRIAL FIBRILLATION (H): ICD-10-CM

## 2023-10-26 DIAGNOSIS — Z79.01 CHRONIC ANTICOAGULATION: ICD-10-CM

## 2023-10-26 LAB — INR HOME MONITORING: 2.9 RATIO (ref 2–3)

## 2023-10-26 RX ORDER — WARFARIN SODIUM 2 MG/1
TABLET ORAL
Qty: 180 TABLET | Refills: 1 | Status: SHIPPED | OUTPATIENT
Start: 2023-10-26 | End: 2024-04-25

## 2023-10-26 NOTE — PROGRESS NOTES
ANTICOAGULATION MANAGEMENT     Mara Colindres 88 year old female is on warfarin with therapeutic INR result. (Goal INR 2.0-3.0)    Recent labs: (last 7 days)     10/26/23  1318   INR 2.9       ASSESSMENT     Source(s): Chart Review and Patient/Caregiver Call     Warfarin doses taken: Warfarin taken as instructed  Diet: Protein supplement/shake started which maybe affecting INR, was taking while INR was low. Stopped over the last week.  Medication/supplement changes: None noted  New illness, injury, or hospitalization: No  Signs or symptoms of bleeding or clotting: No  Previous result: Subtherapeutic  Additional findings: Refill needed today. Mara meets all criteria for refill (current [facility] referral, office visit with referring provider/group in last 1 year unless directed to return in 2 years in last referring provider visit note, lab monitoring up to date or not exceeding 2 weeks overdue). Rx instructions and quantity supplied updated to match patient's current dosing plan. Warfarin 90 day supply with 1 refill granted per [facility] protocol        PLAN     Recommended plan for ongoing change(s) affecting INR     Dosing Instructions: Continue your current warfarin dose with next INR in 1 week       Summary  As of 10/26/2023      Full warfarin instructions:  3 mg every Wed; 4 mg all other days   Next INR check:  11/2/2023               Telephone call with Mara who verbalizes understanding and agrees to plan    Patient to recheck with home meter    Education provided:   Discussed with the patient the therapeutic goal range. What high and low INR means and possible cause.  Resume manage by exception. Patient knows to continue dosing and send in INR weekly.   Will call if changes.    Plan made per [facility] anticoagulation protocol    Shellie Aguayo, RN  Anticoagulation Clinic  10/26/2023    _______________________________________________________________________     Anticoagulation Episode Summary       Current INR  goal:  2.0-3.0   TTR:  57.0% (1 y)   Target end date:  Indefinite   Send INR reminders to:  [facility]    Indications    Long-term (current) use of anticoagulants [Z79.01] [Z79.01]  Persistent atrial fibrillation (H) [I48.19]  Chronic anticoagulation [Z79.01]             Comments:  [employer] home meter. Manage by exception.             Anticoagulation Care Providers       Provider Role Specialty Phone number    Steven Abrams MD Referring Internal Medicine [phone]

## 2023-10-27 ENCOUNTER — TELEPHONE (OUTPATIENT)
Dept: PHARMACY | Facility: CLINIC | Age: 88
End: 2023-10-27
Payer: COMMERCIAL

## 2023-10-27 DIAGNOSIS — N18.4 CKD (CHRONIC KIDNEY DISEASE) STAGE 4, GFR 15-29 ML/MIN (H): Primary | ICD-10-CM

## 2023-11-02 ENCOUNTER — DOCUMENTATION ONLY (OUTPATIENT)
Dept: ANTICOAGULATION | Facility: CLINIC | Age: 88
End: 2023-11-02
Payer: COMMERCIAL

## 2023-11-02 DIAGNOSIS — I48.19 PERSISTENT ATRIAL FIBRILLATION (H): ICD-10-CM

## 2023-11-02 DIAGNOSIS — Z79.01 LONG TERM CURRENT USE OF ANTICOAGULANT THERAPY: Primary | ICD-10-CM

## 2023-11-02 DIAGNOSIS — Z79.01 CHRONIC ANTICOAGULATION: ICD-10-CM

## 2023-11-02 LAB — INR HOME MONITORING: 2.3 RATIO (ref 2–3)

## 2023-11-02 NOTE — PROGRESS NOTES
ANTICOAGULATION  MANAGEMENT-Home Monitor Managed by Exception    Mara Colindres 88 year old female is on warfarin with therapeutic INR result. (Goal INR 2.0-3.0)    Recent labs: (last 7 days)     11/02/23  1333   INR 2.3       Previous INR was Therapeutic  Medication, diet, health changes since last INR:chart reviewed; none identified  Contacted within the last 12 weeks by phone on 10/26/23      ISABEL Coelloores was NOT contacted regarding therapeutic result today per home monitoring policy manage by exception agreement.   Current warfarin dose is to be continued:     Summary  As of 11/2/2023      Full warfarin instructions:  3 mg every Wed; 4 mg all other days   Next INR check:  11/9/2023             ?   Marcos Jauregui RN  Anticoagulation Clinic  11/2/2023    _______________________________________________________________________     Anticoagulation Episode Summary       Current INR goal:  2.0-3.0   TTR:  57.0% (1 y)   Target end date:  Indefinite   Send INR reminders to:  [facility]    Indications    Long-term (current) use of anticoagulants [Z79.01] [Z79.01]  Persistent atrial fibrillation (H) [I48.19]  Chronic anticoagulation [Z79.01]             Comments:  [employer] home meter. Manage by exception.             Anticoagulation Care Providers       Provider Role Specialty Phone number    Steven Abrams MD Referring Internal Medicine [phone]

## 2023-11-09 ENCOUNTER — ANTICOAGULATION THERAPY VISIT (OUTPATIENT)
Dept: ANTICOAGULATION | Facility: CLINIC | Age: 88
End: 2023-11-09
Payer: COMMERCIAL

## 2023-11-09 ENCOUNTER — TRANSFERRED RECORDS (OUTPATIENT)
Dept: HEALTH INFORMATION MANAGEMENT | Facility: CLINIC | Age: 88
End: 2023-11-09
Payer: COMMERCIAL

## 2023-11-09 DIAGNOSIS — I48.19 PERSISTENT ATRIAL FIBRILLATION (H): ICD-10-CM

## 2023-11-09 DIAGNOSIS — Z79.01 CHRONIC ANTICOAGULATION: ICD-10-CM

## 2023-11-09 DIAGNOSIS — Z79.01 LONG TERM CURRENT USE OF ANTICOAGULANT THERAPY: Primary | ICD-10-CM

## 2023-11-09 LAB — INR HOME MONITORING: 3.3 RATIO (ref 2–3)

## 2023-11-09 NOTE — PROGRESS NOTES
ANTICOAGULATION MANAGEMENT     Mara Colindres 88 year old female is on warfarin with supratherapeutic INR result. (Goal INR 2.0-3.0)    Recent labs: (last 7 days)     11/09/23  1548   INR 3.3*       ASSESSMENT     Source(s): Chart Review and Patient/Caregiver Call     Warfarin doses taken: Warfarin taken as instructed  Diet: Decreased greens/vitamin K in diet; plans to resume previous intake  Medication/supplement changes: None noted  New illness, injury, or hospitalization: No  Signs or symptoms of bleeding or clotting: No  Previous result: Therapeutic last 2(+) visits  Additional findings: None       PLAN     Recommended plan for no diet, medication or health factor changes affecting INR     Dosing Instructions: Continue your current warfarin dose with next INR in 1 week       Summary  As of 11/9/2023      Full warfarin instructions:  3 mg every Wed; 4 mg all other days   Next INR check:  11/16/2023               Telephone call with Mara who verbalizes understanding and agrees to plan dosing given to Rick german    Patient to recheck with home meter    Education provided:   None required    Plan made per [facility] anticoagulation protocol    Shellie Aguayo RN  Anticoagulation Clinic  11/9/2023    _______________________________________________________________________     Anticoagulation Episode Summary       Current INR goal:  2.0-3.0   TTR:  56.4% (1 y)   Target end date:  Indefinite   Send INR reminders to:  [facility]    Indications    Long-term (current) use of anticoagulants [Z79.01] [Z79.01]  Persistent atrial fibrillation (H) [I48.19]  Chronic anticoagulation [Z79.01]             Comments:  [employer] home meter. Manage by exception.             Anticoagulation Care Providers       Provider Role Specialty Phone number    Steven Abrams MD Referring Internal Medicine [phone]

## 2023-11-15 DIAGNOSIS — M15.0 PRIMARY OSTEOARTHRITIS INVOLVING MULTIPLE JOINTS: ICD-10-CM

## 2023-11-15 DIAGNOSIS — I50.32 CHRONIC DIASTOLIC CONGESTIVE HEART FAILURE (H): ICD-10-CM

## 2023-11-15 RX ORDER — HYDROCODONE BITARTRATE AND ACETAMINOPHEN 5; 325 MG/1; MG/1
1 TABLET ORAL DAILY PRN
Qty: 30 TABLET | Refills: 0 | Status: SHIPPED | OUTPATIENT
Start: 2023-11-15 | End: 2024-01-15

## 2023-11-15 RX ORDER — DIGOXIN 0.06 MG/1
62.5 TABLET ORAL EVERY OTHER DAY
Qty: 45 TABLET | Refills: 3 | Status: SHIPPED | OUTPATIENT
Start: 2023-11-15 | End: 2024-03-15

## 2023-11-15 NOTE — TELEPHONE ENCOUNTER
Dr. Abrams,    Patient called for refills on meds. One of them is a scheduled drug. Sending to you for review and approval.     Thanks,  AMAYA Granados  [facility]

## 2023-11-16 ENCOUNTER — ANTICOAGULATION THERAPY VISIT (OUTPATIENT)
Dept: ANTICOAGULATION | Facility: CLINIC | Age: 88
End: 2023-11-16
Payer: COMMERCIAL

## 2023-11-16 ENCOUNTER — TRANSFERRED RECORDS (OUTPATIENT)
Dept: HEALTH INFORMATION MANAGEMENT | Facility: CLINIC | Age: 88
End: 2023-11-16
Payer: COMMERCIAL

## 2023-11-16 DIAGNOSIS — I48.19 PERSISTENT ATRIAL FIBRILLATION (H): ICD-10-CM

## 2023-11-16 DIAGNOSIS — Z79.01 CHRONIC ANTICOAGULATION: ICD-10-CM

## 2023-11-16 DIAGNOSIS — Z79.01 LONG TERM CURRENT USE OF ANTICOAGULANT THERAPY: Primary | ICD-10-CM

## 2023-11-16 LAB — INR HOME MONITORING: 3.6 RATIO (ref 2–3)

## 2023-11-16 NOTE — PROGRESS NOTES
ANTICOAGULATION MANAGEMENT     Mara Colindres 88 year old female is on warfarin with supratherapeutic INR result. (Goal INR 2.0-3.0)    Recent labs: (last 7 days)     11/16/23  1508   INR 3.6*       ASSESSMENT     Source(s): Chart Review and Patient/Caregiver Call     Warfarin doses taken: Warfarin taken as instructed  Diet: Protein supplement/shake 2x a week stated that she switched to boost without vitamin k - that she has been on this for about a month now which maybe affecting INR  Medication/supplement changes: None noted  New illness, injury, or hospitalization: No  Signs or symptoms of bleeding or clotting: No  Previous result: Supratherapeutic  Additional findings: None       PLAN     Recommended plan for ongoing change(s) affecting INR     Dosing Instructions: partial hold then decrease your warfarin dose (7% change) with next INR in 8 days   due to Holiday.    Summary  As of 11/16/2023      Full warfarin instructions:  11/16: 2 mg; Otherwise 3 mg every Tue, Thu, Sat; 4 mg all other days   Next INR check:  11/24/2023               Telephone call with patient's son Alicia who verbalizes understanding and agrees to plan and who agrees to plan and repeated back plan correctly    Patient to recheck with home meter    Education provided:   Goal range and lab monitoring: Importance of following up at instructed interval    Plan made per [facility] anticoagulation protocol    Coral Carlisle RN  Anticoagulation Clinic  11/16/2023    _______________________________________________________________________     Anticoagulation Episode Summary       Current INR goal:  2.0-3.0   TTR:  54.5% (1 y)   Target end date:  Indefinite   Send INR reminders to:  [facility]    Indications    Long-term (current) use of anticoagulants [Z79.01] [Z79.01]  Persistent atrial fibrillation (H) [I48.19]  Chronic anticoagulation [Z79.01]             Comments:  [facility] home meter. Manage by exception.              Anticoagulation Care Providers       Provider Role Specialty Phone number    Steven Abrams MD Referring Internal Medicine [phone]

## 2023-11-24 ENCOUNTER — ANTICOAGULATION THERAPY VISIT (OUTPATIENT)
Dept: ANTICOAGULATION | Facility: CLINIC | Age: 88
End: 2023-11-24
Payer: COMMERCIAL

## 2023-11-24 ENCOUNTER — TRANSFERRED RECORDS (OUTPATIENT)
Dept: HEALTH INFORMATION MANAGEMENT | Facility: CLINIC | Age: 88
End: 2023-11-24
Payer: COMMERCIAL

## 2023-11-24 LAB — INR HOME MONITORING: 3.2 RATIO (ref 2–3)

## 2023-11-24 NOTE — PROGRESS NOTES
ANTICOAGULATION MANAGEMENT     Mara Colindres 88 year old female is on warfarin with supratherapeutic INR result. (Goal INR 2.0-3.0)    Recent labs: (last 7 days)     11/24/23  1315   INR 3.2*       ASSESSMENT     Source(s): Chart Review and Patient/Caregiver Call     Warfarin doses taken: Warfarin taken as instructed  Diet: No new diet changes identified  Medication/supplement changes: None noted  New illness, injury, or hospitalization: No  Signs or symptoms of bleeding or clotting: No  Previous result: Supratherapeutic  Additional findings: None       PLAN     Recommended plan for no diet, medication or health factor changes affecting INR     Dosing Instructions: decrease your warfarin dose (4% change) with next INR in 1 week       Summary  As of 11/24/2023      Full warfarin instructions:  11/24: 3 mg; 11/25: 4 mg; Otherwise 4 mg every Mon, Thu, Sat; 3 mg all other days   Next INR check:  12/1/2023               Telephone call with Hong and patient who agrees to plan and repeated back plan correctly    Patient to recheck with home meter    Education provided:   Please call back if any changes to your diet, medications or how you've been taking warfarin    Plan made per [facility] anticoagulation protocol    Alejandrina Wise RN  Anticoagulation Clinic  11/24/2023    _______________________________________________________________________     Anticoagulation Episode Summary       Current INR goal:  2.0-3.0   TTR:  53.2% (1 y)   Target end date:  Indefinite   Send INR reminders to:  [facility]    Indications    Long-term (current) use of anticoagulants [Z79.01] [Z79.01]  Persistent atrial fibrillation (H) [I48.19]  Chronic anticoagulation [Z79.01]             Comments:  [employer] home meter. Manage by exception.             Anticoagulation Care Providers       Provider Role Specialty Phone number    Steven Abrams MD Referring Internal Medicine [phone]

## 2023-11-27 ENCOUNTER — NURSE TRIAGE (OUTPATIENT)
Dept: CARDIOLOGY | Facility: CLINIC | Age: 88
End: 2023-11-27
Payer: COMMERCIAL

## 2023-11-27 DIAGNOSIS — R06.02 SOB (SHORTNESS OF BREATH): Primary | ICD-10-CM

## 2023-11-27 DIAGNOSIS — I27.20 PULMONARY HYPERTENSION (H): ICD-10-CM

## 2023-11-27 NOTE — TELEPHONE ENCOUNTER
"Received a call from the call center to discuss shortness of breath and edema.  Patient has an appointment tomorrow 11/28/23 with Dr. Thompson.  Spoke to Mara, who says for a couple weeks she has noticed an increase in shortness of breath with activity. She says she wears 2.5 lpm continuous oxygen, and gets short of breath just going to the bathroom and going into the kitchen, and has to sit and rest. She says she has lymphedema, and has had more swelling in both lower extremities from thighs, calves, and feet. She says her feet hurt. She does not weigh herself regularly. She says she is taking a \"football shaped pill\" she thinks is furosemide, one in the morning and one at noon time. She says she is urinating, but not as much as she should.   Advised a message will be sent to Dr. Thompson's team for an update on symptoms.    1. RESPIRATORY STATUS: \"Not good\" (e.g., wheezing, shortness of breath, unable to speak, severe coughing) shortness of breath  2. ONSET: \"When did this breathing problem begin?\" Couple weeks ago  3. PATTERN \"Does the difficult breathing come and go, or has it been constant since it started?\"  4. SEVERITY: \"How bad is your breathing?\" (e.g., mild, moderate, severe) Mild to moderate  - MILD: No SOB at rest, mild SOB with walking, speaks normally in sentences, can lie down, no retractions, pulse < 100.  - MODERATE: SOB at rest, SOB with minimal exertion and prefers to sit, cannot lie down flat, speaks in phrases, mild retractions, audible wheezing, pulse 100-120.  - SEVERE: Very SOB at rest, speaks in single words, struggling to breathe, sitting hunched forward, retractions, pulse > 120  5. RECURRENT SYMPTOM: \"Have you had difficulty breathing before?\" If Yes, ask: \"When was the last time?\" and \"What happened that time?\"  6. CARDIAC HISTORY: \"Do you have any history of heart disease?\" (e.g., heart attack, angina, bypass surgery, angioplasty) CHF, PHTN  7. LUNG HISTORY: \"Do you have any history " "of lung disease?\" (e.g., pulmonary embolus, asthma, emphysema)  8. CAUSE: \"What do you think is causing the breathing problem?\"  9. OTHER SYMPTOMS: \"Do you have any other symptoms? (e.g., dizziness, runny nose, cough, chest pain, fever) bilateral lower extremity swelling thighs to feet  10. O2 SATURATION MONITOR: \"Do you use an oxygen saturation monitor (pulse oximeter) at home?\" If Yes, ask: \"What is your reading (oxygen level) today?\" \"What is your usual oxygen saturation reading?\" (e.g., 95%)  11. PREGNANCY: \"Is there any chance you are pregnant?\" \"When was your last menstrual period?\"  12. TRAVEL: \"Have you traveled out of the country in the last month?\" (e.g., travel history, exposures)  Additional Information   Negative: MODERATE difficulty breathing (e.g., speaks in phrases, SOB even at rest, pulse 100-120) of new-onset or worse than normal    Protocols used: Breathing Difficulty-A-OH    Called pt back and she said that her breathing is \"not good\" she is not taking her daily weights.  She has swelling in her legs over the course of weeks.  She has an appt with Dr Thompson tomorrow.  She said her legs are huge, but she isn't sure if she has lymphedema problems or just water weight.  Pt was able to speak to me in full sentences and said that she loses her breath with exertion.  Her lowest weight she recalls is 194lbs and has not taken her weight today.  She will come in to the clinic to have blood drawn prior to her appt with aJy.  Pt verbalized understanding.    Arden Iyer RN on 11/27/2023 at 3:06 PM        "

## 2023-11-28 ENCOUNTER — LAB (OUTPATIENT)
Dept: LAB | Facility: CLINIC | Age: 88
End: 2023-11-28
Attending: INTERNAL MEDICINE
Payer: COMMERCIAL

## 2023-11-28 ENCOUNTER — OFFICE VISIT (OUTPATIENT)
Dept: CARDIOLOGY | Facility: CLINIC | Age: 88
End: 2023-11-28
Attending: INTERNAL MEDICINE
Payer: COMMERCIAL

## 2023-11-28 VITALS
HEART RATE: 68 BPM | OXYGEN SATURATION: 96 % | DIASTOLIC BLOOD PRESSURE: 76 MMHG | BODY MASS INDEX: 40 KG/M2 | SYSTOLIC BLOOD PRESSURE: 118 MMHG | WEIGHT: 218.7 LBS

## 2023-11-28 DIAGNOSIS — N18.4 CKD (CHRONIC KIDNEY DISEASE) STAGE 4, GFR 15-29 ML/MIN (H): ICD-10-CM

## 2023-11-28 DIAGNOSIS — R06.02 SOB (SHORTNESS OF BREATH): ICD-10-CM

## 2023-11-28 DIAGNOSIS — D64.9 ANEMIA, UNSPECIFIED TYPE: ICD-10-CM

## 2023-11-28 DIAGNOSIS — D50.9 IRON DEFICIENCY ANEMIA, UNSPECIFIED IRON DEFICIENCY ANEMIA TYPE: ICD-10-CM

## 2023-11-28 DIAGNOSIS — R06.02 SOB (SHORTNESS OF BREATH): Primary | ICD-10-CM

## 2023-11-28 DIAGNOSIS — I27.20 PULMONARY HYPERTENSION (H): ICD-10-CM

## 2023-11-28 LAB
ANION GAP SERPL CALCULATED.3IONS-SCNC: 12 MMOL/L (ref 7–15)
BUN SERPL-MCNC: 29.7 MG/DL (ref 8–23)
CALCIUM SERPL-MCNC: 9.8 MG/DL (ref 8.8–10.2)
CHLORIDE SERPL-SCNC: 103 MMOL/L (ref 98–107)
CREAT SERPL-MCNC: 1.8 MG/DL (ref 0.51–0.95)
DEPRECATED HCO3 PLAS-SCNC: 24 MMOL/L (ref 22–29)
EGFRCR SERPLBLD CKD-EPI 2021: 27 ML/MIN/1.73M2
ERYTHROCYTE [DISTWIDTH] IN BLOOD BY AUTOMATED COUNT: 16.2 % (ref 10–15)
FERRITIN SERPL-MCNC: 45 NG/ML (ref 11–328)
GLUCOSE SERPL-MCNC: 285 MG/DL (ref 70–99)
HCT VFR BLD AUTO: 31.7 % (ref 35–47)
HGB BLD-MCNC: 9.6 G/DL (ref 11.7–15.7)
IRON BINDING CAPACITY (ROCHE): 405 UG/DL (ref 240–430)
IRON SATN MFR SERPL: 9 % (ref 15–46)
IRON SERPL-MCNC: 37 UG/DL (ref 37–145)
MCH RBC QN AUTO: 26.2 PG (ref 26.5–33)
MCHC RBC AUTO-ENTMCNC: 30.3 G/DL (ref 31.5–36.5)
MCV RBC AUTO: 87 FL (ref 78–100)
NT-PROBNP SERPL-MCNC: 6171 PG/ML (ref 0–1800)
PLATELET # BLD AUTO: 242 10E3/UL (ref 150–450)
POTASSIUM SERPL-SCNC: 4.3 MMOL/L (ref 3.4–5.3)
RBC # BLD AUTO: 3.66 10E6/UL (ref 3.8–5.2)
SODIUM SERPL-SCNC: 139 MMOL/L (ref 135–145)
WBC # BLD AUTO: 7.3 10E3/UL (ref 4–11)

## 2023-11-28 PROCEDURE — 36415 COLL VENOUS BLD VENIPUNCTURE: CPT | Performed by: PATHOLOGY

## 2023-11-28 PROCEDURE — 83540 ASSAY OF IRON: CPT | Performed by: PATHOLOGY

## 2023-11-28 PROCEDURE — 99214 OFFICE O/P EST MOD 30 MIN: CPT | Performed by: INTERNAL MEDICINE

## 2023-11-28 PROCEDURE — 83880 ASSAY OF NATRIURETIC PEPTIDE: CPT | Performed by: PATHOLOGY

## 2023-11-28 PROCEDURE — 85027 COMPLETE CBC AUTOMATED: CPT | Performed by: PATHOLOGY

## 2023-11-28 PROCEDURE — 80048 BASIC METABOLIC PNL TOTAL CA: CPT | Performed by: PATHOLOGY

## 2023-11-28 PROCEDURE — 83550 IRON BINDING TEST: CPT | Performed by: PATHOLOGY

## 2023-11-28 PROCEDURE — G0463 HOSPITAL OUTPT CLINIC VISIT: HCPCS | Performed by: INTERNAL MEDICINE

## 2023-11-28 PROCEDURE — 82728 ASSAY OF FERRITIN: CPT | Performed by: PATHOLOGY

## 2023-11-28 RX ORDER — EPINEPHRINE 1 MG/ML
0.3 INJECTION, SOLUTION, CONCENTRATE INTRAVENOUS EVERY 5 MIN PRN
Status: CANCELLED | OUTPATIENT
Start: 2023-12-12

## 2023-11-28 RX ORDER — METHYLPREDNISOLONE SODIUM SUCCINATE 125 MG/2ML
125 INJECTION, POWDER, LYOPHILIZED, FOR SOLUTION INTRAMUSCULAR; INTRAVENOUS
Status: CANCELLED
Start: 2023-12-12

## 2023-11-28 RX ORDER — MEPERIDINE HYDROCHLORIDE 25 MG/ML
25 INJECTION INTRAMUSCULAR; INTRAVENOUS; SUBCUTANEOUS EVERY 30 MIN PRN
Status: CANCELLED | OUTPATIENT
Start: 2023-12-12

## 2023-11-28 RX ORDER — ALBUTEROL SULFATE 0.83 MG/ML
2.5 SOLUTION RESPIRATORY (INHALATION)
Status: CANCELLED | OUTPATIENT
Start: 2023-12-12

## 2023-11-28 RX ORDER — HEPARIN SODIUM (PORCINE) LOCK FLUSH IV SOLN 100 UNIT/ML 100 UNIT/ML
5 SOLUTION INTRAVENOUS
Status: CANCELLED | OUTPATIENT
Start: 2023-12-12

## 2023-11-28 RX ORDER — DIPHENHYDRAMINE HYDROCHLORIDE 50 MG/ML
50 INJECTION INTRAMUSCULAR; INTRAVENOUS
Status: CANCELLED
Start: 2023-12-12

## 2023-11-28 RX ORDER — ALBUTEROL SULFATE 90 UG/1
1-2 AEROSOL, METERED RESPIRATORY (INHALATION)
Status: CANCELLED
Start: 2023-12-12

## 2023-11-28 RX ORDER — HEPARIN SODIUM,PORCINE 10 UNIT/ML
5-20 VIAL (ML) INTRAVENOUS DAILY PRN
Status: CANCELLED | OUTPATIENT
Start: 2023-12-12

## 2023-11-28 ASSESSMENT — PAIN SCALES - GENERAL: PAINLEVEL: NO PAIN (0)

## 2023-11-28 NOTE — NURSING NOTE
Chief Complaint   Patient presents with    Follow Up     called d/t hypervolemia, labs prior     Vitals were taken and medications reconciled.    Andrew Varma, EMT  12:26 PM

## 2023-11-28 NOTE — NURSING NOTE
"Plan as patient understands:  40mg Lasix BID as directed  Follow-up appt with Davi with labs prior  2 infusions of IV Iron 500mg weekly    ========================  Reviewed Med list  Completed AVS  Follow-up orders placed  Marked chart \"Ready for Checkout\"    Patient verbalized understanding, agreed with plan and denied any further questions. Arden Iyer RN on 11/28/2023 at 2:06 PM    "

## 2023-11-28 NOTE — LETTER
11/28/2023      RE: Mara Colindres  [address] [facility] [address]       Dear Colleague,    Thank you for the opportunity to participate in the care of your patient, Mara Colindres, at the [facility] [place] [facility]. Please see a copy of my visit note below.               Morbid obesity (H)             Hypertension goal BP (blood pressure) < 140/90             DVT (deep venous thrombosis) (H)             MRSA (methicillin resistant Staphylococcus aureus)             Chronic diarrhea             HYPERLIPIDEMIA LDL GOAL <100             Chronic anticoagulation             CKD (chronic kidney disease) stage 3, GFR 30-59 ml/min             Septic hip (H)             Lymphedema             Tubular adenoma of colon             Abdominal wall hematoma             Advanced directives, counseling/discussion             Umbilical hernia             History of [place] fever             Moderate persistent asthma             Pseudophakia, ou             Hypovitaminosis D             Bilateral leg pain             Positive YAMINI             Raynaud phenomenon             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             Scotoma involving central area in visual field, right             Failed total hip arthroplasty, sequela             Major depressive disorder, recurrent episode, mild (H)             Chronic diastolic congestive heart failure (H)             Long-term (current) use of anticoagulants [Z79.01]             Persistent atrial fibrillation (H)             Diabetic polyneuropathy associated with diabetes mellitus due to underlying condition (H)             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, without long-term current use of insulin (H)             Senile osteoporosis             Posterior vitreous detachment of left eye             Background diabetic retinopathy, mild,  ou    Plan:   IV iron qweek x 2 with ferric carboxy maltose  Dr. Abrams weekly to check renal function hemoglobin and weight loss as well as examine reasons for iron deficiency  Return to taking furosemide 40 BID  Blood sugar is 285 and will need to follow with Dr. Abrams  RTC us in end of March    Patient complains of increasing shortness of breath, increasing weight, increasing peripheral edema.  Patient has not been taking medications as ordered.  She has no history of melena, inadequate food intake, ulcers but is on omeprazole.      Alert, oriented overweight, basilar rales, regular rhythm, lymphedema no ulceration    Current Outpatient Medications   Medication    ACCU-CHEK PARVEZ PLUS test strip    acetaminophen (TYLENOL) 500 MG tablet    alendronate (FOSAMAX) 70 MG tablet    ASPIRIN NOT PRESCRIBED, INTENTIONAL,    Calcium Carb-Cholecalciferol 600-20 MG-MCG TABS    digoxin (LANOXIN) 62.5 MCG tablet    diphenoxylate-atropine (LOMOTIL) 2.5-0.025 MG tablet    empagliflozin (JARDIANCE) 10 MG TABS tablet    furosemide (LASIX) 20 MG tablet    HYDROcodone-acetaminophen (NORCO) 5-325 MG tablet    metoprolol tartrate (LOPRESSOR) 100 MG tablet    OMEPRAZOLE CPCR 20 MG OR    order for DME    PARoxetine (PAXIL) 20 MG tablet    Polyethyl Glycol-Propyl Glycol (SYSTANE OP)    potassium chloride ER (KLOR-CON M) 20 MEQ CR tablet    STATIN NOT PRESCRIBED, INTENTIONAL,    TRULICITY 1.5 MG/0.5ML pen    Vitamin D (Cholecalciferol) 25 MCG (1000 UT) TABS    warfarin ANTICOAGULANT (COUMADIN) 2 MG tablet     No current facility-administered medications for this visit.          Latest Reference Range & Units 11/28/23 12:11   Sodium 135 - 145 mmol/L 139   Potassium 3.4 - 5.3 mmol/L 4.3   Chloride 98 - 107 mmol/L 103   Carbon Dioxide (CO2) 22 - 29 mmol/L 24   Urea Nitrogen 8.0 - 23.0 mg/dL 29.7 (H)   Creatinine 0.51 - 0.95 mg/dL 1.80 (H)   GFR Estimate >60 mL/min/1.73m2 27 (L)   Calcium 8.8 - 10.2 mg/dL 9.8   Anion Gap 7 - 15 mmol/L 12    Glucose 70 - 99 mg/dL 285 (H)   Iron 37 - 145 ug/dL 37   Iron Binding Capacity 240 - 430 ug/dL 405   Iron Sat Index 15 - 46 % 9 (L)   WBC 4.0 - 11.0 10e3/uL 7.3   Hemoglobin 11.7 - 15.7 g/dL 9.6 (L)   Hematocrit 35.0 - 47.0 % 31.7 (L)   Platelet Count 150 - 450 10e3/uL 242   RBC Count 3.80 - 5.20 10e6/uL 3.66 (L)   MCV 78 - 100 fL 87   MCH 26.5 - 33.0 pg 26.2 (L)   MCHC 31.5 - 36.5 g/dL 30.3 (L)   RDW 10.0 - 15.0 % 16.2 (H)       CC Dr. Abrams      Please do not hesitate to contact me if you have any questions/concerns.     Sincerely,     Akbar Thompson MD

## 2023-11-28 NOTE — PATIENT INSTRUCTIONS
You were seen today in the Pulmonary Hypertension Clinic at the [facility].     Cardiology Provider you saw during your visit:    Dr. Thompson    Medication Changes:  Please take your Lasix as prescribed 40mg in the morning and 40mg in the early afternoon.    Recommendations:   Ordering IV Iron infusions - They will reach out to you to schedule.    Follow-up:   With Zayda Tomlinson in April with labs prior    Results:   Component      Latest Ref Rng 11/28/2023  12:11 PM   Sodium      135 - 145 mmol/L 139    Potassium      3.4 - 5.3 mmol/L 4.3    Chloride      98 - 107 mmol/L 103    Carbon Dioxide (CO2)      22 - 29 mmol/L 24    Anion Gap      7 - 15 mmol/L 12    Urea Nitrogen      8.0 - 23.0 mg/dL 29.7 (H)    Creatinine      0.51 - 0.95 mg/dL 1.80 (H)    GFR Estimate      >60 mL/min/1.73m2 27 (L)    Calcium      8.8 - 10.2 mg/dL 9.8    Glucose      70 - 99 mg/dL 285 (H)    WBC      4.0 - 11.0 10e3/uL 7.3    RBC Count      3.80 - 5.20 10e6/uL 3.66 (L)    Hemoglobin      11.7 - 15.7 g/dL 9.6 (L)    Hematocrit      35.0 - 47.0 % 31.7 (L)    MCV      78 - 100 fL 87    MCH      26.5 - 33.0 pg 26.2 (L)    MCHC      31.5 - 36.5 g/dL 30.3 (L)    RDW      10.0 - 15.0 % 16.2 (H)    Platelet Count      150 - 450 10e3/uL 242    Iron      37 - 145 ug/dL 37    Iron Binding Capacity      240 - 430 ug/dL 405    Iron Sat Index      15 - 46 % 9 (L)       Legend:  (H) High  (L) Low    Please call us immediately if you have any syncope (fainting or passing out), chest pain, edema (swelling or weight gain), or decline in your functional status (general decline in how you are feeling).    If you have emergent concerns after hours or on the weekend, please call our on-call Cardiologist at [phone], option 4. For emergencies call 911.     Thank you for allowing us to be a part of your care here at the [facility]    If you have questions or concerns please contact us at:    Arden Iyer RN (P:  [phone])    Nurse Coordinator       Pulmonary Hypertension     [facility]         Katlin Sheth, JOVON Dyson   (Prior Authorizations)    ()  Clinic   Clinic   Pulmonary Hypertension   Pulmonary Hypertension  [facility]  (P)[phone]    (P) [phone]  (F) [phone]

## 2023-11-28 NOTE — PROGRESS NOTES
Morbid obesity (H)             Hypertension goal BP (blood pressure) < 140/90             DVT (deep venous thrombosis) (H)             MRSA (methicillin resistant Staphylococcus aureus)             Chronic diarrhea             HYPERLIPIDEMIA LDL GOAL <100             Chronic anticoagulation             CKD (chronic kidney disease) stage 3, GFR 30-59 ml/min             Septic hip (H)             Lymphedema             Tubular adenoma of colon             Abdominal wall hematoma             Advanced directives, counseling/discussion             Umbilical hernia             History of [place] fever             Moderate persistent asthma             Pseudophakia, ou             Hypovitaminosis D             Bilateral leg pain             Positive YAMINI             Raynaud phenomenon             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             Scotoma involving central area in visual field, right             Failed total hip arthroplasty, sequela             Major depressive disorder, recurrent episode, mild (H)             Chronic diastolic congestive heart failure (H)             Long-term (current) use of anticoagulants [Z79.01]             Persistent atrial fibrillation (H)             Diabetic polyneuropathy associated with diabetes mellitus due to underlying condition (H)             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, without long-term current use of insulin (H)             Senile osteoporosis             Posterior vitreous detachment of left eye             Background diabetic retinopathy, mild, ou    Plan:   IV iron qweek x 2 with ferric carboxy maltose  Dr. Abrams weekly to check renal function hemoglobin and weight loss as well as examine reasons for iron deficiency  Return to taking furosemide 40 BID  Blood sugar is 285 and will need to follow with Dr. Abrams  RTC us in end of March    Patient complains of increasing shortness of breath, increasing  weight, increasing peripheral edema.  Patient has not been taking medications as ordered.  She has no history of melena, inadequate food intake, ulcers but is on omeprazole.      Alert, oriented overweight, basilar rales, regular rhythm, lymphedema no ulceration    Current Outpatient Medications   Medication    ACCU-CHEK PARVEZ PLUS test strip    acetaminophen (TYLENOL) 500 MG tablet    alendronate (FOSAMAX) 70 MG tablet    ASPIRIN NOT PRESCRIBED, INTENTIONAL,    Calcium Carb-Cholecalciferol 600-20 MG-MCG TABS    digoxin (LANOXIN) 62.5 MCG tablet    diphenoxylate-atropine (LOMOTIL) 2.5-0.025 MG tablet    empagliflozin (JARDIANCE) 10 MG TABS tablet    furosemide (LASIX) 20 MG tablet    HYDROcodone-acetaminophen (NORCO) 5-325 MG tablet    metoprolol tartrate (LOPRESSOR) 100 MG tablet    OMEPRAZOLE CPCR 20 MG OR    order for DME    PARoxetine (PAXIL) 20 MG tablet    Polyethyl Glycol-Propyl Glycol (SYSTANE OP)    potassium chloride ER (KLOR-CON M) 20 MEQ CR tablet    STATIN NOT PRESCRIBED, INTENTIONAL,    TRULICITY 1.5 MG/0.5ML pen    Vitamin D (Cholecalciferol) 25 MCG (1000 UT) TABS    warfarin ANTICOAGULANT (COUMADIN) 2 MG tablet     No current facility-administered medications for this visit.          Latest Reference Range & Units 11/28/23 12:11   Sodium 135 - 145 mmol/L 139   Potassium 3.4 - 5.3 mmol/L 4.3   Chloride 98 - 107 mmol/L 103   Carbon Dioxide (CO2) 22 - 29 mmol/L 24   Urea Nitrogen 8.0 - 23.0 mg/dL 29.7 (H)   Creatinine 0.51 - 0.95 mg/dL 1.80 (H)   GFR Estimate >60 mL/min/1.73m2 27 (L)   Calcium 8.8 - 10.2 mg/dL 9.8   Anion Gap 7 - 15 mmol/L 12   Glucose 70 - 99 mg/dL 285 (H)   Iron 37 - 145 ug/dL 37   Iron Binding Capacity 240 - 430 ug/dL 405   Iron Sat Index 15 - 46 % 9 (L)   WBC 4.0 - 11.0 10e3/uL 7.3   Hemoglobin 11.7 - 15.7 g/dL 9.6 (L)   Hematocrit 35.0 - 47.0 % 31.7 (L)   Platelet Count 150 - 450 10e3/uL 242   RBC Count 3.80 - 5.20 10e6/uL 3.66 (L)   MCV 78 - 100 fL 87   MCH 26.5 - 33.0 pg 26.2 (L)    MCHC 31.5 - 36.5 g/dL 30.3 (L)   RDW 10.0 - 15.0 % 16.2 (H)       CC Dr. Abrams

## 2023-11-30 LAB — INR HOME MONITORING: 2 RATIO (ref 2–3)

## 2023-12-01 ENCOUNTER — ANTICOAGULATION THERAPY VISIT (OUTPATIENT)
Dept: ANTICOAGULATION | Facility: CLINIC | Age: 88
End: 2023-12-01
Payer: COMMERCIAL

## 2023-12-01 DIAGNOSIS — Z79.01 CHRONIC ANTICOAGULATION: ICD-10-CM

## 2023-12-01 DIAGNOSIS — I48.19 PERSISTENT ATRIAL FIBRILLATION (H): ICD-10-CM

## 2023-12-01 DIAGNOSIS — Z79.01 LONG TERM CURRENT USE OF ANTICOAGULANT THERAPY: Primary | ICD-10-CM

## 2023-12-01 NOTE — PROGRESS NOTES
ANTICOAGULATION MANAGEMENT     Mara Colindres 89 year old female is on warfarin with therapeutic INR result. (Goal INR 2.0-3.0)    Recent labs: (last 7 days)     11/30/23  1850   INR 2       ASSESSMENT     Source(s): Chart Review and Patient/Caregiver Call     Warfarin doses taken: Warfarin taken as instructed  Diet: No new diet changes identified  Medication/supplement changes: None noted  New illness, injury, or hospitalization: No  Signs or symptoms of bleeding or clotting: No  Previous result: Supratherapeutic  Additional findings: upcoming iron infusion to be scheduled for patient per son       PLAN     Recommended plan for no diet, medication or health factor changes affecting INR     Dosing Instructions: Continue your current warfarin dose with next INR in 1 week       Summary  As of 12/1/2023      Full warfarin instructions:  4 mg every Mon, Thu, Sat; 3 mg all other days   Next INR check:  12/7/2023               Telephone call with maxi Pitts, who verbalizes understanding and agrees to plan    Patient to recheck with home meter    Education provided:   Please call back if any changes to your diet, medications or how you've been taking warfarin  Resume manage by exception with home monitor. Continue to submit INR results to home monitor company.You will only be called when your result is out of range. Please call and notify [facility] if new medication started, dose missed, signs or symptoms of bleeding or clotting, or a surgery/procedure is scheduled.    Plan made per [facility] anticoagulation protocol    Laxmi Huang RN  Anticoagulation Clinic  12/1/2023    _______________________________________________________________________     Anticoagulation Episode Summary       Current INR goal:  2.0-3.0   TTR:  54.9% (1 y)   Target end date:  Indefinite   Send INR reminders to:  [facility] MONITOR[facility]    Indications    Long-term (current) use of anticoagulants [Z79.01] [Z79.01]  Persistent atrial fibrillation (H)  [I48.19]  Chronic anticoagulation [Z79.01]             Comments:  [employer] home meter. Manage by exception.             Anticoagulation Care Providers       Provider Role Specialty Phone number    Steven Abrams MD Referring Internal Medicine [phone]          Patient requested [facility] call son, unable to reach son and unable to leave a message will call back later.  Laxmi Huang RN

## 2023-12-07 ENCOUNTER — ANTICOAGULATION THERAPY VISIT (OUTPATIENT)
Dept: ANTICOAGULATION | Facility: CLINIC | Age: 88
End: 2023-12-07
Payer: COMMERCIAL

## 2023-12-07 DIAGNOSIS — Z79.01 LONG TERM CURRENT USE OF ANTICOAGULANT THERAPY: Primary | ICD-10-CM

## 2023-12-07 DIAGNOSIS — I48.19 PERSISTENT ATRIAL FIBRILLATION (H): ICD-10-CM

## 2023-12-07 DIAGNOSIS — Z79.01 CHRONIC ANTICOAGULATION: ICD-10-CM

## 2023-12-07 LAB — INR HOME MONITORING: 3.2 RATIO (ref 2–3)

## 2023-12-07 NOTE — PROGRESS NOTES
ANTICOAGULATION MANAGEMENT     Mara Colindres 89 year old female is on warfarin with supratherapeutic INR result. (Goal INR 2.0-3.0)    Recent labs: (last 7 days)     12/07/23  1444   INR 3.2*       ASSESSMENT     Source(s): Chart Review and Patient/Caregiver Call     Warfarin doses taken: Warfarin taken as instructed  Diet: Change in alcohol intake may be affecting INR. Had one wine cooler this past week   Medication/supplement changes: None noted  New illness, injury, or hospitalization: No  Signs or symptoms of bleeding or clotting: No  Previous result: Therapeutic last visit; previously outside of goal range  Additional findings: None             PLAN     Recommended plan for temporary change(s) affecting INR     Dosing Instructions: partial hold then continue your current warfarin dose with next INR in 1 week       Summary  As of 12/7/2023      Full warfarin instructions:  12/7: 3 mg; Otherwise 4 mg every Mon, Thu, Sat; 3 mg all other days   Next INR check:  12/14/2023               Telephone call with patient's son Hong  who verbalizes understanding and agrees to plan and who agrees to plan and repeated back plan correctly    Patient to recheck with home meter    Education provided:   Taking warfarin: Importance of taking warfarin as instructed  Healthy lifestyle considerations: potential interaction between warfarin and alcohol  Contact [phone]  with any changes, questions or concerns.     Plan made per [facility] anticoagulation protocol    Coral Carlisle RN  Anticoagulation Clinic  12/7/2023    _______________________________________________________________________     Anticoagulation Episode Summary       Current INR goal:  2.0-3.0   TTR:  56.1% (1 y)   Target end date:  Indefinite   Send INR reminders to:  [facility]ITOR[facility]    Indications    Long-term (current) use of anticoagulants [Z79.01] [Z79.01]  Persistent atrial fibrillation (H) [I48.19]  Chronic anticoagulation [Z79.01]              Comments:  [employer] home meter. Manage by exception.             Anticoagulation Care Providers       Provider Role Specialty Phone number    Steven Abrams MD Referring Internal Medicine [phone]

## 2023-12-11 DIAGNOSIS — D50.9 IRON DEFICIENCY ANEMIA, UNSPECIFIED IRON DEFICIENCY ANEMIA TYPE: Primary | ICD-10-CM

## 2023-12-11 RX ORDER — HEPARIN SODIUM (PORCINE) LOCK FLUSH IV SOLN 100 UNIT/ML 100 UNIT/ML
5 SOLUTION INTRAVENOUS
Status: CANCELLED | OUTPATIENT
Start: 2023-12-11

## 2023-12-11 RX ORDER — METHYLPREDNISOLONE SODIUM SUCCINATE 125 MG/2ML
125 INJECTION, POWDER, LYOPHILIZED, FOR SOLUTION INTRAMUSCULAR; INTRAVENOUS
Status: CANCELLED
Start: 2023-12-11

## 2023-12-11 RX ORDER — ALBUTEROL SULFATE 0.83 MG/ML
2.5 SOLUTION RESPIRATORY (INHALATION)
Status: CANCELLED | OUTPATIENT
Start: 2023-12-11

## 2023-12-11 RX ORDER — DIPHENHYDRAMINE HYDROCHLORIDE 50 MG/ML
50 INJECTION INTRAMUSCULAR; INTRAVENOUS
Status: CANCELLED
Start: 2023-12-11

## 2023-12-11 RX ORDER — MEPERIDINE HYDROCHLORIDE 25 MG/ML
25 INJECTION INTRAMUSCULAR; INTRAVENOUS; SUBCUTANEOUS EVERY 30 MIN PRN
Status: CANCELLED | OUTPATIENT
Start: 2023-12-11

## 2023-12-11 RX ORDER — ALBUTEROL SULFATE 90 UG/1
1-2 AEROSOL, METERED RESPIRATORY (INHALATION)
Status: CANCELLED
Start: 2023-12-11

## 2023-12-11 RX ORDER — EPINEPHRINE 1 MG/ML
0.3 INJECTION, SOLUTION, CONCENTRATE INTRAVENOUS EVERY 5 MIN PRN
Status: CANCELLED | OUTPATIENT
Start: 2023-12-11

## 2023-12-11 RX ORDER — HEPARIN SODIUM,PORCINE 10 UNIT/ML
5-20 VIAL (ML) INTRAVENOUS DAILY PRN
Status: CANCELLED | OUTPATIENT
Start: 2023-12-11

## 2023-12-14 ENCOUNTER — ANTICOAGULATION THERAPY VISIT (OUTPATIENT)
Dept: ANTICOAGULATION | Facility: CLINIC | Age: 88
End: 2023-12-14
Payer: COMMERCIAL

## 2023-12-14 ENCOUNTER — TRANSFERRED RECORDS (OUTPATIENT)
Dept: HEALTH INFORMATION MANAGEMENT | Facility: CLINIC | Age: 88
End: 2023-12-14
Payer: COMMERCIAL

## 2023-12-14 DIAGNOSIS — Z79.01 CHRONIC ANTICOAGULATION: ICD-10-CM

## 2023-12-14 DIAGNOSIS — Z79.01 LONG TERM CURRENT USE OF ANTICOAGULANT THERAPY: Primary | ICD-10-CM

## 2023-12-14 DIAGNOSIS — I48.19 PERSISTENT ATRIAL FIBRILLATION (H): ICD-10-CM

## 2023-12-14 LAB — INR HOME MONITORING: 3.4 RATIO (ref 2–3)

## 2023-12-14 NOTE — PROGRESS NOTES
ANTICOAGULATION MANAGEMENT     Mara Colindres 89 year old female is on warfarin with supratherapeutic INR result. (Goal INR 2.0-3.0)    Recent labs: (last 7 days)     12/14/23  1821   INR 3.4*       ASSESSMENT     Source(s): Chart Review and Patient/Caregiver Call     Warfarin doses taken: Warfarin taken as instructed  Diet: No new diet changes identified  Medication/supplement changes: None noted  New illness, injury, or hospitalization: No  Signs or symptoms of bleeding or clotting: No  Previous result: Supratherapeutic  Additional findings: None       PLAN     Recommended plan for no diet, medication or health factor changes affecting INR     Dosing Instructions: decrease your warfarin dose (8% change from the dose she has had in the last 7 days) with next INR in 1 week       Summary  As of 12/14/2023      Full warfarin instructions:  3 mg every day   Next INR check:  12/21/2023               Telephone call with son Hong who verbalizes understanding and agrees to plan    Patient to recheck with home meter    Education provided:   Contact [phone]  with any changes, questions or concerns.     Plan made per [facility] anticoagulation protocol    Juana Castrejon RN  Anticoagulation Clinic  12/14/2023    _______________________________________________________________________     Anticoagulation Episode Summary       Current INR goal:  2.0-3.0   TTR:  56.1% (1 y)   Target end date:  Indefinite   Send INR reminders to:  [facility]ITOR[facility]    Indications    Long-term (current) use of anticoagulants [Z79.01] [Z79.01]  Persistent atrial fibrillation (H) [I48.19]  Chronic anticoagulation [Z79.01]             Comments:  [employer] home meter. Manage by exception.             Anticoagulation Care Providers       Provider Role Specialty Phone number    Steven Abrams MD Referring Internal Medicine [phone]

## 2023-12-19 ENCOUNTER — INFUSION THERAPY VISIT (OUTPATIENT)
Dept: INFUSION THERAPY | Facility: CLINIC | Age: 88
End: 2023-12-19
Attending: INTERNAL MEDICINE
Payer: COMMERCIAL

## 2023-12-19 VITALS
SYSTOLIC BLOOD PRESSURE: 132 MMHG | TEMPERATURE: 97.6 F | RESPIRATION RATE: 18 BRPM | DIASTOLIC BLOOD PRESSURE: 87 MMHG | OXYGEN SATURATION: 100 % | HEART RATE: 90 BPM

## 2023-12-19 DIAGNOSIS — N18.4 CKD (CHRONIC KIDNEY DISEASE) STAGE 4, GFR 15-29 ML/MIN (H): ICD-10-CM

## 2023-12-19 DIAGNOSIS — D50.9 IRON DEFICIENCY ANEMIA, UNSPECIFIED IRON DEFICIENCY ANEMIA TYPE: Primary | ICD-10-CM

## 2023-12-19 PROCEDURE — 96366 THER/PROPH/DIAG IV INF ADDON: CPT

## 2023-12-19 PROCEDURE — 250N000011 HC RX IP 250 OP 636: Performed by: INTERNAL MEDICINE

## 2023-12-19 PROCEDURE — 96365 THER/PROPH/DIAG IV INF INIT: CPT

## 2023-12-19 PROCEDURE — 258N000003 HC RX IP 258 OP 636: Performed by: INTERNAL MEDICINE

## 2023-12-19 RX ORDER — HEPARIN SODIUM,PORCINE 10 UNIT/ML
5-20 VIAL (ML) INTRAVENOUS DAILY PRN
Status: CANCELLED | OUTPATIENT
Start: 2023-12-21

## 2023-12-19 RX ORDER — DIPHENHYDRAMINE HYDROCHLORIDE 50 MG/ML
50 INJECTION INTRAMUSCULAR; INTRAVENOUS
Status: CANCELLED
Start: 2023-12-21

## 2023-12-19 RX ORDER — HEPARIN SODIUM (PORCINE) LOCK FLUSH IV SOLN 100 UNIT/ML 100 UNIT/ML
5 SOLUTION INTRAVENOUS
Status: CANCELLED | OUTPATIENT
Start: 2023-12-21

## 2023-12-19 RX ORDER — ALBUTEROL SULFATE 0.83 MG/ML
2.5 SOLUTION RESPIRATORY (INHALATION)
Status: CANCELLED | OUTPATIENT
Start: 2023-12-21

## 2023-12-19 RX ORDER — ALBUTEROL SULFATE 90 UG/1
1-2 AEROSOL, METERED RESPIRATORY (INHALATION)
Status: CANCELLED
Start: 2023-12-21

## 2023-12-19 RX ORDER — EPINEPHRINE 1 MG/ML
0.3 INJECTION, SOLUTION INTRAMUSCULAR; SUBCUTANEOUS EVERY 5 MIN PRN
Status: CANCELLED | OUTPATIENT
Start: 2023-12-21

## 2023-12-19 RX ORDER — MEPERIDINE HYDROCHLORIDE 25 MG/ML
25 INJECTION INTRAMUSCULAR; INTRAVENOUS; SUBCUTANEOUS EVERY 30 MIN PRN
Status: CANCELLED | OUTPATIENT
Start: 2023-12-21

## 2023-12-19 RX ORDER — METHYLPREDNISOLONE SODIUM SUCCINATE 125 MG/2ML
125 INJECTION, POWDER, LYOPHILIZED, FOR SOLUTION INTRAMUSCULAR; INTRAVENOUS
Status: CANCELLED
Start: 2023-12-21

## 2023-12-19 RX ADMIN — IRON SUCROSE 300 MG: 20 INJECTION, SOLUTION INTRAVENOUS at 15:12

## 2023-12-19 NOTE — PROGRESS NOTES
Infusion Nursing Note:  Mara Colindres presents today for Venofer- first dose.    Patient seen by provider today: No   present during visit today: Not Applicable.    Note:   Infusion over 90 minutes    Intravenous Access:  [facility]    Treatment Conditions:  Not Applicable.    Report given to late shift nurse at 1615 to resume cares    Discharge Plan:   AVS to patient via MYCHART.  Patient will return 12/21 for next appointment.     Administrations This Visit       iron sucrose (VENOFER) 300 mg in sodium chloride 0.9 % 290 mL intermittent infusion       Admin Date  12/19/2023 Action  $New Bag Dose  300 mg Rate  193.3 mL/hr Route  Intravenous Documented By  Kelli Guerra RN                /71 (BP Location: Right arm)   Pulse 74   Temp 97.6  F (36.4  C) (Oral)   Resp 20   SpO2 99%     Kelli Guerra, RN

## 2023-12-19 NOTE — PATIENT INSTRUCTIONS
Dear Mara Colindres    Thank you for choosing [facility] ([facility]) for your infusion.  The following information is a summary of our appointment as well as important reminders.      We look forward in seeing you on your next appointment here at [facility] ([facility]).  Please don t hesitate to call us at [phone] to reschedule any of your appointments or to speak with one of the [facility] registered nurses.  It was a pleasure taking care of you today.    Sincerely,    [facility]  [facility]  [address] [place] [address]  [address]  Phone:  [phone]

## 2023-12-21 ENCOUNTER — INFUSION THERAPY VISIT (OUTPATIENT)
Dept: INFUSION THERAPY | Facility: CLINIC | Age: 88
End: 2023-12-21
Attending: INTERNAL MEDICINE
Payer: COMMERCIAL

## 2023-12-21 ENCOUNTER — ANTICOAGULATION THERAPY VISIT (OUTPATIENT)
Dept: ANTICOAGULATION | Facility: CLINIC | Age: 88
End: 2023-12-21

## 2023-12-21 VITALS
TEMPERATURE: 98 F | RESPIRATION RATE: 16 BRPM | DIASTOLIC BLOOD PRESSURE: 72 MMHG | SYSTOLIC BLOOD PRESSURE: 134 MMHG | OXYGEN SATURATION: 97 % | HEART RATE: 88 BPM

## 2023-12-21 DIAGNOSIS — N18.30 CONTROLLED TYPE 2 DIABETES MELLITUS WITH STAGE 3 CHRONIC KIDNEY DISEASE, WITHOUT LONG-TERM CURRENT USE OF INSULIN (H): ICD-10-CM

## 2023-12-21 DIAGNOSIS — D50.9 IRON DEFICIENCY ANEMIA, UNSPECIFIED IRON DEFICIENCY ANEMIA TYPE: Primary | ICD-10-CM

## 2023-12-21 DIAGNOSIS — Z79.01 CHRONIC ANTICOAGULATION: ICD-10-CM

## 2023-12-21 DIAGNOSIS — I48.19 PERSISTENT ATRIAL FIBRILLATION (H): ICD-10-CM

## 2023-12-21 DIAGNOSIS — N18.4 CKD (CHRONIC KIDNEY DISEASE) STAGE 4, GFR 15-29 ML/MIN (H): ICD-10-CM

## 2023-12-21 DIAGNOSIS — E11.22 CONTROLLED TYPE 2 DIABETES MELLITUS WITH STAGE 3 CHRONIC KIDNEY DISEASE, WITHOUT LONG-TERM CURRENT USE OF INSULIN (H): ICD-10-CM

## 2023-12-21 DIAGNOSIS — Z79.01 LONG TERM CURRENT USE OF ANTICOAGULANT THERAPY: Primary | ICD-10-CM

## 2023-12-21 LAB — INR HOME MONITORING: 3.2 RATIO (ref 2–3)

## 2023-12-21 PROCEDURE — 258N000003 HC RX IP 258 OP 636: Performed by: INTERNAL MEDICINE

## 2023-12-21 PROCEDURE — 96366 THER/PROPH/DIAG IV INF ADDON: CPT

## 2023-12-21 PROCEDURE — 250N000011 HC RX IP 250 OP 636: Performed by: INTERNAL MEDICINE

## 2023-12-21 PROCEDURE — 96365 THER/PROPH/DIAG IV INF INIT: CPT

## 2023-12-21 RX ORDER — METHYLPREDNISOLONE SODIUM SUCCINATE 125 MG/2ML
125 INJECTION, POWDER, LYOPHILIZED, FOR SOLUTION INTRAMUSCULAR; INTRAVENOUS
Status: CANCELLED
Start: 2023-12-23

## 2023-12-21 RX ORDER — ALBUTEROL SULFATE 90 UG/1
1-2 AEROSOL, METERED RESPIRATORY (INHALATION)
Status: CANCELLED
Start: 2023-12-23

## 2023-12-21 RX ORDER — DIPHENHYDRAMINE HYDROCHLORIDE 50 MG/ML
50 INJECTION INTRAMUSCULAR; INTRAVENOUS
Status: CANCELLED
Start: 2023-12-23

## 2023-12-21 RX ORDER — ALBUTEROL SULFATE 0.83 MG/ML
2.5 SOLUTION RESPIRATORY (INHALATION)
Status: CANCELLED | OUTPATIENT
Start: 2023-12-23

## 2023-12-21 RX ORDER — HEPARIN SODIUM,PORCINE 10 UNIT/ML
5-20 VIAL (ML) INTRAVENOUS DAILY PRN
Status: CANCELLED | OUTPATIENT
Start: 2023-12-23

## 2023-12-21 RX ORDER — DULAGLUTIDE 1.5 MG/.5ML
INJECTION, SOLUTION SUBCUTANEOUS
Qty: 2 ML | Refills: 5 | Status: SHIPPED | OUTPATIENT
Start: 2023-12-21 | End: 2024-01-15

## 2023-12-21 RX ORDER — MEPERIDINE HYDROCHLORIDE 25 MG/ML
25 INJECTION INTRAMUSCULAR; INTRAVENOUS; SUBCUTANEOUS EVERY 30 MIN PRN
Status: CANCELLED | OUTPATIENT
Start: 2023-12-23

## 2023-12-21 RX ORDER — HEPARIN SODIUM (PORCINE) LOCK FLUSH IV SOLN 100 UNIT/ML 100 UNIT/ML
5 SOLUTION INTRAVENOUS
Status: CANCELLED | OUTPATIENT
Start: 2023-12-23

## 2023-12-21 RX ORDER — EPINEPHRINE 1 MG/ML
0.3 INJECTION, SOLUTION INTRAMUSCULAR; SUBCUTANEOUS EVERY 5 MIN PRN
Status: CANCELLED | OUTPATIENT
Start: 2023-12-23

## 2023-12-21 RX ADMIN — IRON SUCROSE 300 MG: 20 INJECTION, SOLUTION INTRAVENOUS at 15:33

## 2023-12-21 ASSESSMENT — PAIN SCALES - GENERAL: PAINLEVEL: NO PAIN (0)

## 2023-12-21 NOTE — PROGRESS NOTES
ANTICOAGULATION MANAGEMENT     Mara Colindres 89 year old female is on warfarin with supratherapeutic INR result. (Goal INR 2.0-3.0)    Recent labs: (last 7 days)     12/21/23  1438   INR 3.2*       ASSESSMENT     Source(s): Chart Review and Patient/Caregiver Call     Warfarin doses taken: Warfarin taken as instructed  Diet: No new diet changes identified  Medication/supplement changes: None noted  New illness, injury, or hospitalization: No  Signs or symptoms of bleeding or clotting: No  Previous result: Supratherapeutic  Additional findings: None       PLAN     Recommended plan for no diet, medication or health factor changes affecting INR     Dosing Instructions: decrease your warfarin dose (4.8% change) with next INR in 1 week       Summary  As of 12/21/2023      Full warfarin instructions:  2 mg every Thu; 3 mg all other days   Next INR check:  12/28/2023               Telephone call with Hong, son, who verbalizes understanding and agrees to plan    Patient to recheck with home meter    Education provided:   Please call back if any changes to your diet, medications or how you've been taking warfarin    Plan made per [facility] anticoagulation protocol    Laxmi Huang RN  Anticoagulation Clinic  12/21/2023    _______________________________________________________________________     Anticoagulation Episode Summary       Current INR goal:  2.0-3.0   TTR:  54.4% (1 y)   Target end date:  Indefinite   Send INR reminders to:  [facility]    Indications    Long-term (current) use of anticoagulants [Z79.01] [Z79.01]  Persistent atrial fibrillation (H) [I48.19]  Chronic anticoagulation [Z79.01]             Comments:  [employer] home meter. Manage by exception.             Anticoagulation Care Providers       Provider Role Specialty Phone number    Steven Abrams MD Referring Internal Medicine [phone]

## 2023-12-21 NOTE — PATIENT INSTRUCTIONS
Dear Mara Colindres    Thank you for choosing [facility] ([facility]) for your infusion.  The following information is a summary of our appointment as well as important reminders.      If you are a transplant patient and require transplant education, please click on this link: [email or web address].    We look forward in seeing you on your next appointment here at [facility] ([facility]).  Please don t hesitate to call us at [phone] to reschedule any of your appointments or to speak with one of the [facility] registered nurses.  It was a pleasure taking care of you today.    Sincerely,    [facility]  [facility]  [address] [place] [address]  [address]  Phone:  [phone]

## 2023-12-21 NOTE — PROGRESS NOTES
Infusion Nursing Note:  Mara Colindres presents today for venofer.    Patient seen by provider today: No   present during visit today: Not Applicable.    Note: dose 2/3.    Intravenous Access:  [facility]     Treatment Conditions:  Not Applicable.    Post Infusion Assessment:  Patient tolerated infusion without incident.  Blood return noted pre and post infusion.  Site patent and intact, free from redness, edema or discomfort.  No evidence of extravasations.  Access discontinued per protocol.     Discharge Plan:   Discharge instructions reviewed with: Patient.  Patient and/or family verbalized understanding of discharge instructions and all questions answered.  Patient will return 12/26/23 for next appointment.  Patient discharged in stable condition accompanied by: sister.  Departure Mode: Wheelchair.    Administrations This Visit       iron sucrose (VENOFER) 300 mg in sodium chloride 0.9 % 290 mL intermittent infusion       Admin Date  12/21/2023 Action  $New Bag Dose  300 mg Rate  193.3 mL/hr Route  Intravenous Documented By  Sandy Hwang, RN                    Sandy Hwang, RN

## 2023-12-26 ENCOUNTER — INFUSION THERAPY VISIT (OUTPATIENT)
Dept: INFUSION THERAPY | Facility: CLINIC | Age: 88
End: 2023-12-26
Attending: INTERNAL MEDICINE
Payer: COMMERCIAL

## 2023-12-26 VITALS
TEMPERATURE: 97.9 F | RESPIRATION RATE: 16 BRPM | HEART RATE: 68 BPM | DIASTOLIC BLOOD PRESSURE: 81 MMHG | SYSTOLIC BLOOD PRESSURE: 119 MMHG | OXYGEN SATURATION: 97 %

## 2023-12-26 DIAGNOSIS — N18.4 CKD (CHRONIC KIDNEY DISEASE) STAGE 4, GFR 15-29 ML/MIN (H): ICD-10-CM

## 2023-12-26 DIAGNOSIS — D50.9 IRON DEFICIENCY ANEMIA, UNSPECIFIED IRON DEFICIENCY ANEMIA TYPE: Primary | ICD-10-CM

## 2023-12-26 PROCEDURE — 96365 THER/PROPH/DIAG IV INF INIT: CPT

## 2023-12-26 PROCEDURE — 96366 THER/PROPH/DIAG IV INF ADDON: CPT

## 2023-12-26 PROCEDURE — 258N000003 HC RX IP 258 OP 636: Performed by: INTERNAL MEDICINE

## 2023-12-26 PROCEDURE — 250N000011 HC RX IP 250 OP 636: Performed by: INTERNAL MEDICINE

## 2023-12-26 RX ORDER — EPINEPHRINE 1 MG/ML
0.3 INJECTION, SOLUTION INTRAMUSCULAR; SUBCUTANEOUS EVERY 5 MIN PRN
OUTPATIENT
Start: 2023-12-27

## 2023-12-26 RX ORDER — HEPARIN SODIUM (PORCINE) LOCK FLUSH IV SOLN 100 UNIT/ML 100 UNIT/ML
5 SOLUTION INTRAVENOUS
OUTPATIENT
Start: 2023-12-27

## 2023-12-26 RX ORDER — DIPHENHYDRAMINE HYDROCHLORIDE 50 MG/ML
50 INJECTION INTRAMUSCULAR; INTRAVENOUS
Start: 2023-12-27

## 2023-12-26 RX ORDER — METHYLPREDNISOLONE SODIUM SUCCINATE 125 MG/2ML
125 INJECTION, POWDER, LYOPHILIZED, FOR SOLUTION INTRAMUSCULAR; INTRAVENOUS
Start: 2023-12-27

## 2023-12-26 RX ORDER — MEPERIDINE HYDROCHLORIDE 25 MG/ML
25 INJECTION INTRAMUSCULAR; INTRAVENOUS; SUBCUTANEOUS EVERY 30 MIN PRN
OUTPATIENT
Start: 2023-12-27

## 2023-12-26 RX ORDER — HEPARIN SODIUM,PORCINE 10 UNIT/ML
5-20 VIAL (ML) INTRAVENOUS DAILY PRN
OUTPATIENT
Start: 2023-12-27

## 2023-12-26 RX ORDER — ALBUTEROL SULFATE 90 UG/1
1-2 AEROSOL, METERED RESPIRATORY (INHALATION)
Start: 2023-12-27

## 2023-12-26 RX ORDER — ALBUTEROL SULFATE 0.83 MG/ML
2.5 SOLUTION RESPIRATORY (INHALATION)
OUTPATIENT
Start: 2023-12-27

## 2023-12-26 RX ADMIN — IRON SUCROSE 300 MG: 20 INJECTION, SOLUTION INTRAVENOUS at 15:05

## 2023-12-26 NOTE — PROGRESS NOTES
Infusion Nursing Note:  Mara Colindres presents today for Venofer.    Patient seen by provider today: No   present during visit today: Not Applicable.    Note: Venofer 300 mg infused over 90 minutes per orders.    Administrations This Visit       iron sucrose (VENOFER) 300 mg in sodium chloride 0.9 % 290 mL intermittent infusion       Admin Date  12/26/2023 Action  $New Bag Dose  300 mg Rate  193.3 mL/hr Route  Intravenous Documented By  Ayah Ambrosio RN                   Intravenous Access:  Peripheral IV placed by vascular.  Nol labs due per pt.    Treatment Conditions:  None.    Discharge Plan:   Discharge instructions reviewed with: Patient.  Patient and/or family verbalized understanding of discharge instructions and all questions answered.  AVS to patient via [employer].  Patient will return to her provider as needed for next appointment.   - Care transfer to late shift nurse at 1635 hr.    /62 (BP Location: Left arm, Patient Position: Semi-Doherty's, Cuff Size: Adult Regular)   Pulse 89   Temp 97.9  F (36.6  C) (Oral)   Resp 16   SpO2 97%      Ayah Ambrosio RN

## 2023-12-26 NOTE — PATIENT INSTRUCTIONS
Dear Mara Colindres    Thank you for choosing [facility] ([facility]) for your infusion.  The following information is a summary of our appointment as well as important reminders.          If you are a transplant patient and require transplant education, please click on this link: [email or web address].    We look forward in seeing you on your next appointment here at [facility] ([facility]).  Please don t hesitate to call us at [phone] to reschedule any of your appointments or to speak with one of the [facility] registered nurses.  It was a pleasure taking care of you today.    Sincerely,    [facility]  [facility]  [address] [place] [address]  [address]  Phone:  [phone]

## 2023-12-28 ENCOUNTER — ANTICOAGULATION THERAPY VISIT (OUTPATIENT)
Dept: ANTICOAGULATION | Facility: CLINIC | Age: 88
End: 2023-12-28
Payer: COMMERCIAL

## 2023-12-28 ENCOUNTER — TRANSFERRED RECORDS (OUTPATIENT)
Dept: HEALTH INFORMATION MANAGEMENT | Facility: CLINIC | Age: 88
End: 2023-12-28
Payer: COMMERCIAL

## 2023-12-28 DIAGNOSIS — Z79.01 CHRONIC ANTICOAGULATION: ICD-10-CM

## 2023-12-28 DIAGNOSIS — Z79.01 LONG TERM CURRENT USE OF ANTICOAGULANT THERAPY: Primary | ICD-10-CM

## 2023-12-28 DIAGNOSIS — I48.19 PERSISTENT ATRIAL FIBRILLATION (H): ICD-10-CM

## 2023-12-28 LAB — INR HOME MONITORING: 2.7 RATIO (ref 2–3)

## 2023-12-28 NOTE — PROGRESS NOTES
ANTICOAGULATION MANAGEMENT     Mara Colindres 89 year old female is on warfarin with therapeutic INR result. (Goal INR 2.0-3.0)    Recent labs: (last 7 days)     12/28/23  1424   INR 2.7       ASSESSMENT     Source(s): Chart Review  Previous INR was Supratherapeutic  Medication, diet, health changes since last INR chart reviewed; none identified         PLAN     Recommended plan for no diet, medication or health factor changes affecting INR     Dosing Instructions: Continue your current warfarin dose with next INR in 1 week       Summary  As of 12/28/2023      Full warfarin instructions:  2 mg every Thu; 3 mg all other days   Next INR check:  1/4/2024               Detailed voice message left for Hong with dosing instructions and follow up date.     Patient to recheck with home meter    Education provided:   Please call back if any changes to your diet, medications or how you've been taking warfarin  Symptom monitoring: monitoring for bleeding signs and symptoms and monitoring for clotting signs and symptoms  Resume manage by exception with home monitor. Continue to submit INR results to home monitor company.You will only be called when your result is out of range. Please call and notify [facility] if new medication started, dose missed, signs or symptoms of bleeding or clotting, or a surgery/procedure is scheduled.  Contact [phone]  with any changes, questions or concerns.     Plan made per [facility] anticoagulation protocol    Coral Oliver RN  Anticoagulation Clinic  12/28/2023    _______________________________________________________________________     Anticoagulation Episode Summary       Current INR goal:  2.0-3.0   TTR:  54.1% (1 y)   Target end date:  Indefinite   Send INR reminders to:  [facility]    Indications    Long-term (current) use of anticoagulants [Z79.01] [Z79.01]  Persistent atrial fibrillation (H) [I48.19]  Chronic anticoagulation [Z79.01]             Comments:  [employer] home meter.  Manage by exception.             Anticoagulation Care Providers       Provider Role Specialty Phone number    Steven Abrams MD Referring Internal Medicine [phone]

## 2024-01-03 ENCOUNTER — TELEPHONE (OUTPATIENT)
Dept: FAMILY MEDICINE | Facility: CLINIC | Age: 89
End: 2024-01-03
Payer: COMMERCIAL

## 2024-01-03 DIAGNOSIS — N18.30 CONTROLLED TYPE 2 DIABETES MELLITUS WITH STAGE 3 CHRONIC KIDNEY DISEASE, WITHOUT LONG-TERM CURRENT USE OF INSULIN (H): ICD-10-CM

## 2024-01-03 DIAGNOSIS — M81.0 AGE-RELATED OSTEOPOROSIS WITHOUT CURRENT PATHOLOGICAL FRACTURE: ICD-10-CM

## 2024-01-03 DIAGNOSIS — E11.22 CONTROLLED TYPE 2 DIABETES MELLITUS WITH STAGE 3 CHRONIC KIDNEY DISEASE, WITHOUT LONG-TERM CURRENT USE OF INSULIN (H): ICD-10-CM

## 2024-01-03 NOTE — TELEPHONE ENCOUNTER
Patient Returning Call    Reason for call:  Went to pharmacy and they said that some of the prescriptions need to be renewed - Patient is not sure which ones - please call and inform as to next steps     Information relayed to patient:  message sent     Patient has additional questions:  Yes    What are your questions/concerns:  Went to pharmacy and they said that some of the prescriptions need to be renewed - Patient is not sure which ones - please call and inform as to next steps     Okay to leave a detailed message?: Yes at Home number on file [phone] (home)

## 2024-01-04 ENCOUNTER — DOCUMENTATION ONLY (OUTPATIENT)
Dept: ANTICOAGULATION | Facility: CLINIC | Age: 89
End: 2024-01-04
Payer: COMMERCIAL

## 2024-01-04 ENCOUNTER — TRANSFERRED RECORDS (OUTPATIENT)
Dept: HEALTH INFORMATION MANAGEMENT | Facility: CLINIC | Age: 89
End: 2024-01-04
Payer: COMMERCIAL

## 2024-01-04 DIAGNOSIS — Z79.01 LONG TERM CURRENT USE OF ANTICOAGULANT THERAPY: Primary | ICD-10-CM

## 2024-01-04 DIAGNOSIS — Z79.01 CHRONIC ANTICOAGULATION: ICD-10-CM

## 2024-01-04 DIAGNOSIS — I48.19 PERSISTENT ATRIAL FIBRILLATION (H): ICD-10-CM

## 2024-01-04 LAB — INR HOME MONITORING: 2 RATIO (ref 2–3)

## 2024-01-04 RX ORDER — ALENDRONATE SODIUM 70 MG/1
TABLET ORAL
Qty: 12 TABLET | Refills: 0 | Status: SHIPPED | OUTPATIENT
Start: 2024-01-04 | End: 2024-01-19 | Stop reason: ALTCHOICE

## 2024-01-04 RX ORDER — EMPAGLIFLOZIN 10 MG/1
10 TABLET, FILM COATED ORAL DAILY
Qty: 90 TABLET | Refills: 0 | Status: SHIPPED | OUTPATIENT
Start: 2024-01-04 | End: 2024-03-15

## 2024-01-04 NOTE — TELEPHONE ENCOUNTER
"Requested Prescriptions   Pending Prescriptions Disp Refills    JARDIANCE 10 MG TABS tablet [Pharmacy Med Name: JARDIANCE 10MG TABLETS] 90 tablet 1     Sig: TAKE 1 TABLET(10 MG) BY MOUTH DAILY       Sodium Glucose Co-Transport Inhibitor Agents Failed - 1/3/2024  2:18 PM        Failed - Patient has documented A1c within the specified period of time.     If HgbA1C is 8 or greater, it needs to be on file within the past 3 months.  If less than 8, must be on file within the past 6 months.     Recent Labs   Lab Test 08/28/23  1105   A1C 8.7*             Failed - No creatinine >1.4 or GFR <45 within the past 12 mos     Recent Labs   Lab Test 11/28/23  1211 09/13/21  1309 01/25/21  1426   GFRESTIMATED 27*   < > 30*   GFRESTBLACK  --   --  35*    < > = values in this interval not displayed.       Recent Labs   Lab Test 11/28/23  1211   CR 1.80*             Failed - Recent (6 mo) or future (30 days) visit within the authorizing provider's specialty     Patient had office visit in the last 6 months or has a visit in the next 30 days with authorizing provider or within the authorizing provider's specialty.  See \"Patient Info\" tab in inbasket, or \"Choose Columns\" in Meds & Orders section of the refill encounter.            Passed - Medication is active on med list        Passed - Patient is age 18 or older        Passed - Patient is not pregnant        Passed - Patient has documented normal Potassium within the last 12 mos.     Recent Labs   Lab Test 11/28/23  1211   POTASSIUM 4.3             Passed - Patient has no positive pregnancy test within the past 12 mos.          alendronate (FOSAMAX) 70 MG tablet [Pharmacy Med Name: ALENDRONATE 70MG TABLETS] 12 tablet 1     Sig: TAKE 1 TABLET(70 MG) BY MOUTH EVERY 7 DAYS       Bisphosphonates Failed - 1/3/2024  2:18 PM        Failed - Dexa scan completed in the past 48-months     Please review last Dexa result.           Failed - Recent (12 mo) or future (90 days) visit within the " authorizing provider's specialty     The patient must have completed an in-person or virtual visit within the past 12 months or has a future visit scheduled within the next 90 days with the authorizing provider s specialty.  Urgent care and e-visits do not quality as an office visit for this protocol.          Failed - Normal Creatinine Clearance  within past 12 months     Recent Labs   Lab Test 11/28/23  1211   CR 1.80*       Ok to refill medication if creatinine is low          Passed - Medication is active on med list        Passed - Medication indicated for associated diagnosis     The medication is prescribed for one or more of the following conditions:     Osteoporosis   Osteitis Deformans (Paget's Disease)   Postmenopausal    Osteopenia   Arthroplasty   Crohn's Disease   Cystic Fibrosis   Fibrous Dysplasia of bone   Growth Hormone Deficiency   Hypercalcemia   Juvenile Osteoporosis   Hypogonadism          Passed - Patient is age 18 or older

## 2024-01-04 NOTE — PROGRESS NOTES
ANTICOAGULATION  MANAGEMENT-Home Monitor Managed by Exception    Mara CHARLES Jens 89 year old female is on warfarin with therapeutic INR result. (Goal INR 2.0-3.0)    Recent labs: (last 7 days)     01/04/24  1653   INR 2       Previous INR was Therapeutic  Medication, diet, health changes since last INR:chart reviewed; none identified  Contacted within the last 12 weeks by phone on 12/28/23 (~ 12 weeks : 3/21/24)  Last [facility] referral date: 08/11/2023      ISABEL     Mara was NOT contacted regarding therapeutic result today per home monitoring policy manage by exception agreement.   Current warfarin dose is to be continued:     Summary  As of 1/4/2024      Full warfarin instructions:  2 mg every Thu; 3 mg all other days   Next INR check:  1/11/2024             ?   Annette Brsicoe RN  Anticoagulation Clinic  1/4/2024    _______________________________________________________________________     Anticoagulation Episode Summary       Current INR goal:  2.0-3.0   TTR:  56.0% (1 y)   Target end date:  Indefinite   Send INR reminders to:  [facility]    Indications    Long-term (current) use of anticoagulants [Z79.01] [Z79.01]  Persistent atrial fibrillation (H) [I48.19]  Chronic anticoagulation [Z79.01]             Comments:  [employer] home meter. Manage by exception.             Anticoagulation Care Providers       Provider Role Specialty Phone number    Steven Abrams MD Referring Internal Medicine [phone]

## 2024-01-04 NOTE — TELEPHONE ENCOUNTER
Separate encounter pending refills. Closing to reduce error.     Thanks,  AMAYA Granados  [facility]

## 2024-01-04 NOTE — TELEPHONE ENCOUNTER
This message is too vague. Need more detail.  Please help me with this. Reroute with answers to these questions please !    Steven Abrams MD

## 2024-01-08 ENCOUNTER — VIRTUAL VISIT (OUTPATIENT)
Dept: PHARMACY | Facility: CLINIC | Age: 89
End: 2024-01-08
Payer: COMMERCIAL

## 2024-01-08 DIAGNOSIS — Z79.01 LONG TERM CURRENT USE OF ANTICOAGULANT THERAPY: ICD-10-CM

## 2024-01-08 DIAGNOSIS — M15.0 PRIMARY OSTEOARTHRITIS INVOLVING MULTIPLE JOINTS: ICD-10-CM

## 2024-01-08 DIAGNOSIS — G89.29 OTHER CHRONIC PAIN: ICD-10-CM

## 2024-01-08 DIAGNOSIS — N18.4 CKD (CHRONIC KIDNEY DISEASE) STAGE 4, GFR 15-29 ML/MIN (H): ICD-10-CM

## 2024-01-08 DIAGNOSIS — N18.30 CONTROLLED TYPE 2 DIABETES MELLITUS WITH STAGE 3 CHRONIC KIDNEY DISEASE, WITHOUT LONG-TERM CURRENT USE OF INSULIN (H): Primary | ICD-10-CM

## 2024-01-08 DIAGNOSIS — I10 HYPERTENSION GOAL BP (BLOOD PRESSURE) < 140/90: ICD-10-CM

## 2024-01-08 DIAGNOSIS — E11.22 CONTROLLED TYPE 2 DIABETES MELLITUS WITH STAGE 3 CHRONIC KIDNEY DISEASE, WITHOUT LONG-TERM CURRENT USE OF INSULIN (H): Primary | ICD-10-CM

## 2024-01-08 DIAGNOSIS — R19.7 DIARRHEA: ICD-10-CM

## 2024-01-08 DIAGNOSIS — F33.0 MAJOR DEPRESSIVE DISORDER, RECURRENT EPISODE, MILD (H): ICD-10-CM

## 2024-01-08 DIAGNOSIS — I50.32 CHRONIC DIASTOLIC CONGESTIVE HEART FAILURE (H): ICD-10-CM

## 2024-01-08 DIAGNOSIS — E55.9 HYPOVITAMINOSIS D: ICD-10-CM

## 2024-01-08 DIAGNOSIS — I48.19 PERSISTENT ATRIAL FIBRILLATION (H): ICD-10-CM

## 2024-01-08 DIAGNOSIS — M81.0 AGE-RELATED OSTEOPOROSIS WITHOUT CURRENT PATHOLOGICAL FRACTURE: ICD-10-CM

## 2024-01-08 PROCEDURE — 99207 PR NO CHARGE LOS: CPT | Mod: 93 | Performed by: PHARMACIST

## 2024-01-08 NOTE — PROGRESS NOTES
Medication Therapy Management (MTM) Encounter    ASSESSMENT:                            Medication Adherence/Access: No issues identified    Type 2 Diabetes:  A1C is not at goal < 8%. May benefit from rechecking since she's been on Jardiance. Due for microalbumin as well.    Hypertension/HFpEF/Atrial Fibrillation/History of DVT/CKD Stage 4: Stable. Blood pressure at goal < 140/90.    Osteoporosis: Patient is not meeting RDI of calcium 1200mg/day. Patient is meeting RDI of Vitamin D 1000 IU/day.    GERD: May benefit from continuing omeprazole lower Hg.     Depression: patient prefers to maintain paroxetine. Sertraline may be less drying the paroxetine and can consider in future if desired.     Pain/osteoarthritis: Stable.     Diarrhea: Stable.     PLAN:                            Recheck A1C and urine protein test.  Change calcium to a full tablet (600 mg) in the morning and a full tablet (600 mg) at night.    Follow-up: Return in about 3 months (around 2024) for Medication Therapy Management.    SUBJECTIVE/OBJECTIVE:                          Mara Colindres is a 89 year old female called for a follow-up visit from 3/10/23.       Reason for visit: med review.    Allergies/ADRs: Reviewed in chart  Past Medical History: Reviewed in chart  Tobacco: She reports that she has never smoked. She has never used smokeless tobacco.  Alcohol: none  Caffeine: 1 cup coffee, 1 pespi on occassion    Medication Adherence/Access:   Patient uses pill box(es)-her son helps her set this up.  Patient takes medications 4 time(s) per day.   Per patient, misses medication 0 times per week.   The patient fills medications at [facility]: NO, fills medications at [facility].    Diabetes /Type 2:  Trulicity 1.5 mg weekly -   Jardiance 10 mg daily -started 23    Patient is not experiencing side effects.  History of diarrhea with metformin.   Blood sugar monitorin time(s) daily  Ranges (patient reported): 145-285,  was 285 when she went to the doctor.   Symptoms of low blood sugar? none  Symptoms of high blood sugar? none  Not taking aspirin due to warfarin.   Eye exam is up to date  Foot exam: due  Urine Albumin:   Lab Results   Component Value Date    UMALCR 135.17 (H) 08/28/2023      Lab Results   Component Value Date    A1C 8.7 (H) 08/28/2023     Hypertension /HFpEF/Atrial Fibrillation/History of DVT/CKD Stage 4/Lymphedema:   Furosemide 40 mg every morning and 40 mg at lunch.   Metoprolol tartrate 50 mg twice daily   Digoxin 62.5 mcg every other day  Potassium 20 mEq at dinner and at bedtime   Warfarin per INR clinic    She has devices that help with the leg swelling, they massage the legs. She completed three iron infusions.  She weighs herself and reports stable within 1-2 lbs up or down.  Patient reports no current medication side effects-does get dizzy if changes positions too quickly.  Patient self monitors blood pressure.  Home BP monitoring 118/76, 100/6 .       BP Readings from Last 3 Encounters:   12/26/23 119/81   12/21/23 134/72   12/19/23 132/87     Pulse Readings from Last 3 Encounters:   12/26/23 68   12/21/23 88   12/19/23 90     Osteoporosis:  calcium 300/Vitamin D 10 mcg twice daily (serving size says 2 tablets for 1200 mg)  Vitamin D 1000 international unit(s) daily   alendronate (Fosamax) 70mg weekly on Thursday (has been on current therapy for 3 years)    History of Prolia.  Patient is not experiencing side effects.  DEXA History: 9/11/20, -3.8  Patient is getting the following sources of dietary calcium/week, doesn't drink a lot of milk, a daily protein drink 2-3x/week.  Risk factors: post-menopausal, Height loss of 2.5 inches, Parent history of osteoporosis, follow up osteoporosis  Last vitamin D level:  Vitamin D Deficiency Screening Results:  Lab Results   Component Value Date    VITDT 37 06/03/2022    VITDT 57 09/13/2021    VITDT 33 08/11/2020    VITDT 44 04/29/2019    VITDT 34 11/07/2017     GERD:    Prilosec (omeprazole) 20 once daily    Patient reports no current symptoms.   The patient does not have a history of GI bleed, although concern for possibly slow stomach bleed for lower hemoglobin.  Patient has not tried a trial off of therapy and is interested in doing so. Patient feels that current regimen is effective.    Depression:    Paroxetine 10 mg every other day (helps her stop crying all the time)    She has dry mouth, dry eyes.  Paroxetine has really helped her, so she's not interested in changing this at all. She does try to wean off of this every now and then and crying resumes.    Pain/osteoarthritis:    Norco 5-325 mg daily as needed for arm/shoulder pain  Acetaminophen 1000 mg as needed (a few times a week)    She takes the Norco only when she really needs it. States this/these are effective. Denies side effects.     Diarrhea:    Has had long standing diarrhea, she'll take Lomotil when she's out for a hockey game or something, but otherwise doesn't regularly use. States this/these are effective. Denies side effects.     Today's Vitals: There were no vitals taken for this visit.  ----------------      I spent 38 minutes with this patient today. All changes were made via collaborative practice agreement with Steven Abrams MD. A copy of the visit note was provided to the patient's provider(s).    A summary of these recommendations was sent via [employer].    Arlene Ham, CarlosD  Medication Therapy Management Pharmacist  [phone]    Telemedicine Visit Details  Type of service:  Telephone visit  Start Time: 11:04 AM  End Time: 11:42 AM     Medication Therapy Recommendations  Age-related osteoporosis without current pathological fracture    Current Medication: Calcium Carb-Cholecalciferol 600-20 MG-MCG TABS   Rationale: Dose too low - Dosage too low - Effectiveness   Recommendation: Increase Dose   Status: Accepted per CPA

## 2024-01-08 NOTE — PATIENT INSTRUCTIONS
"Recommendations from today's MTM visit:                                                         Recheck A1C and urine protein test.  Change calcium to a full tablet (600 mg) in the morning and a full tablet (600 mg) at night.    Follow-up: Return in about 3 months (around 4/8/2024) for Medication Therapy Management.    It was great speaking with you today.  I value your experience and would be very thankful for your time in providing feedback in our clinic survey. In the next few days, you may receive an email or text message from [employer] with a link to a survey related to your  clinical pharmacist.\"     To schedule another MTM appointment, please call the clinic directly or you may call the MTM scheduling line at [phone] or toll-free at [phone].     My Clinical Pharmacist's contact information:                                                      Please feel free to contact me with any questions or concerns you have.      Arlene Ham, PharmD  Medication Therapy Management Pharmacist  [phone]     "

## 2024-01-08 NOTE — Clinical Note
JORDEN MORALES note, thanks!  Arlene Ham, PharmD Medication Therapy Management Pharmacist [phone]

## 2024-01-11 ENCOUNTER — TRANSFERRED RECORDS (OUTPATIENT)
Dept: HEALTH INFORMATION MANAGEMENT | Facility: CLINIC | Age: 89
End: 2024-01-11
Payer: COMMERCIAL

## 2024-01-11 ENCOUNTER — DOCUMENTATION ONLY (OUTPATIENT)
Dept: ANTICOAGULATION | Facility: CLINIC | Age: 89
End: 2024-01-11
Payer: COMMERCIAL

## 2024-01-11 DIAGNOSIS — Z79.01 CHRONIC ANTICOAGULATION: ICD-10-CM

## 2024-01-11 DIAGNOSIS — Z79.01 LONG TERM CURRENT USE OF ANTICOAGULANT THERAPY: Primary | ICD-10-CM

## 2024-01-11 DIAGNOSIS — I48.19 PERSISTENT ATRIAL FIBRILLATION (H): ICD-10-CM

## 2024-01-11 LAB — INR HOME MONITORING: 2 RATIO (ref 2–3)

## 2024-01-11 NOTE — PROGRESS NOTES
ANTICOAGULATION  MANAGEMENT-Home Monitor Managed by Exception    Mara CHARLES Jens 89 year old female is on warfarin with therapeutic INR result. (Goal INR 2.0-3.0)    Recent labs: (last 7 days)     01/11/24  1606   INR 2       Previous INR was Therapeutic  Medication, diet, health changes since last INR:chart reviewed; none identified  Contacted within the last 12 weeks by phone on 12/28/23  Last [facility] referral date: 08/11/2023      ISABEL     Mara was NOT contacted regarding therapeutic result today per home monitoring policy manage by exception agreement.   Current warfarin dose is to be continued:     Summary  As of 1/11/2024      Full warfarin instructions:  2 mg every Thu; 3 mg all other days   Next INR check:  1/18/2024             ?   Lesly Wright RN  Anticoagulation Clinic  1/11/2024    _______________________________________________________________________     Anticoagulation Episode Summary       Current INR goal:  2.0-3.0   TTR:  57.9% (1 y)   Target end date:  Indefinite   Send INR reminders to:  [facility]    Indications    Long-term (current) use of anticoagulants [Z79.01] [Z79.01]  Persistent atrial fibrillation (H) [I48.19]  Chronic anticoagulation [Z79.01]             Comments:  [employer] home meter. Manage by exception.             Anticoagulation Care Providers       Provider Role Specialty Phone number    Steven Abrams MD Referring Internal Medicine [phone]

## 2024-01-12 DIAGNOSIS — I50.32 CHRONIC DIASTOLIC CONGESTIVE HEART FAILURE (H): ICD-10-CM

## 2024-01-12 DIAGNOSIS — I27.20 PULMONARY HTN (H): ICD-10-CM

## 2024-01-15 ENCOUNTER — TELEPHONE (OUTPATIENT)
Dept: FAMILY MEDICINE | Facility: CLINIC | Age: 89
End: 2024-01-15
Payer: COMMERCIAL

## 2024-01-15 DIAGNOSIS — N18.30 CONTROLLED TYPE 2 DIABETES MELLITUS WITH STAGE 3 CHRONIC KIDNEY DISEASE, WITHOUT LONG-TERM CURRENT USE OF INSULIN (H): Primary | ICD-10-CM

## 2024-01-15 DIAGNOSIS — E11.22 CONTROLLED TYPE 2 DIABETES MELLITUS WITH STAGE 3 CHRONIC KIDNEY DISEASE, WITHOUT LONG-TERM CURRENT USE OF INSULIN (H): Primary | ICD-10-CM

## 2024-01-15 DIAGNOSIS — M15.0 PRIMARY OSTEOARTHRITIS INVOLVING MULTIPLE JOINTS: ICD-10-CM

## 2024-01-15 RX ORDER — METOPROLOL TARTRATE 100 MG
TABLET ORAL
Qty: 90 TABLET | Refills: 0 | Status: SHIPPED | OUTPATIENT
Start: 2024-01-15 | End: 2024-04-04

## 2024-01-15 RX ORDER — HYDROCODONE BITARTRATE AND ACETAMINOPHEN 5; 325 MG/1; MG/1
1 TABLET ORAL DAILY PRN
Qty: 30 TABLET | Refills: 0 | Status: SHIPPED | OUTPATIENT
Start: 2024-01-15 | End: 2024-04-25

## 2024-01-15 NOTE — TELEPHONE ENCOUNTER
[facility] calling to state that trulicity is out of stock at pharmacy and asking for alternative.     Pt is completely out, RN Called pt confirm she is out of this medication and states that this is the third week that she cannot get this medication.     Ok for pt to start an alternative medication to trulicity 1.5 MG/0.5ML pen?    Norah Harris RN on 1/15/2024 at 1:50 PM

## 2024-01-15 NOTE — TELEPHONE ENCOUNTER
Called and spoke with patient, will change to Ozempic. Since she's been off Trulicity we'll have her start Ozempic at 0.25 mg weekly x 1 month, then increase to 0.5 mg weekly, which is roughly the equivalent dose of current Trulicity 1.5 mg weekly. Prescription sent.    Arlene Ham, PharmD  Medication Therapy Management Pharmacist  [phone]

## 2024-01-16 ENCOUNTER — LAB (OUTPATIENT)
Dept: LAB | Facility: CLINIC | Age: 89
End: 2024-01-16
Payer: COMMERCIAL

## 2024-01-16 ENCOUNTER — DOCUMENTATION ONLY (OUTPATIENT)
Dept: ANTICOAGULATION | Facility: CLINIC | Age: 89
End: 2024-01-16

## 2024-01-16 DIAGNOSIS — Z79.01 CHRONIC ANTICOAGULATION: ICD-10-CM

## 2024-01-16 DIAGNOSIS — I10 HYPERTENSION GOAL BP (BLOOD PRESSURE) < 140/90: ICD-10-CM

## 2024-01-16 DIAGNOSIS — I48.19 PERSISTENT ATRIAL FIBRILLATION (H): ICD-10-CM

## 2024-01-16 DIAGNOSIS — E11.22 CONTROLLED TYPE 2 DIABETES MELLITUS WITH STAGE 3 CHRONIC KIDNEY DISEASE, WITHOUT LONG-TERM CURRENT USE OF INSULIN (H): ICD-10-CM

## 2024-01-16 DIAGNOSIS — Z79.01 LONG TERM CURRENT USE OF ANTICOAGULANT THERAPY: Primary | ICD-10-CM

## 2024-01-16 DIAGNOSIS — N18.4 CKD (CHRONIC KIDNEY DISEASE) STAGE 4, GFR 15-29 ML/MIN (H): ICD-10-CM

## 2024-01-16 DIAGNOSIS — E78.5 HYPERLIPIDEMIA LDL GOAL <100: Primary | ICD-10-CM

## 2024-01-16 DIAGNOSIS — M81.0 AGE-RELATED OSTEOPOROSIS WITHOUT CURRENT PATHOLOGICAL FRACTURE: ICD-10-CM

## 2024-01-16 DIAGNOSIS — N18.30 CONTROLLED TYPE 2 DIABETES MELLITUS WITH STAGE 3 CHRONIC KIDNEY DISEASE, WITHOUT LONG-TERM CURRENT USE OF INSULIN (H): ICD-10-CM

## 2024-01-16 LAB
HBA1C MFR BLD: 7.6 % (ref 0–5.6)
INR BLD: 2.2 (ref 0.9–1.1)

## 2024-01-16 PROCEDURE — 82040 ASSAY OF SERUM ALBUMIN: CPT

## 2024-01-16 PROCEDURE — 82043 UR ALBUMIN QUANTITATIVE: CPT

## 2024-01-16 PROCEDURE — 84460 ALANINE AMINO (ALT) (SGPT): CPT

## 2024-01-16 PROCEDURE — 80162 ASSAY OF DIGOXIN TOTAL: CPT

## 2024-01-16 PROCEDURE — 83036 HEMOGLOBIN GLYCOSYLATED A1C: CPT

## 2024-01-16 PROCEDURE — 80048 BASIC METABOLIC PNL TOTAL CA: CPT

## 2024-01-16 PROCEDURE — 82570 ASSAY OF URINE CREATININE: CPT

## 2024-01-16 PROCEDURE — 85610 PROTHROMBIN TIME: CPT

## 2024-01-16 PROCEDURE — 36415 COLL VENOUS BLD VENIPUNCTURE: CPT

## 2024-01-16 NOTE — PROGRESS NOTES
ANTICOAGULATION  MANAGEMENT-Home Monitor Managed by Exception    Mara MELVIN Colindres 89 year old female is on warfarin with therapeutic INR result. (Goal INR 2.0-3.0)    Recent labs: (last 7 days)     01/16/24  1456   INR 2.2*       Previous INR was Therapeutic  Medication, diet, health changes since last INR:chart reviewed; none identified  Contacted within the last 12 weeks by phone on 12/28/23  Last [facility] referral date: 08/11/2023      ISABEL     Mara was NOT contacted regarding therapeutic result today per home monitoring policy manage by exception agreement.   Current warfarin dose is to be continued:     Summary  As of 1/16/2024      Full warfarin instructions:  2 mg every Thu; 3 mg all other days   Next INR check:  1/23/2024             ?   Usha Riley RN  Anticoagulation Clinic  1/16/2024    _______________________________________________________________________     Anticoagulation Episode Summary       Current INR goal:  2.0-3.0   TTR:  59.3% (1 y)   Target end date:  Indefinite   Send INR reminders to:  [facility]    Indications    Long-term (current) use of anticoagulants [Z79.01] [Z79.01]  Persistent atrial fibrillation (H) [I48.19]  Chronic anticoagulation [Z79.01]             Comments:  [employer] home meter. Manage by exception.             Anticoagulation Care Providers       Provider Role Specialty Phone number    Steven Abrams MD Referring Internal Medicine [phone]

## 2024-01-17 LAB
ALT SERPL W P-5'-P-CCNC: 10 U/L (ref 0–50)
CREAT UR-MCNC: 70.2 MG/DL
DIGOXIN SERPL-MCNC: 0.6 NG/ML (ref 0.6–2)
MICROALBUMIN UR-MCNC: 101 MG/L
MICROALBUMIN/CREAT UR: 143.87 MG/G CR (ref 0–25)

## 2024-01-17 NOTE — TELEPHONE ENCOUNTER
"Rivas calling from Benefits department. States that care team was finding alternative for patient's Trulicity and wanted to follow-up on status. Writer relayed MT's Pharmacist's message below and relayed dosing per MAR, discussed that this has been sent to requested pharmacy for patient.    \"     Called and spoke with patient, will change to Ozempic. Since she's been off Trulicity we'll have her start Ozempic at 0.25 mg weekly x 1 month, then increase to 0.5 mg weekly, which is roughly the equivalent dose of current Trulicity 1.5 mg weekly. Prescription sent.     Arlene Ham, PharmD  Medication Therapy Management Pharmacist  [phone]         \"    Rivas states understanding and will follow-up with patient, no further questions.    NICANOR RosasN RN  [facility]          "

## 2024-01-19 ENCOUNTER — DOCUMENTATION ONLY (OUTPATIENT)
Dept: ANTICOAGULATION | Facility: CLINIC | Age: 89
End: 2024-01-19
Payer: COMMERCIAL

## 2024-01-19 ENCOUNTER — TELEPHONE (OUTPATIENT)
Dept: FAMILY MEDICINE | Facility: CLINIC | Age: 89
End: 2024-01-19
Payer: COMMERCIAL

## 2024-01-19 DIAGNOSIS — E11.22 CONTROLLED TYPE 2 DIABETES MELLITUS WITH STAGE 3 CHRONIC KIDNEY DISEASE, WITHOUT LONG-TERM CURRENT USE OF INSULIN (H): ICD-10-CM

## 2024-01-19 DIAGNOSIS — N18.4 CKD (CHRONIC KIDNEY DISEASE) STAGE 4, GFR 15-29 ML/MIN (H): ICD-10-CM

## 2024-01-19 DIAGNOSIS — M81.0 AGE-RELATED OSTEOPOROSIS WITHOUT CURRENT PATHOLOGICAL FRACTURE: Primary | ICD-10-CM

## 2024-01-19 DIAGNOSIS — N18.30 CONTROLLED TYPE 2 DIABETES MELLITUS WITH STAGE 3 CHRONIC KIDNEY DISEASE, WITHOUT LONG-TERM CURRENT USE OF INSULIN (H): ICD-10-CM

## 2024-01-19 LAB
ALBUMIN SERPL BCG-MCNC: 4.3 G/DL (ref 3.5–5.2)
ANION GAP SERPL CALCULATED.3IONS-SCNC: 18 MMOL/L (ref 7–15)
BUN SERPL-MCNC: 21.2 MG/DL (ref 8–23)
CALCIUM SERPL-MCNC: 9.6 MG/DL (ref 8.8–10.2)
CALCIUM SERPL-MCNC: 9.6 MG/DL (ref 8.8–10.2)
CHLORIDE SERPL-SCNC: 100 MMOL/L (ref 98–107)
CREAT SERPL-MCNC: 1.91 MG/DL (ref 0.51–0.95)
CREAT SERPL-MCNC: 1.91 MG/DL (ref 0.51–0.95)
DEPRECATED HCO3 PLAS-SCNC: 23 MMOL/L (ref 22–29)
EGFRCR SERPLBLD CKD-EPI 2021: 25 ML/MIN/1.73M2
EGFRCR SERPLBLD CKD-EPI 2021: 25 ML/MIN/1.73M2
GLUCOSE SERPL-MCNC: 235 MG/DL (ref 70–99)
INR HOME MONITORING: 2.2 RATIO (ref 2–3)
POTASSIUM SERPL-SCNC: 4 MMOL/L (ref 3.4–5.3)
SODIUM SERPL-SCNC: 141 MMOL/L (ref 135–145)

## 2024-01-19 RX ORDER — DULAGLUTIDE 1.5 MG/.5ML
1.5 INJECTION, SOLUTION SUBCUTANEOUS
Qty: 2 ML | Refills: 5 | Status: SHIPPED | OUTPATIENT
Start: 2024-01-19 | End: 2024-02-21

## 2024-01-19 NOTE — TELEPHONE ENCOUNTER
Hi Dr. Abrams, can you please place orders for Prolia. I spoke with Mara and she will stop alendronate.    Thank you,  Arlene Ham, PharmD  Medication Therapy Management Pharmacist  [phone]

## 2024-01-19 NOTE — TELEPHONE ENCOUNTER
Message reviewed , orders signed , check-in to find out what is her question about Ozempic  [semaglutide], I can call her on Monday if that would be necessary or helpful    Steven Abrams MD

## 2024-01-19 NOTE — TELEPHONE ENCOUNTER
Called and spoke with Mara about stopping alendronate (per renal function, eCrCL 20 mL/min) and replacing this with Prolia. Brief education provided on Prolia - informed her clinic nurses will call with more information.     She also has questions on Ozempic which we discussed, she's done one week of Ozempic 0.25 mg so far and tolerating well.     Scheduled MTM follow-up for 2/21/24    Arlene Ham, PharmD  Medication Therapy Management Pharmacist  [phone]

## 2024-01-19 NOTE — TELEPHONE ENCOUNTER
Stated per pharmacy patient is taking Trulicity, this not active on patients med list, [facility] [address] [place] [facility]

## 2024-01-19 NOTE — PROGRESS NOTES
ANTICOAGULATION  MANAGEMENT-Home Monitor Managed by Exception    Mara CHARLES Jens 89 year old female is on warfarin with therapeutic INR result. (Goal INR 2.0-3.0)    Recent labs: (last 7 days)     01/19/24  1521   INR 2.2       Previous INR was Therapeutic  Medication, diet, health changes since last INR:chart reviewed; none identified  Contacted within the last 12 weeks by phone on 12/28/23  Last [facility] referral date: 08/11/2023      ISABEL     Mara was NOT contacted regarding therapeutic result today per home monitoring policy manage by exception agreement.   Current warfarin dose is to be continued:     Summary  As of 1/19/2024      Full warfarin instructions:  2 mg every Thu; 3 mg all other days   Next INR check:  1/26/2024             ?   Alejandrina Wise RN  Anticoagulation Clinic  1/19/2024    _______________________________________________________________________     Anticoagulation Episode Summary       Current INR goal:  2.0-3.0   TTR:  60.1% (1 y)   Target end date:  Indefinite   Send INR reminders to:  [facility]    Indications    Long-term (current) use of anticoagulants [Z79.01] [Z79.01]  Persistent atrial fibrillation (H) [I48.19]  Chronic anticoagulation [Z79.01]             Comments:  [employer] home meter. Manage by exception.             Anticoagulation Care Providers       Provider Role Specialty Phone number    Steven Abrams MD Referring Internal Medicine [phone]

## 2024-01-22 NOTE — TELEPHONE ENCOUNTER
Carter Flores,    Looks like order is in but has not run through insurance yet. Pt will have the prolia here at [place] location. Please let us know when its good to go so we can call and schedule patient.       Thanks,  AMAYA Granados  [employer]

## 2024-01-22 NOTE — TELEPHONE ENCOUNTER
She had no further questions on Ozempic, she will continue as planned and tolerating well.  Routing to RN team for Prolia.    Arlene Ham, PharmD  Medication Therapy Management Pharmacist  [phone]

## 2024-01-25 ENCOUNTER — ANTICOAGULATION THERAPY VISIT (OUTPATIENT)
Dept: ANTICOAGULATION | Facility: CLINIC | Age: 89
End: 2024-01-25
Payer: COMMERCIAL

## 2024-01-25 DIAGNOSIS — I48.19 PERSISTENT ATRIAL FIBRILLATION (H): ICD-10-CM

## 2024-01-25 DIAGNOSIS — Z79.01 LONG TERM CURRENT USE OF ANTICOAGULANT THERAPY: Primary | ICD-10-CM

## 2024-01-25 DIAGNOSIS — Z79.01 CHRONIC ANTICOAGULATION: ICD-10-CM

## 2024-01-25 LAB — INR HOME MONITORING: 1.7 RATIO (ref 2–3)

## 2024-01-25 NOTE — PROGRESS NOTES
ANTICOAGULATION MANAGEMENT     Mara Colindres 89 year old female is on warfarin with subtherapeutic INR result. (Goal INR 2.0-3.0)    Recent labs: (last 7 days)     01/25/24  1808   INR 1.7*       ASSESSMENT     Source(s): Chart Review and Patient/Caregiver Call     Warfarin doses taken: More warfarin taken than planned which may be affecting INR  Diet: No new diet changes identified  Medication/supplement changes: stopped Trulicity a month ago and started Ozempic last Friday and will be taking  every Thursday moving forward.  Fosamax stopped no interaction expected.  New illness, injury, or hospitalization: No  Signs or symptoms of bleeding or clotting: No  Previous result: Therapeutic last 2(+) visits  Additional findings: None       PLAN     Recommended plan for ongoing change(s) affecting INR     Dosing Instructions: Continue your current warfarin dose with next INR in 1 week       Summary  As of 1/25/2024      Full warfarin instructions:  3 mg every day   Next INR check:  2/1/2024               Telephone call with patients son Hong who verbalizes understanding and agrees to plan and who agrees to plan and repeated back plan correctly    Patient to recheck with home meter    Education provided:   Contact [phone]  with any changes, questions or concerns.     Plan made per [facility] anticoagulation protocol    Coral Carlisle RN  Anticoagulation Clinic  1/25/2024    _______________________________________________________________________     Anticoagulation Episode Summary       Current INR goal:  2.0-3.0   TTR:  60.8% (1 y)   Target end date:  Indefinite   Send INR reminders to:  [facility]IT[facility]    Indications    Long-term (current) use of anticoagulants [Z79.01] [Z79.01]  Persistent atrial fibrillation (H) [I48.19]  Chronic anticoagulation [Z79.01]             Comments:  [employer] home meter. Manage by exception.             Anticoagulation Care Providers       Provider Role Specialty Phone number     Steven Abrams MD [facility] Internal Medicine [phone]

## 2024-02-01 ENCOUNTER — ANTICOAGULATION THERAPY VISIT (OUTPATIENT)
Dept: ANTICOAGULATION | Facility: CLINIC | Age: 89
End: 2024-02-01
Payer: COMMERCIAL

## 2024-02-01 ENCOUNTER — TRANSFERRED RECORDS (OUTPATIENT)
Dept: HEALTH INFORMATION MANAGEMENT | Facility: CLINIC | Age: 89
End: 2024-02-01
Payer: COMMERCIAL

## 2024-02-01 DIAGNOSIS — I48.19 PERSISTENT ATRIAL FIBRILLATION (H): ICD-10-CM

## 2024-02-01 DIAGNOSIS — Z79.01 CHRONIC ANTICOAGULATION: ICD-10-CM

## 2024-02-01 DIAGNOSIS — Z79.01 LONG TERM CURRENT USE OF ANTICOAGULANT THERAPY: Primary | ICD-10-CM

## 2024-02-01 LAB — INR HOME MONITORING: 1.7 RATIO (ref 2–3)

## 2024-02-01 NOTE — PROGRESS NOTES
ANTICOAGULATION MANAGEMENT     Mara Colindres 89 year old female is on warfarin with subtherapeutic INR result. (Goal INR 2.0-3.0)    Recent labs: (last 7 days)     02/01/24  1706   INR 1.7*       ASSESSMENT     Source(s): Chart Review and Patient/Caregiver Call     Warfarin doses taken: Warfarin taken as instructed  Diet: No new diet changes identified  Medication/supplement changes: None noted  New illness, injury, or hospitalization: No  Signs or symptoms of bleeding or clotting: No  Previous result: Subtherapeutic  Additional findings: None       PLAN     Recommended plan for no diet, medication or health factor changes affecting INR     Dosing Instructions: booster dose then continue your current warfarin dose with next INR in 1 week       Summary  As of 2/1/2024      Full warfarin instructions:  2/1: 4 mg; Otherwise 3 mg every day   Next INR check:  2/8/2024               Telephone call with Mara and Hong son who agrees to plan and repeated back plan correctly    Patient to recheck with home meter    Education provided:   Please call back if any changes to your diet, medications or how you've been taking warfarin    Plan made per [facility] anticoagulation protocol    Belkis Nunez, RN  Anticoagulation Clinic  2/1/2024    _______________________________________________________________________     Anticoagulation Episode Summary       Current INR goal:  2.0-3.0   TTR:  60.8% (1 y)   Target end date:  Indefinite   Send INR reminders to:  [facility] MONITOR[facility]    Indications    Long-term (current) use of anticoagulants [Z79.01] [Z79.01]  Persistent atrial fibrillation (H) [I48.19]  Chronic anticoagulation [Z79.01]             Comments:  [facility] home meter. Manage by exception.             Anticoagulation Care Providers       Provider Role Specialty Phone number    Steven Abrams MD Referring Internal Medicine [phone]

## 2024-02-06 ENCOUNTER — ALLIED HEALTH/NURSE VISIT (OUTPATIENT)
Dept: FAMILY MEDICINE | Facility: CLINIC | Age: 89
End: 2024-02-06
Payer: COMMERCIAL

## 2024-02-06 DIAGNOSIS — M81.0 AGE-RELATED OSTEOPOROSIS WITHOUT CURRENT PATHOLOGICAL FRACTURE: Primary | ICD-10-CM

## 2024-02-06 PROCEDURE — 99207 PR NO CHARGE NURSE ONLY: CPT

## 2024-02-06 PROCEDURE — 96372 THER/PROPH/DIAG INJ SC/IM: CPT | Performed by: INTERNAL MEDICINE

## 2024-02-06 NOTE — PATIENT INSTRUCTIONS
You received your Prolia injection today  Your next Injection is due in 6 months (around 8/6/24)  If you plan on having any dental work done within the next 6 months, please let your dentist know that you are on this medication.  We will call in advance to have your next injection scheduled.   Make sure you do not have any dental work completed involving the jaw bone within 1 month prior to your scheduled injection.

## 2024-02-06 NOTE — PROGRESS NOTES
Clinic Administered Medication Documentation      Prolia Documentation    Indication: Prolia  (denosumab) is a prescription medicine used to treat osteoporosis in patients who:   Are at high risk for fracture, meaning patients who have had a fracture related to osteoporosis, or who have multiple risk factors for fracture.  Cannot use another osteoporosis medicine or other osteoporosis medicines did not work well.  The timeline for early/late injections would be 4 weeks early and any time after the 6 month akt. If a patient receives their injection late, then the subsequent injection would be 6 months from the date that they actually received the injection.    When was the last injection?  None  Was the last injection at least 6 months ago? Yes  Has the prior authorization been completed?  Yes  Is there an active order (written within the past 365 days, with administrations remaining, not ) in the chart?  Yes  Patient denies any dental work involving the bone (e.g. tooth extraction or dental implants) in the past 4 weeks?  Yes  Patient denies plans for any dental work involving the bone (e.g. tooth extraction or dental implants) in the next 4 weeks? Yes    The following steps were completed to comply with the REMS program for Prolia:  Reviewed information in the Medication Guide and Patient Counseling Chart, including the serious risks of Prolia  and the symptoms of each risk.  Advised patient to seek prompt medical attention if they have signs or symptoms of any of the serious risks.  Provided each patient a copy of the Medication Guide and Patient Brochure.    Prior to injection, verified patient identity using patient's name and date of birth. Medication was administered. Please see MAR and medication order for additional information. Patient instructed to remain in clinic for 15 minutes and report any adverse reaction to staff immediately.    Vial/Syringe: Syringe  Was this medication supplied by the  patient? No  Verified that the patient has refills remaining in their prescription.    Shellie Bello RN  [facility]

## 2024-02-08 ENCOUNTER — ANTICOAGULATION THERAPY VISIT (OUTPATIENT)
Dept: ANTICOAGULATION | Facility: CLINIC | Age: 89
End: 2024-02-08
Payer: COMMERCIAL

## 2024-02-08 DIAGNOSIS — Z79.01 LONG TERM CURRENT USE OF ANTICOAGULANT THERAPY: Primary | ICD-10-CM

## 2024-02-08 DIAGNOSIS — I48.19 PERSISTENT ATRIAL FIBRILLATION (H): ICD-10-CM

## 2024-02-08 DIAGNOSIS — Z79.01 CHRONIC ANTICOAGULATION: ICD-10-CM

## 2024-02-08 LAB — INR HOME MONITORING: 1.9 RATIO (ref 2–3)

## 2024-02-08 NOTE — PROGRESS NOTES
ANTICOAGULATION MANAGEMENT     Mara Colindres 89 year old female is on warfarin with subtherapeutic INR result. (Goal INR 2.0-3.0)    Recent labs: (last 7 days)     02/08/24  1618   INR 1.9*       ASSESSMENT     Source(s): Chart Review and Patient/Caregiver Call     Warfarin doses taken: Warfarin taken as instructed confirmed that patient took 4 mg dose on 2/8/as instructed.  Diet: No new diet changes identified  Medication/supplement changes: None noted  New illness, injury, or hospitalization: No  Signs or symptoms of bleeding or clotting: No  Previous result: Subtherapeutic  Additional findings: None       PLAN     Recommended plan for temporary change(s) affecting INR     Dosing Instructions: Increase your warfarin dose (9.5% change from previous plan but only)4.8 % from total taken this past week) with next INR in 1 week       Summary  As of 2/8/2024      Full warfarin instructions:  4 mg every Mon, Thu; 3 mg all other days   Next INR check:  2/15/2024               Telephone call with patient's son Hong who verbalizes understanding and agrees to plan and who agrees to plan and repeated back plan correctly    Patient to recheck with home meter    Education provided:   Contact [phone]  with any changes, questions or concerns.     Plan made per [facility] anticoagulation protocol    Coral Carlisle RN  Anticoagulation Clinic  2/8/2024    _______________________________________________________________________     Anticoagulation Episode Summary       Current INR goal:  2.0-3.0   TTR:  60.1% (1 y)   Target end date:  Indefinite   Send INR reminders to:  [facility] HOME MONITOR[facility]    Indications    Long-term (current) use of anticoagulants [Z79.01] [Z79.01]  Persistent atrial fibrillation (H) [I48.19]  Chronic anticoagulation [Z79.01]             Comments:  mdINR home meter. Manage by exception.             Anticoagulation Care Providers       Provider Role Specialty Phone number    Steven Abrams MD Referring  Internal Medicine [phone]

## 2024-02-14 ENCOUNTER — PATIENT OUTREACH (OUTPATIENT)
Dept: CARE COORDINATION | Facility: CLINIC | Age: 89
End: 2024-02-14
Payer: COMMERCIAL

## 2024-02-15 ENCOUNTER — ANTICOAGULATION THERAPY VISIT (OUTPATIENT)
Dept: ANTICOAGULATION | Facility: CLINIC | Age: 89
End: 2024-02-15
Payer: COMMERCIAL

## 2024-02-15 DIAGNOSIS — Z79.01 CHRONIC ANTICOAGULATION: ICD-10-CM

## 2024-02-15 DIAGNOSIS — I48.19 PERSISTENT ATRIAL FIBRILLATION (H): ICD-10-CM

## 2024-02-15 DIAGNOSIS — Z79.01 LONG TERM CURRENT USE OF ANTICOAGULANT THERAPY: Primary | ICD-10-CM

## 2024-02-15 LAB — INR HOME MONITORING: 1.9 RATIO (ref 2–3)

## 2024-02-15 NOTE — PROGRESS NOTES
ANTICOAGULATION MANAGEMENT     Mara Colindres 89 year old female is on warfarin with subtherapeutic INR result. (Goal INR 2.0-3.0)    Recent labs: (last 7 days)     02/15/24  1714   INR 1.9*       ASSESSMENT     Source(s): Chart Review and Patient/Caregiver Call     Warfarin doses taken: Warfarin taken as instructed  Diet: Protein supplement/shake 3 x times a week for about a month which maybe affecting INR  Medication/supplement changes: None noted  New illness, injury, or hospitalization: No  Signs or symptoms of bleeding or clotting: No  Previous result: Subtherapeutic  Additional findings:  per patient she lost about 15 pounds since she started jardiance and ozempic  about a month ago.       PLAN     Recommended plan for ongoing change(s) affecting INR     Dosing Instructions: Increase your warfarin dose (8.7% change) with next INR in 1 week       Summary  As of 2/15/2024      Full warfarin instructions:  3 mg every Mon, Wed, Fri; 4 mg all other days   Next INR check:  2/22/2024               Telephone call with Mara and dosing to son Hong who verbalizes understanding and agrees to plan and who agrees to plan and repeated back plan correctly    Patient to recheck with home meter    Education provided:   Dietary considerations: Impact of protein intake on INR   Contact [phone]  with any changes, questions or concerns.     Plan made per [facility] anticoagulation protocol    Coral Carlisle RN  Anticoagulation Clinic  2/15/2024    _______________________________________________________________________     Anticoagulation Episode Summary       Current INR goal:  2.0-3.0   TTR:  59.3% (1 y)   Target end date:  Indefinite   Send INR reminders to:  [facility]AG HOME MONITORING    Indications    Long-term (current) use of anticoagulants [Z79.01] [Z79.01]  Persistent atrial fibrillation (H) [I48.19]  Chronic anticoagulation [Z79.01]             Comments:  mdINR home meter. Manage by exception.              Anticoagulation Care Providers       Provider Role Specialty Phone number    Steven Abrams MD Referring Internal Medicine [phone]

## 2024-02-21 ENCOUNTER — VIRTUAL VISIT (OUTPATIENT)
Dept: PHARMACY | Facility: CLINIC | Age: 89
End: 2024-02-21
Payer: COMMERCIAL

## 2024-02-21 DIAGNOSIS — F33.0 MAJOR DEPRESSIVE DISORDER, RECURRENT EPISODE, MILD (H): ICD-10-CM

## 2024-02-21 DIAGNOSIS — N18.30 CONTROLLED TYPE 2 DIABETES MELLITUS WITH STAGE 3 CHRONIC KIDNEY DISEASE, WITHOUT LONG-TERM CURRENT USE OF INSULIN (H): Primary | ICD-10-CM

## 2024-02-21 DIAGNOSIS — R06.02 SOB (SHORTNESS OF BREATH): ICD-10-CM

## 2024-02-21 DIAGNOSIS — I10 HYPERTENSION GOAL BP (BLOOD PRESSURE) < 140/90: ICD-10-CM

## 2024-02-21 DIAGNOSIS — M15.0 PRIMARY OSTEOARTHRITIS INVOLVING MULTIPLE JOINTS: ICD-10-CM

## 2024-02-21 DIAGNOSIS — E11.22 CONTROLLED TYPE 2 DIABETES MELLITUS WITH STAGE 3 CHRONIC KIDNEY DISEASE, WITHOUT LONG-TERM CURRENT USE OF INSULIN (H): Primary | ICD-10-CM

## 2024-02-21 DIAGNOSIS — E55.9 HYPOVITAMINOSIS D: ICD-10-CM

## 2024-02-21 DIAGNOSIS — R19.7 DIARRHEA: ICD-10-CM

## 2024-02-21 DIAGNOSIS — I48.19 PERSISTENT ATRIAL FIBRILLATION (H): ICD-10-CM

## 2024-02-21 DIAGNOSIS — I50.32 CHRONIC DIASTOLIC CONGESTIVE HEART FAILURE (H): ICD-10-CM

## 2024-02-21 DIAGNOSIS — N18.4 CKD (CHRONIC KIDNEY DISEASE) STAGE 4, GFR 15-29 ML/MIN (H): ICD-10-CM

## 2024-02-21 DIAGNOSIS — M81.0 AGE-RELATED OSTEOPOROSIS WITHOUT CURRENT PATHOLOGICAL FRACTURE: ICD-10-CM

## 2024-02-21 DIAGNOSIS — Z79.01 LONG TERM CURRENT USE OF ANTICOAGULANT THERAPY: ICD-10-CM

## 2024-02-21 DIAGNOSIS — Z78.9 TAKES DIETARY SUPPLEMENTS: ICD-10-CM

## 2024-02-21 DIAGNOSIS — G89.29 OTHER CHRONIC PAIN: ICD-10-CM

## 2024-02-21 PROCEDURE — 99207 PR NO CHARGE LOS: CPT | Mod: 93 | Performed by: PHARMACIST

## 2024-02-21 RX ORDER — POTASSIUM CHLORIDE 1500 MG/1
20 TABLET, EXTENDED RELEASE ORAL
Qty: 90 TABLET | Refills: 1 | Status: SHIPPED | OUTPATIENT
Start: 2024-02-21 | End: 2024-08-26

## 2024-02-21 RX ORDER — CHOLECALCIFEROL (VITAMIN D3) 50 MCG
1 TABLET ORAL DAILY
COMMUNITY

## 2024-02-21 NOTE — PROGRESS NOTES
Medication Therapy Management (MTM) Encounter    ASSESSMENT:                            Medication Adherence/Access: No issues identified    Type 2 Diabetes:  A1C is at goal < 8%.     Hypertension/HFpEF/Atrial Fibrillation/History of DVT/CKD Stage 4: Stable. Blood pressure at goal < 140/90.    Osteoporosis: Patient is not meeting RDI of calcium 1200mg/day. Patient is meeting RDI of Vitamin D 1000 IU/day.    GERD: May benefit from continuing omeprazole due to lower Hg.     Depression: patient prefers to maintain paroxetine. Sertraline may be less drying the paroxetine and can consider in future if desired.     Pain/osteoarthritis: Stable.     Supplements: Resveritrol can contribute to diarrhea, probiotic may not be helping this either.    Diarrhea: as above.    PLAN:                            Change calcium to a full tablet (600 mg) in the morning and a full tablet (600 mg) at night.  Stop Resveritrol, if that doesn't improve the diarrhea then stop the probiotic too.    Follow-up: Return in about 6 months (around 8/21/2024) for Medication Therapy Management.    SUBJECTIVE/OBJECTIVE:                          Mara Colindres is a 89 year old female called for a follow-up visit from 1/8/24. Patient was accompanied by sonRick.      Reason for visit: med review.    Allergies/ADRs: Reviewed in chart  Past Medical History: Reviewed in chart  Tobacco: She reports that she has never smoked. She has never used smokeless tobacco.  Alcohol: none  Caffeine: 1 cup coffee, 1 pespi on occassion    Medication Adherence/Access:   Patient uses pill box(es)-her son helps her set this up.  Patient takes medications 4 time(s) per day.   Per patient, misses medication 0 times per week.   The patient fills medications at [place]: NO, fills medications at [facility].  Son Rick helps set up pill boxes 7 weeks at a time    Diabetes /Type 2:  Ozempic 0.5 mg weekly x 1 dose (replaced Trulicity)  Jardiance 10 mg daily     Her  blood sugar is going down and she has lost 20 lb, no change in appetite.  Patient is not experiencing side effects.  History of diarrhea with metformin.   Blood sugar monitorin time(s) daily  Ranges (patient reported): 131, 155, 131, 177, 147, 135, 138   Symptoms of low blood sugar? none  Symptoms of high blood sugar? none  Not taking aspirin due to warfarin.     Eye exam is up to date  Foot exa  m: due  Urine Albumin:   Lab Results   Component Value Date    UMALCR 143.87 (H) 2024      Lab Results   Component Value Date    A1C 7.6 (H) 2024     Wt Readings from Last 5 Encounters:   23 218 lb 11.2 oz (99.2 kg)   23 195 lb (88.5 kg)   21 239 lb (108.4 kg)   10/22/20 252 lb (114.3 kg)   20 252 lb (114.3 kg)     Hypertension   /HFpEF/Atrial Fibrillation/History of DVT/CKD Stage 4/Lymphedema:   Furosemide 40 mg every morning and 40 mg at lunch.   Metoprolol tartrate 50 mg twice daily   Digoxin 62.5 mcg every other day  Potassium 20 mEq every evening  Warfarin per INR clinic    She has devices that help with the leg swelling, they massage the legs. She completed three iron infusions.  She weighs herself and reports stable within 1-2 lbs up or down.  Patient reports no current medication side effects-does get dizzy if changes positions too quickly.  Patient self monitors blood pressure, did not review today.      BP Readings from Last 3 Encounters:   23 119/81   23 134/72   23 132/87     Pulse Readings from Last 3 Encounters:   23 68   23 88   23 90     Osteoporosis:  calcium 300/Vitamin D 10 mcg twice daily (serving size says 2 tablets for 1200 mg)  Vitamin D 2000 international unit(s) daily   Prolia every 6 months - she had her first dose a couple weeks ago    She was tired, wiped out the day after the dose of Prolia, and then felt better afterwards. Has been on Prolia in the past as well.  Patient is not experiencing side effects.  DEXA History:  9/11/20, -3.8  Patient is getting the following sources of dietary calcium/week, doesn't drink a lot of milk, a daily protein drink 2-3x/week.  Risk factors: post-menopausal, Height loss of 2.5 inches, Parent history of osteoporosis, follow up osteoporosis  Last vitamin D level:  Vitamin D Deficiency Screening Results:  Lab Results   Component Value Date    VITDT 37 06/03/2022    VITDT 57 09/13/2021    VITDT 33 08/11/2020    VITDT 44 04/29/2019    VITDT 34 11/07/2017     GERD:   Prilosec (omeprazole) 20 once daily    Patient reports no current symptoms.   The patient does not have a history of GI bleed, although concern for possibly slow stomach bleed for lower hemoglobin.  Patient has not tried a trial off of therapy.  Patient feels that current regimen is effective.    Depression:    Paroxetine 10 mg every other day (helps her stop crying all the time)    She has dry mouth, dry eyes.  Paroxetine has really helped her, so she's not interested in changing this at all. She does try to wean off of this every now and then and crying resumes.    Pain/osteoarthritis:    Norco 5-325 mg daily as needed for arm/shoulder pain  Acetaminophen 1000 mg as needed (a few times a week)    She takes the Norco only when she really needs it. States this/these are effective. Denies side effects.     Supplements:   Probiotic daily   Resveritrol daily -she started this on her own for heart health    Patient is experiencing diarrhea.    Diarrhea:    Has had long standing diarrhea, she'll take Lomotil when she's out for a hockey game or something, but otherwise doesn't regularly use. States this/these are effective. Denies side effects.     Today's Vitals: There were no vitals taken for this visit.  ----------------      I spent 30 minutes with this patient today. All changes were made via collaborative practice agreement with Steven Abrams MD. A copy of the visit note was provided to the patient's provider(s).    A summary of these  recommendations was sent via [employer].    Arlene Ham, PharmD  Medication Therapy Management Pharmacist  [phone]    Telemedicine Visit Details  Type of service:  Telephone visit  Start Time: 1:01 PM  End Time: 1:31 PM     Medication Therapy Recommendations  No medication therapy recommendations to display

## 2024-02-21 NOTE — Clinical Note
JORDEN MORALES note, thanks!  Arlene Ham, PharmD Medication Therapy Management Pharmacist [phone]

## 2024-02-21 NOTE — PATIENT INSTRUCTIONS
"Recommendations from today's MTM visit:                                                         Change calcium to a full tablet (600 mg) in the morning and a full tablet (600 mg) at night.  Stop Resveritrol, if that doesn't improve the diarrhea then stop the probiotic too.    Follow-up: Return in about 6 months (around 8/21/2024) for Medication Therapy Management.    It was great speaking with you today.  I value your experience and would be very thankful for your time in providing feedback in our clinic survey. In the next few days, you may receive an email or text message from [employer] with a link to a survey related to your  clinical pharmacist.\"     To schedule another MTM appointment, please call the clinic directly or you may call the MTM scheduling line at [phone] or toll-free at [phone].     My Clinical Pharmacist's contact information:                                                      Please feel free to contact me with any questions or concerns you have.      Arlene Ham, PharmD  Medication Therapy Management Pharmacist  [phone]     "

## 2024-02-22 ENCOUNTER — ANTICOAGULATION THERAPY VISIT (OUTPATIENT)
Dept: ANTICOAGULATION | Facility: CLINIC | Age: 89
End: 2024-02-22
Payer: COMMERCIAL

## 2024-02-22 DIAGNOSIS — I48.19 PERSISTENT ATRIAL FIBRILLATION (H): ICD-10-CM

## 2024-02-22 DIAGNOSIS — Z79.01 LONG TERM CURRENT USE OF ANTICOAGULANT THERAPY: Primary | ICD-10-CM

## 2024-02-22 DIAGNOSIS — Z79.01 CHRONIC ANTICOAGULATION: ICD-10-CM

## 2024-02-22 LAB — INR HOME MONITORING: 2.8 RATIO (ref 2–3)

## 2024-02-22 NOTE — PROGRESS NOTES
ANTICOAGULATION MANAGEMENT     Mara Colindres 89 year old female is on warfarin with therapeutic INR result. (Goal INR 2.0-3.0)    Recent labs: (last 7 days)     02/22/24  1719   INR 2.8       ASSESSMENT     Source(s): Chart Review and Patient/Caregiver Call     Warfarin doses taken: Warfarin taken as instructed  Diet: No new diet changes identified  Medication/supplement changes: Ozempic dose was increased, this can increase warfarin exposure  New illness, injury, or hospitalization: No  Signs or symptoms of bleeding or clotting: No  Previous result: Subtherapeutic  Additional findings: None       PLAN     Recommended plan for ongoing change(s) affecting INR     Dosing Instructions: Continue your current warfarin dose with next INR in 1 week       Summary  As of 2/22/2024      Full warfarin instructions:  3 mg every Mon, Wed, Fri; 4 mg all other days   Next INR check:  2/29/2024               Telephone call with Mara and Hong who agrees to plan and repeated back plan correctly    Patient to recheck with home meter    Education provided:   Please call back if any changes to your diet, medications or how you've been taking warfarin  Symptom monitoring: monitoring for bleeding signs and symptoms, monitoring for clotting signs and symptoms, and monitoring for stroke signs and symptoms  Resume manage by exception with home monitor. Continue to submit INR results to home monitor company.You will only be called when your result is out of range. Please call and notify [facility] if new medication started, dose missed, signs or symptoms of bleeding or clotting, or a surgery/procedure is scheduled.  Contact [phone]  with any changes, questions or concerns.     Plan made per [facility] anticoagulation protocol    Coral Oliver RN  Anticoagulation Clinic  2/22/2024    _______________________________________________________________________     Anticoagulation Episode Summary       Current INR goal:  2.0-3.0   TTR:  59.1% (1 y)    Target end date:  Indefinite   Send INR reminders to:  [facility]    Indications    Long-term (current) use of anticoagulants [Z79.01] [Z79.01]  Persistent atrial fibrillation (H) [I48.19]  Chronic anticoagulation [Z79.01]             Comments:  [employer] home meter. Manage by exception.             Anticoagulation Care Providers       Provider Role Specialty Phone number    Steven Abrams MD Referring Internal Medicine [phone]

## 2024-03-01 ENCOUNTER — DOCUMENTATION ONLY (OUTPATIENT)
Dept: ANTICOAGULATION | Facility: CLINIC | Age: 89
End: 2024-03-01
Payer: COMMERCIAL

## 2024-03-01 DIAGNOSIS — Z79.01 LONG TERM CURRENT USE OF ANTICOAGULANT THERAPY: Primary | ICD-10-CM

## 2024-03-01 DIAGNOSIS — I48.19 PERSISTENT ATRIAL FIBRILLATION (H): ICD-10-CM

## 2024-03-01 DIAGNOSIS — Z79.01 CHRONIC ANTICOAGULATION: ICD-10-CM

## 2024-03-01 LAB — INR HOME MONITORING: 3 RATIO (ref 2–3)

## 2024-03-01 NOTE — PROGRESS NOTES
ANTICOAGULATION  MANAGEMENT-Home Monitor Managed by Exception    Mararoel Colindres 89 year old female is on warfarin with therapeutic INR result. (Goal INR 2.0-3.0)    Recent labs: (last 7 days)     03/01/24  1701   INR 3       Previous INR was Therapeutic  Medication, diet, health changes since last INR:chart reviewed; none identified  Contacted within the last 12 weeks by phone on 2/22/24  Last [facility] referral date: 08/11/2023      ISABEL Terry was NOT contacted regarding therapeutic result today per home monitoring policy manage by exception agreement.   Current warfarin dose is to be continued:     Summary  As of 3/1/2024      Full warfarin instructions:  3 mg every Mon, Wed, Fri; 4 mg all other days   Next INR check:  3/8/2024             ?   Swetha Alcocer RN  Anticoagulation Clinic  3/1/2024    _______________________________________________________________________     Anticoagulation Episode Summary       Current INR goal:  2.0-3.0   TTR:  59.1% (1 y)   Target end date:  Indefinite   Send INR reminders to:  [facility]    Indications    Long-term (current) use of anticoagulants [Z79.01] [Z79.01]  Persistent atrial fibrillation (H) [I48.19]  Chronic anticoagulation [Z79.01]             Comments:  [employer] home meter. Manage by exception.             Anticoagulation Care Providers       Provider Role Specialty Phone number    Steven Abrams MD Referring Internal Medicine [phone]

## 2024-03-04 ENCOUNTER — TELEPHONE (OUTPATIENT)
Dept: FAMILY MEDICINE | Facility: CLINIC | Age: 89
End: 2024-03-04
Payer: COMMERCIAL

## 2024-03-04 DIAGNOSIS — N18.30 CONTROLLED TYPE 2 DIABETES MELLITUS WITH STAGE 3 CHRONIC KIDNEY DISEASE, WITHOUT LONG-TERM CURRENT USE OF INSULIN (H): ICD-10-CM

## 2024-03-04 DIAGNOSIS — E11.22 CONTROLLED TYPE 2 DIABETES MELLITUS WITH STAGE 3 CHRONIC KIDNEY DISEASE, WITHOUT LONG-TERM CURRENT USE OF INSULIN (H): ICD-10-CM

## 2024-03-04 NOTE — TELEPHONE ENCOUNTER
Pt calling    For PCP  Potassium- questions about dose.  She is currently taking Lasix 40 mg BID should she be taking 20MeQ potassium BID as well??  She thought she was told this by MTM but I do not see notes regarding change or new rx sent with dose adjustment      For RN  Ozempic - she should have refills at her pharmacy.  Pt states pharmacy had further question.  She is overdue by a week- can nurse call the pharmacy to check on this and call her back??        Ilene, RN    Triage Nurse  [facility]

## 2024-03-04 NOTE — TELEPHONE ENCOUNTER
Pt notified of Provider's message. Pt was notified that prescription for Ozempic would need to go to an alternate pharmacy. Script sent to alternate pharmacy.    Usha Zhong RN  [facility]

## 2024-03-04 NOTE — TELEPHONE ENCOUNTER
Attempted call to [facility] in [place] pharmacy and they are temporarily closed. Pt will need to select a different pharmacy she would like this prescription to go to.     Please notify pt once PCP clarifies if pt should be taking lasix and potassium.    Usha Zhong RN  [facility]

## 2024-03-07 ENCOUNTER — DOCUMENTATION ONLY (OUTPATIENT)
Dept: ANTICOAGULATION | Facility: CLINIC | Age: 89
End: 2024-03-07
Payer: COMMERCIAL

## 2024-03-07 DIAGNOSIS — I48.19 PERSISTENT ATRIAL FIBRILLATION (H): ICD-10-CM

## 2024-03-07 DIAGNOSIS — Z79.01 CHRONIC ANTICOAGULATION: ICD-10-CM

## 2024-03-07 DIAGNOSIS — Z79.01 LONG TERM CURRENT USE OF ANTICOAGULANT THERAPY: Primary | ICD-10-CM

## 2024-03-07 LAB — INR HOME MONITORING: 2.9 RATIO (ref 2–3)

## 2024-03-07 NOTE — PROGRESS NOTES
ANTICOAGULATION  MANAGEMENT-Home Monitor Managed by Exception    Mara MELVIN Colindres 89 year old female is on warfarin with therapeutic INR result. (Goal INR 2.0-3.0)    Recent labs: (last 7 days)     03/07/24  1537   INR 2.9       Previous INR was Therapeutic  Medication, diet, health changes since last INR:chart reviewed; none identified  Contacted within the last 12 weeks by phone on 2/22/24  Last [facility] referral date: 08/11/2023      ISABEL     Mara was NOT contacted regarding therapeutic result today per home monitoring policy manage by exception agreement.   Current warfarin dose is to be continued:     Summary  As of 3/7/2024      Full warfarin instructions:  3 mg every Mon, Wed, Fri; 4 mg all other days   Next INR check:  3/14/2024             ?   Coral Carlisle RN  Anticoagulation Clinic  3/7/2024    _______________________________________________________________________     Anticoagulation Episode Summary       Current INR goal:  2.0-3.0   TTR:  59.1% (1 y)   Target end date:  Indefinite   Send INR reminders to:  [facility]    Indications    Long-term (current) use of anticoagulants [Z79.01] [Z79.01]  Persistent atrial fibrillation (H) [I48.19]  Chronic anticoagulation [Z79.01]             Comments:  [employer] home meter. Manage by exception.             Anticoagulation Care Providers       Provider Role Specialty Phone number    Steven Abrams MD Referring Internal Medicine [phone]

## 2024-03-14 ENCOUNTER — DOCUMENTATION ONLY (OUTPATIENT)
Dept: ANTICOAGULATION | Facility: CLINIC | Age: 89
End: 2024-03-14
Payer: COMMERCIAL

## 2024-03-14 DIAGNOSIS — I48.19 PERSISTENT ATRIAL FIBRILLATION (H): ICD-10-CM

## 2024-03-14 DIAGNOSIS — Z79.01 CHRONIC ANTICOAGULATION: ICD-10-CM

## 2024-03-14 DIAGNOSIS — Z79.01 LONG TERM CURRENT USE OF ANTICOAGULANT THERAPY: Primary | ICD-10-CM

## 2024-03-14 LAB — INR HOME MONITORING: 2.3 RATIO (ref 2–3)

## 2024-03-14 NOTE — PROGRESS NOTES
ANTICOAGULATION  MANAGEMENT-Home Monitor Managed by Exception    Mara MELVIN Colindres 89 year old female is on warfarin with therapeutic INR result. (Goal INR 2.0-3.0)    Recent labs: (last 7 days)     03/14/24  1120   INR 2.3       Previous INR was Therapeutic  Medication, diet, health changes since last INR:chart reviewed; none identified  Contacted within the last 12 weeks by phone on 2/22/24  Last [facility] referral date: 08/11/2023      ISABEL     Mara was NOT contacted regarding therapeutic result today per home monitoring policy manage by exception agreement.   Current warfarin dose is to be continued:     Summary  As of 3/14/2024      Full warfarin instructions:  3 mg every Mon, Wed, Fri; 4 mg all other days   Next INR check:  3/21/2024             ?   Kayla Jackson RN  Anticoagulation Clinic  3/14/2024    _______________________________________________________________________     Anticoagulation Episode Summary       Current INR goal:  2.0-3.0   TTR:  59.1% (1 y)   Target end date:  Indefinite   Send INR reminders to:  [facility]    Indications    Long-term (current) use of anticoagulants [Z79.01] [Z79.01]  Persistent atrial fibrillation (H) [I48.19]  Chronic anticoagulation [Z79.01]             Comments:  [employer] home meter. Manage by exception.             Anticoagulation Care Providers       Provider Role Specialty Phone number    Steven Abrams MD Referring Internal Medicine [phone]

## 2024-03-15 DIAGNOSIS — N18.30 CONTROLLED TYPE 2 DIABETES MELLITUS WITH STAGE 3 CHRONIC KIDNEY DISEASE, WITHOUT LONG-TERM CURRENT USE OF INSULIN (H): ICD-10-CM

## 2024-03-15 DIAGNOSIS — I50.32 CHRONIC DIASTOLIC CONGESTIVE HEART FAILURE (H): ICD-10-CM

## 2024-03-15 DIAGNOSIS — F33.0 MAJOR DEPRESSIVE DISORDER, RECURRENT EPISODE, MILD (H): ICD-10-CM

## 2024-03-15 DIAGNOSIS — E11.22 CONTROLLED TYPE 2 DIABETES MELLITUS WITH STAGE 3 CHRONIC KIDNEY DISEASE, WITHOUT LONG-TERM CURRENT USE OF INSULIN (H): ICD-10-CM

## 2024-03-15 DIAGNOSIS — N18.4 CKD (CHRONIC KIDNEY DISEASE) STAGE 4, GFR 15-29 ML/MIN (H): ICD-10-CM

## 2024-03-15 RX ORDER — FUROSEMIDE 20 MG
TABLET ORAL
Qty: 385 TABLET | Refills: 0 | Status: SHIPPED | OUTPATIENT
Start: 2024-03-15 | End: 2024-04-22

## 2024-03-15 RX ORDER — PAROXETINE 20 MG/1
10 TABLET, FILM COATED ORAL
Qty: 45 TABLET | Refills: 0 | Status: SHIPPED | OUTPATIENT
Start: 2024-03-16 | End: 2024-04-26

## 2024-03-15 RX ORDER — DIGOXIN 0.06 MG/1
62.5 TABLET ORAL EVERY OTHER DAY
Qty: 45 TABLET | Refills: 0 | Status: SHIPPED | OUTPATIENT
Start: 2024-03-15 | End: 2024-04-25

## 2024-03-21 ENCOUNTER — DOCUMENTATION ONLY (OUTPATIENT)
Dept: ANTICOAGULATION | Facility: CLINIC | Age: 89
End: 2024-03-21
Payer: COMMERCIAL

## 2024-03-21 DIAGNOSIS — I48.19 PERSISTENT ATRIAL FIBRILLATION (H): ICD-10-CM

## 2024-03-21 DIAGNOSIS — Z79.01 LONG TERM CURRENT USE OF ANTICOAGULANT THERAPY: Primary | ICD-10-CM

## 2024-03-21 DIAGNOSIS — Z79.01 CHRONIC ANTICOAGULATION: ICD-10-CM

## 2024-03-21 LAB — INR HOME MONITORING: 2.6 RATIO (ref 2–3)

## 2024-03-21 NOTE — PROGRESS NOTES
ANTICOAGULATION  MANAGEMENT-Home Monitor Managed by Exception    Mara CHARLES Jens 89 year old female is on warfarin with therapeutic INR result. (Goal INR 2.0-3.0)    Recent labs: (last 7 days)     03/21/24  1723   INR 2.6       Previous INR was Therapeutic  Medication, diet, health changes since last INR:chart reviewed; none identified  Contacted within the last 12 weeks by phone on 2/22/24  Last [facility] referral date: 08/11/2023      ISABEL     Mara was NOT contacted regarding therapeutic result today per home monitoring policy manage by exception agreement.   Current warfarin dose is to be continued:     Summary  As of 3/21/2024      Full warfarin instructions:  3 mg every Mon, Wed, Fri; 4 mg all other days   Next INR check:  3/28/2024             ?   Coral Carlisle RN  Anticoagulation Clinic  3/21/2024    _______________________________________________________________________     Anticoagulation Episode Summary       Current INR goal:  2.0-3.0   TTR:  59.9% (1 y)   Target end date:  Indefinite   Send INR reminders to:  [facility]    Indications    Long-term (current) use of anticoagulants [Z79.01] [Z79.01]  Persistent atrial fibrillation (H) [I48.19]  Chronic anticoagulation [Z79.01]             Comments:  [employer] home meter. Manage by exception.             Anticoagulation Care Providers       Provider Role Specialty Phone number    Steven Abrams MD Referring Internal Medicine [phone]

## 2024-03-28 ENCOUNTER — DOCUMENTATION ONLY (OUTPATIENT)
Dept: ANTICOAGULATION | Facility: CLINIC | Age: 89
End: 2024-03-28
Payer: COMMERCIAL

## 2024-03-28 DIAGNOSIS — I48.19 PERSISTENT ATRIAL FIBRILLATION (H): ICD-10-CM

## 2024-03-28 DIAGNOSIS — Z79.01 CHRONIC ANTICOAGULATION: ICD-10-CM

## 2024-03-28 DIAGNOSIS — Z79.01 LONG TERM CURRENT USE OF ANTICOAGULANT THERAPY: Primary | ICD-10-CM

## 2024-03-28 LAB — INR HOME MONITORING: 2.8 RATIO (ref 2–3)

## 2024-03-28 NOTE — PROGRESS NOTES
ANTICOAGULATION  MANAGEMENT-Home Monitor Managed by Exception    Mara MELVIN Colindres 89 year old female is on warfarin with therapeutic INR result. (Goal INR 2.0-3.0)    Recent labs: (last 7 days)     03/28/24  1741   INR 2.8       Previous INR was Therapeutic  Medication, diet, health changes since last INR:chart reviewed; none identified  Contacted within the last 12 weeks by phone on 2/22/24  Last [facility] referral date: 08/11/2023      ISABEL     Mara was NOT contacted regarding therapeutic result today per home monitoring policy manage by exception agreement.   Current warfarin dose is to be continued:     Summary  As of 3/28/2024      Full warfarin instructions:  3 mg every Mon, Wed, Fri; 4 mg all other days   Next INR check:  4/4/2024             ?   Coral Carlisle RN  Anticoagulation Clinic  3/28/2024    _______________________________________________________________________     Anticoagulation Episode Summary       Current INR goal:  2.0-3.0   TTR:  61.8% (1 y)   Target end date:  Indefinite   Send INR reminders to:  [facility]    Indications    Long-term (current) use of anticoagulants [Z79.01] [Z79.01]  Persistent atrial fibrillation (H) [I48.19]  Chronic anticoagulation [Z79.01]             Comments:  [employer] home meter. Manage by exception.             Anticoagulation Care Providers       Provider Role Specialty Phone number    Steven Abrams MD Referring Internal Medicine [phone]

## 2024-04-04 ENCOUNTER — DOCUMENTATION ONLY (OUTPATIENT)
Dept: ANTICOAGULATION | Facility: CLINIC | Age: 89
End: 2024-04-04
Payer: COMMERCIAL

## 2024-04-04 DIAGNOSIS — I27.20 PULMONARY HTN (H): ICD-10-CM

## 2024-04-04 DIAGNOSIS — Z79.01 CHRONIC ANTICOAGULATION: ICD-10-CM

## 2024-04-04 DIAGNOSIS — I50.32 CHRONIC DIASTOLIC CONGESTIVE HEART FAILURE (H): ICD-10-CM

## 2024-04-04 DIAGNOSIS — I48.19 PERSISTENT ATRIAL FIBRILLATION (H): ICD-10-CM

## 2024-04-04 DIAGNOSIS — Z79.01 LONG TERM CURRENT USE OF ANTICOAGULANT THERAPY: Primary | ICD-10-CM

## 2024-04-04 LAB — INR HOME MONITORING: 2.3 RATIO (ref 2–3)

## 2024-04-04 RX ORDER — LOSARTAN POTASSIUM 25 MG/1
TABLET ORAL
OUTPATIENT
Start: 2024-04-04

## 2024-04-04 RX ORDER — METOPROLOL TARTRATE 100 MG
TABLET ORAL
Qty: 90 TABLET | Refills: 2 | Status: SHIPPED | OUTPATIENT
Start: 2024-04-04 | End: 2024-04-25

## 2024-04-04 NOTE — PROGRESS NOTES
ANTICOAGULATION  MANAGEMENT-Home Monitor Managed by Exception    Mara CHARLES Jens 89 year old female is on warfarin with therapeutic INR result. (Goal INR 2.0-3.0)    Recent labs: (last 7 days)     04/04/24  1404   INR 2.3       Previous INR was Therapeutic  Medication, diet, health changes since last INR:chart reviewed; none identified  Contacted within the last 12 weeks by phone on 2/22/24  Last [facility] referral date: 08/11/2023      ISABEL     Mara was NOT contacted regarding therapeutic result today per home monitoring policy manage by exception agreement.   Current warfarin dose is to be continued:     Summary  As of 4/4/2024      Full warfarin instructions:  3 mg every Mon, Wed, Fri; 4 mg all other days   Next INR check:  4/11/2024             ?   Lesly Wright RN  Anticoagulation Clinic  4/4/2024    _______________________________________________________________________     Anticoagulation Episode Summary       Current INR goal:  2.0-3.0   TTR:  63.0% (1 y)   Target end date:  Indefinite   Send INR reminders to:  [facility]    Indications    Long-term (current) use of anticoagulants [Z79.01] [Z79.01]  Persistent atrial fibrillation (H) [I48.19]  Chronic anticoagulation [Z79.01]             Comments:  [employer] home meter. Manage by exception.             Anticoagulation Care Providers       Provider Role Specialty Phone number    Steven Abrams MD Referring Internal Medicine [phone]

## 2024-04-11 ENCOUNTER — ANTICOAGULATION THERAPY VISIT (OUTPATIENT)
Dept: ANTICOAGULATION | Facility: CLINIC | Age: 89
End: 2024-04-11
Payer: COMMERCIAL

## 2024-04-11 DIAGNOSIS — Z79.01 LONG TERM CURRENT USE OF ANTICOAGULANT THERAPY: Primary | ICD-10-CM

## 2024-04-11 DIAGNOSIS — I48.19 PERSISTENT ATRIAL FIBRILLATION (H): ICD-10-CM

## 2024-04-11 DIAGNOSIS — Z79.01 CHRONIC ANTICOAGULATION: ICD-10-CM

## 2024-04-11 LAB — INR HOME MONITORING: 2.6 RATIO (ref 2–3)

## 2024-04-11 NOTE — PROGRESS NOTES
ANTICOAGULATION MANAGEMENT     Mara Colindres 89 year old female is on warfarin with therapeutic INR result. (Goal INR 2.0-3.0)    Recent labs: (last 7 days)     04/11/24  1815   INR 2.6       ASSESSMENT     Source(s): Chart Review and Patient/Caregiver Call     Warfarin doses taken: Warfarin taken as instructed  Diet: No new diet changes identified  Medication/supplement changes: None noted   New illness, injury, or hospitalization: No - Per son, patient has lost weight since starting Ozempic and getting her pep back. Patient has been off oxygen (2L) for the last few weeks during the day, but still uses at night.  Signs or symptoms of bleeding or clotting: No  Previous result: Therapeutic last 2(+) visits  Additional findings: None       PLAN     Recommended plan for ongoing change(s) affecting INR     Dosing Instructions: Continue your current warfarin dose with next INR in 1 week       Summary  As of 4/11/2024      Full warfarin instructions:  3 mg every Mon, Wed, Fri; 4 mg all other days   Next INR check:  4/18/2024               Telephone call with son Hong who verbalizes understanding and agrees to plan    Patient to recheck with home meter    Education provided:   Please call back if any changes to your diet, medications or how you've been taking warfarin  Symptom monitoring: monitoring for bleeding signs and symptoms and monitoring for clotting signs and symptoms    Plan made per [facility] anticoagulation protocol    Kathryn Donnelly RN  Anticoagulation Clinic  4/11/2024    _______________________________________________________________________     Anticoagulation Episode Summary       Current INR goal:  2.0-3.0   TTR:  64.6% (1 y)   Target end date:  Indefinite   Send INR reminders to:  [facility]    Indications    Long-term (current) use of anticoagulants [Z79.01] [Z79.01]  Persistent atrial fibrillation (H) [I48.19]  Chronic anticoagulation [Z79.01]             Comments:  [employer] home meter.  Manage by exception.             Anticoagulation Care Providers       Provider Role Specialty Phone number    Steven Abrams MD Referring Internal Medicine [phone]

## 2024-04-19 ENCOUNTER — ANTICOAGULATION THERAPY VISIT (OUTPATIENT)
Dept: ANTICOAGULATION | Facility: CLINIC | Age: 89
End: 2024-04-19
Payer: COMMERCIAL

## 2024-04-19 DIAGNOSIS — Z79.01 LONG TERM CURRENT USE OF ANTICOAGULANT THERAPY: Primary | ICD-10-CM

## 2024-04-19 DIAGNOSIS — I48.19 PERSISTENT ATRIAL FIBRILLATION (H): ICD-10-CM

## 2024-04-19 DIAGNOSIS — N18.4 CKD (CHRONIC KIDNEY DISEASE) STAGE 4, GFR 15-29 ML/MIN (H): ICD-10-CM

## 2024-04-19 DIAGNOSIS — Z79.01 CHRONIC ANTICOAGULATION: ICD-10-CM

## 2024-04-19 LAB — INR HOME MONITORING: 2.5 RATIO (ref 2–3)

## 2024-04-19 NOTE — PROGRESS NOTES
ANTICOAGULATION  MANAGEMENT-Home Monitor Managed by Exception    Mraa CHARLES Jens 89 year old female is on warfarin with therapeutic INR result. (Goal INR 2.0-3.0)    Recent labs: (last 7 days)     04/19/24  1714   INR 2.5       Previous INR was Therapeutic  Medication, diet, health changes since last INR:chart reviewed; none identified  Contacted within the last 12 weeks by phone on 4/11/24  Last [facility] referral date: 08/11/2023      ISABEL     Mara was NOT contacted regarding therapeutic result today per home monitoring policy manage by exception agreement.   Current warfarin dose is to be continued:     Summary  As of 4/19/2024      Full warfarin instructions:  3 mg every Mon, Wed, Fri; 4 mg all other days   Next INR check:  4/26/2024             ?   Chaya Coronado RN  Anticoagulation Clinic  4/19/2024    _______________________________________________________________________     Anticoagulation Episode Summary       Current INR goal:  2.0-3.0   TTR:  66.8% (1 y)   Target end date:  Indefinite   Send INR reminders to:  [facility]    Indications    Chronic anticoagulation [Z79.01]  Long-term (current) use of anticoagulants [Z79.01] [Z79.01]             Comments:  [employer] home meter. Manage by exception.             Anticoagulation Care Providers       Provider Role Specialty Phone number    Steven Abrams MD Referring Internal Medicine [phone]

## 2024-04-22 RX ORDER — FUROSEMIDE 20 MG
TABLET ORAL
Qty: 150 TABLET | Refills: 0 | Status: SHIPPED | OUTPATIENT
Start: 2024-04-22 | End: 2024-04-25

## 2024-04-25 ENCOUNTER — DOCUMENTATION ONLY (OUTPATIENT)
Dept: ANTICOAGULATION | Facility: CLINIC | Age: 89
End: 2024-04-25
Payer: COMMERCIAL

## 2024-04-25 DIAGNOSIS — E11.22 CONTROLLED TYPE 2 DIABETES MELLITUS WITH STAGE 3 CHRONIC KIDNEY DISEASE, WITHOUT LONG-TERM CURRENT USE OF INSULIN (H): ICD-10-CM

## 2024-04-25 DIAGNOSIS — I50.32 CHRONIC DIASTOLIC CONGESTIVE HEART FAILURE (H): ICD-10-CM

## 2024-04-25 DIAGNOSIS — I27.20 PULMONARY HTN (H): ICD-10-CM

## 2024-04-25 DIAGNOSIS — M15.0 PRIMARY OSTEOARTHRITIS INVOLVING MULTIPLE JOINTS: ICD-10-CM

## 2024-04-25 DIAGNOSIS — N18.30 CONTROLLED TYPE 2 DIABETES MELLITUS WITH STAGE 3 CHRONIC KIDNEY DISEASE, WITHOUT LONG-TERM CURRENT USE OF INSULIN (H): ICD-10-CM

## 2024-04-25 DIAGNOSIS — Z79.01 CHRONIC ANTICOAGULATION: Primary | ICD-10-CM

## 2024-04-25 DIAGNOSIS — Z79.01 LONG TERM CURRENT USE OF ANTICOAGULANT THERAPY: ICD-10-CM

## 2024-04-25 DIAGNOSIS — I48.19 PERSISTENT ATRIAL FIBRILLATION (H): ICD-10-CM

## 2024-04-25 DIAGNOSIS — F33.0 MAJOR DEPRESSIVE DISORDER, RECURRENT EPISODE, MILD (H): ICD-10-CM

## 2024-04-25 DIAGNOSIS — N18.4 CKD (CHRONIC KIDNEY DISEASE) STAGE 4, GFR 15-29 ML/MIN (H): ICD-10-CM

## 2024-04-25 LAB — INR HOME MONITORING: 2.3 RATIO (ref 2–3)

## 2024-04-25 NOTE — PROGRESS NOTES
ANTICOAGULATION  MANAGEMENT-Home Monitor Managed by Exception    Mararoel Colindres 89 year old female is on warfarin with therapeutic INR result. (Goal INR 2.0-3.0)    Recent labs: (last 7 days)     04/25/24  1704   INR 2.3       Previous INR was Therapeutic  Medication, diet, health changes since last INR:chart reviewed; none identified  Contacted within the last 12 weeks by phone on 4/11/24  Last [facility] referral date: 08/11/2023      ISABEL Terry was NOT contacted regarding therapeutic result today per home monitoring policy manage by exception agreement.   Current warfarin dose is to be continued:     Summary  As of 4/25/2024      Full warfarin instructions:  3 mg every Mon, Wed, Fri; 4 mg all other days   Next INR check:  5/2/2024             ?   Coral Carlisle RN  Anticoagulation Clinic  4/25/2024    _______________________________________________________________________     Anticoagulation Episode Summary       Current INR goal:  2.0-3.0   TTR:  67.3% (1 y)   Target end date:  Indefinite   Send INR reminders to:  [facility]    Indications    Chronic anticoagulation [Z79.01]  Long-term (current) use of anticoagulants [Z79.01] [Z79.01]             Comments:  [employer] home meter. Manage by exception.             Anticoagulation Care Providers       Provider Role Specialty Phone number    Steven Abrams MD Referring Internal Medicine [phone]

## 2024-04-26 RX ORDER — DIGOXIN 0.06 MG/1
62.5 TABLET ORAL EVERY OTHER DAY
Qty: 45 TABLET | Refills: 0 | Status: SHIPPED | OUTPATIENT
Start: 2024-04-26 | End: 2024-07-08

## 2024-04-26 RX ORDER — HYDROCODONE BITARTRATE AND ACETAMINOPHEN 5; 325 MG/1; MG/1
1 TABLET ORAL DAILY PRN
Qty: 30 TABLET | Refills: 0 | Status: SHIPPED | OUTPATIENT
Start: 2024-04-26 | End: 2024-08-26

## 2024-04-26 RX ORDER — WARFARIN SODIUM 2 MG/1
TABLET ORAL
Qty: 180 TABLET | Refills: 1 | Status: SHIPPED | OUTPATIENT
Start: 2024-04-26 | End: 2024-09-05

## 2024-04-26 RX ORDER — METOPROLOL TARTRATE 100 MG
TABLET ORAL
Qty: 90 TABLET | Refills: 2 | Status: SHIPPED | OUTPATIENT
Start: 2024-04-26 | End: 2024-09-05

## 2024-04-26 RX ORDER — PAROXETINE 20 MG/1
TABLET, FILM COATED ORAL
Qty: 45 TABLET | Refills: 0 | Status: SHIPPED | OUTPATIENT
Start: 2024-04-26 | End: 2024-07-08

## 2024-04-26 RX ORDER — FUROSEMIDE 20 MG
TABLET ORAL
Qty: 150 TABLET | Refills: 0 | Status: SHIPPED | OUTPATIENT
Start: 2024-04-26 | End: 2024-09-05

## 2024-04-30 NOTE — TELEPHONE ENCOUNTER
"Routing refill request to provider for review/approval because:  Labs out of range:    Labs not current:        Requested Prescriptions   Pending Prescriptions Disp Refills     TRULICITY 1.5 MG/0.5ML pen [Pharmacy Med Name: TRULICITY 1.5MG/0.5ML SDP 4X0.5ML]  Last Written Prescription Date:  1/21/19  Last Fill Quantity: 2 mL,  # refills: 1   Last office visit: 11/7/2017 with prescribing provider:     Future Office Visit:     2 mL 0     Sig: INJECT 1.5MG SUBCUTANEOUS EVERY 7 DAYS    GLP-1 Agonists Protocol Failed - 3/18/2019  8:02 AM       Failed - LDL on file in past 12 months    Recent Labs   Lab Test 07/27/17  1448   LDL 66            Failed - Microalbumin on file in past 12 months    Recent Labs   Lab Test 11/07/17  1510   MICROL 45   UMALCR 21.34            Failed - HgbA1C in past 3 or 6 months    If HgbA1C is 8 or greater, it needs to be on file within the past 3 months.  If less than 8, must be on file within the past 6 months.     Recent Labs   Lab Test 07/09/18  1435   A1C 7.3*            Failed - Normal serum creatinine on file in past 12 months    Recent Labs   Lab Test 11/28/18  1039   CR 1.67*            Passed - Blood pressure less than 140/90 in past 6 months    BP Readings from Last 3 Encounters:   11/28/18 98/73   09/13/18 100/64   07/09/18 125/50                Passed - Medication is active on med list       Passed - Patient is age 18 or older       Passed - No active pregnancy on record       Passed - No positive pregnancy test in past 12 months       Passed - Recent (6 mo) or future (30 days) visit within the authorizing provider's specialty    Patient had office visit in the last 6 months or has a visit in the next 30 days with authorizing provider.  See \"Patient Info\" tab in inbasket, or \"Choose Columns\" in Meds & Orders section of the refill encounter.            Celeste Ruiz RN - [facility]      " spotting/abnormal vaginal bleeding

## 2024-05-02 LAB — INR HOME MONITORING: 3 RATIO (ref 2–3)

## 2024-05-03 ENCOUNTER — DOCUMENTATION ONLY (OUTPATIENT)
Dept: ANTICOAGULATION | Facility: CLINIC | Age: 89
End: 2024-05-03
Payer: COMMERCIAL

## 2024-05-03 DIAGNOSIS — Z79.01 CHRONIC ANTICOAGULATION: Primary | ICD-10-CM

## 2024-05-03 DIAGNOSIS — Z79.01 LONG TERM CURRENT USE OF ANTICOAGULANT THERAPY: ICD-10-CM

## 2024-05-03 NOTE — PROGRESS NOTES
ANTICOAGULATION  MANAGEMENT-Home Monitor Managed by Exception    Mararoel Colindres 89 year old female is on warfarin with therapeutic INR result. (Goal INR 2.0-3.0)    Recent labs: (last 7 days)     05/02/24  1317   INR 3       Previous INR was Therapeutic  Medication, diet, health changes since last INR:chart reviewed; none identified  Contacted within the last 12 weeks by phone on 4/11/2024  Last [facility] referral date: 08/11/2023      ISABEL     Mara was NOT contacted regarding therapeutic result today per home monitoring policy manage by exception agreement.   Current warfarin dose is to be continued:     Summary  As of 5/3/2024      Full warfarin instructions:  3 mg every Mon, Wed, Fri; 4 mg all other days   Next INR check:  5/9/2024             ?   Usha Riley RN  Anticoagulation Clinic  5/3/2024    _______________________________________________________________________     Anticoagulation Episode Summary       Current INR goal:  2.0-3.0   TTR:  67.1% (1 y)   Target end date:  Indefinite   Send INR reminders to:  [facility]    Indications    Chronic anticoagulation [Z79.01]  Long-term (current) use of anticoagulants [Z79.01] [Z79.01]             Comments:  [employer] home meter. Manage by exception.             Anticoagulation Care Providers       Provider Role Specialty Phone number    Steven Abrams MD Referring Internal Medicine [phone]

## 2024-05-09 ENCOUNTER — DOCUMENTATION ONLY (OUTPATIENT)
Dept: ANTICOAGULATION | Facility: CLINIC | Age: 89
End: 2024-05-09
Payer: COMMERCIAL

## 2024-05-09 ENCOUNTER — TELEPHONE (OUTPATIENT)
Dept: FAMILY MEDICINE | Facility: CLINIC | Age: 89
End: 2024-05-09
Payer: COMMERCIAL

## 2024-05-09 DIAGNOSIS — Z79.01 CHRONIC ANTICOAGULATION: Primary | ICD-10-CM

## 2024-05-09 DIAGNOSIS — E11.22 CONTROLLED TYPE 2 DIABETES MELLITUS WITH STAGE 3 CHRONIC KIDNEY DISEASE, WITHOUT LONG-TERM CURRENT USE OF INSULIN (H): Primary | ICD-10-CM

## 2024-05-09 DIAGNOSIS — Z79.01 LONG TERM CURRENT USE OF ANTICOAGULANT THERAPY: ICD-10-CM

## 2024-05-09 DIAGNOSIS — N18.30 CONTROLLED TYPE 2 DIABETES MELLITUS WITH STAGE 3 CHRONIC KIDNEY DISEASE, WITHOUT LONG-TERM CURRENT USE OF INSULIN (H): Primary | ICD-10-CM

## 2024-05-09 LAB — INR HOME MONITORING: 2.8 RATIO (ref 2–3)

## 2024-05-09 RX ORDER — BLOOD SUGAR DIAGNOSTIC
STRIP MISCELLANEOUS
Qty: 100 STRIP | Refills: 5 | Status: SHIPPED | OUTPATIENT
Start: 2024-05-09

## 2024-05-09 NOTE — PROGRESS NOTES
ANTICOAGULATION  MANAGEMENT-Home Monitor Managed by Exception    Mara Colindres 89 year old female is on warfarin with therapeutic INR result. (Goal INR 2.0-3.0)    Recent labs: (last 7 days)     05/09/24  1559   INR 2.8       Previous INR was Therapeutic  Medication, diet, health changes since last INR:chart reviewed; none identified  Contacted within the last 12 weeks by phone on 4/11/24  Last [facility] referral date: 08/11/2023      ISABEL     Mara was NOT contacted regarding therapeutic result today per home monitoring policy manage by exception agreement.   Current warfarin dose is to be continued:     Summary  As of 5/9/2024      Full warfarin instructions:  3 mg every Mon, Wed, Fri; 4 mg all other days   Next INR check:  5/16/2024             ?   Belkis Nunez RN  Anticoagulation Clinic  5/9/2024    _______________________________________________________________________     Anticoagulation Episode Summary       Current INR goal:  2.0-3.0   TTR:  68.6% (1 y)   Target end date:  Indefinite   Send INR reminders to:  [facility]    Indications    Chronic anticoagulation [Z79.01]  Long-term (current) use of anticoagulants [Z79.01] [Z79.01]             Comments:  [employer] home meter. Manage by exception.             Anticoagulation Care Providers       Provider Role Specialty Phone number    Steven Abrams MD Referring Internal Medicine [phone]

## 2024-05-16 ENCOUNTER — ANTICOAGULATION THERAPY VISIT (OUTPATIENT)
Dept: ANTICOAGULATION | Facility: CLINIC | Age: 89
End: 2024-05-16
Payer: COMMERCIAL

## 2024-05-16 DIAGNOSIS — Z79.01 LONG TERM CURRENT USE OF ANTICOAGULANT THERAPY: ICD-10-CM

## 2024-05-16 DIAGNOSIS — Z79.01 CHRONIC ANTICOAGULATION: Primary | ICD-10-CM

## 2024-05-16 LAB — INR HOME MONITORING: 4.4 RATIO (ref 2–3)

## 2024-05-16 NOTE — PROGRESS NOTES
ANTICOAGULATION MANAGEMENT     Mara Colindres 89 year old female is on warfarin with supratherapeutic INR result. (Goal INR 2.0-3.0)    Recent labs: (last 7 days)     05/16/24  1735   INR 4.4*       ASSESSMENT     Source(s): Chart Review and Patient/Caregiver Call     Warfarin doses taken: Warfarin taken as instructed  Diet: Decreased greens/vitamin K in diet; plans to resume previous intake  Medication/supplement changes: None noted  New illness, injury, or hospitalization: No  Signs or symptoms of bleeding or clotting: No  Previous result: Therapeutic last 2(+) visits  Additional findings: None       PLAN     Recommended plan for temporary change(s) affecting INR     Dosing Instructions: hold dose then continue your current warfarin dose with next INR in 1 week       Summary  As of 5/16/2024      Full warfarin instructions:  5/16: Hold; Otherwise 3 mg every Mon, Wed, Fri; 4 mg all other days   Next INR check:  5/23/2024               Telephone call with Jenni who verbalizes understanding and agrees to plan    Patient to recheck with home meter    Education provided:   Please call back if any changes to your diet, medications or how you've been taking warfarin  Dietary considerations: importance of consistent vitamin K intake  Symptom monitoring: monitoring for bleeding signs and symptoms, monitoring for clotting signs and symptoms, and monitoring for stroke signs and symptoms  Contact [phone]  with any changes, questions or concerns.     Plan made per [facility] anticoagulation protocol    Coral Oliver RN  Anticoagulation Clinic  5/16/2024    _______________________________________________________________________     Anticoagulation Episode Summary       Current INR goal:  2.0-3.0   TTR:  68.3% (1 y)   Target end date:  Indefinite   Send INR reminders to:  [facility]    Indications    Chronic anticoagulation [Z79.01]  Long-term (current) use of anticoagulants [Z79.01] [Z79.01]              Comments:  [facility]IN[employer] home meter. Manage by exception.             Anticoagulation Care Providers       Provider Role Specialty Phone number    Steven Abrams MD Referring Internal Medicine [phone]

## 2024-05-23 ENCOUNTER — TELEPHONE (OUTPATIENT)
Dept: CARDIOLOGY | Facility: CLINIC | Age: 89
End: 2024-05-23

## 2024-05-23 ENCOUNTER — ANTICOAGULATION THERAPY VISIT (OUTPATIENT)
Dept: ANTICOAGULATION | Facility: CLINIC | Age: 89
End: 2024-05-23

## 2024-05-23 ENCOUNTER — LAB (OUTPATIENT)
Dept: LAB | Facility: CLINIC | Age: 89
End: 2024-05-23
Payer: COMMERCIAL

## 2024-05-23 ENCOUNTER — OFFICE VISIT (OUTPATIENT)
Dept: CARDIOLOGY | Facility: CLINIC | Age: 89
End: 2024-05-23
Attending: INTERNAL MEDICINE
Payer: COMMERCIAL

## 2024-05-23 VITALS
BODY MASS INDEX: 31.33 KG/M2 | DIASTOLIC BLOOD PRESSURE: 77 MMHG | SYSTOLIC BLOOD PRESSURE: 118 MMHG | HEART RATE: 70 BPM | WEIGHT: 171.3 LBS | OXYGEN SATURATION: 97 %

## 2024-05-23 DIAGNOSIS — I27.20 PULMONARY HYPERTENSION (H): ICD-10-CM

## 2024-05-23 DIAGNOSIS — D64.9 ANEMIA, UNSPECIFIED TYPE: ICD-10-CM

## 2024-05-23 DIAGNOSIS — Z79.01 CHRONIC ANTICOAGULATION: Primary | ICD-10-CM

## 2024-05-23 DIAGNOSIS — R06.02 SOB (SHORTNESS OF BREATH): ICD-10-CM

## 2024-05-23 DIAGNOSIS — D64.9 ANEMIA, UNSPECIFIED TYPE: Primary | ICD-10-CM

## 2024-05-23 DIAGNOSIS — Z79.01 LONG TERM CURRENT USE OF ANTICOAGULANT THERAPY: ICD-10-CM

## 2024-05-23 LAB
ANION GAP SERPL CALCULATED.3IONS-SCNC: 14 MMOL/L (ref 7–15)
BUN SERPL-MCNC: 32.2 MG/DL (ref 8–23)
CALCIUM SERPL-MCNC: 9.3 MG/DL (ref 8.8–10.2)
CHLORIDE SERPL-SCNC: 102 MMOL/L (ref 98–107)
CREAT SERPL-MCNC: 1.88 MG/DL (ref 0.51–0.95)
DEPRECATED HCO3 PLAS-SCNC: 23 MMOL/L (ref 22–29)
EGFRCR SERPLBLD CKD-EPI 2021: 25 ML/MIN/1.73M2
ERYTHROCYTE [DISTWIDTH] IN BLOOD BY AUTOMATED COUNT: 15.3 % (ref 10–15)
FERRITIN SERPL-MCNC: 141 NG/ML (ref 11–328)
GLUCOSE SERPL-MCNC: 152 MG/DL (ref 70–99)
HCT VFR BLD AUTO: 40.9 % (ref 35–47)
HGB BLD-MCNC: 13.2 G/DL (ref 11.7–15.7)
INR HOME MONITORING: 2.5 RATIO (ref 2–3)
IRON BINDING CAPACITY (ROCHE): 289 UG/DL (ref 240–430)
IRON SATN MFR SERPL: 25 % (ref 15–46)
IRON SERPL-MCNC: 71 UG/DL (ref 37–145)
MCH RBC QN AUTO: 31.8 PG (ref 26.5–33)
MCHC RBC AUTO-ENTMCNC: 32.3 G/DL (ref 31.5–36.5)
MCV RBC AUTO: 99 FL (ref 78–100)
NT-PROBNP SERPL-MCNC: 4753 PG/ML (ref 0–1800)
PLATELET # BLD AUTO: 183 10E3/UL (ref 150–450)
POTASSIUM SERPL-SCNC: 3.9 MMOL/L (ref 3.4–5.3)
RBC # BLD AUTO: 4.15 10E6/UL (ref 3.8–5.2)
SODIUM SERPL-SCNC: 139 MMOL/L (ref 135–145)
WBC # BLD AUTO: 7 10E3/UL (ref 4–11)

## 2024-05-23 PROCEDURE — 99215 OFFICE O/P EST HI 40 MIN: CPT | Performed by: PHYSICIAN ASSISTANT

## 2024-05-23 PROCEDURE — 36415 COLL VENOUS BLD VENIPUNCTURE: CPT | Performed by: PATHOLOGY

## 2024-05-23 PROCEDURE — 82728 ASSAY OF FERRITIN: CPT | Performed by: PATHOLOGY

## 2024-05-23 PROCEDURE — 83550 IRON BINDING TEST: CPT | Performed by: PATHOLOGY

## 2024-05-23 PROCEDURE — 83880 ASSAY OF NATRIURETIC PEPTIDE: CPT | Performed by: PATHOLOGY

## 2024-05-23 PROCEDURE — 85027 COMPLETE CBC AUTOMATED: CPT | Performed by: PATHOLOGY

## 2024-05-23 PROCEDURE — 80048 BASIC METABOLIC PNL TOTAL CA: CPT | Performed by: PATHOLOGY

## 2024-05-23 PROCEDURE — G0463 HOSPITAL OUTPT CLINIC VISIT: HCPCS | Performed by: PHYSICIAN ASSISTANT

## 2024-05-23 PROCEDURE — 83540 ASSAY OF IRON: CPT | Performed by: PATHOLOGY

## 2024-05-23 ASSESSMENT — PAIN SCALES - GENERAL: PAINLEVEL: NO PAIN (0)

## 2024-05-23 NOTE — PROGRESS NOTES
Date of Visit:  05/23/24      [facility] Pulmonary Hypertension Clinic      Primary PH cardiologist: Dr. Thompson     HPI:  Ms. Mara Colindres is a pleasant 89 year old female with a past medical history significant for hypertension, permanent atrial fibrillation on AC, type 2 diabetes, COPD (though never smoker), chronic kidney disease, EVONNE on CPAP, and history of DVT.  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    She was seen most recently by Dr. Thompson in Nov of 2023 at which time it was recommended she increase her Lasix to 40mg BID. Additionally, she was found to be iron deficient and sent for IV iron and asked to follow up with her PCP regarding her DM management as well as anemia workup.    Today, I'm seeing Mara back in clinic. She tells me that overall she is doing well. Since she was here last, she was placed on Jardiance and Ozempic. With this, she has lost nearly 50 pounds. BS control is better. Her breathing has improved and she also notes less swelling in her legs. She does still have lymphedema for which she wears wraps occasionally but thinks this is under good control. She is now wearing her oxygen only at night, and tells me she no longer wears CPAP. She will, however, still get short winded with too much activity quickly, like taking a shower or getting in and out of the car.      Labs performed prior to our visit today were reviewed as below.        CURRENT PULMONARY HYPERTENSION REGIMEN:     PAH Rx: None     Diuretics: Lasix 40mg BID     Oxygen: NC 2L at night, using only PRN during the day     Anticoagulation: warfarin   Indication: afib         ASSESSMENT/PLAN:        1. Pulmonary hypertension:              --Ms. Colindres has presumed group 2 PH. She had her initial diagnosis in 2016 based on invasive hemodynamic testing, and efforts have been put toward diuresis and afterload reduction. No pulmonary  vasodilators were felt warranted at that time. We then lost her to follow up in early 2021. We then met her back in early 2023 after a hospital stay for decompensated heart failure. Again, she was diuresed. She has since politely declined repeat invasive hemodynamic testing and prefers conservative management.    --Today, she appears well compensated. She has lost a significant amount of weight after addition of Jardiance and Ozempic. She has chronic lymphedema but this sounds to be improved overall. NT-proBNP remains quite elevated, but overall improved from prior. Renal function appears stable so will keep Lasix 40mg BID as is.               --She has a history of anemia but labs today show normal hemoglobin and repeat iron studies.    --Recommended continued oxygen at night with PRN during the day. She tells me that she no longer wears CPAP after her weight loss.          2. Atrial fibrillation.              --Chronic afib, rate controlled on metoprolol and digoxin.               --Anticoagulated with warfarin, follows with INR clinic locally.       Follow up plan: Return in 6 months to see Dr. Thompson with repeat echocardiogram and labs.  We are happy to see the patient back sooner with any new concerns.       Orders this Visit:  Orders Placed This Encounter   Procedures    Iron and iron binding capacity    Ferritin    N terminal pro BNP outpatient    Basic metabolic panel    CBC with platelets    Follow-Up with Cardiology    Echocardiogram Complete     No orders of the defined types were placed in this encounter.    There are no discontinued medications.      Review of Systems:  POS ROS ARE BOLDED, all other negative.    Cardiovascular: Chest pain, palpitations, orthopnea, chronic LE edema  Resp: Dyspnea on exertion, cough, known chronic lung disease  Hematologic/lymphatic: Current systemic anticoagulation, hx of blood clots, new bleeding concerns.  Neurological: Dizziness, syncope/presyncope     Physical  Exam:  Vitals: /77 (BP Location: Right arm, Patient Position: Chair, Cuff Size: Adult Regular)   Pulse 70   Wt 77.7 kg (171 lb 4.8 oz)   SpO2 97%   BMI 31.33 kg/m     Wt Readings from Last 4 Encounters:   05/23/24 77.7 kg (171 lb 4.8 oz)   11/28/23 99.2 kg (218 lb 11.2 oz)   03/02/23 88.5 kg (195 lb)   09/13/21 108.4 kg (239 lb)       GEN:  In general, this is a well nourished female in no acute distress on RA.  Patient in a motorized scooter today, unaccompanied.   NECK: Supple, No JVD with patient upright.  C/V:  Regular rate and rhythm, no murmur, rub or gallop. No S3 or RV heave.   RESP: Respirations are unlabored. No use of accessory muscles. Clear to auscultation bilaterally without wheezing, rales, or rhonchi.  EXTREM: Chronic nonpitting moderate LE edema.   NEURO: Alert and oriented, cooperative. Gait not assessed.      Recent Lab Results:    LIVER ENZYME RESULTS:  Lab Results   Component Value Date    AST 14 01/07/2020    ALT 10 01/16/2024    ALT 18 01/07/2020       CBC RESULTS:  Lab Results   Component Value Date    WBC 7.0 05/23/2024    WBC 6.2 01/25/2021    RBC 4.15 05/23/2024    RBC 4.30 01/25/2021    HGB 13.2 05/23/2024    HGB 12.6 01/25/2021    HCT 40.9 05/23/2024    HCT 39.9 01/25/2021    MCV 99 05/23/2024    MCV 93 01/25/2021    MCH 31.8 05/23/2024    MCH 29.3 01/25/2021    MCHC 32.3 05/23/2024    MCHC 31.6 01/25/2021    RDW 15.3 (H) 05/23/2024    RDW 15.7 (H) 01/25/2021     05/23/2024     01/25/2021       BMP RESULTS:  Lab Results   Component Value Date     05/23/2024     01/25/2021    POTASSIUM 3.9 05/23/2024    POTASSIUM 4.3 02/09/2023    POTASSIUM 3.7 01/25/2021    CHLORIDE 102 05/23/2024    CHLORIDE 104 02/09/2023    CHLORIDE 100 01/25/2021    CO2 23 05/23/2024    CO2 32 02/09/2023    CO2 27 01/25/2021    ANIONGAP 14 05/23/2024    ANIONGAP 4 02/09/2023    ANIONGAP 9 01/25/2021     (H) 05/23/2024     (H) 02/09/2023     (H) 01/25/2021     BUN 32.2 (H) 05/23/2024    BUN 30 02/09/2023    BUN 33 (H) 01/25/2021    CR 1.88 (H) 05/23/2024    CR 1.54 (H) 01/25/2021    GFRESTIMATED 25 (L) 05/23/2024    GFRESTIMATED 30 (L) 01/25/2021    GFRESTBLACK 35 (L) 01/25/2021    SHANTANU 9.3 05/23/2024    SHANTANU 9.4 01/25/2021        Recent Labs   Lab Test 05/23/24  1145 11/28/23  1211 02/09/23  1139   NTBNP 4,753* 6,171* 2,885*       Most recent pertinent testing:      Echocardiogram Dec 2022 (outside)  SUMMARY     Rhythm is atrial fibrillation with rapid ventricular response (130's).     Severe pulmonary hypertension with estimated pulmonary artery systolic   pressure of 85 mmHg + RA pressure.   Severely enlarged right ventricular size with diastolic flattening of the   interventricular septum consistent with Cor pulmonale.   Moderately decreased right ventricular systolic performance.   Tricuspid valve insufficiency moderate.   Biatrial enlargement severe.   Based on the appearance of the IVC, the RA pressure is elevated.   Normal left ventricular cavity size, borderline increased wall thickness   and normal LV systolic function.   The estimated left ventricular ejection fraction is 53 %.   No regional left ventricular wall motion abnormality.   Nonspecific aortic valve abnormality .   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       NYHA Functional Class:  3    A total of 40 minutes was spent today performing chart and history review, gathering HPI, physical exam, patient education, pre and post visit documentation, and care coordination.      Zayda Tomlinson PA-C  [facility]  Pager [phone]

## 2024-05-23 NOTE — NURSING NOTE
Chief Complaint   Patient presents with    Follow Up     PH Follow-up with labs prior       Vitals were taken, medications reconciled.    Deyvi Olivier, Facilitator   12:04 PM

## 2024-05-23 NOTE — TELEPHONE ENCOUNTER
Reviewed results with patient; no changes to plan. Patient verbalized understanding and did not have any further questions. Dilcia Marino RN on 5/23/2024 at 4:55 PM    ----- Message from FARHANA Owens sent at 5/23/2024  4:40 PM CDT -----  Regarding: iron  Pls call pt and let her know her iron studies were normal. She does not have mychart and I told her we would check this and get back to her.    Thanks

## 2024-05-23 NOTE — LETTER
5/23/2024      RE: Mara Colindres  [address] [facility] [address]       Dear Colleague,    Thank you for the opportunity to participate in the care of your patient, Mara Colindres, at the [facility] [place] [facility]. Please see a copy of my visit note below.        Date of Visit:  05/23/24      [facility] Pulmonary Hypertension Clinic      Primary PH cardiologist: Dr. Thompson     HPI:  Ms. Mara Colindres is a pleasant 89 year old female with a past medical history significant for hypertension, permanent atrial fibrillation on AC, type 2 diabetes, COPD (though never smoker), chronic kidney disease, EVONNE on CPAP, and history of DVT.  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    She was seen most recently by Dr. Thompson in Nov of 2023 at which time it was recommended she increase her Lasix to 40mg BID. Additionally, she was found to be iron deficient and sent for IV iron and asked to follow up with her PCP regarding her DM management as well as anemia workup.    Today, I'm seeing Mara back in clinic. She tells me that overall she is doing well. Since she was here last, she was placed on Jardiance and Ozempic. With this, she has lost nearly 50 pounds. BS control is better. Her breathing has improved and she also notes less swelling in her legs. She does still have lymphedema for which she wears wraps occasionally but thinks this is under good control. She is now wearing her oxygen only at night, and tells me she no longer wears CPAP. She will, however, still get short winded with too much activity quickly, like taking a shower or getting in and out of the car.      Labs performed prior to our visit today were reviewed as below.        CURRENT PULMONARY HYPERTENSION REGIMEN:     PAH Rx: None     Diuretics: Lasix 40mg BID     Oxygen: NC 2L at night,  using only PRN during the day     Anticoagulation: warfarin   Indication: afib         ASSESSMENT/PLAN:        1. Pulmonary hypertension:              --Ms. Colindres has presumed group 2 PH. She had her initial diagnosis in 2016 based on invasive hemodynamic testing, and efforts have been put toward diuresis and afterload reduction. No pulmonary vasodilators were felt warranted at that time. We then lost her to follow up in early 2021. We then met her back in early 2023 after a hospital stay for decompensated heart failure. Again, she was diuresed. She has since politely declined repeat invasive hemodynamic testing and prefers conservative management.    --Today, she appears well compensated. She has lost a significant amount of weight after addition of Jardiance and Ozempic. She has chronic lymphedema but this sounds to be improved overall. NT-proBNP remains quite elevated, but overall improved from prior. Renal function appears stable so will keep Lasix 40mg BID as is.               --She has a history of anemia but labs today show normal hemoglobin and repeat iron studies.    --Recommended continued oxygen at night with PRN during the day. She tells me that she no longer wears CPAP after her weight loss.          2. Atrial fibrillation.              --Chronic afib, rate controlled on metoprolol and digoxin.               --Anticoagulated with warfarin, follows with INR clinic locally.       Follow up plan: Return in 6 months to see Dr. Thompson with repeat echocardiogram and labs.  We are happy to see the patient back sooner with any new concerns.       Orders this Visit:  Orders Placed This Encounter   Procedures     Iron and iron binding capacity     Ferritin     N terminal pro BNP outpatient     Basic metabolic panel     CBC with platelets     Follow-Up with Cardiology     Echocardiogram Complete     No orders of the defined types were placed in this encounter.    There are no discontinued  medications.      Review of Systems:  POS ROS ARE BOLDED, all other negative.    Cardiovascular: Chest pain, palpitations, orthopnea, chronic LE edema  Resp: Dyspnea on exertion, cough, known chronic lung disease  Hematologic/lymphatic: Current systemic anticoagulation, hx of blood clots, new bleeding concerns.  Neurological: Dizziness, syncope/presyncope     Physical Exam:  Vitals: /77 (BP Location: Right arm, Patient Position: Chair, Cuff Size: Adult Regular)   Pulse 70   Wt 77.7 kg (171 lb 4.8 oz)   SpO2 97%   BMI 31.33 kg/m     Wt Readings from Last 4 Encounters:   05/23/24 77.7 kg (171 lb 4.8 oz)   11/28/23 99.2 kg (218 lb 11.2 oz)   03/02/23 88.5 kg (195 lb)   09/13/21 108.4 kg (239 lb)       GEN:  In general, this is a well nourished female in no acute distress on RA.  Patient in a motorized scooter today, unaccompanied.   NECK: Supple, No JVD with patient upright.  C/V:  Regular rate and rhythm, no murmur, rub or gallop. No S3 or RV heave.   RESP: Respirations are unlabored. No use of accessory muscles. Clear to auscultation bilaterally without wheezing, rales, or rhonchi.  EXTREM: Chronic nonpitting moderate LE edema.   NEURO: Alert and oriented, cooperative. Gait not assessed.      Recent Lab Results:    LIVER ENZYME RESULTS:  Lab Results   Component Value Date    AST 14 01/07/2020    ALT 10 01/16/2024    ALT 18 01/07/2020       CBC RESULTS:  Lab Results   Component Value Date    WBC 7.0 05/23/2024    WBC 6.2 01/25/2021    RBC 4.15 05/23/2024    RBC 4.30 01/25/2021    HGB 13.2 05/23/2024    HGB 12.6 01/25/2021    HCT 40.9 05/23/2024    HCT 39.9 01/25/2021    MCV 99 05/23/2024    MCV 93 01/25/2021    MCH 31.8 05/23/2024    MCH 29.3 01/25/2021    MCHC 32.3 05/23/2024    MCHC 31.6 01/25/2021    RDW 15.3 (H) 05/23/2024    RDW 15.7 (H) 01/25/2021     05/23/2024     01/25/2021       BMP RESULTS:  Lab Results   Component Value Date     05/23/2024     01/25/2021    POTASSIUM 3.9  05/23/2024    POTASSIUM 4.3 02/09/2023    POTASSIUM 3.7 01/25/2021    CHLORIDE 102 05/23/2024    CHLORIDE 104 02/09/2023    CHLORIDE 100 01/25/2021    CO2 23 05/23/2024    CO2 32 02/09/2023    CO2 27 01/25/2021    ANIONGAP 14 05/23/2024    ANIONGAP 4 02/09/2023    ANIONGAP 9 01/25/2021     (H) 05/23/2024     (H) 02/09/2023     (H) 01/25/2021    BUN 32.2 (H) 05/23/2024    BUN 30 02/09/2023    BUN 33 (H) 01/25/2021    CR 1.88 (H) 05/23/2024    CR 1.54 (H) 01/25/2021    GFRESTIMATED 25 (L) 05/23/2024    GFRESTIMATED 30 (L) 01/25/2021    GFRESTBLACK 35 (L) 01/25/2021    SHANTANU 9.3 05/23/2024    SHANTANU 9.4 01/25/2021        Recent Labs   Lab Test 05/23/24  1145 11/28/23  1211 02/09/23  1139   NTBNP 4,753* 6,171* 2,885*       Most recent pertinent testing:      Echocardiogram Dec 2022 (outside)  SUMMARY     Rhythm is atrial fibrillation with rapid ventricular response (130's).     Severe pulmonary hypertension with estimated pulmonary artery systolic   pressure of 85 mmHg + RA pressure.   Severely enlarged right ventricular size with diastolic flattening of the   interventricular septum consistent with Cor pulmonale.   Moderately decreased right ventricular systolic performance.   Tricuspid valve insufficiency moderate.   Biatrial enlargement severe.   Based on the appearance of the IVC, the RA pressure is elevated.   Normal left ventricular cavity size, borderline increased wall thickness   and normal LV systolic function.   The estimated left ventricular ejection fraction is 53 %.   No regional left ventricular wall motion abnormality.   Nonspecific aortic valve abnormality .   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       NYHA Functional Class:  3    A total of 40 minutes was spent today performing chart and history review, gathering HPI, physical exam, patient education, pre and post visit documentation, and care coordination.      Zayda Tomlinson PA-C  [facility]  [facility]  Pager [phone]            Please do not hesitate to contact me if you have any questions/concerns.     Sincerely,     FARHANA Owens

## 2024-05-23 NOTE — PATIENT INSTRUCTIONS
You were seen today in the Pulmonary Hypertension Clinic at the [facility].     Cardiology Provider you saw during your visit:    FARHANA Owens    Medication Changes:  None at this time    Follow-up:   6 month follow-up with Jay with labs and echo prior.    Please call us immediately if you have any syncope (fainting or passing out), chest pain, edema (swelling or weight gain), or decline in your functional status (general decline in how you are feeling).    If you have emergent concerns after hours or on the weekend, please call our on-call Cardiologist at [phone], option [phone]. For emergencies call [phone].     Thank you for allowing us to be a part of your care here at the [facility]    If you have questions or concerns please contact us at:    Arden Iyer RN (P: [phone])    Nurse Coordinator       Pulmonary Hypertension     [facility]         JOVON Florentino   (Prior Auths & Pt Assistance)   ()  Clinic   Clinic   Pulmonary Hypertension   Pulmonary Hypertension  [facility]  [facility]  (P)[phone]    (P) [phone]  (F) [phone]

## 2024-05-23 NOTE — PROGRESS NOTES
ANTICOAGULATION MANAGEMENT     Mara Colindres 89 year old female is on warfarin with therapeutic INR result. (Goal INR 2.0-3.0)    Recent labs: (last 7 days)     05/23/24  1554   INR 2.5       ASSESSMENT     Source(s): Chart Review and Patient/Caregiver Call     Warfarin doses taken: Warfarin taken as instructed  Diet: Pt was able to resume   Medication/supplement changes: None noted  New illness, injury, or hospitalization: No  Signs or symptoms of bleeding or clotting: No  Previous result: Supratherapeutic  Additional findings: Spoke with Ayla today who states that son Hong is sleeping and [facility] RN can speak with her today regarding INR. No changes reported, if son has any questions she will have him call back.        PLAN     Recommended plan for no diet, medication or health factor changes affecting INR     Dosing Instructions: Continue your current warfarin dose with next INR in 1 week       Summary  As of 5/23/2024      Full warfarin instructions:  3 mg every Mon, Wed, Fri; 4 mg all other days   Next INR check:  5/30/2024               Telephone call with Mara who verbalizes understanding and agrees to plan    Patient to recheck with home meter    Education provided:   Goal range and lab monitoring: goal range and significance of current result and Importance of therapeutic range  Contact [phone]  with any changes, questions or concerns.     Plan made per [facility] anticoagulation protocol    Marcos Jauregui RN  Anticoagulation Clinic  5/23/2024    _______________________________________________________________________     Anticoagulation Episode Summary       Current INR goal:  2.0-3.0   TTR:  67.3% (1 y)   Target end date:  Indefinite   Send INR reminders to:  [facility]    Indications    Chronic anticoagulation [Z79.01]  Long-term (current) use of anticoagulants [Z79.01] [Z79.01]             Comments:  [employer] home meter. Manage by exception.             Anticoagulation Care Providers        Provider Role Specialty Phone number    Steven Abrams MD Referring Internal Medicine [phone]

## 2024-05-24 ENCOUNTER — TELEPHONE (OUTPATIENT)
Dept: FAMILY MEDICINE | Facility: CLINIC | Age: 89
End: 2024-05-24

## 2024-05-24 NOTE — LETTER
May 24, 2024    To  Mara Colindres  [address] [facility] [address]    Your team at [facility] cares about your health. We have reviewed your chart and based on our findings; we are making the following recommendations to better manage your health.     You are in particular need of attention regarding the following:     Schedule a DIABETIC FOLLOW UP appointment for Office Visit. Patients with diabetes should see their provider regularly.  Schedule an office visit with your PRIMARY CARE PHYSICIAN for mental health follow-up.  PREVENTATIVE VISIT: Annual Medicare Wellness:Schedule an Annual Medicare Wellness Exam. Please call your [facility] clinic to set up your appointment.    If you have already completed these items, please contact the clinic via phone or   [facility] so your care team can review and update your records. Thank you for   choosing [facility] Clinics for your healthcare needs. For any questions,   concerns, or to schedule an appointment please contact our clinic.    Healthy Regards,      Your [facility] Care Team

## 2024-05-24 NOTE — TELEPHONE ENCOUNTER
Patient Quality Outreach    Patient is due for the following:   Diabetes -  Microalbumin and Foot Exam  Depression  -  PHQ-9 needed  Physical Annual Wellness Visit      Topic Date Due    Diptheria Tetanus Pertussis (DTAP/TDAP/TD) Vaccine (1 - Tdap) 08/17/2011    COVID-19 Vaccine (7 - 2023-24 season) 03/07/2024       Next Steps:   Schedule a Annual Wellness Visit    Type of outreach:    Sent letter.    Next Steps:  Reach out within 90 days via Letter.    Max number of attempts reached: No. Will try again in 90 days if patient still on fail list.    Questions for provider review:    None           Kira Landon [facility]  Chart routed to none.

## 2024-05-30 ENCOUNTER — ANTICOAGULATION THERAPY VISIT (OUTPATIENT)
Dept: ANTICOAGULATION | Facility: CLINIC | Age: 89
End: 2024-05-30
Payer: COMMERCIAL

## 2024-05-30 DIAGNOSIS — Z79.01 CHRONIC ANTICOAGULATION: Primary | ICD-10-CM

## 2024-05-30 DIAGNOSIS — Z79.01 LONG TERM CURRENT USE OF ANTICOAGULANT THERAPY: ICD-10-CM

## 2024-05-30 LAB — INR HOME MONITORING: 1.8 RATIO (ref 2–3)

## 2024-05-30 NOTE — PROGRESS NOTES
ANTICOAGULATION MANAGEMENT     Mara Colindres 89 year old female is on warfarin with subtherapeutic INR result. (Goal INR 2.0-3.0)    Recent labs: (last 7 days)     05/30/24  1702   INR 1.8*       ASSESSMENT     Source(s): Chart Review  Previous INR was Therapeutic last visit; previously outside of goal range  Medication, diet, health changes since last INR chart reviewed; none identified         PLAN     Unable to reach Ayla or Hong today    Left message to continue current dose of warfarin 4 mg tonight. Request call back for assessment.    Follow up required to confirm warfarin dose taken and assess for changes and discuss dosing instructions and confirm understanding of instructions    Coral Carlisle RN  Anticoagulation Clinic  5/30/2024

## 2024-05-31 NOTE — PROGRESS NOTES
ANTICOAGULATION MANAGEMENT     Mara Colindres 89 year old female is on warfarin with subtherapeutic INR result. (Goal INR 2.0-3.0)    Recent labs: (last 7 days)     05/30/24  1702   INR 1.8*       ASSESSMENT     Source(s): Chart Review  Previous INR was Therapeutic last visit; previously outside of goal range  Medication, diet, health changes since last INR chart reviewed; none identified  No call back received from patient or his son Hong. Tried calling Hong's number on file yesterday but seems like the number belongs to someone else.         PLAN     Recommended plan for no diet, medication or health factor changes affecting INR     Dosing Instructions: Continue your current warfarin dose with next INR in 6 days       Summary  As of 5/30/2024      Full warfarin instructions:  3 mg every Mon, Wed, Fri; 4 mg all other days   Next INR check:  6/6/2024               Detailed voice message left for Mara / Hong with dosing instructions and follow up date.     Patient to recheck with home meter    Education provided:   Contact [phone]  with any changes, questions or concerns.     Plan made per [facility] anticoagulation protocol    Coral Carlisle RN  Anticoagulation Clinic  5/31/2024    _______________________________________________________________________     Anticoagulation Episode Summary       Current INR goal:  2.0-3.0   TTR:  66.6% (1 y)   Target end date:  Indefinite   Send INR reminders to:  [facility]    Indications    Chronic anticoagulation [Z79.01]  Long-term (current) use of anticoagulants [Z79.01] [Z79.01]             Comments:  [employer] home meter. Manage by exception.             Anticoagulation Care Providers       Provider Role Specialty Phone number    Steven Abrams MD Referring Internal Medicine [phone]

## 2024-06-06 ENCOUNTER — ANTICOAGULATION THERAPY VISIT (OUTPATIENT)
Dept: ANTICOAGULATION | Facility: CLINIC | Age: 89
End: 2024-06-06
Payer: COMMERCIAL

## 2024-06-06 DIAGNOSIS — Z79.01 CHRONIC ANTICOAGULATION: Primary | ICD-10-CM

## 2024-06-06 DIAGNOSIS — Z79.01 LONG TERM CURRENT USE OF ANTICOAGULANT THERAPY: ICD-10-CM

## 2024-06-06 LAB — INR HOME MONITORING: 2.3 RATIO (ref 2–3)

## 2024-06-06 NOTE — PROGRESS NOTES
ANTICOAGULATION MANAGEMENT     Mara Colindres 89 year old female is on warfarin with therapeutic INR result. (Goal INR 2.0-3.0)    Recent labs: (last 7 days)     06/06/24  1450   INR 2.3       ASSESSMENT     Source(s): Chart Review and Patient/Caregiver Call     Warfarin doses taken: Warfarin taken as instructed  Diet: No new diet changes identified  Medication/supplement changes: None noted  New illness, injury, or hospitalization: No  Signs or symptoms of bleeding or clotting: No  Previous result: Subtherapeutic  Additional findings: None       PLAN     Recommended plan for no diet, medication or health factor changes affecting INR     Dosing Instructions: Continue your current warfarin dose with next INR in 1 week       Summary  As of 6/6/2024      Full warfarin instructions:  3 mg every Mon, Wed, Fri; 4 mg all other days   Next INR check:  6/13/2024               Telephone call with SonHong who verbalizes understanding and agrees to plan    Patient to recheck with home meter    Education provided:   Please call back if any changes to your diet, medications or how you've been taking warfarin  Resume manage by exception with home monitor. Continue to submit INR results to home monitor company.You will only be called when your result is out of range. Please call and notify [facility] if new medication started, dose missed, signs or symptoms of bleeding or clotting, or a surgery/procedure is scheduled. Due for next call no later than: 9/4/24.    Plan made per [facility] anticoagulation protocol    Lesly Wright RN  Anticoagulation Clinic  6/6/2024    _______________________________________________________________________     Anticoagulation Episode Summary       Current INR goal:  2.0-3.0   TTR:  66.0% (1 y)   Target end date:  Indefinite   [facility]    Indications    Chronic anticoagulation [Z79.01]  Long-term (current) use of anticoagulants [Z79.01] [Z79.01]             Comments:   [employer] home meter. Manage by exception.             Anticoagulation Care Providers       Provider Role Specialty Phone number    Steven Abrams MD Referring Internal Medicine [phone]

## 2024-06-13 ENCOUNTER — ANTICOAGULATION THERAPY VISIT (OUTPATIENT)
Dept: ANTICOAGULATION | Facility: CLINIC | Age: 89
End: 2024-06-13
Payer: COMMERCIAL

## 2024-06-13 DIAGNOSIS — Z79.01 LONG TERM CURRENT USE OF ANTICOAGULANT THERAPY: ICD-10-CM

## 2024-06-13 DIAGNOSIS — Z79.01 CHRONIC ANTICOAGULATION: Primary | ICD-10-CM

## 2024-06-13 LAB — INR HOME MONITORING: 1.6 RATIO (ref 2–3)

## 2024-06-13 NOTE — PROGRESS NOTES
ANTICOAGULATION MANAGEMENT     Mara Colindres 89 year old female is on warfarin with subtherapeutic INR result. (Goal INR 2.0-3.0)    Recent labs: (last 7 days)     06/13/24  1439   INR 1.6*       ASSESSMENT     Source(s): Chart Review  Previous INR was Therapeutic last visit; previously outside of goal range  Medication, diet, health changes since last INR chart reviewed; none identified         PLAN     Unable to reach Hong today.    Left message to continue current dose of warfarin 6 mg tonight. Request call back for assessment.    Spoke with Mara and advised to take 6 mg (3 tablets tonight). She stated she will have Hong call in the morning.    Follow up required to discuss out of range result     Usha Riley, RN  Anticoagulation Clinic  6/13/2024

## 2024-06-14 NOTE — PROGRESS NOTES
ANTICOAGULATION MANAGEMENT     Mara Colindres 89 year old female is on warfarin with subtherapeutic INR result. (Goal INR 2.0-3.0)    Recent labs: (last 7 days)     06/13/24  1439   INR 1.6*       ASSESSMENT     Source(s): Chart Review and Patient/Caregiver Call     Warfarin doses taken: Warfarin taken as instructed  Diet: No new diet changes identified  Medication/supplement changes: None noted  New illness, injury, or hospitalization: No  Signs or symptoms of bleeding or clotting: No  Previous result: Therapeutic last visit; previously outside of goal range  Additional findings: None  Son confirmed patient did receive 6 mg of warfarin last night.       PLAN     Recommended plan for no diet, medication or health factor changes affecting INR     Dosing Instructions: booster dose then Increase your warfarin dose (12% change) with next INR in 1 week       Summary  As of 6/13/2024      Full warfarin instructions:  6/13: 6 mg; Otherwise 4 mg every day   Next INR check:  6/20/2024               Telephone call with Hong who agrees to plan and repeated back plan correctly    Patient to recheck with home meter    Education provided:   Please call back if any changes to your diet, medications or how you've been taking warfarin  Dietary considerations: importance of consistent vitamin K intake    Plan made per [facility] anticoagulation protocol    Usha Riley RN  Anticoagulation Clinic  6/14/2024    _______________________________________________________________________     Anticoagulation Episode Summary       Current INR goal:  2.0-3.0   TTR:  64.8% (1 y)   Target end date:  Indefinite   Send INR reminders to:  [facility]    Indications    Chronic anticoagulation [Z79.01]  Long-term (current) use of anticoagulants [Z79.01] [Z79.01]             Comments:  [employer] home meter. Manage by exception.             Anticoagulation Care Providers       Provider Role Specialty Phone number    Steven Abrams MD  [facility] Internal Medicine [phone]

## 2024-06-20 ENCOUNTER — ANTICOAGULATION THERAPY VISIT (OUTPATIENT)
Dept: ANTICOAGULATION | Facility: CLINIC | Age: 89
End: 2024-06-20
Payer: COMMERCIAL

## 2024-06-20 DIAGNOSIS — Z79.01 CHRONIC ANTICOAGULATION: Primary | ICD-10-CM

## 2024-06-20 DIAGNOSIS — Z79.01 LONG TERM CURRENT USE OF ANTICOAGULANT THERAPY: ICD-10-CM

## 2024-06-20 LAB — INR HOME MONITORING: 2.7 RATIO (ref 2–3)

## 2024-06-28 ENCOUNTER — DOCUMENTATION ONLY (OUTPATIENT)
Dept: ANTICOAGULATION | Facility: CLINIC | Age: 89
End: 2024-06-28
Payer: COMMERCIAL

## 2024-06-28 ENCOUNTER — ANTICOAGULATION THERAPY VISIT (OUTPATIENT)
Dept: ANTICOAGULATION | Facility: CLINIC | Age: 89
End: 2024-06-28
Payer: COMMERCIAL

## 2024-06-28 DIAGNOSIS — I48.19 PERSISTENT ATRIAL FIBRILLATION (H): Primary | ICD-10-CM

## 2024-06-28 DIAGNOSIS — Z79.01 CHRONIC ANTICOAGULATION: Primary | ICD-10-CM

## 2024-06-28 DIAGNOSIS — Z79.01 LONG TERM CURRENT USE OF ANTICOAGULANT THERAPY: ICD-10-CM

## 2024-06-28 LAB — INR HOME MONITORING: 2.4 RATIO (ref 2–3)

## 2024-06-28 NOTE — PROGRESS NOTES
ANTICOAGULATION CLINIC REFERRAL RENEWAL REQUEST       An annual renewal order is required for all patients referred to [facility] Anticoagulation Clinic.?  Please review and sign the pended referral order for Mara Colindres.       ANTICOAGULATION SUMMARY      Warfarin indication(s)   Atrial Fibrillation and DVT    Mechanical heart valve present?  NO       Current goal range   INR: 2.0-3.0     Goal appropriate for indication? Goal INR 2-3, standard for indication(s) above     Time in Therapeutic Range (TTR)  (Goal > 60%) 64%       Office visit with referring provider's group within last year yes on 8/28/2023       Coral Carlisle RN  [facility] Anticoagulation Clinic

## 2024-06-28 NOTE — PROGRESS NOTES
ANTICOAGULATION MANAGEMENT     Mara Colindres 89 year old female is on warfarin with therapeutic INR result. (Goal INR 2.0-3.0)    Recent labs: (last 7 days)     06/28/24  1558   INR 2.4       ASSESSMENT     Source(s): Chart Review and Patient/Caregiver Call     Warfarin doses taken: Warfarin taken as instructed  Diet: Increased greens/vitamin K in diet; ongoing change  Medication/supplement changes: None noted  New illness, injury, or hospitalization: No  Signs or symptoms of bleeding or clotting: No  Previous result: Therapeutic last visit; previously outside of goal range  Additional findings: Patient / family is okay to resume MBE per home monitor protocol if next INR is therapeutic         PLAN     Recommended plan for ongoing change(s) affecting INR     Dosing Instructions: Continue your current warfarin dose with next INR in 1 week       Summary  As of 6/28/2024      Full warfarin instructions:  4 mg every day   Next INR check:  7/5/2024               Telephone call with Hong who verbalizes understanding and agrees to plan    Patient to recheck with home meter    Education provided: Resume manage by exception with home monitor. Continue to submit INR results to home monitor company.You will only be called when your result is out of range. Please call and notify [facility] if new medication started, dose missed, signs or symptoms of bleeding or clotting, or a surgery/procedure is scheduled. Due for next call no later than: 9/26/24.  Contact [phone] with any changes, questions or concerns.     Plan made per [facility] anticoagulation protocol    Coral Carlisle RN  Anticoagulation Clinic  6/28/2024    _______________________________________________________________________     Anticoagulation Episode Summary       Current INR goal:  2.0-3.0   TTR:  64.2% (1 y)   Target end date:  Indefinite   [facility]    Indications    Chronic anticoagulation [Z79.01]  Long-term (current) use of  anticoagulants [Z79.01] [Z79.01]             Comments:  [employer] home meter. Manage by exception.             Anticoagulation Care Providers       Provider Role Specialty Phone number    Steven Abrams MD Referring Internal Medicine [phone]

## 2024-06-29 DIAGNOSIS — N18.30 CONTROLLED TYPE 2 DIABETES MELLITUS WITH STAGE 3 CHRONIC KIDNEY DISEASE, WITHOUT LONG-TERM CURRENT USE OF INSULIN (H): ICD-10-CM

## 2024-06-29 DIAGNOSIS — E11.22 CONTROLLED TYPE 2 DIABETES MELLITUS WITH STAGE 3 CHRONIC KIDNEY DISEASE, WITHOUT LONG-TERM CURRENT USE OF INSULIN (H): ICD-10-CM

## 2024-07-01 RX ORDER — SEMAGLUTIDE 0.68 MG/ML
0.5 INJECTION, SOLUTION SUBCUTANEOUS
Qty: 6 ML | Refills: 0 | Status: SHIPPED | OUTPATIENT
Start: 2024-07-01 | End: 2024-09-05

## 2024-07-05 ENCOUNTER — ANTICOAGULATION THERAPY VISIT (OUTPATIENT)
Dept: ANTICOAGULATION | Facility: CLINIC | Age: 89
End: 2024-07-05
Payer: COMMERCIAL

## 2024-07-05 DIAGNOSIS — Z79.01 CHRONIC ANTICOAGULATION: Primary | ICD-10-CM

## 2024-07-05 DIAGNOSIS — F33.0 MAJOR DEPRESSIVE DISORDER, RECURRENT EPISODE, MILD (H): ICD-10-CM

## 2024-07-05 DIAGNOSIS — K52.9 CHRONIC DIARRHEA: ICD-10-CM

## 2024-07-05 DIAGNOSIS — Z79.01 LONG TERM CURRENT USE OF ANTICOAGULANT THERAPY: ICD-10-CM

## 2024-07-05 DIAGNOSIS — I48.19 PERSISTENT ATRIAL FIBRILLATION (H): ICD-10-CM

## 2024-07-05 DIAGNOSIS — I50.32 CHRONIC DIASTOLIC CONGESTIVE HEART FAILURE (H): ICD-10-CM

## 2024-07-05 LAB — INR HOME MONITORING: 3.4 RATIO (ref 2–3)

## 2024-07-05 NOTE — PROGRESS NOTES
ANTICOAGULATION MANAGEMENT     Mararoel Colindres 89 year old female is on warfarin with supratherapeutic INR result. (Goal INR 2.0-3.0)    Recent labs: (last 7 days)     07/05/24  1755   INR 3.4*       ASSESSMENT        PLAN     Unable to reach Hong Terry today.    Left message to take 2 mg Friday, and 4 mg on Saturday and Sunday this weekend. Request call back for assessment.    Follow up required to confirm warfarin dose taken and assess for changes    Coral Oliver RN  Anticoagulation Clinic  7/5/2024

## 2024-07-05 NOTE — TELEPHONE ENCOUNTER
Medication Question or Refill        What medication are you calling about (include dose and sig)?: digoxin 0.625    Preferred Pharmacy:   "[id number] - [place] - [address]  [address]  [place] [address]  Phone: [phone] Fax: [phone]    [id number] [place]  [address]  [address] [place] [address] [place] [address]  Phone: [phone] Fax: [phone]    [facility] #[id number] - [place], [place] - [address] AT [facility] [address] [facility] [address]  Phone: [phone] Fax: [phone]      Controlled Substance Agreement on file:   CSA -- Patient Level:    CSA: None found at the patient level.       Who prescribed the medication?: PCP    Do you need a refill? Yes    When did you use the medication last? Either today 07/05/24 or yesterday 07/04/24    Patient offered an appointment? No    Do you have any questions or concerns?  No      Okay to leave a detailed message?: Yes at Cell number on file:    [phone]

## 2024-07-06 DIAGNOSIS — I50.32 CHRONIC DIASTOLIC CONGESTIVE HEART FAILURE (H): ICD-10-CM

## 2024-07-08 RX ORDER — PAROXETINE 20 MG/1
TABLET, FILM COATED ORAL
Qty: 60 TABLET | Refills: 0 | Status: SHIPPED | OUTPATIENT
Start: 2024-07-08 | End: 2024-08-28

## 2024-07-08 RX ORDER — DIGOXIN 0.06 MG/1
TABLET ORAL
OUTPATIENT
Start: 2024-07-08

## 2024-07-08 RX ORDER — DIGOXIN 0.06 MG/1
62.5 TABLET ORAL EVERY OTHER DAY
Qty: 45 TABLET | Refills: 0 | Status: SHIPPED | OUTPATIENT
Start: 2024-07-08 | End: 2024-08-27

## 2024-07-08 NOTE — PROGRESS NOTES
ANTICOAGULATION MANAGEMENT     Mara Colindres 89 year old female is on warfarin with supratherapeutic INR result. (Goal INR 2.0-3.0)    Recent labs: (last 7 days)     07/05/24  1755   INR 3.4*       ASSESSMENT     Source(s): Chart Review and Patient/Caregiver Call     Warfarin doses taken: Warfarin taken as instructed  Diet: Decreased greens/vitamin K in diet; plans to resume previous intake  Medication/supplement changes: None noted  New illness, injury, or hospitalization: No  Signs or symptoms of bleeding or clotting: No  Previous result: Therapeutic last 2(+) visits  Additional findings: None       PLAN     Recommended plan for temporary change(s) affecting INR     Dosing Instructions: partial hold then continue your current warfarin dose with next INR in 1 week (patient did partial hold on 7/5/24)    Summary  As of 7/5/2024      Full warfarin instructions:  7/5: 2 mg; Otherwise 4 mg every day   Next INR check:  7/12/2024               Telephone call with Hong Aguila who verbalizes understanding and agrees to plan    Patient to recheck with home meter    Education provided: Please call back if any changes to your diet, medications or how you've been taking warfarin  Symptom monitoring: monitoring for bleeding signs and symptoms, monitoring for clotting signs and symptoms, and monitoring for stroke signs and symptoms  Contact [phone] with any changes, questions or concerns.     Plan made per [facility] anticoagulation protocol    Coral Oliver RN  Anticoagulation Clinic  7/8/2024    _______________________________________________________________________     Anticoagulation Episode Summary       Current INR goal:  2.0-3.0   TTR:  63.1% (1 y)   Target end date:  Indefinite   Send INR reminders to:  [facility]    Indications    Chronic anticoagulation [Z79.01]  Long-term (current) use of anticoagulants [Z79.01] [Z79.01]  Persistent atrial fibrillation (H) [I48.19]             Comments:  [employer]  home meter. Manage by exception.             Anticoagulation Care Providers       Provider Role Specialty Phone number    Steven Abrams MD Referring Internal Medicine [phone]

## 2024-07-11 ENCOUNTER — ANTICOAGULATION THERAPY VISIT (OUTPATIENT)
Dept: ANTICOAGULATION | Facility: CLINIC | Age: 89
End: 2024-07-11
Payer: COMMERCIAL

## 2024-07-11 DIAGNOSIS — I48.19 PERSISTENT ATRIAL FIBRILLATION (H): ICD-10-CM

## 2024-07-11 DIAGNOSIS — Z79.01 LONG TERM CURRENT USE OF ANTICOAGULANT THERAPY: ICD-10-CM

## 2024-07-11 DIAGNOSIS — Z79.01 CHRONIC ANTICOAGULATION: Primary | ICD-10-CM

## 2024-07-11 LAB — INR HOME MONITORING: 3 RATIO (ref 2–3)

## 2024-07-11 NOTE — PROGRESS NOTES
ANTICOAGULATION MANAGEMENT     Mara Colindres 89 year old female is on warfarin with therapeutic INR result. (Goal INR 2.0-3.0)    Recent labs: (last 7 days)     07/11/24  1801   INR 3       ASSESSMENT     Source(s): Chart Review and Patient/Caregiver Call     Warfarin doses taken: Warfarin taken as instructed  Diet: Increased greens/vitamin K in diet; per Hong, he  will try to have the patient continue eating greens  Medication/supplement changes: None noted  New illness, injury, or hospitalization: No  Signs or symptoms of bleeding or clotting: No  Previous result: Supratherapeutic  Additional findings:  made aware that he will get a call regarding patient's INR result next wek       PLAN     Recommended plan for ongoing change(s) affecting INR     Dosing Instructions: Continue your current warfarin dose with next INR in 1 week       Summary  As of 7/11/2024      Full warfarin instructions:  4 mg every day   Next INR check:  7/18/2024               Telephone call with patient's son Hong who verbalizes understanding and agrees to plan    Patient to recheck with home meter    Education provided: Dietary considerations: importance of consistent vitamin K intake    Plan made per [facility] anticoagulation protocol    Coral Carlisle RN  Anticoagulation Clinic  7/11/2024    _______________________________________________________________________     Anticoagulation Episode Summary       Current INR goal:  2.0-3.0   TTR:  61.8% (1 y)   Target end date:  Indefinite   Send INR reminders to:  [facility]    Indications    Chronic anticoagulation [Z79.01]  Long-term (current) use of anticoagulants [Z79.01] [Z79.01]  Persistent atrial fibrillation (H) [I48.19]             Comments:  [employer] home meter. Manage by exception.             Anticoagulation Care Providers       Provider Role Specialty Phone number    Steven Abrams MD Referring Internal Medicine [phone]

## 2024-07-18 ENCOUNTER — ANTICOAGULATION THERAPY VISIT (OUTPATIENT)
Dept: ANTICOAGULATION | Facility: CLINIC | Age: 89
End: 2024-07-18
Payer: COMMERCIAL

## 2024-07-18 DIAGNOSIS — I48.19 PERSISTENT ATRIAL FIBRILLATION (H): ICD-10-CM

## 2024-07-18 DIAGNOSIS — Z79.01 LONG TERM CURRENT USE OF ANTICOAGULANT THERAPY: ICD-10-CM

## 2024-07-18 DIAGNOSIS — Z79.01 CHRONIC ANTICOAGULATION: Primary | ICD-10-CM

## 2024-07-18 LAB — INR HOME MONITORING: 3.1 RATIO (ref 2–3)

## 2024-07-18 NOTE — PROGRESS NOTES
ANTICOAGULATION MANAGEMENT     Mara Colindres 89 year old female is on warfarin with supratherapeutic INR result. (Goal INR 2.0-3.0)    Recent labs: (last 7 days)     07/18/24  1422   INR 3.1*       ASSESSMENT     Source(s): Chart Review and Patient/Caregiver Call     Warfarin doses taken: Warfarin taken as instructed  Diet: No new diet changes identified  Medication/supplement changes: None noted  New illness, injury, or hospitalization: No  Signs or symptoms of bleeding or clotting: No  Previous result: Therapeutic last visit; previously outside of goal range  Additional findings: None       PLAN     Recommended plan for no diet, medication or health factor changes affecting INR     Dosing Instructions: decrease your warfarin dose (7% change) with next INR in 1 week       Summary  As of 7/18/2024      Full warfarin instructions:  2 mg every Thu; 4 mg all other days   Next INR check:  7/25/2024               Telephone call with patient's son Hong who verbalizes understanding and agrees to plan    Patient to recheck with home meter    Education provided: Contact [phone] with any changes, questions or concerns.     Plan made per [facility] anticoagulation protocol    Coral Carlisle RN  Anticoagulation Clinic  7/18/2024    _______________________________________________________________________     Anticoagulation Episode Summary       Current INR goal:  2.0-3.0   TTR:  59.9% (1 y)   Target end date:  Indefinite   Send INR reminders to:  [facility]    Indications    Chronic anticoagulation [Z79.01]  Long-term (current) use of anticoagulants [Z79.01] [Z79.01]  Persistent atrial fibrillation (H) [I48.19]             Comments:  [employer] home meter. Manage by exception.             Anticoagulation Care Providers       Provider Role Specialty Phone number    Steven Abrams MD Referring Internal Medicine [phone]

## 2024-07-22 ENCOUNTER — DOCUMENTATION ONLY (OUTPATIENT)
Dept: FAMILY MEDICINE | Facility: CLINIC | Age: 89
End: 2024-07-22
Payer: COMMERCIAL

## 2024-07-22 DIAGNOSIS — I27.20 PULMONARY HYPERTENSION, UNSPECIFIED (H): Primary | ICD-10-CM

## 2024-07-26 ENCOUNTER — ANTICOAGULATION THERAPY VISIT (OUTPATIENT)
Dept: ANTICOAGULATION | Facility: CLINIC | Age: 89
End: 2024-07-26
Payer: COMMERCIAL

## 2024-07-26 DIAGNOSIS — Z79.01 LONG TERM CURRENT USE OF ANTICOAGULANT THERAPY: ICD-10-CM

## 2024-07-26 DIAGNOSIS — I48.19 PERSISTENT ATRIAL FIBRILLATION (H): ICD-10-CM

## 2024-07-26 DIAGNOSIS — Z79.01 CHRONIC ANTICOAGULATION: Primary | ICD-10-CM

## 2024-07-26 LAB — INR HOME MONITORING: 2.5 RATIO (ref 2–3)

## 2024-07-26 NOTE — PROGRESS NOTES
ANTICOAGULATION MANAGEMENT     Mara Colindres 89 year old female is on warfarin with therapeutic INR result. (Goal INR 2.0-3.0)    Recent labs: (last 7 days)     07/26/24  1440   INR 2.5       ASSESSMENT     Source(s): Chart Review and Patient/Caregiver Call     Warfarin doses taken: Warfarin taken as instructed  Diet: No new diet changes identified  Medication/supplement changes: None noted  New illness, injury, or hospitalization: No  Signs or symptoms of bleeding or clotting: No  Previous result: Supratherapeutic  Additional findings: Patient 's son Hong is okay to resume MBE per home monitor protocol if next INR is therapeutic        PLAN     Recommended plan for no diet, medication or health factor changes affecting INR     Dosing Instructions: Continue your current warfarin dose with next INR in 1 week       Summary  As of 7/26/2024      Full warfarin instructions:  2 mg every Thu; 4 mg all other days   Next INR check:  8/2/2024               Telephone call with patient's son Hong who verbalizes understanding and agrees to plan    Patient to recheck with home meter    Education provided: Resume manage by exception with home monitor. Continue to submit INR results to home monitor company.You will only be called when your result is out of range. Please call and notify [facility] if new medication started, dose missed, signs or symptoms of bleeding or clotting, or a surgery/procedure is scheduled. Due for next call no later than: 10/24/24.  Contact [phone] with any changes, questions or concerns.     Plan made per [facility] anticoagulation protocol    Coral Carlisle RN  Anticoagulation Clinic  7/26/2024    _______________________________________________________________________     Anticoagulation Episode Summary       Current INR goal:  2.0-3.0   TTR:  59.5% (1 y)   Target end date:  Indefinite   Send INR reminders to:  [facility]    Indications    Chronic anticoagulation [Z79.01]  Long-term  (current) use of anticoagulants [Z79.01] [Z79.01]  Persistent atrial fibrillation (H) [I48.19]             Comments:  [employer]INR home meter. Manage by exception.             Anticoagulation Care Providers       Provider Role Specialty Phone number    Steven Abrams MD Referring Internal Medicine [phone]

## 2024-08-02 ENCOUNTER — DOCUMENTATION ONLY (OUTPATIENT)
Dept: ANTICOAGULATION | Facility: CLINIC | Age: 89
End: 2024-08-02
Payer: COMMERCIAL

## 2024-08-02 DIAGNOSIS — Z79.01 CHRONIC ANTICOAGULATION: Primary | ICD-10-CM

## 2024-08-02 DIAGNOSIS — I48.19 PERSISTENT ATRIAL FIBRILLATION (H): ICD-10-CM

## 2024-08-02 DIAGNOSIS — Z79.01 LONG TERM CURRENT USE OF ANTICOAGULANT THERAPY: ICD-10-CM

## 2024-08-02 LAB — INR HOME MONITORING: 3 RATIO (ref 2–3)

## 2024-08-02 NOTE — PROGRESS NOTES
ANTICOAGULATION  MANAGEMENT-Home Monitor Managed by Exception    Mara CHARLES Jens 89 year old female is on warfarin with therapeutic INR result. (Goal INR 2.0-3.0)    Recent labs: (last 7 days)     08/02/24  1047   INR 3       Previous INR was Therapeutic  Medication, diet, health changes since last INR:chart reviewed; none identified  Contacted within the last 12 weeks by phone on 7/26/24  Last [facility] referral date: 06/28/2024      [facility]     Mara was NOT contacted regarding therapeutic result today per home monitoring policy manage by exception agreement.   Current warfarin dose is to be continued:     Summary  As of 8/2/2024      Full warfarin instructions:  2 mg every Thu; 4 mg all other days   Next INR check:  8/9/2024             ?   Alejandrina Wise RN  Anticoagulation Clinic  8/2/2024    _______________________________________________________________________     Anticoagulation Episode Summary       Current INR goal:  2.0-3.0   TTR:  59.5% (1 y)   Target end date:  Indefinite   Send INR reminders to:  [facility]    Indications    Chronic anticoagulation [Z79.01]  Long-term (current) use of anticoagulants [Z79.01] [Z79.01]  Persistent atrial fibrillation (H) [I48.19]             Comments:  [employer] home meter. Manage by exception.             Anticoagulation Care Providers       Provider Role Specialty Phone number    Steven Abrams MD Referring Internal Medicine [phone]

## 2024-08-08 ENCOUNTER — DOCUMENTATION ONLY (OUTPATIENT)
Dept: ANTICOAGULATION | Facility: CLINIC | Age: 89
End: 2024-08-08
Payer: COMMERCIAL

## 2024-08-08 DIAGNOSIS — Z79.01 LONG TERM CURRENT USE OF ANTICOAGULANT THERAPY: ICD-10-CM

## 2024-08-08 DIAGNOSIS — I48.19 PERSISTENT ATRIAL FIBRILLATION (H): ICD-10-CM

## 2024-08-08 DIAGNOSIS — Z79.01 CHRONIC ANTICOAGULATION: Primary | ICD-10-CM

## 2024-08-08 LAB — INR HOME MONITORING: 2.4 RATIO (ref 2–3)

## 2024-08-08 NOTE — PROGRESS NOTES
ANTICOAGULATION  MANAGEMENT-Home Monitor Managed by Exception    Mara CHARLES Jens 89 year old female is on warfarin with therapeutic INR result. (Goal INR 2.0-3.0)    Recent labs: (last 7 days)     08/08/24  1620   INR 2.4       Previous INR was Therapeutic  Medication, diet, health changes since last INR:chart reviewed; none identified  Contacted within the last 12 weeks by phone on 7/26/24  Due for next call no later than: 10/24/24.   Last [facility] referral date: 06/28/2024      [facility]     Mara was NOT contacted regarding therapeutic result today per home monitoring policy manage by exception agreement.   Current warfarin dose is to be continued:     Summary  As of 8/8/2024      Full warfarin instructions:  2 mg every Thu; 4 mg all other days   Next INR check:  8/15/2024             ?   Lesly Wright RN  Anticoagulation Clinic  8/8/2024    _______________________________________________________________________     Anticoagulation Episode Summary       Current INR goal:  2.0-3.0   TTR:  59.5% (1 y)   Target end date:  Indefinite   Send INR reminders to:  [facility]    Indications    Chronic anticoagulation [Z79.01]  Long-term (current) use of anticoagulants [Z79.01] [Z79.01]  Persistent atrial fibrillation (H) [I48.19]             Comments:  [employer] home meter. Manage by exception.             Anticoagulation Care Providers       Provider Role Specialty Phone number    Steven Abrams MD Referring Internal Medicine [phone]

## 2024-08-13 ENCOUNTER — OFFICE VISIT (OUTPATIENT)
Dept: OPHTHALMOLOGY | Facility: CLINIC | Age: 89
End: 2024-08-13
Payer: COMMERCIAL

## 2024-08-13 DIAGNOSIS — H52.4 PRESBYOPIA: ICD-10-CM

## 2024-08-13 DIAGNOSIS — Z01.01 ENCOUNTER FOR EXAMINATION OF EYES AND VISION WITH ABNORMAL FINDINGS: ICD-10-CM

## 2024-08-13 DIAGNOSIS — Z96.1 PSEUDOPHAKIA: ICD-10-CM

## 2024-08-13 DIAGNOSIS — E11.3299 BACKGROUND DIABETIC RETINOPATHY (H): ICD-10-CM

## 2024-08-13 DIAGNOSIS — N18.30 CONTROLLED TYPE 2 DIABETES MELLITUS WITH STAGE 3 CHRONIC KIDNEY DISEASE, WITHOUT LONG-TERM CURRENT USE OF INSULIN (H): Primary | ICD-10-CM

## 2024-08-13 DIAGNOSIS — E11.22 CONTROLLED TYPE 2 DIABETES MELLITUS WITH STAGE 3 CHRONIC KIDNEY DISEASE, WITHOUT LONG-TERM CURRENT USE OF INSULIN (H): Primary | ICD-10-CM

## 2024-08-13 DIAGNOSIS — H43.812 POSTERIOR VITREOUS DETACHMENT OF LEFT EYE: ICD-10-CM

## 2024-08-13 PROCEDURE — 92015 DETERMINE REFRACTIVE STATE: CPT | Performed by: OPHTHALMOLOGY

## 2024-08-13 PROCEDURE — 92014 COMPRE OPH EXAM EST PT 1/>: CPT | Performed by: OPHTHALMOLOGY

## 2024-08-13 ASSESSMENT — REFRACTION_WEARINGRX
OS_SPHERE: +3.00
OD_SPHERE: -1.00
OD_CYLINDER: SPHERE
OS_CYLINDER: +0.50
OD_CYLINDER: +0.25
SPECS_TYPE: PAL
OS_CYLINDER: SPHERE
OD_ADD: +3.00
OD_AXIS: 053
OS_SPHERE: -1.25
OS_ADD: +3.00
SPECS_TYPE: OTC READERS
OS_AXIS: 153
OD_SPHERE: +3.00

## 2024-08-13 ASSESSMENT — VISUAL ACUITY
OS_CC: 20/25
OS_CC+: +2
OD_CC+: -2
CORRECTION_TYPE: GLASSES
METHOD: SNELLEN - LINEAR
OD_CC: 20/25

## 2024-08-13 ASSESSMENT — CONF VISUAL FIELD
OD_INFERIOR_NASAL_RESTRICTION: 0
OD_NORMAL: 1
OD_INFERIOR_TEMPORAL_RESTRICTION: 0
OD_SUPERIOR_TEMPORAL_RESTRICTION: 0
OS_NORMAL: 1
OS_SUPERIOR_TEMPORAL_RESTRICTION: 0
OD_SUPERIOR_NASAL_RESTRICTION: 0
OS_SUPERIOR_NASAL_RESTRICTION: 0
OS_INFERIOR_NASAL_RESTRICTION: 0
OS_INFERIOR_TEMPORAL_RESTRICTION: 0

## 2024-08-13 ASSESSMENT — SLIT LAMP EXAM - LIDS
COMMENTS: 1+ DERMATOCHALASIS
COMMENTS: 1+ DERMATOCHALASIS

## 2024-08-13 ASSESSMENT — REFRACTION_MANIFEST
OS_ADD: +3.00
OS_SPHERE: -1.50
OS_AXIS: 150
OD_ADD: +3.00
OS_CYLINDER: +0.75
OD_CYLINDER: +0.75
OD_SPHERE: -1.00
OD_AXIS: 013

## 2024-08-13 ASSESSMENT — EXTERNAL EXAM - LEFT EYE: OS_EXAM: 1+ BROW PTOSIS

## 2024-08-13 ASSESSMENT — CUP TO DISC RATIO
OS_RATIO: 0.5
OD_RATIO: 0.5

## 2024-08-13 ASSESSMENT — TONOMETRY
OD_IOP_MMHG: 14
OS_IOP_MMHG: 14
IOP_METHOD: APPLANATION

## 2024-08-13 ASSESSMENT — EXTERNAL EXAM - RIGHT EYE: OD_EXAM: 1+ BROW PTOSIS

## 2024-08-13 NOTE — PATIENT INSTRUCTIONS
"Glasses prescription given - optional    Apply a hot compress for 10 minutes a few times daily. May use a warm wash cloth, Eron mask, or a microwaveable pack filled with dry, uncooked rice, gel beads, etc.    Wash the bases of your lashes with warm water and a wash cloth daily.     May use artificial tears up to four times a day (like Refresh Optive, Systane Balance, or TheraTears. Avoid \"get the red out\" drops and generic artifical tears).     Possible clouding of posterior capsule left eye discussed.     Possible posterior vitreous detachment (sudden onset large floater and/or flashing lights) right eye discussed.     Call in April 2025 for an appointment in August 2025 for Complete Exam    Dr. Tavares [phone]      Patient Education   Diabetes weakens the blood vessels all over the body, including the eyes. Damage to the blood vessels in the eyes can cause swelling or bleeding into part of the eye (called the retina). This is called diabetic retinopathy ([facility]). If not treated, this disease can cause vision loss or blindness.   Symptoms may include blurred or distorted vision, but many people have no symptoms. It's important to see your eye doctor regularly to check for problems.   Early treatment and good control can help protect your vision. Here are the things you can do to help prevent vision loss:      1. Keep your blood sugar levels under tight control.      2. Bring high blood pressure under control.      3. No smoking.      4. Have yearly dilated eye exams.     "

## 2024-08-13 NOTE — LETTER
"8/13/2024      Mara Colindres  [address] [address]      Dear Colleague,    Thank you for referring your patient, Mara Colindres, to the [facility]. Please see a copy of my visit note below.     Current Eye Medications:  Systane prn both eyes     Subjective:  here for diabetic eye exam. Has had a few pimples on the right lower lid this year. Also the GENNY is tender to the touch sometimes.  Having more floaters in both eyes more and more.  Eyes itch sometimes    Hemoglobin A1C   Date Value Ref Range Status   01/16/2024 7.6 (H) 0.0 - 5.6 % Final     Comment:     Normal <5.7%   Prediabetes 5.7-6.4%    Diabetes 6.5% or higher     Note: Adopted from ADA consensus guidelines.   08/11/2020 7.1 (H) 0 - 5.6 % Final     Comment:     Normal <5.7% Prediabetes 5.7-6.4%  Diabetes 6.5% or higher - adopted from ADA   consensus guidelines.           Objective:  See Ophthalmology Exam.       Assessment:  Stable mild background diabetic retinopathy both eyes in patient with diabetes; otherwise stable eye exam.      ICD-10-CM    1. 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 Pseudophakia, ou; Yag Caps, od  Z96.1       3. Background diabetic retinopathy, mild, ou  E11.3299       4. Posterior vitreous detachment of left eye  H43.812       5. Encounter for examination of eyes and vision with abnormal findings  Z01.01       6. Presbyopia  H52.4            Plan:  Glasses prescription given - optional    Apply a hot compress for 10 minutes a few times daily. May use a warm wash cloth, Eron mask, or a microwaveable pack filled with dry, uncooked rice, gel beads, etc.    Wash the bases of your lashes with warm water and a wash cloth daily.     May use artificial tears up to four times a day (like Refresh Optive, Systane Balance, or TheraTears. Avoid \"get the red out\" drops and generic " artifical tears).     Possible clouding of posterior capsule left eye discussed.     Possible posterior vitreous detachment (sudden onset large floater and/or flashing lights) right eye discussed.     Call in April 2025 for an appointment in August 2025 for Complete Exam    Dr. Tavares [phone]              Again, thank you for allowing me to participate in the care of your patient.        Sincerely,        Matty Tavares MD

## 2024-08-13 NOTE — PROGRESS NOTES
" Current Eye Medications:  Systane prn both eyes     Subjective:  here for diabetic eye exam. Has had a few pimples on the right lower lid this year. Also the GENNY is tender to the touch sometimes.  Having more floaters in both eyes more and more.  Eyes itch sometimes    Hemoglobin A1C   Date Value Ref Range Status   01/16/2024 7.6 (H) 0.0 - 5.6 % Final     Comment:     Normal <5.7%   Prediabetes 5.7-6.4%    Diabetes 6.5% or higher     Note: Adopted from ADA consensus guidelines.   08/11/2020 7.1 (H) 0 - 5.6 % Final     Comment:     Normal <5.7% Prediabetes 5.7-6.4%  Diabetes 6.5% or higher - adopted from ADA   consensus guidelines.           Objective:  See Ophthalmology Exam.       Assessment:  Stable mild background diabetic retinopathy both eyes in patient with diabetes; otherwise stable eye exam.      ICD-10-CM    1. 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 Pseudophakia, ou; Yag Caps, od  Z96.1       3. Background diabetic retinopathy, mild, ou  E11.3299       4. Posterior vitreous detachment of left eye  H43.812       5. Encounter for examination of eyes and vision with abnormal findings  Z01.01       6. Presbyopia  H52.4            Plan:  Glasses prescription given - optional    Apply a hot compress for 10 minutes a few times daily. May use a warm wash cloth, Eron mask, or a microwaveable pack filled with dry, uncooked rice, gel beads, etc.    Wash the bases of your lashes with warm water and a wash cloth daily.     May use artificial tears up to four times a day (like Refresh Optive, Systane Balance, or TheraTears. Avoid \"get the red out\" drops and generic artifical tears).     Possible clouding of posterior capsule left eye discussed.     Possible posterior vitreous detachment (sudden onset large floater and/or flashing lights) right eye discussed.     Call in April 2025 for an appointment in August " 2025 for Complete Exam    Dr. Tavares [phone]

## 2024-08-15 ENCOUNTER — ANTICOAGULATION THERAPY VISIT (OUTPATIENT)
Dept: ANTICOAGULATION | Facility: CLINIC | Age: 89
End: 2024-08-15
Payer: COMMERCIAL

## 2024-08-15 DIAGNOSIS — Z79.01 LONG TERM CURRENT USE OF ANTICOAGULANT THERAPY: ICD-10-CM

## 2024-08-15 DIAGNOSIS — Z79.01 CHRONIC ANTICOAGULATION: Primary | ICD-10-CM

## 2024-08-15 DIAGNOSIS — I48.19 PERSISTENT ATRIAL FIBRILLATION (H): ICD-10-CM

## 2024-08-15 LAB — INR HOME MONITORING: 3.8 RATIO (ref 2–3)

## 2024-08-15 NOTE — PROGRESS NOTES
ANTICOAGULATION MANAGEMENT     Mara Colindres 89 year old female is on warfarin with supratherapeutic INR result. (Goal INR 2.0-3.0)    Recent labs: (last 7 days)     08/15/24  1553   INR 3.8*       ASSESSMENT     Source(s): Chart Review and Patient/Caregiver Call     Warfarin doses taken: Warfarin taken as instructed  Diet: No new diet changes identified  Medication/supplement changes: None noted  New illness, injury, or hospitalization: No  Signs or symptoms of bleeding or clotting: No  Previous result: Therapeutic last 2(+) visits  Additional findings: None       PLAN     Recommended plan for no diet, medication or health factor changes affecting INR     Dosing Instructions: hold dose then decrease your warfarin dose (7.7% change) with next INR in 1 week       Summary  As of 8/15/2024      Full warfarin instructions:  8/15: Hold; Otherwise 2 mg every Sun, Thu; 4 mg all other days   Next INR check:  8/22/2024               Telephone call with son Hong who verbalizes understanding and agrees to plan    Patient to recheck with home meter    Education provided: Goal range and lab monitoring: goal range and significance of current result, Importance of therapeutic range, and Importance of following up at instructed interval    Plan made per [facility] anticoagulation protocol    Marcos Jauregui RN  Anticoagulation Clinic  8/15/2024    _______________________________________________________________________     Anticoagulation Episode Summary       Current INR goal:  2.0-3.0   TTR:  58.4% (1 y)   Target end date:  Indefinite   Send INR reminders to:  ANTICOAG HOME MONITORING    Indications    Chronic anticoagulation [Z79.01]  Long-term (current) use of anticoagulants [Z79.01] [Z79.01]  Persistent atrial fibrillation (H) [I48.19]             Comments:  [employer] home meter. Manage by exception.             Anticoagulation Care Providers       Provider Role Specialty Phone number    Steven Abrams MD Referring Internal Medicine  [phone]

## 2024-08-22 ENCOUNTER — ANTICOAGULATION THERAPY VISIT (OUTPATIENT)
Dept: ANTICOAGULATION | Facility: CLINIC | Age: 89
End: 2024-08-22
Payer: COMMERCIAL

## 2024-08-22 DIAGNOSIS — Z79.01 LONG TERM CURRENT USE OF ANTICOAGULANT THERAPY: ICD-10-CM

## 2024-08-22 DIAGNOSIS — I48.19 PERSISTENT ATRIAL FIBRILLATION (H): ICD-10-CM

## 2024-08-22 DIAGNOSIS — Z79.01 CHRONIC ANTICOAGULATION: Primary | ICD-10-CM

## 2024-08-22 LAB — INR HOME MONITORING: 3 RATIO (ref 2–3)

## 2024-08-22 NOTE — PROGRESS NOTES
ANTICOAGULATION MANAGEMENT     Mara Colindres 89 year old female is on warfarin with therapeutic INR result. (Goal INR 2.0-3.0)    Recent labs: (last 7 days)     08/22/24  1715   INR 3       ASSESSMENT     Source(s): Chart Review and Patient/Caregiver Call     Warfarin doses taken: Held for INR 3.8  recently which may be affecting INR  Diet: No new diet changes identified  Medication/supplement changes: None noted  New illness, injury, or hospitalization: No  Signs or symptoms of bleeding or clotting: No  Previous result: Supratherapeutic  Additional findings: Hong made aware that he will get a call regarding patient's next inr result       PLAN     Recommended plan for temporary change(s) affecting INR     Dosing Instructions: decrease your warfarin dose (8% change from previous plan but the same amount taken this past week ) with next INR in 1 week       Summary  As of 8/22/2024      Full warfarin instructions:  2 mg every Sun, Tue, Thu; 4 mg all other days   Next INR check:  8/29/2024               Telephone call with patient's son Hong who verbalizes understanding and agrees to plan and who agrees to plan and repeated back plan correctly    Patient to recheck with home meter    Education provided: Contact [phone] with any changes, questions or concerns.     Plan made per [facility] anticoagulation protocol    Coral Carlisle RN  Anticoagulation Clinic  8/22/2024    _______________________________________________________________________     Anticoagulation Episode Summary       Current INR goal:  2.0-3.0   TTR:  56.5% (1 y)   Target end date:  Indefinite   Send INR reminders to:  ANTICOAG HOME MONITORING    Indications    Chronic anticoagulation [Z79.01]  Long-term (current) use of anticoagulants [Z79.01] [Z79.01]  Persistent atrial fibrillation (H) [I48.19]             Comments:  [employer] home meter. Manage by exception.             Anticoagulation Care Providers       Provider Role Specialty Phone number     Please drink plenty of fluids.  Please get plenty of rest.    Please return here or go to the Emergency Department for any concerns or worsening of condition.    You were given a TORADOL injection today, please do not take anymore anti-inflammatory medications today. Please start taking anti-inflammatory medications tomorrow.     Please take TIZANIDINE every 8 hours as needed for muscle tension, be aware that this medication can make you drowsy. Do not drive or operate heavy machinery while taking.     Please consider using over the counter VOLTAREN GEL for pain relief.   Please consider using over the counter LIDOCAINE PATCHES for pain relief.   Please consider applying a heating pad to the affected area.    Rest, ice, compression and elevation to the affected joint or limb as needed.  Please follow up with your primary care doctor or specialist as needed.    If you  smoke, please stop smoking.   Steven Abrams MD [facility] Internal Medicine [phone]

## 2024-08-26 ENCOUNTER — TELEPHONE (OUTPATIENT)
Dept: FAMILY MEDICINE | Facility: CLINIC | Age: 89
End: 2024-08-26
Payer: COMMERCIAL

## 2024-08-26 DIAGNOSIS — N18.4 CKD (CHRONIC KIDNEY DISEASE) STAGE 4, GFR 15-29 ML/MIN (H): ICD-10-CM

## 2024-08-26 DIAGNOSIS — E11.22 CONTROLLED TYPE 2 DIABETES MELLITUS WITH STAGE 3 CHRONIC KIDNEY DISEASE, WITHOUT LONG-TERM CURRENT USE OF INSULIN (H): ICD-10-CM

## 2024-08-26 DIAGNOSIS — R06.02 SOB (SHORTNESS OF BREATH): ICD-10-CM

## 2024-08-26 DIAGNOSIS — F33.0 MAJOR DEPRESSIVE DISORDER, RECURRENT EPISODE, MILD (H): ICD-10-CM

## 2024-08-26 DIAGNOSIS — M15.0 PRIMARY OSTEOARTHRITIS INVOLVING MULTIPLE JOINTS: ICD-10-CM

## 2024-08-26 DIAGNOSIS — N18.30 CONTROLLED TYPE 2 DIABETES MELLITUS WITH STAGE 3 CHRONIC KIDNEY DISEASE, WITHOUT LONG-TERM CURRENT USE OF INSULIN (H): ICD-10-CM

## 2024-08-27 ENCOUNTER — TELEPHONE (OUTPATIENT)
Dept: CARDIOLOGY | Facility: CLINIC | Age: 89
End: 2024-08-27
Payer: COMMERCIAL

## 2024-08-27 DIAGNOSIS — I50.32 CHRONIC DIASTOLIC CONGESTIVE HEART FAILURE (H): ICD-10-CM

## 2024-08-27 RX ORDER — POTASSIUM CHLORIDE 1500 MG/1
20 TABLET, EXTENDED RELEASE ORAL
Qty: 90 TABLET | Refills: 1 | Status: SHIPPED | OUTPATIENT
Start: 2024-08-27 | End: 2024-09-05

## 2024-08-27 RX ORDER — DIGOXIN 125 MCG
62.5 TABLET ORAL EVERY OTHER DAY
Qty: 23 TABLET | Refills: 3 | Status: SHIPPED | OUTPATIENT
Start: 2024-08-27 | End: 2024-09-05

## 2024-08-27 NOTE — TELEPHONE ENCOUNTER
[facility] Call Center    Phone Message    May a detailed message be left on voicemail: yes     Reason for Call: Medication Question or concern regarding medication   Prescription Clarification  Name of Medication: digoxin (LANOXIN) 62.5 MCG tablet   Prescribing Provider: Jaimee   Pharmacy:    [id number] - [place], [place] - [address] AT [facility] [address]     What on the order needs clarification? Patient would like to know if she should still be taking this medication since her pharmacy doesn't stock it. She has tried looking at other pharmacies and none of them stock it. Patient would like to know if there's something else she can take instead or if she should keep looking for this particular medication.      Action Taken: Other: Cardiology    Travel Screening: Not Applicable     Thank you!  [facility]

## 2024-08-27 NOTE — TELEPHONE ENCOUNTER
Tablet changed to 125mcg and Sig reads to take half tab. Kayla Echeverria RN on 8/27/2024 at 2:21 PM

## 2024-08-28 RX ORDER — PAROXETINE 20 MG/1
TABLET, FILM COATED ORAL
Qty: 60 TABLET | Refills: 0 | Status: SHIPPED | OUTPATIENT
Start: 2024-08-28 | End: 2024-09-05

## 2024-08-28 RX ORDER — HYDROCODONE BITARTRATE AND ACETAMINOPHEN 5; 325 MG/1; MG/1
1 TABLET ORAL DAILY PRN
Qty: 30 TABLET | Refills: 0 | Status: SHIPPED | OUTPATIENT
Start: 2024-08-28 | End: 2024-08-30

## 2024-08-28 NOTE — TELEPHONE ENCOUNTER
This patient needs to be called. She has to understand that she has to see me for an appointment. I want a face to face encounter     See prescription sig line , but please also call patient     Steven Abrams MD

## 2024-08-29 ENCOUNTER — TELEPHONE (OUTPATIENT)
Dept: INTERNAL MEDICINE | Facility: CLINIC | Age: 89
End: 2024-08-29
Payer: COMMERCIAL

## 2024-08-29 ENCOUNTER — ANTICOAGULATION THERAPY VISIT (OUTPATIENT)
Dept: ANTICOAGULATION | Facility: CLINIC | Age: 89
End: 2024-08-29
Payer: COMMERCIAL

## 2024-08-29 ENCOUNTER — PATIENT OUTREACH (OUTPATIENT)
Dept: CARE COORDINATION | Facility: CLINIC | Age: 89
End: 2024-08-29
Payer: COMMERCIAL

## 2024-08-29 DIAGNOSIS — M15.0 PRIMARY OSTEOARTHRITIS INVOLVING MULTIPLE JOINTS: ICD-10-CM

## 2024-08-29 DIAGNOSIS — Z79.01 CHRONIC ANTICOAGULATION: Primary | ICD-10-CM

## 2024-08-29 DIAGNOSIS — Z79.01 LONG TERM CURRENT USE OF ANTICOAGULANT THERAPY: ICD-10-CM

## 2024-08-29 DIAGNOSIS — I48.19 PERSISTENT ATRIAL FIBRILLATION (H): ICD-10-CM

## 2024-08-29 LAB — INR HOME MONITORING: 2.3 RATIO (ref 2–3)

## 2024-08-29 NOTE — PROGRESS NOTES
ANTICOAGULATION MANAGEMENT     Mara Colindres 89 year old female is on warfarin with therapeutic INR result. (Goal INR 2.0-3.0)    Recent labs: (last 7 days)     08/29/24  1438   INR 2.3       ASSESSMENT     Source(s): Chart Review and Patient/Caregiver Call     Warfarin doses taken: More warfarin taken than planned which may be affecting INR  Diet: No new diet changes identified  Medication/supplement changes: None noted  New illness, injury, or hospitalization: No  Signs or symptoms of bleeding or clotting: No  Previous result: Therapeutic last visit; previously outside of goal range  Additional findings: Patient 's son is okay to resume MBE per home monitor protocol if next INR is therapeutic         PLAN     Recommended plan for ongoing change(s) affecting INR     Dosing Instructions: Increase your warfarin dose (9% change) but the same amount taken in the past 7 days with next INR in 1 week       Summary  As of 8/29/2024      Full warfarin instructions:  2 mg every Sun, Thu; 4 mg all other days   Next INR check:  9/5/2024               Telephone call with patient's son Hong who verbalizes understanding and agrees to plan and who agrees to plan and repeated back plan correctly    Patient to recheck with home meter    Education provided: Resume manage by exception with home monitor. Continue to submit INR results to home monitor company.You will only be called when your result is out of range. Please call and notify [facility] if new medication started, dose missed, signs or symptoms of bleeding or clotting, or a surgery/procedure is scheduled. Due for next call no later than: 11/27/24.  Contact [phone] with any changes, questions or concerns.     Plan made per [facility] anticoagulation protocol    Coral Carlisle RN  Anticoagulation Clinic  8/29/2024    _______________________________________________________________________     Anticoagulation Episode Summary       Current INR goal:  2.0-3.0   TTR:  56.5% (1 y)    Target end date:  Indefinite   Send INR reminders to:  ANTICOAG HOME MONITORING    Indications    Chronic anticoagulation [Z79.01]  Long-term (current) use of anticoagulants [Z79.01] [Z79.01]  Persistent atrial fibrillation (H) [I48.19]             Comments:  mdINR home meter. Manage by exception.             Anticoagulation Care Providers       Provider Role Specialty Phone number    Steven Abrams MD Referring Internal Medicine [phone]

## 2024-08-29 NOTE — TELEPHONE ENCOUNTER
Prior Authorization Retail Medication Request    Medication/Dose: Hydrocodone-Acetaminophen  Diagnosis and ICD code (if different than what is on RX):    New/renewal/insurance change PA/secondary ins. PA:  Previously Tried and Failed:    Rationale:      Insurance [employer]  Primary:   Insurance ID:  [id number]      Secondary (if applicable):  Insurance ID:      Pharmacy Information (if different than what is on RX)  Name:  [facility]  Phone:  [phone]  Fax:[phone]

## 2024-08-29 NOTE — TELEPHONE ENCOUNTER
[facility] Call Center    Phone Message    May a detailed message be left on voicemail: yes     Reason for Call: Other: Patient is still waiting for a call back regarding medication. Please reach out ASAP. Thank you      Action Taken: Other: cardiology     Travel Screening: Not Applicable    Thank you!  [facility]       Date of Service:

## 2024-08-30 DIAGNOSIS — E11.22 CONTROLLED TYPE 2 DIABETES MELLITUS WITH STAGE 3 CHRONIC KIDNEY DISEASE, WITHOUT LONG-TERM CURRENT USE OF INSULIN (H): ICD-10-CM

## 2024-08-30 DIAGNOSIS — N18.30 CONTROLLED TYPE 2 DIABETES MELLITUS WITH STAGE 3 CHRONIC KIDNEY DISEASE, WITHOUT LONG-TERM CURRENT USE OF INSULIN (H): ICD-10-CM

## 2024-08-30 RX ORDER — HYDROCODONE BITARTRATE AND ACETAMINOPHEN 5; 325 MG/1; MG/1
1 TABLET ORAL DAILY PRN
Qty: 30 TABLET | Refills: 0 | Status: SHIPPED | OUTPATIENT
Start: 2024-08-30 | End: 2024-09-05

## 2024-08-30 NOTE — TELEPHONE ENCOUNTER
Attempted to reach pt.  Lvm to call back and let us know exactly what she would like.    Arden Iyer RN on 8/30/2024 at 1:34 PM

## 2024-08-30 NOTE — TELEPHONE ENCOUNTER
Attempted to call pt to explain that new prescription was sent the other day for 125 mcg tablets. No answer. Team to try pt again later.

## 2024-08-30 NOTE — TELEPHONE ENCOUNTER
PA Initiation    Medication: HYDROCODONE-ACETAMINOPHEN 5-325 MG PO TABS  Insurance Company: [employer] Part D - Phone [phone] Fax [phone]  Pharmacy Filling the Rx: [facility] #[id number] - [place], [place] - [address] AT [facility] OF [address]  Filling Pharmacy Phone: [phone]  Filling Pharmacy Fax: [phone]  Start Date: 8/30/2024

## 2024-08-30 NOTE — TELEPHONE ENCOUNTER
Prior Authorization Approval    Medication: HYDROCODONE-ACETAMINOPHEN 5-325 MG PO TABS  Authorization Effective Date: 8/30/2024  Authorization Expiration Date: 9/29/2024  Approved Dose/Quantity:   Reference #:     Insurance Company: [employer] Part D - Phone [phone] Fax [phone]  Expected CoPay: $    CoPay Card Available:      Financial Assistance Needed:   Which Pharmacy is filling the prescription: [facility] #[id number] - [place], [place] - [address] AT [facility] OF [address] & [facility]  Pharmacy Notified: YES  Patient Notified: **Instructed pharmacy to notify patient when script is ready to /ship.**

## 2024-09-03 ENCOUNTER — TELEPHONE (OUTPATIENT)
Dept: CARDIOLOGY | Facility: CLINIC | Age: 89
End: 2024-09-03
Payer: COMMERCIAL

## 2024-09-03 NOTE — TELEPHONE ENCOUNTER
[facility] Call Center    Phone Message    May a detailed message be left on voicemail: yes     Reason for Call: Other: Patient is returning call to Arden NIELSON     Action Taken: Other: cardio    Travel Screening: Not Applicable     Date of Service:

## 2024-09-04 NOTE — TELEPHONE ENCOUNTER
Will need possibly to not refill this until I see her at clinic tomorrow     What I need to know is just exactly when was this medication started ? And what is her history with dosing ?     I need this information to proceed with gradual dose increases consistent with how this medication is taken    Semaglutide dosing information  Usual Adult Dose of Ozempic for Diabetes Type 2:  Initial dose: 0.25 mg subcutaneously once a week for 4 weeks  -After 4 weeks, the dose should be increased to 0.5 mg subcutaneously once a week  Maintenance dose: If additional glycemic control is needed after 4 weeks of 0.5 mg once a week, may increase dose to 1 mg subcutaneously once a week  Maximum dose: 1 mg once a week    Reroute with answers to these questions please !    Steven Abrams MD

## 2024-09-05 ENCOUNTER — OFFICE VISIT (OUTPATIENT)
Dept: INTERNAL MEDICINE | Facility: CLINIC | Age: 89
End: 2024-09-05
Payer: COMMERCIAL

## 2024-09-05 VITALS
WEIGHT: 165.2 LBS | RESPIRATION RATE: 14 BRPM | SYSTOLIC BLOOD PRESSURE: 104 MMHG | BODY MASS INDEX: 29.27 KG/M2 | TEMPERATURE: 98 F | OXYGEN SATURATION: 95 % | DIASTOLIC BLOOD PRESSURE: 73 MMHG | HEIGHT: 63 IN | HEART RATE: 90 BPM

## 2024-09-05 DIAGNOSIS — E11.3299 BACKGROUND DIABETIC RETINOPATHY (H): ICD-10-CM

## 2024-09-05 DIAGNOSIS — N18.30 CONTROLLED TYPE 2 DIABETES MELLITUS WITH STAGE 3 CHRONIC KIDNEY DISEASE, WITHOUT LONG-TERM CURRENT USE OF INSULIN (H): Primary | ICD-10-CM

## 2024-09-05 DIAGNOSIS — Z79.01 LONG TERM CURRENT USE OF ANTICOAGULANT THERAPY: ICD-10-CM

## 2024-09-05 DIAGNOSIS — M00.9 SEPTIC HIP (H): ICD-10-CM

## 2024-09-05 DIAGNOSIS — M15.0 PRIMARY OSTEOARTHRITIS INVOLVING MULTIPLE JOINTS: ICD-10-CM

## 2024-09-05 DIAGNOSIS — I50.32 CHRONIC DIASTOLIC CONGESTIVE HEART FAILURE (H): ICD-10-CM

## 2024-09-05 DIAGNOSIS — Z29.11 NEED FOR VACCINATION AGAINST RESPIRATORY SYNCYTIAL VIRUS: ICD-10-CM

## 2024-09-05 DIAGNOSIS — I27.20 PULMONARY HTN (H): ICD-10-CM

## 2024-09-05 DIAGNOSIS — M81.0 AGE-RELATED OSTEOPOROSIS WITHOUT CURRENT PATHOLOGICAL FRACTURE: ICD-10-CM

## 2024-09-05 DIAGNOSIS — K52.9 CHRONIC DIARRHEA: ICD-10-CM

## 2024-09-05 DIAGNOSIS — F33.0 MAJOR DEPRESSIVE DISORDER, RECURRENT EPISODE, MILD (H): ICD-10-CM

## 2024-09-05 DIAGNOSIS — N18.4 CKD (CHRONIC KIDNEY DISEASE) STAGE 4, GFR 15-29 ML/MIN (H): ICD-10-CM

## 2024-09-05 DIAGNOSIS — R06.02 SOB (SHORTNESS OF BREATH): ICD-10-CM

## 2024-09-05 DIAGNOSIS — E11.22 CONTROLLED TYPE 2 DIABETES MELLITUS WITH STAGE 3 CHRONIC KIDNEY DISEASE, WITHOUT LONG-TERM CURRENT USE OF INSULIN (H): Primary | ICD-10-CM

## 2024-09-05 DIAGNOSIS — I82.511 CHRONIC DEEP VEIN THROMBOSIS (DVT) OF FEMORAL VEIN OF RIGHT LOWER EXTREMITY (H): ICD-10-CM

## 2024-09-05 DIAGNOSIS — E78.5 HYPERLIPIDEMIA LDL GOAL <100: ICD-10-CM

## 2024-09-05 DIAGNOSIS — Z23 NEED FOR TDAP VACCINATION: ICD-10-CM

## 2024-09-05 DIAGNOSIS — I48.19 PERSISTENT ATRIAL FIBRILLATION (H): ICD-10-CM

## 2024-09-05 LAB
HBA1C MFR BLD: 6.9 % (ref 0–5.6)
HGB BLD-MCNC: 12.2 G/DL (ref 11.7–15.7)

## 2024-09-05 PROCEDURE — 83036 HEMOGLOBIN GLYCOSYLATED A1C: CPT | Performed by: INTERNAL MEDICINE

## 2024-09-05 PROCEDURE — 85018 HEMOGLOBIN: CPT | Performed by: INTERNAL MEDICINE

## 2024-09-05 PROCEDURE — 99214 OFFICE O/P EST MOD 30 MIN: CPT | Performed by: INTERNAL MEDICINE

## 2024-09-05 PROCEDURE — 82570 ASSAY OF URINE CREATININE: CPT | Performed by: INTERNAL MEDICINE

## 2024-09-05 PROCEDURE — 36415 COLL VENOUS BLD VENIPUNCTURE: CPT | Performed by: INTERNAL MEDICINE

## 2024-09-05 PROCEDURE — 80061 LIPID PANEL: CPT | Performed by: INTERNAL MEDICINE

## 2024-09-05 PROCEDURE — 82043 UR ALBUMIN QUANTITATIVE: CPT | Performed by: INTERNAL MEDICINE

## 2024-09-05 PROCEDURE — 80048 BASIC METABOLIC PNL TOTAL CA: CPT | Performed by: INTERNAL MEDICINE

## 2024-09-05 RX ORDER — SEMAGLUTIDE 0.68 MG/ML
0.5 INJECTION, SOLUTION SUBCUTANEOUS
Qty: 9 ML | Refills: 1 | Status: SHIPPED | OUTPATIENT
Start: 2024-09-05 | End: 2024-10-04

## 2024-09-05 RX ORDER — DIPHENOXYLATE HCL/ATROPINE 2.5-.025MG
1 TABLET ORAL 4 TIMES DAILY PRN
Qty: 40 TABLET | Refills: 1 | Status: SHIPPED | OUTPATIENT
Start: 2024-09-05 | End: 2024-10-04

## 2024-09-05 RX ORDER — WARFARIN SODIUM 2 MG/1
TABLET ORAL
Qty: 180 TABLET | Refills: 1 | Status: SHIPPED | OUTPATIENT
Start: 2024-09-05 | End: 2024-10-04

## 2024-09-05 RX ORDER — PAROXETINE 20 MG/1
20 TABLET, FILM COATED ORAL EVERY OTHER DAY
Qty: 45 TABLET | Refills: 3 | Status: SHIPPED | OUTPATIENT
Start: 2024-09-05 | End: 2024-10-04

## 2024-09-05 RX ORDER — HYDROCODONE BITARTRATE AND ACETAMINOPHEN 5; 325 MG/1; MG/1
1 TABLET ORAL DAILY PRN
Qty: 30 TABLET | Refills: 0 | Status: SHIPPED | OUTPATIENT
Start: 2024-09-05 | End: 2024-10-04

## 2024-09-05 RX ORDER — METOPROLOL TARTRATE 100 MG
TABLET ORAL
Qty: 90 TABLET | Refills: 3 | Status: SHIPPED | OUTPATIENT
Start: 2024-09-05

## 2024-09-05 RX ORDER — POTASSIUM CHLORIDE 1500 MG/1
20 TABLET, EXTENDED RELEASE ORAL
Qty: 90 TABLET | Refills: 1 | Status: SHIPPED | OUTPATIENT
Start: 2024-09-05

## 2024-09-05 RX ORDER — DIGOXIN 125 MCG
62.5 TABLET ORAL EVERY OTHER DAY
Qty: 23 TABLET | Refills: 3 | Status: CANCELLED | OUTPATIENT
Start: 2024-09-05

## 2024-09-05 RX ORDER — SEMAGLUTIDE 0.68 MG/ML
0.5 INJECTION, SOLUTION SUBCUTANEOUS
OUTPATIENT
Start: 2024-09-05

## 2024-09-05 RX ORDER — FUROSEMIDE 20 MG
TABLET ORAL
Qty: 360 TABLET | Refills: 3 | Status: SHIPPED | OUTPATIENT
Start: 2024-09-05 | End: 2024-10-04

## 2024-09-05 ASSESSMENT — ASTHMA QUESTIONNAIRES
ACT_TOTALSCORE: 24
ACT_TOTALSCORE: 24
QUESTION_1 LAST FOUR WEEKS HOW MUCH OF THE TIME DID YOUR ASTHMA KEEP YOU FROM GETTING AS MUCH DONE AT WORK, SCHOOL OR AT HOME: NONE OF THE TIME
QUESTION_2 LAST FOUR WEEKS HOW OFTEN HAVE YOU HAD SHORTNESS OF BREATH: NOT AT ALL
QUESTION_5 LAST FOUR WEEKS HOW WOULD YOU RATE YOUR ASTHMA CONTROL: WELL CONTROLLED
QUESTION_3 LAST FOUR WEEKS HOW OFTEN DID YOUR ASTHMA SYMPTOMS (WHEEZING, COUGHING, SHORTNESS OF BREATH, CHEST TIGHTNESS OR PAIN) WAKE YOU UP AT NIGHT OR EARLIER THAN USUAL IN THE MORNING: NOT AT ALL
QUESTION_4 LAST FOUR WEEKS HOW OFTEN HAVE YOU USED YOUR RESCUE INHALER OR NEBULIZER MEDICATION (SUCH AS ALBUTEROL): NOT AT ALL

## 2024-09-05 ASSESSMENT — PATIENT HEALTH QUESTIONNAIRE - PHQ9
10. IF YOU CHECKED OFF ANY PROBLEMS, HOW DIFFICULT HAVE THESE PROBLEMS MADE IT FOR YOU TO DO YOUR WORK, TAKE CARE OF THINGS AT HOME, OR GET ALONG WITH OTHER PEOPLE: NOT DIFFICULT AT ALL
SUM OF ALL RESPONSES TO PHQ QUESTIONS 1-9: 3
SUM OF ALL RESPONSES TO PHQ QUESTIONS 1-9: 3

## 2024-09-05 NOTE — PATIENT INSTRUCTIONS
Carter Colindres,    Here is some information regarding vaccinations: recommended for you are    RSV vaccine   In the fall, recommended to receive the new Coronavirus (COVID-19) immunization coming out in October and the new prophylactic vaccination and inoculation against influenza should be available then as well  4. TDAP vaccination

## 2024-09-05 NOTE — PROGRESS NOTES
Assessment & Plan     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, without long-term current use of insulin (H)  Mara Colindres is an amazing interesting individual . She is generally doing well and this is nothing short of a miracle based on her age and co-morbidities . Nevertheless she is hanging in there just fine. We refilled her anti-diabetic medications but further follow up / adjustment with medications may prove necessary after a review of today's hemoglobin a1c  [ diabetes test ]   - REVIEW OF HEALTH MAINTENANCE PROTOCOL ORDERS  - HEMOGLOBIN A1C; Future  - empagliflozin (JARDIANCE) 10 MG TABS tablet; Take 1 tablet (10 mg) by mouth daily.  - semaglutide (OZEMPIC, 0.25 OR 0.5 MG/DOSE,) 2 MG/3ML pen; Inject 0.5 mg subcutaneously every 7 days.  - HEMOGLOBIN A1C    Major depressive disorder, recurrent episode, mild (H24)  Problem is stable and ongoing monitoring    - PARoxetine (PAXIL) 20 MG tablet; Take 1 tablet (20 mg) by mouth every other day.    Chronic diastolic congestive heart failure (H)  Problem is stable and ongoing monitoring  , we got into a discussion regarding Digoxin (LANOXIN)  which was reportedly prescribed by Dr. Gokul Thompson, cardiologist with the [facility]. She has not had this medication due to it becoming harder and harder to find. I am not aware that this is a truly necessary medication and related to the  patient that I will forward this message to her cardiologist and let him decide on how critical this medication is. For now she is out of this medication completely for close to one month.  - metoprolol tartrate (LOPRESSOR) 100 MG tablet; TAKE 1/2 TABLET(50 MG) BY MOUTH TWICE DAILY  - Basic metabolic panel  (Ca, Cl, CO2, Creat, Gluc, K, Na, BUN); Future  - Basic metabolic panel  (Ca, Cl, CO2, Creat, Gluc, K, Na, BUN)    Need for Tdap vaccination  Postponed     Need for vaccination against respiratory syncytial virus  Postponed      Age-related osteoporosis without current pathological fracture  Problem is stable and ongoing monitoring      CKD (chronic kidney disease) stage 4, GFR 15-29 ml/min (H)  Problem is stable and ongoing monitoring  , further follow up depending on the test results   - Albumin Random Urine Quantitative with Creat Ratio; Future  - Hemoglobin; Future  - furosemide (LASIX) 20 MG tablet; TAKE 2 TABLETS BY MOUTH IN THE MORNING AND at noon  - potassium chloride michelle ER (KLOR-CON M20) 20 MEQ CR tablet; Take 1 tablet (20 mEq) by mouth daily (with dinner).  - Albumin Random Urine Quantitative with Creat Ratio  - Hemoglobin    Background diabetic retinopathy (H)  Problem is stable and ongoing monitoring      Septic hip (H)  Noted as a point of historical importance , she took chronic antibiotic suppression treatment for over 10 years and was finally directed to stop these by her Infectious Disease specialist. At this point we list this as an old problem, problem is stable and ongoing monitoring      Chronic deep vein thrombosis (DVT) of femoral vein of right lower extremity (H)  Resolved problem . She does continue with longterm anticoagulation with warfarin     Chronic diarrhea  Problem is stable and ongoing monitoring  , refills provided   - diphenoxylate-atropine (LOMOTIL) 2.5-0.025 MG tablet; Take 1 tablet by mouth 4 times daily as needed for diarrhea.    Primary osteoarthritis involving multiple joints  She uses 30 pills a year   - HYDROcodone-acetaminophen (NORCO) 5-325 MG tablet; Take 1 tablet by mouth daily as needed for severe pain.    Pulmonary HTN (H)  Continue current plan of care with Dr. Thompson  - metoprolol tartrate (LOPRESSOR) 100 MG tablet; TAKE 1/2 TABLET(50 MG) BY MOUTH TWICE DAILY    SOB (shortness of breath)  As detailed above   - potassium chloride michelle ER (KLOR-CON M20) 20 MEQ CR tablet; Take 1 tablet (20 mEq) by mouth daily (with dinner).    Long-term (current) use of anticoagulants [Z79.01]  As  "detailed above   - warfarin ANTICOAGULANT (COUMADIN) 2 MG tablet; Take 3 mg (2 mg x 1.5) every Wed; 4 mg (2 mg x 2) all other days or as directed by INR clinic    Persistent atrial fibrillation (H)  Problem is stable and ongoing monitoring    - warfarin ANTICOAGULANT (COUMADIN) 2 MG tablet; Take 3 mg (2 mg x 1.5) every Wed; 4 mg (2 mg x 2) all other days or as directed by INR clinic    HYPERLIPIDEMIA LDL GOAL <100  Problem is stable and ongoing monitoring    - Lipid panel reflex to direct LDL Non-fasting; Future  - Lipid panel reflex to direct LDL Non-fasting          BMI  Estimated body mass index is 29.26 kg/m  as calculated from the following:    Height as of this encounter: 1.6 m (5' 3\").    Weight as of this encounter: 74.9 kg (165 lb 3.2 oz).   Weight management plan: Discussed healthy diet and exercise guidelines      Michelle Terry is a 89 year old, presenting for the following health issues:  Recheck Medication      9/5/2024    11:31 AM   Additional Questions   Roomed by Tia     Via the Health Maintenance questionnaire, the patient has reported the following services have been completed DEXA: i dont know 2015-01-01, this information has been sent to the abstraction team.  History of Present Illness       Reason for visit:  Prescriptions    She eats 0-1 servings of fruits and vegetables daily.She consumes 0 sweetened beverage(s) daily.She exercises with enough effort to increase her heart rate 9 or less minutes per day.  She exercises with enough effort to increase her heart rate 3 or less days per week.   She is taking medications regularly.     I will message Dr. Gokul Thompson, cardiologist with the [facility] regarding the Digoxin (LANOXIN) which is not easily obtained    Lab Results   Component Value Date    A1C 7.6 01/16/2024    A1C 8.7 08/28/2023    A1C 7.4 11/09/2022    A1C 7.7 06/03/2022    A1C 7.3 09/13/2021    A1C 7.1 08/11/2020    A1C 7.0 04/29/2019    A1C 7.3 " "07/09/2018    A1C 7.8 11/07/2017    A1C 8.2 07/27/2017     Home blood glucose monitoring is excellent         Review of Systems  Constitutional, HEENT, cardiovascular, pulmonary, gi and gu systems are negative, except as otherwise noted.      Objective    /73   Pulse 90   Temp 98  F (36.7  C) (Temporal)   Resp 14   Ht 1.6 m (5' 3\")   Wt 74.9 kg (165 lb 3.2 oz)   SpO2 95%   BMI 29.26 kg/m    Body mass index is 29.26 kg/m .  Physical Exam   GENERAL: alert and no distress, using a motorized scooter type of vehicle , appears her stated age , answers all questions appropriately, talkative and appropriate, normal grooming and affect   NECK: no adenopathy, no asymmetry, masses, or scars  RESP: lungs clear to auscultation - no rales, rhonchi or wheezes  CV: irregularly irregular rhythm consistent with atrial fibrillation , normal S1 S2, no S3 or S4, no murmur, click or rub, peripheral pulses strong, and + peripheral edema consistent with her longterm diagnosis of lymphedema   ABDOMEN: soft, nontender, no hepatosplenomegaly, no masses and bowel sounds normal  MS: no gross musculoskeletal defects noted, edema as detailed above - she has actually nice looking legs today with some lymphedema but no skin breakdown, no venous stasis ulcerations or signs or symptoms  of ischemic vascular disease    Orders Placed This Encounter   Procedures    REVIEW OF HEALTH MAINTENANCE PROTOCOL ORDERS    Albumin Random Urine Quantitative with Creat Ratio    HEMOGLOBIN A1C    Lipid panel reflex to direct LDL Non-fasting    Hemoglobin    Basic metabolic panel  (Ca, Cl, CO2, Creat, Gluc, K, Na, BUN)             Signed Electronically by: Steven Abrams MD    "

## 2024-09-05 NOTE — Clinical Note
Keeping you in the information loop , she's not taking Digoxin (LANOXIN)  due to difficulties with obtaining this. She has not taken this medication for close to a month and I took it off of her medication list for now

## 2024-09-05 NOTE — LETTER
September 6, 2024      Mara Colindres  [address] [facility] [address]        Dear Mara:    We are writing to inform you of your test results.    All of these tests are within acceptable limits, things look good ! This is great news. Please let me know if you have any questions for me about all of these lab tests.    Resulted Orders   Albumin Random Urine Quantitative with Creat Ratio   Result Value Ref Range    Creatinine Urine mg/dL 142.0 mg/dL      Comment:      The reference ranges have not been established in urine creatinine. The results should be integrated into the clinical context for interpretation.    Albumin Urine mg/L 122.0 mg/L      Comment:      The reference ranges have not been established in urine albumin. The results should be integrated into the clinical context for interpretation.    Albumin Urine mg/g Cr 85.92 (H) 0.00 - 25.00 mg/g Cr      Comment:      Microalbuminuria is defined as an albumin:creatinine ratio of 17 to 299 for males and 25 to 299 for females. A ratio of albumin:creatinine of 300 or higher is indicative of overt proteinuria.  Due to biologic variability, positive results should be confirmed by a second, first-morning random or 24-hour timed urine specimen. If there is discrepancy, a third specimen is recommended. When 2 out of 3 results are in the microalbuminuria range, this is evidence for incipient nephropathy and warrants increased efforts at glucose control, blood pressure control, and institution of therapy with an angiotensin-converting-enzyme (ACE) inhibitor (if the patient can tolerate it).     HEMOGLOBIN A1C   Result Value Ref Range    Hemoglobin A1C 6.9 (H) 0.0 - 5.6 %      Comment:      Normal <5.7%   Prediabetes 5.7-6.4%    Diabetes 6.5% or higher     Note: Adopted from ADA consensus guidelines.   Lipid panel reflex to direct LDL Non-fasting   Result Value Ref Range    Cholesterol 127 <200 mg/dL    Triglycerides 57 <150 mg/dL    Direct Measure HDL  59 >=50 mg/dL    LDL Cholesterol Calculated 57 <=100 mg/dL    Non HDL Cholesterol 68 <130 mg/dL    Patient Fasting > 8hrs? Unknown     Narrative    Cholesterol  Desirable:  <200 mg/dL    Triglycerides  Normal:  Less than 150 mg/dL  Borderline High:  150-199 mg/dL  High:  200-499 mg/dL  Very High:  Greater than or equal to 500 mg/dL    Direct Measure HDL  Female:  Greater than or equal to 50 mg/dL   Male:  Greater than or equal to 40 mg/dL    LDL Cholesterol  Desirable:  <100mg/dL  Above Desirable:  100-129 mg/dL   Borderline High:  130-159 mg/dL   High:  160-189 mg/dL   Very High:  >= 190 mg/dL    Non HDL Cholesterol  Desirable:  130 mg/dL  Above Desirable:  130-159 mg/dL  Borderline High:  160-189 mg/dL  High:  190-219 mg/dL  Very High:  Greater than or equal to 220 mg/dL   Hemoglobin   Result Value Ref Range    Hemoglobin 12.2 11.7 - 15.7 g/dL   Basic metabolic panel  (Ca, Cl, CO2, Creat, Gluc, K, Na, BUN)   Result Value Ref Range    Sodium 140 135 - 145 mmol/L    Potassium 3.8 3.4 - 5.3 mmol/L    Chloride 102 98 - 107 mmol/L    Carbon Dioxide (CO2) 23 22 - 29 mmol/L    Anion Gap 15 7 - 15 mmol/L    Urea Nitrogen 33.6 (H) 8.0 - 23.0 mg/dL    Creatinine 1.99 (H) 0.51 - 0.95 mg/dL    GFR Estimate 23 (L) >60 mL/min/1.73m2      Comment:      eGFR calculated using 2021 CKD-EPI equation.    Calcium 9.5 8.8 - 10.4 mg/dL      Comment:      Reference intervals for this test were updated on 7/16/2024 to reflect our healthy population more accurately. There may be differences in the flagging of prior results with similar values performed with this method. Those prior results can be interpreted in the context of the updated reference intervals.    Glucose 141 (H) 70 - 99 mg/dL    Patient Fasting > 8hrs? Unknown      Sincerely,      Steven Abrams MD/gianna

## 2024-09-06 ENCOUNTER — DOCUMENTATION ONLY (OUTPATIENT)
Dept: ANTICOAGULATION | Facility: CLINIC | Age: 89
End: 2024-09-06
Payer: COMMERCIAL

## 2024-09-06 DIAGNOSIS — I48.19 PERSISTENT ATRIAL FIBRILLATION (H): ICD-10-CM

## 2024-09-06 DIAGNOSIS — Z79.01 LONG TERM CURRENT USE OF ANTICOAGULANT THERAPY: ICD-10-CM

## 2024-09-06 DIAGNOSIS — Z79.01 CHRONIC ANTICOAGULATION: Primary | ICD-10-CM

## 2024-09-06 LAB
ANION GAP SERPL CALCULATED.3IONS-SCNC: 15 MMOL/L (ref 7–15)
BUN SERPL-MCNC: 33.6 MG/DL (ref 8–23)
CALCIUM SERPL-MCNC: 9.5 MG/DL (ref 8.8–10.4)
CHLORIDE SERPL-SCNC: 102 MMOL/L (ref 98–107)
CHOLEST SERPL-MCNC: 127 MG/DL
CREAT SERPL-MCNC: 1.99 MG/DL (ref 0.51–0.95)
CREAT UR-MCNC: 142 MG/DL
EGFRCR SERPLBLD CKD-EPI 2021: 23 ML/MIN/1.73M2
FASTING STATUS PATIENT QL REPORTED: ABNORMAL
FASTING STATUS PATIENT QL REPORTED: NORMAL
GLUCOSE SERPL-MCNC: 141 MG/DL (ref 70–99)
HCO3 SERPL-SCNC: 23 MMOL/L (ref 22–29)
HDLC SERPL-MCNC: 59 MG/DL
INR HOME MONITORING: 3 RATIO (ref 2–3)
LDLC SERPL CALC-MCNC: 57 MG/DL
MICROALBUMIN UR-MCNC: 122 MG/L
MICROALBUMIN/CREAT UR: 85.92 MG/G CR (ref 0–25)
NONHDLC SERPL-MCNC: 68 MG/DL
POTASSIUM SERPL-SCNC: 3.8 MMOL/L (ref 3.4–5.3)
SODIUM SERPL-SCNC: 140 MMOL/L (ref 135–145)
TRIGL SERPL-MCNC: 57 MG/DL

## 2024-09-06 NOTE — PROGRESS NOTES
ANTICOAGULATION  MANAGEMENT-Home Monitor Managed by Exception    Mara CHARLES Jens 89 year old female is on warfarin with therapeutic INR result. (Goal INR 2.0-3.0)    Recent labs: (last 7 days)     09/06/24  1702   INR 3       Previous INR was Therapeutic  Medication, diet, health changes since last INR:chart reviewed; none identified - recent OV with PCP - med refilled, no changes  Contacted within the last 12 weeks by phone on 8/29/24   Due for next call no later than: 11/27/24.   Last [facility] referral date: 06/28/2024      ISABEL     Mara was NOT contacted regarding therapeutic result today per home monitoring policy manage by exception agreement.   Current warfarin dose is to be continued:     Summary  As of 9/6/2024      Full warfarin instructions:  2 mg every Sun, Thu; 4 mg all other days   Next INR check:  9/13/2024             ?   Lesly Wright RN  Anticoagulation Clinic  9/6/2024    _______________________________________________________________________     Anticoagulation Episode Summary       Current INR goal:  2.0-3.0   TTR:  57.2% (1 y)   Target end date:  Indefinite   Send INR reminders to:  [facility]    Indications    Chronic anticoagulation [Z79.01]  Long-term (current) use of anticoagulants [Z79.01] [Z79.01]  Persistent atrial fibrillation (H) [I48.19]             Comments:  [employer] home meter. Manage by exception.             Anticoagulation Care Providers       Provider Role Specialty Phone number    Steven Abrams MD Referring Internal Medicine [phone]

## 2024-09-13 ENCOUNTER — ANTICOAGULATION THERAPY VISIT (OUTPATIENT)
Dept: ANTICOAGULATION | Facility: CLINIC | Age: 89
End: 2024-09-13
Payer: COMMERCIAL

## 2024-09-13 DIAGNOSIS — I48.19 PERSISTENT ATRIAL FIBRILLATION (H): ICD-10-CM

## 2024-09-13 DIAGNOSIS — Z79.01 CHRONIC ANTICOAGULATION: Primary | ICD-10-CM

## 2024-09-13 DIAGNOSIS — Z79.01 LONG TERM CURRENT USE OF ANTICOAGULANT THERAPY: ICD-10-CM

## 2024-09-13 LAB — INR HOME MONITORING: 3.3 RATIO (ref 2–3)

## 2024-09-13 NOTE — PROGRESS NOTES
ANTICOAGULATION MANAGEMENT     Mara Colindres 89 year old female is on warfarin with supratherapeutic INR result. (Goal INR 2.0-3.0)    Recent labs: (last 7 days)     09/13/24  1109   INR 3.3*       ASSESSMENT     Source(s): Chart Review and Patient/Caregiver Call     Warfarin doses taken: Warfarin taken as instructed  Diet: No new diet changes identified  Medication/supplement changes: None noted  New illness, injury, or hospitalization: No  Signs or symptoms of bleeding or clotting: No  Previous result: Therapeutic last 2(+) visits inr noted to be trending up  Additional findings: None       PLAN     Recommended plan for no diet, medication or health factor changes affecting INR     Dosing Instructions: decrease your warfarin dose (8% change) with next INR in 1 week       Summary  As of 9/13/2024      Full warfarin instructions:  2 mg every Sun, Tue, Thu; 4 mg all other days   Next INR check:  9/20/2024               Telephone call with Mara's son Hong who verbalizes understanding and agrees to plan and who agrees to plan and repeated back plan correctly    Patient to recheck with home meter    Education provided: Taking warfarin: Importance of taking warfarin as instructed  Contact [phone] with any changes, questions or concerns.     Plan made per [facility] anticoagulation protocol    Coral Carlisle RN  9/13/2024  Anticoagulation Clinic  [employer] for routing messages: [employer] [facility] HOME MONITORING  [facility] patient phone line: [phone]        _______________________________________________________________________     Anticoagulation Episode Summary       Current INR goal:  2.0-3.0   TTR:  57.3% (1 y)   Target end date:  Indefinite   Send INR reminders to:  [facility]    Indications    Chronic anticoagulation [Z79.01]  Long-term (current) use of anticoagulants [Z79.01] [Z79.01]  Persistent atrial fibrillation (H) [I48.19]             Comments:  [employer] home meter. Manage by exception.              Anticoagulation Care Providers       Provider Role Specialty Phone number    Steven Abrams MD Referring Internal Medicine [phone]

## 2024-09-20 ENCOUNTER — ANTICOAGULATION THERAPY VISIT (OUTPATIENT)
Dept: ANTICOAGULATION | Facility: CLINIC | Age: 89
End: 2024-09-20
Payer: COMMERCIAL

## 2024-09-20 ENCOUNTER — TELEPHONE (OUTPATIENT)
Dept: FAMILY MEDICINE | Facility: CLINIC | Age: 89
End: 2024-09-20
Payer: COMMERCIAL

## 2024-09-20 DIAGNOSIS — Z79.01 CHRONIC ANTICOAGULATION: Primary | ICD-10-CM

## 2024-09-20 DIAGNOSIS — Z79.01 LONG TERM CURRENT USE OF ANTICOAGULANT THERAPY: ICD-10-CM

## 2024-09-20 DIAGNOSIS — I48.19 PERSISTENT ATRIAL FIBRILLATION (H): ICD-10-CM

## 2024-09-20 LAB — INR HOME MONITORING: 2 RATIO (ref 2–3)

## 2024-09-20 NOTE — PROGRESS NOTES
ANTICOAGULATION MANAGEMENT     Mara Colindres 89 year old female is on warfarin with therapeutic INR result. (Goal INR 2.0-3.0)    Recent labs: (last 7 days)     09/20/24  1444   INR 2       ASSESSMENT     Source(s): Chart Review and Patient/Caregiver Call     Warfarin doses taken: Warfarin taken as instructed  Diet: No new diet changes identified  Medication/supplement changes: None noted  New illness, injury, or hospitalization: No  Signs or symptoms of bleeding or clotting: No  Previous result: Supratherapeutic  Additional findings: None       PLAN     Recommended plan for no diet, medication or health factor changes affecting INR     Dosing Instructions: Continue your current warfarin dose with next INR in 1 week       Summary  As of 9/20/2024      Full warfarin instructions:  2 mg every Sun, Tue, Thu; 4 mg all other days   Next INR check:  9/27/2024               Telephone call with Rick, son,  who verbalizes understanding and agrees to plan    Patient to recheck with home meter    Education provided: Please call back if any changes to your diet, medications or how you've been taking warfarin  Resume manage by exception with home monitor. Continue to submit INR results to home monitor company.You will only be called when your result is out of range. Please call and notify [facility] if new medication started, dose missed, signs or symptoms of bleeding or clotting, or a surgery/procedure is scheduled. Due for next call no later than: 12/19/24.  Contact [phone] with any changes, questions or concerns.     Plan made per [facility] anticoagulation protocol    Shellie Aguayo RN  9/20/2024  Anticoagulation Clinic  [facility] for routing messages: [employer] [facility]  [facility] patient phone line: [phone]        _______________________________________________________________________     Anticoagulation Episode Summary       Current INR goal:  2.0-3.0   TTR:  58.7% (1 y)   Target end date:  Indefinite   Send INR  reminders to:  [facility]IT[facility]    Indications    Chronic anticoagulation [Z79.01]  Long-term (current) use of anticoagulants [Z79.01] [Z79.01]  Persistent atrial fibrillation (H) [I48.19]             Comments:  [employer] home meter. Manage by exception.             Anticoagulation Care Providers       Provider Role Specialty Phone number    Steven Abrams MD Referring Internal Medicine [phone]

## 2024-09-20 NOTE — LETTER
September 20, 2024    To  Mara Colindres  [address] [facility] [address]    Your team at [facility] cares about your health. We have reviewed your chart and based on our findings; we are making the following recommendations to better manage your health.     You are in particular need of attention regarding the following:     PREVENTATIVE VISIT: Annual Medicare Wellness:Schedule an Annual Medicare Wellness Exam. Please call your [facility] clinic to set up your appointment.    If you have already completed these items, please contact the clinic via phone or   MyChart so your care team can review and update your records. Thank you for   choosing [facility] for your healthcare needs. For any questions,   concerns, or to schedule an appointment please contact our clinic.    Healthy Regards,      Your [facility] Care Team

## 2024-09-20 NOTE — TELEPHONE ENCOUNTER
Patient Quality Outreach    Patient is due for the following:   Physical Annual Wellness Visit    Next Steps:   Schedule a Annual Wellness Visit    Type of outreach:    Sent letter.      Questions for provider review:    None           Kira Landon [facility]  Chart routed to none.

## 2024-09-27 ENCOUNTER — DOCUMENTATION ONLY (OUTPATIENT)
Dept: ANTICOAGULATION | Facility: CLINIC | Age: 89
End: 2024-09-27
Payer: COMMERCIAL

## 2024-09-27 DIAGNOSIS — Z79.01 CHRONIC ANTICOAGULATION: Primary | ICD-10-CM

## 2024-09-27 DIAGNOSIS — I48.19 PERSISTENT ATRIAL FIBRILLATION (H): ICD-10-CM

## 2024-09-27 DIAGNOSIS — Z79.01 LONG TERM CURRENT USE OF ANTICOAGULANT THERAPY: ICD-10-CM

## 2024-09-27 LAB — INR HOME MONITORING: 2.5 RATIO (ref 2–3)

## 2024-09-27 NOTE — PROGRESS NOTES
ANTICOAGULATION  MANAGEMENT-Home Monitor Managed by Exception    Mara MELVIN Colindres 89 year old female is on warfarin with therapeutic INR result. (Goal INR 2.0-3.0)    Recent labs: (last 7 days)     09/27/24  1109   INR 2.5       Previous INR was Therapeutic  Medication, diet, health changes since last INR:chart reviewed; none identified  Contacted within the last 12 weeks by phone on 9/20/24  Last [facility] referral date: 06/28/2024      ISABEL     Mara was NOT contacted regarding therapeutic result today per home monitoring policy manage by exception agreement.   Current warfarin dose is to be continued:     Summary  As of 9/27/2024      Full warfarin instructions:  2 mg every Sun, Tue, Thu; 4 mg all other days   Next INR check:  10/4/2024             ?   Alejandrina Wise RN  Anticoagulation Clinic  9/27/2024    _______________________________________________________________________     Anticoagulation Episode Summary       Current INR goal:  2.0-3.0   TTR:  60.6% (1 y)   Target end date:  Indefinite   Send INR reminders to:  [facility]    Indications    Chronic anticoagulation [Z79.01]  Long-term (current) use of anticoagulants [Z79.01] [Z79.01]  Persistent atrial fibrillation (H) [I48.19]             Comments:  [employer] home meter. Manage by exception.             Anticoagulation Care Providers       Provider Role Specialty Phone number    Steven Abrams MD Referring Internal Medicine [phone]

## 2024-10-04 ENCOUNTER — DOCUMENTATION ONLY (OUTPATIENT)
Dept: ANTICOAGULATION | Facility: CLINIC | Age: 89
End: 2024-10-04
Payer: COMMERCIAL

## 2024-10-04 DIAGNOSIS — Z79.01 CHRONIC ANTICOAGULATION: Primary | ICD-10-CM

## 2024-10-04 DIAGNOSIS — Z79.01 LONG TERM CURRENT USE OF ANTICOAGULANT THERAPY: ICD-10-CM

## 2024-10-04 DIAGNOSIS — E11.22 CONTROLLED TYPE 2 DIABETES MELLITUS WITH STAGE 3 CHRONIC KIDNEY DISEASE, WITHOUT LONG-TERM CURRENT USE OF INSULIN (H): ICD-10-CM

## 2024-10-04 DIAGNOSIS — I48.19 PERSISTENT ATRIAL FIBRILLATION (H): ICD-10-CM

## 2024-10-04 DIAGNOSIS — F33.0 MAJOR DEPRESSIVE DISORDER, RECURRENT EPISODE, MILD (H): ICD-10-CM

## 2024-10-04 DIAGNOSIS — N18.30 CONTROLLED TYPE 2 DIABETES MELLITUS WITH STAGE 3 CHRONIC KIDNEY DISEASE, WITHOUT LONG-TERM CURRENT USE OF INSULIN (H): ICD-10-CM

## 2024-10-04 DIAGNOSIS — N18.4 CKD (CHRONIC KIDNEY DISEASE) STAGE 4, GFR 15-29 ML/MIN (H): ICD-10-CM

## 2024-10-04 DIAGNOSIS — K52.9 CHRONIC DIARRHEA: ICD-10-CM

## 2024-10-04 DIAGNOSIS — M15.0 PRIMARY OSTEOARTHRITIS INVOLVING MULTIPLE JOINTS: ICD-10-CM

## 2024-10-04 LAB — INR HOME MONITORING: 2.9 RATIO (ref 2–3)

## 2024-10-04 NOTE — PROGRESS NOTES
ANTICOAGULATION  MANAGEMENT-Home Monitor Managed by Exception    Mara MELVIN Colindres 89 year old female is on warfarin with therapeutic INR result. (Goal INR 2.0-3.0)    Recent labs: (last 7 days)     10/04/24  1202   INR 2.9       Previous INR was Therapeutic  Medication, diet, health changes since last INR:chart reviewed; none identified  Contacted within the last 12 weeks by phone on 9/20/24  Last [facility] referral date: 06/28/2024      ISABEL     Mara was NOT contacted regarding therapeutic result today per home monitoring policy manage by exception agreement.   Current warfarin dose is to be continued:     Summary  As of 10/4/2024      Full warfarin instructions:  2 mg every Sun, Tue, Thu; 4 mg all other days   Next INR check:  10/11/2024             ?   Usha Riley RN  Anticoagulation Clinic  10/4/2024    _______________________________________________________________________     Anticoagulation Episode Summary       Current INR goal:  2.0-3.0   TTR:  62.6% (1 y)   Target end date:  Indefinite   Send INR reminders to:  [facility]    Indications    Chronic anticoagulation [Z79.01]  Long-term (current) use of anticoagulants [Z79.01] [Z79.01]  Persistent atrial fibrillation (H) [I48.19]             Comments:  [employer] home meter. Manage by exception.             Anticoagulation Care Providers       Provider Role Specialty Phone number    Steven Abrams MD Referring Internal Medicine [phone]

## 2024-10-07 RX ORDER — SEMAGLUTIDE 0.68 MG/ML
0.5 INJECTION, SOLUTION SUBCUTANEOUS
Qty: 9 ML | Refills: 1 | Status: SHIPPED | OUTPATIENT
Start: 2024-10-07

## 2024-10-07 RX ORDER — FUROSEMIDE 20 MG/1
TABLET ORAL
Qty: 360 TABLET | Refills: 3 | Status: SHIPPED | OUTPATIENT
Start: 2024-10-07

## 2024-10-07 RX ORDER — PAROXETINE 20 MG/1
20 TABLET, FILM COATED ORAL EVERY OTHER DAY
Qty: 45 TABLET | Refills: 3 | Status: SHIPPED | OUTPATIENT
Start: 2024-10-07

## 2024-10-07 RX ORDER — WARFARIN SODIUM 2 MG/1
TABLET ORAL
Qty: 180 TABLET | Refills: 1 | Status: SHIPPED | OUTPATIENT
Start: 2024-10-07

## 2024-10-07 RX ORDER — HYDROCODONE BITARTRATE AND ACETAMINOPHEN 5; 325 MG/1; MG/1
1 TABLET ORAL DAILY PRN
Qty: 30 TABLET | Refills: 0 | Status: SHIPPED | OUTPATIENT
Start: 2024-10-07 | End: 2024-11-01

## 2024-10-07 RX ORDER — DIPHENOXYLATE HYDROCHLORIDE AND ATROPINE SULFATE 2.5; .025 MG/1; MG/1
1 TABLET ORAL 4 TIMES DAILY PRN
Qty: 40 TABLET | Refills: 0 | Status: SHIPPED | OUTPATIENT
Start: 2024-10-07

## 2024-10-09 ENCOUNTER — TELEPHONE (OUTPATIENT)
Dept: PHARMACY | Facility: CLINIC | Age: 89
End: 2024-10-09
Payer: COMMERCIAL

## 2024-10-09 NOTE — TELEPHONE ENCOUNTER
Called to schedule MTM appt, no answer, LVM.    Arlene Ham, PharmD  Medication Therapy Management Pharmacist  [phone]  [facility]: [phone]

## 2024-10-11 ENCOUNTER — DOCUMENTATION ONLY (OUTPATIENT)
Dept: ANTICOAGULATION | Facility: CLINIC | Age: 89
End: 2024-10-11
Payer: COMMERCIAL

## 2024-10-11 DIAGNOSIS — I48.19 PERSISTENT ATRIAL FIBRILLATION (H): ICD-10-CM

## 2024-10-11 DIAGNOSIS — Z79.01 CHRONIC ANTICOAGULATION: Primary | ICD-10-CM

## 2024-10-11 DIAGNOSIS — Z79.01 LONG TERM CURRENT USE OF ANTICOAGULANT THERAPY: ICD-10-CM

## 2024-10-11 LAB — INR HOME MONITORING: 2.7 RATIO (ref 2–3)

## 2024-10-11 NOTE — PROGRESS NOTES
ANTICOAGULATION  MANAGEMENT-Home Monitor Managed by Exception    Mara MELVIN Colindres 89 year old female is on warfarin with therapeutic INR result. (Goal INR 2.0-3.0)    Recent labs: (last 7 days)     10/11/24  1204   INR 2.7       Previous INR was Therapeutic  Medication, diet, health changes since last INR:chart reviewed; none identified  Contacted within the last 12 weeks by phone on 9/20/24  Last [facility] referral date: 06/28/2024      ISABEL     Mara was NOT contacted regarding therapeutic result today per home monitoring policy manage by exception agreement.   Current warfarin dose is to be continued:     Summary  As of 10/11/2024      Full warfarin instructions:  2 mg every Sun, Tue, Thu; 4 mg all other days   Next INR check:  10/18/2024             ?   Coral Oliver RN  Anticoagulation Clinic  10/11/2024    _______________________________________________________________________     Anticoagulation Episode Summary       Current INR goal:  2.0-3.0   TTR:  64.5% (1 y)   Target end date:  Indefinite   Send INR reminders to:  [facility]    Indications    Chronic anticoagulation [Z79.01]  Long-term (current) use of anticoagulants [Z79.01] [Z79.01]  Persistent atrial fibrillation (H) [I48.19]             Comments:  [employer] home meter. Manage by exception.             Anticoagulation Care Providers       Provider Role Specialty Phone number    Steven Abrams MD Referring Internal Medicine [phone]

## 2024-10-16 DIAGNOSIS — I50.32 CHRONIC DIASTOLIC CONGESTIVE HEART FAILURE (H): ICD-10-CM

## 2024-10-16 DIAGNOSIS — R06.02 SOB (SHORTNESS OF BREATH): ICD-10-CM

## 2024-10-16 DIAGNOSIS — N18.4 CKD (CHRONIC KIDNEY DISEASE) STAGE 4, GFR 15-29 ML/MIN (H): ICD-10-CM

## 2024-10-16 DIAGNOSIS — E11.22 CONTROLLED TYPE 2 DIABETES MELLITUS WITH STAGE 3 CHRONIC KIDNEY DISEASE, WITHOUT LONG-TERM CURRENT USE OF INSULIN (H): ICD-10-CM

## 2024-10-16 DIAGNOSIS — I27.20 PULMONARY HTN (H): ICD-10-CM

## 2024-10-16 DIAGNOSIS — N18.30 CONTROLLED TYPE 2 DIABETES MELLITUS WITH STAGE 3 CHRONIC KIDNEY DISEASE, WITHOUT LONG-TERM CURRENT USE OF INSULIN (H): ICD-10-CM

## 2024-10-16 NOTE — TELEPHONE ENCOUNTER
Pending Prescriptions:                       Disp   Refills    empagliflozin (JARDIANCE) 10 MG TABS tabl*90 tab*0            Sig: Take 1 tablet (10 mg) by mouth daily.    metoprolol tartrate (LOPRESSOR) 100 MG ta*90 tab*3            Sig: TAKE 1/2 TABLET(50 MG) BY MOUTH TWICE DAILY    ACCU-CHEK GUIDE test strip                100 st*5            Sig: Use to test blood sugar 1-3 times daily or as           directed.    potassium chloride michelle ER (KLOR-CON M20)*90 tab*1            Sig: Take 1 tablet (20 mEq) by mouth daily (with           dinner).    Shayy Posada CMA

## 2024-10-17 RX ORDER — POTASSIUM CHLORIDE 1500 MG/1
20 TABLET, EXTENDED RELEASE ORAL
Qty: 90 TABLET | Refills: 1 | Status: SHIPPED | OUTPATIENT
Start: 2024-10-17

## 2024-10-17 RX ORDER — METOPROLOL TARTRATE 100 MG/1
TABLET ORAL
Qty: 90 TABLET | Refills: 1 | Status: SHIPPED | OUTPATIENT
Start: 2024-10-17

## 2024-10-18 ENCOUNTER — ANTICOAGULATION THERAPY VISIT (OUTPATIENT)
Dept: ANTICOAGULATION | Facility: CLINIC | Age: 89
End: 2024-10-18
Payer: COMMERCIAL

## 2024-10-18 DIAGNOSIS — I48.19 PERSISTENT ATRIAL FIBRILLATION (H): ICD-10-CM

## 2024-10-18 DIAGNOSIS — Z79.01 CHRONIC ANTICOAGULATION: Primary | ICD-10-CM

## 2024-10-18 DIAGNOSIS — Z79.01 LONG TERM CURRENT USE OF ANTICOAGULANT THERAPY: ICD-10-CM

## 2024-10-18 LAB — INR HOME MONITORING: 3.2 RATIO (ref 2–3)

## 2024-10-18 NOTE — PROGRESS NOTES
ANTICOAGULATION MANAGEMENT     Mara Colindres 89 year old female is on warfarin with supratherapeutic INR result. (Goal INR 2.0-3.0)    Recent labs: (last 7 days)     10/18/24  1525   INR 3.2*       ASSESSMENT     Source(s): Chart Review and Patient/Caregiver Call     Warfarin doses taken: Warfarin taken as instructed  Diet: No new diet changes identified  Medication/supplement changes: None noted  New illness, injury, or hospitalization: No  Signs or symptoms of bleeding or clotting: No  Previous result: Therapeutic last 2(+) visits  Additional findings: None       PLAN     Recommended plan for no diet, medication or health factor changes affecting INR     Dosing Instructions: Continue your current warfarin dose with next INR in 1 week       Summary  As of 10/18/2024      Full warfarin instructions:  2 mg every Sun, Tue, Thu; 4 mg all other days   Next INR check:  10/25/2024               Telephone call with patient's son Hong who verbalizes understanding and agrees to plan and who agrees to plan and repeated back plan correctly    Patient to recheck with home meter    Education provided: Contact [phone] with any changes, questions or concerns.     Plan made per [facility] anticoagulation protocol    Coral Carlisle RN  10/18/2024  Anticoagulation Clinic  [facility] for routing messages: p ANTICOAG HOME MONITORING  [facility] patient phone line: [phone]        _______________________________________________________________________     Anticoagulation Episode Summary       Current INR goal:  2.0-3.0   TTR:  65.7% (1 y)   Target end date:  Indefinite   Send INR reminders to:  ANTICOAG HOME MONITORING    Indications    Chronic anticoagulation [Z79.01]  Long-term (current) use of anticoagulants [Z79.01] [Z79.01]  Persistent atrial fibrillation (H) [I48.19]             Comments:  [employer] home meter. Manage by exception.             Anticoagulation Care Providers       Provider Role Specialty Phone number    Steven Abrams  MD [facility] Internal [facility] [phone]

## 2024-10-23 ENCOUNTER — TELEPHONE (OUTPATIENT)
Dept: CARDIOLOGY | Facility: CLINIC | Age: 89
End: 2024-10-23
Payer: COMMERCIAL

## 2024-10-23 NOTE — TELEPHONE ENCOUNTER
Left Voicemail (1st Attempt) for the patient to call back and schedule the following:    Appointment type:  rtm PH  Provider: AKI  Return date: 04/08/25  Specialty phone number: [phone] OLPT 1   Additional appointment(s) needed: ECHO, LABS   Additonal Notes: RESCHEDULED FROM 11/26/25

## 2024-10-25 ENCOUNTER — ANTICOAGULATION THERAPY VISIT (OUTPATIENT)
Dept: ANTICOAGULATION | Facility: CLINIC | Age: 89
End: 2024-10-25
Payer: COMMERCIAL

## 2024-10-25 DIAGNOSIS — Z79.01 CHRONIC ANTICOAGULATION: Primary | ICD-10-CM

## 2024-10-25 DIAGNOSIS — I48.19 PERSISTENT ATRIAL FIBRILLATION (H): ICD-10-CM

## 2024-10-25 DIAGNOSIS — Z79.01 LONG TERM CURRENT USE OF ANTICOAGULANT THERAPY: ICD-10-CM

## 2024-10-25 LAB — INR HOME MONITORING: 2.9 RATIO (ref 2–3)

## 2024-10-25 NOTE — PROGRESS NOTES
ANTICOAGULATION MANAGEMENT     Mara Colindres 89 year old female is on warfarin with therapeutic INR result. (Goal INR 2.0-3.0)    Recent labs: (last 7 days)     10/25/24  1242   INR 2.9       ASSESSMENT     Source(s): Chart Review and Patient/Caregiver Call     Warfarin doses taken: Warfarin taken as instructed  Diet: No new diet changes identified  Medication/supplement changes: None noted  New illness, injury, or hospitalization: No  Signs or symptoms of bleeding or clotting: No  Previous result: Supratherapeutic  Additional findings: None       PLAN     Recommended plan for no diet, medication or health factor changes affecting INR     Dosing Instructions: Continue your current warfarin dose with next INR in 1 week       Summary  As of 10/25/2024      Full warfarin instructions:  2 mg every Sun, Tue, Thu; 4 mg all other days   Next INR check:  11/1/2024               Telephone call with Hong who verbalizes understanding and agrees to plan    Patient to recheck with home meter    Education provided: Please call back if any changes to your diet, medications or how you've been taking warfarin  Symptom monitoring: monitoring for bleeding signs and symptoms and monitoring for clotting signs and symptoms  Resume manage by exception with home monitor. Continue to submit INR results to home monitor company.You will only be called when your result is out of range. Please call and notify [facility] if new medication started, dose missed, signs or symptoms of bleeding or clotting, or a surgery/procedure is scheduled. Due for next call no later than: 1/23/25.    Plan made per [facility] anticoagulation protocol    Coral Oliver RN  10/25/2024  Anticoagulation Clinic  [facility] for routing messages: deshaun [facility] MONITOR[facility]  [facility] patient phone line: [phone]        _______________________________________________________________________     Anticoagulation Episode Summary       Current INR goal:  2.0-3.0   TTR:  65.0% (1 y)    Target end date:  Indefinite   Send INR reminders to:  ANTICOAG HOME MONITORING    Indications    Chronic anticoagulation [Z79.01]  Long-term (current) use of anticoagulants [Z79.01] [Z79.01]  Persistent atrial fibrillation (H) [I48.19]             Comments:  [employer] home meter. Manage by exception.             Anticoagulation Care Providers       Provider Role Specialty Phone number    Steven Abrams MD Referring Internal Medicine [phone]

## 2024-11-01 ENCOUNTER — DOCUMENTATION ONLY (OUTPATIENT)
Dept: ANTICOAGULATION | Facility: CLINIC | Age: 89
End: 2024-11-01
Payer: COMMERCIAL

## 2024-11-01 ENCOUNTER — TELEPHONE (OUTPATIENT)
Dept: FAMILY MEDICINE | Facility: CLINIC | Age: 89
End: 2024-11-01
Payer: COMMERCIAL

## 2024-11-01 DIAGNOSIS — I27.20 PULMONARY HTN (H): ICD-10-CM

## 2024-11-01 DIAGNOSIS — G89.29 OTHER CHRONIC PAIN: ICD-10-CM

## 2024-11-01 DIAGNOSIS — T84.018S FAILED TOTAL HIP ARTHROPLASTY, SEQUELA: ICD-10-CM

## 2024-11-01 DIAGNOSIS — I89.0 LYMPHEDEMA: ICD-10-CM

## 2024-11-01 DIAGNOSIS — Z79.01 LONG TERM CURRENT USE OF ANTICOAGULANT THERAPY: ICD-10-CM

## 2024-11-01 DIAGNOSIS — Z79.01 CHRONIC ANTICOAGULATION: Primary | ICD-10-CM

## 2024-11-01 DIAGNOSIS — I48.19 PERSISTENT ATRIAL FIBRILLATION (H): ICD-10-CM

## 2024-11-01 DIAGNOSIS — E08.42 DIABETIC POLYNEUROPATHY ASSOCIATED WITH DIABETES MELLITUS DUE TO UNDERLYING CONDITION (H): ICD-10-CM

## 2024-11-01 DIAGNOSIS — M15.0 PRIMARY OSTEOARTHRITIS INVOLVING MULTIPLE JOINTS: Primary | ICD-10-CM

## 2024-11-01 DIAGNOSIS — Z96.649 FAILED TOTAL HIP ARTHROPLASTY, SEQUELA: ICD-10-CM

## 2024-11-01 DIAGNOSIS — R09.02 OXYGEN DESATURATION: ICD-10-CM

## 2024-11-01 LAB — INR HOME MONITORING: 2 RATIO (ref 2–3)

## 2024-11-01 RX ORDER — HYDROCODONE BITARTRATE AND ACETAMINOPHEN 5; 325 MG/1; MG/1
1 TABLET ORAL DAILY PRN
Qty: 30 TABLET | Refills: 0 | Status: SHIPPED | OUTPATIENT
Start: 2024-11-01

## 2024-11-01 NOTE — TELEPHONE ENCOUNTER
Patient/family called back and RN relayed provider's message. Patient/family verbalized understanding.     Mihaela Roy RN, BSN  [facility]: [place]

## 2024-11-01 NOTE — TELEPHONE ENCOUNTER
Called patient. Left voice message to return call at [phone].    MALCOM Mayen RN  [facility]

## 2024-11-01 NOTE — PROGRESS NOTES
ANTICOAGULATION  MANAGEMENT-Home Monitor Managed by Exception    Mara MELVIN Colindres 89 year old female is on warfarin with therapeutic INR result. (Goal INR 2.0-3.0)    Recent labs: (last 7 days)     11/01/24  1012   INR 2       Previous INR was Therapeutic  Medication, diet, health changes since last INR:chart reviewed; none identified  Contacted within the last 12 weeks by phone on 10/25/24  Last [facility] referral date: 06/28/2024      ISABEL     Mara was NOT contacted regarding therapeutic result today per home monitoring policy manage by exception agreement.   Current warfarin dose is to be continued:     Summary  As of 11/1/2024      Full warfarin instructions:  2 mg every Sun, Tue, Thu; 4 mg all other days   Next INR check:  11/8/2024             ?   Coral Oliver RN  Anticoagulation Clinic  11/1/2024    _______________________________________________________________________     Anticoagulation Episode Summary       Current INR goal:  2.0-3.0   TTR:  64.9% (1 y)   Target end date:  Indefinite   Send INR reminders to:  [facility]IT[facility]    Indications    Chronic anticoagulation [Z79.01]  Long-term (current) use of anticoagulants [Z79.01] [Z79.01]  Persistent atrial fibrillation (H) [I48.19]             Comments:  [employer] home meter. Manage by exception.             Anticoagulation Care Providers       Provider Role Specialty Phone number    Steven Abrams MD Referring Internal Medicine [phone]

## 2024-11-01 NOTE — TELEPHONE ENCOUNTER
The prescription for HYDROcodone (VICODIN) is sent.    The second issue is requesting a new motorized scooter type of vehicle . This is expensive and while we certainly see patient needs this device and I think will be ultimately successful in this pursuit of a new vehicle , we have to follow the prescribed pathway to the letter , otherwise we won't get coverage for this sort of a device    So I have placed a referral order for the patient to be seen by the [facility] with [facility]     Just please notify patient of this information. The clinic will be calling her to set up the appointment. Reroute if additional input requested from me     Steven Abrams MD

## 2024-11-01 NOTE — TELEPHONE ENCOUNTER
Routing to Patrice CONDE team    Patient calling    She was only able to get 7 out of 30 tablets for [facility]    [facility] to verify that she only got 7 and then request the remainder from PCP if she needs a new order    Mihaela Roy RN

## 2024-11-01 NOTE — TELEPHONE ENCOUNTER
Spoke with pharmacist from [facility]. They verified that she did only receive 7 tablets.    Because patient is new to opioid therapy, therefore, insurance plan will pay for shortest duration (7 tablets) for the initial fill. Each fill thereafter will be filled for prescribed amount.    New prescription is needed.     Informed patient. Patient verbalized understanding. Pended medication.    Patient additionally requesting a new order for a scooter. She has had a scooter for the past 12 years, however, she states she had tipped over on the scooter a few times as it is only 3 wheels.     Patient would like new order for different scooter with 4 wheels.    Patient uses a walker when at home, however, uses mechanical scooter when she is out as she is unable to walk long distances.     Please advise.    MALCOM Mayen RN  [facility]

## 2024-11-06 ENCOUNTER — TELEPHONE (OUTPATIENT)
Dept: FAMILY MEDICINE | Facility: CLINIC | Age: 89
End: 2024-11-06
Payer: COMMERCIAL

## 2024-11-06 NOTE — TELEPHONE ENCOUNTER
We have been unable to reach this patient for MTM follow-up after several attempts. We will stop reaching out to the patient at this time. Please let us know if we can assist in this patient's care in the future.     Arlene Ham, PharmD  Medication Therapy Management Pharmacist  [phone]

## 2024-11-09 LAB — INR HOME MONITORING: 2.3 RATIO (ref 2–3)

## 2024-11-11 ENCOUNTER — DOCUMENTATION ONLY (OUTPATIENT)
Dept: ANTICOAGULATION | Facility: CLINIC | Age: 89
End: 2024-11-11
Payer: COMMERCIAL

## 2024-11-11 DIAGNOSIS — Z79.01 CHRONIC ANTICOAGULATION: Primary | ICD-10-CM

## 2024-11-11 DIAGNOSIS — I48.19 PERSISTENT ATRIAL FIBRILLATION (H): ICD-10-CM

## 2024-11-11 DIAGNOSIS — Z79.01 LONG TERM CURRENT USE OF ANTICOAGULANT THERAPY: ICD-10-CM

## 2024-11-11 NOTE — PROGRESS NOTES
ANTICOAGULATION  MANAGEMENT-Home Monitor Managed by Exception    Mara MELVIN Colindres 89 year old female is on warfarin with therapeutic INR result. (Goal INR 2.0-3.0)    Recent labs: (last 7 days)     11/09/24  1419   INR 2.3       Previous INR was Therapeutic  Medication, diet, health changes since last INR:chart reviewed; none identified  Contacted within the last 12 weeks by phone on 10/25/24  Due for next call no later than: 1/23/25.   Last [facility] referral date: 06/28/2024      ISABEL Terry was NOT contacted regarding therapeutic result today per home monitoring policy manage by exception agreement.   Current warfarin dose is to be continued:     Summary  As of 11/11/2024      Full warfarin instructions:  2 mg every Sun, Tue, Thu; 4 mg all other days   Next INR check:  11/18/2024             ?   Belkis Nunez RN  Anticoagulation Clinic  11/11/2024    _______________________________________________________________________     Anticoagulation Episode Summary       Current INR goal:  2.0-3.0   TTR:  66.0% (1 y)   Target end date:  Indefinite   Send INR reminders to:  [facility]IT[facility]    Indications    Chronic anticoagulation [Z79.01]  Long-term (current) use of anticoagulants [Z79.01] [Z79.01]  Persistent atrial fibrillation (H) [I48.19]             Comments:  [employer] home meter. Manage by exception.             Anticoagulation Care Providers       Provider Role Specialty Phone number    Steven Abrams MD Referring Internal Medicine [phone]

## 2024-11-14 ENCOUNTER — DOCUMENTATION ONLY (OUTPATIENT)
Dept: ANTICOAGULATION | Facility: CLINIC | Age: 89
End: 2024-11-14
Payer: COMMERCIAL

## 2024-11-14 DIAGNOSIS — I48.19 PERSISTENT ATRIAL FIBRILLATION (H): ICD-10-CM

## 2024-11-14 DIAGNOSIS — Z79.01 LONG TERM CURRENT USE OF ANTICOAGULANT THERAPY: ICD-10-CM

## 2024-11-14 DIAGNOSIS — Z79.01 CHRONIC ANTICOAGULATION: Primary | ICD-10-CM

## 2024-11-14 LAB — INR HOME MONITORING: 2 RATIO (ref 2–3)

## 2024-11-14 NOTE — PROGRESS NOTES
ANTICOAGULATION  MANAGEMENT-Home Monitor Managed by Exception    Mara CHARLES Jens 89 year old female is on warfarin with therapeutic INR result. (Goal INR 2.0-3.0)    Recent labs: (last 7 days)     11/14/24  1436   INR 2       Previous INR was Therapeutic  Medication, diet, health changes since last INR:chart reviewed; none identified  Contacted within the last 12 weeks by phone on 10/25/2024  Due for next call no later than: 1/23/25.   Last [facility] referral date: 06/28/2024      ISABEL     Mara was NOT contacted regarding therapeutic result today per home monitoring policy manage by exception agreement.   Current warfarin dose is to be continued:     Summary  As of 11/14/2024      Full warfarin instructions:  2 mg every Sun, Tue, Thu; 4 mg all other days   Next INR check:  11/21/2024             ?   Shellie Aguayo RN  Anticoagulation Clinic  11/14/2024    _______________________________________________________________________     Anticoagulation Episode Summary       Current INR goal:  2.0-3.0   TTR:  67.1% (1 y)   Target end date:  Indefinite   Send INR reminders to:  [facility]    Indications    Chronic anticoagulation [Z79.01]  Long-term (current) use of anticoagulants [Z79.01] [Z79.01]  Persistent atrial fibrillation (H) [I48.19]             Comments:  [employer] home meter. Manage by exception.             Anticoagulation Care Providers       Provider Role Specialty Phone number    Steven Abrams MD Referring Internal Medicine [phone]

## 2024-11-18 ENCOUNTER — TELEPHONE (OUTPATIENT)
Dept: FAMILY MEDICINE | Facility: CLINIC | Age: 89
End: 2024-11-18
Payer: COMMERCIAL

## 2024-11-18 NOTE — TELEPHONE ENCOUNTER
Nursing Team,    Please check to make sure everything in place for prolia and please call to help schedule patient.     Thanks,  AMAYA Granados  [facility]

## 2024-11-18 NOTE — TELEPHONE ENCOUNTER
Attempt #1.    Left vm asking call back so we can help schedule prolia.     Team, please try to call again at later date/time. If unable to connect after third call let us know and we can cue up letter.     Thanks,  AMAYA Granados  [employer]

## 2024-11-21 ENCOUNTER — DOCUMENTATION ONLY (OUTPATIENT)
Dept: ANTICOAGULATION | Facility: CLINIC | Age: 89
End: 2024-11-21
Payer: COMMERCIAL

## 2024-11-21 DIAGNOSIS — Z79.01 LONG TERM CURRENT USE OF ANTICOAGULANT THERAPY: ICD-10-CM

## 2024-11-21 DIAGNOSIS — I48.19 PERSISTENT ATRIAL FIBRILLATION (H): ICD-10-CM

## 2024-11-21 DIAGNOSIS — Z79.01 CHRONIC ANTICOAGULATION: Primary | ICD-10-CM

## 2024-11-21 LAB — INR HOME MONITORING: 2 RATIO (ref 2–3)

## 2024-11-21 NOTE — PROGRESS NOTES
ANTICOAGULATION  MANAGEMENT-Home Monitor Managed by Exception    Mara MELVIN Colindres 89 year old female is on warfarin with therapeutic INR result. (Goal INR 2.0-3.0)    Recent labs: (last 7 days)     11/21/24  1658   INR 2       Previous INR was Therapeutic  Medication, diet, health changes since last INR:chart reviewed; none identified  Contacted within the last 12 weeks by phone on 10/25/24  Last [facility] referral date: 06/28/2024      ISABEL     Mara was NOT contacted regarding therapeutic result today per home monitoring policy manage by exception agreement.   Current warfarin dose is to be continued:     Summary  As of 11/21/2024      Full warfarin instructions:  2 mg every Sun, Tue, Thu; 4 mg all other days   Next INR check:  11/29/2024             ?   Marcos Jauregui RN  Anticoagulation Clinic  11/21/2024    _______________________________________________________________________     Anticoagulation Episode Summary       Current INR goal:  2.0-3.0   TTR:  69.0% (1 y)   Target end date:  Indefinite   Send INR reminders to:  [facility]    Indications    Chronic anticoagulation [Z79.01]  Long-term (current) use of anticoagulants [Z79.01] [Z79.01]  Persistent atrial fibrillation (H) [I48.19]             Comments:  [employer] home meter. Manage by exception.             Anticoagulation Care Providers       Provider Role Specialty Phone number    Steven Abrams MD Referring Internal Medicine [phone]

## 2024-11-27 DIAGNOSIS — E11.22 CONTROLLED TYPE 2 DIABETES MELLITUS WITH STAGE 3 CHRONIC KIDNEY DISEASE, WITHOUT LONG-TERM CURRENT USE OF INSULIN (H): ICD-10-CM

## 2024-11-27 DIAGNOSIS — N18.30 CONTROLLED TYPE 2 DIABETES MELLITUS WITH STAGE 3 CHRONIC KIDNEY DISEASE, WITHOUT LONG-TERM CURRENT USE OF INSULIN (H): ICD-10-CM

## 2024-11-27 RX ORDER — SEMAGLUTIDE 0.68 MG/ML
0.5 INJECTION, SOLUTION SUBCUTANEOUS
Qty: 9 ML | Refills: 1 | Status: SHIPPED | OUTPATIENT
Start: 2024-11-27

## 2024-11-27 NOTE — TELEPHONE ENCOUNTER
Medication Question or Refill    Contacts       Contact Date/Time Type Contact Phone/Fax    11/27/2024 09:52 AM CST Phone (Incoming) Jens Mara MELVIN (Self) [phone] (H)            What medication are you calling about (include dose and sig)?: semaglutide (OZEMPIC, 0.25 OR 0.5 MG/DOSE,) 2 MG/3ML pen     Preferred Pharmacy:     [facility]/pharmacy #[id number] - [place] [address] AT [facility] OF [address] [place] [address]  Phone: [phone] Fax: [phone]    Controlled Substance Agreement on file:   CSA -- Patient Level:    CSA: None found at the patient level.       Who prescribed the medication?:     Do you need a refill? Yes    When did you use the medication last? Last week    Patient offered an appointment? No    Do you have any questions or concerns?  No      Okay to leave a detailed message?: Yes at Home number on file [phone] (home)    Halima Bob,

## 2024-12-03 ENCOUNTER — TELEPHONE (OUTPATIENT)
Dept: FAMILY MEDICINE | Facility: CLINIC | Age: 89
End: 2024-12-03

## 2024-12-03 ENCOUNTER — LAB (OUTPATIENT)
Dept: LAB | Facility: CLINIC | Age: 89
End: 2024-12-03
Payer: COMMERCIAL

## 2024-12-03 DIAGNOSIS — E55.9 HYPOVITAMINOSIS D: ICD-10-CM

## 2024-12-03 DIAGNOSIS — M81.0 AGE-RELATED OSTEOPOROSIS WITHOUT CURRENT PATHOLOGICAL FRACTURE: ICD-10-CM

## 2024-12-03 LAB
ANION GAP SERPL CALCULATED.3IONS-SCNC: 15 MMOL/L (ref 7–15)
BUN SERPL-MCNC: 33 MG/DL (ref 8–23)
CALCIUM SERPL-MCNC: 9.7 MG/DL (ref 8.8–10.4)
CHLORIDE SERPL-SCNC: 100 MMOL/L (ref 98–107)
CREAT SERPL-MCNC: 2.24 MG/DL (ref 0.51–0.95)
EGFRCR SERPLBLD CKD-EPI 2021: 20 ML/MIN/1.73M2
GLUCOSE SERPL-MCNC: 194 MG/DL (ref 70–99)
HCO3 SERPL-SCNC: 24 MMOL/L (ref 22–29)
MAGNESIUM SERPL-MCNC: 2.2 MG/DL (ref 1.7–2.3)
POTASSIUM SERPL-SCNC: 4.6 MMOL/L (ref 3.4–5.3)
SODIUM SERPL-SCNC: 139 MMOL/L (ref 135–145)
VIT D+METAB SERPL-MCNC: 63 NG/ML (ref 20–50)

## 2024-12-03 PROCEDURE — 36415 COLL VENOUS BLD VENIPUNCTURE: CPT

## 2024-12-03 PROCEDURE — 82306 VITAMIN D 25 HYDROXY: CPT

## 2024-12-03 PROCEDURE — 80048 BASIC METABOLIC PNL TOTAL CA: CPT

## 2024-12-03 PROCEDURE — 83735 ASSAY OF MAGNESIUM: CPT

## 2024-12-03 NOTE — TELEPHONE ENCOUNTER
Patient calling to see if she would be able to receive RSV vaccine today when she comes to clinic for labs.    MA schedule is full so she will call the pharmacy to see if they can do it today.    Christine M Klisch, RN

## 2024-12-04 ENCOUNTER — TELEPHONE (OUTPATIENT)
Dept: FAMILY MEDICINE | Facility: CLINIC | Age: 89
End: 2024-12-04
Payer: COMMERCIAL

## 2024-12-04 DIAGNOSIS — N18.4 CKD (CHRONIC KIDNEY DISEASE) STAGE 4, GFR 15-29 ML/MIN (H): Primary | ICD-10-CM

## 2024-12-04 NOTE — TELEPHONE ENCOUNTER
----- Message from Steven Abrams sent at 12/4/2024  3:07 PM CST -----  Hi Mara,    These tests show us that your renal function is somewhat further declines . The differences between all of the gfr [ glomerular filtration rate ] measurements we have followed with your case going as far back as a year ago are all into the beginning of chronic kidney disease stage 4.    The Vitamin D levels and the magnesium are within normal limits.    In my opinion your kidney disease is advanced enough that it may be prudent for you to have some Nephrologist attention to this. I am well aware of the fact that your age is indeed advancing and it is inevitable that there will be some problems arising. It is a grey area here. If you are simply not interested to meet with a Nephrologist  the only advice I can really give is things you already know. No use of non-steroidal anti-inflammatory drugs and push fluids. We need to be certain your blood pressure is well controlled.    If you want to hold off on Nephrology particularly if you would not want to have dialysis , you don't need this follow up.    Note to RN     I just want you to review the outlines of what I have said here and if she declines to see Nephrologist then she needs to have a follow up appointment with me in 3-6 months with recheck renal function     Reroute if additional input requested from me . Offer patient telephone MD visit with me if she wishes    Steven Abrams MD

## 2024-12-04 NOTE — TELEPHONE ENCOUNTER
Dr. Abrams    Spoke with pt and notified of below. Patient asked what is causing her kidney function decline. I told her that I would have to look at visits note to determine specific cause for her but I told her that two common reasons for kidney decline includes uncontrolled DM or uncontrolled HTN.     She is willing to see specialty as she is nervous about her kidneys. Please sign referral.     Thanks,  AMAYA Granados  [facility]

## 2024-12-05 ENCOUNTER — ANTICOAGULATION THERAPY VISIT (OUTPATIENT)
Dept: ANTICOAGULATION | Facility: CLINIC | Age: 89
End: 2024-12-05
Payer: COMMERCIAL

## 2024-12-05 DIAGNOSIS — I48.19 PERSISTENT ATRIAL FIBRILLATION (H): ICD-10-CM

## 2024-12-05 DIAGNOSIS — Z79.01 CHRONIC ANTICOAGULATION: Primary | ICD-10-CM

## 2024-12-05 DIAGNOSIS — Z79.01 LONG TERM CURRENT USE OF ANTICOAGULANT THERAPY: ICD-10-CM

## 2024-12-05 LAB — INR HOME MONITORING: 2.5 RATIO (ref 2–3)

## 2024-12-05 NOTE — PROGRESS NOTES
ANTICOAGULATION  MANAGEMENT-Home Monitor Managed by Exception    Mara MELVIN Colindres 89 year old female is on warfarin with therapeutic INR result. (Goal INR 2.0-3.0)    Recent labs: (last 7 days)     12/05/24  1841   INR 2.5       Previous INR was Therapeutic  Medication, diet, health changes since last INR:chart reviewed; none identified  Contacted within the last 12 weeks by phone on 10/25/24  Due for next call no later than: 1/23/25.(Copied from TE/ACC dated 10/25/24)  Last [facility] referral date: 06/28/2024      ISABEL Terry was NOT contacted regarding therapeutic result today per home monitoring policy manage by exception agreement.   Current warfarin dose is to be continued:     Summary  As of 12/5/2024      Full warfarin instructions:  2 mg every Sun, Tue, Thu; 4 mg all other days   Next INR check:  12/12/2024             ?   Belkis Nunez, RN  Anticoagulation Clinic  12/5/2024    _______________________________________________________________________     Anticoagulation Episode Summary       Current INR goal:  2.0-3.0   TTR:  70.0% (1 y)   Target end date:  Indefinite   Send INR reminders to:  [facility]    Indications    Chronic anticoagulation [Z79.01]  Long-term (current) use of anticoagulants [Z79.01] [Z79.01]  Persistent atrial fibrillation (H) [I48.19]             Comments:  mdINANABELLA home meter. Manage by exception.             Anticoagulation Care Providers       Provider Role Specialty Phone number    Steven Abrams MD Referring Internal Medicine [phone]

## 2024-12-10 ENCOUNTER — TELEPHONE (OUTPATIENT)
Dept: FAMILY MEDICINE | Facility: CLINIC | Age: 89
End: 2024-12-10

## 2024-12-10 ENCOUNTER — ALLIED HEALTH/NURSE VISIT (OUTPATIENT)
Dept: FAMILY MEDICINE | Facility: CLINIC | Age: 89
End: 2024-12-10
Payer: COMMERCIAL

## 2024-12-10 DIAGNOSIS — M81.0 AGE-RELATED OSTEOPOROSIS WITHOUT CURRENT PATHOLOGICAL FRACTURE: Primary | ICD-10-CM

## 2024-12-10 NOTE — TELEPHONE ENCOUNTER
See Allied Health/Nurse Visit from 12/10:    Is there a calcium order for 1 month post Prolia injection? No. Open an Orders Only encounter and pend Calcium. Route to ordering provider and schedule Calcium lab in next month.     Routing to provider to please place orders for Calcium re-draw per protocol. Once orders placed, please route to team to further assist with booking lab appointment in one month.    NICANOR MayenN RN  [facility]

## 2024-12-10 NOTE — PROGRESS NOTES
Clinic Administered Medication Documentation      Prolia Documentation    Indication: Prolia  (denosumab) is a prescription medicine used to treat osteoporosis in patients who:   Are at high risk for fracture, meaning patients who have had a fracture related to osteoporosis, or who have multiple risk factors for fracture.  Cannot use another osteoporosis medicine or other osteoporosis medicines did not work well.  The timeline for early/late injections would be 4 weeks early and any time after the 6 month kat. If a patient receives their injection late, then the subsequent injection would be 6 months from the date that they actually received the injection.    When was the last injection?  2024  Was the last injection at least 6 months ago? Yes  Has the prior authorization been completed?  Yes  Is there an active order (written within the past 365 days, with administrations remaining, not ) in the chart?  Yes   GFR Estimate   Date Value Ref Range Status   2024 20 (L) >60 mL/min/1.73m2 Final     Comment:     eGFR calculated using  CKD-EPI equation.   2021 30 (L) >60 mL/min/[1.73_m2] Final     Comment:     Non  GFR Calc  Starting 2018, serum creatinine based estimated GFR (eGFR) will be   calculated using the Chronic Kidney Disease Epidemiology Collaboration   (CKD-EPI) equation.       Has patient had a GFR within the last 12 months? Yes   Is GFR under 30, or patient has a diagnosis of CKD4 or CKD5? Yes   Calcium   Date Value Ref Range Status   2024 9.7 8.8 - 10.4 mg/dL Final     Comment:     Reference intervals for this test were updated on 2024 to reflect our healthy population more accurately. There may be differences in the flagging of prior results with similar values performed with this method. Those prior results can be interpreted in the context of the updated reference intervals.   2021 9.4 8.5 - 10.1 mg/dL Final     Is the calcium results 8.8  or above? Yes   Was the calcium done after last Prolia injection? Yes   Is there a calcium order for 1 month post Prolia injection? No. Open an Orders Only encounter and pend Calcium. Route to ordering provider and schedule Calcium lab in next month.   Patient denies gastric bypass or parathyroid surgery in past 6 months? Yes - patient denies.   Patient denies dental work in the past two months involving drilling into the bone, such as implants/extractions, oral surgery or a tooth extraction that has not healed yet?  Yes  Patient denies plans for an emergency tooth extraction within the next week? Yes    The following steps were completed to comply with the REMS program for Prolia:  Reviewed information in the Medication Guide, including the serious risks of Prolia  and the symptoms of each risk.  Advised patient to seek prompt medical attention if they have signs or symptoms of any of the serious risks.  Provided each patient a copy of the Medication Guide and Patient Guide.    Prior to injection, verified patient identity using patient's name and date of birth. Medication was administered. Please see MAR and medication order for additional information. Patient instructed to remain in clinic for 15 minutes and report any adverse reaction to staff immediately.    Vial/Syringe: Syringe  Was this medication supplied by the patient? No  Verified that the patient has administrations remaining in their prescription.     NICANOR MayenN AMAYA  [facility]

## 2024-12-10 NOTE — PATIENT INSTRUCTIONS
You received your Prolia injection today  Your next Injection is due in 6 months (around 6/10/2024)  If you plan on having any dental work done within the next 6 months, please let your dentist know that you are on this medication.  Make sure you do not have any dental work completed involving the jaw bone within 2 month prior to your scheduled injection

## 2024-12-11 NOTE — TELEPHONE ENCOUNTER
Spoke with patient. Assisted with booking lab appointment on 1/14 at 11:30 am. Also assisted with scheduling next Prolia injection on 6/10 at 11:30 am. Confirmed with patient she does not have any dental procedures involving her jaw bone within 2 months of appointment. Patient agreeable to informing clinic if anything changes in the meantime.     NICANOR MayenN RN  [facility]

## 2024-12-12 ENCOUNTER — DOCUMENTATION ONLY (OUTPATIENT)
Dept: ANTICOAGULATION | Facility: CLINIC | Age: 89
End: 2024-12-12
Payer: COMMERCIAL

## 2024-12-12 DIAGNOSIS — Z79.01 CHRONIC ANTICOAGULATION: Primary | ICD-10-CM

## 2024-12-12 DIAGNOSIS — Z79.01 LONG TERM CURRENT USE OF ANTICOAGULANT THERAPY: ICD-10-CM

## 2024-12-12 DIAGNOSIS — I48.19 PERSISTENT ATRIAL FIBRILLATION (H): ICD-10-CM

## 2024-12-12 LAB — INR HOME MONITORING: 2.6 RATIO (ref 2–3)

## 2024-12-12 NOTE — PROGRESS NOTES
ANTICOAGULATION  MANAGEMENT-Home Monitor Managed by Exception    Mara CHARLES Jens 89 year old female is on warfarin with therapeutic INR result. (Goal INR 2.0-3.0)    Recent labs: (last 7 days)     12/12/24  1714   INR 2.6       Previous INR was Therapeutic  Medication, diet, health changes since last INR:chart reviewed; none identified  Contacted within the last 12 weeks by phone on 10/25/24  Due for next call no later than: 1/23/25   Last [facility] referral date: 06/28/2024      ISABEL     Mara was NOT contacted regarding therapeutic result today per home monitoring policy manage by exception agreement.   Current warfarin dose is to be continued:     Summary  As of 12/12/2024      Full warfarin instructions:  2 mg every Sun, Tue, Thu; 4 mg all other days   Next INR check:  12/19/2024             ?   Lesly Wright RN  Anticoagulation Clinic  12/12/2024    _______________________________________________________________________     Anticoagulation Episode Summary       Current INR goal:  2.0-3.0   TTR:  71.7% (1 y)   Target end date:  Indefinite   Send INR reminders to:  [facility]    Indications    Chronic anticoagulation [Z79.01]  Long-term (current) use of anticoagulants [Z79.01] [Z79.01]  Persistent atrial fibrillation (H) [I48.19]             Comments:  [employer] home meter. Manage by exception.             Anticoagulation Care Providers       Provider Role Specialty Phone number    Steven Abrams MD Referring Internal Medicine [phone]

## 2024-12-18 ENCOUNTER — THERAPY VISIT (OUTPATIENT)
Dept: OCCUPATIONAL THERAPY | Facility: CLINIC | Age: 89
End: 2024-12-18
Attending: INTERNAL MEDICINE
Payer: COMMERCIAL

## 2024-12-18 DIAGNOSIS — G89.29 OTHER CHRONIC PAIN: ICD-10-CM

## 2024-12-18 DIAGNOSIS — Z96.649 FAILED TOTAL HIP ARTHROPLASTY, SEQUELA: ICD-10-CM

## 2024-12-18 DIAGNOSIS — M15.0 PRIMARY OSTEOARTHRITIS INVOLVING MULTIPLE JOINTS: ICD-10-CM

## 2024-12-18 DIAGNOSIS — E08.42 DIABETIC POLYNEUROPATHY ASSOCIATED WITH DIABETES MELLITUS DUE TO UNDERLYING CONDITION (H): ICD-10-CM

## 2024-12-18 DIAGNOSIS — I27.20 PULMONARY HTN (H): ICD-10-CM

## 2024-12-18 DIAGNOSIS — R09.02 OXYGEN DESATURATION: ICD-10-CM

## 2024-12-18 DIAGNOSIS — T84.018S FAILED TOTAL HIP ARTHROPLASTY, SEQUELA: ICD-10-CM

## 2024-12-18 DIAGNOSIS — Z78.9 IMPAIRED MOBILITY AND ADLS: Primary | ICD-10-CM

## 2024-12-18 DIAGNOSIS — Z74.09 IMPAIRED MOBILITY AND ADLS: Primary | ICD-10-CM

## 2024-12-18 DIAGNOSIS — I89.0 LYMPHEDEMA: ICD-10-CM

## 2024-12-18 PROCEDURE — 97542 WHEELCHAIR MNGMENT TRAINING: CPT | Mod: GO | Performed by: OCCUPATIONAL THERAPIST

## 2024-12-18 NOTE — PROGRESS NOTES
"  SEATING AND WHEELED MOBILITY EVALUATION   General Information   Funding [facility] Medicare   Service Seating/Wheeled Mobility Evaluation;OT Therapy;Outpatient   Height 5'3\"   Weight 165   Start Of Care Date December 18, 2024     Referring Physician Steven Abrams   Orders Per Therapist Evaluation;Evaluate And Treat As Indicated   Orders Date 11/1/24   Others Present at Evaluation sister   Patient/Caregiver Goals Scooter replacement   Rehabilitation Technology Supplier [employer]   Current Community Support Family/Friend Caregiver   Patient role/Employment history Retired   Medical History   Medical Diagnosis R Hip surgery + infection, diabetes, lymphedema, B total knee replacements, atrial fibrillation   Cardio-Respiratory Status Inhaler  (nebulizer)   Home Accessibility   Living Environment House with sister    Primary Entrance Stairs  (3 steps with railing) now ramped   All Rooms Wheelchair Accessible No   Rooms Not Accessible Other, See Comment  (shower and laundry in basement)   Community ADL   Transportation Car with lift for scooter on the back   Community Mobility Requirements Medical Appointments;Shopping   Cognitive/Visual/Hearing Status   Observations No Problems Observed During Evaluation   Vision Corrective Lenses   Hearing Intact   ADL Status   Feeding Independent   Grooming/Hygiene Independent   Dressing Independent   Toileting Uses Equipment-grab bars   Bathing Uses Equipment- cut out tub   Meal Preparation Requires Assist- sister   Home Management Requires Assist-sister   Fatigue   Reported Problems spontaneous naps   Balance   Unsupported Sitting Balance Within Functional Limits   Sitting Balance in Chair Within Functional Limits   Standing Balance Uses Upper Extremities for Balance   Ambulation   Ambulation Ambulatory   Ambulation Assist Requires Assist   Ambulation Equipment 4 Wheeled Walker with Seat;2 Wheeled Walker   Mobility device- Lite rider scooter 2013 from [employer]   Transfers "   Transfer Assist Moderate assist   Transfer Method Stand Pivot   Sleep/Rest   Sleep Surface/Equipment reg bed   Fall Risk Screen   Fall screen completed by OT   Have you fallen 2 or more times in the last year? Yes   Have you fallen and had an injury in the past year? yes   Is the patient a fall risk? Yes;Department Fall Risk Interventions Implemented   Neuromuscular   History of Pressure Sores No   Current Pressure Sores No   Additional Pressure Sores? No   Pain Yes   Pain Location B LE- both shoulders   Coordination UE Intact;LE Impaired   Tone (muscle spasms in tight hip and thigh)   Sensory Deficits Reported intact   Sensation on Seating Surface Intact per Report   Neuromuscular Comments toes on right foot that are painful   Head and Neck   Head and Neck Position Functional   Head Control Good   Upper Extremities   Shoulder Position Functional Bilaterally   UE ROM Very limited active shoulder ranges- very pain    UE Strength 2-/5   Dominance Right   Pelvis   Anterior/Posterior Pelvis Position Posterior Tilt   Trunk   Anterior/Posterior Trunk Position Increased Thoracic Kyphosis   Lower Extremities   Hip Position Abducted- severe lymphedema   LE ROM Hips to about 100 degrees flexion and knees and ankles full but very painful and slow   LE Strength hips 2-/5, knees and ankles 3+/5   Patient Measurements- per apt notes   Problems with Equipment for Mobility   Equipment- scooter and walker Now a full time user with severe Lymphedema and no shoulder movement   Education Assessment   Barriers to Learning No Barriers   Preferred Learning Style Listening;Demonstration   Assessment/Plan   Criteria for Skilled Interventions Met Yes, Treatment Indicated   Treatment Diagnosis impaired mobility   Influenced by the Following Impairments right hip pain, limited ROM/strength   Functional Limitations Due to Impairments poor ambulation   Rehab Potential Fair, Will Monitor Progress Closely   Therapy Frequency once   Planned  Therapy Interventions Wheelchair Management/Propulsion Training   Planned Therapy Interventions Comments Educated on scooter vs power chair.  SAfe trials of power moblity in clinic.  Patient now with more deficits in extremities and needs more full time equipment for in home.     Risks and benefits of treatment have been explained Yes   Patient/family & other staff in agreement with plan of care Yes   Session Time   Total Evaluation Time 40   GOALS   Goals Patient/family demonstrates understanding of equipment for independent mobility, including benefits/limitations   Electronically signed by:  Laxmi Colorado OTR/L, ATP      Occupational Therapist, Assistive   [phone]      fax: [phone]      [email or web address]  Seating Clinic- [facility], [address]  [address]  [place]   [address]  December 18, 2024

## 2024-12-19 ENCOUNTER — DOCUMENTATION ONLY (OUTPATIENT)
Dept: ANTICOAGULATION | Facility: CLINIC | Age: 89
End: 2024-12-19
Payer: COMMERCIAL

## 2024-12-19 DIAGNOSIS — Z79.01 LONG TERM CURRENT USE OF ANTICOAGULANT THERAPY: ICD-10-CM

## 2024-12-19 DIAGNOSIS — I48.19 PERSISTENT ATRIAL FIBRILLATION (H): ICD-10-CM

## 2024-12-19 DIAGNOSIS — Z79.01 CHRONIC ANTICOAGULATION: Primary | ICD-10-CM

## 2024-12-19 LAB — INR HOME MONITORING: 2.5 RATIO (ref 2–3)

## 2024-12-19 NOTE — PROGRESS NOTES
ANTICOAGULATION  MANAGEMENT-Home Monitor Managed by Exception    Mara CHARLES Jens 89 year old female is on warfarin with therapeutic INR result. (Goal INR 2.0-3.0)    Recent labs: (last 7 days)     12/19/24  1537   INR 2.5       Previous INR was Therapeutic  Medication, diet, health changes since last INR:chart reviewed; none identified  Contacted within the last 12 weeks by phone on 10/25/24  Due for next call no later than: 1/23/25   Last [facility] referral date: 06/28/2024      ISABEL     Mara was NOT contacted regarding therapeutic result today per home monitoring policy manage by exception agreement.   Current warfarin dose is to be continued:     Summary  As of 12/19/2024      Full warfarin instructions:  2 mg every Sun, Tue, Thu; 4 mg all other days   Next INR check:  12/26/2024             ?   Coral Carlisle RN  Anticoagulation Clinic  12/19/2024    _______________________________________________________________________     Anticoagulation Episode Summary       Current INR goal:  2.0-3.0   TTR:  73.6% (1 y)   Target end date:  Indefinite   Send INR reminders to:  [facility]    Indications    Chronic anticoagulation [Z79.01]  Long-term (current) use of anticoagulants [Z79.01] [Z79.01]  Persistent atrial fibrillation (H) [I48.19]             Comments:  mdINANABELLA home meter. Manage by exception.             Anticoagulation Care Providers       Provider Role Specialty Phone number    Steven Abrams MD Referring Internal Medicine [phone]

## 2024-12-26 ENCOUNTER — DOCUMENTATION ONLY (OUTPATIENT)
Dept: ANTICOAGULATION | Facility: CLINIC | Age: 89
End: 2024-12-26
Payer: COMMERCIAL

## 2024-12-26 DIAGNOSIS — Z79.01 LONG TERM CURRENT USE OF ANTICOAGULANT THERAPY: ICD-10-CM

## 2024-12-26 DIAGNOSIS — I48.19 PERSISTENT ATRIAL FIBRILLATION (H): ICD-10-CM

## 2024-12-26 DIAGNOSIS — Z79.01 CHRONIC ANTICOAGULATION: Primary | ICD-10-CM

## 2024-12-26 LAB — INR HOME MONITORING: 2.7 RATIO (ref 2–3)

## 2024-12-26 NOTE — PROGRESS NOTES
ANTICOAGULATION  MANAGEMENT-Home Monitor Managed by Exception    Mara CHARLES Jens 89 year old female is on warfarin with therapeutic INR result. (Goal INR 2.0-3.0)    Recent labs: (last 7 days)     12/26/24  1727   INR 2.7       Previous INR was Therapeutic  Medication, diet, health changes since last INR:chart reviewed; none identified  Contacted within the last 12 weeks by phone on 10/25/24  Due for next call no later than: 1/23/25   Last [facility] referral date: 06/28/2024      ISABEL     Mara was NOT contacted regarding therapeutic result today per home monitoring policy manage by exception agreement.   Current warfarin dose is to be continued:     Summary  As of 12/26/2024      Full warfarin instructions:  2 mg every Sun, Tue, Thu; 4 mg all other days   Next INR check:  1/2/2025             ?   Coral Carlisle RN  Anticoagulation Clinic  12/26/2024    _______________________________________________________________________     Anticoagulation Episode Summary       Current INR goal:  2.0-3.0   TTR:  74.9% (1 y)   Target end date:  Indefinite   Send INR reminders to:  [facility]    Indications    Chronic anticoagulation [Z79.01]  Long-term (current) use of anticoagulants [Z79.01] [Z79.01]  Persistent atrial fibrillation (H) [I48.19]             Comments:  mdINANABELLA home meter. Manage by exception.             Anticoagulation Care Providers       Provider Role Specialty Phone number    Steven Abrams MD Referring Internal Medicine [phone]

## 2025-01-09 ENCOUNTER — ANTICOAGULATION THERAPY VISIT (OUTPATIENT)
Dept: ANTICOAGULATION | Facility: CLINIC | Age: OVER 89
End: 2025-01-09
Payer: COMMERCIAL

## 2025-01-09 DIAGNOSIS — I48.19 PERSISTENT ATRIAL FIBRILLATION (H): ICD-10-CM

## 2025-01-09 DIAGNOSIS — Z79.01 LONG TERM CURRENT USE OF ANTICOAGULANT THERAPY: ICD-10-CM

## 2025-01-09 DIAGNOSIS — Z79.01 CHRONIC ANTICOAGULATION: Primary | ICD-10-CM

## 2025-01-09 LAB — INR HOME MONITORING: 2.3 RATIO (ref 2–3)

## 2025-01-09 NOTE — PROGRESS NOTES
Hong called back and confirmed that no changes with patient and confirm that patient is taking doses as instructed.    He has no other concerns.

## 2025-01-09 NOTE — PROGRESS NOTES
ANTICOAGULATION MANAGEMENT     Mara Colindres 89 year old female is on warfarin with therapeutic INR result. (Goal INR 2.0-3.0)    Recent labs: (last 7 days)     01/09/25  1306   INR 2.3       ASSESSMENT     Source(s): Chart Review     Warfarin doses taken: Reviewed in chart    Medication/supplement changes: None noted  Previous result: Therapeutic last 2(+) visits  Additional findings:  Quarterly patient outreach per home monitoring protocol       PLAN     Recommended plan for no diet, medication or health factor changes affecting INR     Dosing Instructions: Continue your current warfarin dose with next INR in 1 week       Summary  As of 1/9/2025      Full warfarin instructions:  2 mg every Sun, Tue, Thu; 4 mg all other days   Next INR check:  1/16/2025               Detailed voice message left for patient's son Hong with dosing instructions and follow up date.     Patient to recheck with home meter    Education provided: Please call back if any changes to your diet, medications or how you've been taking warfarin  Resume manage by exception with home monitor. Continue to submit INR results to home monitor company.You will only be called when your result is out of range and at 90 day check in. Please call and notify [facility] if new medication started, dose missed, signs or symptoms of bleeding or clotting, or a surgery/procedure is scheduled. Due for next call no later than: 4/9/25.  Contact [phone] with any changes, questions or concerns.     Plan made per [facility] anticoagulation protocol    Coral Carlisle RN  1/9/2025  Anticoagulation Clinic  [facility] for routing messages: deshaun ANTICOAG HOME MONITORING  [facility] patient phone line: [phone]        _______________________________________________________________________     Anticoagulation Episode Summary       Current INR goal:  2.0-3.0   TTR:  74.8% (1 y)   Target end date:  Indefinite   Send INR reminders to:  [facility]    Indications    Chronic  anticoagulation [Z79.01]  Long-term (current) use of anticoagulants [Z79.01] [Z79.01]  Persistent atrial fibrillation (H) [I48.19]             Comments:  mdINR home meter. Manage by exception.             Anticoagulation Care Providers       Provider Role Specialty Phone number    Steven Abrams MD Referring Internal Medicine [phone]             none

## 2025-01-16 LAB — INR HOME MONITORING: 1.8 RATIO (ref 2–3)

## 2025-02-03 DIAGNOSIS — N18.4 CKD (CHRONIC KIDNEY DISEASE) STAGE 4, GFR 15-29 ML/MIN (H): Primary | ICD-10-CM

## 2025-02-04 ENCOUNTER — TELEPHONE (OUTPATIENT)
Dept: NEPHROLOGY | Facility: CLINIC | Age: OVER 89
End: 2025-02-04
Payer: COMMERCIAL

## 2025-02-04 NOTE — TELEPHONE ENCOUNTER
Called patient with a appointment reminder for Wed. 2/12/25 at 1:00 pm with Dr. Bailey and a lab draw at 12:00 pm.    Pierre Hartman on 2/4/2025 at 3:02 PM

## 2025-02-06 ENCOUNTER — DOCUMENTATION ONLY (OUTPATIENT)
Dept: ANTICOAGULATION | Facility: CLINIC | Age: OVER 89
End: 2025-02-06
Payer: COMMERCIAL

## 2025-02-06 DIAGNOSIS — Z79.01 LONG TERM CURRENT USE OF ANTICOAGULANT THERAPY: ICD-10-CM

## 2025-02-06 DIAGNOSIS — I48.19 PERSISTENT ATRIAL FIBRILLATION (H): ICD-10-CM

## 2025-02-06 DIAGNOSIS — Z79.01 CHRONIC ANTICOAGULATION: Primary | ICD-10-CM

## 2025-02-06 LAB — INR HOME MONITORING: 2.6 RATIO (ref 2–3)

## 2025-02-06 NOTE — PROGRESS NOTES
ANTICOAGULATION  MANAGEMENT-Home Monitor Managed by Exception    Mara CHARLES Jens 89 year old female is on warfarin with therapeutic INR result. (Goal INR 2.0-3.0)    Recent labs: (last 7 days)     02/06/25  1410   INR 2.6       Previous INR was Therapeutic  Medication, diet, health changes since last INR:chart reviewed; none identified  Contacted within the last 12 weeks by phone on 1/24/25  Due for next call no later than: 4/24/25.   Last [facility] referral date: 06/28/2024      ISABEL     Mara was NOT contacted regarding therapeutic result today per home monitoring policy manage by exception agreement.   Current warfarin dose is to be continued:     Summary  As of 2/6/2025      Full warfarin instructions:  2 mg every Sun, Tue, Thu; 4 mg all other days   Next INR check:  2/13/2025             ?   Shellie Aguayo, RN  Anticoagulation Clinic  2/6/2025    _______________________________________________________________________     Anticoagulation Episode Summary       Current INR goal:  2.0-3.0   TTR:  77.7% (1 y)   Target end date:  Indefinite   Send INR reminders to:  [facility]    Indications    Chronic anticoagulation [Z79.01]  Long-term (current) use of anticoagulants [Z79.01] [Z79.01]  Persistent atrial fibrillation (H) [I48.19]             Comments:  [employer] home meter. Manage by exception.             Anticoagulation Care Providers       Provider Role Specialty Phone number    Steven Abrams MD Referring Internal Medicine [phone]

## 2025-02-12 ENCOUNTER — LAB (OUTPATIENT)
Dept: LAB | Facility: CLINIC | Age: OVER 89
End: 2025-02-12
Payer: COMMERCIAL

## 2025-02-12 ENCOUNTER — OFFICE VISIT (OUTPATIENT)
Dept: NEPHROLOGY | Facility: CLINIC | Age: OVER 89
End: 2025-02-12
Attending: INTERNAL MEDICINE
Payer: COMMERCIAL

## 2025-02-12 VITALS
TEMPERATURE: 98 F | SYSTOLIC BLOOD PRESSURE: 110 MMHG | DIASTOLIC BLOOD PRESSURE: 72 MMHG | BODY MASS INDEX: 30.21 KG/M2 | HEART RATE: 87 BPM | HEIGHT: 63 IN | WEIGHT: 170.5 LBS | RESPIRATION RATE: 18 BRPM | OXYGEN SATURATION: 100 %

## 2025-02-12 DIAGNOSIS — I48.19 PERSISTENT ATRIAL FIBRILLATION (H): Primary | ICD-10-CM

## 2025-02-12 DIAGNOSIS — N18.4 CKD (CHRONIC KIDNEY DISEASE) STAGE 4, GFR 15-29 ML/MIN (H): ICD-10-CM

## 2025-02-12 DIAGNOSIS — I50.32 CHRONIC DIASTOLIC CONGESTIVE HEART FAILURE (H): ICD-10-CM

## 2025-02-12 LAB
ALBUMIN MFR UR ELPH: 43.9 MG/DL
ALBUMIN SERPL BCG-MCNC: 4.2 G/DL (ref 3.5–5.2)
ALBUMIN UR-MCNC: 30 MG/DL
ANION GAP SERPL CALCULATED.3IONS-SCNC: 11 MMOL/L (ref 7–15)
APPEARANCE UR: ABNORMAL
BACTERIA #/AREA URNS HPF: ABNORMAL /HPF
BILIRUB UR QL STRIP: NEGATIVE
BUN SERPL-MCNC: 33.9 MG/DL (ref 8–23)
CALCIUM SERPL-MCNC: 9.3 MG/DL (ref 8.8–10.4)
CHLORIDE SERPL-SCNC: 101 MMOL/L (ref 98–107)
COLOR UR AUTO: YELLOW
CREAT SERPL-MCNC: 2.18 MG/DL (ref 0.51–0.95)
CREAT UR-MCNC: 160 MG/DL
CREAT UR-MCNC: 164 MG/DL
EGFRCR SERPLBLD CKD-EPI 2021: 21 ML/MIN/1.73M2
ERYTHROCYTE [DISTWIDTH] IN BLOOD BY AUTOMATED COUNT: 15.3 % (ref 10–15)
FERRITIN SERPL-MCNC: 51 NG/ML (ref 11–328)
GLUCOSE SERPL-MCNC: 171 MG/DL (ref 70–99)
GLUCOSE UR STRIP-MCNC: 1000 MG/DL
HCO3 SERPL-SCNC: 26 MMOL/L (ref 22–29)
HCT VFR BLD AUTO: 35.1 % (ref 35–47)
HGB BLD-MCNC: 10.8 G/DL (ref 11.7–15.7)
HGB UR QL STRIP: ABNORMAL
HYALINE CASTS: 8 /LPF
IRON BINDING CAPACITY (ROCHE): 367 UG/DL (ref 240–430)
IRON SATN MFR SERPL: 8 % (ref 15–46)
IRON SERPL-MCNC: 30 UG/DL (ref 37–145)
IRON SERPL-MCNC: 30 UG/DL (ref 37–145)
KETONES UR STRIP-MCNC: NEGATIVE MG/DL
LEUKOCYTE ESTERASE UR QL STRIP: ABNORMAL
MCH RBC QN AUTO: 27.9 PG (ref 26.5–33)
MCHC RBC AUTO-ENTMCNC: 30.8 G/DL (ref 31.5–36.5)
MCV RBC AUTO: 91 FL (ref 78–100)
MICROALBUMIN UR-MCNC: 106 MG/L
MICROALBUMIN/CREAT UR: 66.25 MG/G CR (ref 0–25)
MUCOUS THREADS #/AREA URNS LPF: PRESENT /LPF
NITRATE UR QL: POSITIVE
PH UR STRIP: 5.5 [PH] (ref 5–7)
PHOSPHATE SERPL-MCNC: 3.9 MG/DL (ref 2.5–4.5)
PLATELET # BLD AUTO: 238 10E3/UL (ref 150–450)
POTASSIUM SERPL-SCNC: 4.3 MMOL/L (ref 3.4–5.3)
PROT/CREAT 24H UR: 0.27 MG/MG CR (ref 0–0.2)
PTH-INTACT SERPL-MCNC: 113 PG/ML (ref 15–65)
RBC # BLD AUTO: 3.87 10E6/UL (ref 3.8–5.2)
RBC URINE: 16 /HPF
SODIUM SERPL-SCNC: 138 MMOL/L (ref 135–145)
SP GR UR STRIP: 1.02 (ref 1–1.03)
SQUAMOUS EPITHELIAL: 6 /HPF
UROBILINOGEN UR STRIP-MCNC: NORMAL MG/DL
VIT D+METAB SERPL-MCNC: 69 NG/ML (ref 20–50)
WBC # BLD AUTO: 6.5 10E3/UL (ref 4–11)
WBC CLUMPS #/AREA URNS HPF: PRESENT /HPF
WBC URINE: >182 /HPF

## 2025-02-12 PROCEDURE — 82570 ASSAY OF URINE CREATININE: CPT

## 2025-02-12 PROCEDURE — 80069 RENAL FUNCTION PANEL: CPT | Performed by: PATHOLOGY

## 2025-02-12 PROCEDURE — G0463 HOSPITAL OUTPT CLINIC VISIT: HCPCS

## 2025-02-12 PROCEDURE — 82728 ASSAY OF FERRITIN: CPT

## 2025-02-12 PROCEDURE — 84156 ASSAY OF PROTEIN URINE: CPT | Performed by: PATHOLOGY

## 2025-02-12 PROCEDURE — 83540 ASSAY OF IRON: CPT

## 2025-02-12 PROCEDURE — 83970 ASSAY OF PARATHORMONE: CPT | Performed by: PATHOLOGY

## 2025-02-12 PROCEDURE — 82306 VITAMIN D 25 HYDROXY: CPT

## 2025-02-12 PROCEDURE — 81001 URINALYSIS AUTO W/SCOPE: CPT | Performed by: PATHOLOGY

## 2025-02-12 PROCEDURE — 99000 SPECIMEN HANDLING OFFICE-LAB: CPT | Performed by: PATHOLOGY

## 2025-02-12 PROCEDURE — 36415 COLL VENOUS BLD VENIPUNCTURE: CPT | Performed by: PATHOLOGY

## 2025-02-12 PROCEDURE — 85027 COMPLETE CBC AUTOMATED: CPT | Performed by: PATHOLOGY

## 2025-02-12 ASSESSMENT — PAIN SCALES - GENERAL: PAINLEVEL_OUTOF10: MODERATE PAIN (6)

## 2025-02-12 NOTE — NURSING NOTE
"Chief Complaint   Patient presents with    Consult     CKD     Vital signs:  Temp: 98  F (36.7  C) Temp src: Oral BP: 110/72 Pulse: 87   Resp: 18 SpO2: 100 %     Height: 160 cm (5' 2.99\") (per pt) Weight: 77.3 kg (170 lb 8 oz)  Estimated body mass index is 30.21 kg/m  as calculated from the following:    Height as of this encounter: 1.6 m (5' 2.99\").    Weight as of this encounter: 77.3 kg (170 lb 8 oz).      Ilene Middleton, [facility]  2/12/2025 1:07 PM    "

## 2025-02-12 NOTE — LETTER
2/12/2025       RE: Mara Colindres  [address] [facility] [address]     Dear Colleague,    Thank you for referring your patient, Mara Colindres, to the [facility] NEPHROLOGY [facility]IC [place] at [facility]. Please see a copy of my visit note below.    Kidney and Blood Pressure Clinic Note    Encounter Date: Feb 12, 2025     Assessment and Plan:       #CKD stage 4 presumed secondary to T2DM.  Baseline creatinine: around 2, today its 2.18; possible causes; progression of her CKD, 2/2 medications such as Ozempic and Jardiance, or other possible underlying cause like multiple myeloma as pt has had weight loss almost 80 IBS in 1-2 years, although she has been using Ozempic for wt loss, pt is also anemic with chronic bone/joint pain. UA with 1000 glucose, albumin 30, small blood, >182 WBC, RBC 16, moderate bacteria, hyaline casts.  Will do renal US, SPEP, UPEP with IF to rule it out.    Electrolytes: sodium and potassium WNL  Acid/Base: bicarb 26  Bone/Mineral: calcium and phosphorus WNL, .  Volume/Blood pressure: euvolemic/normotensive.  Proteinuria: UPC 0.27, UACR 66.25 better than last checked 09/05/2024 85.92.  Anemia: Will repeat iron studies now, her Hb 10.8 today, lower than last time checked.    #HTN  Blood pressure readings: 110/72 today.  Current Regimen: Furosemide 40 mg AM and afternoon, Metoprolol 100 mg daily.  Instructed the pt to let us know if she becomes dizzy or lightheaded to check her blood pressure, to see if we need to lower her blood pressure medications.    Discussed with Dr Ren.    Milly Bailey MD  Division of Renal Disease and Hypertension  [facility]  santiago [employer] Web [employer]      Subjective:     Chief Complaint: Establish Care for CKD management.    History of Present Illness: 89-year-old male, PMH T2DM, complicated by CKD, polyneuropathy, and retinopathy, GERD, HTN, Diastolic heart failure, CKD stage IV,  chronic DVT in right femoral vein 2004, COPD, chronic pain from arthritis, lymphedema, presented to the office today to establish care for CKD management.    Patient takes calcium 1200 daily, furosemide 40 mg in AM and AN, hydrocodone/acetaminophen oral tablets daily, Jardiance 10 mg daily, metoprolol tartrate 100 mg half a tablet at bedtime, omeprazole 20 mg daily, Ozempic every week, paroxetine 20 mg daily, KCl 20M EQ daily, Prolia subcu every 6 months, vitamin D3 5000 unit, warfarin 2 mg 1-1/2 tablet as directed.    Pt uses O2 at night, 2.5 L/min, sometimes needs it during the day when she does any exertion, been having exertional SOB for 6 months now. No obstructive urinary symptoms, no foamy urine. Pt reported having Chronic fatigue and lack of sleep.  Pt started on the Ozempic last year, maybe less she does not remember, currently has occasional diarrhea and nausea.  Pt drinks 5 cups of 8 oz water a day only.    Labs 02/12/2025: Na 138, K 4.3, Cl 101, HCO3 26, BUN 33.9, Cr 2.18, GFR 21, Ca 9.3, AG 11, Phos 3.9, Albumin 4.2, , Hb 10.8,WBC 6.5, Plts 238, , Pending UPCR. Last Hba1c 6.9 in September 2024.    - History of Hematuria: none.  - Swelling: Chronic LE edema.  - Hx of UTIs: none  - Hx of stones: none  - Rashes/Joint pain: none  - Family hx of kidney disease: none  - NSAID use: none  - BG at home 130s-150s.    Home BPs: Does not check it at home.      Review of Systems:   All organ systems were reviewed and are negative except as noted in the HPI above.    Past Medical History:   Diagnosis Date     A-fib (H)      Bilateral leg edema     fairly severe     Cataract      CHF (congestive heart failure) (H)      Chronic diarrhea     neg colonoscopy abt one year ago, even to the point of having fecal accidents     Chronic kidney disease      Chronic venous insufficiency      Diabetic retinopathy (H)      DVT (deep venous thrombosis) (H)      GERD (gastroesophageal reflux disease)      Hyperlipidemia  LDL goal <100 10/31/2010     Hypertension goal BP (blood pressure) < 140/90      Long-term (current) use of anticoagulants      Moderate persistent asthma 08/15/2012     MRSA (methicillin resistant Staphylococcus aureus)     postop hip     Obesity      EVONNE (obstructive sleep apnea)      S/P colonoscopy 2008 ( ?)     Septic hip (H)     sees an ID specialist Dr ANNEL DE LA CRUZ She remains on long-term suppressive antibiotic therapy using Minocin 50 mg twice daily     Stasis dermatitis      Urinary incontinence, mixed      Vitiligo      Past Surgical History:   Procedure Laterality Date     APPENDECTOMY       CATARACT IOL, RT/LT       COLONOSCOPY  2008     COLONOSCOPY  08/20/2012    Procedure: COLONOSCOPY;  COLONOSCOPY, TUBULAR ADENOMA OF COLON, CHRONIC DIARRHEA;  Surgeon: Yobany Hinojosa MD;  Location: [facility] OR     HC REMOVAL GALLBLADDER       JOINT REPLACEMENT, HIP RT/LT  2004    right     JOINT REPLACEMTN, KNEE RT/LT  2000    bilateral     LASER YAG CAPSULOTOMY Right 05/24/2022     PHACOEMULSIFICATION WITH STANDARD INTRAOCULAR LENS IMPLANT  08/2013    left eye; right eye     ROTATOR CUFF REPAIR RT/LT  01/1993    right     TONSILLECTOMY       Allergies   Allergen Reactions     Atenolol Other (See Comments)     Arms became limp, almost stroke like symptoms per patient.   Tolerates metoprolol fine     Metformin      Side effects / gastrointestinal symptoms      Patient's Medications   New Prescriptions    No medications on file   Previous Medications    ACCU-CHEK PARVEZ PLUS TEST STRIP    USE TO TEST FOUR TIMES DAILY OR AS DIRECTED    ACCU-CHEK GUIDE TEST STRIP    Use to test blood sugar 1-3 times daily or as directed.    ACETAMINOPHEN (TYLENOL) 500 MG TABLET    Take 2 tablets by mouth 3 times daily as needed.    CALCIUM CARB-CHOLECALCIFEROL 600-20 MG-MCG TABS    Take 600 mg by mouth 2 times daily    DIPHENOXYLATE-ATROPINE (LOMOTIL) 2.5-0.025 MG TABLET    Take 1 tablet by mouth 4 times daily as needed for diarrhea.     EMPAGLIFLOZIN (JARDIANCE) 10 MG TABS TABLET    Take 1 tablet (10 mg) by mouth daily.    FUROSEMIDE (LASIX) 20 MG TABLET    TAKE 2 TABLETS BY MOUTH IN THE MORNING AND at noon    HYDROCODONE-ACETAMINOPHEN (NORCO) 5-325 MG TABLET    Take 1 tablet by mouth daily as needed for severe pain.    METOPROLOL TARTRATE (LOPRESSOR) 100 MG TABLET    TAKE 1/2 TABLET(50 MG) BY MOUTH TWICE DAILY    OMEPRAZOLE CPCR 20 MG OR    1 CAPSULE DAILY    ORDER FOR DME    Equipment being ordered: Oxygen.  Oxygen resting at room air was 94%.   Oxygen on room air walking about 50 feet was 87% then after returning to room dropped lower to 86%.   With oxygen at 2 liters resting was 97%.   With oxygen at 2 liters walking about 50 feet was only at 91%.    PAROXETINE (PAXIL) 20 MG TABLET    Take 1 tablet (20 mg) by mouth every other day.    POLYETHYL GLYCOL-PROPYL GLYCOL (SYSTANE OP)    Apply 1 drop to eye as needed Both eyes.    POTASSIUM CHLORIDE MARI ER (KLOR-CON M20) 20 MEQ CR TABLET    Take 1 tablet (20 mEq) by mouth daily (with dinner).    SEMAGLUTIDE (OZEMPIC, 0.25 OR 0.5 MG/DOSE,) 2 MG/3ML PEN    Inject 0.5 mg subcutaneously every 7 days.    VITAMIN D3 (CHOLECALCIFEROL) 50 MCG (2000 UNITS) TABLET    Take 1 tablet by mouth daily    WARFARIN ANTICOAGULANT (COUMADIN) 2 MG TABLET    Take 3 mg (2 mg x 1.5) every Wed; 4 mg (2 mg x 2) all other days or as directed by INR clinic   Modified Medications    No medications on file   Discontinued Medications    No medications on file     Family History   Problem Relation Age of Onset     Diabetes Mother      Cancer Mother      Hypertension Mother      Cataracts Mother      Hypertension Father      Cataracts Father      Neurologic Disorder Sister      Alzheimer Disease Sister      Cancer Son         thyroid cancer     Family Status   Relation Name Status     Mo       Fa       MGMo       MGFa       PGMo       PGFa       Bro  Alive     Bro  Alive     Sis   Alive     Sis  Alive     Son  Alive     Vibha     No partnership data on file     Social History     Social History Narrative    [place] in [place]     Social History     Tobacco Use     Smoking status: Never     Passive exposure: Never     Smokeless tobacco: Never   Substance Use Topics     Alcohol use: No       Objective:     There were no vitals taken for this visit.    Wt Readings from Last 3 Encounters:   24 74.9 kg (165 lb 3.2 oz)   24 77.7 kg (171 lb 4.8 oz)   23 99.2 kg (218 lb 11.2 oz)       Constitutional:  NAD  Head: Atraumatic, normocephalic  CV: RRR, no m/r/g  Respiratory: CTA bilaterally  GI: Abdomen soft, non-tender  Musculoskeletal:  Extremities warm and well perfused. Bilateral LE non pitting swelling noted.  Skin: No jaundice  Neurological: Speech normal.  Psychiatric: AOx3      Results:    Orders Only on 2025   Component Date Value Ref Range Status     INR HOME MONITORING 2025 2.6  2 - 3 RATIO Final   Orders Only on 2025   Component Date Value Ref Range Status     INR HOME MONITORING 2025 2.1  2 - 3 RATIO Final       I was present with the fellow during the history and exam.  I discussed the case with the fellow and agree with the findings as documented in the assessment and plan. CKD slowly progressing.  She is optimized, on SGLT2 inh and BP low normal. Will continue to monitor.    Emilie Ren MD   of Medicine  Department of Nephrology  [facility]      Again, thank you for allowing me to participate in the care of your patient.      Sincerely,    Milly Bailey MD

## 2025-02-12 NOTE — PROGRESS NOTES
Kidney and Blood Pressure Clinic Note    Encounter Date: Feb 12, 2025     Assessment and Plan:       #CKD stage 4 presumed secondary to T2DM.  Baseline creatinine: around 2, today its 2.18; possible causes; progression of her CKD, 2/2 medications such as Ozempic and Jardiance, or other possible underlying cause like multiple myeloma as pt has had weight loss almost 80 IBS in 1-2 years, although she has been using Ozempic for wt loss, pt is also anemic with chronic bone/joint pain. UA with 1000 glucose, albumin 30, small blood, >182 WBC, RBC 16, moderate bacteria, hyaline casts.  Will do renal US, SPEP, UPEP with IF to rule it out.    Electrolytes: sodium and potassium WNL  Acid/Base: bicarb 26  Bone/Mineral: calcium and phosphorus WNL, .  Volume/Blood pressure: euvolemic/normotensive.  Proteinuria: UPC 0.27, UACR 66.25 better than last checked 09/05/2024 85.92.  Anemia: Will repeat iron studies now, her Hb 10.8 today, lower than last time checked.    #HTN  Blood pressure readings: 110/72 today.  Current Regimen: Furosemide 40 mg AM and afternoon, Metoprolol 100 mg daily.  Instructed the pt to let us know if she becomes dizzy or lightheaded to check her blood pressure, to see if we need to lower her blood pressure medications.    Discussed with Dr Ren.    Milly Bailey MD  Division of Renal Disease and Hypertension  [facility]  santiago [employer]      Subjective:     Chief Complaint: Establish Care for CKD management.    History of Present Illness: 89-year-old male, PMH T2DM, complicated by CKD, polyneuropathy, and retinopathy, GERD, HTN, Diastolic heart failure, CKD stage IV, chronic DVT in right femoral vein 2004, COPD, chronic pain from arthritis, lymphedema, presented to the office today to establish care for CKD management.    Patient takes calcium 1200 daily, furosemide 40 mg in AM and AN, hydrocodone/acetaminophen oral tablets daily, Jardiance 10 mg daily, metoprolol tartrate 100 mg half a  tablet at bedtime, omeprazole 20 mg daily, Ozempic every week, paroxetine 20 mg daily, KCl 20M EQ daily, Prolia subcu every 6 months, vitamin D3 5000 unit, warfarin 2 mg 1-1/2 tablet as directed.    Pt uses O2 at night, 2.5 L/min, sometimes needs it during the day when she does any exertion, been having exertional SOB for 6 months now. No obstructive urinary symptoms, no foamy urine. Pt reported having Chronic fatigue and lack of sleep.  Pt started on the Ozempic last year, maybe less she does not remember, currently has occasional diarrhea and nausea.  Pt drinks 5 cups of 8 oz water a day only.    Labs 02/12/2025: Na 138, K 4.3, Cl 101, HCO3 26, BUN 33.9, Cr 2.18, GFR 21, Ca 9.3, AG 11, Phos 3.9, Albumin 4.2, , Hb 10.8,WBC 6.5, Plts 238, , Pending UPCR. Last Hba1c 6.9 in September 2024.    - History of Hematuria: none.  - Swelling: Chronic LE edema.  - Hx of UTIs: none  - Hx of stones: none  - Rashes/Joint pain: none  - Family hx of kidney disease: none  - NSAID use: none  - BG at home 130s-150s.    Home BPs: Does not check it at home.      Review of Systems:   All organ systems were reviewed and are negative except as noted in the HPI above.    Past Medical History:   Diagnosis Date    A-fib (H)     Bilateral leg edema     fairly severe    Cataract     CHF (congestive heart failure) (H)     Chronic diarrhea     neg colonoscopy abt one year ago, even to the point of having fecal accidents    Chronic kidney disease     Chronic venous insufficiency     Diabetic retinopathy (H)     DVT (deep venous thrombosis) (H)     GERD (gastroesophageal reflux disease)     Hyperlipidemia LDL goal <100 10/31/2010    Hypertension goal BP (blood pressure) < 140/90     Long-term (current) use of anticoagulants     Moderate persistent asthma 08/15/2012    MRSA (methicillin resistant Staphylococcus aureus)     postop hip    Obesity     EVONNE (obstructive sleep apnea)     S/P colonoscopy 2008 ( ?)    Septic hip (H)     sees an  ID specialist Dr ANNEL DE LA CRUZ She remains on long-term suppressive antibiotic therapy using Minocin 50 mg twice daily    Stasis dermatitis     Urinary incontinence, mixed     Vitiligo      Past Surgical History:   Procedure Laterality Date    APPENDECTOMY      CATARACT IOL, RT/LT      COLONOSCOPY  2008    COLONOSCOPY  08/20/2012    Procedure: COLONOSCOPY;  COLONOSCOPY, TUBULAR ADENOMA OF COLON, CHRONIC DIARRHEA;  Surgeon: Yobany Hinojosa MD;  Location: [facility] OR    HC REMOVAL GALLBLADDER      JOINT REPLACEMENT, HIP RT/LT  2004    right    JOINT REPLACEMTN, KNEE RT/LT  2000    bilateral    LASER YAG CAPSULOTOMY Right 05/24/2022    PHACOEMULSIFICATION WITH STANDARD INTRAOCULAR LENS IMPLANT  08/2013    left eye; right eye    ROTATOR CUFF REPAIR RT/LT  01/1993    right    TONSILLECTOMY       Allergies   Allergen Reactions    Atenolol Other (See Comments)     Arms became limp, almost stroke like symptoms per patient.   Tolerates metoprolol fine    Metformin      Side effects / gastrointestinal symptoms      Patient's Medications   New Prescriptions    No medications on file   Previous Medications    ACCU-CHEK PARVEZ PLUS TEST STRIP    USE TO TEST FOUR TIMES DAILY OR AS DIRECTED    ACCU-CHEK GUIDE TEST STRIP    Use to test blood sugar 1-3 times daily or as directed.    ACETAMINOPHEN (TYLENOL) 500 MG TABLET    Take 2 tablets by mouth 3 times daily as needed.    CALCIUM CARB-CHOLECALCIFEROL 600-20 MG-MCG TABS    Take 600 mg by mouth 2 times daily    DIPHENOXYLATE-ATROPINE (LOMOTIL) 2.5-0.025 MG TABLET    Take 1 tablet by mouth 4 times daily as needed for diarrhea.    EMPAGLIFLOZIN (JARDIANCE) 10 MG TABS TABLET    Take 1 tablet (10 mg) by mouth daily.    FUROSEMIDE (LASIX) 20 MG TABLET    TAKE 2 TABLETS BY MOUTH IN THE MORNING AND at noon    HYDROCODONE-ACETAMINOPHEN (NORCO) 5-325 MG TABLET    Take 1 tablet by mouth daily as needed for severe pain.    METOPROLOL TARTRATE (LOPRESSOR) 100 MG TABLET    TAKE 1/2 TABLET(50  MG) BY MOUTH TWICE DAILY    OMEPRAZOLE CPCR 20 MG OR    1 CAPSULE DAILY    ORDER FOR DME    Equipment being ordered: Oxygen.  Oxygen resting at room air was 94%.   Oxygen on room air walking about 50 feet was 87% then after returning to room dropped lower to 86%.   With oxygen at 2 liters resting was 97%.   With oxygen at 2 liters walking about 50 feet was only at 91%.    PAROXETINE (PAXIL) 20 MG TABLET    Take 1 tablet (20 mg) by mouth every other day.    POLYETHYL GLYCOL-PROPYL GLYCOL (SYSTANE OP)    Apply 1 drop to eye as needed Both eyes.    POTASSIUM CHLORIDE MARI ER (KLOR-CON M20) 20 MEQ CR TABLET    Take 1 tablet (20 mEq) by mouth daily (with dinner).    SEMAGLUTIDE (OZEMPIC, 0.25 OR 0.5 MG/DOSE,) 2 MG/3ML PEN    Inject 0.5 mg subcutaneously every 7 days.    VITAMIN D3 (CHOLECALCIFEROL) 50 MCG (2000 UNITS) TABLET    Take 1 tablet by mouth daily    WARFARIN ANTICOAGULANT (COUMADIN) 2 MG TABLET    Take 3 mg (2 mg x 1.5) every Wed; 4 mg (2 mg x 2) all other days or as directed by INR clinic   Modified Medications    No medications on file   Discontinued Medications    No medications on file     Family History   Problem Relation Age of Onset    Diabetes Mother     Cancer Mother     Hypertension Mother     Cataracts Mother     Hypertension Father     Cataracts Father     Neurologic Disorder Sister     Alzheimer Disease Sister     Cancer Son         thyroid cancer     Family Status   Relation Name Status    Mo      Fa      MGMo      MGFa      PGMo      PGFa      Bro  Alive    Bro  Alive    Sis  Alive    Sis  Alive    Son  Alive    Vbiha     No partnership data on file     Social History     Social History Narrative     in [place]     Social History     Tobacco Use    Smoking status: Never     Passive exposure: Never    Smokeless tobacco: Never   Substance Use Topics    Alcohol use: No       Objective:     There were no vitals taken for this visit.    Wt  Readings from Last 3 Encounters:   09/05/24 74.9 kg (165 lb 3.2 oz)   05/23/24 77.7 kg (171 lb 4.8 oz)   11/28/23 99.2 kg (218 lb 11.2 oz)       Constitutional:  NAD  Head: Atraumatic, normocephalic  CV: RRR, no m/r/g  Respiratory: CTA bilaterally  GI: Abdomen soft, non-tender  Musculoskeletal:  Extremities warm and well perfused. Bilateral LE non pitting swelling noted.  Skin: No jaundice  Neurological: Speech normal.  Psychiatric: AOx3      Results:    Orders Only on 02/06/2025   Component Date Value Ref Range Status    INR HOME MONITORING 02/06/2025 2.6  2 - 3 RATIO Final   Orders Only on 01/31/2025   Component Date Value Ref Range Status    INR HOME MONITORING 01/31/2025 2.1  2 - 3 RATIO Final       I was present with the fellow during the history and exam.  I discussed the case with the fellow and agree with the findings as documented in the assessment and plan. CKD slowly progressing.  She is optimized, on SGLT2 inh and BP low normal. Will continue to monitor.    Emilie Ren MD   of Medicine  Department of Nephrology  [facility]

## 2025-02-12 NOTE — PATIENT INSTRUCTIONS
It was a pleasure meeting you today Ms Colindres!  Please continue taking your medications as before.  Please make sure to do the kidney ultrasound and the new lab work, will follow up with the results.  Will do a follow up appointment in 5 months.  Please inform if you experience any dizziness or lightheadedness to make sure your blood pressure is not low.

## 2025-02-13 ENCOUNTER — DOCUMENTATION ONLY (OUTPATIENT)
Dept: ANTICOAGULATION | Facility: CLINIC | Age: OVER 89
End: 2025-02-13
Payer: COMMERCIAL

## 2025-02-13 DIAGNOSIS — Z79.01 CHRONIC ANTICOAGULATION: Primary | ICD-10-CM

## 2025-02-13 DIAGNOSIS — I48.19 PERSISTENT ATRIAL FIBRILLATION (H): ICD-10-CM

## 2025-02-13 DIAGNOSIS — Z79.01 LONG TERM CURRENT USE OF ANTICOAGULANT THERAPY: ICD-10-CM

## 2025-02-13 LAB — INR HOME MONITORING: 2.1 RATIO (ref 2–3)

## 2025-02-13 NOTE — PROGRESS NOTES
ANTICOAGULATION  MANAGEMENT-Home Monitor Managed by Exception    Mara MELVIN Colindres 89 year old female is on warfarin with therapeutic INR result. (Goal INR 2.0-3.0)    Recent labs: (last 7 days)     02/13/25  1440   INR 2.1       Previous INR was Therapeutic  Medication, diet, health changes since last INR:chart reviewed; none identified  Contacted within the last 12 weeks by phone on 1/24/25  Last [facility] referral date: 06/28/2024      ISABEL     Mara was NOT contacted regarding therapeutic result today per home monitoring policy manage by exception agreement.   Current warfarin dose is to be continued:     Summary  As of 2/13/2025      Full warfarin instructions:  2 mg every Sun, Tue, Thu; 4 mg all other days   Next INR check:  2/20/2025             ?   Marcos Jauregui RN  Anticoagulation Clinic  2/13/2025    _______________________________________________________________________     Anticoagulation Episode Summary       Current INR goal:  2.0-3.0   TTR:  79.6% (1 y)   Target end date:  Indefinite   Send INR reminders to:  [facility] HOME MONITORING    Indications    Chronic anticoagulation [Z79.01]  Long-term (current) use of anticoagulants [Z79.01] [Z79.01]  Persistent atrial fibrillation (H) [I48.19]             Comments:  Ross[employer] home meter. Manage by exception.             Anticoagulation Care Providers       Provider Role Specialty Phone number    Steven Abrams MD Referring Internal Medicine [phone]

## 2025-02-20 ENCOUNTER — ANTICOAGULATION THERAPY VISIT (OUTPATIENT)
Dept: ANTICOAGULATION | Facility: CLINIC | Age: OVER 89
End: 2025-02-20
Payer: COMMERCIAL

## 2025-02-20 DIAGNOSIS — Z79.01 LONG TERM CURRENT USE OF ANTICOAGULANT THERAPY: ICD-10-CM

## 2025-02-20 DIAGNOSIS — I48.19 PERSISTENT ATRIAL FIBRILLATION (H): ICD-10-CM

## 2025-02-20 DIAGNOSIS — Z79.01 CHRONIC ANTICOAGULATION: Primary | ICD-10-CM

## 2025-02-20 LAB — INR HOME MONITORING: 1.8 RATIO (ref 2–3)

## 2025-02-20 NOTE — PROGRESS NOTES
ANTICOAGULATION MANAGEMENT     Mara Colindres 89 year old female is on warfarin with subtherapeutic INR result. (Goal INR 2.0-3.0)    Recent labs: (last 7 days)     02/20/25  1405   INR 1.8*       ASSESSMENT     Source(s): Chart Review and Patient/Caregiver Call     Warfarin doses taken: Warfarin taken as instructed  Diet: No new diet changes identified  Medication/supplement changes: None noted  New illness, injury, or hospitalization: No  Signs or symptoms of bleeding or clotting: No  Previous result: Therapeutic last 2(+) visits  Additional findings: None       PLAN     Recommended plan for no diet, medication or health factor changes affecting INR     Dosing Instructions: Continue your current warfarin dose with next INR in 1 week       Summary  As of 2/20/2025      Full warfarin instructions:  2 mg every Sun, Tue, Thu; 4 mg all other days   Next INR check:  2/27/2025               Telephone call with maxi Cabrera who verbalizes understanding and agrees to plan    Patient to recheck with home meter    Education provided: Goal range and lab monitoring: goal range and significance of current result and Importance of therapeutic range    Plan made per [facility] anticoagulation protocol    Marcos Jauregui RN  2/20/2025  Anticoagulation Clinic  [employer] for routing messages: p ANTICOAG HOME MONITORING  [facility] patient phone line: [phone]        _______________________________________________________________________     Anticoagulation Episode Summary       Current INR goal:  2.0-3.0   TTR:  79.0% (1 y)   Target end date:  Indefinite   Send INR reminders to:  ANTICOAG HOME MONITORING    Indications    Chronic anticoagulation [Z79.01]  Long-term (current) use of anticoagulants [Z79.01] [Z79.01]  Persistent atrial fibrillation (H) [I48.19]             Comments:  [facility] home meter. Manage by exception.             Anticoagulation Care Providers       Provider Role Specialty Phone number    Steven Abrams MD Referring Internal  [facility] [phone]

## 2025-02-24 ENCOUNTER — PATIENT OUTREACH (OUTPATIENT)
Dept: NEPHROLOGY | Facility: CLINIC | Age: OVER 89
End: 2025-02-24
Payer: COMMERCIAL

## 2025-02-24 DIAGNOSIS — N18.9 ANEMIA IN CHRONIC KIDNEY DISEASE, UNSPECIFIED CKD STAGE: ICD-10-CM

## 2025-02-24 DIAGNOSIS — N18.4 CKD (CHRONIC KIDNEY DISEASE) STAGE 4, GFR 15-29 ML/MIN (H): Primary | ICD-10-CM

## 2025-02-24 DIAGNOSIS — D63.1 ANEMIA IN CHRONIC KIDNEY DISEASE, UNSPECIFIED CKD STAGE: ICD-10-CM

## 2025-02-24 RX ORDER — FERROUS SULFATE 325(65) MG
325 TABLET ORAL EVERY OTHER DAY
Qty: 45 TABLET | Refills: 3 | Status: SHIPPED | OUTPATIENT
Start: 2025-02-24

## 2025-02-24 NOTE — PROGRESS NOTES
Emilie Ren MD Mdanat, MD Milly  Cc: Nichole Bey RN  Low iron studies  Would have her start oral iron every other day and repeat studies in 3-4 months  I dont see followup, Flores, can you help coordinate this in the mean time    thanks

## 2025-02-24 NOTE — TELEPHONE ENCOUNTER
Spoke to Mara about the ferrous sulfate every other day. She wrote everything down. We were not able to schedule with Dr. Bailey as his schedule for summer is not out yet and she prefers an afternoon and Dr. Ren only has mornings.  Writer reminded her of recheck on her anemia labs in 3-4 months.

## 2025-02-25 ENCOUNTER — ANCILLARY PROCEDURE (OUTPATIENT)
Dept: ULTRASOUND IMAGING | Facility: CLINIC | Age: OVER 89
End: 2025-02-25
Attending: INTERNAL MEDICINE
Payer: COMMERCIAL

## 2025-02-25 DIAGNOSIS — N18.4 CKD (CHRONIC KIDNEY DISEASE) STAGE 4, GFR 15-29 ML/MIN (H): ICD-10-CM

## 2025-02-25 PROCEDURE — 76770 US EXAM ABDO BACK WALL COMP: CPT | Mod: GC | Performed by: STUDENT IN AN ORGANIZED HEALTH CARE EDUCATION/TRAINING PROGRAM

## 2025-03-04 ENCOUNTER — TELEPHONE (OUTPATIENT)
Dept: FAMILY MEDICINE | Facility: CLINIC | Age: OVER 89
End: 2025-03-04
Payer: COMMERCIAL

## 2025-03-04 NOTE — TELEPHONE ENCOUNTER
Orders ok as listed     Interactions noted.  However, there are not safer options available and treatments appear medically necessary

## 2025-03-04 NOTE — TELEPHONE ENCOUNTER
Called Abida. Left detailed voice message on identified voicemail box regarding approval of requested orders and PCP message regarding medication interactions, advised to return call at [phone] for any further questions.     NICANOR MayenN RN  [facility]   Recommend Peter Active x2 daily.

## 2025-03-04 NOTE — TELEPHONE ENCOUNTER
"  Home Care is calling regarding an established patient with [facility].  Requesting orders from: Steven Abrams  OTHER ACTION: Gave verbal approval for home PT orders. Home care needs response from PCP regarding interactions between the patient's following medications: cefuroxime and omeprazole (per [employer]: \"Concurrent use of CEFUROXIME AXETIL and PROTON PUMP INHIBITORS may result in decreased cefuroxime axetil exposure); warfarin and paroxetine (per [employer]: \"Concurrent use of PAROXETINE and WARFARIN may result in an increased risk of bleeding and an increased free concentration of PARoxetine or warfarin\"); and diphenoxylate and potassium chloride (per [employer]: \"Concurrent use of POTASSIUM CHLORIDE and ANTICHOLINERGICS may result in increased risk of gastrointestinal irritation\"). Please advise if any medication changes need to be made.   Is this a request for a temporary pause in the home care episode?  No    Orders Requested    Physical Therapy  Request for initial certification (first set of orders)   Frequency: 2x week for 2 weeks, then 1x week for 2 weeks  RN gave verbal order: Yes        Caller: VICKY Nieto  Home Care Agency: [facility]  Phone number Home Care can be reached at: [phone]  Okay to leave a detailed message?: Yes    Katerine MOSQUERA RN  [facility]   "

## 2025-03-06 ENCOUNTER — TELEPHONE (OUTPATIENT)
Dept: ANTICOAGULATION | Facility: CLINIC | Age: OVER 89
End: 2025-03-06
Payer: COMMERCIAL

## 2025-03-06 NOTE — TELEPHONE ENCOUNTER
ANTICOAGULATION     Mara Colindres is overdue for an INR check.     Called [facility]- [phone]  to discuss if nursing was included in admission visit and if INR can be drawn this week.Spoke with Home care nurse and patient is open to home care for therapy services but no nursing visits.      Left message for patient to recheck INR with home meter.    Laxmi Huang RN  3/6/2025  Anticoagulation Clinic  [facility] for routing messages: deshaun MEND[facility] HOME MONITORING  [facility] patient phone line: [phone]

## 2025-03-07 PROBLEM — J44.0 CHRONIC OBSTRUCTIVE PULMONARY DISEASE WITH ACUTE LOWER RESPIRATORY INFECTION (H): Status: ACTIVE | Noted: 2025-03-07

## 2025-03-10 ENCOUNTER — TELEPHONE (OUTPATIENT)
Dept: FAMILY MEDICINE | Facility: CLINIC | Age: OVER 89
End: 2025-03-10
Payer: COMMERCIAL

## 2025-03-10 NOTE — TELEPHONE ENCOUNTER
Attempt #1 to call patient.     RN left voicemail and requested return call to [facility] at [phone]. When patient returns call, please review message from provider below.    Katerine MOSQUERA RN  [facility]      ----- Message from Luan Sanchez sent at 3/7/2025  5:24 PM CST -----  Please call patient: Sodium is normalized (it was low when she was in hospital with PNA).  Follow-up in 1 month for BP evaluation.

## 2025-03-10 NOTE — TELEPHONE ENCOUNTER
Luan,    Patient returned call and notified of below.     Per patient she is seeing her cardiologist 4/8 and she would really like to avoid BP follow up with primary care as it is difficult for her to leave the home for appts. I told her this should be fine and that I would send you an FYI.     Thanks,  AMAYA Granados  [facility]

## 2025-03-13 ENCOUNTER — DOCUMENTATION ONLY (OUTPATIENT)
Dept: ANTICOAGULATION | Facility: CLINIC | Age: OVER 89
End: 2025-03-13
Payer: COMMERCIAL

## 2025-03-13 DIAGNOSIS — Z79.01 LONG TERM CURRENT USE OF ANTICOAGULANT THERAPY: ICD-10-CM

## 2025-03-13 DIAGNOSIS — I48.19 PERSISTENT ATRIAL FIBRILLATION (H): ICD-10-CM

## 2025-03-13 DIAGNOSIS — Z79.01 CHRONIC ANTICOAGULATION: Primary | ICD-10-CM

## 2025-03-13 LAB — INR HOME MONITORING: 2.4 RATIO (ref 2–3)

## 2025-03-13 NOTE — PROGRESS NOTES
ANTICOAGULATION  MANAGEMENT-Home Monitor Managed by Exception    Mara MELVIN Colindres 89 year old female is on warfarin with therapeutic INR result. (Goal INR 2.0-3.0)    Recent labs: (last 7 days)     03/13/25  1501   INR 2.4       Previous INR was Therapeutic  Medication, diet, health changes since last INR:chart reviewed; none identified  Contacted within the last 12 weeks by phone on 3/7/25  Last [facility] referral date: 06/28/2024      ISABEL     Mara was NOT contacted regarding therapeutic result today per home monitoring policy manage by exception agreement.   Current warfarin dose is to be continued:     Summary  As of 3/13/2025      Full warfarin instructions:  2 mg every Sun, Tue, Thu; 4 mg all other days   Next INR check:  3/20/2025             ?   Chaya Coronado RN  Anticoagulation Clinic  3/13/2025    _______________________________________________________________________     Anticoagulation Episode Summary       Current INR goal:  2.0-3.0   TTR:  78.4% (1 y)   Target end date:  Indefinite   Send INR reminders to:  [facility] MONITOR[facility]    Indications    Long-term (current) use of anticoagulants [Z79.01] [Z79.01]  Persistent atrial fibrillation (H) [I48.19]             Comments:  [facility] home meter. Manage by exception.             Anticoagulation Care Providers       Provider Role Specialty Phone number    Steven Abrams MD Referring Internal Medicine [phone]

## 2025-03-27 ENCOUNTER — TELEPHONE (OUTPATIENT)
Dept: INTERNAL MEDICINE | Facility: CLINIC | Age: OVER 89
End: 2025-03-27

## 2025-03-27 ENCOUNTER — MEDICAL CORRESPONDENCE (OUTPATIENT)
Dept: HEALTH INFORMATION MANAGEMENT | Facility: CLINIC | Age: OVER 89
End: 2025-03-27

## 2025-03-27 ENCOUNTER — ANTICOAGULATION THERAPY VISIT (OUTPATIENT)
Dept: ANTICOAGULATION | Facility: CLINIC | Age: OVER 89
End: 2025-03-27

## 2025-03-27 ENCOUNTER — TELEPHONE (OUTPATIENT)
Dept: FAMILY MEDICINE | Facility: CLINIC | Age: OVER 89
End: 2025-03-27

## 2025-03-27 ENCOUNTER — VIRTUAL VISIT (OUTPATIENT)
Dept: INTERNAL MEDICINE | Facility: CLINIC | Age: OVER 89
End: 2025-03-27
Payer: COMMERCIAL

## 2025-03-27 DIAGNOSIS — E11.22 CONTROLLED TYPE 2 DIABETES MELLITUS WITH STAGE 3 CHRONIC KIDNEY DISEASE, WITHOUT LONG-TERM CURRENT USE OF INSULIN (H): ICD-10-CM

## 2025-03-27 DIAGNOSIS — T84.018S FAILED TOTAL HIP ARTHROPLASTY, SEQUELA: ICD-10-CM

## 2025-03-27 DIAGNOSIS — I48.19 PERSISTENT ATRIAL FIBRILLATION (H): ICD-10-CM

## 2025-03-27 DIAGNOSIS — I89.0 LYMPHEDEMA: Primary | ICD-10-CM

## 2025-03-27 DIAGNOSIS — Z79.01 LONG TERM CURRENT USE OF ANTICOAGULANT THERAPY: Primary | ICD-10-CM

## 2025-03-27 DIAGNOSIS — N18.30 CONTROLLED TYPE 2 DIABETES MELLITUS WITH STAGE 3 CHRONIC KIDNEY DISEASE, WITHOUT LONG-TERM CURRENT USE OF INSULIN (H): ICD-10-CM

## 2025-03-27 DIAGNOSIS — M81.0 AGE-RELATED OSTEOPOROSIS WITHOUT CURRENT PATHOLOGICAL FRACTURE: ICD-10-CM

## 2025-03-27 DIAGNOSIS — E08.42 DIABETIC POLYNEUROPATHY ASSOCIATED WITH DIABETES MELLITUS DUE TO UNDERLYING CONDITION (H): ICD-10-CM

## 2025-03-27 DIAGNOSIS — M00.9 SEPTIC HIP (H): ICD-10-CM

## 2025-03-27 DIAGNOSIS — Z96.649 FAILED TOTAL HIP ARTHROPLASTY, SEQUELA: ICD-10-CM

## 2025-03-27 DIAGNOSIS — Z96.653 H/O TOTAL KNEE REPLACEMENT, BILATERAL: ICD-10-CM

## 2025-03-27 LAB — INR HOME MONITORING: 1.9 RATIO (ref 2–3)

## 2025-03-27 NOTE — TELEPHONE ENCOUNTER
Forms/Letter Request    Type of form/letter: Home Health Certification      Do we have the form/letter: Yes:     Who is the form from? Home care    Where did/will the form come from? form was faxed in    When is form/letter needed by: ?    How would you like the form/letter returned: Fax : [phone]    Halima Bob,

## 2025-03-27 NOTE — PROGRESS NOTES
Mara is a 89 year old who is being evaluated via a billable telephone visit.    What phone number would you like to be contacted at? [phone]  How would you like to obtain your AVS? Reggie  Originating Location (pt. Location): Home    Distant Location (provider location):  On-site  Telephone visit completed due to the patient did not consent to a video visit.    Assessment & Plan     Lymphedema  Today's appointment was this patients attempt to complete the process of the acquisition of a new electric wheelchair . She has never had this before. For this appointment I reviewed directly the documentation from the [place] [facility] with [facility] , occupational therapist provider was Laxmi [place], and the documentation is complete and correct. I am not positive about generating the new order for this mobility equipment and will ascertain we do this correctly but we should be able to complete this by later today . Technically , according to my understanding , this process also requires patient to have a true face to face encounter with me which I suspect a telephone MD visit is not rising to meet that threshold . Again this will be determined as soon as possible and if absolutely necessary patient may wind up needing a further either face to face encounter or virtual office visit     Septic hip (H)  As above     Failed total hip arthroplasty, sequela  As above     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, without long-term current use of insulin (H)  As above     Diabetic polyneuropathy associated with diabetes mellitus due to underlying condition (H)  As above , noted as a point of historical importance and further buttressing the reasons why such a mobility assist device is necessary at this point     H/O total knee replacement, bilateral  As above     Age-related osteoporosis without current pathological fracture  An unrelated matter, this diagnosis is  "not reviewed and need for DEXA scan is postponed until next complete physical exam           BMI  Estimated body mass index is 27.82 kg/m  as calculated from the following:    Height as of 2/12/25: 1.6 m (5' 2.99\").    Weight as of 3/7/25: 71.2 kg (157 lb).   Weight management plan: Discussed healthy diet and exercise guidelines      Michelle Terry is a 89 year old, presenting for the following health issues:  Dme (Power wheelchair )      3/27/2025     9:38 AM   Additional Questions   Roomed by deb     History of Present Illness       Reason for visit:  Dme for power wheelchair   She is taking medications regularly.        Telephone MD visit     Call began at 10:15 AM   Call ended at 10:35 AM      Last most recent previous office visit with me was a face to face encounter from 9-5-2024.    Patient was calling / today's telephone MD visit to check-in to a new mobility assist device. She's had the appointment with a place that deals with these devices, already     Patient informs me of a fall in February in her hallway at home and fractured a rib and stomach was very sore for a time but is recovered now. She called the clinic and was directed to the emergency room so she went there and was kept for a week because she was found with a pneumonia , she was at [facility] , came home February 28th, 2025    Before [place] is when she heard about the mobility assist devices eligibility . She does not walk very well. She is supposed to have a face to face encounter withme I suspect    Patient says she needs not a motorized scooter type of vehicle but rather, an electric wheelchair  which would actually fit in her house.    Currently she uses a walker and she has an older motorized scooter type of vehicle which does not fit into her house ad she keeps it in the garage . She has a car with a travis lift.    She already says she met with a woman and [facility] , for a mobility assessment   - patient met with " Laxmi David occupational therapist , patient met her [address]    [phone]    The device recommended was already selected. I did successfully obtain all this documentation of her visit with the [place] [facility] with [facility]         Review of Systems  Constitutional, HEENT, cardiovascular, pulmonary, gi and gu systems are negative, except as otherwise noted.      Objective           Vitals:  No vitals were obtained today due to virtual visit.    Physical Exam   General: Alert and no distress //Respiratory: No audible wheeze, cough, or shortness of breath // Psychiatric:  Appropriate affect, tone, and pace of words      No orders of the defined types were placed in this encounter.          Phone call duration: 32 minutes  Signed Electronically by: Steven Abrams MD

## 2025-03-27 NOTE — TELEPHONE ENCOUNTER
General Call      Reason for Call:  son retuning inr call    What are your questions or concerns:  see above    Date of last appointment with provider: n/a    Okay to leave a detailed message?: Yes at Home number on file [phone]

## 2025-03-27 NOTE — TELEPHONE ENCOUNTER
Noted, spoke with son.  Please see the March 27, 2025 anticoagulation encounter for further information.     Lesly Wright RN

## 2025-03-27 NOTE — PROGRESS NOTES
ANTICOAGULATION MANAGEMENT     Mara Colindres 89 year old female is on warfarin with subtherapeutic INR result. (Goal INR 2.0-3.0)    Recent labs: (last 7 days)     03/27/25  1356   INR 1.9*       ASSESSMENT     Source(s): Chart Review and Patient/Caregiver Call     Warfarin doses taken: Warfarin taken as instructed - unsure if patient missed any dose  Diet: No new diet changes identified  Medication/supplement changes: None noted  New illness, injury, or hospitalization: No  Signs or symptoms of bleeding or clotting: No  Previous result: Subtherapeutic  Additional findings:  Unsure if patient missed any dose.  Will consider as temp factor, booster dose today then continue same dose, recheck in 1 week.  If continue to be sub therapeutic then will increase maintenance dose.        PLAN     Recommended plan for temporary change(s) affecting INR     Dosing Instructions: booster dose then continue your current warfarin dose with next INR in 1 week       Summary  As of 3/27/2025      Full warfarin instructions:  3/27: 4 mg; Otherwise 2 mg every Sun, Tue, Thu; 4 mg all other days   Next INR check:  4/3/2025               Telephone call with sonRick who verbalizes understanding and agrees to plan    Patient to recheck with home meter    Education provided: Please call back if any changes to your diet, medications or how you've been taking warfarin    Plan made per [facility] anticoagulation protocol    Lesly Wright RN  3/27/2025  Anticoagulation Clinic  [facility] for routing messages: p ANTICOAG HOME MONITORING  [facility] patient phone line: [phone]        _______________________________________________________________________     Anticoagulation Episode Summary       Current INR goal:  2.0-3.0   TTR:  75.7% (1 y)   Target end date:  Indefinite   Send INR reminders to:  ANTICOAG HOME MONITORING    Indications    Long-term (current) use of anticoagulants [Z79.01] [Z79.01]  Persistent atrial fibrillation (H) [I48.19]              Comments:  mdINR home meter. Manage by exception.             Anticoagulation Care Providers       Provider Role Specialty Phone number    Steven Abrams MD Referring Internal Medicine [phone]

## 2025-04-03 ENCOUNTER — MEDICAL CORRESPONDENCE (OUTPATIENT)
Dept: HEALTH INFORMATION MANAGEMENT | Facility: CLINIC | Age: OVER 89
End: 2025-04-03
Payer: COMMERCIAL

## 2025-04-07 NOTE — PROGRESS NOTES
Morbid obesity (H)             Hypertension goal BP (blood pressure) < 140/90             DVT (deep venous thrombosis) (H)             MRSA (methicillin resistant Staphylococcus aureus)             Chronic diarrhea             HYPERLIPIDEMIA LDL GOAL <100             Chronic anticoagulation             CKD (chronic kidney disease) stage 3, GFR 30-59 ml/min             Septic hip (H)             Lymphedema             Tubular adenoma of colon             Abdominal wall hematoma             Advanced directives, counseling/discussion             Umbilical hernia             History of [facility] fever             Moderate persistent asthma             Pseudophakia, ou             Hypovitaminosis D             Bilateral leg pain             Positive YAMINI             Raynaud phenomenon             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             Scotoma involving central area in visual field, right             Failed total hip arthroplasty, sequela             Major depressive disorder, recurrent episode, mild (H)             Chronic diastolic congestive heart failure (H)             Long-term (current) use of anticoagulants [Z79.01]             Persistent atrial fibrillation (H)             Diabetic polyneuropathy associated with diabetes mellitus due to underlying condition (H)             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, without long-term current use of insulin (H)             Senile osteoporosis             Posterior vitreous detachment of left eye             Background diabetic retinopathy, mild, ou    Plan:   IV iron qweek x 2 with ferric carboxy maltose  Dr. Abrams weekly to check renal function hemoglobin and weight loss as well as examine reasons for iron deficiency  Return to taking furosemide 40 BID  Blood sugar is 285 and will need to follow with Dr. Abrams  RTC in 6 weeks  Labs with Dr. Abrams next weeks      Patient complains of increasing  shortness of breath, increasing weight, increasing peripheral edema.  Patient has not been taking medications as ordered.  She has no history of melena, inadequate food intake, ulcers but is on omeprazole.      Alert, oriented overweight, basilar rales, regular rhythm, lymphedema no ulceration    Current Outpatient Medications   Medication    ACCU-CHEK PARVEZ PLUS test strip    acetaminophen (TYLENOL) 500 MG tablet    alendronate (FOSAMAX) 70 MG tablet    ASPIRIN NOT PRESCRIBED, INTENTIONAL,    Calcium Carb-Cholecalciferol 600-20 MG-MCG TABS    digoxin (LANOXIN) 62.5 MCG tablet    diphenoxylate-atropine (LOMOTIL) 2.5-0.025 MG tablet    empagliflozin (JARDIANCE) 10 MG TABS tablet    furosemide (LASIX) 20 MG tablet    HYDROcodone-acetaminophen (NORCO) 5-325 MG tablet    metoprolol tartrate (LOPRESSOR) 100 MG tablet    OMEPRAZOLE CPCR 20 MG OR    order for DME    PARoxetine (PAXIL) 20 MG tablet    Polyethyl Glycol-Propyl Glycol (SYSTANE OP)    potassium chloride ER (KLOR-CON M) 20 MEQ CR tablet    STATIN NOT PRESCRIBED, INTENTIONAL,    TRULICITY 1.5 MG/0.5ML pen    Vitamin D (Cholecalciferol) 25 MCG (1000 UT) TABS    warfarin ANTICOAGULANT (COUMADIN) 2 MG tablet     No current facility-administered medications for this visit.       Latest Reference Range & Units 02/12/25 12:11   Sodium 135 - 145 mmol/L 138   Potassium 3.4 - 5.3 mmol/L 4.3   Chloride 98 - 107 mmol/L 101   Carbon Dioxide (CO2) 22 - 29 mmol/L 26   Urea Nitrogen 8.0 - 23.0 mg/dL 33.9 (H)   Creatinine 0.51 - 0.95 mg/dL 2.18 (H)   GFR Estimate >60 mL/min/1.73m2 21 (L)   Calcium 8.8 - 10.4 mg/dL 9.3   Anion Gap 7 - 15 mmol/L 11   Phosphorus 2.5 - 4.5 mg/dL 3.9   Albumin 3.5 - 5.2 g/dL 4.2   Ferritin 11 - 328 ng/mL 51   Glucose 70 - 99 mg/dL 171 (H)   Iron 37 - 145 ug/dL  37 - 145 ug/dL 30 (L)  30 (L)   Iron Binding Capacity 240 - 430 ug/dL 367   Iron Sat Index 15 - 46 % 8 (L)           Latest Reference Range & Units 04/04/25 10:27 04/08/25 09:41   Sodium 135 -  145 mmol/L  140   Potassium 3.4 - 5.3 mmol/L  4.4   Chloride 98 - 107 mmol/L  101   Carbon Dioxide (CO2) 22 - 29 mmol/L  26   Urea Nitrogen 8.0 - 23.0 mg/dL  27.8 (H)   Creatinine 0.51 - 0.95 mg/dL  1.92 (H)   GFR Estimate >60 mL/min/1.73m2  24 (L)   Calcium 8.8 - 10.4 mg/dL  8.8 - 10.4 mg/dL  9.8  9.8   Anion Gap 7 - 15 mmol/L  13   ALT 0 - 50 U/L  16   Glucose 70 - 99 mg/dL  275 (H)   Iron 37 - 145 ug/dL  31 (L)   Iron Binding Capacity 240 - 430 ug/dL  349   Iron Sat Index 15 - 46 %  9 (L)   N-Terminal Pro Bnp 0 - 1,800 pg/mL  6,424 (H)   WBC 4.0 - 11.0 10e3/uL  6.0   Hemoglobin 11.7 - 15.7 g/dL  10.9 (L)   Hematocrit 35.0 - 47.0 %  35.3   Platelet Count 150 - 450 10e3/uL  222   RBC Count 3.80 - 5.20 10e6/uL  3.85   MCV 78 - 100 fL  92   MCH 26.5 - 33.0 pg  28.3   MCHC 31.5 - 36.5 g/dL  30.9 (L)   RDW 10.0 - 15.0 %  17.0 (H)   INR HOME MONITORING 2 - 3 RATIO 1.8 (L)    (H): Data is abnormally high  (L): Data is abnormally low  Wt Readings from Last 24 Encounters:   03/07/25 71.2 kg (157 lb)   02/12/25 77.3 kg (170 lb 8 oz)   09/05/24 74.9 kg (165 lb 3.2 oz)   05/23/24 77.7 kg (171 lb 4.8 oz)   11/28/23 99.2 kg (218 lb 11.2 oz)   03/02/23 88.5 kg (195 lb)   09/13/21 108.4 kg (239 lb)   10/22/20 114.3 kg (252 lb)   09/04/20 114.3 kg (252 lb)   04/22/20 113.4 kg (250 lb)   03/05/20 113.4 kg (250 lb)   01/07/20 113.4 kg (250 lb)   10/03/19 117.5 kg (259 lb)   04/29/19 117.5 kg (259 lb)   11/28/18 110.6 kg (243 lb 14.4 oz)   09/13/18 114.1 kg (251 lb 9.6 oz)   01/08/18 114.3 kg (252 lb)   11/07/17 113.4 kg (250 lb)   07/27/17 115.2 kg (254 lb)   05/24/17 113.6 kg (250 lb 8 oz)   05/15/17 115.7 kg (255 lb)   04/27/17 113.9 kg (251 lb)   02/20/17 115.2 kg (254 lb)   10/27/16 115.8 kg (255 lb 6.4 oz)           SUSAN Abrams

## 2025-04-08 ENCOUNTER — ANCILLARY PROCEDURE (OUTPATIENT)
Dept: CARDIOLOGY | Facility: CLINIC | Age: OVER 89
End: 2025-04-08
Attending: PHYSICIAN ASSISTANT
Payer: COMMERCIAL

## 2025-04-08 ENCOUNTER — LAB (OUTPATIENT)
Dept: LAB | Facility: CLINIC | Age: OVER 89
End: 2025-04-08
Attending: INTERNAL MEDICINE
Payer: COMMERCIAL

## 2025-04-08 VITALS
WEIGHT: 164.4 LBS | OXYGEN SATURATION: 89 % | BODY MASS INDEX: 29.13 KG/M2 | SYSTOLIC BLOOD PRESSURE: 96 MMHG | DIASTOLIC BLOOD PRESSURE: 60 MMHG | HEART RATE: 72 BPM

## 2025-04-08 DIAGNOSIS — N18.4 CKD (CHRONIC KIDNEY DISEASE) STAGE 4, GFR 15-29 ML/MIN (H): ICD-10-CM

## 2025-04-08 DIAGNOSIS — R06.02 SOB (SHORTNESS OF BREATH): ICD-10-CM

## 2025-04-08 DIAGNOSIS — I27.20 PULMONARY HYPERTENSION (H): ICD-10-CM

## 2025-04-08 DIAGNOSIS — I48.19 PERSISTENT ATRIAL FIBRILLATION (H): ICD-10-CM

## 2025-04-08 DIAGNOSIS — N18.9 ANEMIA IN CHRONIC KIDNEY DISEASE, UNSPECIFIED CKD STAGE: ICD-10-CM

## 2025-04-08 DIAGNOSIS — E78.5 HYPERLIPIDEMIA LDL GOAL <100: ICD-10-CM

## 2025-04-08 DIAGNOSIS — M81.0 AGE-RELATED OSTEOPOROSIS WITHOUT CURRENT PATHOLOGICAL FRACTURE: ICD-10-CM

## 2025-04-08 DIAGNOSIS — D50.9 IRON DEFICIENCY ANEMIA, UNSPECIFIED IRON DEFICIENCY ANEMIA TYPE: Primary | ICD-10-CM

## 2025-04-08 DIAGNOSIS — D63.1 ANEMIA IN CHRONIC KIDNEY DISEASE, UNSPECIFIED CKD STAGE: ICD-10-CM

## 2025-04-08 LAB
ALT SERPL W P-5'-P-CCNC: 16 U/L (ref 0–50)
ANION GAP SERPL CALCULATED.3IONS-SCNC: 13 MMOL/L (ref 7–15)
BUN SERPL-MCNC: 27.8 MG/DL (ref 8–23)
CALCIUM SERPL-MCNC: 9.8 MG/DL (ref 8.8–10.4)
CALCIUM SERPL-MCNC: 9.8 MG/DL (ref 8.8–10.4)
CHLORIDE SERPL-SCNC: 101 MMOL/L (ref 98–107)
CREAT SERPL-MCNC: 1.92 MG/DL (ref 0.51–0.95)
CREAT UR-MCNC: 107 MG/DL
DIGOXIN SERPL-MCNC: <0.4 NG/ML (ref 0.6–1.2)
EGFRCR SERPLBLD CKD-EPI 2021: 24 ML/MIN/1.73M2
ERYTHROCYTE [DISTWIDTH] IN BLOOD BY AUTOMATED COUNT: 17 % (ref 10–15)
GLUCOSE SERPL-MCNC: 275 MG/DL (ref 70–99)
HCO3 SERPL-SCNC: 26 MMOL/L (ref 22–29)
HCT VFR BLD AUTO: 35.3 % (ref 35–47)
HGB BLD-MCNC: 10.9 G/DL (ref 11.7–15.7)
IRON BINDING CAPACITY (ROCHE): 349 UG/DL (ref 240–430)
IRON SATN MFR SERPL: 9 % (ref 15–46)
IRON SERPL-MCNC: 31 UG/DL (ref 37–145)
LVEF ECHO: NORMAL
MCH RBC QN AUTO: 28.3 PG (ref 26.5–33)
MCHC RBC AUTO-ENTMCNC: 30.9 G/DL (ref 31.5–36.5)
MCV RBC AUTO: 92 FL (ref 78–100)
MICROALBUMIN UR-MCNC: 44.7 MG/L
MICROALBUMIN/CREAT UR: 41.78 MG/G CR (ref 0–25)
NT-PROBNP SERPL-MCNC: 6424 PG/ML (ref 0–1800)
PLATELET # BLD AUTO: 222 10E3/UL (ref 150–450)
POTASSIUM SERPL-SCNC: 4.4 MMOL/L (ref 3.4–5.3)
RBC # BLD AUTO: 3.85 10E6/UL (ref 3.8–5.2)
SODIUM SERPL-SCNC: 140 MMOL/L (ref 135–145)
WBC # BLD AUTO: 6 10E3/UL (ref 4–11)

## 2025-04-08 PROCEDURE — 84460 ALANINE AMINO (ALT) (SGPT): CPT | Performed by: PATHOLOGY

## 2025-04-08 PROCEDURE — 83880 ASSAY OF NATRIURETIC PEPTIDE: CPT | Performed by: PATHOLOGY

## 2025-04-08 PROCEDURE — 36415 COLL VENOUS BLD VENIPUNCTURE: CPT | Performed by: PATHOLOGY

## 2025-04-08 PROCEDURE — 99000 SPECIMEN HANDLING OFFICE-LAB: CPT | Performed by: PATHOLOGY

## 2025-04-08 PROCEDURE — 80162 ASSAY OF DIGOXIN TOTAL: CPT | Performed by: INTERNAL MEDICINE

## 2025-04-08 PROCEDURE — 3074F SYST BP LT 130 MM HG: CPT | Performed by: INTERNAL MEDICINE

## 2025-04-08 PROCEDURE — 82043 UR ALBUMIN QUANTITATIVE: CPT | Performed by: INTERNAL MEDICINE

## 2025-04-08 PROCEDURE — 1126F AMNT PAIN NOTED NONE PRSNT: CPT | Performed by: INTERNAL MEDICINE

## 2025-04-08 PROCEDURE — 86334 IMMUNOFIX E-PHORESIS SERUM: CPT | Mod: 26 | Performed by: STUDENT IN AN ORGANIZED HEALTH CARE EDUCATION/TRAINING PROGRAM

## 2025-04-08 PROCEDURE — 83550 IRON BINDING TEST: CPT | Performed by: PATHOLOGY

## 2025-04-08 PROCEDURE — 85027 COMPLETE CBC AUTOMATED: CPT | Performed by: PATHOLOGY

## 2025-04-08 PROCEDURE — G0463 HOSPITAL OUTPT CLINIC VISIT: HCPCS | Performed by: INTERNAL MEDICINE

## 2025-04-08 PROCEDURE — 86334 IMMUNOFIX E-PHORESIS SERUM: CPT | Performed by: STUDENT IN AN ORGANIZED HEALTH CARE EDUCATION/TRAINING PROGRAM

## 2025-04-08 PROCEDURE — 82570 ASSAY OF URINE CREATININE: CPT | Performed by: INTERNAL MEDICINE

## 2025-04-08 PROCEDURE — 93306 TTE W/DOPPLER COMPLETE: CPT | Performed by: INTERNAL MEDICINE

## 2025-04-08 PROCEDURE — 86335 IMMUNFIX E-PHORSIS/URINE/CSF: CPT | Mod: 26 | Performed by: STUDENT IN AN ORGANIZED HEALTH CARE EDUCATION/TRAINING PROGRAM

## 2025-04-08 PROCEDURE — 83540 ASSAY OF IRON: CPT | Performed by: PATHOLOGY

## 2025-04-08 PROCEDURE — 80048 BASIC METABOLIC PNL TOTAL CA: CPT | Performed by: PATHOLOGY

## 2025-04-08 PROCEDURE — 3078F DIAST BP <80 MM HG: CPT | Performed by: INTERNAL MEDICINE

## 2025-04-08 PROCEDURE — 86335 IMMUNFIX E-PHORSIS/URINE/CSF: CPT | Performed by: STUDENT IN AN ORGANIZED HEALTH CARE EDUCATION/TRAINING PROGRAM

## 2025-04-08 PROCEDURE — 99214 OFFICE O/P EST MOD 30 MIN: CPT | Mod: 25 | Performed by: INTERNAL MEDICINE

## 2025-04-08 RX ORDER — METHYLPREDNISOLONE SODIUM SUCCINATE 40 MG/ML
40 INJECTION INTRAMUSCULAR; INTRAVENOUS
Start: 2025-04-08

## 2025-04-08 RX ORDER — DIPHENHYDRAMINE HYDROCHLORIDE 50 MG/ML
50 INJECTION, SOLUTION INTRAMUSCULAR; INTRAVENOUS
Start: 2025-04-08

## 2025-04-08 RX ORDER — MEPERIDINE HYDROCHLORIDE 25 MG/ML
25 INJECTION INTRAMUSCULAR; INTRAVENOUS; SUBCUTANEOUS
OUTPATIENT
Start: 2025-04-08

## 2025-04-08 RX ORDER — ALBUTEROL SULFATE 90 UG/1
1-2 INHALANT RESPIRATORY (INHALATION)
Start: 2025-04-08

## 2025-04-08 RX ORDER — EPINEPHRINE 1 MG/ML
0.3 INJECTION, SOLUTION, CONCENTRATE INTRAVENOUS EVERY 5 MIN PRN
OUTPATIENT
Start: 2025-04-08

## 2025-04-08 RX ORDER — DIPHENHYDRAMINE HYDROCHLORIDE 50 MG/ML
25 INJECTION, SOLUTION INTRAMUSCULAR; INTRAVENOUS
Start: 2025-04-08

## 2025-04-08 RX ORDER — HEPARIN SODIUM (PORCINE) LOCK FLUSH IV SOLN 100 UNIT/ML 100 UNIT/ML
5 SOLUTION INTRAVENOUS
OUTPATIENT
Start: 2025-04-08

## 2025-04-08 RX ORDER — ALBUTEROL SULFATE 0.83 MG/ML
2.5 SOLUTION RESPIRATORY (INHALATION)
OUTPATIENT
Start: 2025-04-08

## 2025-04-08 RX ORDER — HEPARIN SODIUM,PORCINE 10 UNIT/ML
5-20 VIAL (ML) INTRAVENOUS DAILY PRN
OUTPATIENT
Start: 2025-04-08

## 2025-04-08 ASSESSMENT — PAIN SCALES - GENERAL: PAINLEVEL_OUTOF10: NO PAIN (0)

## 2025-04-08 NOTE — LETTER
4/8/2025      RE: Mara Colindres  [address] [address]       Dear Colleague,    Thank you for the opportunity to participate in the care of your patient, Mara Colindres, at the [facility] [place] at [facility]. Please see a copy of my visit note below.               Morbid obesity (H)              Hypertension goal BP (blood pressure) < 140/90              DVT (deep venous thrombosis) (H)              MRSA (methicillin resistant Staphylococcus aureus)              Chronic diarrhea              HYPERLIPIDEMIA LDL GOAL <100              Chronic anticoagulation              CKD (chronic kidney disease) stage 3, GFR 30-59 ml/min              Septic hip (H)              Lymphedema              Tubular adenoma of colon              Abdominal wall hematoma              Advanced directives, counseling/discussion              Umbilical hernia              History of [place] fever              Moderate persistent asthma              Pseudophakia, ou              Hypovitaminosis D              Bilateral leg pain              Positive YAMINI              Raynaud phenomenon              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              Scotoma involving central area in visual field, right              Failed total hip arthroplasty, sequela              Major depressive disorder, recurrent episode, mild (H)              Chronic diastolic congestive heart failure (H)              Long-term (current) use of anticoagulants [Z79.01]              Persistent atrial fibrillation (H)              Diabetic polyneuropathy associated with diabetes mellitus due to underlying condition (H)              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, without long-term current use of insulin (H)              Senile osteoporosis              Posterior vitreous detachment of left eye              Background diabetic  retinopathy, mild, ou    Plan:   IV iron qweek x 2 with ferric carboxy maltose  Dr. Abrams weekly to check renal function hemoglobin and weight loss as well as examine reasons for iron deficiency  Return to taking furosemide 40 BID  Blood sugar is 285 and will need to follow with Dr. Abrams  RTC in 6 weeks  Labs with Dr. Abrams next weeks      Patient complains of increasing shortness of breath, increasing weight, increasing peripheral edema.  Patient has not been taking medications as ordered.  She has no history of melena, inadequate food intake, ulcers but is on omeprazole.      Alert, oriented overweight, basilar rales, regular rhythm, lymphedema no ulceration    Current Outpatient Medications   Medication     ACCU-CHEK PARVEZ PLUS test strip     acetaminophen (TYLENOL) 500 MG tablet     alendronate (FOSAMAX) 70 MG tablet     ASPIRIN NOT PRESCRIBED, INTENTIONAL,     Calcium Carb-Cholecalciferol 600-20 MG-MCG TABS     digoxin (LANOXIN) 62.5 MCG tablet     diphenoxylate-atropine (LOMOTIL) 2.5-0.025 MG tablet     empagliflozin (JARDIANCE) 10 MG TABS tablet     furosemide (LASIX) 20 MG tablet     HYDROcodone-acetaminophen (NORCO) 5-325 MG tablet     metoprolol tartrate (LOPRESSOR) 100 MG tablet     OMEPRAZOLE CPCR 20 MG OR     order for DME     PARoxetine (PAXIL) 20 MG tablet     Polyethyl Glycol-Propyl Glycol (SYSTANE OP)     potassium chloride ER (KLOR-CON M) 20 MEQ CR tablet     STATIN NOT PRESCRIBED, INTENTIONAL,     TRULICITY 1.5 MG/0.5ML pen     Vitamin D (Cholecalciferol) 25 MCG (1000 UT) TABS     warfarin ANTICOAGULANT (COUMADIN) 2 MG tablet     No current facility-administered medications for this visit.       Latest Reference Range & Units 02/12/25 12:11   Sodium 135 - 145 mmol/L 138   Potassium 3.4 - 5.3 mmol/L 4.3   Chloride 98 - 107 mmol/L 101   Carbon Dioxide (CO2) 22 - 29 mmol/L 26   Urea Nitrogen 8.0 - 23.0 mg/dL 33.9 (H)   Creatinine 0.51 - 0.95 mg/dL 2.18 (H)   GFR Estimate >60 mL/min/1.73m2 21 (L)    Calcium 8.8 - 10.4 mg/dL 9.3   Anion Gap 7 - 15 mmol/L 11   Phosphorus 2.5 - 4.5 mg/dL 3.9   Albumin 3.5 - 5.2 g/dL 4.2   Ferritin 11 - 328 ng/mL 51   Glucose 70 - 99 mg/dL 171 (H)   Iron 37 - 145 ug/dL  37 - 145 ug/dL 30 (L)  30 (L)   Iron Binding Capacity 240 - 430 ug/dL 367   Iron Sat Index 15 - 46 % 8 (L)           [facility] Reference Range & Units 04/04/25 10:27 04/08/25 09:41   Sodium 135 - 145 mmol/L  140   Potassium 3.4 - 5.3 mmol/L  4.4   Chloride 98 - 107 mmol/L  101   Carbon Dioxide (CO2) 22 - 29 mmol/L  26   Urea Nitrogen 8.0 - 23.0 mg/dL  27.8 (H)   Creatinine 0.51 - 0.95 mg/dL  1.92 (H)   GFR Estimate >60 mL/min/1.73m2  24 (L)   Calcium 8.8 - 10.4 mg/dL  8.8 - 10.4 mg/dL  9.8  9.8   Anion Gap 7 - 15 mmol/L  13   ALT 0 - 50 U/L  16   Glucose 70 - 99 mg/dL  275 (H)   Iron 37 - 145 ug/dL  31 (L)   Iron Binding Capacity 240 - 430 ug/dL  349   Iron Sat Index 15 - 46 %  9 (L)   N-Terminal Pro Bnp 0 - 1,800 pg/mL  6,424 (H)   WBC 4.0 - 11.0 10e3/uL  6.0   Hemoglobin 11.7 - 15.7 g/dL  10.9 (L)   Hematocrit 35.0 - 47.0 %  35.3   Platelet Count 150 - 450 10e3/uL  222   RBC Count 3.80 - 5.20 10e6/uL  3.85   MCV 78 - 100 fL  92   MCH 26.5 - 33.0 pg  28.3   MCHC 31.5 - 36.5 g/dL  30.9 (L)   RDW 10.0 - 15.0 %  17.0 (H)   INR HOME MONITORING 2 - 3 RATIO 1.8 (L)    (H): Data is abnormally high  (L): Data is abnormally low  Wt Readings from Last 24 Encounters:   03/07/25 71.2 kg (157 lb)   02/12/25 77.3 kg (170 lb 8 oz)   09/05/24 74.9 kg (165 lb 3.2 oz)   05/23/24 77.7 kg (171 lb 4.8 oz)   11/28/23 99.2 kg (218 lb 11.2 oz)   03/02/23 88.5 kg (195 lb)   09/13/21 108.4 kg (239 lb)   10/22/20 114.3 kg (252 lb)   09/04/20 114.3 kg (252 lb)   04/22/20 113.4 kg (250 lb)   03/05/20 113.4 kg (250 lb)   01/07/20 113.4 kg (250 lb)   10/03/19 117.5 kg (259 lb)   04/29/19 117.5 kg (259 lb)   11/28/18 110.6 kg (243 lb 14.4 oz)   09/13/18 114.1 kg (251 lb 9.6 oz)   01/08/18 114.3 kg (252 lb)   11/07/17 113.4 kg (250 lb)   07/27/17  115.2 kg (254 lb)   05/24/17 113.6 kg (250 lb 8 oz)   05/15/17 115.7 kg (255 lb)   04/27/17 113.9 kg (251 lb)   02/20/17 115.2 kg (254 lb)   10/27/16 115.8 kg (255 lb 6.4 oz)           CC Dr. Abrams      Please do not hesitate to contact me if you have any questions/concerns.     Sincerely,     Akbar Thompson MD

## 2025-04-08 NOTE — PATIENT INSTRUCTIONS
You were seen today in the Pulmonary Hypertension Clinic at the [facility].     Cardiology Provider you saw during your visit:    Dr. Thompson    Medication Changes:  - STOP oral iron  - STOP Omeprazole     Results:   Component      Latest Ref Rng 4/8/2025  9:41 AM   Sodium      135 - 145 mmol/L 140    Potassium      3.4 - 5.3 mmol/L 4.4    Chloride      98 - 107 mmol/L 101    Carbon Dioxide (CO2)      22 - 29 mmol/L 26    Anion Gap      7 - 15 mmol/L 13    Urea Nitrogen      8.0 - 23.0 mg/dL 27.8 (H)    Creatinine      0.51 - 0.95 mg/dL 1.92 (H)    GFR Estimate      >60 mL/min/1.73m2 24 (L)    Calcium      8.8 - 10.4 mg/dL 9.8    Glucose      70 - 99 mg/dL 275 (H)    WBC      4.0 - 11.0 10e3/uL 6.0    RBC Count      3.80 - 5.20 10e6/uL 3.85    Hemoglobin      11.7 - 15.7 g/dL 10.9 (L)    Hematocrit      35.0 - 47.0 % 35.3    MCV      78 - 100 fL 92    MCH      26.5 - 33.0 pg 28.3    MCHC      31.5 - 36.5 g/dL 30.9 (L)    RDW      10.0 - 15.0 % 17.0 (H)    Platelet Count      150 - 450 10e3/uL 222    N-Terminal Pro Bnp      0 - 1,800 pg/mL 6,424 (H)       Legend:  (H) High  (L) Low    Recommendations:   - IV iron infusions 2 rounds, 1 week apart. Please call [phone] to get scheduled  - Follow up with Dr. Abrams in 1 week to monitor your kidney function    Follow-up:   - 6 week follow up with Dr. Thompson, labs prior    Please call us immediately if you have any syncope (fainting or passing out), chest pain, edema (swelling or weight gain), or decline in your functional status (general decline in how you are feeling).    If you have emergent concerns after hours or on the weekend, please call our on-call Cardiologist at [phone], option 4. For emergencies call [phone].     Thank you for allowing us to be a part of your care here at the [facility]    If you have questions or concerns please contact us at:    Arden Iyer RN (P: [phone])    Nurse Coordinator        Pulmonary Hypertension     [facility]         JOVON Florentino   (Prior Auths & Pt Assistance)   ()  Clinic   Clinic   Pulmonary Hypertension   Pulmonary Hypertension  [facility]  (P)[phone]    (P) [phone]  (F) [phone]

## 2025-04-08 NOTE — NURSING NOTE
Follow Up Plan:  - STOP OTC iron and omeprazole   - IV iron infusions 2 rounds, 1 week apart. Please call [phone] to get scheduled  - Follow up with Dr. Abrams in 1 week to monitor your kidney function  - 6 week follow up with Dr. Thompson, labs prior    Orders placed and patient escorted to Pods to schedule F/U testing. Dilcia Marino RN on 4/8/2025 at 12:34 PM

## 2025-04-08 NOTE — NURSING NOTE
Chief Complaint   Patient presents with    Follow Up     RETURN PULMONARY HYPERTENSION     Vitals were taken and medications reconciled.    Stepan Villarreal, MAXWELL  11:34 AM

## 2025-04-09 LAB
LOCATION OF TASK: NORMAL
LOCATION OF TASK: NORMAL
PROT ELPH PNL UR ELPH: NORMAL
PROT PATTERN SERPL IFE-IMP: NORMAL

## 2025-04-10 ENCOUNTER — ANTICOAGULATION THERAPY VISIT (OUTPATIENT)
Dept: ANTICOAGULATION | Facility: CLINIC | Age: OVER 89
End: 2025-04-10
Payer: COMMERCIAL

## 2025-04-10 DIAGNOSIS — Z79.01 LONG TERM CURRENT USE OF ANTICOAGULANT THERAPY: Primary | ICD-10-CM

## 2025-04-10 DIAGNOSIS — I48.19 PERSISTENT ATRIAL FIBRILLATION (H): ICD-10-CM

## 2025-04-10 LAB — INR HOME MONITORING: 2.3 RATIO (ref 2–3)

## 2025-04-10 NOTE — PROGRESS NOTES
ANTICOAGULATION MANAGEMENT     Mara Colindres 89 year old female is on warfarin with therapeutic INR result. (Goal INR 2.0-3.0)    Recent labs: (last 7 days)     04/10/25  1349   INR 2.3       ASSESSMENT     Source(s): Chart Review and Patient/Caregiver Call     Warfarin doses taken: Warfarin taken as instructed  Diet: No new diet changes identified  Medication/supplement changes:  Stopping OTC iron and omeprazole  New illness, injury, or hospitalization: No  Signs or symptoms of bleeding or clotting: No  Previous result: Subtherapeutic  Additional findings:  Per cardiology office visit on 4/8, stopping OTC iron and omeprazole. Will be receiving 2 rounds of IV iron infusions.       PLAN     Recommended plan for no diet, medication or health factor changes affecting INR     Dosing Instructions: Continue your current warfarin dose with next INR in 1 week       Summary  As of 4/10/2025      Full warfarin instructions:  2 mg every Sun, Thu; 4 mg all other days   Next INR check:  4/17/2025               Telephone call with Rick (Son) who verbalizes understanding and agrees to plan    Patient to recheck with home meter    Education provided: Please call back if any changes to your diet, medications or how you've been taking warfarin    Plan made per [facility] anticoagulation protocol    Herbert Bean RN  4/10/2025  Anticoagulation Clinic  [employer] for routing messages: p ANTICOAG HOME MONITORING  [facility] patient phone line: [phone]        _______________________________________________________________________     Anticoagulation Episode Summary       Current INR goal:  2.0-3.0   TTR:  72.9% (1 y)   Target end date:  Indefinite   Send INR reminders to:  ANTICOAG HOME MONITORING    Indications    Long-term (current) use of anticoagulants [Z79.01] [Z79.01]  Persistent atrial fibrillation (H) [I48.19]             Comments:  mdINR home meter. Manage by exception.             Anticoagulation Care Providers       Provider  Role Specialty Phone number    Steven Abrams MD Referring Internal Medicine [phone]

## 2025-04-11 ENCOUNTER — TELEPHONE (OUTPATIENT)
Dept: FAMILY MEDICINE | Facility: CLINIC | Age: OVER 89
End: 2025-04-11
Payer: COMMERCIAL

## 2025-04-11 DIAGNOSIS — E11.22 CONTROLLED TYPE 2 DIABETES MELLITUS WITH STAGE 3 CHRONIC KIDNEY DISEASE, WITHOUT LONG-TERM CURRENT USE OF INSULIN (H): Primary | ICD-10-CM

## 2025-04-11 DIAGNOSIS — N18.30 CONTROLLED TYPE 2 DIABETES MELLITUS WITH STAGE 3 CHRONIC KIDNEY DISEASE, WITHOUT LONG-TERM CURRENT USE OF INSULIN (H): Primary | ICD-10-CM

## 2025-04-11 NOTE — TELEPHONE ENCOUNTER
FYI - Status Update    Who is Calling: patient    Update: pt wnts to know if dr garcia can use the labs from her last draw per cardiologist frm 4/8 or will he need her to do more labs. Please give pt a call if labs are needed, otherwise she will just come into the appt.    Does caller want a call/response back: Yes     Okay to leave a detailed message?: Yes at Home number on file [phone] (home)

## 2025-04-14 ENCOUNTER — TELEPHONE (OUTPATIENT)
Dept: CARDIOLOGY | Facility: CLINIC | Age: OVER 89
End: 2025-04-14
Payer: COMMERCIAL

## 2025-04-14 NOTE — TELEPHONE ENCOUNTER
The one laboratory I most hoped to see was the hemoglobin a1c  [ diabetes test ]. It was last tested about 7 months ago and that's the central way to follow the course of a patients diabetes mellitus type 2 .    Lab Results   Component Value Date    A1C 6.9 09/05/2024    A1C 7.6 01/16/2024    A1C 8.7 08/28/2023    A1C 7.4 11/09/2022    A1C 7.7 06/03/2022    A1C 7.1 08/11/2020    A1C 7.0 04/29/2019    A1C 7.3 07/09/2018    A1C 7.8 11/07/2017    A1C 8.2 07/27/2017     Unfortunately we've just missed the cut off to add any more orders onto the blood we had from the laboratory draw patient had done recently [ about 6 days ago ]. We are not in an urgent need for this laboratory. I have ordered this but anytime within the next few months would be ok.    Hopefully this answers the question. Otherwise Reroute if additional input requested from me     Steven Abrams MD      Health Maintenance Due   Topic Date Due    COPD ACTION PLAN  Never done    DTAP/TDAP/TD IMMUNIZATION (1 - Tdap) 08/17/2011    HF ACTION PLAN  04/29/2022    DIABETIC FOOT EXAM  06/03/2023    DEXA  09/11/2023    MEDICARE ANNUAL WELLNESS VISIT  03/02/2024    URINE DRUG SCREEN  08/28/2024    A1C  03/05/2025    COVID-19 Vaccine (8 - 2024-25 season) 04/14/2025

## 2025-04-14 NOTE — TELEPHONE ENCOUNTER
Patient returned call. Patient notified of provider message as written. Patient verbalized good understanding.     Tierra العراقي, RN  [facility]

## 2025-04-14 NOTE — TELEPHONE ENCOUNTER
Attempt #1 to call patient.     RN left voicemail and requested return call to [facility] at #[phone]. When patient returns call, please inform her that Dr. Abrams would like her to come back to the lab within the next couple of months for a hemoglobin A1C (unable to add lab to previously drawn specimen).    Katerine MOSQUERA RN  [facility]

## 2025-04-14 NOTE — TELEPHONE ENCOUNTER
Spoke with pt about getting her iron infusions set up, but she had already made the appts earlier in the day.    Arden Iyer RN on 4/14/2025 at 2:21 PM

## 2025-04-16 DIAGNOSIS — D50.9 IRON DEFICIENCY ANEMIA, UNSPECIFIED IRON DEFICIENCY ANEMIA TYPE: Primary | ICD-10-CM

## 2025-04-16 DIAGNOSIS — N18.4 CKD (CHRONIC KIDNEY DISEASE) STAGE 4, GFR 15-29 ML/MIN (H): ICD-10-CM

## 2025-04-16 RX ORDER — METHYLPREDNISOLONE SODIUM SUCCINATE 40 MG/ML
40 INJECTION INTRAMUSCULAR; INTRAVENOUS
Start: 2025-04-16

## 2025-04-16 RX ORDER — ALBUTEROL SULFATE 90 UG/1
1-2 INHALANT RESPIRATORY (INHALATION)
Start: 2025-04-16

## 2025-04-16 RX ORDER — HEPARIN SODIUM (PORCINE) LOCK FLUSH IV SOLN 100 UNIT/ML 100 UNIT/ML
5 SOLUTION INTRAVENOUS
OUTPATIENT
Start: 2025-04-16

## 2025-04-16 RX ORDER — ALBUTEROL SULFATE 0.83 MG/ML
2.5 SOLUTION RESPIRATORY (INHALATION)
OUTPATIENT
Start: 2025-04-16

## 2025-04-16 RX ORDER — DIPHENHYDRAMINE HYDROCHLORIDE 50 MG/ML
25 INJECTION, SOLUTION INTRAMUSCULAR; INTRAVENOUS
Start: 2025-04-16

## 2025-04-16 RX ORDER — EPINEPHRINE 1 MG/ML
0.3 INJECTION, SOLUTION, CONCENTRATE INTRAVENOUS EVERY 5 MIN PRN
OUTPATIENT
Start: 2025-04-16

## 2025-04-16 RX ORDER — HEPARIN SODIUM,PORCINE 10 UNIT/ML
5-20 VIAL (ML) INTRAVENOUS DAILY PRN
OUTPATIENT
Start: 2025-04-16

## 2025-04-16 RX ORDER — DIPHENHYDRAMINE HYDROCHLORIDE 50 MG/ML
50 INJECTION, SOLUTION INTRAMUSCULAR; INTRAVENOUS
Start: 2025-04-16

## 2025-04-17 ENCOUNTER — INFUSION THERAPY VISIT (OUTPATIENT)
Dept: INFUSION THERAPY | Facility: CLINIC | Age: OVER 89
End: 2025-04-17
Attending: INTERNAL MEDICINE
Payer: COMMERCIAL

## 2025-04-17 VITALS
SYSTOLIC BLOOD PRESSURE: 104 MMHG | HEART RATE: 77 BPM | DIASTOLIC BLOOD PRESSURE: 70 MMHG | OXYGEN SATURATION: 100 % | TEMPERATURE: 97.9 F | RESPIRATION RATE: 18 BRPM

## 2025-04-17 DIAGNOSIS — D50.9 IRON DEFICIENCY ANEMIA, UNSPECIFIED IRON DEFICIENCY ANEMIA TYPE: Primary | ICD-10-CM

## 2025-04-17 DIAGNOSIS — N18.4 CKD (CHRONIC KIDNEY DISEASE) STAGE 4, GFR 15-29 ML/MIN (H): ICD-10-CM

## 2025-04-17 PROCEDURE — 258N000003 HC RX IP 258 OP 636: Performed by: INTERNAL MEDICINE

## 2025-04-17 PROCEDURE — 250N000011 HC RX IP 250 OP 636: Performed by: INTERNAL MEDICINE

## 2025-04-17 RX ORDER — ALBUTEROL SULFATE 90 UG/1
1-2 INHALANT RESPIRATORY (INHALATION)
Start: 2025-04-19

## 2025-04-17 RX ORDER — ALBUTEROL SULFATE 0.83 MG/ML
2.5 SOLUTION RESPIRATORY (INHALATION)
OUTPATIENT
Start: 2025-04-19

## 2025-04-17 RX ORDER — DIPHENHYDRAMINE HYDROCHLORIDE 50 MG/ML
25 INJECTION, SOLUTION INTRAMUSCULAR; INTRAVENOUS
Start: 2025-04-19

## 2025-04-17 RX ORDER — HEPARIN SODIUM,PORCINE 10 UNIT/ML
5-20 VIAL (ML) INTRAVENOUS DAILY PRN
OUTPATIENT
Start: 2025-04-19

## 2025-04-17 RX ORDER — METHYLPREDNISOLONE SODIUM SUCCINATE 40 MG/ML
40 INJECTION INTRAMUSCULAR; INTRAVENOUS
Start: 2025-04-19

## 2025-04-17 RX ORDER — HEPARIN SODIUM (PORCINE) LOCK FLUSH IV SOLN 100 UNIT/ML 100 UNIT/ML
5 SOLUTION INTRAVENOUS
OUTPATIENT
Start: 2025-04-19

## 2025-04-17 RX ORDER — EPINEPHRINE 1 MG/ML
0.3 INJECTION, SOLUTION INTRAMUSCULAR; SUBCUTANEOUS EVERY 5 MIN PRN
OUTPATIENT
Start: 2025-04-19

## 2025-04-17 RX ORDER — DIPHENHYDRAMINE HYDROCHLORIDE 50 MG/ML
50 INJECTION, SOLUTION INTRAMUSCULAR; INTRAVENOUS
Start: 2025-04-19

## 2025-04-17 RX ADMIN — IRON SUCROSE 300 MG: 20 INJECTION, SOLUTION INTRAVENOUS at 08:36

## 2025-04-17 ASSESSMENT — PAIN SCALES - GENERAL: PAINLEVEL_OUTOF10: MILD PAIN (3)

## 2025-04-17 NOTE — PATIENT INSTRUCTIONS
Dear Mara Colindres    Thank you for choosing [facility] ([facility] for your infusion.  The following information is a summary of our appointment as well as important reminders.      If you have any questions on your upcoming Specialty Infusion appointments, please call scheduling at [phone].  It was a pleasure taking care of you today.    Sincerely,    [facility]  [address] [place] [address]  [id number]  Phone:  [phone]

## 2025-04-17 NOTE — PROGRESS NOTES
Infusion Nursing Note:  Mara Colindres presents today for Venofer (dose 1 of 3).    Patient seen by provider today: No   present during visit today: Not Applicable.    Note: Venofer infused over 90 mins.    Message sent to scheduling to set up an additional Venofer infusion appointment.    Intravenous Access:  Peripheral IV placed.    Treatment Conditions:  Not Applicable.    BP 93/61 (BP Location: Left arm, Patient Position: Sitting, Cuff Size: Adult Regular)   Pulse 77   Temp 97.9  F (36.6  C) (Oral)   Resp 18   SpO2 100%     Administrations This Visit       iron sucrose (VENOFER) 300 mg in sodium chloride 0.9 % 290 mL intermittent infusion       Admin Date  04/17/2025 Action  $New Bag Dose  300 mg Rate  193.3 mL/hr Route  Intravenous Documented By  Shellie Treviño RN                  Post Infusion Assessment:  Patient tolerated infusion without incident.  Site patent and intact, free from redness, edema or discomfort.  No evidence of extravasations.  Access discontinued per protocol.     Discharge Plan:   Copy of AVS reviewed with patient and/or family.  Patient will return 4/24/25 for next appointment.  Patient discharged in stable condition accompanied by: self.  Departure Mode: Wheelchair.    Shellie Treviño, RN

## 2025-04-24 ENCOUNTER — ANTICOAGULATION THERAPY VISIT (OUTPATIENT)
Dept: ANTICOAGULATION | Facility: CLINIC | Age: OVER 89
End: 2025-04-24
Payer: COMMERCIAL

## 2025-04-24 ENCOUNTER — INFUSION THERAPY VISIT (OUTPATIENT)
Dept: INFUSION THERAPY | Facility: CLINIC | Age: OVER 89
End: 2025-04-24
Attending: INTERNAL MEDICINE
Payer: COMMERCIAL

## 2025-04-24 VITALS
DIASTOLIC BLOOD PRESSURE: 63 MMHG | SYSTOLIC BLOOD PRESSURE: 102 MMHG | TEMPERATURE: 97.7 F | HEART RATE: 90 BPM | OXYGEN SATURATION: 97 % | RESPIRATION RATE: 17 BRPM

## 2025-04-24 DIAGNOSIS — N18.4 CKD (CHRONIC KIDNEY DISEASE) STAGE 4, GFR 15-29 ML/MIN (H): Primary | ICD-10-CM

## 2025-04-24 DIAGNOSIS — Z79.01 LONG TERM CURRENT USE OF ANTICOAGULANT THERAPY: Primary | ICD-10-CM

## 2025-04-24 DIAGNOSIS — D50.9 IRON DEFICIENCY ANEMIA, UNSPECIFIED IRON DEFICIENCY ANEMIA TYPE: ICD-10-CM

## 2025-04-24 DIAGNOSIS — I48.19 PERSISTENT ATRIAL FIBRILLATION (H): ICD-10-CM

## 2025-04-24 LAB — INR HOME MONITORING: 1.9 RATIO (ref 2–3)

## 2025-04-24 PROCEDURE — 250N000011 HC RX IP 250 OP 636: Performed by: INTERNAL MEDICINE

## 2025-04-24 PROCEDURE — 258N000003 HC RX IP 258 OP 636: Performed by: INTERNAL MEDICINE

## 2025-04-24 RX ORDER — ALBUTEROL SULFATE 90 UG/1
1-2 INHALANT RESPIRATORY (INHALATION)
Start: 2025-04-25

## 2025-04-24 RX ORDER — DIPHENHYDRAMINE HYDROCHLORIDE 50 MG/ML
50 INJECTION, SOLUTION INTRAMUSCULAR; INTRAVENOUS
Start: 2025-04-25

## 2025-04-24 RX ORDER — DIPHENHYDRAMINE HYDROCHLORIDE 50 MG/ML
25 INJECTION, SOLUTION INTRAMUSCULAR; INTRAVENOUS
Start: 2025-04-25

## 2025-04-24 RX ORDER — ALBUTEROL SULFATE 0.83 MG/ML
2.5 SOLUTION RESPIRATORY (INHALATION)
OUTPATIENT
Start: 2025-04-25

## 2025-04-24 RX ORDER — HEPARIN SODIUM,PORCINE 10 UNIT/ML
5-20 VIAL (ML) INTRAVENOUS DAILY PRN
OUTPATIENT
Start: 2025-04-25

## 2025-04-24 RX ORDER — METHYLPREDNISOLONE SODIUM SUCCINATE 40 MG/ML
40 INJECTION INTRAMUSCULAR; INTRAVENOUS
Start: 2025-04-25

## 2025-04-24 RX ORDER — HEPARIN SODIUM (PORCINE) LOCK FLUSH IV SOLN 100 UNIT/ML 100 UNIT/ML
5 SOLUTION INTRAVENOUS
OUTPATIENT
Start: 2025-04-25

## 2025-04-24 RX ORDER — EPINEPHRINE 1 MG/ML
0.3 INJECTION, SOLUTION INTRAMUSCULAR; SUBCUTANEOUS EVERY 5 MIN PRN
OUTPATIENT
Start: 2025-04-25

## 2025-04-24 RX ADMIN — IRON SUCROSE 300 MG: 20 INJECTION, SOLUTION INTRAVENOUS at 15:58

## 2025-04-24 NOTE — PROGRESS NOTES
Infusion Nursing Note:  Mara Colindres presents today for Venofer.    Patient seen by provider today: No   present during visit today: Not Applicable.    Note: Venofer infused over 90 minutes.    Administrations This Visit       iron sucrose (VENOFER) 300 mg in sodium chloride 0.9 % 290 mL intermittent infusion       Admin Date  04/24/2025 Action  $New Bag Dose  300 mg Rate  193.3 mL/hr Route  Intravenous Documented By  Ayah Ambrosio, RN                   Intravenous Access:  Peripheral IV placed.    Treatment Conditions:  None.        -Care transfer to late shift nurse.    /63 (BP Location: Left arm, Patient Position: Sitting, Cuff Size: Adult Regular)   Pulse 90   Temp 97.7  F (36.5  C) (Oral)   Resp 17   SpO2 97%      Ayah Ambrosio, RN

## 2025-04-24 NOTE — PROGRESS NOTES
ANTICOAGULATION MANAGEMENT     Mara Colindres 89 year old female is on warfarin with subtherapeutic INR result. (Goal INR 2.0-3.0)    Recent labs: (last 7 days)     04/24/25  1455   INR 1.9*       ASSESSMENT     Source(s): Chart Review and Patient/Caregiver Call     Warfarin doses taken: Warfarin taken as instructed  Diet: No new diet changes identified  Medication/supplement changes:  Venofer infusion first  of 3 on 4/17/25 No interaction anticipated then 2nd infusion today and last will be in one week.  New illness, injury, or hospitalization: No  Signs or symptoms of bleeding or clotting: No  Previous result: Therapeutic last 2(+) visits  Additional findings: None       PLAN     Recommended plan for temporary change(s) affecting INR     Dosing Instructions: Continue your current warfarin dose with next INR in 1 week       Summary  As of 4/24/2025      Full warfarin instructions:  2 mg every Sun, Thu; 4 mg all other days   Next INR check:  5/1/2025               Telephone call with patient's son Hong who verbalizes understanding and agrees to plan    Patient to recheck with home meter    Education provided: Interaction IS NOT anticipated between warfarin and venofer  Contact [phone] with any changes, questions or concerns.     Plan made per [facility] anticoagulation protocol    Coral Carlisle RN  4/24/2025  Anticoagulation Clinic  creads for routing messages: p ANTICOAG HOME MONITORING  [facility] patient phone line: [phone]        _______________________________________________________________________     Anticoagulation Episode Summary       Current INR goal:  2.0-3.0   TTR:  72.4% (1 y)   Target end date:  Indefinite   Send INR reminders to:  ANTICOAG HOME MONITORING    Indications    Long-term (current) use of anticoagulants [Z79.01] [Z79.01]  Persistent atrial fibrillation (H) [I48.19]             Comments:  [employer] home meter. Manage by exception.             Anticoagulation Care Providers       Provider  Role Specialty Phone number    Steven Abrams MD Referring Internal Medicine [phone]

## 2025-04-24 NOTE — PATIENT INSTRUCTIONS
Dear Mara Colindres    Thank you for choosing [facility] ([facility] for your infusion.  The following information is a summary of our appointment as well as important reminders.      If you have any questions on your upcoming Specialty [facility] appointments, please call scheduling at [phone].  It was a pleasure taking care of you today.    Sincerely,    [facility]  [facility]  [address] [place] [address]  [id number]  Phone:  [phone]

## 2025-04-30 ENCOUNTER — INFUSION THERAPY VISIT (OUTPATIENT)
Dept: INFUSION THERAPY | Facility: CLINIC | Age: OVER 89
End: 2025-04-30
Attending: INTERNAL MEDICINE
Payer: COMMERCIAL

## 2025-04-30 VITALS
HEART RATE: 85 BPM | SYSTOLIC BLOOD PRESSURE: 101 MMHG | OXYGEN SATURATION: 98 % | TEMPERATURE: 97.7 F | DIASTOLIC BLOOD PRESSURE: 62 MMHG | RESPIRATION RATE: 16 BRPM

## 2025-04-30 DIAGNOSIS — D50.9 IRON DEFICIENCY ANEMIA, UNSPECIFIED IRON DEFICIENCY ANEMIA TYPE: ICD-10-CM

## 2025-04-30 DIAGNOSIS — N18.4 CKD (CHRONIC KIDNEY DISEASE) STAGE 4, GFR 15-29 ML/MIN (H): Primary | ICD-10-CM

## 2025-04-30 PROCEDURE — 258N000003 HC RX IP 258 OP 636: Performed by: INTERNAL MEDICINE

## 2025-04-30 PROCEDURE — 96366 THER/PROPH/DIAG IV INF ADDON: CPT

## 2025-04-30 PROCEDURE — 250N000011 HC RX IP 250 OP 636: Mod: JZ | Performed by: INTERNAL MEDICINE

## 2025-04-30 PROCEDURE — 96365 THER/PROPH/DIAG IV INF INIT: CPT

## 2025-04-30 RX ORDER — DIPHENHYDRAMINE HYDROCHLORIDE 50 MG/ML
25 INJECTION, SOLUTION INTRAMUSCULAR; INTRAVENOUS
Status: CANCELLED
Start: 2025-04-30

## 2025-04-30 RX ORDER — HEPARIN SODIUM (PORCINE) LOCK FLUSH IV SOLN 100 UNIT/ML 100 UNIT/ML
5 SOLUTION INTRAVENOUS
Status: CANCELLED | OUTPATIENT
Start: 2025-04-30

## 2025-04-30 RX ORDER — ALBUTEROL SULFATE 0.83 MG/ML
2.5 SOLUTION RESPIRATORY (INHALATION)
Status: CANCELLED | OUTPATIENT
Start: 2025-04-30

## 2025-04-30 RX ORDER — HEPARIN SODIUM,PORCINE 10 UNIT/ML
5-20 VIAL (ML) INTRAVENOUS DAILY PRN
Status: CANCELLED | OUTPATIENT
Start: 2025-04-30

## 2025-04-30 RX ORDER — EPINEPHRINE 1 MG/ML
0.3 INJECTION, SOLUTION INTRAMUSCULAR; SUBCUTANEOUS EVERY 5 MIN PRN
Status: CANCELLED | OUTPATIENT
Start: 2025-04-30

## 2025-04-30 RX ORDER — METHYLPREDNISOLONE SODIUM SUCCINATE 40 MG/ML
40 INJECTION INTRAMUSCULAR; INTRAVENOUS
Status: CANCELLED
Start: 2025-04-30

## 2025-04-30 RX ORDER — DIPHENHYDRAMINE HYDROCHLORIDE 50 MG/ML
50 INJECTION, SOLUTION INTRAMUSCULAR; INTRAVENOUS
Status: CANCELLED
Start: 2025-04-30

## 2025-04-30 RX ORDER — ALBUTEROL SULFATE 90 UG/1
1-2 INHALANT RESPIRATORY (INHALATION)
Status: CANCELLED
Start: 2025-04-30

## 2025-04-30 RX ADMIN — IRON SUCROSE 300 MG: 20 INJECTION, SOLUTION INTRAVENOUS at 13:44

## 2025-04-30 NOTE — PROGRESS NOTES
Nursing Note  [facility]ara Colindres [facility] for: Venofer  During today's [facility] appointment, orders from Dr. Thompson were completed.  Frequency: today is dose 3 of 3 total    Progress note:  Patient identification verified by name and date of birth.  Assessment completed.  Vitals recorded in Doc Flowsheets.  Patient was provided with education regarding medication/procedure and possible side effects.  Patient verbalized understanding.     present during visit today: Not Applicable.    Treatment Conditions: Non-applicable.    Infusion length and rate:  infusion given over approximately  90 minutes    Labs: were not ordered for this appointment.    Vascular access: peripheral IV placed today. At the end of infusion, PIV infiltrated. Small amount of edema and bruising at site. PIV removed, heat pack applied. Only iron left in the tubing remaining so another IV was not started. Pt instructed to use ice/heat to comfort and keep arm elevated when possible tonight.     Is the next appt scheduled? None planned    Post Infusion Assessment:  Patient tolerated infusion without incident.     Discharge Plan:   Follow up plan of care with: ongoing infusions [facility].  Discharge instructions were reviewed with patient.  Patient/representative verbalized understanding of discharge instructions and all questions answered.  Patient discharged from [facility] in stable condition.    Susie Lindo RN    Administrations This Visit       iron sucrose (VENOFER) 300 mg in sodium chloride 0.9 % 290 mL intermittent infusion       Admin Date  04/30/2025 Action  $New Bag Dose  300 mg Rate  193.3 mL/hr Route  Intravenous Documented By  Susie Lindo RN                    /66 (BP Location: Right arm)   Pulse 90   Temp 97.7  F (36.5  C) (Oral)   Resp 16   SpO2 98%

## 2025-05-03 LAB — INR HOME MONITORING: 1.8 RATIO (ref 2–3)

## 2025-05-05 ENCOUNTER — ANTICOAGULATION THERAPY VISIT (OUTPATIENT)
Dept: ANTICOAGULATION | Facility: CLINIC | Age: OVER 89
End: 2025-05-05
Payer: COMMERCIAL

## 2025-05-05 DIAGNOSIS — I48.19 PERSISTENT ATRIAL FIBRILLATION (H): ICD-10-CM

## 2025-05-05 DIAGNOSIS — Z79.01 LONG TERM CURRENT USE OF ANTICOAGULANT THERAPY: Primary | ICD-10-CM

## 2025-05-05 NOTE — PROGRESS NOTES
Duplicate Encounter and Result.  See [facility] Encounter dated 5/2/25    Belkis Nunez, RN  Anticoagulation Nurse - [facility] [place]

## 2025-05-08 ENCOUNTER — ANTICOAGULATION THERAPY VISIT (OUTPATIENT)
Dept: ANTICOAGULATION | Facility: CLINIC | Age: OVER 89
End: 2025-05-08
Payer: COMMERCIAL

## 2025-05-08 ENCOUNTER — RESULTS FOLLOW-UP (OUTPATIENT)
Dept: ANTICOAGULATION | Facility: CLINIC | Age: OVER 89
End: 2025-05-08

## 2025-05-08 DIAGNOSIS — I48.19 PERSISTENT ATRIAL FIBRILLATION (H): ICD-10-CM

## 2025-05-08 DIAGNOSIS — Z79.01 LONG TERM CURRENT USE OF ANTICOAGULANT THERAPY: Primary | ICD-10-CM

## 2025-05-08 LAB — INR HOME MONITORING: 2.1 RATIO (ref 2–3)

## 2025-05-08 NOTE — PROGRESS NOTES
ANTICOAGULATION MANAGEMENT     Mara Colindres 89 year old female is on warfarin with therapeutic INR result. (Goal INR 2.0-3.0)    Recent labs: (last 7 days)     05/08/25  1246   INR 2.1       ASSESSMENT     Source(s): Chart Review and Patient/Caregiver Call     Warfarin doses taken: Warfarin taken as instructed  Diet: No new diet changes identified  Medication/supplement changes: None noted  New illness, injury, or hospitalization: No  Signs or symptoms of bleeding or clotting: No  Previous result: Subtherapeutic  Additional findings: will hold off resuming back to MBE at this time until INR more stable with newer dose        PLAN     Recommended plan for no diet, medication or health factor changes affecting INR     Dosing Instructions: Continue your current warfarin dose with next INR in 1 week       Summary  As of 5/8/2025      Full warfarin instructions:  2 mg every Thu; 4 mg all other days   Next INR check:  5/15/2025               Telephone call with sonRick who verbalizes understanding and agrees to plan    Patient to recheck with home meter    Education provided: Please call back if any changes to your diet, medications or how you've been taking warfarin    Plan made per [facility] anticoagulation protocol    Lesly Wright RN  5/8/2025  Anticoagulation Clinic  myJambi for routing messages: p ANTICOAG HOME MONITORING  [facility] patient phone line: [phone]        _______________________________________________________________________     Anticoagulation Episode Summary       Current INR goal:  2.0-3.0   TTR:  69.1% (1 y)   Target end date:  Indefinite   Send INR reminders to:  ANTICOAG HOME MONITORING    Indications    Long-term (current) use of anticoagulants [Z79.01] [Z79.01]  Persistent atrial fibrillation (H) [I48.19]             Comments:  [employer] home meter. Manage by exception.             Anticoagulation Care Providers       Provider Role Specialty Phone number    Steven Abrams MD Referring Internal  [facility] [phone]

## 2025-05-12 DIAGNOSIS — M15.0 PRIMARY OSTEOARTHRITIS INVOLVING MULTIPLE JOINTS: ICD-10-CM

## 2025-05-12 RX ORDER — HYDROCODONE BITARTRATE AND ACETAMINOPHEN 5; 325 MG/1; MG/1
1 TABLET ORAL DAILY PRN
Qty: 60 TABLET | Refills: 0 | Status: SHIPPED | OUTPATIENT
Start: 2025-05-12

## 2025-05-12 NOTE — TELEPHONE ENCOUNTER
Pt is not able to have her pain medication go through mail order pharmacy.    This must be sent locally.    Script discontinued from previous pharmacy and re pended to local pharmacy.    Please sign if agreeable.        AMAYA Odom    Triage Nurse  [facility]

## 2025-05-15 ENCOUNTER — ANTICOAGULATION THERAPY VISIT (OUTPATIENT)
Dept: ANTICOAGULATION | Facility: CLINIC | Age: OVER 89
End: 2025-05-15
Payer: COMMERCIAL

## 2025-05-15 ENCOUNTER — DOCUMENTATION ONLY (OUTPATIENT)
Dept: ANTICOAGULATION | Facility: CLINIC | Age: OVER 89
End: 2025-05-15
Payer: COMMERCIAL

## 2025-05-15 ENCOUNTER — RESULTS FOLLOW-UP (OUTPATIENT)
Dept: ANTICOAGULATION | Facility: CLINIC | Age: OVER 89
End: 2025-05-15

## 2025-05-15 DIAGNOSIS — Z79.01 CHRONIC ANTICOAGULATION: Primary | ICD-10-CM

## 2025-05-15 DIAGNOSIS — I48.19 PERSISTENT ATRIAL FIBRILLATION (H): ICD-10-CM

## 2025-05-15 LAB — INR HOME MONITORING: 2.4 RATIO (ref 2–3)

## 2025-05-15 NOTE — PROGRESS NOTES
ANTICOAGULATION MANAGEMENT     Mara Colindres 89 year old female is on warfarin with therapeutic INR result. (Goal INR 2.0-3.0)    Recent labs: (last 7 days)     05/15/25  1202   INR 2.4       ASSESSMENT     Source(s): Chart Review and Patient/Caregiver Call     Warfarin doses taken: Warfarin taken as instructed  Diet: No new diet changes identified  Medication/supplement changes: None noted  New illness, injury, or hospitalization: No  Signs or symptoms of bleeding or clotting: No  Previous result: Therapeutic last visit; previously outside of goal range  Additional findings: MD increased on 5/2/25. Consider MBE if next INR level is therapeutic.       PLAN     Recommended plan for ongoing change(s) affecting INR     Dosing Instructions: Continue your current warfarin dose with next INR in 1 week       Summary  As of 5/15/2025      Full warfarin instructions:  2 mg every Thu; 4 mg all other days   Next INR check:  5/22/2025               Telephone call with Rick (son) who verbalizes understanding and agrees to plan    Patient to recheck with home meter    Education provided: Contact [phone] with any changes, questions or concerns.     Plan made per [facility] anticoagulation protocol    Kristine Yanez RN  5/15/2025  Anticoagulation Clinic  [employer] for routing messages: p ANTICOAG HOME MONITORING  [facility] patient phone line: [phone]        _______________________________________________________________________     Anticoagulation Episode Summary       Current INR goal:  2.0-3.0   TTR:  70.3% (1 y)   Target end date:  Indefinite   Send INR reminders to:  ANTICOAG HOME MONITORING    Indications    Long-term (current) use of anticoagulants [Z79.01] [Z79.01]  Persistent atrial fibrillation (H) [I48.19]             Comments:  [employer] home meter. Manage by exception.             Anticoagulation Care Providers       Provider Role Specialty Phone number    Steven Abrams MD Referring Internal Medicine [phone]

## 2025-05-15 NOTE — PROGRESS NOTES
ANTICOAGULATION CLINIC REFERRAL RENEWAL REQUEST       An annual renewal order is required for all patients referred to [facility].?  Please review and sign the pended referral order for Mara MELVIN Colindres.       ANTICOAGULATION SUMMARY      Warfarin indication(s)   Atrial Fibrillation    Mechanical heart valve present?  NO       Current goal range   INR: 2.0-3.0     Goal appropriate for indication? Goal INR 2-3, standard for indication(s) above     Time in Therapeutic Range (TTR)  (Goal > 60%) 70.3%       Office visit with referring provider's group within last year yes on 5/2/25       Kristine Yanez RN  [facility]

## 2025-06-01 ENCOUNTER — NURSE TRIAGE (OUTPATIENT)
Dept: NURSING | Facility: CLINIC | Age: OVER 89
End: 2025-06-01
Payer: COMMERCIAL

## 2025-06-01 NOTE — TELEPHONE ENCOUNTER
Pt calling she woke about 8:30 am this morning and was incontinent of stool in bed; pt went into the bathroom and pt states she was bleeding that looked like a crime scene, got all the floor and kept coming which stopped.  Pt states that is she coughs or moves she can feel the bleeding continues.  Pt states she is too weak to stand, pt Son is with her in the house.  Pt intends to Call 911 now.  Valerie aBrr RN  [facility] Nurse Advisor    Reason for Disposition   Blood in or on bowel movement is main symptom   Shock suspected (e.g., cold/pale/clammy skin, too weak to stand, low BP, rapid pulse)    Additional Information   Negative: Foreign body in rectum   Negative: Diarrhea is main symptom   Negative: Constipation is main symptom (e.g., pain or discomfort caused by passage of hard BMs)    Protocols used: Rectal Symptoms-A-AH, Rectal Bleeding-A-AH

## 2025-06-02 ENCOUNTER — TELEPHONE (OUTPATIENT)
Dept: FAMILY MEDICINE | Facility: CLINIC | Age: OVER 89
End: 2025-06-02

## 2025-06-02 DIAGNOSIS — M81.0 AGE-RELATED OSTEOPOROSIS WITHOUT CURRENT PATHOLOGICAL FRACTURE: Primary | ICD-10-CM

## 2025-06-02 DIAGNOSIS — Z78.0 ASYMPTOMATIC MENOPAUSAL STATE: ICD-10-CM

## 2025-06-02 DIAGNOSIS — N18.4 CKD (CHRONIC KIDNEY DISEASE) STAGE 4, GFR 15-29 ML/MIN (H): ICD-10-CM

## 2025-06-02 NOTE — TELEPHONE ENCOUNTER
The following steps were completed to comply with the REMS program for Prolia:  Reviewed the serious risks of Prolia  and the symptoms of each risk.  Advised patient to seek prompt medical attention if they have signs or symptoms of any of the serious risks.  Patient will be provided a copy of the Medication Guide and Patient Brochure prior to first injection.    Steven Abrams MD

## 2025-06-02 NOTE — TELEPHONE ENCOUNTER
Routing to PCP.    Pt is scheduled on 6/10, pt needs a new referral placed for denosumab. Can you please place one as soon as possible for pt to make sure it goes through authorization process prior to appointment.     The one she has will be  on appointment date.     Vandana Bansal RN

## 2025-06-02 NOTE — PATIENT INSTRUCTIONS
You have selected the below site for your Prolia injection. If you did not schedule this appointment in clinic, please call the number listed below.  Patrice SRIDHAR) [phone]

## 2025-06-07 ENCOUNTER — TELEPHONE (OUTPATIENT)
Dept: FAMILY MEDICINE | Facility: CLINIC | Age: OVER 89
End: 2025-06-07
Payer: COMMERCIAL

## 2025-06-07 NOTE — TELEPHONE ENCOUNTER
FYI - Status Update    [facility]    Update: Mara enrolled into hospice today and discharging from hospital back home, wondering if PCP would like to continue getting updates from Hospice attendee. Hospice Doctor is happy to adjust any of the comfort meds for Mara    Would like a call back by Tuesday of next week    Does caller want a call/response back: Yes     Okay to leave a detailed message?: Yes at Other phone number:  [phone]

## 2025-06-08 DIAGNOSIS — E11.22 CONTROLLED TYPE 2 DIABETES MELLITUS WITH STAGE 3 CHRONIC KIDNEY DISEASE, WITHOUT LONG-TERM CURRENT USE OF INSULIN (H): ICD-10-CM

## 2025-06-08 DIAGNOSIS — N18.30 CONTROLLED TYPE 2 DIABETES MELLITUS WITH STAGE 3 CHRONIC KIDNEY DISEASE, WITHOUT LONG-TERM CURRENT USE OF INSULIN (H): ICD-10-CM

## 2025-06-09 ENCOUNTER — TELEPHONE (OUTPATIENT)
Dept: FAMILY MEDICINE | Facility: CLINIC | Age: OVER 89
End: 2025-06-09
Payer: COMMERCIAL

## 2025-06-09 RX ORDER — BLOOD SUGAR DIAGNOSTIC
STRIP MISCELLANEOUS
Qty: 300 STRIP | Refills: 3 | Status: SHIPPED | OUTPATIENT
Start: 2025-06-09

## 2025-06-09 NOTE — TELEPHONE ENCOUNTER
Attempt #1 to call Collin, with [facility].     No VM left due to unable to verify who's voice mail it was. Not sure if it was confidential and there was no name stated on the VM.     Will attempt to call again at a later time.     Vandana Bansal RN   [facility]

## 2025-06-09 NOTE — TELEPHONE ENCOUNTER
Patient has her care and directions of care handled by hospice caregiver, RN or MD     Further updates with me are strictly courtesy and are up to the hospice caregiver, RN or MD Steven Abrams MD

## 2025-06-09 NOTE — TELEPHONE ENCOUNTER
I am more then pleased to  serve as continued source of authorization for orders if this is ok with hospice care     Steven Abrams MD

## 2025-06-09 NOTE — TELEPHONE ENCOUNTER
Home Care is calling regarding an established patient with [facility].  Requesting orders from: Steven Abrams RN APPROVED: RN able to provide verbal orders.  Home Care will send orders for signature.  RN will close encounter.  Is this a request for a temporary pause in the home care episode?  No    Orders Requested  Can you follow patient for home care Hospice?      Phone number Home Care can be reached at: [facility]  Okay to leave a detailed message?: Yes    Contacts       Contact Date/Time Type Contact Phone/Fax    06/09/2025 12:54 PM CDT Phone (Incoming) [facility] [phone]          Roberta Davis RN

## 2025-06-09 NOTE — TELEPHONE ENCOUNTER
[facility] Writer spoke with Rg and relayed providers orders below to continue to follow for hospice orders. Rg verbalized understanding.     Vandana Bansal RN

## 2025-06-10 ENCOUNTER — TELEPHONE (OUTPATIENT)
Dept: FAMILY MEDICINE | Facility: CLINIC | Age: OVER 89
End: 2025-06-10

## 2025-06-10 NOTE — TELEPHONE ENCOUNTER
Patient missed her Prolia injection appointment today. Called and spoke with patient and she confirmed that she is now receiving hospice care and will no longer be receiving Prolia injections. She also requested that I cancel today's lab appointment as well. Appointments cancelled.    Patient wanted to pass along a message to Dr. Abrams (FYI only, no action required). She said she appreciates all the care you have given her over the years and thanks you for being a part of her life.    Katerine MOSQUERA RN  [facility]

## 2025-06-11 NOTE — TELEPHONE ENCOUNTER
Attempted to call, left vm stating PCP can receive updates out of courtesy but all other orders should be taken care of by hospice team and provider.     Thanks,  AMAYA Granados  [facility]

## 2025-06-19 ENCOUNTER — DOCUMENTATION ONLY (OUTPATIENT)
Dept: ANTICOAGULATION | Facility: CLINIC | Age: OVER 89
End: 2025-06-19
Payer: COMMERCIAL

## 2025-06-19 DIAGNOSIS — Z79.01 LONG TERM CURRENT USE OF ANTICOAGULANT THERAPY: Primary | ICD-10-CM

## 2025-06-19 DIAGNOSIS — I48.19 PERSISTENT ATRIAL FIBRILLATION (H): ICD-10-CM

## 2025-06-19 DIAGNOSIS — Z79.01 CHRONIC ANTICOAGULATION: ICD-10-CM

## 2025-06-19 NOTE — PROGRESS NOTES
ANTICOAGULATION  MANAGEMENT    Mara Colindres is being discharged from the [facility] ([facility]).    Reason for discharge: patient has enrolled in hospice and is no longer taking warfarin    Anticoagulation episode resolved, ACC referral closed, and Standing order discontinued    If patient needs warfarin management in the future, please send a new referral    Usha Riley RN

## 2025-07-15 ENCOUNTER — DOCUMENTATION ONLY (OUTPATIENT)
Dept: FAMILY MEDICINE | Facility: CLINIC | Age: OVER 89
End: 2025-07-15
Payer: COMMERCIAL

## 2025-07-15 DIAGNOSIS — I27.20 PULMONARY HYPERTENSION, UNSPECIFIED (H): Primary | ICD-10-CM

## 2025-08-07 PROBLEM — R53.83 FATIGUE, UNSPECIFIED TYPE: Status: RESOLVED | Noted: 2017-04-27 | Resolved: 2025-08-07

## 2025-08-11 ENCOUNTER — NURSE TRIAGE (OUTPATIENT)
Dept: INTERNAL MEDICINE | Facility: CLINIC | Age: OVER 89
End: 2025-08-11
Payer: COMMERCIAL

## 2025-09-04 ENCOUNTER — TELEPHONE (OUTPATIENT)
Dept: FAMILY MEDICINE | Facility: CLINIC | Age: OVER 89
End: 2025-09-04
Payer: COMMERCIAL

## (undated) RX ORDER — ALBUTEROL SULFATE 0.83 MG/ML
SOLUTION RESPIRATORY (INHALATION)
Status: DISPENSED
Start: 2019-11-07